# Patient Record
Sex: FEMALE | Race: WHITE | NOT HISPANIC OR LATINO | Employment: OTHER | URBAN - METROPOLITAN AREA
[De-identification: names, ages, dates, MRNs, and addresses within clinical notes are randomized per-mention and may not be internally consistent; named-entity substitution may affect disease eponyms.]

---

## 2017-02-01 ENCOUNTER — HOSPITAL ENCOUNTER (OUTPATIENT)
Dept: RADIOLOGY | Facility: MEDICAL CENTER | Age: 77
Discharge: HOME | End: 2017-02-01
Payer: MEDICARE

## 2017-02-01 ENCOUNTER — ALLSCRIPTS OFFICE VISIT (OUTPATIENT)
Dept: OTHER | Facility: OTHER | Age: 77
End: 2017-02-01

## 2017-02-01 DIAGNOSIS — S82.143A CLOSED DISPLACED BICONDYLAR FRACTURE OF TIBIA: ICD-10-CM

## 2017-02-01 DIAGNOSIS — Z47.89 ENCOUNTER FOR OTHER ORTHOPEDIC AFTERCARE: ICD-10-CM

## 2017-02-01 PROCEDURE — 73560 X-RAY EXAM OF KNEE 1 OR 2: CPT

## 2017-03-02 ENCOUNTER — GENERIC CONVERSION - ENCOUNTER (OUTPATIENT)
Dept: OTHER | Facility: OTHER | Age: 77
End: 2017-03-02

## 2017-03-02 ENCOUNTER — APPOINTMENT (OUTPATIENT)
Dept: PHYSICAL THERAPY | Facility: REHABILITATION | Age: 77
End: 2017-03-02
Payer: MEDICARE

## 2017-03-02 DIAGNOSIS — S82.143A CLOSED DISPLACED BICONDYLAR FRACTURE OF TIBIA: ICD-10-CM

## 2017-03-02 DIAGNOSIS — Z47.89 ENCOUNTER FOR OTHER ORTHOPEDIC AFTERCARE: ICD-10-CM

## 2017-03-02 PROCEDURE — G8979 MOBILITY GOAL STATUS: HCPCS | Performed by: PHYSICAL THERAPIST

## 2017-03-02 PROCEDURE — 97162 PT EVAL MOD COMPLEX 30 MIN: CPT

## 2017-03-02 PROCEDURE — G8978 MOBILITY CURRENT STATUS: HCPCS | Performed by: PHYSICAL THERAPIST

## 2017-03-08 ENCOUNTER — APPOINTMENT (OUTPATIENT)
Dept: PHYSICAL THERAPY | Facility: REHABILITATION | Age: 77
End: 2017-03-08
Payer: MEDICARE

## 2017-03-08 PROCEDURE — 97110 THERAPEUTIC EXERCISES: CPT

## 2017-03-08 PROCEDURE — 97140 MANUAL THERAPY 1/> REGIONS: CPT

## 2017-03-09 ENCOUNTER — APPOINTMENT (OUTPATIENT)
Dept: PHYSICAL THERAPY | Facility: REHABILITATION | Age: 77
End: 2017-03-09
Payer: MEDICARE

## 2017-03-09 ENCOUNTER — TRANSCRIBE ORDERS (OUTPATIENT)
Dept: ADMINISTRATIVE | Facility: HOSPITAL | Age: 77
End: 2017-03-09

## 2017-03-09 DIAGNOSIS — G31.84 MCI (MILD COGNITIVE IMPAIRMENT) WITH MEMORY LOSS: Primary | ICD-10-CM

## 2017-03-09 PROCEDURE — 97140 MANUAL THERAPY 1/> REGIONS: CPT

## 2017-03-09 PROCEDURE — 97110 THERAPEUTIC EXERCISES: CPT

## 2017-03-15 ENCOUNTER — APPOINTMENT (OUTPATIENT)
Dept: PHYSICAL THERAPY | Facility: REHABILITATION | Age: 77
End: 2017-03-15
Payer: MEDICARE

## 2017-03-17 ENCOUNTER — APPOINTMENT (OUTPATIENT)
Dept: PHYSICAL THERAPY | Facility: REHABILITATION | Age: 77
End: 2017-03-17
Payer: MEDICARE

## 2017-03-22 ENCOUNTER — ALLSCRIPTS OFFICE VISIT (OUTPATIENT)
Dept: OTHER | Facility: OTHER | Age: 77
End: 2017-03-22

## 2017-03-22 ENCOUNTER — HOSPITAL ENCOUNTER (OUTPATIENT)
Dept: RADIOLOGY | Facility: MEDICAL CENTER | Age: 77
Discharge: HOME | End: 2017-03-22
Payer: MEDICARE

## 2017-03-22 ENCOUNTER — APPOINTMENT (OUTPATIENT)
Dept: PHYSICAL THERAPY | Facility: REHABILITATION | Age: 77
End: 2017-03-22
Payer: MEDICARE

## 2017-03-22 DIAGNOSIS — S82.143A CLOSED DISPLACED BICONDYLAR FRACTURE OF TIBIA: ICD-10-CM

## 2017-03-22 DIAGNOSIS — Z47.89 ENCOUNTER FOR OTHER ORTHOPEDIC AFTERCARE: ICD-10-CM

## 2017-03-22 PROCEDURE — 73560 X-RAY EXAM OF KNEE 1 OR 2: CPT

## 2017-03-24 ENCOUNTER — APPOINTMENT (OUTPATIENT)
Dept: PHYSICAL THERAPY | Facility: REHABILITATION | Age: 77
End: 2017-03-24
Payer: MEDICARE

## 2017-03-24 PROCEDURE — 97110 THERAPEUTIC EXERCISES: CPT

## 2017-03-24 PROCEDURE — 97140 MANUAL THERAPY 1/> REGIONS: CPT

## 2017-03-28 ENCOUNTER — APPOINTMENT (OUTPATIENT)
Dept: LAB | Facility: CLINIC | Age: 77
End: 2017-03-28
Payer: MEDICARE

## 2017-03-28 ENCOUNTER — HOSPITAL ENCOUNTER (OUTPATIENT)
Dept: CT IMAGING | Facility: HOSPITAL | Age: 77
Discharge: HOME | End: 2017-03-28
Payer: MEDICARE

## 2017-03-28 DIAGNOSIS — G31.84 MCI (MILD COGNITIVE IMPAIRMENT) WITH MEMORY LOSS: ICD-10-CM

## 2017-03-28 LAB
BUN SERPL-MCNC: 14 MG/DL (ref 5–25)
CREAT SERPL-MCNC: 0.78 MG/DL (ref 0.6–1.3)
GFR SERPL CREATININE-BSD FRML MDRD: >60 ML/MIN/1.73SQ M

## 2017-03-28 PROCEDURE — 70450 CT HEAD/BRAIN W/O DYE: CPT

## 2017-03-28 PROCEDURE — 36415 COLL VENOUS BLD VENIPUNCTURE: CPT

## 2017-03-28 PROCEDURE — 82565 ASSAY OF CREATININE: CPT

## 2017-03-28 PROCEDURE — 84520 ASSAY OF UREA NITROGEN: CPT

## 2017-03-29 ENCOUNTER — APPOINTMENT (OUTPATIENT)
Dept: PHYSICAL THERAPY | Facility: REHABILITATION | Age: 77
End: 2017-03-29
Payer: MEDICARE

## 2017-03-30 ENCOUNTER — TRANSCRIBE ORDERS (OUTPATIENT)
Dept: ADMINISTRATIVE | Facility: HOSPITAL | Age: 77
End: 2017-03-30

## 2017-03-30 DIAGNOSIS — R41.0 CONFUSION: Primary | ICD-10-CM

## 2017-03-30 DIAGNOSIS — IMO0002 LUMP: ICD-10-CM

## 2017-03-31 ENCOUNTER — APPOINTMENT (OUTPATIENT)
Dept: PHYSICAL THERAPY | Facility: REHABILITATION | Age: 77
End: 2017-03-31
Payer: MEDICARE

## 2017-03-31 PROCEDURE — G8978 MOBILITY CURRENT STATUS: HCPCS | Performed by: PHYSICAL THERAPIST

## 2017-03-31 PROCEDURE — 97110 THERAPEUTIC EXERCISES: CPT

## 2017-03-31 PROCEDURE — G8979 MOBILITY GOAL STATUS: HCPCS | Performed by: PHYSICAL THERAPIST

## 2017-03-31 PROCEDURE — 97140 MANUAL THERAPY 1/> REGIONS: CPT

## 2017-04-04 ENCOUNTER — APPOINTMENT (OUTPATIENT)
Dept: PHYSICAL THERAPY | Facility: REHABILITATION | Age: 77
End: 2017-04-04
Payer: MEDICARE

## 2017-04-04 PROCEDURE — 97140 MANUAL THERAPY 1/> REGIONS: CPT

## 2017-04-04 PROCEDURE — 97110 THERAPEUTIC EXERCISES: CPT

## 2017-04-06 ENCOUNTER — APPOINTMENT (OUTPATIENT)
Dept: PHYSICAL THERAPY | Facility: REHABILITATION | Age: 77
End: 2017-04-06
Payer: MEDICARE

## 2017-04-06 PROCEDURE — 97110 THERAPEUTIC EXERCISES: CPT

## 2017-04-06 PROCEDURE — 97140 MANUAL THERAPY 1/> REGIONS: CPT

## 2017-04-10 ENCOUNTER — APPOINTMENT (OUTPATIENT)
Dept: PHYSICAL THERAPY | Facility: REHABILITATION | Age: 77
End: 2017-04-10
Payer: MEDICARE

## 2017-04-10 PROCEDURE — 97110 THERAPEUTIC EXERCISES: CPT

## 2017-04-10 PROCEDURE — 97140 MANUAL THERAPY 1/> REGIONS: CPT

## 2017-04-13 ENCOUNTER — APPOINTMENT (OUTPATIENT)
Dept: PHYSICAL THERAPY | Facility: REHABILITATION | Age: 77
End: 2017-04-13
Payer: MEDICARE

## 2017-04-13 PROCEDURE — 97140 MANUAL THERAPY 1/> REGIONS: CPT

## 2017-04-13 PROCEDURE — 97110 THERAPEUTIC EXERCISES: CPT

## 2017-04-17 ENCOUNTER — APPOINTMENT (OUTPATIENT)
Dept: PHYSICAL THERAPY | Facility: REHABILITATION | Age: 77
End: 2017-04-17
Payer: MEDICARE

## 2017-04-17 ENCOUNTER — GENERIC CONVERSION - ENCOUNTER (OUTPATIENT)
Dept: OTHER | Facility: OTHER | Age: 77
End: 2017-04-17

## 2017-04-17 PROCEDURE — 97110 THERAPEUTIC EXERCISES: CPT

## 2017-04-17 PROCEDURE — 97140 MANUAL THERAPY 1/> REGIONS: CPT

## 2017-04-17 PROCEDURE — G8978 MOBILITY CURRENT STATUS: HCPCS

## 2017-04-17 PROCEDURE — G8979 MOBILITY GOAL STATUS: HCPCS

## 2017-04-18 ENCOUNTER — HOSPITAL ENCOUNTER (OUTPATIENT)
Dept: MRI IMAGING | Facility: HOSPITAL | Age: 77
Discharge: HOME | End: 2017-04-18
Payer: MEDICARE

## 2017-04-18 DIAGNOSIS — R41.0 CONFUSION: ICD-10-CM

## 2017-04-18 PROCEDURE — 70551 MRI BRAIN STEM W/O DYE: CPT

## 2017-04-20 ENCOUNTER — APPOINTMENT (OUTPATIENT)
Dept: PHYSICAL THERAPY | Facility: REHABILITATION | Age: 77
End: 2017-04-20
Payer: MEDICARE

## 2017-04-20 PROCEDURE — 97110 THERAPEUTIC EXERCISES: CPT

## 2017-04-20 PROCEDURE — 97140 MANUAL THERAPY 1/> REGIONS: CPT

## 2017-04-24 ENCOUNTER — APPOINTMENT (OUTPATIENT)
Dept: PHYSICAL THERAPY | Facility: REHABILITATION | Age: 77
End: 2017-04-24
Payer: MEDICARE

## 2017-04-24 PROCEDURE — 97140 MANUAL THERAPY 1/> REGIONS: CPT

## 2017-04-24 PROCEDURE — 97110 THERAPEUTIC EXERCISES: CPT

## 2017-04-27 ENCOUNTER — APPOINTMENT (OUTPATIENT)
Dept: PHYSICAL THERAPY | Facility: REHABILITATION | Age: 77
End: 2017-04-27
Payer: MEDICARE

## 2017-04-27 PROCEDURE — 97140 MANUAL THERAPY 1/> REGIONS: CPT

## 2017-04-27 PROCEDURE — 97110 THERAPEUTIC EXERCISES: CPT

## 2017-05-01 ENCOUNTER — APPOINTMENT (OUTPATIENT)
Dept: PHYSICAL THERAPY | Facility: REHABILITATION | Age: 77
End: 2017-05-01
Payer: MEDICARE

## 2017-05-01 PROCEDURE — 97140 MANUAL THERAPY 1/> REGIONS: CPT

## 2017-05-01 PROCEDURE — 97110 THERAPEUTIC EXERCISES: CPT

## 2017-05-04 ENCOUNTER — APPOINTMENT (OUTPATIENT)
Dept: PHYSICAL THERAPY | Facility: REHABILITATION | Age: 77
End: 2017-05-04
Payer: MEDICARE

## 2017-05-04 PROCEDURE — 97140 MANUAL THERAPY 1/> REGIONS: CPT

## 2017-05-04 PROCEDURE — 97110 THERAPEUTIC EXERCISES: CPT

## 2017-05-11 ENCOUNTER — APPOINTMENT (OUTPATIENT)
Dept: PHYSICAL THERAPY | Facility: REHABILITATION | Age: 77
End: 2017-05-11
Payer: MEDICARE

## 2017-05-11 PROCEDURE — 97140 MANUAL THERAPY 1/> REGIONS: CPT

## 2017-05-11 PROCEDURE — 97110 THERAPEUTIC EXERCISES: CPT

## 2017-05-18 ENCOUNTER — APPOINTMENT (OUTPATIENT)
Dept: PHYSICAL THERAPY | Facility: REHABILITATION | Age: 77
End: 2017-05-18
Payer: MEDICARE

## 2017-05-18 ENCOUNTER — GENERIC CONVERSION - ENCOUNTER (OUTPATIENT)
Dept: OTHER | Facility: OTHER | Age: 77
End: 2017-05-18

## 2017-05-18 PROCEDURE — G8978 MOBILITY CURRENT STATUS: HCPCS

## 2017-05-18 PROCEDURE — 97140 MANUAL THERAPY 1/> REGIONS: CPT

## 2017-05-18 PROCEDURE — G8979 MOBILITY GOAL STATUS: HCPCS

## 2017-05-18 PROCEDURE — 97110 THERAPEUTIC EXERCISES: CPT

## 2017-05-25 ENCOUNTER — APPOINTMENT (OUTPATIENT)
Dept: PHYSICAL THERAPY | Facility: REHABILITATION | Age: 77
End: 2017-05-25
Payer: MEDICARE

## 2017-05-25 PROCEDURE — 97110 THERAPEUTIC EXERCISES: CPT

## 2017-05-25 PROCEDURE — 97140 MANUAL THERAPY 1/> REGIONS: CPT

## 2017-06-01 ENCOUNTER — APPOINTMENT (OUTPATIENT)
Dept: PHYSICAL THERAPY | Facility: REHABILITATION | Age: 77
End: 2017-06-01
Payer: MEDICARE

## 2017-06-01 PROCEDURE — 97110 THERAPEUTIC EXERCISES: CPT

## 2017-06-01 PROCEDURE — 97140 MANUAL THERAPY 1/> REGIONS: CPT

## 2017-06-08 ENCOUNTER — APPOINTMENT (OUTPATIENT)
Dept: PHYSICAL THERAPY | Facility: REHABILITATION | Age: 77
End: 2017-06-08
Payer: MEDICARE

## 2017-06-08 PROCEDURE — 97140 MANUAL THERAPY 1/> REGIONS: CPT

## 2017-06-08 PROCEDURE — 97110 THERAPEUTIC EXERCISES: CPT

## 2017-06-15 ENCOUNTER — APPOINTMENT (OUTPATIENT)
Dept: PHYSICAL THERAPY | Facility: REHABILITATION | Age: 77
End: 2017-06-15
Payer: MEDICARE

## 2017-06-15 PROCEDURE — 97110 THERAPEUTIC EXERCISES: CPT

## 2017-06-15 PROCEDURE — 97140 MANUAL THERAPY 1/> REGIONS: CPT

## 2017-06-22 ENCOUNTER — APPOINTMENT (OUTPATIENT)
Dept: PHYSICAL THERAPY | Facility: REHABILITATION | Age: 77
End: 2017-06-22
Payer: MEDICARE

## 2017-06-22 ENCOUNTER — GENERIC CONVERSION - ENCOUNTER (OUTPATIENT)
Dept: OTHER | Facility: OTHER | Age: 77
End: 2017-06-22

## 2017-06-22 PROCEDURE — 97110 THERAPEUTIC EXERCISES: CPT

## 2017-08-03 ENCOUNTER — TRANSCRIBE ORDERS (OUTPATIENT)
Dept: ADMINISTRATIVE | Facility: HOSPITAL | Age: 77
End: 2017-08-03

## 2017-08-03 DIAGNOSIS — G31.84 MCI (MILD COGNITIVE IMPAIRMENT) WITH MEMORY LOSS: Primary | ICD-10-CM

## 2017-08-04 ENCOUNTER — TRANSCRIBE ORDERS (OUTPATIENT)
Dept: LAB | Facility: CLINIC | Age: 77
End: 2017-08-04

## 2017-08-04 ENCOUNTER — APPOINTMENT (OUTPATIENT)
Dept: LAB | Facility: CLINIC | Age: 77
End: 2017-08-04
Payer: MEDICARE

## 2017-08-04 DIAGNOSIS — S09.90XA HEAD INJURY, INITIAL ENCOUNTER: Primary | ICD-10-CM

## 2017-08-04 DIAGNOSIS — S09.90XA HEAD INJURY, INITIAL ENCOUNTER: ICD-10-CM

## 2017-08-04 LAB
BUN SERPL-MCNC: 20 MG/DL (ref 5–25)
CREAT SERPL-MCNC: 0.86 MG/DL (ref 0.6–1.3)
GFR SERPL CREATININE-BSD FRML MDRD: 65 ML/MIN/1.73SQ M

## 2017-08-04 PROCEDURE — 82565 ASSAY OF CREATININE: CPT

## 2017-08-04 PROCEDURE — 84520 ASSAY OF UREA NITROGEN: CPT

## 2017-08-04 PROCEDURE — 36415 COLL VENOUS BLD VENIPUNCTURE: CPT

## 2017-08-24 ENCOUNTER — HOSPITAL ENCOUNTER (OUTPATIENT)
Dept: MRI IMAGING | Facility: HOSPITAL | Age: 77
Discharge: HOME | End: 2017-08-24
Payer: MEDICARE

## 2017-08-24 DIAGNOSIS — G31.84 MCI (MILD COGNITIVE IMPAIRMENT) WITH MEMORY LOSS: ICD-10-CM

## 2017-08-24 PROCEDURE — A9585 GADOBUTROL INJECTION: HCPCS | Performed by: RADIOLOGY

## 2017-08-24 PROCEDURE — 70553 MRI BRAIN STEM W/O & W/DYE: CPT

## 2017-08-24 RX ADMIN — GADOBUTROL 5 ML: 604.72 INJECTION INTRAVENOUS at 15:43

## 2017-09-15 ENCOUNTER — TRANSCRIBE ORDERS (OUTPATIENT)
Dept: LAB | Facility: CLINIC | Age: 77
End: 2017-09-15

## 2017-09-15 ENCOUNTER — APPOINTMENT (OUTPATIENT)
Dept: LAB | Facility: CLINIC | Age: 77
End: 2017-09-15
Payer: MEDICARE

## 2017-09-15 DIAGNOSIS — M10.9 GOUT, UNSPECIFIED CAUSE, UNSPECIFIED CHRONICITY, UNSPECIFIED SITE: ICD-10-CM

## 2017-09-15 DIAGNOSIS — M19.90 OSTEOARTHRITIS, UNSPECIFIED OSTEOARTHRITIS TYPE, UNSPECIFIED SITE: ICD-10-CM

## 2017-09-15 DIAGNOSIS — M19.90 OSTEOARTHRITIS, UNSPECIFIED OSTEOARTHRITIS TYPE, UNSPECIFIED SITE: Primary | ICD-10-CM

## 2017-09-15 LAB
ALBUMIN SERPL BCP-MCNC: 3.3 G/DL (ref 3.5–5)
ALP SERPL-CCNC: 106 U/L (ref 46–116)
ALT SERPL W P-5'-P-CCNC: 27 U/L (ref 12–78)
ANION GAP SERPL CALCULATED.3IONS-SCNC: 7 MMOL/L (ref 4–13)
AST SERPL W P-5'-P-CCNC: 19 U/L (ref 5–45)
BASOPHILS # BLD AUTO: 0.02 THOUSANDS/ΜL (ref 0–0.1)
BASOPHILS NFR BLD AUTO: 0 % (ref 0–1)
BILIRUB SERPL-MCNC: 0.4 MG/DL (ref 0.2–1)
BUN SERPL-MCNC: 16 MG/DL (ref 5–25)
CALCIUM SERPL-MCNC: 8.9 MG/DL (ref 8.3–10.1)
CHLORIDE SERPL-SCNC: 102 MMOL/L (ref 100–108)
CO2 SERPL-SCNC: 28 MMOL/L (ref 21–32)
CREAT SERPL-MCNC: 0.77 MG/DL (ref 0.6–1.3)
CRP SERPL QL: 26 MG/L
EOSINOPHIL # BLD AUTO: 0.15 THOUSAND/ΜL (ref 0–0.61)
EOSINOPHIL NFR BLD AUTO: 3 % (ref 0–6)
ERYTHROCYTE [DISTWIDTH] IN BLOOD BY AUTOMATED COUNT: 13.9 % (ref 11.6–15.1)
ERYTHROCYTE [SEDIMENTATION RATE] IN BLOOD: 33 MM/HOUR (ref 0–20)
EST. AVERAGE GLUCOSE BLD GHB EST-MCNC: 123 MG/DL
GFR SERPL CREATININE-BSD FRML MDRD: 75 ML/MIN/1.73SQ M
GLUCOSE P FAST SERPL-MCNC: 99 MG/DL (ref 65–99)
HBA1C MFR BLD: 5.9 % (ref 4.2–6.3)
HCT VFR BLD AUTO: 38.8 % (ref 34.8–46.1)
HGB BLD-MCNC: 12.6 G/DL (ref 11.5–15.4)
LYMPHOCYTES # BLD AUTO: 1.2 THOUSANDS/ΜL (ref 0.6–4.47)
LYMPHOCYTES NFR BLD AUTO: 22 % (ref 14–44)
MCH RBC QN AUTO: 30.7 PG (ref 26.8–34.3)
MCHC RBC AUTO-ENTMCNC: 32.5 G/DL (ref 31.4–37.4)
MCV RBC AUTO: 94 FL (ref 82–98)
MONOCYTES # BLD AUTO: 0.56 THOUSAND/ΜL (ref 0.17–1.22)
MONOCYTES NFR BLD AUTO: 11 % (ref 4–12)
NEUTROPHILS # BLD AUTO: 3.42 THOUSANDS/ΜL (ref 1.85–7.62)
NEUTS SEG NFR BLD AUTO: 64 % (ref 43–75)
PLATELET # BLD AUTO: 334 THOUSANDS/UL (ref 149–390)
PMV BLD AUTO: 10.4 FL (ref 8.9–12.7)
POTASSIUM SERPL-SCNC: 4.4 MMOL/L (ref 3.5–5.3)
PROT SERPL-MCNC: 7.3 G/DL (ref 6.4–8.2)
RBC # BLD AUTO: 4.11 MILLION/UL (ref 3.81–5.12)
SODIUM SERPL-SCNC: 137 MMOL/L (ref 136–145)
WBC # BLD AUTO: 5.35 THOUSAND/UL (ref 4.31–10.16)

## 2017-09-15 PROCEDURE — 80053 COMPREHEN METABOLIC PANEL: CPT

## 2017-09-15 PROCEDURE — 86140 C-REACTIVE PROTEIN: CPT

## 2017-09-15 PROCEDURE — 85025 COMPLETE CBC W/AUTO DIFF WBC: CPT

## 2017-09-15 PROCEDURE — 83036 HEMOGLOBIN GLYCOSYLATED A1C: CPT

## 2017-09-15 PROCEDURE — 86431 RHEUMATOID FACTOR QUANT: CPT

## 2017-09-15 PROCEDURE — 85652 RBC SED RATE AUTOMATED: CPT

## 2017-09-15 PROCEDURE — 86430 RHEUMATOID FACTOR TEST QUAL: CPT

## 2017-09-15 PROCEDURE — 36415 COLL VENOUS BLD VENIPUNCTURE: CPT

## 2017-09-18 LAB
CRYOGLOB RF SER-ACNC: ABNORMAL [IU]/ML
RHEUMATOID FACT SER QL LA: POSITIVE

## 2017-09-20 ENCOUNTER — GENERIC CONVERSION - ENCOUNTER (OUTPATIENT)
Dept: OTHER | Facility: OTHER | Age: 77
End: 2017-09-20

## 2017-09-21 ENCOUNTER — HOSPITAL ENCOUNTER (EMERGENCY)
Facility: HOSPITAL | Age: 77
Discharge: HOME | End: 2017-09-21
Attending: EMERGENCY MEDICINE
Payer: MEDICARE

## 2017-09-21 ENCOUNTER — APPOINTMENT (EMERGENCY)
Dept: RADIOLOGY | Facility: HOSPITAL | Age: 77
End: 2017-09-21
Payer: MEDICARE

## 2017-09-21 VITALS
BODY MASS INDEX: 18.66 KG/M2 | TEMPERATURE: 98.5 F | SYSTOLIC BLOOD PRESSURE: 174 MMHG | RESPIRATION RATE: 18 BRPM | OXYGEN SATURATION: 98 % | DIASTOLIC BLOOD PRESSURE: 80 MMHG | WEIGHT: 108.69 LBS | HEART RATE: 69 BPM

## 2017-09-21 DIAGNOSIS — M19.90 ARTHRITIS: Primary | ICD-10-CM

## 2017-09-21 PROCEDURE — 99284 EMERGENCY DEPT VISIT MOD MDM: CPT

## 2017-09-21 PROCEDURE — 96372 THER/PROPH/DIAG INJ SC/IM: CPT

## 2017-09-21 PROCEDURE — 73110 X-RAY EXAM OF WRIST: CPT

## 2017-09-21 PROCEDURE — 73562 X-RAY EXAM OF KNEE 3: CPT

## 2017-09-21 RX ORDER — NAPROXEN 375 MG/1
375 TABLET ORAL 2 TIMES DAILY WITH MEALS
Qty: 20 TABLET | Refills: 0 | Status: SHIPPED | OUTPATIENT
Start: 2017-09-21 | End: 2020-01-21

## 2017-09-21 RX ORDER — KETOROLAC TROMETHAMINE 30 MG/ML
15 INJECTION, SOLUTION INTRAMUSCULAR; INTRAVENOUS ONCE
Status: COMPLETED | OUTPATIENT
Start: 2017-09-21 | End: 2017-09-21

## 2017-09-21 RX ORDER — TRAMADOL HYDROCHLORIDE 50 MG/1
50 TABLET ORAL EVERY 6 HOURS PRN
Qty: 20 TABLET | Refills: 0 | Status: SHIPPED | OUTPATIENT
Start: 2017-09-21 | End: 2017-09-26

## 2017-09-21 RX ORDER — OMEPRAZOLE 20 MG/1
20 CAPSULE, DELAYED RELEASE ORAL DAILY
Qty: 20 CAPSULE | Refills: 0 | Status: SHIPPED | OUTPATIENT
Start: 2017-09-21 | End: 2020-07-01

## 2017-09-21 RX ADMIN — KETOROLAC TROMETHAMINE 15 MG: 30 INJECTION, SOLUTION INTRAMUSCULAR at 05:58

## 2018-01-12 VITALS — SYSTOLIC BLOOD PRESSURE: 131 MMHG | RESPIRATION RATE: 18 BRPM | HEART RATE: 69 BPM | DIASTOLIC BLOOD PRESSURE: 70 MMHG

## 2018-01-22 VITALS
RESPIRATION RATE: 18 BRPM | SYSTOLIC BLOOD PRESSURE: 116 MMHG | WEIGHT: 102.25 LBS | BODY MASS INDEX: 17.55 KG/M2 | HEART RATE: 78 BPM | DIASTOLIC BLOOD PRESSURE: 67 MMHG

## 2018-10-11 ENCOUNTER — TRANSCRIBE ORDERS (OUTPATIENT)
Dept: LAB | Facility: CLINIC | Age: 78
End: 2018-10-11

## 2018-10-11 ENCOUNTER — APPOINTMENT (OUTPATIENT)
Dept: LAB | Facility: CLINIC | Age: 78
End: 2018-10-11
Payer: MEDICARE

## 2018-10-11 DIAGNOSIS — M81.0 OSTEOPOROSIS, UNSPECIFIED OSTEOPOROSIS TYPE, UNSPECIFIED PATHOLOGICAL FRACTURE PRESENCE: ICD-10-CM

## 2018-10-11 DIAGNOSIS — C50.919 MALIGNANT NEOPLASM OF FEMALE BREAST, UNSPECIFIED ESTROGEN RECEPTOR STATUS, UNSPECIFIED LATERALITY, UNSPECIFIED SITE OF BREAST (HCC): Primary | ICD-10-CM

## 2018-10-11 DIAGNOSIS — M51.36 DDD (DEGENERATIVE DISC DISEASE), LUMBAR: ICD-10-CM

## 2018-10-11 DIAGNOSIS — C50.919 MALIGNANT NEOPLASM OF FEMALE BREAST, UNSPECIFIED ESTROGEN RECEPTOR STATUS, UNSPECIFIED LATERALITY, UNSPECIFIED SITE OF BREAST (HCC): ICD-10-CM

## 2018-10-11 LAB
25(OH)D3 SERPL-MCNC: 18.3 NG/ML (ref 30–100)
ALBUMIN SERPL BCP-MCNC: 3.3 G/DL (ref 3.5–5)
ALP SERPL-CCNC: 141 U/L (ref 46–116)
ALT SERPL W P-5'-P-CCNC: 33 U/L (ref 12–78)
ANION GAP SERPL CALCULATED.3IONS-SCNC: 9 MMOL/L (ref 4–13)
AST SERPL W P-5'-P-CCNC: 22 U/L (ref 5–45)
BILIRUB SERPL-MCNC: 0.2 MG/DL (ref 0.2–1)
BUN SERPL-MCNC: 16 MG/DL (ref 5–25)
CALCIUM SERPL-MCNC: 8.9 MG/DL (ref 8.3–10.1)
CHLORIDE SERPL-SCNC: 101 MMOL/L (ref 100–108)
CHOLEST SERPL-MCNC: 217 MG/DL (ref 50–200)
CO2 SERPL-SCNC: 27 MMOL/L (ref 21–32)
CREAT SERPL-MCNC: 0.96 MG/DL (ref 0.6–1.3)
ERYTHROCYTE [DISTWIDTH] IN BLOOD BY AUTOMATED COUNT: 13.9 % (ref 11.6–15.1)
GFR SERPL CREATININE-BSD FRML MDRD: 56 ML/MIN/1.73SQ M
GLUCOSE P FAST SERPL-MCNC: 139 MG/DL (ref 65–99)
HCT VFR BLD AUTO: 40 % (ref 34.8–46.1)
HDLC SERPL-MCNC: 113 MG/DL (ref 40–60)
HGB BLD-MCNC: 13 G/DL (ref 11.5–15.4)
LDLC SERPL CALC-MCNC: 96 MG/DL (ref 0–100)
MCH RBC QN AUTO: 32.7 PG (ref 26.8–34.3)
MCHC RBC AUTO-ENTMCNC: 32.5 G/DL (ref 31.4–37.4)
MCV RBC AUTO: 101 FL (ref 82–98)
NONHDLC SERPL-MCNC: 104 MG/DL
PLATELET # BLD AUTO: 311 THOUSANDS/UL (ref 149–390)
PMV BLD AUTO: 10.4 FL (ref 8.9–12.7)
POTASSIUM SERPL-SCNC: 4.6 MMOL/L (ref 3.5–5.3)
PROT SERPL-MCNC: 7.4 G/DL (ref 6.4–8.2)
RBC # BLD AUTO: 3.97 MILLION/UL (ref 3.81–5.12)
SODIUM SERPL-SCNC: 137 MMOL/L (ref 136–145)
TRIGL SERPL-MCNC: 41 MG/DL
VIT B12 SERPL-MCNC: 589 PG/ML (ref 100–900)
WBC # BLD AUTO: 9.08 THOUSAND/UL (ref 4.31–10.16)

## 2018-10-11 PROCEDURE — 80061 LIPID PANEL: CPT

## 2018-10-11 PROCEDURE — 82607 VITAMIN B-12: CPT

## 2018-10-11 PROCEDURE — 82306 VITAMIN D 25 HYDROXY: CPT

## 2018-10-11 PROCEDURE — 80053 COMPREHEN METABOLIC PANEL: CPT

## 2018-10-11 PROCEDURE — 85027 COMPLETE CBC AUTOMATED: CPT

## 2018-10-11 PROCEDURE — 36415 COLL VENOUS BLD VENIPUNCTURE: CPT

## 2019-04-12 ENCOUNTER — TRANSCRIBE ORDERS (OUTPATIENT)
Dept: PHYSICAL THERAPY | Facility: REHABILITATION | Age: 79
End: 2019-04-12

## 2019-04-12 ENCOUNTER — EVALUATION (OUTPATIENT)
Dept: PHYSICAL THERAPY | Facility: REHABILITATION | Age: 79
End: 2019-04-12
Payer: MEDICARE

## 2019-04-12 DIAGNOSIS — M19.031 OSTEOARTHRITIS OF BOTH WRISTS, UNSPECIFIED OSTEOARTHRITIS TYPE: Primary | ICD-10-CM

## 2019-04-12 DIAGNOSIS — M19.032 OSTEOARTHRITIS OF BOTH WRISTS, UNSPECIFIED OSTEOARTHRITIS TYPE: Primary | ICD-10-CM

## 2019-04-12 DIAGNOSIS — M06.9 RHEUMATOID ARTHRITIS INVOLVING BOTH HANDS, UNSPECIFIED RHEUMATOID FACTOR PRESENCE: ICD-10-CM

## 2019-04-12 PROCEDURE — 97110 THERAPEUTIC EXERCISES: CPT | Performed by: PHYSICAL THERAPIST

## 2019-04-12 PROCEDURE — 97161 PT EVAL LOW COMPLEX 20 MIN: CPT | Performed by: PHYSICAL THERAPIST

## 2019-04-17 ENCOUNTER — OFFICE VISIT (OUTPATIENT)
Dept: PHYSICAL THERAPY | Facility: REHABILITATION | Age: 79
End: 2019-04-17
Payer: MEDICARE

## 2019-04-17 DIAGNOSIS — M19.032 OSTEOARTHRITIS OF BOTH WRISTS, UNSPECIFIED OSTEOARTHRITIS TYPE: Primary | ICD-10-CM

## 2019-04-17 DIAGNOSIS — M19.031 OSTEOARTHRITIS OF BOTH WRISTS, UNSPECIFIED OSTEOARTHRITIS TYPE: Primary | ICD-10-CM

## 2019-04-17 DIAGNOSIS — M06.9 RHEUMATOID ARTHRITIS INVOLVING BOTH HANDS, UNSPECIFIED RHEUMATOID FACTOR PRESENCE: ICD-10-CM

## 2019-04-17 PROCEDURE — 97110 THERAPEUTIC EXERCISES: CPT | Performed by: PHYSICAL THERAPIST

## 2019-04-19 ENCOUNTER — APPOINTMENT (OUTPATIENT)
Dept: PHYSICAL THERAPY | Facility: REHABILITATION | Age: 79
End: 2019-04-19
Payer: MEDICARE

## 2019-04-19 ENCOUNTER — OFFICE VISIT (OUTPATIENT)
Dept: PHYSICAL THERAPY | Facility: REHABILITATION | Age: 79
End: 2019-04-19
Payer: MEDICARE

## 2019-04-19 DIAGNOSIS — M19.032 OSTEOARTHRITIS OF BOTH WRISTS, UNSPECIFIED OSTEOARTHRITIS TYPE: Primary | ICD-10-CM

## 2019-04-19 DIAGNOSIS — M19.031 OSTEOARTHRITIS OF BOTH WRISTS, UNSPECIFIED OSTEOARTHRITIS TYPE: Primary | ICD-10-CM

## 2019-04-19 DIAGNOSIS — M06.9 RHEUMATOID ARTHRITIS INVOLVING BOTH HANDS, UNSPECIFIED RHEUMATOID FACTOR PRESENCE: ICD-10-CM

## 2019-04-19 PROCEDURE — 97140 MANUAL THERAPY 1/> REGIONS: CPT | Performed by: PHYSICAL THERAPIST

## 2019-04-19 PROCEDURE — 97110 THERAPEUTIC EXERCISES: CPT | Performed by: PHYSICAL THERAPIST

## 2019-04-22 ENCOUNTER — OFFICE VISIT (OUTPATIENT)
Dept: PHYSICAL THERAPY | Facility: REHABILITATION | Age: 79
End: 2019-04-22
Payer: MEDICARE

## 2019-04-22 DIAGNOSIS — M06.9 RHEUMATOID ARTHRITIS INVOLVING BOTH HANDS, UNSPECIFIED RHEUMATOID FACTOR PRESENCE: ICD-10-CM

## 2019-04-22 DIAGNOSIS — M19.031 OSTEOARTHRITIS OF BOTH WRISTS, UNSPECIFIED OSTEOARTHRITIS TYPE: Primary | ICD-10-CM

## 2019-04-22 DIAGNOSIS — M19.032 OSTEOARTHRITIS OF BOTH WRISTS, UNSPECIFIED OSTEOARTHRITIS TYPE: Primary | ICD-10-CM

## 2019-04-22 PROCEDURE — 97110 THERAPEUTIC EXERCISES: CPT | Performed by: PHYSICAL THERAPIST

## 2019-04-22 PROCEDURE — 97140 MANUAL THERAPY 1/> REGIONS: CPT | Performed by: PHYSICAL THERAPIST

## 2019-04-22 PROCEDURE — 97112 NEUROMUSCULAR REEDUCATION: CPT | Performed by: PHYSICAL THERAPIST

## 2019-04-25 ENCOUNTER — APPOINTMENT (OUTPATIENT)
Dept: PHYSICAL THERAPY | Facility: REHABILITATION | Age: 79
End: 2019-04-25
Payer: MEDICARE

## 2019-04-25 ENCOUNTER — OFFICE VISIT (OUTPATIENT)
Dept: PHYSICAL THERAPY | Facility: REHABILITATION | Age: 79
End: 2019-04-25
Payer: MEDICARE

## 2019-04-25 DIAGNOSIS — M19.031 OSTEOARTHRITIS OF BOTH WRISTS, UNSPECIFIED OSTEOARTHRITIS TYPE: Primary | ICD-10-CM

## 2019-04-25 DIAGNOSIS — M19.032 OSTEOARTHRITIS OF BOTH WRISTS, UNSPECIFIED OSTEOARTHRITIS TYPE: Primary | ICD-10-CM

## 2019-04-25 DIAGNOSIS — M06.9 RHEUMATOID ARTHRITIS INVOLVING BOTH HANDS, UNSPECIFIED RHEUMATOID FACTOR PRESENCE: ICD-10-CM

## 2019-04-25 PROCEDURE — 97140 MANUAL THERAPY 1/> REGIONS: CPT

## 2019-04-25 PROCEDURE — 97110 THERAPEUTIC EXERCISES: CPT

## 2019-04-25 PROCEDURE — 97112 NEUROMUSCULAR REEDUCATION: CPT

## 2019-04-29 ENCOUNTER — OFFICE VISIT (OUTPATIENT)
Dept: PHYSICAL THERAPY | Facility: REHABILITATION | Age: 79
End: 2019-04-29
Payer: MEDICARE

## 2019-04-29 DIAGNOSIS — M06.9 RHEUMATOID ARTHRITIS INVOLVING BOTH HANDS, UNSPECIFIED RHEUMATOID FACTOR PRESENCE: ICD-10-CM

## 2019-04-29 DIAGNOSIS — M19.031 OSTEOARTHRITIS OF BOTH WRISTS, UNSPECIFIED OSTEOARTHRITIS TYPE: Primary | ICD-10-CM

## 2019-04-29 DIAGNOSIS — M19.032 OSTEOARTHRITIS OF BOTH WRISTS, UNSPECIFIED OSTEOARTHRITIS TYPE: Primary | ICD-10-CM

## 2019-04-29 PROCEDURE — 97140 MANUAL THERAPY 1/> REGIONS: CPT | Performed by: PHYSICAL THERAPIST

## 2019-04-29 PROCEDURE — 97110 THERAPEUTIC EXERCISES: CPT | Performed by: PHYSICAL THERAPIST

## 2019-05-02 ENCOUNTER — OFFICE VISIT (OUTPATIENT)
Dept: PHYSICAL THERAPY | Facility: REHABILITATION | Age: 79
End: 2019-05-02
Payer: MEDICARE

## 2019-05-02 DIAGNOSIS — M19.032 OSTEOARTHRITIS OF BOTH WRISTS, UNSPECIFIED OSTEOARTHRITIS TYPE: Primary | ICD-10-CM

## 2019-05-02 DIAGNOSIS — M06.9 RHEUMATOID ARTHRITIS INVOLVING BOTH HANDS, UNSPECIFIED RHEUMATOID FACTOR PRESENCE: ICD-10-CM

## 2019-05-02 DIAGNOSIS — M19.031 OSTEOARTHRITIS OF BOTH WRISTS, UNSPECIFIED OSTEOARTHRITIS TYPE: Primary | ICD-10-CM

## 2019-05-02 PROCEDURE — 97110 THERAPEUTIC EXERCISES: CPT

## 2019-05-02 PROCEDURE — 97140 MANUAL THERAPY 1/> REGIONS: CPT

## 2019-05-02 PROCEDURE — 97112 NEUROMUSCULAR REEDUCATION: CPT

## 2019-05-06 ENCOUNTER — OFFICE VISIT (OUTPATIENT)
Dept: PHYSICAL THERAPY | Facility: REHABILITATION | Age: 79
End: 2019-05-06
Payer: MEDICARE

## 2019-05-06 DIAGNOSIS — M19.031 OSTEOARTHRITIS OF BOTH WRISTS, UNSPECIFIED OSTEOARTHRITIS TYPE: Primary | ICD-10-CM

## 2019-05-06 DIAGNOSIS — M06.9 RHEUMATOID ARTHRITIS INVOLVING BOTH HANDS, UNSPECIFIED RHEUMATOID FACTOR PRESENCE: ICD-10-CM

## 2019-05-06 DIAGNOSIS — M19.032 OSTEOARTHRITIS OF BOTH WRISTS, UNSPECIFIED OSTEOARTHRITIS TYPE: Primary | ICD-10-CM

## 2019-05-06 PROCEDURE — 97140 MANUAL THERAPY 1/> REGIONS: CPT | Performed by: PHYSICAL THERAPIST

## 2019-05-06 PROCEDURE — 97110 THERAPEUTIC EXERCISES: CPT | Performed by: PHYSICAL THERAPIST

## 2019-05-09 ENCOUNTER — OFFICE VISIT (OUTPATIENT)
Dept: PHYSICAL THERAPY | Facility: REHABILITATION | Age: 79
End: 2019-05-09
Payer: MEDICARE

## 2019-05-09 DIAGNOSIS — M06.9 RHEUMATOID ARTHRITIS INVOLVING BOTH HANDS, UNSPECIFIED RHEUMATOID FACTOR PRESENCE: ICD-10-CM

## 2019-05-09 DIAGNOSIS — M19.032 OSTEOARTHRITIS OF BOTH WRISTS, UNSPECIFIED OSTEOARTHRITIS TYPE: Primary | ICD-10-CM

## 2019-05-09 DIAGNOSIS — M19.031 OSTEOARTHRITIS OF BOTH WRISTS, UNSPECIFIED OSTEOARTHRITIS TYPE: Primary | ICD-10-CM

## 2019-05-09 PROCEDURE — 97110 THERAPEUTIC EXERCISES: CPT | Performed by: PHYSICAL THERAPIST

## 2019-05-09 PROCEDURE — 97140 MANUAL THERAPY 1/> REGIONS: CPT | Performed by: PHYSICAL THERAPIST

## 2019-05-13 ENCOUNTER — EVALUATION (OUTPATIENT)
Dept: PHYSICAL THERAPY | Facility: REHABILITATION | Age: 79
End: 2019-05-13
Payer: MEDICARE

## 2019-05-13 DIAGNOSIS — M19.031 OSTEOARTHRITIS OF BOTH WRISTS, UNSPECIFIED OSTEOARTHRITIS TYPE: Primary | ICD-10-CM

## 2019-05-13 DIAGNOSIS — M19.032 OSTEOARTHRITIS OF BOTH WRISTS, UNSPECIFIED OSTEOARTHRITIS TYPE: Primary | ICD-10-CM

## 2019-05-13 DIAGNOSIS — M06.9 RHEUMATOID ARTHRITIS INVOLVING BOTH HANDS, UNSPECIFIED RHEUMATOID FACTOR PRESENCE: ICD-10-CM

## 2019-05-13 PROCEDURE — 97140 MANUAL THERAPY 1/> REGIONS: CPT | Performed by: PHYSICAL THERAPIST

## 2019-05-14 ENCOUNTER — OFFICE VISIT (OUTPATIENT)
Dept: PODIATRY | Facility: CLINIC | Age: 79
End: 2019-05-14
Payer: MEDICARE

## 2019-05-14 VITALS
HEIGHT: 63 IN | WEIGHT: 112 LBS | BODY MASS INDEX: 19.84 KG/M2 | SYSTOLIC BLOOD PRESSURE: 128 MMHG | DIASTOLIC BLOOD PRESSURE: 83 MMHG

## 2019-05-14 DIAGNOSIS — M19.071 ARTHRITIS OF RIGHT FOOT: Primary | ICD-10-CM

## 2019-05-14 DIAGNOSIS — L60.3 NAIL DYSTROPHY: Primary | ICD-10-CM

## 2019-05-14 PROCEDURE — NCFTCARE PR NON-COVERED FOOT CARE: Performed by: PODIATRIST

## 2019-05-14 RX ORDER — PREDNISONE 1 MG/1
TABLET ORAL
COMMUNITY
Start: 2018-10-31 | End: 2020-10-28

## 2019-05-14 RX ORDER — ASPIRIN 81 MG/1
81 TABLET, CHEWABLE ORAL DAILY
COMMUNITY
End: 2020-12-02

## 2019-05-14 RX ORDER — DOXYCYCLINE HYCLATE 100 MG
TABLET ORAL
COMMUNITY
Start: 2019-01-16 | End: 2020-04-15 | Stop reason: SDUPTHER

## 2019-05-16 ENCOUNTER — OFFICE VISIT (OUTPATIENT)
Dept: PHYSICAL THERAPY | Facility: REHABILITATION | Age: 79
End: 2019-05-16
Payer: MEDICARE

## 2019-05-16 DIAGNOSIS — M19.031 OSTEOARTHRITIS OF BOTH WRISTS, UNSPECIFIED OSTEOARTHRITIS TYPE: Primary | ICD-10-CM

## 2019-05-16 DIAGNOSIS — M19.032 OSTEOARTHRITIS OF BOTH WRISTS, UNSPECIFIED OSTEOARTHRITIS TYPE: Primary | ICD-10-CM

## 2019-05-16 DIAGNOSIS — M06.9 RHEUMATOID ARTHRITIS INVOLVING BOTH HANDS, UNSPECIFIED RHEUMATOID FACTOR PRESENCE: ICD-10-CM

## 2019-05-16 PROCEDURE — 97140 MANUAL THERAPY 1/> REGIONS: CPT | Performed by: PHYSICAL THERAPIST

## 2019-05-20 ENCOUNTER — OFFICE VISIT (OUTPATIENT)
Dept: PHYSICAL THERAPY | Facility: REHABILITATION | Age: 79
End: 2019-05-20
Payer: MEDICARE

## 2019-05-20 DIAGNOSIS — M06.9 RHEUMATOID ARTHRITIS INVOLVING BOTH HANDS, UNSPECIFIED RHEUMATOID FACTOR PRESENCE: ICD-10-CM

## 2019-05-20 DIAGNOSIS — M19.032 OSTEOARTHRITIS OF BOTH WRISTS, UNSPECIFIED OSTEOARTHRITIS TYPE: Primary | ICD-10-CM

## 2019-05-20 DIAGNOSIS — M19.031 OSTEOARTHRITIS OF BOTH WRISTS, UNSPECIFIED OSTEOARTHRITIS TYPE: Primary | ICD-10-CM

## 2019-05-20 PROCEDURE — 97140 MANUAL THERAPY 1/> REGIONS: CPT | Performed by: PHYSICAL THERAPIST

## 2019-05-20 PROCEDURE — 97110 THERAPEUTIC EXERCISES: CPT | Performed by: PHYSICAL THERAPIST

## 2019-05-21 ENCOUNTER — TRANSCRIBE ORDERS (OUTPATIENT)
Dept: ADMINISTRATIVE | Facility: HOSPITAL | Age: 79
End: 2019-05-21

## 2019-05-21 DIAGNOSIS — R31.9 URINARY TRACT INFECTION WITH HEMATURIA, SITE UNSPECIFIED: Primary | ICD-10-CM

## 2019-05-21 DIAGNOSIS — N39.0 URINARY TRACT INFECTION WITH HEMATURIA, SITE UNSPECIFIED: Primary | ICD-10-CM

## 2019-05-23 ENCOUNTER — OFFICE VISIT (OUTPATIENT)
Dept: PHYSICAL THERAPY | Facility: REHABILITATION | Age: 79
End: 2019-05-23
Payer: MEDICARE

## 2019-05-23 DIAGNOSIS — M19.032 OSTEOARTHRITIS OF BOTH WRISTS, UNSPECIFIED OSTEOARTHRITIS TYPE: Primary | ICD-10-CM

## 2019-05-23 DIAGNOSIS — M19.031 OSTEOARTHRITIS OF BOTH WRISTS, UNSPECIFIED OSTEOARTHRITIS TYPE: Primary | ICD-10-CM

## 2019-05-23 DIAGNOSIS — M06.9 RHEUMATOID ARTHRITIS INVOLVING BOTH HANDS, UNSPECIFIED RHEUMATOID FACTOR PRESENCE: ICD-10-CM

## 2019-05-23 PROCEDURE — 97110 THERAPEUTIC EXERCISES: CPT | Performed by: PHYSICAL THERAPIST

## 2019-05-23 PROCEDURE — 97140 MANUAL THERAPY 1/> REGIONS: CPT | Performed by: PHYSICAL THERAPIST

## 2019-05-28 ENCOUNTER — HOSPITAL ENCOUNTER (OUTPATIENT)
Dept: ULTRASOUND IMAGING | Facility: HOSPITAL | Age: 79
Discharge: HOME | End: 2019-05-28
Payer: MEDICARE

## 2019-05-28 DIAGNOSIS — R31.9 URINARY TRACT INFECTION WITH HEMATURIA, SITE UNSPECIFIED: ICD-10-CM

## 2019-05-28 DIAGNOSIS — N39.0 URINARY TRACT INFECTION WITH HEMATURIA, SITE UNSPECIFIED: ICD-10-CM

## 2019-05-28 PROCEDURE — 76770 US EXAM ABDO BACK WALL COMP: CPT

## 2019-05-30 ENCOUNTER — OFFICE VISIT (OUTPATIENT)
Dept: PHYSICAL THERAPY | Facility: REHABILITATION | Age: 79
End: 2019-05-30
Payer: MEDICARE

## 2019-05-30 DIAGNOSIS — M06.9 RHEUMATOID ARTHRITIS INVOLVING BOTH HANDS, UNSPECIFIED RHEUMATOID FACTOR PRESENCE: ICD-10-CM

## 2019-05-30 DIAGNOSIS — M19.032 OSTEOARTHRITIS OF BOTH WRISTS, UNSPECIFIED OSTEOARTHRITIS TYPE: Primary | ICD-10-CM

## 2019-05-30 DIAGNOSIS — M19.031 OSTEOARTHRITIS OF BOTH WRISTS, UNSPECIFIED OSTEOARTHRITIS TYPE: Primary | ICD-10-CM

## 2019-05-30 PROCEDURE — 97112 NEUROMUSCULAR REEDUCATION: CPT | Performed by: PHYSICAL THERAPY ASSISTANT

## 2019-05-30 PROCEDURE — 97140 MANUAL THERAPY 1/> REGIONS: CPT | Performed by: PHYSICAL THERAPY ASSISTANT

## 2019-05-30 PROCEDURE — 97110 THERAPEUTIC EXERCISES: CPT | Performed by: PHYSICAL THERAPY ASSISTANT

## 2019-06-03 ENCOUNTER — OFFICE VISIT (OUTPATIENT)
Dept: PHYSICAL THERAPY | Facility: REHABILITATION | Age: 79
End: 2019-06-03
Payer: MEDICARE

## 2019-06-03 DIAGNOSIS — M19.032 OSTEOARTHRITIS OF BOTH WRISTS, UNSPECIFIED OSTEOARTHRITIS TYPE: Primary | ICD-10-CM

## 2019-06-03 DIAGNOSIS — M06.9 RHEUMATOID ARTHRITIS INVOLVING BOTH HANDS, UNSPECIFIED RHEUMATOID FACTOR PRESENCE: ICD-10-CM

## 2019-06-03 DIAGNOSIS — M19.031 OSTEOARTHRITIS OF BOTH WRISTS, UNSPECIFIED OSTEOARTHRITIS TYPE: Primary | ICD-10-CM

## 2019-06-03 PROCEDURE — 97110 THERAPEUTIC EXERCISES: CPT | Performed by: PHYSICAL THERAPIST

## 2019-06-03 PROCEDURE — 97140 MANUAL THERAPY 1/> REGIONS: CPT | Performed by: PHYSICAL THERAPIST

## 2019-06-04 ENCOUNTER — TELEPHONE (OUTPATIENT)
Dept: PODIATRY | Facility: CLINIC | Age: 79
End: 2019-06-04

## 2019-06-06 ENCOUNTER — OFFICE VISIT (OUTPATIENT)
Dept: PHYSICAL THERAPY | Facility: REHABILITATION | Age: 79
End: 2019-06-06
Payer: MEDICARE

## 2019-06-06 DIAGNOSIS — M19.031 OSTEOARTHRITIS OF BOTH WRISTS, UNSPECIFIED OSTEOARTHRITIS TYPE: Primary | ICD-10-CM

## 2019-06-06 DIAGNOSIS — M19.032 OSTEOARTHRITIS OF BOTH WRISTS, UNSPECIFIED OSTEOARTHRITIS TYPE: Primary | ICD-10-CM

## 2019-06-06 DIAGNOSIS — M06.9 RHEUMATOID ARTHRITIS INVOLVING BOTH HANDS, UNSPECIFIED RHEUMATOID FACTOR PRESENCE: ICD-10-CM

## 2019-06-06 PROCEDURE — 97140 MANUAL THERAPY 1/> REGIONS: CPT | Performed by: PHYSICAL THERAPIST

## 2019-06-06 PROCEDURE — 97110 THERAPEUTIC EXERCISES: CPT | Performed by: PHYSICAL THERAPIST

## 2019-06-10 ENCOUNTER — OFFICE VISIT (OUTPATIENT)
Dept: PHYSICAL THERAPY | Facility: REHABILITATION | Age: 79
End: 2019-06-10
Payer: MEDICARE

## 2019-06-10 DIAGNOSIS — M19.031 OSTEOARTHRITIS OF BOTH WRISTS, UNSPECIFIED OSTEOARTHRITIS TYPE: Primary | ICD-10-CM

## 2019-06-10 DIAGNOSIS — M19.032 OSTEOARTHRITIS OF BOTH WRISTS, UNSPECIFIED OSTEOARTHRITIS TYPE: Primary | ICD-10-CM

## 2019-06-10 DIAGNOSIS — M06.9 RHEUMATOID ARTHRITIS INVOLVING BOTH HANDS, UNSPECIFIED RHEUMATOID FACTOR PRESENCE: ICD-10-CM

## 2019-06-10 PROCEDURE — 97140 MANUAL THERAPY 1/> REGIONS: CPT | Performed by: PHYSICAL THERAPIST

## 2019-06-10 PROCEDURE — 97110 THERAPEUTIC EXERCISES: CPT | Performed by: PHYSICAL THERAPIST

## 2019-06-13 ENCOUNTER — OFFICE VISIT (OUTPATIENT)
Dept: PHYSICAL THERAPY | Facility: REHABILITATION | Age: 79
End: 2019-06-13
Payer: MEDICARE

## 2019-06-13 DIAGNOSIS — M06.9 RHEUMATOID ARTHRITIS INVOLVING BOTH HANDS, UNSPECIFIED RHEUMATOID FACTOR PRESENCE: ICD-10-CM

## 2019-06-13 DIAGNOSIS — M19.031 OSTEOARTHRITIS OF BOTH WRISTS, UNSPECIFIED OSTEOARTHRITIS TYPE: Primary | ICD-10-CM

## 2019-06-13 DIAGNOSIS — M19.032 OSTEOARTHRITIS OF BOTH WRISTS, UNSPECIFIED OSTEOARTHRITIS TYPE: Primary | ICD-10-CM

## 2019-06-13 PROCEDURE — 97110 THERAPEUTIC EXERCISES: CPT

## 2019-06-13 PROCEDURE — 97140 MANUAL THERAPY 1/> REGIONS: CPT

## 2019-06-17 ENCOUNTER — OFFICE VISIT (OUTPATIENT)
Dept: PHYSICAL THERAPY | Facility: REHABILITATION | Age: 79
End: 2019-06-17
Payer: MEDICARE

## 2019-06-17 DIAGNOSIS — M19.032 OSTEOARTHRITIS OF BOTH WRISTS, UNSPECIFIED OSTEOARTHRITIS TYPE: Primary | ICD-10-CM

## 2019-06-17 DIAGNOSIS — M06.9 RHEUMATOID ARTHRITIS INVOLVING BOTH HANDS, UNSPECIFIED RHEUMATOID FACTOR PRESENCE: ICD-10-CM

## 2019-06-17 DIAGNOSIS — M19.031 OSTEOARTHRITIS OF BOTH WRISTS, UNSPECIFIED OSTEOARTHRITIS TYPE: Primary | ICD-10-CM

## 2019-06-17 PROCEDURE — 97110 THERAPEUTIC EXERCISES: CPT | Performed by: PHYSICAL THERAPIST

## 2019-06-17 PROCEDURE — 97140 MANUAL THERAPY 1/> REGIONS: CPT | Performed by: PHYSICAL THERAPIST

## 2019-06-20 ENCOUNTER — OFFICE VISIT (OUTPATIENT)
Dept: PHYSICAL THERAPY | Facility: REHABILITATION | Age: 79
End: 2019-06-20
Payer: MEDICARE

## 2019-06-20 DIAGNOSIS — M06.9 RHEUMATOID ARTHRITIS INVOLVING BOTH HANDS, UNSPECIFIED RHEUMATOID FACTOR PRESENCE: ICD-10-CM

## 2019-06-20 DIAGNOSIS — M19.032 OSTEOARTHRITIS OF BOTH WRISTS, UNSPECIFIED OSTEOARTHRITIS TYPE: Primary | ICD-10-CM

## 2019-06-20 DIAGNOSIS — M19.031 OSTEOARTHRITIS OF BOTH WRISTS, UNSPECIFIED OSTEOARTHRITIS TYPE: Primary | ICD-10-CM

## 2019-06-20 PROCEDURE — 97110 THERAPEUTIC EXERCISES: CPT

## 2019-06-20 PROCEDURE — 97140 MANUAL THERAPY 1/> REGIONS: CPT

## 2019-06-24 ENCOUNTER — EVALUATION (OUTPATIENT)
Dept: PHYSICAL THERAPY | Facility: REHABILITATION | Age: 79
End: 2019-06-24
Payer: MEDICARE

## 2019-06-24 ENCOUNTER — TRANSCRIBE ORDERS (OUTPATIENT)
Dept: PHYSICAL THERAPY | Facility: REHABILITATION | Age: 79
End: 2019-06-24

## 2019-06-24 DIAGNOSIS — M19.031 OSTEOARTHRITIS OF BOTH WRISTS, UNSPECIFIED OSTEOARTHRITIS TYPE: Primary | ICD-10-CM

## 2019-06-24 DIAGNOSIS — M19.032 OSTEOARTHRITIS OF BOTH WRISTS, UNSPECIFIED OSTEOARTHRITIS TYPE: Primary | ICD-10-CM

## 2019-06-24 DIAGNOSIS — M06.9 RHEUMATOID ARTHRITIS INVOLVING BOTH HANDS, UNSPECIFIED RHEUMATOID FACTOR PRESENCE: ICD-10-CM

## 2019-06-24 PROCEDURE — 97140 MANUAL THERAPY 1/> REGIONS: CPT | Performed by: PHYSICAL THERAPIST

## 2019-06-27 ENCOUNTER — OFFICE VISIT (OUTPATIENT)
Dept: PHYSICAL THERAPY | Facility: REHABILITATION | Age: 79
End: 2019-06-27
Payer: MEDICARE

## 2019-06-27 DIAGNOSIS — M19.032 OSTEOARTHRITIS OF BOTH WRISTS, UNSPECIFIED OSTEOARTHRITIS TYPE: Primary | ICD-10-CM

## 2019-06-27 DIAGNOSIS — M06.9 RHEUMATOID ARTHRITIS INVOLVING BOTH HANDS, UNSPECIFIED RHEUMATOID FACTOR PRESENCE: ICD-10-CM

## 2019-06-27 DIAGNOSIS — M19.031 OSTEOARTHRITIS OF BOTH WRISTS, UNSPECIFIED OSTEOARTHRITIS TYPE: Primary | ICD-10-CM

## 2019-06-27 PROCEDURE — 97140 MANUAL THERAPY 1/> REGIONS: CPT

## 2019-06-27 PROCEDURE — 97112 NEUROMUSCULAR REEDUCATION: CPT

## 2019-06-27 PROCEDURE — 97110 THERAPEUTIC EXERCISES: CPT

## 2019-07-05 ENCOUNTER — OFFICE VISIT (OUTPATIENT)
Dept: PHYSICAL THERAPY | Facility: REHABILITATION | Age: 79
End: 2019-07-05
Payer: MEDICARE

## 2019-07-05 VITALS — HEART RATE: 68 BPM | DIASTOLIC BLOOD PRESSURE: 69 MMHG | SYSTOLIC BLOOD PRESSURE: 103 MMHG

## 2019-07-05 DIAGNOSIS — M06.9 RHEUMATOID ARTHRITIS INVOLVING BOTH HANDS, UNSPECIFIED RHEUMATOID FACTOR PRESENCE: ICD-10-CM

## 2019-07-05 DIAGNOSIS — M19.032 OSTEOARTHRITIS OF BOTH WRISTS, UNSPECIFIED OSTEOARTHRITIS TYPE: Primary | ICD-10-CM

## 2019-07-05 DIAGNOSIS — M19.031 OSTEOARTHRITIS OF BOTH WRISTS, UNSPECIFIED OSTEOARTHRITIS TYPE: Primary | ICD-10-CM

## 2019-07-05 PROCEDURE — 97110 THERAPEUTIC EXERCISES: CPT | Performed by: PHYSICAL THERAPIST

## 2019-07-05 PROCEDURE — 97140 MANUAL THERAPY 1/> REGIONS: CPT | Performed by: PHYSICAL THERAPIST

## 2019-07-05 NOTE — PROGRESS NOTES
Daily Note     Today's date: 2019  Patient name: Roxane Silverman  : 1940  MRN: [id number]  Referring provider: Trupti Angel DO  Dx:   Encounter Diagnosis     ICD-10-CM    1  Osteoarthritis of both wrists, unspecified osteoarthritis type M19 031     M19 032    2  Rheumatoid arthritis involving both hands, unspecified rheumatoid factor presence ([facility] [address] ) M06 9        Start Time: 1115  Stop Time: 1210  Total time in clinic (min): 55 minutes    Subjective: Patient reports no significant changes to overall status since her previous treatment session  Objective: See treatment diary below      Assessment: Tolerated treatment well  Patient demonstrated fatigue post treatment, exhibited good technique with therapeutic exercises and would benefit from continued PT  Patient with improved tolerance for manual stretching  No significant changes to overall status otherwise  Plan: Continue per plan of care  Progress treatment as tolerated         Precautions: h/o breast CA, asthma, osteoporosis, h/o TIA     Daily Treatment Diary      Manual     Bilateral wrist, hand ROM GR GR         LK   Reassessment  GR                                                                                                   Exercise Diary     Putty gripping                       Digiflex gripping: all   20x Yellow b/l         20x yellow b/l   Putty marble pick out               Thread the needle    2x b/l                   Tendon glides                       Wrist flexion, extension                 Forearm pronation, supination with hammer   20x b/l         20x b/l   Wrist extensor str                Prayer str             5x30"   Flexbar supination            Red x20   Flexbar flexion, extension               Yellow theraband  with supination               Yellow theraband thumb opposition with 2nd and 3rd digits                        wrist flexion & ext    20x 2# b/l              20x  Yellow web digit flexion/ext                  20x                                                                                                                                 Modalities  6/24 7/5 6/20    (b/l hands) - pre 13' 15'         10'

## 2019-07-08 ENCOUNTER — APPOINTMENT (OUTPATIENT)
Dept: RADIOLOGY | Facility: CLINIC | Age: 79
End: 2019-07-08
Payer: MEDICARE

## 2019-07-08 ENCOUNTER — TELEPHONE (OUTPATIENT)
Dept: OBGYN CLINIC | Facility: HOSPITAL | Age: 79
End: 2019-07-08

## 2019-07-08 ENCOUNTER — OFFICE VISIT (OUTPATIENT)
Dept: RHEUMATOLOGY | Facility: CLINIC | Age: 79
End: 2019-07-08
Payer: MEDICARE

## 2019-07-08 VITALS
HEIGHT: 63 IN | HEART RATE: 69 BPM | BODY MASS INDEX: 18.64 KG/M2 | SYSTOLIC BLOOD PRESSURE: 134 MMHG | DIASTOLIC BLOOD PRESSURE: 71 MMHG | WEIGHT: 105.2 LBS

## 2019-07-08 DIAGNOSIS — M05.9 SEROPOSITIVE RHEUMATOID ARTHRITIS (HCC): ICD-10-CM

## 2019-07-08 DIAGNOSIS — M81.0 AGE-RELATED OSTEOPOROSIS WITHOUT CURRENT PATHOLOGICAL FRACTURE: ICD-10-CM

## 2019-07-08 DIAGNOSIS — Z79.52 CURRENT USE OF STEROID MEDICATION: ICD-10-CM

## 2019-07-08 DIAGNOSIS — Z79.899 HIGH RISK MEDICATION USE: ICD-10-CM

## 2019-07-08 DIAGNOSIS — M05.9 SEROPOSITIVE RHEUMATOID ARTHRITIS (HCC): Primary | ICD-10-CM

## 2019-07-08 DIAGNOSIS — Z11.59 ENCOUNTER FOR SCREENING FOR OTHER VIRAL DISEASES: ICD-10-CM

## 2019-07-08 PROCEDURE — 99205 OFFICE O/P NEW HI 60 MIN: CPT | Performed by: INTERNAL MEDICINE

## 2019-07-08 PROCEDURE — 72050 X-RAY EXAM NECK SPINE 4/5VWS: CPT

## 2019-07-08 RX ORDER — LEFLUNOMIDE 20 MG/1
20 TABLET ORAL DAILY
Qty: 30 TABLET | Refills: 2 | Status: SHIPPED | OUTPATIENT
Start: 2019-07-08 | End: 2020-10-28

## 2019-07-08 NOTE — TELEPHONE ENCOUNTER
[address] [facility] patient says she could not tolerate methotrexate and leflunomide copay is too high for her  Is there any way we can help with patient assistance or a copay card? Or if we can check with her insurance which rheumatoid medications would be covered with a minimal copay  Please let her know we are looking at other options and we will get back to her  She can continue only the prednisone for now

## 2019-07-08 NOTE — PROGRESS NOTES
Assessment and Plan:   Ms Yair Ludwig is a 79-year-old  female with history significant for seropositive rheumatoid arthritis (positive rheumatoid factor, with evidence of erosive arthropathy), osteoporosis not on antiresorptive medications, and breast cancer diagnosed 10 years ago in remission, who presents for further management of the rheumatoid arthritis  She is referred by Dr Jojo Alaniz  She is transitioning care from Dr Rickie Bates     # Seropositive rheumatoid arthritis  - Onel Reinoso presents today for further management of rheumatoid arthritis, that was diagnosed approximately 2 years ago  She does have evidence of erosive arthropathy, as well as joint deformities on her physical examination  She is currently only managing the rheumatoid arthritis with prednisone 5 mg daily as needed  She was previously intolerant to methotrexate, and has not been on alternate DMARDs  - I did discuss with her at this time that rheumatoid arthritis can continue to progress if the inflammation is uncontrolled, and can potentially cause future joint damage as well as deformities  To prevent this and avoid chronic steroid use (especially in view of the history of osteoporosis), I would like to start her on DMARDs  I do not anticipate hydroxychloroquine would be beneficial to her as the potency may be more mild, and would like to start her on oral leflunomide 20 mg daily  I reviewed the side effects with her including the risk for infections, GI side effects, as well as need for bone marrow/liver/kidney monitoring  I also provided her with additional reading information  She would like to check with the pharmacy prior to picking up the prescription, as she is anxious about the co-pay  I advised her to let me know if the co-pay amount is too high so we can consider alternate medications  In the interim she can continue to take the prednisone 5 mg daily as needed    - I also worry about the occurrence of cervical spine involvement with the uncontrolled rheumatoid arthritis, and would like to obtain a cervical spine x-ray with flexion-extension views to further evaluate  # Osteoporosis  - I will repeat a DEXA scan and determine further management based off of this  I advised her to continue calcium and vitamin-D supplements daily  Plan:  Diagnoses and all orders for this visit:    Seropositive rheumatoid arthritis ([facility] [address] )  -     XR spine cervical complete 4 or 5 vw non injury; Future  -     leflunomide (ARAVA) 20 MG tablet; Take 1 tablet (20 mg total) by mouth daily  -     CBC and differential; Future  -     Comprehensive metabolic panel; Future  -     Cyclic citrul peptide antibody, IgG; Future  -     Chronic Hepatitis Panel; Future    Age-related osteoporosis without current pathological fracture  -     DXA bone density spine hip and pelvis; Future    Encounter for screening for other viral diseases   -     Chronic Hepatitis Panel; Future    High risk medication use    Current use of steroid medication      I have personally reviewed pertinent films in PACS which shows degenerative changes of bilateral hands, as well as small erosions at the bilateral 2nd MCPs  Activities as tolerated    Diet: low carb/low fat, more greens/vegetables, adequate hydration  Exercise: try to maintain a low impact exercise regimen as much as possible  Walk for 30 minutes a day for at least 3 days a week    Encouraged to maintain good sleep hygiene  Continue other medications as prescribed by PCP and other specialists        RTC in 3 months  HPI  Ms Hi Rodriguez is a 34-year-old  female with history significant for seropositive rheumatoid arthritis (positive rheumatoid factor, with evidence of erosive arthropathy), osteoporosis not on antiresorptive medications, and breast cancer diagnosed 10 years ago in remission, who presents for further management of the rheumatoid arthritis  She is referred by Dr Garrett Mcclellan    She is transitioning care from Dr Valentino Hankins     Patient states she was diagnosed with rheumatoid arthritis approximately 2 years ago, but recalls experiencing joint pains for many years prior to that  She did have x-rays of her hands done in 2014 which revealed evidence of erosive changes at the bilateral 2nd MCPs  She had been seeing Dr Valentino Hankins, but is currently transitioning care to [facility]  At the time of diagnosis she was started on low doses of prednisone, as well as methotrexate up to 5 tablets weekly  She was on the methotrexate for approximately 1 year, and discontinued it 3 months ago  There was no benefit in her joint pains noted with the methotrexate  She encountered multiple side effects including weight loss, hair loss and recurrent urinary infections  She has not been on any other DMARDs  She does have a prescription of prednisone which she takes 5 mg daily as needed, or when she has a flare-up of the joint pains  She states in the past 1 week she may have taken it 2-3 times  It does help her with the joint pains  For her joint pain she is also on aspirin 81 mg daily, as well as topical Voltaren gel that she applies on her hands and feet, which she states helps her significantly  In terms of her joint pains they are predominantly affecting her hands and feet at this time  She did recently complete a course of physical therapy for her hands and wrists which did help  She also describes pain affecting her wrists and ankles  She denies any significant pain in her elbows, shoulders, hips or knees  Recently she has been experiencing pain in her neck with limitation in range of motion  She notes swelling affecting her hands and feet  She does not experience any morning stiffness which is significant, and she states with use of the topical Voltaren gel at bedtime it really helps her through the night and in the morning      Of note she does have a history of osteoporosis, but states she has not had a DEXA scan done in many years  She does take calcium and vitamin-D supplements, and there had previously been discussion regarding initiation of Prolia, but this was not started  She has never been on antiresorptive medications  She has sustained a fracture of her knee in the past which was attributed to an injury  She denies spontaneous fractures  She reports a family history of rheumatoid arthritis in her mother and father  No other complaints noted at this time  The following portions of the patient's history were reviewed and updated as appropriate: allergies, current medications, past family history, past medical history, past social history, past surgical history and problem list       Review of Systems  Constitutional: Negative for weight change, fevers, chills, night sweats, fatigue  ENT/Mouth: Negative for hearing changes, ear pain, nasal congestion, sinus pain, hoarseness, sore throat, rhinorrhea, swallowing difficulty  Eyes: Negative for pain, redness, discharge, vision changes  Cardiovascular: Negative for chest pain, SOB, palpitations  Respiratory: Negative for cough, sputum, wheezing, dyspnea  Gastrointestinal: Negative for nausea, vomiting, diarrhea, constipation, pain, heartburn  Genitourinary: Negative for dysuria, urinary frequency, hematuria  Musculoskeletal: As per HPI  Skin: Negative for skin rash, color changes  Neuro: Negative for weakness, numbness, tingling, loss of consciousness  Psych: Negative for anxiety, depression  Positive for difficulty concentrating  Heme/Lymph: Negative for easy bruising, bleeding, lymphadenopathy          Past Medical History:   Diagnosis Date    Allergic     Anxiety     Arthritis     Cancer ([facility] [address] )     HLD (hyperlipidemia)     Hypertension     Hyperthyroidism     Osteoporosis     Stroke [facility])        Past Surgical History:   Procedure Laterality Date    BREAST LUMPECTOMY Left     HYSTERECTOMY      KNEE SURGERY      ORIF TIBIA FRACTURE Right 12/8/2016    Procedure: OPEN REDUCTION W/ INTERNAL FIXATION (ORIF) TIBIA;  Surgeon: Kory Echols MD;  Location: [facility];  Service:        Social History     Socioeconomic History    Marital status:       Spouse name: Not on file    Number of children: Not on file    Years of education: Not on file    Highest education level: Not on file   Occupational History    Not on file   Social Needs    Financial resource strain: Not on file    Food insecurity:     Worry: Not on file     Inability: Not on file    Transportation needs:     Medical: Not on file     Non-medical: Not on file   Tobacco Use    Smoking status: Never Smoker    Smokeless tobacco: Never Used   Substance and Sexual Activity    Alcohol use: No    Drug use: No    Sexual activity: Not on file   Lifestyle    Physical activity:     Days per week: Not on file     Minutes per session: Not on file    Stress: Not on file   Relationships    Social connections:     Talks on phone: Not on file     Gets together: Not on file     Attends [employer] service: Not on file     Active member of club or organization: Not on file     Attends meetings of clubs or organizations: Not on file     Relationship status: Not on file    Intimate partner violence:     Fear of current or ex partner: Not on file     Emotionally abused: Not on file     Physically abused: Not on file     Forced sexual activity: Not on file   Other Topics Concern    Not on file   Social History Narrative    [facility]       Family History   Problem Relation Age of Onset    Coronary artery disease Family     Other Family         DJD       Allergies   Allergen Reactions    Penicillins Swelling    Sulfa Antibiotics Swelling       Current Outpatient Medications:     albuterol (PROVENTIL HFA,VENTOLIN HFA) 90 mcg/act inhaler, Inhale 2 puffs every 6 (six) hours as needed for wheezing, Disp: , Rfl:     aspirin 81 mg chewable tablet, Chew 81 mg daily, Disp: , Rfl:     Calcium Carbonate-Vitamin D (CALCIUM-VITAMIN D3 PO), Take by mouth, Disp: , Rfl:     cyanocobalamin 50 MCG tablet, Take 500 mcg by mouth daily, Disp: , Rfl:     diclofenac sodium (VOLTAREN) 1 %, , Disp: , Rfl: 0    diclofenac sodium (VOLTAREN) 1 %, Apply 2 g topically 4 (four) times a day for 90 days, Disp: 240 g, Rfl: 2    doxycycline hyclate (VIBRA-TABS) 100 mg tablet, take 1 tablet by mouth once daily, PRN, Disp: , Rfl:     leflunomide (ARAVA) 20 MG tablet, Take 1 tablet (20 mg total) by mouth daily, Disp: 30 tablet, Rfl: 2    multivitamin (THERAGRAN) TABS, Take 1 tablet by mouth daily, Disp: , Rfl:     naproxen (NAPROSYN) 375 mg tablet, Take 1 tablet by mouth 2 (two) times a day with meals, Disp: 20 tablet, Rfl: 0    omeprazole (PriLOSEC) 20 mg delayed release capsule, Take 1 capsule by mouth daily for 20 days, Disp: 20 capsule, Rfl: 0    Pirbuterol Acetate (MAXAIR AUTOHALER IN), Inhale one time by mouth every four hours as needed, Disp: , Rfl:     predniSONE 5 mg tablet, TAKE 1/2 -1 TABLET BY MOUTH DAILY, PRN, Disp: , Rfl:       Objective:    Vitals:    07/08/19 1308   BP: 134/71   Pulse: 69   Weight: 47 7 kg (105 lb 3 2 oz)   Height: 5' 3" (1 6 m)       Physical Exam  General: Well appearing, well nourished, in no distress  Oriented x 3, normal mood and affect  Ambulating without difficulty  Skin: Good turgor, no rash, unusual bruising or prominent lesions  Hair: Normal texture and distribution  Nails: Normal color, no deformities  HEENT:  Head: Normocephalic, atraumatic  Eyes: Conjunctiva clear, sclera non-icteric, EOM intact  Nose: No external lesions, mucosa non-inflamed  Mouth: Mucous membranes moist, no mucosal lesions  Neck: Supple, thyroid non-enlarged and non-tender  No lymphadenopathy  Extremities: No amputations or deformities, cyanosis, edema    Musculoskeletal:   Hands - there is diffuse osteoarthritic changes present at her bilateral DIPs and PIPs  She has mild swan-neck deformities present at the bilateral 2nd digits  There is bilateral 2nd MCP hypertrophy noted  She has synovitis present at her 2nd through 5th PIPs on the right hand  The 3rd through 5th MCPs on the right hand are unremarkable  There is soft tissue swelling with tenderness present at the left hand 2nd and 5th MCPs  There is soft tissue swelling present at her left hand 2nd through 5th PIPs  She is able to make full fists bilaterally  There is mild subluxation noted at her bilateral thumb IP joints  Wrists - the left wrist is unremarkable  There is synovial hypertrophy noted at the right wrist without tenderness  She has limitation in right wrist extension, as well as with full wrist flexion  Elbows and shoulders - unremarkable  Knees - there is no soft tissue swelling or tenderness noted  Ankles - there is mild soft tissue swelling present bilaterally with tenderness to palpation of the left ankle  Feet - positive MTP squeeze test bilaterally  She has bunion deformities present bilaterally  Cervical spine - tender to palpation in the upper region  She has significant limitation in range of motion in all directions  Neurologic: Alert and oriented  No focal neurological deficits appreciated  Psychiatric: Normal mood and affect  SAGAR Garcia    Rheumatology

## 2019-07-08 NOTE — TELEPHONE ENCOUNTER
Patient called in and said the medication leflunomide is too expensive  Patient said that she does have prednisone that she can take for now  Please advise      # [phone]

## 2019-07-08 NOTE — PATIENT INSTRUCTIONS
Please take 1000 mg of calcium daily and 1000 IU of Vitamin D daily  Please have labs done at [address]  Please schedule bone density test   Please check with your pharmacy what the co-pay for leflunomide is  Let me know if the amount is high and we can find another suitable medication  Leflunomide (By mouth)   Leflunomide ([id number]-mide)  Treats rheumatoid arthritis  Brand Name(s): Arava   There may be other brand names for this medicine  When This Medicine Should Not Be Used: This medicine is not right for everyone  Do not use it if you had an allergic reaction to leflunomide, or if you are pregnant or breastfeeding  How to Use This Medicine:   Tablet  · Take your medicine as directed  Your dose may need to be changed several times to find what works best for you  · Missed dose: Take a dose as soon as you remember  If it is almost time for your next dose, wait until then and take a regular dose  Do not take extra medicine to make up for a missed dose  · Store the medicine in a closed container at room temperature, away from heat, moisture, and direct light  Drugs and Foods to Avoid:   Ask your doctor or pharmacist before using any other medicine, including over-the-counter medicines, vitamins, and herbal products  · Do not use this medicine if you are also using teriflunomide  · Some medicines can affect how leflunomide works  Tell your doctor if you are using any of the following:  ¨ Alosetron, cefaclor, cimetidine, ciprofloxacin, duloxetine, furosemide, ketoprofen, methotrexate, mitoxantrone, paclitaxel, penicillin G, pioglitazone, rifampin, rosiglitazone, theophylline, tizanidine, warfarin, zidovudine  ¨ Birth control pills  ¨ Medicines that weaken your immune system, including a steroid or cancer medicines  ¨ Statin medicine (including atorvastatin, nateglinide, pravastatin, repaglinide, simvastatin)  · Ask your doctor before you get a flu shot or any other vaccines   You should not receive live virus vaccines while you are taking this medicine  Warnings While Using This Medicine:   · It is not safe to take this medicine during pregnancy  It could harm an unborn baby  Tell your doctor right away if you become pregnant  · Tell your doctor if you have kidney disease, liver disease, diabetes, any infection, lung disease, or a history of tuberculosis or blood or bone marrow problems  · This medicine may cause the following problems:  ¨ Liver problems  ¨ Serious skin reactions, including drug reaction with eosinophilia and systemic reaction which can cause organ damage  ¨ Possible increased risk of cancer  ¨ Nerve problems  ¨ Lung problems  · This medicine may make you bleed, bruise, or get infections more easily  Take precautions to prevent illness and injury  Wash your hands often  · Your doctor will do lab tests at regular visits to check on the effects of this medicine  Keep all appointments  Your doctor will also check your blood pressure  · Keep all medicine out of the reach of children  Never share your medicine with anyone  Possible Side Effects While Using This Medicine:   Call your doctor right away if you notice any of these side effects:  · Allergic reaction: Itching or hives, swelling in your face or hands, swelling or tingling in your mouth or throat, chest tightness, trouble breathing  · Blistering, peeling, or red skin rash  · Chest pain or discomfort, trouble breathing  · Dark urine or pale stools, nausea, vomiting, loss of appetite, stomach pain, yellow eyes or skin  · Fever, chills, cough, sore throat, body aches  · Numbness, tingling, or burning pain in your hands, arms, legs, or feet  · Unusual bleeding, bruising, or weakness  If you notice these less serious side effects, talk with your doctor:   · Diarrhea  · Hair loss  If you notice other side effects that you think are caused by this medicine, tell your doctor     Call your doctor for medical advice about side effects  You may report side effects to FDA at 8-934-FDA-5571  © 2017 [address] [employer] Information is for End User's use only and may not be sold, redistributed or otherwise used for commercial purposes  The above information is an  only  It is not intended as medical advice for individual conditions or treatments  Talk to your doctor, nurse or pharmacist before following any medical regimen to see if it is safe and effective for you

## 2019-07-09 ENCOUNTER — TELEPHONE (OUTPATIENT)
Dept: RHEUMATOLOGY | Facility: CLINIC | Age: 79
End: 2019-07-09

## 2019-07-09 DIAGNOSIS — M05.9 SEROPOSITIVE RHEUMATOID ARTHRITIS (HCC): Primary | ICD-10-CM

## 2019-07-09 DIAGNOSIS — Z11.59 ENCOUNTER FOR SCREENING FOR OTHER VIRAL DISEASES: ICD-10-CM

## 2019-07-09 DIAGNOSIS — Z79.899 HIGH RISK MEDICATION USE: ICD-10-CM

## 2019-07-09 DIAGNOSIS — Z79.52 CURRENT USE OF STEROID MEDICATION: ICD-10-CM

## 2019-07-09 DIAGNOSIS — M81.0 AGE-RELATED OSTEOPOROSIS WITHOUT CURRENT PATHOLOGICAL FRACTURE: ICD-10-CM

## 2019-07-09 NOTE — TELEPHONE ENCOUNTER
Please let her know there were degenerative changes at multiple levels in her cervical spine  Would she like to be seen by [facility] for treatment options?

## 2019-07-09 NOTE — TELEPHONE ENCOUNTER
Called and spoke with patient  She stated with her "urinary issues" right now she is skeptical to take a lot of meds and thinks she would like to stay off of leflunomide or other medications and just continue with the prednisone 5mg daily  She also stated her hands are working much better and wants to know if she should continue PT? She stated she currently goes twice a week and wanted to know if she should keep up with that, go down to once a week, or stop it

## 2019-07-09 NOTE — TELEPHONE ENCOUNTER
Please let her know that is fine, but she should be aware that with the uncontrolled inflammation  it can lead to further joint damage, as we discussed yesterday  She can continue PT if it is helping her 1-2 times weekly at the therapists discretion   Thx

## 2019-07-10 ENCOUNTER — APPOINTMENT (OUTPATIENT)
Dept: PHYSICAL THERAPY | Facility: REHABILITATION | Age: 79
End: 2019-07-10
Payer: MEDICARE

## 2019-07-12 ENCOUNTER — OFFICE VISIT (OUTPATIENT)
Dept: PHYSICAL THERAPY | Facility: REHABILITATION | Age: 79
End: 2019-07-12
Payer: MEDICARE

## 2019-07-12 ENCOUNTER — DOCUMENTATION (OUTPATIENT)
Dept: PHYSICAL THERAPY | Facility: OTHER | Age: 79
End: 2019-07-12

## 2019-07-12 DIAGNOSIS — M19.031 OSTEOARTHRITIS OF BOTH WRISTS, UNSPECIFIED OSTEOARTHRITIS TYPE: Primary | ICD-10-CM

## 2019-07-12 DIAGNOSIS — M06.9 RHEUMATOID ARTHRITIS INVOLVING BOTH HANDS, UNSPECIFIED RHEUMATOID FACTOR PRESENCE: ICD-10-CM

## 2019-07-12 DIAGNOSIS — M19.032 OSTEOARTHRITIS OF BOTH WRISTS, UNSPECIFIED OSTEOARTHRITIS TYPE: Primary | ICD-10-CM

## 2019-07-12 PROCEDURE — 97140 MANUAL THERAPY 1/> REGIONS: CPT | Performed by: PHYSICAL THERAPIST

## 2019-07-12 PROCEDURE — 97110 THERAPEUTIC EXERCISES: CPT | Performed by: PHYSICAL THERAPIST

## 2019-07-12 NOTE — PROGRESS NOTES
Daily Note     Today's date: 2019  Patient name: Darion Vera  : 1940  MRN: [id number]  Referring provider: Brian Fields DO  Dx:   Encounter Diagnosis     ICD-10-CM    1  Osteoarthritis of both wrists, unspecified osteoarthritis type M19 031     M19 032    2  Rheumatoid arthritis involving both hands, unspecified [facility] M06 9        Start Time: 1055  Stop Time: 1200  Total time in clinic (min): 65 minutes    Subjective: Patient reports that she went to a rheumatologist who ordered blood work, a cervical spine radiograph and a DEXA scan  Patient feels like her hands are more loose  Objective: See treatment diary below      Assessment: Tolerated treatment well  Patient demonstrated fatigue post treatment and would benefit from continued PT  Reviewed results of Patient's appointment with her rheumatologist  Patient continues to present with improved dexterity and ROM of hands bilaterally  Plan: Continue per plan of care  Progress treatment as tolerated         Precautions: h/o breast CA, asthma, osteoporosis, h/o TIA     Daily Treatment Diary      Manual     Bilateral wrist, hand ROM GR GR GR        LK   Reassessment  GR                                                                                                   Exercise Diary     Putty gripping                       Digiflex gripping: all   20x Yellow b/l 20x yellow b/l        20x yellow b/l   Putty marble pick out               Thread the needle    2x b/l                   Tendon glides                       Wrist flexion, extension                 Forearm pronation, supination with hammer   20x b/l 20x b/l        20x b/l   Wrist extensor str                Prayer str             5x30"   Flexbar supination            Red x20   Flexbar flexion, extension               Yellow theraband  with supination               Yellow theraband thumb opposition with 2nd and 3rd digits                        wrist flexion & ext    20x 2# b/l  20x 2# b/l            20x    Yellow web digit flexion/ext                  20x                                                                                                                                 Modalities  6/24 7/5 7/12 6/20    (b/l hands) - pre 13' 15' 15'        10'

## 2019-07-15 ENCOUNTER — OFFICE VISIT (OUTPATIENT)
Dept: PHYSICAL THERAPY | Facility: REHABILITATION | Age: 79
End: 2019-07-15
Payer: MEDICARE

## 2019-07-15 DIAGNOSIS — M06.9 RHEUMATOID ARTHRITIS INVOLVING BOTH HANDS, UNSPECIFIED RHEUMATOID FACTOR PRESENCE: ICD-10-CM

## 2019-07-15 DIAGNOSIS — M19.031 OSTEOARTHRITIS OF BOTH WRISTS, UNSPECIFIED OSTEOARTHRITIS TYPE: Primary | ICD-10-CM

## 2019-07-15 DIAGNOSIS — M19.032 OSTEOARTHRITIS OF BOTH WRISTS, UNSPECIFIED OSTEOARTHRITIS TYPE: Primary | ICD-10-CM

## 2019-07-15 PROCEDURE — 97140 MANUAL THERAPY 1/> REGIONS: CPT | Performed by: PHYSICAL THERAPIST

## 2019-07-15 PROCEDURE — 97110 THERAPEUTIC EXERCISES: CPT | Performed by: PHYSICAL THERAPIST

## 2019-07-15 NOTE — PROGRESS NOTES
Daily Note     Today's date: 7/15/2019  Patient name: Darion Vera  : 1940  MRN: [id number]  Referring provider: Brian Fields DO  Dx:   Encounter Diagnosis     ICD-10-CM    1  Osteoarthritis of both wrists, unspecified osteoarthritis type M19 031     M19 032    2  Rheumatoid arthritis involving both hands, unspecified rheumatoid factor presence ([address] ) M06 9        Start Time: 1145  Stop Time: 1255  Total time in clinic (min): 70 minutes    Subjective: 1 - The Voltaren gel really helps  2 - I am much better since I started PT  Objective: See treatment diary below  Assessment: Tolerated treatment well  Plan: Continue per plan of care  Progress treatment as tolerated         Precautions: h/o breast CA, asthma, osteoporosis, h/o TIA     Daily Treatment Diary      Manual  6/24 7/5 7/12 7/15       6/20   Bilateral wrist, hand ROM GR GR GR LMR       LK   Reassessment  GR                                              gr 4 digit 2-4 LAD        LMR                gr 2 PIP dig 2-4 med & lat gapping       LMR                     Exercise Diary  6/24 7/5 7/12 7/15       6/20   Putty gripping                       Digiflex gripping: all   20x Yellow b/l 20x yellow b/l 20 - yellow       20x yellow b/l   Putty marble pick out               Thread the needle    2x b/l    x2               Tendon glides                       Wrist flexion, extension                 Forearm pronation, supination with hammer   20x b/l 20x b/l        20x b/l   Wrist extensor str                Prayer str             5x30"   Flexbar supination            Red x20   Flexbar flexion, extension               Yellow theraband  with supination               Yellow theraband thumb opposition with 2nd and 3rd digits                        wrist flexion & ext    20x 2# b/l  20x 2# b/l  2# - 15          20x    Yellow web digit flexion/ext                  20x                                                                                                                                 Modalities  6/24 7/5 7/12 7/15       6/20    (b/l hands) - pre 13' 15' 15' 15'       10'

## 2019-07-18 ENCOUNTER — OFFICE VISIT (OUTPATIENT)
Dept: PHYSICAL THERAPY | Facility: REHABILITATION | Age: 79
End: 2019-07-18
Payer: MEDICARE

## 2019-07-18 DIAGNOSIS — M06.9 RHEUMATOID ARTHRITIS INVOLVING BOTH HANDS, UNSPECIFIED RHEUMATOID FACTOR PRESENCE: ICD-10-CM

## 2019-07-18 DIAGNOSIS — M19.032 OSTEOARTHRITIS OF BOTH WRISTS, UNSPECIFIED OSTEOARTHRITIS TYPE: Primary | ICD-10-CM

## 2019-07-18 DIAGNOSIS — M19.031 OSTEOARTHRITIS OF BOTH WRISTS, UNSPECIFIED OSTEOARTHRITIS TYPE: Primary | ICD-10-CM

## 2019-07-18 PROCEDURE — 97140 MANUAL THERAPY 1/> REGIONS: CPT

## 2019-07-18 PROCEDURE — 97110 THERAPEUTIC EXERCISES: CPT

## 2019-07-18 NOTE — PROGRESS NOTES
Daily Note     Today's date: 2019  Patient name: Juan M Smith  : 1940  MRN: [id number]  Referring provider: Car Lanza DO  Dx:   Encounter Diagnosis     ICD-10-CM    1  Osteoarthritis of both wrists, unspecified osteoarthritis type M19 031     M19 032    2  Rheumatoid arthritis involving both hands, unspecified rheumatoid factor presence ([facility] [address] ) M06 9        Start Time: 1130  Stop Time: 1200   Total time in clinic (min): 30 minutes    Subjective: "I was able to do a lot of dishes, even my wrists feel better"     Objective: See treatment diary below  Assessment: Tolerated treatment well  Continues to be limited passively with all digit flex/ext  No c/o pain this session during below interventions  Plan: Continue per plan of care  Progress treatment as tolerated         Precautions: h/o breast CA, asthma, osteoporosis, h/o TIA     Daily Treatment Diary      Manual  6/24 7/5 7/12 7/15 07/18        Bilateral wrist, hand ROM GR GR GR LMR LNK        Reassessment  GR                                            gr 4 digit 2-4 LAD        LMR               gr 2 PIP dig 2-4 med & lat gapping       LMR                    Exercise Diary  6/24 7/5 7/12 7/15 07/18        Putty gripping                      Digiflex gripping: all   20x Yellow b/l 20x yellow b/l 20 - yellow 20- yellow b/l         Putty marble pick out              Thread the needle    2x b/l    x2              Tendon glides                      Wrist flexion, extension                Forearm pronation, supination with hammer   20x b/l 20x b/l  20x   b/l        Wrist extensor str               Prayer str               Flexbar supination              Flexbar flexion, extension              Yellow theraband  with supination              Yellow theraband thumb opposition with 2nd and 3rd digits                       wrist flexion & ext    20x 2# b/l  20x 2# b/l  2# - 15  2#- 15           Yellow web digit flexion/ext                                                                                                                                             Modalities  6/24 7/5 7/12 7/15 07/18      6/20    (b/l hands) - pre 15' 15' 15' 15' 15'      10'

## 2019-07-19 ENCOUNTER — APPOINTMENT (OUTPATIENT)
Dept: LAB | Facility: CLINIC | Age: 79
End: 2019-07-19
Payer: MEDICARE

## 2019-07-19 DIAGNOSIS — M05.9 SEROPOSITIVE RHEUMATOID ARTHRITIS (HCC): ICD-10-CM

## 2019-07-19 DIAGNOSIS — Z11.59 ENCOUNTER FOR SCREENING FOR OTHER VIRAL DISEASES: ICD-10-CM

## 2019-07-19 LAB
ALBUMIN SERPL BCP-MCNC: 3.2 G/DL (ref 3.5–5)
ALP SERPL-CCNC: 145 U/L (ref 46–116)
ALT SERPL W P-5'-P-CCNC: 32 U/L (ref 12–78)
ANION GAP SERPL CALCULATED.3IONS-SCNC: 8 MMOL/L (ref 4–13)
AST SERPL W P-5'-P-CCNC: 22 U/L (ref 5–45)
BASOPHILS # BLD AUTO: 0.04 THOUSANDS/ΜL (ref 0–0.1)
BASOPHILS NFR BLD AUTO: 1 % (ref 0–1)
BILIRUB SERPL-MCNC: 0.3 MG/DL (ref 0.2–1)
BUN SERPL-MCNC: 18 MG/DL (ref 5–25)
CALCIUM SERPL-MCNC: 8.8 MG/DL (ref 8.3–10.1)
CHLORIDE SERPL-SCNC: 104 MMOL/L (ref 100–108)
CO2 SERPL-SCNC: 27 MMOL/L (ref 21–32)
CREAT SERPL-MCNC: 0.84 MG/DL (ref 0.6–1.3)
EOSINOPHIL # BLD AUTO: 0.13 THOUSAND/ΜL (ref 0–0.61)
EOSINOPHIL NFR BLD AUTO: 2 % (ref 0–6)
ERYTHROCYTE [DISTWIDTH] IN BLOOD BY AUTOMATED COUNT: 14.6 % (ref 11.6–15.1)
GFR SERPL CREATININE-BSD FRML MDRD: 66 ML/MIN/1.73SQ M
GLUCOSE SERPL-MCNC: 92 MG/DL (ref 65–140)
HBV CORE AB SER QL: NORMAL
HBV CORE IGM SER QL: NORMAL
HBV SURFACE AG SER QL: NORMAL
HCT VFR BLD AUTO: 39.1 % (ref 34.8–46.1)
HCV AB SER QL: NORMAL
HGB BLD-MCNC: 12.4 G/DL (ref 11.5–15.4)
IMM GRANULOCYTES # BLD AUTO: 0.01 THOUSAND/UL (ref 0–0.2)
IMM GRANULOCYTES NFR BLD AUTO: 0 % (ref 0–2)
LYMPHOCYTES # BLD AUTO: 1.24 THOUSANDS/ΜL (ref 0.6–4.47)
LYMPHOCYTES NFR BLD AUTO: 21 % (ref 14–44)
MCH RBC QN AUTO: 30.2 PG (ref 26.8–34.3)
MCHC RBC AUTO-ENTMCNC: 31.7 G/DL (ref 31.4–37.4)
MCV RBC AUTO: 95 FL (ref 82–98)
MONOCYTES # BLD AUTO: 0.4 THOUSAND/ΜL (ref 0.17–1.22)
MONOCYTES NFR BLD AUTO: 7 % (ref 4–12)
NEUTROPHILS # BLD AUTO: 4.06 THOUSANDS/ΜL (ref 1.85–7.62)
NEUTS SEG NFR BLD AUTO: 69 % (ref 43–75)
NRBC BLD AUTO-RTO: 0 /100 WBCS
PLATELET # BLD AUTO: 314 THOUSANDS/UL (ref 149–390)
PMV BLD AUTO: 10.4 FL (ref 8.9–12.7)
POTASSIUM SERPL-SCNC: 3.9 MMOL/L (ref 3.5–5.3)
PROT SERPL-MCNC: 7.1 G/DL (ref 6.4–8.2)
RBC # BLD AUTO: 4.1 MILLION/UL (ref 3.81–5.12)
SODIUM SERPL-SCNC: 139 MMOL/L (ref 136–145)
WBC # BLD AUTO: 5.88 THOUSAND/UL (ref 4.31–10.16)

## 2019-07-19 PROCEDURE — 80053 COMPREHEN METABOLIC PANEL: CPT

## 2019-07-19 PROCEDURE — 86705 HEP B CORE ANTIBODY IGM: CPT

## 2019-07-19 PROCEDURE — 86803 HEPATITIS C AB TEST: CPT

## 2019-07-19 PROCEDURE — 87340 HEPATITIS B SURFACE AG IA: CPT

## 2019-07-19 PROCEDURE — 86200 CCP ANTIBODY: CPT

## 2019-07-19 PROCEDURE — 36415 COLL VENOUS BLD VENIPUNCTURE: CPT

## 2019-07-19 PROCEDURE — 86704 HEP B CORE ANTIBODY TOTAL: CPT

## 2019-07-19 PROCEDURE — 85025 COMPLETE CBC W/AUTO DIFF WBC: CPT

## 2019-07-21 LAB — CCP IGA+IGG SERPL IA-ACNC: >250 UNITS (ref 0–19)

## 2019-07-22 ENCOUNTER — TELEPHONE (OUTPATIENT)
Dept: OBGYN CLINIC | Facility: CLINIC | Age: 79
End: 2019-07-22

## 2019-07-22 NOTE — TELEPHONE ENCOUNTER
Called patient and advised  She would like to know what your plan of treatment is at this point  States she has been on a steroid has been pretty good, things have calmed down  She is still in therapy with her hands

## 2019-07-22 NOTE — TELEPHONE ENCOUNTER
----- Message from Jerilynn Cooks, MD sent at 7/21/2019  9:41 PM EDT -----  Pls let her know the rheumatoid test was positive as expected   Her other labs were normal

## 2019-07-22 NOTE — TELEPHONE ENCOUNTER
Please call and let patient know that Dr Kimball Moritz is out of the office until next week  Since, per previous phone notes, patient was unable to tolerated methotrexate and leflunomide had too high of a copay, I would recommend she stay on Prednisone for now and wait for Dr Kimball Moritz to discuss further options with her when she returns  After you call patient back please forward back to Dr Kimball Moritz  Thanks

## 2019-07-23 NOTE — TELEPHONE ENCOUNTER
Dr Sherryle Rode I called patient please call when you return    She has another test scheduled for August

## 2019-07-24 ENCOUNTER — OFFICE VISIT (OUTPATIENT)
Dept: PHYSICAL THERAPY | Facility: REHABILITATION | Age: 79
End: 2019-07-24
Payer: MEDICARE

## 2019-07-24 DIAGNOSIS — M06.9 RHEUMATOID ARTHRITIS INVOLVING BOTH HANDS, UNSPECIFIED RHEUMATOID FACTOR PRESENCE: ICD-10-CM

## 2019-07-24 DIAGNOSIS — M19.031 OSTEOARTHRITIS OF BOTH WRISTS, UNSPECIFIED OSTEOARTHRITIS TYPE: Primary | ICD-10-CM

## 2019-07-24 DIAGNOSIS — M19.032 OSTEOARTHRITIS OF BOTH WRISTS, UNSPECIFIED OSTEOARTHRITIS TYPE: Primary | ICD-10-CM

## 2019-07-24 PROCEDURE — 97110 THERAPEUTIC EXERCISES: CPT | Performed by: PHYSICAL THERAPIST

## 2019-07-24 PROCEDURE — 97140 MANUAL THERAPY 1/> REGIONS: CPT | Performed by: PHYSICAL THERAPIST

## 2019-07-24 NOTE — TELEPHONE ENCOUNTER
Pls let her know I recommend she continue with the test in August (I am assuming it is the DEXA scan) as we will be able to discuss further treatment of the osteoporosis  In terms of the RA would she like to discuss medications such as the use of injections, or is cost going to be a limiting factor? (She declined use of methotrexate and leflunomide due to cost issues)  If she is willing to try the injections we can try t o see if the medication will be covered by insurance and have a $0 or minimal copay  If she would like to discuss meds except for the prednisone she is currently on, I recommend she schedule a follow up with me as it will be difficult to explain the side effects etc over the phone  Thanks

## 2019-07-24 NOTE — PROGRESS NOTES
Daily Note     Today's date: 2019  Patient name: Sadiq Mcmahan  : 1940  MRN: [id number]  Referring provider: Regulo Jett DO  Dx:   Encounter Diagnosis     ICD-10-CM    1  Osteoarthritis of both wrists, unspecified osteoarthritis type M19 031     M19 032    2  Rheumatoid arthritis involving both hands, unspecified rheumatoid factor presence ([facility] ) M06 9        Start Time: 1155  Stop Time: 1245  Total time in clinic (min): 50 minutes    Subjective: Patient reports that her hands are feeling looser since taking the oral steroids  Objective: See treatment diary below      Assessment: Tolerated treatment well  Patient demonstrated fatigue post treatment and would benefit from continued PT  Patient with improved PIP and DIP ROM bilaterally  Patient continues to present with swelling over right wrist       Plan: Continue per plan of care  Progress treatment as tolerated  Precautions: h/o breast CA, asthma, osteoporosis, h/o TIA     Daily Treatment Diary      Manual              Bilateral wrist, hand ROM GR            Reassessment                                                                                           Exercise Diary              Putty gripping             Digiflex gripping: all 2x10 Red            Putty marble pick out               Thread the needle                      Tendon glides                       Wrist flexion, extension  30x               Forearm pronation, supination with hammer 2x10 Red flexbar            Wrist extensor str  Prayer str               Flexbar supination             Flexbar flexion, extension               Yellow theraband  with supination               Yellow theraband thumb opposition with 2nd and 3rd digits               wrist flexion & ext               Yellow web digit flexion/ext                                                                                                                                          Modalities  7/24             (b/l hands) - pre 15'

## 2019-07-26 NOTE — TELEPHONE ENCOUNTER
Called and advised patient  She declined injections due to cost  She is going to get her DEXA scan in August  She would like to continue the prednisone

## 2019-07-31 ENCOUNTER — OFFICE VISIT (OUTPATIENT)
Dept: PHYSICAL THERAPY | Facility: REHABILITATION | Age: 79
End: 2019-07-31
Payer: MEDICARE

## 2019-07-31 DIAGNOSIS — M19.031 OSTEOARTHRITIS OF BOTH WRISTS, UNSPECIFIED OSTEOARTHRITIS TYPE: Primary | ICD-10-CM

## 2019-07-31 DIAGNOSIS — M19.032 OSTEOARTHRITIS OF BOTH WRISTS, UNSPECIFIED OSTEOARTHRITIS TYPE: Primary | ICD-10-CM

## 2019-07-31 DIAGNOSIS — M06.9 RHEUMATOID ARTHRITIS INVOLVING BOTH HANDS, UNSPECIFIED RHEUMATOID FACTOR PRESENCE: ICD-10-CM

## 2019-07-31 PROCEDURE — 97140 MANUAL THERAPY 1/> REGIONS: CPT | Performed by: PHYSICAL THERAPIST

## 2019-07-31 PROCEDURE — 97110 THERAPEUTIC EXERCISES: CPT | Performed by: PHYSICAL THERAPIST

## 2019-07-31 NOTE — PROGRESS NOTES
Daily Note     Today's date: 2019  Patient name: Gregor Kelly  : 1940  MRN: [id number]  Referring provider: Cassie Shaver DO  Dx:   Encounter Diagnosis     ICD-10-CM    1  Osteoarthritis of both wrists, unspecified osteoarthritis type M19 031     M19 032    2  Rheumatoid arthritis involving both hands, unspecified rheumatoid factor presence ([facility] [address] ) M06 9        Start Time: 1217  Stop Time: 1305  Total time in clinic (min): 48 minutes    Subjective: "I can tell my fingers are getting better  I couldn't move them this well when I first came in "      Objective: See treatment diary below      Assessment: Tolerated treatment well  Patient demonstrated fatigue post treatment, exhibited good technique with therapeutic exercises and would benefit from continued PT  Patient with improved wrist and finger mobility, but remains limited on right > left  Plan: Continue per plan of care  Progress treatment as tolerated  Precautions: h/o breast CA, asthma, osteoporosis, h/o TIA     Daily Treatment Diary      Manual             Bilateral wrist, hand ROM GR GR           Reassessment                                                                                           Exercise Diary             Putty gripping             Digiflex gripping: all 2x10 Red            Putty marble pick out               Thread the needle                      Tendon glides                       Wrist flexion, extension  30x               Forearm pronation, supination with hammer 2x10 Red flexbar            Wrist extensor str  Prayer str               Flexbar supination  20x Red           Flexbar flexion, extension               Yellow theraband  with supination               Yellow theraband thumb opposition with 2nd and 3rd digits               wrist flexion & ext   20x            Yellow web digit flexion/ext               RD KB hold    10x10" 3#                                                                                                                         Modalities  7/24 7/31            (b/l hands) - pre 15'

## 2019-08-05 ENCOUNTER — OFFICE VISIT (OUTPATIENT)
Dept: PODIATRY | Facility: CLINIC | Age: 79
End: 2019-08-05

## 2019-08-05 VITALS
WEIGHT: 105 LBS | HEIGHT: 63 IN | BODY MASS INDEX: 18.61 KG/M2 | DIASTOLIC BLOOD PRESSURE: 66 MMHG | HEART RATE: 63 BPM | SYSTOLIC BLOOD PRESSURE: 123 MMHG

## 2019-08-05 DIAGNOSIS — L60.3 NAIL DYSTROPHY: Primary | ICD-10-CM

## 2019-08-05 PROCEDURE — NCFTCARE PR NON-COVERED FOOT CARE: Performed by: PODIATRIST

## 2019-08-05 NOTE — PROGRESS NOTES
Patient presents for palliative foot care  Pedal pulses are within normal limits  Toenails are elongated and there is a corn on the left 4th toe secondary to hammertoe deformity  Patient also has bunions both feet that her irritated by shoe pressure  Patient is not interested in surgical intervention  She is advised to try to stretch her shoes and they may be more comfortable  Treatment consisted of nail and lesion trimming

## 2019-08-07 ENCOUNTER — HOSPITAL ENCOUNTER (OUTPATIENT)
Dept: RADIOLOGY | Age: 79
Discharge: HOME | End: 2019-08-07
Payer: MEDICARE

## 2019-08-07 DIAGNOSIS — M81.0 AGE-RELATED OSTEOPOROSIS WITHOUT CURRENT PATHOLOGICAL FRACTURE: ICD-10-CM

## 2019-08-07 PROCEDURE — 77080 DXA BONE DENSITY AXIAL: CPT

## 2019-08-08 ENCOUNTER — OFFICE VISIT (OUTPATIENT)
Dept: PHYSICAL THERAPY | Facility: REHABILITATION | Age: 79
End: 2019-08-08
Payer: MEDICARE

## 2019-08-08 ENCOUNTER — TELEPHONE (OUTPATIENT)
Dept: OBGYN CLINIC | Facility: CLINIC | Age: 79
End: 2019-08-08

## 2019-08-08 DIAGNOSIS — M19.031 OSTEOARTHRITIS OF BOTH WRISTS, UNSPECIFIED OSTEOARTHRITIS TYPE: Primary | ICD-10-CM

## 2019-08-08 DIAGNOSIS — M19.032 OSTEOARTHRITIS OF BOTH WRISTS, UNSPECIFIED OSTEOARTHRITIS TYPE: Primary | ICD-10-CM

## 2019-08-08 DIAGNOSIS — M06.9 RHEUMATOID ARTHRITIS INVOLVING BOTH HANDS, UNSPECIFIED RHEUMATOID FACTOR PRESENCE: ICD-10-CM

## 2019-08-08 PROCEDURE — 97110 THERAPEUTIC EXERCISES: CPT | Performed by: PHYSICAL THERAPIST

## 2019-08-08 PROCEDURE — 97140 MANUAL THERAPY 1/> REGIONS: CPT | Performed by: PHYSICAL THERAPIST

## 2019-08-08 NOTE — TELEPHONE ENCOUNTER
Patient was given a results  She did not know if she would like to f/u sooner w  Dr Grimaldo Resides  She said that she wuld call the office back  ----- Message from Mendel Spark, MD sent at 8/8/2019 10:44 AM EDT -----  Please let her know the DEXA scan showed significant osteoporosis which has worsened since her last one, with a high risk for fractures  I recommend she make a follow up appointment so we can discuss this and treatment options  Thank you

## 2019-08-08 NOTE — PROGRESS NOTES
Daily Note     Today's date: 2019  Patient name: Martina Baez  : 1940  MRN: [id number]  Referring provider: Eloina Khoury DO  Dx:   Encounter Diagnosis     ICD-10-CM    1  Osteoarthritis of both wrists, unspecified osteoarthritis type M19 031     M19 032    2  Rheumatoid arthritis involving both hands, unspecified rheumatoid factor presence ([facility] ) M06 9        Start Time: 1145  Stop Time: 1240  Total time in clinic (min): 55 minutes    Subjective: Patient reports that her wrist and hands feels better than when she first started  Objective: See treatment diary below      Assessment: Tolerated treatment well  Patient demonstrated fatigue post treatment, exhibited good technique with therapeutic exercises and would benefit from continued PT  Patient with improved wrist and hand mobility, however, right wrist remains swollen and limited compared to left  Plan: Continue per plan of care  Potential discharge next visit  Progress treatment as tolerated  Precautions: h/o breast CA, asthma, osteoporosis, h/o TIA     Daily Treatment Diary      Manual            Bilateral wrist, hand ROM GR GR GR          Reassessment                                                                                           Exercise Diary            Putty gripping             Digiflex gripping: all 2x10 Red  2x10 Red          Putty marble pick out               Thread the needle                      Tendon glides                       Wrist flexion, extension  30x    30x           Forearm pronation, supination with hammer 2x10 Red flexbar            Wrist extensor str  Prayer str               Flexbar supination  20x Red           Flexbar flexion, extension               Yellow theraband  with supination               Yellow theraband thumb opposition with 2nd and 3rd digits               wrist flexion & ext   20x            Yellow web digit flexion/ext               RD KB hold    10x10" 3#  10x10" 3#                                                                                                                       Modalities  7/24 7/31 8/8           (b/l hands) - pre 15'  15'

## 2019-08-15 ENCOUNTER — OFFICE VISIT (OUTPATIENT)
Dept: PHYSICAL THERAPY | Facility: REHABILITATION | Age: 79
End: 2019-08-15
Payer: MEDICARE

## 2019-08-15 DIAGNOSIS — M06.9 RHEUMATOID ARTHRITIS INVOLVING BOTH HANDS, UNSPECIFIED RHEUMATOID FACTOR PRESENCE: ICD-10-CM

## 2019-08-15 DIAGNOSIS — M19.031 OSTEOARTHRITIS OF BOTH WRISTS, UNSPECIFIED OSTEOARTHRITIS TYPE: Primary | ICD-10-CM

## 2019-08-15 DIAGNOSIS — M19.032 OSTEOARTHRITIS OF BOTH WRISTS, UNSPECIFIED OSTEOARTHRITIS TYPE: Primary | ICD-10-CM

## 2019-08-15 PROCEDURE — 97140 MANUAL THERAPY 1/> REGIONS: CPT | Performed by: PHYSICAL THERAPIST

## 2019-08-15 PROCEDURE — 97110 THERAPEUTIC EXERCISES: CPT | Performed by: PHYSICAL THERAPIST

## 2019-08-15 NOTE — PROGRESS NOTES
PT Discharge    Today's date: 8/15/2019  Patient name: Jeremy Wiggins  : 1940  MRN: [id number]  Referring provider: Andi Hauser DO  Dx:   Encounter Diagnosis     ICD-10-CM    1  Osteoarthritis of both wrists, unspecified osteoarthritis type M19 031     M19 032    2  Rheumatoid arthritis involving both hands, unspecified rheumatoid factor presence ([facility] [address] ) M06 9        Start Time: 1215  Stop Time: 1300  Total time in clinic (min): 45 minutes    Assessment  Assessment details: Since beginning physical therapy, Kristine Fitzpatrick has attended a total number of 30 visits and has maintained excellent compliance with established POC  Patient has made significant improvements in all areas, including decreased pain, increased strength, increased ROM, improved flexibility, improved joint mobility, decreased joint effusion and improved overall level of function  Patient is reporting improved ability to perform a/iadls and engaging in social activities  Patient is independent with comprehensive HEP  Patient has been instructed to contact PT if she begins to notice a decline in function or has any questions or concerns  Patient will be discharged at this time  Patient is in agreement with plan  Understanding of Dx/Px/POC: excellent   Prognosis: good    Goals  Short Term Goals: to be achieved by 4 weeks   1) Patient to be independent with basic HEP  MET  2) Decrease pain to 3/10 at it's worst  PROGRESSED, BUT NOT MET  3) Increase UE strength by 1/2 MMT grade in all deficient planes  MET  4) Increase hand/wrist ROM by > 5 deg in all deficient planes  PARTIALLY MET    Long Term Goals: to be achieved by discharge  1) FOTO equal to or greater than 58  MET  2) Patient to be independent with comprehensive HEP  MET  3) Abolish pain for improved quality of life  PROGRESSED, BUT NOT MET  4) Increase UE strength to 5/5 MMT grade in all deficient planes to improve a/iadls   PARTIALLY MET  5) Increase hand/wrist ROM to within 5 deg of contralateral UE to improve a/iadls  PARTIALLY MET    Plan  Planned therapy interventions: home exercise program  Treatment plan discussed with: patient        Subjective Evaluation    History of Present Illness  Mechanism of injury: Patient reports that her hands and wrist have much greater mobility compared to prior to starting PT  Patient is able to perform a/iadls with less difficulty, including cooking, cleaning and dressing  Patient does continue to experience stiffness in her fingers and wrist d/t her rheumatoid arthritis    Pain  Current pain ratin  At best pain ratin  At worst pain rating: 3  Location: wrist, hand R > L  Quality: dull ache    Patient Goals  Patient goals for therapy: decreased pain, increased motion, independence with ADLs/IADLs and increased strength          Objective     Active Range of Motion     Left Elbow   Flexion: WFL  Extension: WFL  Forearm supination: 78 degrees   Forearm pronation: 75 degrees     Right Elbow   Flexion: WFL  Extension: WFL  Forearm supination: 75 degrees   Forearm pronation: 68 degrees     Left Wrist   Wrist flexion: 70 degrees   Wrist extension: 68 degrees   Radial deviation: 18 degrees   Ulnar deviation: 35 degrees     Right Wrist   Wrist flexion: 54 degrees   Wrist extension: 55 degrees   Radial deviation: 12 degrees   Ulnar deviation: 30 degrees     Left Digits    Flexion   Index     MCP: 82    PIP: 90    DIP: 72  Middle     MCP: 84    PIP: 32    DIP: 50  Ring     MCP: 77    PIP: 88    DIP: 45  Little     MCP: 70    PIP: 85    DIP: 67    Right Digits   Flexion   Index     MCP: 72    PIP: 54    DIP: 72  Middle     MCP: 74    PIP: 77    DIP: 57  Ring     MCP: 83    PIP: 72    DIP: 44  Little     MCP: 82    PIP: 60    DIP: 78    Passive Range of Motion     Left Wrist   Wrist flexion: 75 degrees   Wrist extension: 85 degrees   Radial deviation: 24 degrees   Ulnar deviation: 45 degrees     Right Wrist   Wrist flexion: 53 degrees   Wrist extension: 60 degrees   Radial deviation: 18 degrees   Ulnar deviation: 34 degrees     Left Digits   Flexion   Index     MCP: 93    PIP: 102    DIP: 85  Middle     MCP: 90    PIP: 70    DIP: 70  Ring     MCP: 92    PIP: 100    DIP: 82  Little     MCP: 90    PIP: 92    DIP: 94    Right Digits   Flexion   Index     MCP: 85    PIP: 57    DIP: 84  Middle     MCP: 97    PIP: 92    DIP: 72  Ring     MCP: 88    PIP: 89    DIP: 60  Little     MCP: 85    PIP: 72    DIP: 84    Strength/Myotome Testing     Left Elbow   Flexion: 5  Extension: 5  Forearm supination: 4+  Forearm pronation: 4    Right Elbow   Flexion: 5  Extension: 4  Forearm supination: 4  Forearm pronation: 4    Left Wrist/Hand   Wrist extension: 4+  Wrist flexion: 4+  Radial deviation: 4  Ulnar deviation: 4  Thumb extension: 4-     (2nd hand position)     Trial 1: 11    Trial 2: 13    Trial 3: 11    Right Wrist/Hand   Wrist extension: 4+  Wrist flexion: 4+  Radial deviation: 4+  Ulnar deviation: 4+  Thumb extension: 3+     (2nd hand position)     Trial 1: 12    Trial 2: 10    Trial 3: 10             Precautions: h/o breast CA, asthma, osteoporosis, h/o TIA     Daily Treatment Diary      Manual  7/24 7/31 8/8 8/15         Bilateral wrist, hand ROM GR GR GR          Reassessment                                                                                           Exercise Diary  7/24 7/31 8/8 8/15         Putty gripping             Digiflex gripping: all 2x10 Red  2x10 Red 20x Red         Putty marble pick out               Thread the needle                      Tendon glides                       Wrist flexion, extension  30x    30x           Forearm pronation, supination with hammer 2x10 Red flexbar            Wrist extensor str  Prayer str               Flexbar supination  20x Red  20x Red         Flexbar flexion, extension               Yellow theraband  with supination               Yellow theraband thumb opposition with 2nd and 3rd digits    wrist flexion & ext   20x            Yellow web digit flexion/ext               RD KB hold    10x10" 3#  10x10" 3#                                                                                                                       Modalities  7/24 7/31 8/8 8/15          (b/l hands) - pre 13'  15' 10'

## 2019-09-23 ENCOUNTER — TRANSCRIBE ORDERS (OUTPATIENT)
Dept: LAB | Facility: CLINIC | Age: 79
End: 2019-09-23

## 2019-09-23 ENCOUNTER — APPOINTMENT (OUTPATIENT)
Dept: LAB | Facility: CLINIC | Age: 79
End: 2019-09-23
Payer: MEDICARE

## 2019-09-23 DIAGNOSIS — M06.9 RHEUMATOID ARTHRITIS, INVOLVING UNSPECIFIED SITE, UNSPECIFIED RHEUMATOID FACTOR PRESENCE: Primary | ICD-10-CM

## 2019-09-23 DIAGNOSIS — C50.112 MALIGNANT NEOPLASM OF CENTRAL PORTION OF LEFT FEMALE BREAST, UNSPECIFIED ESTROGEN RECEPTOR STATUS (HCC): ICD-10-CM

## 2019-09-23 DIAGNOSIS — M06.9 RHEUMATOID ARTHRITIS, INVOLVING UNSPECIFIED SITE, UNSPECIFIED RHEUMATOID FACTOR PRESENCE: ICD-10-CM

## 2019-09-23 LAB
ALBUMIN SERPL BCP-MCNC: 3.3 G/DL (ref 3.5–5)
ALP SERPL-CCNC: 130 U/L (ref 46–116)
ALT SERPL W P-5'-P-CCNC: 24 U/L (ref 12–78)
ANION GAP SERPL CALCULATED.3IONS-SCNC: 9 MMOL/L (ref 4–13)
AST SERPL W P-5'-P-CCNC: 21 U/L (ref 5–45)
BASOPHILS # BLD AUTO: 0.04 THOUSANDS/ΜL (ref 0–0.1)
BASOPHILS NFR BLD AUTO: 1 % (ref 0–1)
BILIRUB SERPL-MCNC: 0.3 MG/DL (ref 0.2–1)
BUN SERPL-MCNC: 13 MG/DL (ref 5–25)
CALCIUM SERPL-MCNC: 8.8 MG/DL (ref 8.3–10.1)
CEA SERPL-MCNC: 1.2 NG/ML (ref 0–3)
CHLORIDE SERPL-SCNC: 104 MMOL/L (ref 100–108)
CHOLEST SERPL-MCNC: 196 MG/DL (ref 50–200)
CO2 SERPL-SCNC: 25 MMOL/L (ref 21–32)
CREAT SERPL-MCNC: 0.79 MG/DL (ref 0.6–1.3)
EOSINOPHIL # BLD AUTO: 0.17 THOUSAND/ΜL (ref 0–0.61)
EOSINOPHIL NFR BLD AUTO: 3 % (ref 0–6)
ERYTHROCYTE [DISTWIDTH] IN BLOOD BY AUTOMATED COUNT: 15 % (ref 11.6–15.1)
GFR SERPL CREATININE-BSD FRML MDRD: 71 ML/MIN/1.73SQ M
GLUCOSE P FAST SERPL-MCNC: 91 MG/DL (ref 65–99)
HCT VFR BLD AUTO: 38.4 % (ref 34.8–46.1)
HDLC SERPL-MCNC: 82 MG/DL (ref 40–60)
HGB BLD-MCNC: 12.2 G/DL (ref 11.5–15.4)
IMM GRANULOCYTES # BLD AUTO: 0.01 THOUSAND/UL (ref 0–0.2)
IMM GRANULOCYTES NFR BLD AUTO: 0 % (ref 0–2)
LDLC SERPL CALC-MCNC: 101 MG/DL (ref 0–100)
LYMPHOCYTES # BLD AUTO: 1.16 THOUSANDS/ΜL (ref 0.6–4.47)
LYMPHOCYTES NFR BLD AUTO: 18 % (ref 14–44)
MCH RBC QN AUTO: 29.8 PG (ref 26.8–34.3)
MCHC RBC AUTO-ENTMCNC: 31.8 G/DL (ref 31.4–37.4)
MCV RBC AUTO: 94 FL (ref 82–98)
MONOCYTES # BLD AUTO: 0.58 THOUSAND/ΜL (ref 0.17–1.22)
MONOCYTES NFR BLD AUTO: 9 % (ref 4–12)
NEUTROPHILS # BLD AUTO: 4.43 THOUSANDS/ΜL (ref 1.85–7.62)
NEUTS SEG NFR BLD AUTO: 69 % (ref 43–75)
NONHDLC SERPL-MCNC: 114 MG/DL
NRBC BLD AUTO-RTO: 0 /100 WBCS
PLATELET # BLD AUTO: 321 THOUSANDS/UL (ref 149–390)
PMV BLD AUTO: 10.4 FL (ref 8.9–12.7)
POTASSIUM SERPL-SCNC: 4.6 MMOL/L (ref 3.5–5.3)
PROT SERPL-MCNC: 7.4 G/DL (ref 6.4–8.2)
RBC # BLD AUTO: 4.09 MILLION/UL (ref 3.81–5.12)
SODIUM SERPL-SCNC: 138 MMOL/L (ref 136–145)
TRIGL SERPL-MCNC: 66 MG/DL
WBC # BLD AUTO: 6.39 THOUSAND/UL (ref 4.31–10.16)

## 2019-09-23 PROCEDURE — 85025 COMPLETE CBC W/AUTO DIFF WBC: CPT

## 2019-09-23 PROCEDURE — 80053 COMPREHEN METABOLIC PANEL: CPT

## 2019-09-23 PROCEDURE — 82378 CARCINOEMBRYONIC ANTIGEN: CPT

## 2019-09-23 PROCEDURE — 36415 COLL VENOUS BLD VENIPUNCTURE: CPT

## 2019-09-23 PROCEDURE — 80061 LIPID PANEL: CPT

## 2019-11-04 ENCOUNTER — TRANSCRIBE ORDERS (OUTPATIENT)
Dept: LAB | Facility: CLINIC | Age: 79
End: 2019-11-04

## 2019-11-04 ENCOUNTER — APPOINTMENT (OUTPATIENT)
Dept: LAB | Facility: CLINIC | Age: 79
End: 2019-11-04
Payer: MEDICARE

## 2019-11-04 ENCOUNTER — OFFICE VISIT (OUTPATIENT)
Dept: PODIATRY | Facility: CLINIC | Age: 79
End: 2019-11-04

## 2019-11-04 VITALS
DIASTOLIC BLOOD PRESSURE: 60 MMHG | HEART RATE: 68 BPM | WEIGHT: 110 LBS | HEIGHT: 63 IN | BODY MASS INDEX: 19.49 KG/M2 | SYSTOLIC BLOOD PRESSURE: 108 MMHG

## 2019-11-04 DIAGNOSIS — N39.0 URINARY TRACT INFECTION WITHOUT HEMATURIA, SITE UNSPECIFIED: Primary | ICD-10-CM

## 2019-11-04 DIAGNOSIS — L60.3 NAIL DYSTROPHY: Primary | ICD-10-CM

## 2019-11-04 LAB
BACTERIA UR QL AUTO: ABNORMAL /HPF
BILIRUB UR QL STRIP: NEGATIVE
CLARITY UR: ABNORMAL
COLOR UR: YELLOW
GLUCOSE UR STRIP-MCNC: NEGATIVE MG/DL
HGB UR QL STRIP.AUTO: ABNORMAL
KETONES UR STRIP-MCNC: NEGATIVE MG/DL
LEUKOCYTE ESTERASE UR QL STRIP: ABNORMAL
NITRITE UR QL STRIP: NEGATIVE
NON-SQ EPI CELLS URNS QL MICRO: ABNORMAL /HPF
PH UR STRIP.AUTO: 5.5 [PH]
PROT UR STRIP-MCNC: NEGATIVE MG/DL
RBC #/AREA URNS AUTO: ABNORMAL /HPF
SP GR UR STRIP.AUTO: 1.01 (ref 1–1.03)
UROBILINOGEN UR QL STRIP.AUTO: 0.2 E.U./DL
WBC #/AREA URNS AUTO: ABNORMAL /HPF

## 2019-11-04 PROCEDURE — 81001 URINALYSIS AUTO W/SCOPE: CPT | Performed by: UROLOGY

## 2019-11-04 PROCEDURE — NCFTCARE PR NON-COVERED FOOT CARE: Performed by: PODIATRIST

## 2019-11-04 PROCEDURE — 87086 URINE CULTURE/COLONY COUNT: CPT | Performed by: UROLOGY

## 2019-11-04 PROCEDURE — 87077 CULTURE AEROBIC IDENTIFY: CPT | Performed by: UROLOGY

## 2019-11-04 PROCEDURE — 87186 SC STD MICRODIL/AGAR DIL: CPT | Performed by: UROLOGY

## 2019-11-04 RX ORDER — DOXYCYCLINE HYCLATE 100 MG/1
100 CAPSULE ORAL 2 TIMES DAILY
Refills: 0 | COMMUNITY
Start: 2019-10-07 | End: 2019-11-04 | Stop reason: SDUPTHER

## 2019-11-04 RX ORDER — TOBRAMYCIN AND DEXAMETHASONE 3; 1 MG/ML; MG/ML
SUSPENSION/ DROPS OPHTHALMIC
Refills: 0 | COMMUNITY
Start: 2019-10-01 | End: 2020-10-28

## 2019-11-04 NOTE — PROGRESS NOTES
Patient presents for palliative foot care  No acute disorder noted  Pedal pulses are within normal limits  Treatment consisted of nail and lesion trimming

## 2019-11-07 LAB — BACTERIA UR CULT: ABNORMAL

## 2019-12-04 ENCOUNTER — TELEPHONE (OUTPATIENT)
Dept: PODIATRY | Facility: CLINIC | Age: 79
End: 2019-12-04

## 2019-12-05 DIAGNOSIS — M19.071 PRIMARY OSTEOARTHRITIS OF RIGHT FOOT: Primary | ICD-10-CM

## 2019-12-12 ENCOUNTER — TELEPHONE (OUTPATIENT)
Dept: PODIATRY | Facility: CLINIC | Age: 79
End: 2019-12-12

## 2020-01-21 ENCOUNTER — OFFICE VISIT (OUTPATIENT)
Dept: OBGYN CLINIC | Facility: HOSPITAL | Age: 80
End: 2020-01-21
Payer: MEDICARE

## 2020-01-21 ENCOUNTER — HOSPITAL ENCOUNTER (OUTPATIENT)
Dept: RADIOLOGY | Facility: HOSPITAL | Age: 80
Discharge: HOME | End: 2020-01-21
Attending: ORTHOPAEDIC SURGERY
Payer: MEDICARE

## 2020-01-21 VITALS
HEART RATE: 67 BPM | SYSTOLIC BLOOD PRESSURE: 193 MMHG | BODY MASS INDEX: 18.35 KG/M2 | DIASTOLIC BLOOD PRESSURE: 88 MMHG | WEIGHT: 103.6 LBS

## 2020-01-21 DIAGNOSIS — M25.562 ACUTE PAIN OF LEFT KNEE: Primary | ICD-10-CM

## 2020-01-21 DIAGNOSIS — M17.12 ARTHRITIS OF LEFT KNEE: ICD-10-CM

## 2020-01-21 DIAGNOSIS — M25.562 ACUTE PAIN OF LEFT KNEE: ICD-10-CM

## 2020-01-21 PROCEDURE — 20610 DRAIN/INJ JOINT/BURSA W/O US: CPT | Performed by: PHYSICIAN ASSISTANT

## 2020-01-21 PROCEDURE — 73562 X-RAY EXAM OF KNEE 3: CPT

## 2020-01-21 PROCEDURE — 99213 OFFICE O/P EST LOW 20 MIN: CPT | Performed by: PHYSICIAN ASSISTANT

## 2020-01-21 RX ORDER — LIDOCAINE HYDROCHLORIDE 10 MG/ML
2 INJECTION, SOLUTION INFILTRATION; PERINEURAL
Status: COMPLETED | OUTPATIENT
Start: 2020-01-21 | End: 2020-01-21

## 2020-01-21 RX ORDER — BUPIVACAINE HYDROCHLORIDE 2.5 MG/ML
2 INJECTION, SOLUTION INFILTRATION; PERINEURAL
Status: COMPLETED | OUTPATIENT
Start: 2020-01-21 | End: 2020-01-21

## 2020-01-21 RX ORDER — METHYLPREDNISOLONE ACETATE 40 MG/ML
2 INJECTION, SUSPENSION INTRA-ARTICULAR; INTRALESIONAL; INTRAMUSCULAR; SOFT TISSUE
Status: COMPLETED | OUTPATIENT
Start: 2020-01-21 | End: 2020-01-21

## 2020-01-21 RX ADMIN — METHYLPREDNISOLONE ACETATE 2 ML: 40 INJECTION, SUSPENSION INTRA-ARTICULAR; INTRALESIONAL; INTRAMUSCULAR; SOFT TISSUE at 13:40

## 2020-01-21 RX ADMIN — BUPIVACAINE HYDROCHLORIDE 2 ML: 2.5 INJECTION, SOLUTION INFILTRATION; PERINEURAL at 13:40

## 2020-01-21 RX ADMIN — LIDOCAINE HYDROCHLORIDE 2 ML: 10 INJECTION, SOLUTION INFILTRATION; PERINEURAL at 13:40

## 2020-01-21 NOTE — PROGRESS NOTES
Orthopedics          Roderick Ricketts [de-identified] y o  female MRN: [id number]      Chief Complaint:   left knee pain    HPI:   [de-identified] y  o female complaining of left knee pain  Patient presents today regarding left knee pain  She does have a history of the left knee tibial plateau fracture Open reduction internal fixation done many years ago  She states having swelling decreased motion of her left knee  He states he is ambulate comfortably states the pain is intermittent in nature  She complains swelling  She denies any changes numbness tingling instability clicking popping catching of her left knee at this time  Review Of Systems:   · Skin: Normal  · Neuro: See HPI  · Musculoskeletal: See HPI  · All other systems reviewed and are negative    Past Medical History:   Past Medical History:   Diagnosis Date    Allergic     Anxiety     Arthritis     Cancer ([facility] [address] )     HLD (hyperlipidemia)     Hypertension     Hyperthyroidism     Osteoporosis     Stroke [facility])        Past Surgical History:   Past Surgical History:   Procedure Laterality Date    BREAST LUMPECTOMY Left     HYSTERECTOMY      KNEE SURGERY      ORIF TIBIA FRACTURE Right 12/8/2016    Procedure: OPEN REDUCTION W/ INTERNAL FIXATION (ORIF) TIBIA;  Surgeon: Maegan Lynch MD;  [facility];  Service:        Family History:  Family history reviewed and non-contributory  Family History   Problem Relation Age of Onset    Coronary artery disease Family     Other Family         DJD         Social History:  Social History     Socioeconomic History    Marital status:       Spouse name: None    Number of children: None    Years of education: None    Highest education level: None   Occupational History    None   Social Needs    Financial resource strain: None    Food insecurity:     Worry: None     Inability: None    Transportation needs:     Medical: None     Non-medical: None   Tobacco Use    Smoking status: Never Smoker    Smokeless tobacco: Never Used   Substance and Sexual Activity    Alcohol use: No    Drug use: No    Sexual activity: None   Lifestyle    Physical activity:     Days per week: None     Minutes per session: None    Stress: None   Relationships    Social connections:     Talks on phone: None     Gets together: None     Attends Confucianist service: None     Active member of club or organization: None     Attends meetings of clubs or organizations: None     Relationship status: None    Intimate partner violence:     Fear of current or ex partner: None     Emotionally abused: None     Physically abused: None     Forced sexual activity: None   Other Topics Concern    None   Social History Narrative    Living at [facility]       Allergies:    Allergies   Allergen Reactions    Penicillins Swelling    Sulfa Antibiotics Swelling       Labs:  0   Lab Value Date/Time    HCT 38 4 09/23/2019 1005    HCT 39 1 07/19/2019 1009    HCT 40 0 10/11/2018 1043    HGB 12 2 09/23/2019 1005    HGB 12 4 07/19/2019 1009    HGB 13 0 10/11/2018 1043    WBC 6 39 09/23/2019 1005    WBC 5 88 07/19/2019 1009    WBC 9 08 10/11/2018 1043    ESR 33 (H) 09/15/2017 1031    CRP 26 0 (H) 09/15/2017 1031       Meds:    Current Outpatient Medications:     albuterol (PROVENTIL HFA,VENTOLIN HFA) 90 mcg/act inhaler, Inhale 2 puffs every 6 (six) hours as needed for wheezing, Disp: , Rfl:     aspirin 81 mg chewable tablet, Chew 81 mg daily, Disp: , Rfl:     cyanocobalamin 50 MCG tablet, Take 500 mcg by mouth daily, Disp: , Rfl:     diclofenac sodium (VOLTAREN) 1 %, , Disp: , Rfl: 0    diclofenac sodium (VOLTAREN) 1 %, Apply 2 g topically 3 (three) times a day, Disp: 180 g, Rfl: 2    doxycycline hyclate (VIBRA-TABS) 100 mg tablet, take 1 tablet by mouth once daily, PRN, Disp: , Rfl:     leflunomide (ARAVA) 20 MG tablet, Take 1 tablet (20 mg total) by mouth daily, Disp: 30 tablet, Rfl: 2    multivitamin (THERAGRAN) TABS, Take 1 tablet by mouth daily, Disp: , Rfl:     Pirbuterol Acetate ([facility] [place]), Inhale one time by mouth every four hours as needed, Disp: , Rfl:     predniSONE 5 mg tablet, TAKE 1/2 -1 TABLET BY MOUTH DAILY, PRN, Disp: , Rfl:     tobramycin-dexamethasone (TOBRADEX) ophthalmic suspension, INSTILL 1 DROP IN AFFECTED (EYE(S) AS NEEDED FOR IRRITATION, Disp: , Rfl: 0    Calcium Carbonate-Vitamin D (CALCIUM-VITAMIN D3 PO), Take by mouth, Disp: , Rfl:     diclofenac sodium (VOLTAREN) 1 %, Apply 2 g topically 4 (four) times a day for 90 days, Disp: 240 g, Rfl: 2    omeprazole (PriLOSEC) 20 mg delayed release capsule, Take 1 capsule by mouth daily for 20 days, Disp: 20 capsule, Rfl: 0      Physical Exam:     General Appearance:    Alert, cooperative, no distress, appears stated age   Head:    Normocephalic, without obvious abnormality, atraumatic   Eyes:    conjunctiva/corneas clear, both eyes        Nose:   Nares normal, septum midline, no drainage    Throat:   Lips normal; teeth and gums normal   Neck:    symmetrical, trachea midline, ;     thyroid:  no enlargement/   Back:     Symmetric, no curvature, ROM normal   Lungs:   No audible wheezing or labored breathing   Chest Wall:    No tenderness or deformity    Heart:    Regular rate and rhythm               Pulses:   2+ and symmetric all extremities   Skin:   Skin color, texture, turgor normal, no rashes or lesions   Neurologic:   normal strength, sensation and reflexes     throughout       Musculoskeletal: left lower extremity  · Examination of patient's left knee mild effusion extension limited to 10° and 100° quadriceps hamstring strength 5/5 pain on palpation medial lateral joint lines ankle dorsi plantar flexion strength 5/5 sensation intact distal pulses present  Radiology:   I personally reviewed the films    X-rays left knee shows intact hardware well-healed tibial plateau fracture mild evidence of osteoarthritis noted  Large joint arthrocentesis: L knee  Date/Time: 1/21/2020 1:40 PM  Consent given by: patient  Supporting Documentation  Indications: pain   Procedure Details  Location: knee - L knee  Needle size: 22 G  Ultrasound guidance: no  Approach: superior  Medications administered: 2 mL bupivacaine 0 25 %; 2 mL lidocaine 1 %; 2 mL methylPREDNISolone acetate 40 mg/mL              _*_*_*_*_*_*_*_*_*_*_*_*_*_*_*_*_*_*_*_*_*_*_*_*_*_*_*_*_*_*_*_*_*_*_*_*_*_*_*_*_*    Assessment:  [de-identified] y  o female with left knee pain posttraumatic arthritis    Plan:   · Weight bearing as tolerated  left lower extremity  · Left knee intra-articular injection given as noted above  · Patient advised should they develop any increasing pain, redness, drainage, numbness, tingling or swelling surrounding the injection sight, they are to contact our office or present to the emergency department    · Patient supply with Home range of motion stretching strengthening exercises  · Follow up in 3 months or sooner if needed      Veronica Remy PA-C

## 2020-01-22 ENCOUNTER — TELEPHONE (OUTPATIENT)
Dept: PODIATRY | Facility: CLINIC | Age: 80
End: 2020-01-22

## 2020-01-22 ENCOUNTER — OFFICE VISIT (OUTPATIENT)
Dept: PODIATRY | Facility: CLINIC | Age: 80
End: 2020-01-22
Payer: MEDICARE

## 2020-01-22 VITALS
HEIGHT: 63 IN | SYSTOLIC BLOOD PRESSURE: 130 MMHG | DIASTOLIC BLOOD PRESSURE: 78 MMHG | HEART RATE: 68 BPM | BODY MASS INDEX: 18.14 KG/M2 | WEIGHT: 102.4 LBS

## 2020-01-22 DIAGNOSIS — M20.12 ACQUIRED HALLUX VALGUS OF LEFT FOOT: ICD-10-CM

## 2020-01-22 DIAGNOSIS — L89.891 PRESSURE INJURY OF RIGHT FOOT, STAGE 1: ICD-10-CM

## 2020-01-22 DIAGNOSIS — M20.11 ACQUIRED HALLUX VALGUS OF RIGHT FOOT: Primary | ICD-10-CM

## 2020-01-22 DIAGNOSIS — M19.071 PRIMARY OSTEOARTHRITIS OF RIGHT FOOT: ICD-10-CM

## 2020-01-22 DIAGNOSIS — M20.42 HAMMER TOE OF LEFT FOOT: ICD-10-CM

## 2020-01-22 PROCEDURE — 99213 OFFICE O/P EST LOW 20 MIN: CPT | Performed by: PODIATRIST

## 2020-01-22 NOTE — PROGRESS NOTES
Assessment/Plan:    Explained to patient that she has a healed ulceration at the bunion right foot  Discussed treatment options for bunions  Advised patient on shoe gear  Soft running shoes were recommended  Patient can also cut a hole in an old shoe that she has at home  Surgical intervention is needed for correction and patient is a candidate with acceptable circulation  Patient to consider  She is rescheduled in 2 months  She may discontinue antibiotics on the right foot bunion but should continue with the dry sterile dressing  No problem-specific Assessment & Plan notes found for this encounter  Diagnoses and all orders for this visit:    Acquired hallux valgus of right foot    Acquired hallux valgus of left foot    Hammer toe of left foot    Pressure injury of right foot, stage 1          Subjective:      Patient ID: Tremayne Morgan is a [de-identified] y o  female  HPI     Patient, an 59-year-old female presents with a bandage on her right foot  Approximately 1 month ago she developed an ulcer overlying her bunion on the right foot  She has been trying to keep it clean and use antibiotic cream on the ulcer as well as trying to modify her shoe gear  Patient states that she feels much better than she did and the ulcer appears to have healed  Patient has bunions on both feet  Only the right foot has flared  She also has a hammertoe deformity of the left 4th toe that is erythematous  Patient is interested in treatment options for the bunions  The following portions of the patient's history were reviewed and updated as appropriate: allergies, current medications, past family history, past medical history, past social history, past surgical history and problem list     Review of Systems   Gastrointestinal: Negative  Genitourinary: Negative  Musculoskeletal: Positive for arthralgias and gait problem               Objective:      /78   Pulse 68   Ht 5' 3" (1 6 m) Comment: verbal  Wt 46 4 kg (102 lb 6 4 oz)   BMI 18 14 kg/m²          Physical Exam   Constitutional: She is oriented to person, place, and time  Cardiovascular: Regular rhythm and intact distal pulses  Musculoskeletal: She exhibits deformity  Severe hallux valgus deformity bilateral   Healed ulcer noted over medial eminence right foot  Hammertoe deformity left 4th toe   Neurological: She is alert and oriented to person, place, and time  Skin:   Eschar overlying medial eminence 1st metatarsal head right foot  No drainage or sign of infection

## 2020-02-10 ENCOUNTER — TELEPHONE (OUTPATIENT)
Dept: PODIATRY | Facility: CLINIC | Age: 80
End: 2020-02-10

## 2020-03-05 ENCOUNTER — TELEPHONE (OUTPATIENT)
Dept: OBGYN CLINIC | Facility: HOSPITAL | Age: 80
End: 2020-03-05

## 2020-03-05 NOTE — TELEPHONE ENCOUNTER
Patient is calling because she would like to see Dr Dorothea Mccullough for her rt foot pain  Patient states that she sees Dr Massimo Jennings but that he is not doing anything for her  I called [place] & she stated that the patient should be seen by Dr Massimo Jennings to discuss her options     [phone]

## 2020-04-15 ENCOUNTER — TELEMEDICINE (OUTPATIENT)
Dept: FAMILY MEDICINE CLINIC | Facility: CLINIC | Age: 80
End: 2020-04-15
Payer: MEDICARE

## 2020-04-15 DIAGNOSIS — N39.0 URINARY TRACT INFECTION WITHOUT HEMATURIA, SITE UNSPECIFIED: Primary | ICD-10-CM

## 2020-04-15 PROCEDURE — 99443 PR PHYS/QHP TELEPHONE EVALUATION 21-30 MIN: CPT | Performed by: NURSE PRACTITIONER

## 2020-04-15 RX ORDER — DOXYCYCLINE HYCLATE 100 MG
100 TABLET ORAL 2 TIMES DAILY
Qty: 20 TABLET | Refills: 0 | Status: SHIPPED | OUTPATIENT
Start: 2020-04-15 | End: 2020-04-25

## 2020-06-09 ENCOUNTER — TELEPHONE (OUTPATIENT)
Dept: FAMILY MEDICINE CLINIC | Facility: CLINIC | Age: 80
End: 2020-06-09

## 2020-06-10 ENCOUNTER — TELEMEDICINE (OUTPATIENT)
Dept: FAMILY MEDICINE CLINIC | Facility: CLINIC | Age: 80
End: 2020-06-10
Payer: MEDICARE

## 2020-06-10 DIAGNOSIS — T36.95XA ANTIBIOTIC-ASSOCIATED DIARRHEA: Primary | ICD-10-CM

## 2020-06-10 DIAGNOSIS — K52.1 ANTIBIOTIC-ASSOCIATED DIARRHEA: Primary | ICD-10-CM

## 2020-06-10 PROCEDURE — 99442 PR PHYS/QHP TELEPHONE EVALUATION 11-20 MIN: CPT | Performed by: NURSE PRACTITIONER

## 2020-06-16 ENCOUNTER — TELEPHONE (OUTPATIENT)
Dept: FAMILY MEDICINE CLINIC | Facility: CLINIC | Age: 80
End: 2020-06-16

## 2020-07-01 ENCOUNTER — OFFICE VISIT (OUTPATIENT)
Dept: OBGYN CLINIC | Facility: MEDICAL CENTER | Age: 80
End: 2020-07-01
Payer: MEDICARE

## 2020-07-01 VITALS
BODY MASS INDEX: 18.14 KG/M2 | SYSTOLIC BLOOD PRESSURE: 150 MMHG | WEIGHT: 102.4 LBS | DIASTOLIC BLOOD PRESSURE: 79 MMHG | HEART RATE: 71 BPM

## 2020-07-01 DIAGNOSIS — M17.31 POST-TRAUMATIC OSTEOARTHRITIS OF RIGHT KNEE: Primary | ICD-10-CM

## 2020-07-01 PROCEDURE — 99213 OFFICE O/P EST LOW 20 MIN: CPT | Performed by: ORTHOPAEDIC SURGERY

## 2020-07-01 PROCEDURE — 20610 DRAIN/INJ JOINT/BURSA W/O US: CPT | Performed by: ORTHOPAEDIC SURGERY

## 2020-07-01 RX ORDER — BETAMETHASONE SODIUM PHOSPHATE AND BETAMETHASONE ACETATE 3; 3 MG/ML; MG/ML
6 INJECTION, SUSPENSION INTRA-ARTICULAR; INTRALESIONAL; INTRAMUSCULAR; SOFT TISSUE
Status: COMPLETED | OUTPATIENT
Start: 2020-07-01 | End: 2020-07-01

## 2020-07-01 RX ORDER — BUPIVACAINE HYDROCHLORIDE 2.5 MG/ML
2 INJECTION, SOLUTION INFILTRATION; PERINEURAL
Status: COMPLETED | OUTPATIENT
Start: 2020-07-01 | End: 2020-07-01

## 2020-07-01 RX ORDER — LIDOCAINE HYDROCHLORIDE 10 MG/ML
2 INJECTION, SOLUTION INFILTRATION; PERINEURAL
Status: COMPLETED | OUTPATIENT
Start: 2020-07-01 | End: 2020-07-01

## 2020-07-01 RX ADMIN — LIDOCAINE HYDROCHLORIDE 2 ML: 10 INJECTION, SOLUTION INFILTRATION; PERINEURAL at 15:59

## 2020-07-01 RX ADMIN — BUPIVACAINE HYDROCHLORIDE 2 ML: 2.5 INJECTION, SOLUTION INFILTRATION; PERINEURAL at 15:59

## 2020-07-01 RX ADMIN — BETAMETHASONE SODIUM PHOSPHATE AND BETAMETHASONE ACETATE 6 MG: 3; 3 INJECTION, SUSPENSION INTRA-ARTICULAR; INTRALESIONAL; INTRAMUSCULAR; SOFT TISSUE at 15:59

## 2020-07-01 NOTE — PROGRESS NOTES
79 y o female presents for evaluation of right knee pain  Patient previously seen evaluated approximately 6 months ago for left knee pain  At the time she underwent an intra-articular corticosteroid injection  She states that her left knee symptoms have significantly improved since his injection  With regards to her right knee, She describes intermittent Rosey right knee pain that is worse with activity  She describes recent incident in her garden when she landed against a wall and has been experiencing significant diffuse knee pain  She denies any associated numbness tingling  She also describes intermittent swelling  Patient has a history of right tibial plateau fracture status post open reduction internal fixation with Dr Tommy Sumner approximately 3 and half years ago  Patient states that she has tried over-the-counter oral analgesics with minimal symptomatic relief      Review of Systems  Review of systems negative unless otherwise specified in HPI    Past Medical History  Past Medical History:   Diagnosis Date    Allergic     Anxiety     Arthritis     Cancer ([facility] [address] )     HLD (hyperlipidemia)     Hypertension     Hyperthyroidism     Osteoporosis     Stroke [facility])        Past Surgical History  Past Surgical History:   Procedure Laterality Date    BREAST LUMPECTOMY Left     HYSTERECTOMY      KNEE SURGERY      ORIF TIBIA FRACTURE Right 12/8/2016    Procedure: OPEN REDUCTION W/ INTERNAL FIXATION (ORIF) TIBIA;  Surgeon: Kathy Osborne MD;  [facility];  Service:        Current Medications  Current Outpatient Medications on File Prior to Visit   Medication Sig Dispense Refill    albuterol (PROVENTIL HFA,VENTOLIN HFA) 90 mcg/act inhaler Inhale 2 puffs every 6 (six) hours as needed for wheezing      aspirin 81 mg chewable tablet Chew 81 mg daily      Calcium Carbonate-Vitamin D (CALCIUM-VITAMIN D3 PO) Take by mouth      cyanocobalamin 50 MCG tablet Take 500 mcg by mouth daily      diclofenac sodium (VOLTAREN) 1 %   0    leflunomide (ARAVA) 20 MG tablet Take 1 tablet (20 mg total) by mouth daily 30 tablet 2    multivitamin (THERAGRAN) TABS Take 1 tablet by mouth daily      Pirbuterol Acetate (MAXAIR AUTOHALER IN) Inhale one time by mouth every four hours as needed      predniSONE 5 mg tablet TAKE 1/2 -1 TABLET BY MOUTH DAILY, PRN      tobramycin-dexamethasone (TOBRADEX) ophthalmic suspension INSTILL 1 DROP IN AFFECTED (EYE(S) AS NEEDED FOR IRRITATION  0    diclofenac sodium (VOLTAREN) 1 % Apply 2 g topically 4 (four) times a day for 90 days 240 g 2    diclofenac sodium (VOLTAREN) 1 % Apply 2 g topically 3 (three) times a day 180 g 2    diclofenac sodium (VOLTAREN) 1 % Apply 2 g topically 3 (three) times a day 180 g 2    [DISCONTINUED] omeprazole (PriLOSEC) 20 mg delayed release capsule Take 1 capsule by mouth daily for 20 days 20 capsule 0     No current facility-administered medications on file prior to visit          Recent Labs [facility])  0   Lab Value Date/Time    HCT 38 4 09/23/2019 1005    HGB 12 2 09/23/2019 1005    WBC 6 39 09/23/2019 1005    ESR 33 (H) 09/15/2017 1031    CRP 26 0 (H) 09/15/2017 1031    HGBA1C 5 7 (H) 01/25/2018 3363         Physical exam  · General: Awake, Alert, Oriented  · Eyes: Pupils equal, round and reactive to light  · Heart: regular rate and rhythm  · Lungs: No audible wheezing  · Abdomen: soft  right Knee exam  · Skin intact, no erythema, no ecchymosis, no palpable knee effusion  · Mild genu valgum  · Knee range of motion 0 to approximately 120°  · Knee stable to varus and valgus stress in mid flexion full extension  · Mild tenderness palpation over medial joint line  · Distal extremity warm well perfused    Procedure  Large joint arthrocentesis: R knee  Date/Time: 7/1/2020 3:59 PM  Consent given by: patient  Timeout: Immediately prior to procedure a time out was called to verify the correct patient, procedure, equipment, support staff and site/side marked as required   Supporting Documentation  Indications: pain   Procedure Details  Location: knee - R knee  Needle size: 22 G  Ultrasound guidance: no  Medications administered: 2 mL bupivacaine 0 25 %; 2 mL lidocaine 1 %; 6 mg betamethasone acetate-betamethasone sodium phosphate 6 (3-3) mg/mL    Patient tolerance: patient tolerated the procedure well with no immediate complications  Dressing:  Sterile dressing applied            Imaging  Previously obtained x-rays of right knee reviewed with patient today's visit    They reveal a posterolateral plate with mild degenerative changes of the knee joint     21-year-old female with symptomatic right knee osteoarthritis  Weightbearing as tolerated right lower extremity  Patient provided with a right intra-articular corticosteroid injection at today's visit in attempt to alleviate her symptoms  Patient instructed to continue with home exercises and remain active  Advised to continue with oral analgesics as needed for symptomatic relief  Follow up in office on an as-needed basis  Encouraged to call office in the interim if any other problems should arise

## 2020-07-02 ENCOUNTER — OFFICE VISIT (OUTPATIENT)
Dept: PODIATRY | Facility: CLINIC | Age: 80
End: 2020-07-02

## 2020-07-02 VITALS
BODY MASS INDEX: 18.43 KG/M2 | DIASTOLIC BLOOD PRESSURE: 74 MMHG | HEART RATE: 68 BPM | HEIGHT: 63 IN | SYSTOLIC BLOOD PRESSURE: 103 MMHG | WEIGHT: 104 LBS

## 2020-07-02 DIAGNOSIS — L84 CORNS: ICD-10-CM

## 2020-07-02 DIAGNOSIS — L60.3 NAIL DYSTROPHY: Primary | ICD-10-CM

## 2020-07-02 PROCEDURE — NCFTCARE PR NON-COVERED FOOT CARE: Performed by: PODIATRIST

## 2020-07-02 NOTE — PROGRESS NOTES
Patient presents for palliative foot care  Pedal pulses are within normal limits  Treatment consisted of nail trimming followed by the trimming of a corn on the left 2nd toe  Dispensed foam separators

## 2020-08-18 ENCOUNTER — TELEPHONE (OUTPATIENT)
Dept: FAMILY MEDICINE CLINIC | Facility: CLINIC | Age: 80
End: 2020-08-18

## 2020-08-18 DIAGNOSIS — R09.81 NASAL CONGESTION: Primary | ICD-10-CM

## 2020-08-18 RX ORDER — FLUTICASONE PROPIONATE 50 MCG
1 SPRAY, SUSPENSION (ML) NASAL DAILY
Qty: 1 BOTTLE | Refills: 0 | Status: SHIPPED | OUTPATIENT
Start: 2020-08-18 | End: 2020-10-12

## 2020-08-18 NOTE — TELEPHONE ENCOUNTER
Pt called yesterday and wanted to know if we could call in something for her post nasal drip that she has all the time    [facility]

## 2020-09-10 ENCOUNTER — OFFICE VISIT (OUTPATIENT)
Dept: PODIATRY | Facility: CLINIC | Age: 80
End: 2020-09-10

## 2020-09-10 VITALS
DIASTOLIC BLOOD PRESSURE: 70 MMHG | HEART RATE: 64 BPM | SYSTOLIC BLOOD PRESSURE: 119 MMHG | WEIGHT: 105 LBS | HEIGHT: 63 IN | BODY MASS INDEX: 18.61 KG/M2

## 2020-09-10 DIAGNOSIS — L84 CORNS: Primary | ICD-10-CM

## 2020-09-10 DIAGNOSIS — L60.3 NAIL DYSTROPHY: ICD-10-CM

## 2020-09-10 PROCEDURE — NCFTCARE PR NON-COVERED FOOT CARE: Performed by: PODIATRIST

## 2020-09-10 NOTE — PROGRESS NOTES
Patient presents for palliative foot care  Toenails are elongated and there is a hyperkeratotic lesion distal aspect left 2nd toe  Treatment consisted of nail and lesion trimming  Pedal pulses are within normal limits  Dispensed t small toe crests for each foot  Patient is rescheduled for 3 months

## 2020-10-10 DIAGNOSIS — R09.81 NASAL CONGESTION: ICD-10-CM

## 2020-10-12 RX ORDER — FLUTICASONE PROPIONATE 50 MCG
SPRAY, SUSPENSION (ML) NASAL
Qty: 16 ML | Refills: 0 | Status: SHIPPED | OUTPATIENT
Start: 2020-10-12 | End: 2020-12-02

## 2020-10-19 ENCOUNTER — APPOINTMENT (INPATIENT)
Dept: RADIOLOGY | Facility: HOSPITAL | Age: 80
DRG: 543 | End: 2020-10-19
Payer: MEDICARE

## 2020-10-19 ENCOUNTER — HOSPITAL ENCOUNTER (INPATIENT)
Facility: HOSPITAL | Age: 80
LOS: 2 days | Discharge: NURSING, REHAB OR LONG-TERM CARE | DRG: 543 | End: 2020-10-21
Attending: EMERGENCY MEDICINE | Admitting: EMERGENCY MEDICINE
Payer: MEDICARE
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
## 2020-10-19 DIAGNOSIS — S42.291A OTHER CLOSED DISPLACED FRACTURE OF PROXIMAL END OF RIGHT HUMERUS, INITIAL ENCOUNTER: Primary | ICD-10-CM

## 2020-10-19 PROBLEM — M25.511 ACUTE PAIN OF RIGHT SHOULDER: Status: ACTIVE | Noted: 2020-10-19

## 2020-10-19 PROBLEM — S00.83XA TRAUMATIC HEMATOMA OF FOREHEAD: Status: ACTIVE | Noted: 2020-10-19

## 2020-10-19 PROBLEM — S42.201A CLOSED FRACTURE OF PROXIMAL END OF RIGHT HUMERUS: Status: ACTIVE | Noted: 2020-10-19

## 2020-10-19 LAB
ANION GAP SERPL CALCULATED.3IONS-SCNC: 5 MMOL/L (ref 4–13)
ANISOCYTOSIS BLD QL SMEAR: PRESENT
BASE EXCESS BLDA CALC-SCNC: 0 MMOL/L (ref -2–3)
BASOPHILS # BLD MANUAL: 0 THOUSAND/UL (ref 0–0.1)
BASOPHILS NFR MAR MANUAL: 0 % (ref 0–1)
BUN SERPL-MCNC: 13 MG/DL (ref 5–25)
CA-I BLD-SCNC: 1.04 MMOL/L (ref 1.12–1.32)
CALCIUM SERPL-MCNC: 8.9 MG/DL (ref 8.3–10.1)
CHLORIDE SERPL-SCNC: 104 MMOL/L (ref 100–108)
CO2 SERPL-SCNC: 26 MMOL/L (ref 21–32)
CREAT SERPL-MCNC: 0.68 MG/DL (ref 0.6–1.3)
EOSINOPHIL # BLD MANUAL: 0 THOUSAND/UL (ref 0–0.4)
EOSINOPHIL NFR BLD MANUAL: 0 % (ref 0–6)
ERYTHROCYTE [DISTWIDTH] IN BLOOD BY AUTOMATED COUNT: 13.9 % (ref 11.6–15.1)
GFR SERPL CREATININE-BSD FRML MDRD: 82 ML/MIN/1.73SQ M
GLUCOSE SERPL-MCNC: 129 MG/DL (ref 65–140)
GLUCOSE SERPL-MCNC: 136 MG/DL (ref 65–140)
HCO3 BLDA-SCNC: 24.2 MMOL/L (ref 24–30)
HCT VFR BLD AUTO: 37.8 % (ref 34.8–46.1)
HCT VFR BLD CALC: 39 % (ref 34.8–46.1)
HGB BLD-MCNC: 12 G/DL (ref 11.5–15.4)
HGB BLDA-MCNC: 13.3 G/DL (ref 11.5–15.4)
LYMPHOCYTES # BLD AUTO: 0.27 THOUSAND/UL (ref 0.6–4.47)
LYMPHOCYTES # BLD AUTO: 2 % (ref 14–44)
MACROCYTES BLD QL AUTO: PRESENT
MCH RBC QN AUTO: 30.8 PG (ref 26.8–34.3)
MCHC RBC AUTO-ENTMCNC: 31.7 G/DL (ref 31.4–37.4)
MCV RBC AUTO: 97 FL (ref 82–98)
METAMYELOCYTES NFR BLD MANUAL: 1 % (ref 0–1)
MONOCYTES # BLD AUTO: 0.27 THOUSAND/UL (ref 0–1.22)
MONOCYTES NFR BLD: 2 % (ref 4–12)
MYELOCYTES NFR BLD MANUAL: 1 % (ref 0–1)
NEUTROPHILS # BLD MANUAL: 12.22 THOUSAND/UL (ref 1.85–7.62)
NEUTS SEG NFR BLD AUTO: 92 % (ref 43–75)
NRBC BLD AUTO-RTO: 0 /100 WBCS
PCO2 BLD: 25 MMOL/L (ref 21–32)
PCO2 BLD: 36.5 MM HG (ref 42–50)
PH BLD: 7.43 [PH] (ref 7.3–7.4)
PLATELET # BLD AUTO: 283 THOUSANDS/UL (ref 149–390)
PLATELET BLD QL SMEAR: ADEQUATE
PMV BLD AUTO: 10.2 FL (ref 8.9–12.7)
PO2 BLD: 59 MM HG (ref 35–45)
POLYCHROMASIA BLD QL SMEAR: PRESENT
POTASSIUM BLD-SCNC: 6.1 MMOL/L (ref 3.5–5.3)
POTASSIUM SERPL-SCNC: 4.4 MMOL/L (ref 3.5–5.3)
RBC # BLD AUTO: 3.9 MILLION/UL (ref 3.81–5.12)
RBC MORPH BLD: PRESENT
SAO2 % BLD FROM PO2: 91 % (ref 60–85)
SODIUM BLD-SCNC: 131 MMOL/L (ref 136–145)
SODIUM SERPL-SCNC: 135 MMOL/L (ref 136–145)
SPECIMEN SOURCE: ABNORMAL
VARIANT LYMPHS # BLD AUTO: 2 %
WBC # BLD AUTO: 13.28 THOUSAND/UL (ref 4.31–10.16)

## 2020-10-19 PROCEDURE — 82947 ASSAY GLUCOSE BLOOD QUANT: CPT

## 2020-10-19 PROCEDURE — 73560 X-RAY EXAM OF KNEE 1 OR 2: CPT

## 2020-10-19 PROCEDURE — 82803 BLOOD GASES ANY COMBINATION: CPT

## 2020-10-19 PROCEDURE — 84132 ASSAY OF SERUM POTASSIUM: CPT

## 2020-10-19 PROCEDURE — 73030 X-RAY EXAM OF SHOULDER: CPT

## 2020-10-19 PROCEDURE — 96374 THER/PROPH/DIAG INJ IV PUSH: CPT

## 2020-10-19 PROCEDURE — 90715 TDAP VACCINE 7 YRS/> IM: CPT | Performed by: EMERGENCY MEDICINE

## 2020-10-19 PROCEDURE — 85007 BL SMEAR W/DIFF WBC COUNT: CPT | Performed by: PHYSICIAN ASSISTANT

## 2020-10-19 PROCEDURE — 99222 1ST HOSP IP/OBS MODERATE 55: CPT | Performed by: ORTHOPAEDIC SURGERY

## 2020-10-19 PROCEDURE — 1123F ACP DISCUSS/DSCN MKR DOCD: CPT | Performed by: EMERGENCY MEDICINE

## 2020-10-19 PROCEDURE — 99285 EMERGENCY DEPT VISIT HI MDM: CPT

## 2020-10-19 PROCEDURE — 23600 CLTX PROX HUMRL FX W/O MNPJ: CPT | Performed by: ORTHOPAEDIC SURGERY

## 2020-10-19 PROCEDURE — 85014 HEMATOCRIT: CPT

## 2020-10-19 PROCEDURE — 85027 COMPLETE CBC AUTOMATED: CPT | Performed by: PHYSICIAN ASSISTANT

## 2020-10-19 PROCEDURE — 73020 X-RAY EXAM OF SHOULDER: CPT

## 2020-10-19 PROCEDURE — 90471 IMMUNIZATION ADMIN: CPT

## 2020-10-19 PROCEDURE — 84295 ASSAY OF SERUM SODIUM: CPT

## 2020-10-19 PROCEDURE — 73060 X-RAY EXAM OF HUMERUS: CPT

## 2020-10-19 PROCEDURE — 82330 ASSAY OF CALCIUM: CPT

## 2020-10-19 PROCEDURE — NC001 PR NO CHARGE: Performed by: EMERGENCY MEDICINE

## 2020-10-19 PROCEDURE — 99222 1ST HOSP IP/OBS MODERATE 55: CPT | Performed by: EMERGENCY MEDICINE

## 2020-10-19 PROCEDURE — 80048 BASIC METABOLIC PNL TOTAL CA: CPT | Performed by: PHYSICIAN ASSISTANT

## 2020-10-19 RX ORDER — FENTANYL CITRATE 50 UG/ML
INJECTION, SOLUTION INTRAMUSCULAR; INTRAVENOUS CODE/TRAUMA/SEDATION MEDICATION
Status: COMPLETED | OUTPATIENT
Start: 2020-10-19 | End: 2020-10-19

## 2020-10-19 RX ORDER — ASPIRIN 81 MG/1
81 TABLET, CHEWABLE ORAL DAILY
COMMUNITY

## 2020-10-19 RX ORDER — HYDROMORPHONE HCL/PF 1 MG/ML
0.5 SYRINGE (ML) INJECTION ONCE
Status: COMPLETED | OUTPATIENT
Start: 2020-10-19 | End: 2020-10-19

## 2020-10-19 RX ORDER — OXYCODONE HYDROCHLORIDE 5 MG/1
2.5 TABLET ORAL EVERY 4 HOURS PRN
Status: DISCONTINUED | OUTPATIENT
Start: 2020-10-19 | End: 2020-10-21 | Stop reason: HOSPADM

## 2020-10-19 RX ORDER — OXYCODONE HYDROCHLORIDE 5 MG/1
5 TABLET ORAL EVERY 4 HOURS PRN
Status: DISCONTINUED | OUTPATIENT
Start: 2020-10-19 | End: 2020-10-21 | Stop reason: HOSPADM

## 2020-10-19 RX ORDER — HYDROMORPHONE HCL/PF 1 MG/ML
0.2 SYRINGE (ML) INJECTION EVERY 4 HOURS PRN
Status: DISCONTINUED | OUTPATIENT
Start: 2020-10-19 | End: 2020-10-21 | Stop reason: HOSPADM

## 2020-10-19 RX ORDER — SODIUM CHLORIDE, SODIUM GLUCONATE, SODIUM ACETATE, POTASSIUM CHLORIDE, MAGNESIUM CHLORIDE, SODIUM PHOSPHATE, DIBASIC, AND POTASSIUM PHOSPHATE .53; .5; .37; .037; .03; .012; .00082 G/100ML; G/100ML; G/100ML; G/100ML; G/100ML; G/100ML; G/100ML
75 INJECTION, SOLUTION INTRAVENOUS CONTINUOUS
Status: CANCELLED | OUTPATIENT
Start: 2020-10-19

## 2020-10-19 RX ORDER — ONDANSETRON 2 MG/ML
4 INJECTION INTRAMUSCULAR; INTRAVENOUS EVERY 6 HOURS PRN
Status: DISCONTINUED | OUTPATIENT
Start: 2020-10-19 | End: 2020-10-21 | Stop reason: HOSPADM

## 2020-10-19 RX ORDER — POLYETHYLENE GLYCOL 3350 17 G/17G
17 POWDER, FOR SOLUTION ORAL DAILY
Status: DISCONTINUED | OUTPATIENT
Start: 2020-10-19 | End: 2020-10-21 | Stop reason: HOSPADM

## 2020-10-19 RX ORDER — SENNOSIDES 8.6 MG
2 TABLET ORAL DAILY
Status: DISCONTINUED | OUTPATIENT
Start: 2020-10-19 | End: 2020-10-19

## 2020-10-19 RX ORDER — GABAPENTIN 100 MG/1
100 CAPSULE ORAL
Status: DISCONTINUED | OUTPATIENT
Start: 2020-10-19 | End: 2020-10-21 | Stop reason: HOSPADM

## 2020-10-19 RX ORDER — FENTANYL CITRATE 50 UG/ML
1 INJECTION, SOLUTION INTRAMUSCULAR; INTRAVENOUS ONCE
Status: COMPLETED | OUTPATIENT
Start: 2020-10-19 | End: 2020-10-19

## 2020-10-19 RX ORDER — POLYETHYLENE GLYCOL 3350 17 G/17G
17 POWDER, FOR SOLUTION ORAL DAILY PRN
Status: DISCONTINUED | OUTPATIENT
Start: 2020-10-19 | End: 2020-10-21 | Stop reason: HOSPADM

## 2020-10-19 RX ORDER — LIDOCAINE 50 MG/G
1 PATCH TOPICAL DAILY
Status: COMPLETED | OUTPATIENT
Start: 2020-10-19 | End: 2020-10-20

## 2020-10-19 RX ORDER — DOCUSATE SODIUM 100 MG/1
100 CAPSULE, LIQUID FILLED ORAL 2 TIMES DAILY
Status: DISCONTINUED | OUTPATIENT
Start: 2020-10-19 | End: 2020-10-21 | Stop reason: HOSPADM

## 2020-10-19 RX ORDER — AMOXICILLIN 250 MG
1 CAPSULE ORAL
Status: DISCONTINUED | OUTPATIENT
Start: 2020-10-19 | End: 2020-10-21 | Stop reason: HOSPADM

## 2020-10-19 RX ORDER — DIPHENOXYLATE HYDROCHLORIDE AND ATROPINE SULFATE 2.5; .025 MG/1; MG/1
1 TABLET ORAL DAILY
COMMUNITY
End: 2020-12-02

## 2020-10-19 RX ORDER — ACETAMINOPHEN 325 MG/1
975 TABLET ORAL EVERY 8 HOURS SCHEDULED
Status: DISCONTINUED | OUTPATIENT
Start: 2020-10-19 | End: 2020-10-21 | Stop reason: HOSPADM

## 2020-10-19 RX ADMIN — DOCUSATE SODIUM AND SENNOSIDES 1 TABLET: 8.6; 5 TABLET ORAL at 21:10

## 2020-10-19 RX ADMIN — ACETAMINOPHEN 975 MG: 325 TABLET, FILM COATED ORAL at 21:10

## 2020-10-19 RX ADMIN — ONDANSETRON 4 MG: 2 INJECTION INTRAMUSCULAR; INTRAVENOUS at 14:24

## 2020-10-19 RX ADMIN — DOCUSATE SODIUM 100 MG: 100 CAPSULE ORAL at 18:04

## 2020-10-19 RX ADMIN — OXYCODONE HYDROCHLORIDE 5 MG: 5 TABLET ORAL at 13:46

## 2020-10-19 RX ADMIN — GABAPENTIN 100 MG: 100 CAPSULE ORAL at 21:10

## 2020-10-19 RX ADMIN — HYDROMORPHONE HYDROCHLORIDE 0.5 MG: 1 INJECTION, SOLUTION INTRAMUSCULAR; INTRAVENOUS; SUBCUTANEOUS at 14:20

## 2020-10-19 RX ADMIN — ENOXAPARIN SODIUM 30 MG: 30 INJECTION SUBCUTANEOUS at 18:05

## 2020-10-19 RX ADMIN — FENTANYL CITRATE 25 MCG: 50 INJECTION, SOLUTION INTRAMUSCULAR; INTRAVENOUS at 10:53

## 2020-10-19 RX ADMIN — OXYCODONE HYDROCHLORIDE 5 MG: 5 TABLET ORAL at 18:04

## 2020-10-19 RX ADMIN — TETANUS TOXOID, REDUCED DIPHTHERIA TOXOID AND ACELLULAR PERTUSSIS VACCINE, ADSORBED 0.5 ML: 5; 2.5; 8; 8; 2.5 SUSPENSION INTRAMUSCULAR at 11:09

## 2020-10-19 RX ADMIN — LIDOCAINE 5% 1 PATCH: 700 PATCH TOPICAL at 18:05

## 2020-10-20 ENCOUNTER — APPOINTMENT (INPATIENT)
Dept: RADIOLOGY | Facility: HOSPITAL | Age: 80
DRG: 543 | End: 2020-10-20
Payer: MEDICARE

## 2020-10-20 LAB
25(OH)D3 SERPL-MCNC: 9.4 NG/ML (ref 30–100)
ALBUMIN SERPL BCP-MCNC: 3 G/DL (ref 3.5–5)
ALP SERPL-CCNC: 128 U/L (ref 46–116)
ALT SERPL W P-5'-P-CCNC: 24 U/L (ref 12–78)
ANION GAP SERPL CALCULATED.3IONS-SCNC: 6 MMOL/L (ref 4–13)
AST SERPL W P-5'-P-CCNC: 15 U/L (ref 5–45)
BILIRUB SERPL-MCNC: 0.51 MG/DL (ref 0.2–1)
BUN SERPL-MCNC: 13 MG/DL (ref 5–25)
CALCIUM ALBUM COR SERPL-MCNC: 9 MG/DL (ref 8.3–10.1)
CALCIUM SERPL-MCNC: 8.2 MG/DL (ref 8.3–10.1)
CHLORIDE SERPL-SCNC: 101 MMOL/L (ref 100–108)
CO2 SERPL-SCNC: 26 MMOL/L (ref 21–32)
CREAT SERPL-MCNC: 0.8 MG/DL (ref 0.6–1.3)
ERYTHROCYTE [DISTWIDTH] IN BLOOD BY AUTOMATED COUNT: 14 % (ref 11.6–15.1)
ERYTHROCYTE [SEDIMENTATION RATE] IN BLOOD: 47 MM/HOUR (ref 0–29)
GFR SERPL CREATININE-BSD FRML MDRD: 69 ML/MIN/1.73SQ M
GLUCOSE SERPL-MCNC: 103 MG/DL (ref 65–140)
HCT VFR BLD AUTO: 34.6 % (ref 34.8–46.1)
HGB BLD-MCNC: 11.1 G/DL (ref 11.5–15.4)
MCH RBC QN AUTO: 31.2 PG (ref 26.8–34.3)
MCHC RBC AUTO-ENTMCNC: 32.1 G/DL (ref 31.4–37.4)
MCV RBC AUTO: 97 FL (ref 82–98)
PLATELET # BLD AUTO: 279 THOUSANDS/UL (ref 149–390)
PMV BLD AUTO: 10.8 FL (ref 8.9–12.7)
POTASSIUM SERPL-SCNC: 4.5 MMOL/L (ref 3.5–5.3)
PROT SERPL-MCNC: 6.6 G/DL (ref 6.4–8.2)
PTH-INTACT SERPL-MCNC: 55.8 PG/ML (ref 18.4–80.1)
RBC # BLD AUTO: 3.56 MILLION/UL (ref 3.81–5.12)
SODIUM SERPL-SCNC: 133 MMOL/L (ref 136–145)
TSH SERPL DL<=0.05 MIU/L-ACNC: 1.98 UIU/ML (ref 0.36–3.74)
WBC # BLD AUTO: 6.64 THOUSAND/UL (ref 4.31–10.16)

## 2020-10-20 PROCEDURE — 97163 PT EVAL HIGH COMPLEX 45 MIN: CPT

## 2020-10-20 PROCEDURE — 70486 CT MAXILLOFACIAL W/O DYE: CPT

## 2020-10-20 PROCEDURE — 84443 ASSAY THYROID STIM HORMONE: CPT | Performed by: ORTHOPAEDIC SURGERY

## 2020-10-20 PROCEDURE — 97167 OT EVAL HIGH COMPLEX 60 MIN: CPT

## 2020-10-20 PROCEDURE — 99222 1ST HOSP IP/OBS MODERATE 55: CPT | Performed by: INTERNAL MEDICINE

## 2020-10-20 PROCEDURE — 80053 COMPREHEN METABOLIC PANEL: CPT | Performed by: ORTHOPAEDIC SURGERY

## 2020-10-20 PROCEDURE — G1004 CDSM NDSC: HCPCS

## 2020-10-20 PROCEDURE — 85652 RBC SED RATE AUTOMATED: CPT | Performed by: ORTHOPAEDIC SURGERY

## 2020-10-20 PROCEDURE — 82306 VITAMIN D 25 HYDROXY: CPT | Performed by: ORTHOPAEDIC SURGERY

## 2020-10-20 PROCEDURE — 83970 ASSAY OF PARATHORMONE: CPT | Performed by: ORTHOPAEDIC SURGERY

## 2020-10-20 PROCEDURE — 85027 COMPLETE CBC AUTOMATED: CPT | Performed by: PHYSICIAN ASSISTANT

## 2020-10-20 PROCEDURE — 99232 SBSQ HOSP IP/OBS MODERATE 35: CPT | Performed by: PHYSICIAN ASSISTANT

## 2020-10-20 RX ORDER — FLUTICASONE PROPIONATE 50 MCG
1 SPRAY, SUSPENSION (ML) NASAL DAILY
Status: DISCONTINUED | OUTPATIENT
Start: 2020-10-20 | End: 2020-10-21 | Stop reason: HOSPADM

## 2020-10-20 RX ORDER — ERGOCALCIFEROL 1.25 MG/1
50000 CAPSULE ORAL WEEKLY
Status: DISCONTINUED | OUTPATIENT
Start: 2020-10-20 | End: 2020-10-21 | Stop reason: HOSPADM

## 2020-10-20 RX ORDER — SODIUM CHLORIDE 1000 MG
1 TABLET, SOLUBLE MISCELLANEOUS
Status: DISPENSED | OUTPATIENT
Start: 2020-10-20 | End: 2020-10-20

## 2020-10-20 RX ADMIN — ACETAMINOPHEN 975 MG: 325 TABLET, FILM COATED ORAL at 21:25

## 2020-10-20 RX ADMIN — ACETAMINOPHEN 975 MG: 325 TABLET, FILM COATED ORAL at 05:12

## 2020-10-20 RX ADMIN — ACETAMINOPHEN 975 MG: 325 TABLET, FILM COATED ORAL at 13:52

## 2020-10-20 RX ADMIN — DOCUSATE SODIUM AND SENNOSIDES 1 TABLET: 8.6; 5 TABLET ORAL at 21:25

## 2020-10-20 RX ADMIN — GABAPENTIN 100 MG: 100 CAPSULE ORAL at 21:25

## 2020-10-20 RX ADMIN — ENOXAPARIN SODIUM 30 MG: 30 INJECTION SUBCUTANEOUS at 09:10

## 2020-10-20 RX ADMIN — OXYCODONE HYDROCHLORIDE 2.5 MG: 5 TABLET ORAL at 05:12

## 2020-10-20 RX ADMIN — DOCUSATE SODIUM 100 MG: 100 CAPSULE ORAL at 19:20

## 2020-10-20 RX ADMIN — DOCUSATE SODIUM 100 MG: 100 CAPSULE ORAL at 09:10

## 2020-10-20 RX ADMIN — ENOXAPARIN SODIUM 30 MG: 30 INJECTION SUBCUTANEOUS at 21:25

## 2020-10-20 RX ADMIN — FLUTICASONE PROPIONATE 1 SPRAY: 50 SPRAY, METERED NASAL at 13:52

## 2020-10-20 RX ADMIN — ERGOCALCIFEROL 50000 UNITS: 1.25 CAPSULE ORAL at 13:52

## 2020-10-21 VITALS
HEART RATE: 86 BPM | RESPIRATION RATE: 18 BRPM | SYSTOLIC BLOOD PRESSURE: 105 MMHG | WEIGHT: 110.89 LBS | BODY MASS INDEX: 19.65 KG/M2 | OXYGEN SATURATION: 90 % | TEMPERATURE: 99.5 F | HEIGHT: 63 IN | DIASTOLIC BLOOD PRESSURE: 60 MMHG

## 2020-10-21 PROBLEM — M80.00XA AGE-RELATED OSTEOPOROSIS WITH CURRENT PATHOLOGICAL FRACTURE: Status: ACTIVE | Noted: 2020-10-21

## 2020-10-21 PROBLEM — S02.2XXA CLOSED FRACTURE OF NASAL BONE: Status: ACTIVE | Noted: 2020-10-21

## 2020-10-21 LAB
ANION GAP SERPL CALCULATED.3IONS-SCNC: 5 MMOL/L (ref 4–13)
ATRIAL RATE: 76 BPM
BASOPHILS # BLD AUTO: 0.03 THOUSANDS/ΜL (ref 0–0.1)
BASOPHILS NFR BLD AUTO: 0 % (ref 0–1)
BUN SERPL-MCNC: 16 MG/DL (ref 5–25)
CALCIUM SERPL-MCNC: 8.8 MG/DL (ref 8.3–10.1)
CHLORIDE SERPL-SCNC: 105 MMOL/L (ref 100–108)
CO2 SERPL-SCNC: 26 MMOL/L (ref 21–32)
CORTIS SERPL-MCNC: 19.3 UG/DL
CREAT SERPL-MCNC: 1.18 MG/DL (ref 0.6–1.3)
EOSINOPHIL # BLD AUTO: 0.04 THOUSAND/ΜL (ref 0–0.61)
EOSINOPHIL NFR BLD AUTO: 0 % (ref 0–6)
ERYTHROCYTE [DISTWIDTH] IN BLOOD BY AUTOMATED COUNT: 14 % (ref 11.6–15.1)
GFR SERPL CREATININE-BSD FRML MDRD: 43 ML/MIN/1.73SQ M
GLUCOSE SERPL-MCNC: 124 MG/DL (ref 65–140)
GLUCOSE SERPL-MCNC: 145 MG/DL (ref 65–140)
HCT VFR BLD AUTO: 34.9 % (ref 34.8–46.1)
HGB BLD-MCNC: 11.3 G/DL (ref 11.5–15.4)
IMM GRANULOCYTES # BLD AUTO: 0.03 THOUSAND/UL (ref 0–0.2)
IMM GRANULOCYTES NFR BLD AUTO: 0 % (ref 0–2)
LYMPHOCYTES # BLD AUTO: 0.75 THOUSANDS/ΜL (ref 0.6–4.47)
LYMPHOCYTES NFR BLD AUTO: 8 % (ref 14–44)
MCH RBC QN AUTO: 31.2 PG (ref 26.8–34.3)
MCHC RBC AUTO-ENTMCNC: 32.4 G/DL (ref 31.4–37.4)
MCV RBC AUTO: 96 FL (ref 82–98)
MONOCYTES # BLD AUTO: 0.59 THOUSAND/ΜL (ref 0.17–1.22)
MONOCYTES NFR BLD AUTO: 7 % (ref 4–12)
NEUTROPHILS # BLD AUTO: 7.48 THOUSANDS/ΜL (ref 1.85–7.62)
NEUTS SEG NFR BLD AUTO: 85 % (ref 43–75)
NRBC BLD AUTO-RTO: 0 /100 WBCS
P AXIS: 45 DEGREES
PLATELET # BLD AUTO: 272 THOUSANDS/UL (ref 149–390)
PMV BLD AUTO: 10.6 FL (ref 8.9–12.7)
POTASSIUM SERPL-SCNC: 4.4 MMOL/L (ref 3.5–5.3)
PR INTERVAL: 110 MS
QRS AXIS: 11 DEGREES
QRSD INTERVAL: 78 MS
QT INTERVAL: 410 MS
QTC INTERVAL: 461 MS
RBC # BLD AUTO: 3.62 MILLION/UL (ref 3.81–5.12)
SARS-COV-2 RNA RESP QL NAA+PROBE: NEGATIVE
SODIUM SERPL-SCNC: 136 MMOL/L (ref 136–145)
T WAVE AXIS: 28 DEGREES
VENTRICULAR RATE: 76 BPM
WBC # BLD AUTO: 8.92 THOUSAND/UL (ref 4.31–10.16)

## 2020-10-21 PROCEDURE — 82533 TOTAL CORTISOL: CPT | Performed by: INTERNAL MEDICINE

## 2020-10-21 PROCEDURE — 97110 THERAPEUTIC EXERCISES: CPT

## 2020-10-21 PROCEDURE — 82948 REAGENT STRIP/BLOOD GLUCOSE: CPT

## 2020-10-21 PROCEDURE — 97530 THERAPEUTIC ACTIVITIES: CPT

## 2020-10-21 PROCEDURE — NC001 PR NO CHARGE: Performed by: PHYSICIAN ASSISTANT

## 2020-10-21 PROCEDURE — 93005 ELECTROCARDIOGRAM TRACING: CPT

## 2020-10-21 PROCEDURE — 80048 BASIC METABOLIC PNL TOTAL CA: CPT | Performed by: PHYSICIAN ASSISTANT

## 2020-10-21 PROCEDURE — U0003 INFECTIOUS AGENT DETECTION BY NUCLEIC ACID (DNA OR RNA); SEVERE ACUTE RESPIRATORY SYNDROME CORONAVIRUS 2 (SARS-COV-2) (CORONAVIRUS DISEASE [COVID-19]), AMPLIFIED PROBE TECHNIQUE, MAKING USE OF HIGH THROUGHPUT TECHNOLOGIES AS DESCRIBED BY CMS-2020-01-R: HCPCS | Performed by: NURSE PRACTITIONER

## 2020-10-21 PROCEDURE — 85025 COMPLETE CBC W/AUTO DIFF WBC: CPT | Performed by: PHYSICIAN ASSISTANT

## 2020-10-21 PROCEDURE — 99238 HOSP IP/OBS DSCHRG MGMT 30/<: CPT | Performed by: EMERGENCY MEDICINE

## 2020-10-21 PROCEDURE — 97116 GAIT TRAINING THERAPY: CPT

## 2020-10-21 PROCEDURE — 93010 ELECTROCARDIOGRAM REPORT: CPT | Performed by: INTERNAL MEDICINE

## 2020-10-21 RX ORDER — GABAPENTIN 100 MG/1
100 CAPSULE ORAL
Refills: 0
Start: 2020-10-21 | End: 2021-05-19

## 2020-10-21 RX ORDER — ERGOCALCIFEROL 1.25 MG/1
50000 CAPSULE ORAL WEEKLY
Refills: 0
Start: 2020-10-27 | End: 2021-05-19 | Stop reason: DRUGHIGH

## 2020-10-21 RX ORDER — POLYETHYLENE GLYCOL 3350 17 G/17G
17 POWDER, FOR SOLUTION ORAL DAILY
Refills: 0
Start: 2020-10-22 | End: 2021-05-19

## 2020-10-21 RX ORDER — DOCUSATE SODIUM 100 MG/1
100 CAPSULE, LIQUID FILLED ORAL 2 TIMES DAILY
Qty: 10 CAPSULE | Refills: 0
Start: 2020-10-21 | End: 2021-05-19

## 2020-10-21 RX ORDER — FLUTICASONE PROPIONATE 50 MCG
1 SPRAY, SUSPENSION (ML) NASAL DAILY
Refills: 0
Start: 2020-10-22

## 2020-10-21 RX ORDER — ACETAMINOPHEN 325 MG/1
975 TABLET ORAL EVERY 8 HOURS SCHEDULED
Refills: 0
Start: 2020-10-21

## 2020-10-21 RX ORDER — OXYCODONE HYDROCHLORIDE 5 MG/1
TABLET ORAL
Qty: 18 TABLET | Refills: 0 | Status: SHIPPED | OUTPATIENT
Start: 2020-10-21 | End: 2021-05-19

## 2020-10-21 RX ADMIN — ENOXAPARIN SODIUM 30 MG: 30 INJECTION SUBCUTANEOUS at 08:48

## 2020-10-21 RX ADMIN — DICLOFENAC SODIUM 2 G: 10 GEL TOPICAL at 12:10

## 2020-10-21 RX ADMIN — DOCUSATE SODIUM 100 MG: 100 CAPSULE ORAL at 08:48

## 2020-10-21 RX ADMIN — POLYETHYLENE GLYCOL 3350 17 G: 17 POWDER, FOR SOLUTION ORAL at 08:48

## 2020-10-21 RX ADMIN — OXYCODONE HYDROCHLORIDE 5 MG: 5 TABLET ORAL at 08:48

## 2020-10-21 RX ADMIN — ONDANSETRON 4 MG: 2 INJECTION INTRAMUSCULAR; INTRAVENOUS at 10:39

## 2020-10-21 RX ADMIN — DICLOFENAC SODIUM 2 G: 10 GEL TOPICAL at 08:48

## 2020-10-21 RX ADMIN — FLUTICASONE PROPIONATE 1 SPRAY: 50 SPRAY, METERED NASAL at 08:49

## 2020-10-21 RX ADMIN — ACETAMINOPHEN 975 MG: 325 TABLET, FILM COATED ORAL at 06:29

## 2020-10-22 ENCOUNTER — NURSING HOME VISIT (OUTPATIENT)
Dept: GERIATRICS | Facility: OTHER | Age: 80
End: 2020-10-22
Payer: MEDICARE

## 2020-10-22 VITALS
OXYGEN SATURATION: 96 % | SYSTOLIC BLOOD PRESSURE: 161 MMHG | TEMPERATURE: 98.4 F | HEART RATE: 81 BPM | BODY MASS INDEX: 18.85 KG/M2 | RESPIRATION RATE: 18 BRPM | DIASTOLIC BLOOD PRESSURE: 71 MMHG | WEIGHT: 106.4 LBS

## 2020-10-22 DIAGNOSIS — S02.2XXA CLOSED FRACTURE OF NASAL BONE: ICD-10-CM
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
## 2020-10-22 DIAGNOSIS — S00.83XD TRAUMATIC HEMATOMA OF FOREHEAD, SUBSEQUENT ENCOUNTER: Primary | ICD-10-CM

## 2020-10-22 DIAGNOSIS — M80.00XA AGE-RELATED OSTEOPOROSIS WITH CURRENT PATHOLOGICAL FRACTURE: ICD-10-CM

## 2020-10-22 DIAGNOSIS — S42.291A OTHER CLOSED DISPLACED FRACTURE OF PROXIMAL END OF RIGHT HUMERUS, INITIAL ENCOUNTER: ICD-10-CM

## 2020-10-22 DIAGNOSIS — E55.9 VITAMIN D DEFICIENCY: ICD-10-CM

## 2020-10-22 PROCEDURE — 99305 1ST NF CARE MODERATE MDM 35: CPT | Performed by: INTERNAL MEDICINE

## 2020-10-28 ENCOUNTER — NURSING HOME VISIT (OUTPATIENT)
Dept: GERIATRICS | Facility: OTHER | Age: 80
End: 2020-10-28
Payer: MEDICARE

## 2020-10-28 VITALS
BODY MASS INDEX: 18.85 KG/M2 | DIASTOLIC BLOOD PRESSURE: 70 MMHG | TEMPERATURE: 96.1 F | RESPIRATION RATE: 18 BRPM | SYSTOLIC BLOOD PRESSURE: 146 MMHG | HEART RATE: 77 BPM | OXYGEN SATURATION: 97 % | WEIGHT: 106.4 LBS

## 2020-10-28 DIAGNOSIS — E55.9 VITAMIN D DEFICIENCY: ICD-10-CM

## 2020-10-28 DIAGNOSIS — S42.291A OTHER CLOSED DISPLACED FRACTURE OF PROXIMAL END OF RIGHT HUMERUS, INITIAL ENCOUNTER: Primary | ICD-10-CM

## 2020-10-28 DIAGNOSIS — G31.84 MILD COGNITIVE IMPAIRMENT: ICD-10-CM

## 2020-10-28 DIAGNOSIS — S00.83XD TRAUMATIC HEMATOMA OF FOREHEAD, SUBSEQUENT ENCOUNTER: ICD-10-CM

## 2020-10-28 DIAGNOSIS — S02.2XXD CLOSED FRACTURE OF NASAL BONE WITH ROUTINE HEALING, SUBSEQUENT ENCOUNTER: ICD-10-CM
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
## 2020-10-28 DIAGNOSIS — I63.9 CEREBROVASCULAR ACCIDENT (CVA), UNSPECIFIED MECHANISM (HCC): ICD-10-CM

## 2020-10-28 PROCEDURE — 99308 SBSQ NF CARE LOW MDM 20: CPT | Performed by: INTERNAL MEDICINE

## 2020-11-01 DIAGNOSIS — M17.31 POST-TRAUMATIC OSTEOARTHRITIS OF RIGHT KNEE: Primary | ICD-10-CM

## 2020-11-01 DIAGNOSIS — S42.291A OTHER CLOSED DISPLACED FRACTURE OF PROXIMAL END OF RIGHT HUMERUS, INITIAL ENCOUNTER: ICD-10-CM

## 2020-11-02 ENCOUNTER — NURSING HOME VISIT (OUTPATIENT)
Dept: GERIATRICS | Facility: OTHER | Age: 80
End: 2020-11-02
Payer: MEDICARE

## 2020-11-02 DIAGNOSIS — S00.83XD TRAUMATIC HEMATOMA OF FOREHEAD, SUBSEQUENT ENCOUNTER: ICD-10-CM

## 2020-11-02 DIAGNOSIS — S02.2XXD CLOSED FRACTURE OF NASAL BONE WITH ROUTINE HEALING, SUBSEQUENT ENCOUNTER: ICD-10-CM

## 2020-11-02 DIAGNOSIS — M80.00XA AGE-RELATED OSTEOPOROSIS WITH CURRENT PATHOLOGICAL FRACTURE: ICD-10-CM

## 2020-11-02 DIAGNOSIS — G31.84 MILD COGNITIVE IMPAIRMENT: ICD-10-CM
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
## 2020-11-02 DIAGNOSIS — S42.291A OTHER CLOSED DISPLACED FRACTURE OF PROXIMAL END OF RIGHT HUMERUS, INITIAL ENCOUNTER: Primary | ICD-10-CM

## 2020-11-02 DIAGNOSIS — E55.9 VITAMIN D DEFICIENCY: ICD-10-CM

## 2020-11-02 DIAGNOSIS — E78.5 HLD (HYPERLIPIDEMIA): ICD-10-CM

## 2020-11-02 PROCEDURE — 99308 SBSQ NF CARE LOW MDM 20: CPT | Performed by: INTERNAL MEDICINE

## 2020-11-04 ENCOUNTER — HOSPITAL ENCOUNTER (OUTPATIENT)
Dept: RADIOLOGY | Facility: HOSPITAL | Age: 80
Discharge: HOME | End: 2020-11-04
Attending: ORTHOPAEDIC SURGERY
Payer: COMMERCIAL

## 2020-11-04 ENCOUNTER — TELEPHONE (OUTPATIENT)
Dept: OBGYN CLINIC | Facility: MEDICAL CENTER | Age: 80
End: 2020-11-04

## 2020-11-04 ENCOUNTER — OFFICE VISIT (OUTPATIENT)
Dept: OBGYN CLINIC | Facility: HOSPITAL | Age: 80
End: 2020-11-04

## 2020-11-04 VITALS
HEIGHT: 63 IN | HEART RATE: 78 BPM | DIASTOLIC BLOOD PRESSURE: 70 MMHG | SYSTOLIC BLOOD PRESSURE: 115 MMHG | BODY MASS INDEX: 18.85 KG/M2

## 2020-11-04 DIAGNOSIS — S42.291A OTHER CLOSED DISPLACED FRACTURE OF PROXIMAL END OF RIGHT HUMERUS, INITIAL ENCOUNTER: ICD-10-CM

## 2020-11-04 DIAGNOSIS — S42.291D OTHER CLOSED DISPLACED FRACTURE OF PROXIMAL END OF RIGHT HUMERUS WITH ROUTINE HEALING, SUBSEQUENT ENCOUNTER: Primary | ICD-10-CM

## 2020-11-04 PROCEDURE — 73030 X-RAY EXAM OF SHOULDER: CPT

## 2020-11-04 PROCEDURE — 99024 POSTOP FOLLOW-UP VISIT: CPT | Performed by: ORTHOPAEDIC SURGERY

## 2020-11-04 RX ORDER — AMLODIPINE BESYLATE 2.5 MG/1
2.5 TABLET ORAL DAILY
COMMUNITY
End: 2021-02-03 | Stop reason: SDUPTHER

## 2020-11-05 ENCOUNTER — TELEPHONE (OUTPATIENT)
Dept: OBGYN CLINIC | Facility: HOSPITAL | Age: 80
End: 2020-11-05

## 2020-11-10 ENCOUNTER — NURSING HOME VISIT (OUTPATIENT)
Dept: GERIATRICS | Facility: OTHER | Age: 80
End: 2020-11-10
Payer: MEDICARE

## 2020-11-10 VITALS
RESPIRATION RATE: 16 BRPM | BODY MASS INDEX: 18.81 KG/M2 | OXYGEN SATURATION: 97 % | WEIGHT: 106.2 LBS | SYSTOLIC BLOOD PRESSURE: 138 MMHG | DIASTOLIC BLOOD PRESSURE: 68 MMHG | TEMPERATURE: 97.5 F | HEART RATE: 78 BPM

## 2020-11-10 DIAGNOSIS — S02.2XXD CLOSED FRACTURE OF NASAL BONE WITH ROUTINE HEALING, SUBSEQUENT ENCOUNTER: ICD-10-CM

## 2020-11-10 DIAGNOSIS — E78.5 HYPERLIPIDEMIA, UNSPECIFIED HYPERLIPIDEMIA TYPE: ICD-10-CM

## 2020-11-10 DIAGNOSIS — E55.9 VITAMIN D DEFICIENCY: ICD-10-CM

## 2020-11-10 DIAGNOSIS — I63.9 CEREBROVASCULAR ACCIDENT (CVA), UNSPECIFIED MECHANISM (HCC): ICD-10-CM

## 2020-11-10 DIAGNOSIS — I10 ESSENTIAL HYPERTENSION: ICD-10-CM

## 2020-11-10 DIAGNOSIS — S42.291D OTHER CLOSED DISPLACED FRACTURE OF PROXIMAL END OF RIGHT HUMERUS WITH ROUTINE HEALING, SUBSEQUENT ENCOUNTER: Primary | ICD-10-CM

## 2020-11-10 DIAGNOSIS — M80.00XD AGE-RELATED OSTEOPOROSIS WITH CURRENT PATHOLOGICAL FRACTURE WITH ROUTINE HEALING, SUBSEQUENT ENCOUNTER: ICD-10-CM

## 2020-11-10 PROCEDURE — 99309 SBSQ NF CARE MODERATE MDM 30: CPT | Performed by: INTERNAL MEDICINE

## 2020-11-13 LAB — EXT SARS-COV-2: NOT DETECTED

## 2020-11-17 ENCOUNTER — NURSING HOME VISIT (OUTPATIENT)
Dept: GERIATRICS | Facility: OTHER | Age: 80
End: 2020-11-17
Payer: MEDICARE

## 2020-11-17 VITALS
WEIGHT: 106 LBS | DIASTOLIC BLOOD PRESSURE: 70 MMHG | RESPIRATION RATE: 16 BRPM | TEMPERATURE: 98.2 F | HEART RATE: 78 BPM | SYSTOLIC BLOOD PRESSURE: 126 MMHG | BODY MASS INDEX: 18.78 KG/M2
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
## 2020-11-17 DIAGNOSIS — S00.83XD TRAUMATIC HEMATOMA OF FOREHEAD, SUBSEQUENT ENCOUNTER: ICD-10-CM

## 2020-11-17 DIAGNOSIS — M80.00XD AGE-RELATED OSTEOPOROSIS WITH CURRENT PATHOLOGICAL FRACTURE WITH ROUTINE HEALING, SUBSEQUENT ENCOUNTER: ICD-10-CM

## 2020-11-17 DIAGNOSIS — I10 ESSENTIAL HYPERTENSION: ICD-10-CM

## 2020-11-17 DIAGNOSIS — I63.9 CEREBROVASCULAR ACCIDENT (CVA), UNSPECIFIED MECHANISM (HCC): ICD-10-CM

## 2020-11-17 DIAGNOSIS — E55.9 VITAMIN D DEFICIENCY: ICD-10-CM

## 2020-11-17 DIAGNOSIS — F41.9 ANXIETY: ICD-10-CM

## 2020-11-17 DIAGNOSIS — G31.84 MILD COGNITIVE IMPAIRMENT: ICD-10-CM

## 2020-11-17 DIAGNOSIS — S42.291D OTHER CLOSED DISPLACED FRACTURE OF PROXIMAL END OF RIGHT HUMERUS WITH ROUTINE HEALING, SUBSEQUENT ENCOUNTER: Primary | ICD-10-CM

## 2020-11-17 PROCEDURE — 99309 SBSQ NF CARE MODERATE MDM 30: CPT | Performed by: INTERNAL MEDICINE

## 2020-11-20 ENCOUNTER — NURSING HOME VISIT (OUTPATIENT)
Dept: GERIATRICS | Facility: OTHER | Age: 80
End: 2020-11-20
Payer: MEDICARE

## 2020-11-20 VITALS
TEMPERATURE: 98.2 F | SYSTOLIC BLOOD PRESSURE: 129 MMHG | WEIGHT: 106 LBS | OXYGEN SATURATION: 98 % | DIASTOLIC BLOOD PRESSURE: 61 MMHG | BODY MASS INDEX: 18.78 KG/M2 | RESPIRATION RATE: 16 BRPM | HEART RATE: 70 BPM

## 2020-11-20 DIAGNOSIS — S00.83XD TRAUMATIC HEMATOMA OF FOREHEAD, SUBSEQUENT ENCOUNTER: ICD-10-CM

## 2020-11-20 DIAGNOSIS — E55.9 VITAMIN D DEFICIENCY: ICD-10-CM

## 2020-11-20 DIAGNOSIS — S42.291D OTHER CLOSED DISPLACED FRACTURE OF PROXIMAL END OF RIGHT HUMERUS WITH ROUTINE HEALING, SUBSEQUENT ENCOUNTER: Primary | ICD-10-CM

## 2020-11-20 DIAGNOSIS — I10 ESSENTIAL HYPERTENSION: ICD-10-CM

## 2020-11-20 DIAGNOSIS — I63.9 CEREBROVASCULAR ACCIDENT (CVA), UNSPECIFIED MECHANISM (HCC): ICD-10-CM

## 2020-11-20 DIAGNOSIS — F41.9 ANXIETY: ICD-10-CM

## 2020-11-20 DIAGNOSIS — G31.84 MILD COGNITIVE IMPAIRMENT: ICD-10-CM

## 2020-11-20 PROCEDURE — 99315 NF DSCHRG MGMT 30 MIN/LESS: CPT | Performed by: INTERNAL MEDICINE

## 2020-11-30 DIAGNOSIS — S42.291D OTHER CLOSED DISPLACED FRACTURE OF PROXIMAL END OF RIGHT HUMERUS WITH ROUTINE HEALING, SUBSEQUENT ENCOUNTER: Primary | ICD-10-CM

## 2020-12-01 ENCOUNTER — TELEPHONE (OUTPATIENT)
Dept: OBGYN CLINIC | Facility: HOSPITAL | Age: 80
End: 2020-12-01

## 2020-12-02 ENCOUNTER — HOSPITAL ENCOUNTER (OUTPATIENT)
Dept: RADIOLOGY | Facility: HOSPITAL | Age: 80
Discharge: HOME | End: 2020-12-02
Attending: ORTHOPAEDIC SURGERY
Payer: COMMERCIAL

## 2020-12-02 ENCOUNTER — OFFICE VISIT (OUTPATIENT)
Dept: OBGYN CLINIC | Facility: HOSPITAL | Age: 80
End: 2020-12-02

## 2020-12-02 VITALS
HEIGHT: 63 IN | HEART RATE: 68 BPM | BODY MASS INDEX: 18.78 KG/M2 | SYSTOLIC BLOOD PRESSURE: 106 MMHG | DIASTOLIC BLOOD PRESSURE: 67 MMHG

## 2020-12-02 DIAGNOSIS — S42.291D OTHER CLOSED DISPLACED FRACTURE OF PROXIMAL END OF RIGHT HUMERUS WITH ROUTINE HEALING, SUBSEQUENT ENCOUNTER: ICD-10-CM

## 2020-12-02 DIAGNOSIS — S42.291D OTHER CLOSED DISPLACED FRACTURE OF PROXIMAL END OF RIGHT HUMERUS WITH ROUTINE HEALING, SUBSEQUENT ENCOUNTER: Primary | ICD-10-CM

## 2020-12-02 PROCEDURE — 99024 POSTOP FOLLOW-UP VISIT: CPT | Performed by: ORTHOPAEDIC SURGERY

## 2020-12-02 PROCEDURE — 73030 X-RAY EXAM OF SHOULDER: CPT

## 2020-12-21 ENCOUNTER — TELEMEDICINE (OUTPATIENT)
Dept: FAMILY MEDICINE CLINIC | Facility: CLINIC | Age: 80
End: 2020-12-21
Payer: MEDICARE

## 2020-12-21 DIAGNOSIS — R74.8 ELEVATED LIVER ENZYMES: ICD-10-CM

## 2020-12-21 DIAGNOSIS — S42.291S FRACTURE OF HUMERAL HEAD, CLOSED, RIGHT, SEQUELA: Primary | ICD-10-CM

## 2020-12-21 PROBLEM — S42.212A FX HUMERAL NECK, LEFT, CLOSED, INITIAL ENCOUNTER: Status: ACTIVE | Noted: 2020-12-21

## 2020-12-21 PROCEDURE — 99213 OFFICE O/P EST LOW 20 MIN: CPT | Performed by: NURSE PRACTITIONER

## 2021-01-04 ENCOUNTER — APPOINTMENT (EMERGENCY)
Dept: RADIOLOGY | Facility: HOSPITAL | Age: 81
End: 2021-01-04
Payer: MEDICARE

## 2021-01-04 ENCOUNTER — HOSPITAL ENCOUNTER (EMERGENCY)
Facility: HOSPITAL | Age: 81
Discharge: HOME | End: 2021-01-04
Attending: EMERGENCY MEDICINE
Payer: MEDICARE

## 2021-01-04 VITALS
OXYGEN SATURATION: 97 % | RESPIRATION RATE: 18 BRPM | TEMPERATURE: 97.6 F | DIASTOLIC BLOOD PRESSURE: 84 MMHG | HEART RATE: 78 BPM | SYSTOLIC BLOOD PRESSURE: 211 MMHG

## 2021-01-04 DIAGNOSIS — I10 HTN (HYPERTENSION): ICD-10-CM

## 2021-01-04 DIAGNOSIS — M54.31 SCIATICA OF RIGHT SIDE: Primary | ICD-10-CM

## 2021-01-04 PROCEDURE — 72100 X-RAY EXAM L-S SPINE 2/3 VWS: CPT

## 2021-01-04 PROCEDURE — 99283 EMERGENCY DEPT VISIT LOW MDM: CPT

## 2021-01-04 PROCEDURE — 96372 THER/PROPH/DIAG INJ SC/IM: CPT

## 2021-01-04 PROCEDURE — 99284 EMERGENCY DEPT VISIT MOD MDM: CPT | Performed by: EMERGENCY MEDICINE

## 2021-01-04 RX ORDER — TRAMADOL HYDROCHLORIDE 50 MG/1
25 TABLET ORAL ONCE
Status: COMPLETED | OUTPATIENT
Start: 2021-01-04 | End: 2021-01-04

## 2021-01-04 RX ORDER — KETOROLAC TROMETHAMINE 30 MG/ML
15 INJECTION, SOLUTION INTRAMUSCULAR; INTRAVENOUS ONCE
Status: COMPLETED | OUTPATIENT
Start: 2021-01-04 | End: 2021-01-04

## 2021-01-04 RX ORDER — TRAMADOL HYDROCHLORIDE 50 MG/1
25 TABLET ORAL EVERY 6 HOURS PRN
Qty: 10 TABLET | Refills: 0 | Status: SHIPPED | OUTPATIENT
Start: 2021-01-04 | End: 2021-01-14

## 2021-01-04 RX ADMIN — TRAMADOL HYDROCHLORIDE 25 MG: 50 TABLET, FILM COATED ORAL at 16:17

## 2021-01-04 RX ADMIN — KETOROLAC TROMETHAMINE 15 MG: 30 INJECTION, SOLUTION INTRAMUSCULAR at 13:50

## 2021-01-04 NOTE — DISCHARGE INSTRUCTIONS
Please return if you develop any worsening symptoms  You may return at any time if you have any further concerns  Please follow up with your doctor in the next few days

## 2021-01-04 NOTE — ED PROVIDER NOTES
History  Chief Complaint   Patient presents with    Back Pain     R lower back pain radiating to RLE worsening x days     50-year-old female presenting with right-sided back pain that is radiating to her right thigh  Symptoms are worse when she walks  She denies trauma, fevers, trouble urinating or trouble defecating  Symptoms been ongoing for the past 24-48 hours  Patient found to be hypertensive but she did not take her medications this morning          Prior to Admission Medications   Prescriptions Last Dose Informant Patient Reported? Taking?    Pirbuterol Acetate (MAXAIR AUTOHALER IN)   Yes No   Sig: Inhale one time by mouth every four hours as needed   acetaminophen (TYLENOL) 325 mg tablet   No No   Sig: Take 3 tablets (975 mg total) by mouth every 8 (eight) hours   albuterol (PROVENTIL HFA,VENTOLIN HFA) 90 mcg/act inhaler   Yes No   Sig: Inhale 2 puffs every 6 (six) hours as needed for wheezing   amLODIPine (NORVASC) 2 5 mg tablet   Yes No   Sig: Take 2 5 mg by mouth daily   aspirin 81 mg chewable tablet  Self Yes No   Sig: Chew 81 mg daily   bisacodyl (DULCOLAX) 5 mg EC tablet   Yes No   Sig: Take 5 mg by mouth daily as needed for constipation   diclofenac sodium (VOLTAREN) 1 %   No No   Sig: Apply 2 g topically 3 (three) times a day   diclofenac sodium (VOLTAREN) 1 %   No No   Sig: Apply 2 g topically 4 (four) times a day   docusate sodium (COLACE) 100 mg capsule   No No   Sig: Take 1 capsule (100 mg total) by mouth 2 (two) times a day   ergocalciferol (VITAMIN D2) 50,000 units   No No   Sig: Take 1 capsule (50,000 Units total) by mouth once a week   fluticasone (FLONASE) 50 mcg/act nasal spray   No No   Si spray into each nostril daily   gabapentin (NEURONTIN) 100 mg capsule   No No   Sig: Take 1 capsule (100 mg total) by mouth daily at bedtime   multivitamin (THERAGRAN) TABS   Yes No   Sig: Take 1 tablet by mouth daily   oxyCODONE (ROXICODONE) 5 mg immediate release tablet   No No   Si 5-1 tab every 4 hours as needed for moderate pain   polyethylene glycol (MIRALAX) 17 g packet   No No   Sig: Take 17 g by mouth daily      Facility-Administered Medications: None       Past Medical History:   Diagnosis Date    Allergic     Anxiety     Arthritis     [facility] )     HLD (hyperlipidemia)     Hypertension     Hyperthyroidism     Osteoporosis     Stroke ([facility] )     TIA (transient ischemic attack)        Past Surgical History:   Procedure Laterality Date    BREAST LUMPECTOMY Left     HYSTERECTOMY      KNEE SURGERY Right     ORIF TIBIA FRACTURE Right 12/8/2016    Procedure: OPEN REDUCTION W/ INTERNAL FIXATION (ORIF) TIBIA;  Surgeon: Rosa Elena Olivas MD;  Location:  MAIN OR;  Service:        Family History   Problem Relation Age of Onset    Coronary artery disease Family     Other Family         DJD     I have reviewed and agree with the history as documented  E-Cigarette/Vaping    E-Cigarette Use Never User      E-Cigarette/Vaping Substances    Nicotine No     THC No     CBD No     Flavoring No     Other No     Unknown No      Social History     Tobacco Use    Smoking status: Never Smoker    Smokeless tobacco: Never Used    Tobacco comment: Former smoker - As per Odette Cap    Substance Use Topics    Alcohol use: Not Currently     Frequency: Never     Comment: Alcohol intake:   Occasional  - As per [employer] Drug use: Never       Review of Systems   Constitutional: Negative for fever  HENT: Negative for congestion  Eyes: Negative for visual disturbance  Respiratory: Negative for shortness of breath  Cardiovascular: Negative for chest pain  Gastrointestinal: Negative for abdominal pain  Musculoskeletal: Positive for back pain  Negative for neck pain  Skin: Negative for rash  Neurological: Negative for weakness  Psychiatric/Behavioral: The patient is not nervous/anxious  Physical Exam  Physical Exam  Vitals signs and nursing note reviewed     Constitutional: General: She is not in acute distress  HENT:      Head: Normocephalic and atraumatic  Mouth/Throat:      Pharynx: No posterior oropharyngeal erythema  Eyes:      Conjunctiva/sclera: Conjunctivae normal    Neck:      Musculoskeletal: No neck rigidity  Cardiovascular:      Rate and Rhythm: Regular rhythm  Pulmonary:      Effort: Pulmonary effort is normal  No respiratory distress  Abdominal:      General: There is no distension  Musculoskeletal:      Comments: Mild tenderness to palpation of right paralumbar musculature  No midline tenderness  Distal lower extremities neurovascularly intact  Range of motion intact   Skin:     General: Skin is warm and dry  Neurological:      Mental Status: She is alert  Mental status is at baseline  Psychiatric:         Mood and Affect: Mood normal          Vital Signs  ED Triage Vitals [01/04/21 1304]   Temperature Pulse Respirations Blood Pressure SpO2   97 6 °F (36 4 °C) 78 18 (!) 211/84 97 %      Temp Source Heart Rate Source Patient Position - Orthostatic VS BP Location FiO2 (%)   Oral Monitor Lying Left arm --      Pain Score       7           Vitals:    01/04/21 1304   BP: (!) 211/84   Pulse: 78   Patient Position - Orthostatic VS: Lying         Visual Acuity      ED Medications  Medications   traMADol (ULTRAM) tablet 25 mg (has no administration in time range)   ketorolac (TORADOL) injection 15 mg (15 mg Intramuscular Given 1/4/21 1350)       Diagnostic Studies  Results Reviewed     None                 XR spine lumbar 2 or 3 views injury   Final Result by Lauren Alvarado MD (01/04 1418)      Normal examination           Workstation performed: [id number]                    Procedures  Procedures         ED Course  ED Course as of Jan 04 1610   Mon Jan 04, 2021   1607 On re-evaluation, patient feeling more comfortable and able to ambulate on her own                                              MDM  Number of Diagnoses or Management Options  Diagnosis management comments: 79-year-old female presenting with right-sided back pain that radiates to her right leg  Patient describes what appears to be an L4 distribution of likely sciatica  Muscle strength and sensation grossly intact in both legs  She denies numbness or tingling and denies trouble defecating or trouble urinating      Disposition  Final diagnoses:   Sciatica of right side   HTN (hypertension)     Time reflects when diagnosis was documented in both MDM as applicable and the Disposition within this note     Time User Action Codes Description Comment    1/4/2021  4:08 PM Anitra Poe [M54 [id number]] Sciatica of right side     1/4/2021  4:08 PM Anitra Poe [[id number]] HTN (hypertension)       ED Disposition     ED Disposition Condition Date/Time Comment    Discharge Stable Mon Jan 4, 2021  4:08 PM Sukumar Snyder discharge to home/self care  Follow-up Information     Follow up With Specialties Details Why Contact Info    Viktor Mix Nurse Practitioner, Family Medicine Schedule an appointment as soon as possible for a visit in 2 days  [address]  [phone]            Patient's Medications   Discharge Prescriptions    TRAMADOL (ULTRAM) 50 MG TABLET    Take 0 5 tablets (25 mg total) by mouth every 6 (six) hours as needed for moderate pain for up to 10 days       Start Date: 1/4/2021  End Date: 1/14/2021       Order Dose: 25 mg       Quantity: 10 tablet    Refills: 0     No discharge procedures on file      PDMP Review     None          ED Provider  Electronically Signed by           Morena Vizcarra DO  01/04/21 7407

## 2021-01-05 ENCOUNTER — TELEPHONE (OUTPATIENT)
Dept: FAMILY MEDICINE CLINIC | Facility: CLINIC | Age: 81
End: 2021-01-05

## 2021-01-05 NOTE — TELEPHONE ENCOUNTER
Please schedule patient for a transition of care management appointment if she is able to come into the office to be great

## 2021-01-05 NOTE — TELEPHONE ENCOUNTER
Pt called and stated that she is in need of a call regarding her being in the ER for her sciatica ,, she stated that she needs someone to call her regarding this issue

## 2021-01-08 ENCOUNTER — TRANSITIONAL CARE MANAGEMENT (OUTPATIENT)
Dept: FAMILY MEDICINE CLINIC | Facility: CLINIC | Age: 81
End: 2021-01-08

## 2021-01-08 ENCOUNTER — TELEMEDICINE (OUTPATIENT)
Dept: FAMILY MEDICINE CLINIC | Facility: CLINIC | Age: 81
End: 2021-01-08
Payer: MEDICARE

## 2021-01-08 VITALS — WEIGHT: 106 LBS | HEIGHT: 63 IN | BODY MASS INDEX: 18.78 KG/M2

## 2021-01-08 DIAGNOSIS — Z76.89 ENCOUNTER FOR SUPPORT AND COORDINATION OF TRANSITION OF CARE: Primary | ICD-10-CM

## 2021-01-08 PROCEDURE — 99496 TRANSJ CARE MGMT HIGH F2F 7D: CPT | Performed by: NURSE PRACTITIONER

## 2021-01-08 NOTE — PROGRESS NOTES
Virtual Brief Visit    Assessment/Plan:    Problem List Items Addressed This Visit        Other    Encounter for support and coordination of transition of care - Primary          BMI Counseling: Body mass index is 18 78 kg/m²  The BMI is above normal  Nutrition recommendations include decreasing portion sizes, encouraging healthy choices of fruits and vegetables, decreasing fast food intake, consuming healthier snacks, limiting drinks that contain sugar, moderation in carbohydrate intake, increasing intake of lean protein, reducing intake of saturated and trans fat and reducing intake of cholesterol  Exercise recommendations include vigorous physical activity 75 minutes/week, exercising 3-5 times per week, obtaining a gym membership and strength training exercises  No pharmacotherapy was ordered  Patient referred to nutritionist due to patient being overweight  Depression Screening and Follow-up Plan: Clincally patient does not have depression  No treatment is required  Falls Plan of Care: balance, strength, and gait training instructions were provided  Medications that increase falls were reviewed  Vitamin D supplementation was recommended  Home safety education provided  Reason for visit is   Chief Complaint   Patient presents with    Follow-up     Patient was in ER 1/4/21 for sciatica right side  Was put on tramadol which she states she takes 1/2 tablet and states it is starting to help  Has history of knee surgery r knee and arthritis  Also uses heating pad       Virtual Brief Visit        Encounter provider Rico Bob, [address]    Provider located at [address] [address] [address] [facility] [id number] [phone]    Recent Visits  Date Type Provider Dept   01/05/21 Telephone Rico Bob, [facility] [place]   Showing recent visits within past 7 days and meeting all other requirements     Today's Visits  Date Type Provider Dept   01/08/21 Telemedicine [facility]   Showing today's visits and meeting all other requirements     Future Appointments  No visits were found meeting these conditions  Showing future appointments within next 150 days and meeting all other requirements        After connecting through telephone, the patient was identified by name and date of birth  Martín Aceves was informed that this is a telemedicine visit and that the visit is being conducted through telephone  My office door was closed  No one else was in the room  She acknowledged consent and understanding of privacy and security of the platform  The patient has agreed to participate and understands she can discontinue the visit at any time  Patient is aware this is a billable service  Carmen Nelson is a 79 y o  female virtual telephone visit for evaluation of right leg pain  Patient is 25-year-old female presents for follow-up after ER evaluation for right-sided lower leg pain  Indicates the pain starts at her knee anteriorly and radiates down her leg  She is currently on tramadol and takes a half a pill as needed for discomfort which seems to be helping  She is getting PT and OT at home at this time  No further distress noted patient has been instructed follow-up as needed         Past Medical History:   Diagnosis Date    Allergic     Anxiety     Arthritis     Cancer ([facility] [address] [facility]     HLD (hyperlipidemia)     Hypertension     Hyperthyroidism     Osteoporosis     Stroke ([facility] [address] )     TIA (transient ischemic attack)        Past Surgical History:   Procedure Laterality Date    BREAST LUMPECTOMY Left     HYSTERECTOMY      KNEE SURGERY Right     ORIF TIBIA FRACTURE Right 12/8/2016    Procedure: OPEN REDUCTION W/ INTERNAL FIXATION (ORIF) TIBIA;  Surgeon: Erasmo Rob MD;  Location: [facility];  Service:        Current Outpatient Medications   Medication Sig Dispense Refill    acetaminophen (TYLENOL) 325 mg tablet Take 3 tablets (975 mg total) by mouth every 8 (eight) hours  0    albuterol (PROVENTIL HFA,VENTOLIN HFA) 90 mcg/act inhaler Inhale 2 puffs every 6 (six) hours as needed for wheezing      amLODIPine (NORVASC) 2 5 mg tablet Take 2 5 mg by mouth daily      aspirin 81 mg chewable tablet Chew 81 mg daily      bisacodyl (DULCOLAX) 5 mg EC tablet Take 5 mg by mouth daily as needed for constipation      diclofenac sodium (VOLTAREN) 1 % Apply 2 g topically 4 (four) times a day  0    docusate sodium (COLACE) 100 mg capsule Take 1 capsule (100 mg total) by mouth 2 (two) times a day 10 capsule 0    ergocalciferol (VITAMIN D2) 50,000 units Take 1 capsule (50,000 Units total) by mouth once a week  0    fluticasone (FLONASE) 50 mcg/act nasal spray 1 spray into each nostril daily  0    gabapentin (NEURONTIN) 100 mg capsule Take 1 capsule (100 mg total) by mouth daily at bedtime  0    multivitamin (THERAGRAN) TABS Take 1 tablet by mouth daily      oxyCODONE (ROXICODONE) 5 mg immediate release tablet 0 5-1 tab every 4 hours as needed for moderate pain 18 tablet 0    Pirbuterol Acetate (MAXAIR AUTOHALER IN) Inhale one time by mouth every four hours as needed      polyethylene glycol (MIRALAX) 17 g packet Take 17 g by mouth daily  0    traMADol (ULTRAM) 50 mg tablet Take 0 5 tablets (25 mg total) by mouth every 6 (six) hours as needed for moderate pain for up to 10 days 10 tablet 0    diclofenac sodium (VOLTAREN) 1 % Apply 2 g topically 3 (three) times a day 180 g 2     No current facility-administered medications for this visit  Allergies   Allergen Reactions    Alendronate     Penicillins Swelling    Raloxifene     Risedronate Sodium        Review of Systems   Constitutional: Negative for activity change, appetite change, chills, diaphoresis, fatigue, fever and unexpected weight change     HENT: Negative for congestion, ear pain, postnasal drip, rhinorrhea, sinus pressure, sinus pain, sore throat and voice change  Eyes: Negative for pain, discharge, redness and visual disturbance  Respiratory: Negative for apnea, cough, choking, chest tightness, shortness of breath and wheezing  Cardiovascular: Negative for chest pain, palpitations and leg swelling  Gastrointestinal: Negative for abdominal distention, abdominal pain, constipation, diarrhea, nausea and vomiting  Genitourinary: Negative for dysuria and hematuria  Musculoskeletal: Negative for arthralgias, gait problem, joint swelling, neck pain and neck stiffness  Skin: Negative  Neurological: Positive for weakness and numbness  Negative for dizziness, tremors, seizures, syncope, speech difficulty, light-headedness and headaches  Sciatic pain described 9/10  Mild weakness of the right lower extremity  Patient uses a walker to ambulate  Hematological: Negative  Psychiatric/Behavioral: Negative  Vitals:    01/08/21 1104   Weight: 48 1 kg (106 lb)   Height: 5' 3" (1 6 m)         I spent 25 minutes directly with the patient during this visit    [facility] acknowledges that she has consented to an online visit or consultation  She understands that the online visit is based solely on information provided by her, and that, in the absence of a face-to-face physical evaluation by the physician, the diagnosis she receives is both limited and provisional in terms of accuracy and completeness  This is not intended to replace a full medical face-to-face evaluation by the physician  Rachael Cancino understands and accepts these terms

## 2021-01-08 NOTE — PATIENT INSTRUCTIONS
Knee Pain   WHAT YOU NEED TO KNOW:   What do I need to know about knee pain? Knee pain may start suddenly, or it may be a long-term problem  You may have pain on the side, front, or back of your knee  You may have knee stiffness and swelling  You may hear popping sounds or feel like your knee is giving way or locking up as you walk  You may feel pain when you sit, stand, walk, or climb up and down stairs  What increases my risk for knee pain? · Obesity    · A strain or tear in ligaments or tendons    · A leg fracture or knee dislocation    · Overuse of your knee    · Osteoarthritis, rheumatoid arthritis, or gout    · An infection, tumor, or cyst in your knee    · Shoes that are not supportive, or training on a hard surface    · Sports that involve jumping or pivoting on your knee    How is the cause of knee pain diagnosed? Your healthcare provider will examine your knee and ask about your symptoms  Tell your provider when the pain started and what you were doing at the time  Describe the pain, such as sharp, throbbing, or achy  Tell your provider about any knee injury or surgery you had  You may need any of the following:  · MRI, CT, or ultrasound  pictures may show an injury, fracture, or tumor  · Blood tests  may be used to check the level of inflammation in your blood  The tests may also show signs of infection  · Arthroscopy  is a procedure to look inside your knee joint with an arthroscope  An arthroscope is a flexible tube with a light and camera on the end  A knee arthroscopy is usually done to check for disease or damage inside your knee  These problems may be fixed during the procedure  How is knee pain treated? Treatment will depend on the cause of your pain  You may need any of the following:  · NSAIDs  help decrease swelling and pain or fever  This medicine is available with or without a doctor's order  NSAIDs can cause stomach bleeding or kidney problems in certain people   If you take blood thinner medicine, always ask your healthcare provider if NSAIDs are safe for you  Always read the medicine label and follow directions  · Acetaminophen  decreases pain and fever  It is available without a doctor's order  Ask how much to take and how often to take it  Follow directions  Read the labels of all other medicines you are using to see if they also contain acetaminophen, or ask your doctor or pharmacist  Acetaminophen can cause liver damage if not taken correctly  Do not use more than 4 grams (4,000 milligrams) total of acetaminophen in one day  · Prescription pain medicine  may be given  Ask your healthcare provider how to take this medicine safely  Some prescription pain medicines contain acetaminophen  Do not take other medicines that contain acetaminophen without talking to your healthcare provider  Too much acetaminophen may cause liver damage  Prescription pain medicine may cause constipation  Ask your healthcare provider how to prevent or treat constipation  · Steroid injections  may be given into your knee  Steroids reduce inflammation and pain  · Surgery  may be used for some injuries, such as to repair a torn ACL  What can I do to manage my symptoms? · Rest your knee so it can heal   Limit activities that increase your pain  Do low-impact exercises, such as walking or swimming  · Apply ice to help reduce swelling and pain  Use an ice pack, or put crushed ice in a plastic bag  Cover it with a towel before you apply it to your knee  Apply ice for 15 to 20 minutes every hour, or as directed  · Apply compression to help reduce swelling  Use a brace or bandage only as directed  · Elevate your knee to help decrease pain and swelling  Elevate your knee while you are sitting or lying down  Prop your leg on pillows to keep your knee above the level of your heart  · Prevent your knee from moving as directed  Your healthcare provider may put on a cast or splint   You may need to wear a leg brace to stabilize your knee  A leg brace can be adjusted to increase your range of motion as your knee heals  What can I do to prevent knee pain? · Maintain a healthy weight  Extra weight increases your risk for knee pain  Ask your healthcare provider how much you should weigh  He or she can help you create a safe weight loss plan if you need to lose weight  · Exercise or train properly  Use the correct equipment for sports  Wear shoes that provide good support  Check your posture often as you exercise, play sports, or train for an event  This can help prevent stress and strain on your knees  Rest between sessions so you do not overwork your knees  When should I seek immediate care? · Your pain is worse, even after treatment  · You cannot bend or straighten your leg completely  · The swelling around your knee does not go down even with treatment  · Your knee is painful and hot to the touch  When should I contact my healthcare provider? · You have questions or concerns about your condition or care  CARE AGREEMENT:   You have the right to help plan your care  Learn about your health condition and how it may be treated  Discuss treatment options with your healthcare providers to decide what care you want to receive  You always have the right to refuse treatment  The above information is an  only  It is not intended as medical advice for individual conditions or treatments  Talk to your doctor, nurse or pharmacist before following any medical regimen to see if it is safe and effective for you  © Copyright [address] Information is for End User's use only and may not be sold, redistributed or otherwise used for commercial purposes  All illustrations and images included in CareNotes® are the copyrighted property of [employer]  or  [facility]  Sciatica   WHAT YOU NEED TO KNOW:   What is sciatica?   Sciatica is a condition that causes pain along your sciatic nerve  The sciatic nerve runs from your spine through both sides of your buttocks  It then runs down the back of your thigh, into your lower leg and foot  Any place along your sciatic nerve may be compressed, inflamed, irritated, or stretched and cause symptoms  What causes sciatica? Sciatica may be related to certain activities, poor posture, and physical or psychological stress  Any of the following may cause or increase your risk of sciatica:  · Disc problems:  A slipped disc (soft cushion in between the bones of the spine) is the most common cause of sciatica  The disc may press on the sciatic nerve  One bone in your spine may slip over another, or you may have narrowing of the spinal column  · Muscle injury: This may happen after you twist or lift a heavy object  Swelling from sprained or irritated muscles in the buttocks, thighs, or legs press on the sciatic nerve  · Obesity or pregnancy:  Extra weight increases pressure on your back and legs  · Trauma:  Direct blows on the buttocks, thighs, or legs, car accidents, or falls may injure the sciatic nerve  · Diseases of the spine:  Arthritis, osteoporosis, cancer, or infection of the spine may also affect the sciatic nerve  What are the signs and symptoms of sciatica? The symptoms of sciatic may be short-term or long-term:  · Pain that goes from the lower back into your buttocks and down the back of your thigh    · Numbness or tingling in your buttocks and legs    · Muscle weakness, difficulty moving or controlling your leg or foot    · Leg pain that increases with standing, sitting, or squatting    How is sciatica diagnosed? Your healthcare provider will ask about other health conditions you may have  He may ask you about your job, history of back pain, diseases, or surgeries you have had  He will examine you and move your legs to see what increases pain  You may also need any of the following:  · X-rays:   This is a picture of the bones and tissues in your back, hip, thigh, or leg  This test may show other problems, such as fractures (broken bones)  · CT scan: This test is also called a CAT scan  An x-ray machine uses a computer to take pictures of your hips, thighs, and legs  The pictures may show your sciatic nerve, muscles, and blood vessels  You may be given a dye before the pictures are taken to help healthcare providers see the pictures better  Tell the healthcare provider if you have ever had an allergic reaction to contrast dye  · MRI:  This scan uses powerful magnets and a computer to take pictures of your hips, thighs, and legs  An MRI may show damaged nerves, muscles, bones, and blood vessels  You may be given dye to help the pictures show up better  Tell the healthcare provider if you have ever had an allergic reaction to contrast dye  Do not enter the MRI room with anything metal  Metal can cause serious injury  Tell the healthcare provider if you have any metal in or on your body  · An electromyography (EMG)  test measures the electrical activity of your muscles at rest and with movement  · Nerve conduction tests: These tests check how surface nerves and related muscles respond to stimulation  Electrodes with wires or tiny needles are placed on certain areas, such as the buttocks and legs  How is sciatica treated? · NSAIDs:  These medicines decrease swelling and pain  NSAIDs are available without a doctor's order  Ask your healthcare provider which medicine is right for you  Ask how much to take and when to take it  Take as directed  NSAIDs can cause stomach bleeding or kidney problems if not taken correctly  · Acetaminophen: This medicine decreases pain  Acetaminophen is available without a doctor's order  Ask how much to take and how often to take it  Follow directions  Acetaminophen can cause liver damage if not taken correctly      · Muscle relaxers  help decrease pain and muscle spasms  · Epidural steroid medicine: This may include both an anesthetic (numbing medicine) and a steroid, which may decrease swelling and relieve pain  It is given as a shot close to the spine in the area where you have pain  · Chemonucleolysis: This is an injection given into the damaged disc to soften or shrink the disc  · Surgery: This may be done to correct problems such as a damaged disc, or a tumor in your spine  It may be done to decrease the pressure on the sciatic nerve  Healthcare providers may also release the muscle that may be pressing into your sciatic nerve  How can I help manage sciatica? · Ultrasound therapy: This is a machine that uses sound waves to decrease pain  Topical medicines may be added to help decrease pain and inflammation  · Physical therapy:  A physical therapist teaches you exercises to help improve movement and strength, and to decrease pain  An occupational therapist teaches you skills to help with your daily activities  · Assistive devices: You may need to wear back support, such as a back brace  You may need crutches, a cane, or a walker to decrease stress on your lower back and leg muscles  Ask your healthcare provider for more information about assistive devices and how to use them correctly  How can sciatica be prevented? · Avoid pressure on your back and legs:  Do not  lift heavy objects, or stand or sit for long periods of time  · Lift objects safely:  Keep your back straight and bend your knees when you  an object  Do not bend or twist your back when you lift  · Maintain a healthy weight:  Ask your healthcare provider how much you should weigh  Ask him to help you create a weight loss plan if you are overweight  · Exercise:  Ask your healthcare provider about the best stretching, warmup, and exercise plan for you  What are the risks of sciatica?   An epidural steroid injection can lead to pain disorders or paralysis if it is placed incorrectly  It may also cause headaches, leg pain, and blockage of blood flow to the spinal cord  Surgery may cause you to bleed or get an infection  If not treated, your muscles and nerves may become damaged permanently  You may have decreased strength  You may not be able to move your leg or control when you urinate or have bowel movements  When should I contact my healthcare provider? · You have pain in your lower back at night or when resting  · You have pain in your lower back with numbness below the knee  · You have weakness in one leg only  · You have questions or concerns about your condition or care  When should I seek immediate care or call 911? · You have trouble holding back your urine or bowel movements  · You have weakness in both legs  · You have numbness in your groin or buttocks  CARE AGREEMENT:   You have the right to help plan your care  Learn about your health condition and how it may be treated  Discuss treatment options with your healthcare providers to decide what care you want to receive  You always have the right to refuse treatment  The above information is an  only  It is not intended as medical advice for individual conditions or treatments  Talk to your doctor, nurse or pharmacist before following any medical regimen to see if it is safe and effective for you  © Copyright [address] Information is for End User's use only and may not be sold, redistributed or otherwise used for commercial purposes   All illustrations and images included in CareNotes® are the copyrighted property of [employer]  or [address] [place], [place] [address]

## 2021-01-10 DIAGNOSIS — S42.291D OTHER CLOSED DISPLACED FRACTURE OF PROXIMAL END OF RIGHT HUMERUS WITH ROUTINE HEALING, SUBSEQUENT ENCOUNTER: Primary | ICD-10-CM

## 2021-01-11 ENCOUNTER — TELEPHONE (OUTPATIENT)
Dept: FAMILY MEDICINE CLINIC | Facility: CLINIC | Age: 81
End: 2021-01-11

## 2021-01-11 NOTE — TELEPHONE ENCOUNTER
Pt called - she is in a terrible amount of pain and cannot physically get into the office - she cannot stand - unsure what to do at this point but is looking for guidance

## 2021-01-13 ENCOUNTER — HOSPITAL ENCOUNTER (OUTPATIENT)
Dept: RADIOLOGY | Facility: HOSPITAL | Age: 81
Discharge: HOME | End: 2021-01-13
Attending: ORTHOPAEDIC SURGERY
Payer: MEDICARE

## 2021-01-13 ENCOUNTER — OFFICE VISIT (OUTPATIENT)
Dept: OBGYN CLINIC | Facility: HOSPITAL | Age: 81
End: 2021-01-13

## 2021-01-13 ENCOUNTER — TELEPHONE (OUTPATIENT)
Dept: OBGYN CLINIC | Facility: HOSPITAL | Age: 81
End: 2021-01-13

## 2021-01-13 VITALS
BODY MASS INDEX: 18.78 KG/M2 | SYSTOLIC BLOOD PRESSURE: 138 MMHG | HEART RATE: 74 BPM | DIASTOLIC BLOOD PRESSURE: 84 MMHG | HEIGHT: 63 IN

## 2021-01-13 DIAGNOSIS — M54.31 SCIATICA OF RIGHT SIDE: ICD-10-CM

## 2021-01-13 DIAGNOSIS — S42.291D OTHER CLOSED DISPLACED FRACTURE OF PROXIMAL END OF RIGHT HUMERUS WITH ROUTINE HEALING, SUBSEQUENT ENCOUNTER: Primary | ICD-10-CM

## 2021-01-13 DIAGNOSIS — S42.291D OTHER CLOSED DISPLACED FRACTURE OF PROXIMAL END OF RIGHT HUMERUS WITH ROUTINE HEALING, SUBSEQUENT ENCOUNTER: ICD-10-CM

## 2021-01-13 PROCEDURE — 73030 X-RAY EXAM OF SHOULDER: CPT

## 2021-01-13 PROCEDURE — 99024 POSTOP FOLLOW-UP VISIT: CPT | Performed by: ORTHOPAEDIC SURGERY

## 2021-01-13 RX ORDER — METHYLPREDNISOLONE 4 MG/1
TABLET ORAL
Qty: 21 TABLET | Refills: 0 | Status: SHIPPED | OUTPATIENT
Start: 2021-01-13 | End: 2021-05-19

## 2021-01-13 NOTE — PROGRESS NOTES
Assessment:   Diagnosis ICD-10-CM Associated Orders   1  Other closed displaced fracture of proximal end of right humerus with routine healing, subsequent encounter, HEALED  S42 291D     10/19/2020   2  Sciatica of right side  M54 31 Ambulatory referral to Pain Management     methylPREDNISolone 4 MG tablet therapy pack       Plan:  X-rays taken and reviewed, physical exam performed  Her displaced proximal humerus fracture is healed  Continue with therapy and home exercise program to maximize motion  In the presence of her recent ED visit and right-sided sciatica referral placed at her request to see Spine and Pain  Medrol Dosepak sent to her pharmacy  Discussed possible referral to Dr Deric Ho for shoulder replacement if right shoulder continues to be painful  To do next visit:  Return if symptoms worsen or fail to improve, for re-check on an as needed basis (PRN), referral placed to Spine and Pain  The above stated was discussed in layman's terms and the patient expressed understanding  All questions were answered to the patient's satisfaction  Scribe Attestation    I,:  Caty Johnson am acting as a scribe while in the presence of the attending physician :       I,:  Da Flowers MD personally performed the services described in this documentation    as scribed in my presence :             Subjective:   Carline Patel is a 79 y o  female who presents repeat evaluation of her displaced right proximal humerus fracture  She is 3 months from her injury  Since her last visit she has been discharged from rehab facility to her home residence  She denies any pain at her right shoulder  She does have stiffness and limited motion to shoulder level  Since her last office evaluation she was seen through the emergency department due to right lower extremity sciatica  She has been taking tramadol          Review of systems negative unless otherwise specified in HPI  Review of Systems    Past Medical History:   Diagnosis Date    Allergic     Anxiety     Arthritis     [facility] [address]     HLD (hyperlipidemia)     Hypertension     Hyperthyroidism     Osteoporosis     Stroke [facility] [address] [facility]     TIA (transient ischemic attack)        Past Surgical History:   Procedure Laterality Date    BREAST LUMPECTOMY Left     HYSTERECTOMY      KNEE SURGERY Right     ORIF TIBIA FRACTURE Right 12/8/2016    Procedure: OPEN REDUCTION W/ INTERNAL FIXATION (ORIF) TIBIA;  Surgeon: Shari Valenzuela MD;  [facility];  Service:        Family History   Problem Relation Age of Onset    Coronary artery disease Family     Other Family         DJD       Social History     Occupational History    Not on file   Tobacco Use    Smoking status: Never Smoker    Smokeless tobacco: Never Used    Tobacco comment: Former smoker - As per [place]    Substance and Sexual Activity    Alcohol use: Not Currently     Frequency: Never     Comment: Alcohol intake:   Occasional  - As per [facility] Drug use: Never    Sexual activity: Not Currently         Current Outpatient Medications:     acetaminophen (TYLENOL) 325 mg tablet, Take 3 tablets (975 mg total) by mouth every 8 (eight) hours, Disp:  , Rfl: 0    albuterol (PROVENTIL HFA,VENTOLIN HFA) 90 mcg/act inhaler, Inhale 2 puffs every 6 (six) hours as needed for wheezing, Disp: , Rfl:     amLODIPine (NORVASC) 2 5 mg tablet, Take 2 5 mg by mouth daily, Disp: , Rfl:     aspirin 81 mg chewable tablet, Chew 81 mg daily, Disp: , Rfl:     bisacodyl (DULCOLAX) 5 mg EC tablet, Take 5 mg by mouth daily as needed for constipation, Disp: , Rfl:     diclofenac sodium (VOLTAREN) 1 %, Apply 2 g topically 4 (four) times a day, Disp:  , Rfl: 0    docusate sodium (COLACE) 100 mg capsule, Take 1 capsule (100 mg total) by mouth 2 (two) times a day, Disp: 10 capsule, Rfl: 0    ergocalciferol (VITAMIN D2) 50,000 units, Take 1 capsule (50,000 Units total) by mouth once a week, Disp:  , Rfl: 0   fluticasone (FLONASE) 50 mcg/act nasal spray, 1 spray into each nostril daily, Disp:  , Rfl: 0    gabapentin (NEURONTIN) 100 mg capsule, Take 1 capsule (100 mg total) by mouth daily at bedtime, Disp:  , Rfl: 0    multivitamin (THERAGRAN) TABS, Take 1 tablet by mouth daily, Disp: , Rfl:     oxyCODONE (ROXICODONE) 5 mg immediate release tablet, 0 5-1 tab every 4 hours as needed for moderate pain, Disp: 18 tablet, Rfl: 0    Pirbuterol Acetate (Annamarie Hernadez [place]), Inhale one time by mouth every four hours as needed, Disp: , Rfl:     polyethylene glycol (MIRALAX) 17 g packet, Take 17 g by mouth daily, Disp:  , Rfl: 0    traMADol (ULTRAM) 50 mg tablet, Take 0 5 tablets (25 mg total) by mouth every 6 (six) hours as needed for moderate pain for up to 10 days, Disp: 10 tablet, Rfl: 0    diclofenac sodium (VOLTAREN) 1 %, Apply 2 g topically 3 (three) times a day, Disp: 180 g, Rfl: 2    Allergies   Allergen Reactions    Alendronate     Penicillins Swelling    Raloxifene     Risedronate Sodium             Vitals:    01/13/21 1039   BP: 138/84   Pulse: 74       Objective:                    Right Shoulder Exam     Tenderness   The patient is experiencing no tenderness  Range of Motion   Right shoulder external rotation: Restricted with elbow side  Right shoulder forward flexion: Passively 80°     Other   Erythema: absent  Sensation: normal    Comments:                Diagnostics, reviewed and taken today if performed as documented: The attending physician has personally reviewed the pertinent films in PACS and interpretation is as follows:    Right shoulder x-rays taken and reviewed in the office today show:  Healed displaced proximal humerus fracture with callus formation present      Procedures, if performed today:    Procedures    None performed      Portions of the record may have been created with voice recognition software    Occasional wrong word or "sound a like" substitutions may have occurred due to the inherent limitations of voice recognition software  Read the chart carefully and recognize, using context, where substitutions have occurred

## 2021-01-13 NOTE — TELEPHONE ENCOUNTER
Patient is calling in reference to a steroid medication that was supposed to be sent to her pharmacy this morning  Please send this order to the [facility] on [address] in [place]      Please advise [phone]

## 2021-01-14 NOTE — TELEPHONE ENCOUNTER
Patient is calling for clarification on how she should take the steroid prescribed  Patient was advised to follow the package instructions on how to take the medication throughout the day

## 2021-01-18 ENCOUNTER — TELEPHONE (OUTPATIENT)
Dept: OBGYN CLINIC | Facility: CLINIC | Age: 81
End: 2021-01-18

## 2021-01-18 NOTE — TELEPHONE ENCOUNTER
Brayden Lomas  #: [phone]           Patient is calling in requesting a call back  She stated she made an appt with pain management for 2/4 @1pm  She is experiencing a lot of pain and would like to know what can she do in the meantime? She would also like clarification on how to take ibuprofen? She has ibuprofen 200mg        Please advise,

## 2021-01-18 NOTE — TELEPHONE ENCOUNTER
Patient sees Dr Tanna Garnett    Patient is calling stating she has Tylenol but is unsure how much to take since she's still taking her prior medication  Patient would like a call back with a suggestion as far as how much she should be taking  Patient would like a message to be left if she's unable to get to the phone      Call back # [phone]

## 2021-01-18 NOTE — TELEPHONE ENCOUNTER
Spoke to patient  Advised that she should not take ibuprofen while taking the steroid  Advised that she should finish the steroid completely before starting iburprofen as recommended on the bottle  No more than 2400 mg ibuprofen in 24 hrs as long as she is able to take that medication  Advised no more than 3000 mg tylenol in 24 hrs  Encouraged the least amount she has to take in 24 hrs to achieve pain relief without going over 3000 mg in 24 hrs  Advised her to use ice/heat on her back 20 mins on 20 mins off and alternate use as needed  Patient stated she is taking only steroid dose pack, and her other daily medications  No other medications for pain at this time  Patient stated she has a nurse coming to her home tomorrow and will ask that nurse best technique for icing and medication regimen because she stated she was confused on what to take and when  Went over instructions several times with patient in regards to medications  She did confirm she understood and did not have any further questions at this time  Encouraged call back with further questions or concerns

## 2021-01-19 NOTE — TELEPHONE ENCOUNTER
Attempted to call nurse back  Did not get through  Called patient back and reviewed Tylenol instructions, use the least amount needed to control pain without going over 3000 mg tylenol in 24 hrs  Advised no ibuprofen while taking steroid again  Advised continued ice 20 mins on 20 mins off  Patient verbalized understanding again

## 2021-01-19 NOTE — TELEPHONE ENCOUNTER
Caregiver / nurse is wondering if patient can alternate the Ibuprofen and Tylenol or is she supposed to take one at a time/     I relayed the message from Unique Anne but caregiver / Nurse is still confused  Also is it the low dose or the extra strength       C/b # [phone]

## 2021-01-26 ENCOUNTER — TELEPHONE (OUTPATIENT)
Dept: FAMILY MEDICINE CLINIC | Facility: CLINIC | Age: 81
End: 2021-01-26

## 2021-01-26 DIAGNOSIS — M54.30 SCIATICA, UNSPECIFIED LATERALITY: Primary | ICD-10-CM

## 2021-01-26 RX ORDER — GABAPENTIN 100 MG/1
100 CAPSULE ORAL
Qty: 7 CAPSULE | Refills: 0 | Status: SHIPPED | OUTPATIENT
Start: 2021-01-26 | End: 2021-05-19

## 2021-01-26 NOTE — TELEPHONE ENCOUNTER
Gabapentin 100 mg p o  Q h s  P r n  Called in for patient's report of sciatic pain secondary to sacral decubitus  Patient to schedule follow-up

## 2021-01-26 NOTE — TELEPHONE ENCOUNTER
skip from [facility] called the patient has a new stage two open area on her buttock   They are putting desitin on it  She is having a lot of sciatica pain going down her right leg tylenol not helping she cant sleep can she have gabapentin  --[facility] [address]  -

## 2021-01-26 NOTE — TELEPHONE ENCOUNTER
Spoke with Arleen Alvarado and let her know med was called in   Pt is going to call us to schedule f/u

## 2021-01-28 ENCOUNTER — TELEPHONE (OUTPATIENT)
Dept: FAMILY MEDICINE CLINIC | Facility: CLINIC | Age: 81
End: 2021-01-28

## 2021-01-28 NOTE — TELEPHONE ENCOUNTER
Patient called she would like to speak to you I offered her tomorrow she wants to talk to you today ?

## 2021-01-29 ENCOUNTER — TELEPHONE (OUTPATIENT)
Dept: FAMILY MEDICINE CLINIC | Facility: CLINIC | Age: 81
End: 2021-01-29

## 2021-02-03 DIAGNOSIS — I10 ESSENTIAL HYPERTENSION: Primary | ICD-10-CM

## 2021-02-04 RX ORDER — AMLODIPINE BESYLATE 2.5 MG/1
2.5 TABLET ORAL DAILY
Qty: 90 TABLET | Refills: 1 | Status: SHIPPED | OUTPATIENT
Start: 2021-02-04 | End: 2021-07-01 | Stop reason: SDUPTHER

## 2021-02-05 ENCOUNTER — TELEPHONE (OUTPATIENT)
Dept: OBGYN CLINIC | Facility: HOSPITAL | Age: 81
End: 2021-02-05

## 2021-02-05 ENCOUNTER — TELEPHONE (OUTPATIENT)
Dept: FAMILY MEDICINE CLINIC | Facility: CLINIC | Age: 81
End: 2021-02-05

## 2021-02-05 DIAGNOSIS — S42.291D OTHER CLOSED DISPLACED FRACTURE OF PROXIMAL END OF RIGHT HUMERUS WITH ROUTINE HEALING, SUBSEQUENT ENCOUNTER: Primary | ICD-10-CM

## 2021-02-05 NOTE — TELEPHONE ENCOUNTER
Patient is asking for additional PT till she can get into Pain Management on 2/18  Please return call

## 2021-02-05 NOTE — TELEPHONE ENCOUNTER
LM on FD VM @ 11:27 am    Her home therapy will be ending next week, would really like to continue it at home, cannot get out much      She doesn't see her pain dr until 2/18    Is also confused about her BP meds

## 2021-02-05 NOTE — TELEPHONE ENCOUNTER
May be scheduled for a virtual visit next week with Dr Thelma Adair or  me and then we can evaluate her if we are able to continue her home therapy  At that time we can discuss her blood pressure meds

## 2021-02-08 ENCOUNTER — TELEMEDICINE (OUTPATIENT)
Dept: FAMILY MEDICINE CLINIC | Facility: CLINIC | Age: 81
End: 2021-02-08
Payer: MEDICARE
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
## 2021-02-08 DIAGNOSIS — S42.201G CLOSED FRACTURE OF PROXIMAL END OF RIGHT HUMERUS WITH DELAYED HEALING, UNSPECIFIED FRACTURE MORPHOLOGY, SUBSEQUENT ENCOUNTER: ICD-10-CM

## 2021-02-08 DIAGNOSIS — G31.84 MILD COGNITIVE IMPAIRMENT: Primary | ICD-10-CM

## 2021-02-08 PROCEDURE — 99213 OFFICE O/P EST LOW 20 MIN: CPT | Performed by: NURSE PRACTITIONER

## 2021-02-08 NOTE — PROGRESS NOTES
Virtual Brief Visit    Assessment/Plan:    Problem List Items Addressed This Visit        Musculoskeletal and Integument    Closed fracture of proximal end of right humerus       Other    Fall    Mild cognitive impairment - Primary            Depression Screening and Follow-up Plan: Patient assessed for underlying major depression  Brief counseling provided and recommend additional follow-up/re-evaluation next office visit  Falls Plan of Care: balance, strength, and gait training instructions were provided  Medications that increase falls were reviewed  Vitamin D supplementation was recommended  Home safety education provided  Reason for visit is   Chief Complaint   Patient presents with    Other     Discuss home therapy    Virtual Brief Visit        Encounter provider Ted Harris    Provider located at [address] [place], [place] [id number] [facility] [id number]  [phone]    Recent Visits  Date Type Provider Dept   02/05/21 Telephone MARY Harris recent visits within past 7 days and meeting all other requirements     Today's Visits  Date Type Provider Dept   02/08/21 Telemedicine MARY Harris today's visits and meeting all other requirements     Future Appointments  No visits were found meeting these conditions  Showing future appointments within next 150 days and meeting all other requirements        After connecting through telephone, the patient was identified by name and date of birth  Ildefonso Headley was informed that this is a telemedicine visit and that the visit is being conducted through telephone  My office door was closed  No one else was in the room  She acknowledged consent and understanding of privacy and security of the platform   The patient has agreed to participate and understands she can discontinue the visit at any time     Patient is aware this is a billable service  Bj Rojo is a 79 y o  female telephone virtual visit  Patient is a 79year old female that would like to discussed her current pt/ot orders and home care  She has been instructed to contact her Orthopedic Dr Jordana Barroso if she has questions about about PT/OT and home care information  She has a current order for pt/ot for the next 4-6 weeks  Her decubitus has heeled and she continues to use desitin for treatment  She currently has completed home care services  Her physical therapy and occupational therapy has been continued by Orthopedics         Past Medical History:   Diagnosis Date    Allergic     Anxiety     Arthritis     [facility] [address] [facility]     HLD (hyperlipidemia)     Hypertension     Hyperthyroidism     Osteoporosis     Stroke ([facility] [address] [facility]     TIA (transient ischemic attack)        Past Surgical History:   Procedure Laterality Date    BREAST LUMPECTOMY Left     HYSTERECTOMY      KNEE SURGERY Right     ORIF TIBIA FRACTURE Right 12/8/2016    Procedure: OPEN REDUCTION W/ INTERNAL FIXATION (ORIF) TIBIA;  Surgeon: Piedad Lowery MD;  [facility];  Service:        Current Outpatient Medications   Medication Sig Dispense Refill    acetaminophen (TYLENOL) 325 mg tablet Take 3 tablets (975 mg total) by mouth every 8 (eight) hours  0    albuterol (PROVENTIL HFA,VENTOLIN HFA) 90 mcg/act inhaler Inhale 2 puffs every 6 (six) hours as needed for wheezing      amLODIPine (NORVASC) 2 5 mg tablet Take 1 tablet (2 5 mg total) by mouth daily 90 tablet 1    aspirin 81 mg chewable tablet Chew 81 mg daily      bisacodyl (DULCOLAX) 5 mg EC tablet Take 5 mg by mouth daily as needed for constipation      diclofenac sodium (VOLTAREN) 1 % Apply 2 g topically 4 (four) times a day  0    ergocalciferol (VITAMIN D2) 50,000 units Take 1 capsule (50,000 Units total) by mouth once a week  0    fluticasone (FLONASE) 50 mcg/act nasal spray 1 spray into each nostril daily  0    gabapentin (NEURONTIN) 100 mg capsule Take 1 capsule (100 mg total) by mouth daily at bedtime as needed (As needed daily for sciatic pain) 7 capsule 0    multivitamin (THERAGRAN) TABS Take 1 tablet by mouth daily      oxyCODONE (ROXICODONE) 5 mg immediate release tablet 0 5-1 tab every 4 hours as needed for moderate pain 18 tablet 0    Pirbuterol Acetate (MAXAIR AUTOH[employer]ER IN) Inhale one time by mouth every four hours as needed      polyethylene glycol (MIRALAX) 17 g packet Take 17 g by mouth daily (Patient taking differently: Take 17 g by mouth daily As needed)  0    diclofenac sodium (VOLTAREN) 1 % Apply 2 g topically 3 (three) times a day 180 g 2    docusate sodium (COLACE) 100 mg capsule Take 1 capsule (100 mg total) by mouth 2 (two) times a day (Patient not taking: Reported on 2/8/2021) 10 capsule 0    gabapentin (NEURONTIN) 100 mg capsule Take 1 capsule (100 mg total) by mouth daily at bedtime (Patient not taking: Reported on 2/8/2021)  0    methylPREDNISolone 4 MG tablet therapy pack Use as directed on package (Patient not taking: Reported on 2/8/2021) 21 tablet 0     No current facility-administered medications for this visit  Allergies   Allergen Reactions    Alendronate     Penicillins Swelling    Raloxifene     Risedronate Sodium        Review of Systems   Constitutional: Negative for activity change, appetite change, chills, fatigue, fever and unexpected weight change  HENT: Negative for congestion, dental problem, drooling, ear discharge, ear pain, facial swelling, hearing loss, mouth sores, nosebleeds, postnasal drip, rhinorrhea, sinus pressure, sinus pain, sneezing, sore throat, tinnitus and voice change  Eyes: Negative for photophobia, pain, redness and visual disturbance  Respiratory: Negative for cough, chest tightness, shortness of breath and wheezing  Cardiovascular: Negative for chest pain and palpitations     Gastrointestinal: Negative for abdominal distention, abdominal pain, constipation, diarrhea, nausea and vomiting  Endocrine: Negative  Genitourinary: Negative for dysuria and hematuria  Musculoskeletal: Negative for arthralgias  Skin: Negative  Allergic/Immunologic: Negative  Neurological: Positive for syncope  Intermittent sciatica to right lower leg  She also continues to have right shoulder pain  Psychiatric/Behavioral: The patient is nervous/anxious  There were no vitals filed for this visit  I spent 25 minutes directly with the patient during this visit    [address] [place] [address] acknowledges that she has consented to an online visit or consultation  She understands that the online visit is based solely on information provided by her, and that, in the absence of a face-to-face physical evaluation by the physician, the diagnosis she receives is both limited and provisional in terms of accuracy and completeness  This is not intended to replace a full medical face-to-face evaluation by the physician  Roderick Ricketts understands and accepts these terms

## 2021-02-08 NOTE — PATIENT INSTRUCTIONS
Proximal Humerus Fracture   WHAT YOU NEED TO KNOW:   What is a proximal humerus fracture? A proximal humerus fracture is a crack or break in the top of your upper arm bone  The proximal humerus is one of the bones in your shoulder joint  What are the signs and symptoms of a proximal humerus fracture? · Pain when you move your arm    · Swelling and bruising    · Trouble moving your shoulder    · Abnormal arm position or shape    · Weakness or numbness in your arm, hand, or fingers    How is a proximal humerus fracture diagnosed? Your healthcare provider will ask about your injury and examine you  An x-ray may show the type of fracture you have  You may need more than 1 x-ray, or another x-ray after several days have passed  How is an arm fracture treated? Treatment depends on the type of fracture you have  You may need any of the following:  · A sling  may be needed to hold your broken bones in place  It will decrease your arm movement and allow the bones to heal          · Prescription pain medicine  may be given  Ask your healthcare provider how to take this medicine safely  Some prescription pain medicines contain acetaminophen  Do not take other medicines that contain acetaminophen without talking to your healthcare provider  Too much acetaminophen may cause liver damage  Prescription pain medicine may cause constipation  Ask your healthcare provider how to prevent or treat constipation  · NSAIDs , such as ibuprofen, help decrease swelling, pain, and fever  This medicine is available with or without a doctor's order  NSAIDs can cause stomach bleeding or kidney problems in certain people  If you take blood thinner medicine, always ask your healthcare provider if NSAIDs are safe for you  Always read the medicine label and follow directions  · Acetaminophen  decreases pain and fever  It is available without a doctor's order  Ask how much to take and how often to take it  Follow directions   Read the labels of all other medicines you are using to see if they also contain acetaminophen, or ask your doctor or pharmacist  Acetaminophen can cause liver damage if not taken correctly  Do not use more than 4 grams (4,000 milligrams) total of acetaminophen in one day  · Closed reduction  may be done to put your bones back into the correct position without surgery  · Surgery  may be needed to put your bones back into the correct position  This is called open reduction  An incision is made and the bones are put back in the correct position  Wires, pins, plates, or screws may be used to hold the bones in place  For severe fractures, surgery to replace part of the shoulder joint with an artificial implant may be needed  How can I manage my symptoms? · Rest  your arm as much as possible  Ask your healthcare provider when you can move your arm  Also ask when you can return to sports or vigorous exercises  · Apply ice  on your arm for 15 to 20 minutes every hour or as directed  Use an ice pack, or put crushed ice in a plastic bag  Cover it with a towel before you put it on your arm  Ice helps prevent tissue damage and decreases swelling and pain  · Go to physical therapy as directed  A physical therapist teaches you exercises to help improve movement and strength, and to decrease pain  When should I seek immediate care? · Your pain does not get better or gets worse, even after you rest and take medicine  · Your arm, hand, or fingers feel numb  · The skin over your fracture is swollen, cold, or pale  · You cannot move your arm, hand, or fingers  When should I call my doctor? · You have a fever  · Your sling gets wet, damaged, or falls off  · You have questions or concerns about your condition or care  CARE AGREEMENT:   You have the right to help plan your care  Learn about your health condition and how it may be treated   Discuss treatment options with your healthcare providers to decide what care you want to receive  You always have the right to refuse treatment  The above information is an  only  It is not intended as medical advice for individual conditions or treatments  Talk to your doctor, nurse or pharmacist before following any medical regimen to see if it is safe and effective for you  © Copyright [address] Information is for End User's use only and may not be sold, redistributed or otherwise used for commercial purposes  All illustrations and images included in CareNotes® are the copyrighted property of [employer]  or [facility]  Sciatica   WHAT YOU NEED TO KNOW:   What is sciatica? Sciatica is a condition that causes pain along your sciatic nerve  The sciatic nerve runs from your spine through both sides of your buttocks  It then runs down the back of your thigh, into your lower leg and foot  Any place along your sciatic nerve may be compressed, inflamed, irritated, or stretched and cause symptoms  What causes sciatica? Sciatica may be related to certain activities, poor posture, and physical or psychological stress  Any of the following may cause or increase your risk of sciatica:  · Disc problems:  A slipped disc (soft cushion in between the bones of the spine) is the most common cause of sciatica  The disc may press on the sciatic nerve  One bone in your spine may slip over another, or you may have narrowing of the spinal column  · Muscle injury: This may happen after you twist or lift a heavy object  Swelling from sprained or irritated muscles in the buttocks, thighs, or legs press on the sciatic nerve  · Obesity or pregnancy:  Extra weight increases pressure on your back and legs  · Trauma:  Direct blows on the buttocks, thighs, or legs, car accidents, or falls may injure the sciatic nerve  · Diseases of the spine:  Arthritis, osteoporosis, cancer, or infection of the spine may also affect the sciatic nerve      What are the signs and symptoms of sciatica? The symptoms of sciatic may be short-term or long-term:  · Pain that goes from the lower back into your buttocks and down the back of your thigh    · Numbness or tingling in your buttocks and legs    · Muscle weakness, difficulty moving or controlling your leg or foot    · Leg pain that increases with standing, sitting, or squatting    How is sciatica diagnosed? Your healthcare provider will ask about other health conditions you may have  He may ask you about your job, history of back pain, diseases, or surgeries you have had  He will examine you and move your legs to see what increases pain  You may also need any of the following:  · X-rays: This is a picture of the bones and tissues in your back, hip, thigh, or leg  This test may show other problems, such as fractures (broken bones)  · CT scan: This test is also called a CAT scan  An x-ray machine uses a computer to take pictures of your hips, thighs, and legs  The pictures may show your sciatic nerve, muscles, and blood vessels  You may be given a dye before the pictures are taken to help healthcare providers see the pictures better  Tell the healthcare provider if you have ever had an allergic reaction to contrast dye  · MRI:  This scan uses powerful magnets and a computer to take pictures of your hips, thighs, and legs  An MRI may show damaged nerves, muscles, bones, and blood vessels  You may be given dye to help the pictures show up better  Tell the healthcare provider if you have ever had an allergic reaction to contrast dye  Do not enter the MRI room with anything metal  Metal can cause serious injury  Tell the healthcare provider if you have any metal in or on your body  · An electromyography (EMG)  test measures the electrical activity of your muscles at rest and with movement  · Nerve conduction tests: These tests check how surface nerves and related muscles respond to stimulation   Electrodes with wires or tiny needles are placed on certain areas, such as the buttocks and legs  How is sciatica treated? · NSAIDs:  These medicines decrease swelling and pain  NSAIDs are available without a doctor's order  Ask your healthcare provider which medicine is right for you  Ask how much to take and when to take it  Take as directed  NSAIDs can cause stomach bleeding or kidney problems if not taken correctly  · Acetaminophen: This medicine decreases pain  Acetaminophen is available without a doctor's order  Ask how much to take and how often to take it  Follow directions  Acetaminophen can cause liver damage if not taken correctly  · Muscle relaxers  help decrease pain and muscle spasms  · Epidural steroid medicine: This may include both an anesthetic (numbing medicine) and a steroid, which may decrease swelling and relieve pain  It is given as a shot close to the spine in the area where you have pain  · Chemonucleolysis: This is an injection given into the damaged disc to soften or shrink the disc  · Surgery: This may be done to correct problems such as a damaged disc, or a tumor in your spine  It may be done to decrease the pressure on the sciatic nerve  Healthcare providers may also release the muscle that may be pressing into your sciatic nerve  How can I help manage sciatica? · Ultrasound therapy: This is a machine that uses sound waves to decrease pain  Topical medicines may be added to help decrease pain and inflammation  · Physical therapy:  A physical therapist teaches you exercises to help improve movement and strength, and to decrease pain  An occupational therapist teaches you skills to help with your daily activities  · Assistive devices: You may need to wear back support, such as a back brace  You may need crutches, a cane, or a walker to decrease stress on your lower back and leg muscles   Ask your healthcare provider for more information about assistive devices and how to use them correctly  How can sciatica be prevented? · Avoid pressure on your back and legs:  Do not  lift heavy objects, or stand or sit for long periods of time  · Lift objects safely:  Keep your back straight and bend your knees when you  an object  Do not bend or twist your back when you lift  · Maintain a healthy weight:  Ask your healthcare provider how much you should weigh  Ask him to help you create a weight loss plan if you are overweight  · Exercise:  Ask your healthcare provider about the best stretching, warmup, and exercise plan for you  What are the risks of sciatica? An epidural steroid injection can lead to pain disorders or paralysis if it is placed incorrectly  It may also cause headaches, leg pain, and blockage of blood flow to the spinal cord  Surgery may cause you to bleed or get an infection  If not treated, your muscles and nerves may become damaged permanently  You may have decreased strength  You may not be able to move your leg or control when you urinate or have bowel movements  When should I contact my healthcare provider? · You have pain in your lower back at night or when resting  · You have pain in your lower back with numbness below the knee  · You have weakness in one leg only  · You have questions or concerns about your condition or care  When should I seek immediate care or call 911? · You have trouble holding back your urine or bowel movements  · You have weakness in both legs  · You have numbness in your groin or buttocks  CARE AGREEMENT:   You have the right to help plan your care  Learn about your health condition and how it may be treated  Discuss treatment options with your healthcare providers to decide what care you want to receive  You always have the right to refuse treatment  The above information is an  only  It is not intended as medical advice for individual conditions or treatments   Talk to your doctor, nurse or pharmacist before following any medical regimen to see if it is safe and effective for you  © Copyright [address] Information is for End User's use only and may not be sold, redistributed or otherwise used for commercial purposes   All illustrations and images included in CareNotes® are the copyrighted property of [employer]  or [address] [place] [address]

## 2021-02-22 ENCOUNTER — TELEPHONE (OUTPATIENT)
Dept: PAIN MEDICINE | Facility: CLINIC | Age: 81
End: 2021-02-22

## 2021-02-22 NOTE — TELEPHONE ENCOUNTER
Patient is unable to get a ride on 3/9 is there any way we can help with scheduling a ride for patient? She tried calling a local service [facility] and they were not able to help her       Please advise,    Thank you    [phone]

## 2021-03-01 ENCOUNTER — TELEPHONE (OUTPATIENT)
Dept: PAIN MEDICINE | Facility: CLINIC | Age: 81
End: 2021-03-01

## 2021-03-01 ENCOUNTER — APPOINTMENT (EMERGENCY)
Dept: RADIOLOGY | Facility: HOSPITAL | Age: 81
End: 2021-03-01
Payer: MEDICARE

## 2021-03-01 ENCOUNTER — APPOINTMENT (EMERGENCY)
Dept: CT IMAGING | Facility: HOSPITAL | Age: 81
End: 2021-03-01
Payer: MEDICARE

## 2021-03-01 ENCOUNTER — HOSPITAL ENCOUNTER (EMERGENCY)
Facility: HOSPITAL | Age: 81
Discharge: HOME | End: 2021-03-01
Attending: EMERGENCY MEDICINE
Payer: MEDICARE

## 2021-03-01 VITALS
SYSTOLIC BLOOD PRESSURE: 172 MMHG | HEIGHT: 63 IN | TEMPERATURE: 98.5 F | DIASTOLIC BLOOD PRESSURE: 92 MMHG | HEART RATE: 78 BPM | RESPIRATION RATE: 16 BRPM | BODY MASS INDEX: 18.78 KG/M2 | OXYGEN SATURATION: 98 % | WEIGHT: 106 LBS

## 2021-03-01 DIAGNOSIS — S32.040A COMPRESSION FRACTURE OF L4 VERTEBRA, INITIAL ENCOUNTER (HCC): ICD-10-CM

## 2021-03-01 DIAGNOSIS — M17.11 ARTHRITIS OF RIGHT KNEE: Primary | ICD-10-CM

## 2021-03-01 PROCEDURE — 99284 EMERGENCY DEPT VISIT MOD MDM: CPT

## 2021-03-01 PROCEDURE — 72131 CT LUMBAR SPINE W/O DYE: CPT

## 2021-03-01 PROCEDURE — 99284 EMERGENCY DEPT VISIT MOD MDM: CPT | Performed by: EMERGENCY MEDICINE

## 2021-03-01 PROCEDURE — 73564 X-RAY EXAM KNEE 4 OR MORE: CPT

## 2021-03-01 PROCEDURE — 96372 THER/PROPH/DIAG INJ SC/IM: CPT

## 2021-03-01 RX ORDER — MELOXICAM 15 MG/1
15 TABLET ORAL DAILY
Qty: 30 TABLET | Refills: 0 | Status: SHIPPED | OUTPATIENT
Start: 2021-03-01 | End: 2021-05-19

## 2021-03-01 RX ORDER — KETOROLAC TROMETHAMINE 30 MG/ML
30 INJECTION, SOLUTION INTRAMUSCULAR; INTRAVENOUS ONCE
Status: COMPLETED | OUTPATIENT
Start: 2021-03-01 | End: 2021-03-01

## 2021-03-01 RX ADMIN — KETOROLAC TROMETHAMINE 30 MG: 30 INJECTION, SOLUTION INTRAMUSCULAR at 14:33

## 2021-03-01 NOTE — ED NOTES
MICHELLE called to arrange return transport for patient, patient requesting Saroj Tomeka made aware of same        Karley Dunaway RN  03/01/21 [id number]

## 2021-03-01 NOTE — ED NOTES
Patient ambulatory to and from bathroom with use of walker, gait shuffling, but steady  Complains of knee pain        Sebastian Rivera RN  03/01/21 [id number]

## 2021-03-01 NOTE — TELEPHONE ENCOUNTER
Spoke with patient and she does not meet [employer] criteria  She is unable to get in and out of car without assistant  She will be contacting the ambulance for transportation for her appt March 9th  with Dr G[facility]

## 2021-03-01 NOTE — ED PROVIDER NOTES
History  Chief Complaint   Patient presents with    Knee Pain     Patient here with right knee pain  for a few days now  Knee steve and unable to bear much weight  Patient fell in October and has PT x once a week  Patient is an 62-year-old female  She has had several months of sciatica  She is followed by pain management  Lately she has been having trouble with her right knee buckling  Usually this occurs when she attempts stairs  She is otherwise able to walk  She does have a walker at home  No associated motor sensory complaints  No new trauma  No incontinence  No fever or chills  No abdominal pain  She has taken Tylenol at home without relief  Prior to Admission Medications   Prescriptions Last Dose Informant Patient Reported? Taking?    Pirbuterol Acetate (MAXAIR AUTOHALER IN) Not Taking at Unknown time  Yes No   Sig: Inhale one time by mouth every four hours as needed   acetaminophen (TYLENOL) 325 mg tablet 2/28/2021 at Unknown time  No Yes   Sig: Take 3 tablets (975 mg total) by mouth every 8 (eight) hours   albuterol (PROVENTIL HFA,VENTOLIN HFA) 90 mcg/act inhaler 3/1/2021 at Unknown time  Yes Yes   Sig: Inhale 2 puffs every 6 (six) hours as needed for wheezing   amLODIPine (NORVASC) 2 5 mg tablet 3/1/2021 at Unknown time  No Yes   Sig: Take 1 tablet (2 5 mg total) by mouth daily   aspirin 81 mg chewable tablet 3/1/2021 at Unknown time Self Yes Yes   Sig: Chew 81 mg daily   bisacodyl (DULCOLAX) 5 mg EC tablet 3/1/2021 at Unknown time  Yes Yes   Sig: Take 5 mg by mouth daily as needed for constipation   diclofenac sodium (VOLTAREN) 1 %   No No   Sig: Apply 2 g topically 3 (three) times a day   diclofenac sodium (VOLTAREN) 1 % Past Month at Unknown time  No Yes   Sig: Apply 2 g topically 4 (four) times a day   docusate sodium (COLACE) 100 mg capsule Not Taking at Unknown time  No No   Sig: Take 1 capsule (100 mg total) by mouth 2 (two) times a day   Patient not taking: Reported on 2021   ergocalciferol (VITAMIN D2) 50,000 units 3/1/2021 at Unknown time  No Yes   Sig: Take 1 capsule (50,000 Units total) by mouth once a week   fluticasone (FLONASE) 50 mcg/act nasal spray Past Month at Unknown time  No Yes   Si spray into each nostril daily   gabapentin (NEURONTIN) 100 mg capsule Not Taking at Unknown time  No No   Sig: Take 1 capsule (100 mg total) by mouth daily at bedtime   Patient not taking: Reported on 2021   gabapentin (NEURONTIN) 100 mg capsule Not Taking at Unknown time  No No   Sig: Take 1 capsule (100 mg total) by mouth daily at bedtime as needed (As needed daily for sciatic pain)   Patient not taking: Reported on 3/1/2021   methylPREDNISolone 4 MG tablet therapy pack Not Taking at Unknown time  No No   Sig: Use as directed on package   Patient not taking: Reported on 2021   multivitamin (Darnella Hoist) TABS Not Taking at Unknown time  Yes No   Sig: Take 1 tablet by mouth daily   oxyCODONE (ROXICODONE) 5 mg immediate release tablet Not Taking at Unknown time  No No   Si 5-1 tab every 4 hours as needed for moderate pain   Patient not taking: Reported on 3/1/2021   polyethylene glycol (MIRALAX) 17 g packet Past Month at Unknown time  No Yes   Sig: Take 17 g by mouth daily   Patient taking differently: Take 17 g by mouth daily As needed      Facility-Administered Medications: None       Past Medical History:   Diagnosis Date    Allergic     Anxiety     Arthritis     Cancer ([facility] )     HLD (hyperlipidemia)     Hypertension     Hyperthyroidism     Osteoporosis     Stroke ([facility] )     TIA (transient ischemic attack)        Past Surgical History:   Procedure Laterality Date    BREAST LUMPECTOMY Left     HYSTERECTOMY      KNEE SURGERY Right     ORIF TIBIA FRACTURE Right 2016    Procedure: OPEN REDUCTION W/ INTERNAL FIXATION (ORIF) TIBIA;  Surgeon: Oswald Anderson MD;  Location: [facility];  Service:        Family History   Problem Relation Age of Onset    Coronary artery disease Family     Other Family         DJD     I have reviewed and agree with the history as documented  E-Cigarette/Vaping    E-Cigarette Use Never User      E-Cigarette/Vaping Substances    Nicotine No     THC No     CBD No     Flavoring No     Other No     Unknown No      Social History     Tobacco Use    Smoking status: Never Smoker    Smokeless tobacco: Never Used    Tobacco comment: Former smoker - As per [employer]    Substance Use Topics    Alcohol use: Not Currently     Frequency: Never     Comment: Alcohol intake:   Occasional  - As per Carri Javier Drug use: Never       Review of Systems   Constitutional: Negative for chills and fever  HENT: Negative for rhinorrhea and sore throat  Eyes: Negative for pain, redness and visual disturbance  Respiratory: Negative for cough and shortness of breath  Cardiovascular: Negative for chest pain and leg swelling  Gastrointestinal: Negative for abdominal pain, diarrhea and vomiting  Endocrine: Negative for polydipsia and polyuria  Genitourinary: Negative for dysuria, frequency, hematuria, vaginal bleeding and vaginal discharge  Musculoskeletal: Positive for back pain  Negative for neck pain  Skin: Negative for rash and wound  Allergic/Immunologic: Negative for immunocompromised state  Neurological: Negative for weakness, numbness and headaches  Hematological: Does not bruise/bleed easily  Psychiatric/Behavioral: Negative for hallucinations and suicidal ideas  All other systems reviewed and are negative  Physical Exam  Physical Exam  Vitals signs reviewed  Constitutional:       General: She is not in acute distress  HENT:      Head: Normocephalic and atraumatic  Nose: Nose normal       Mouth/Throat:      Mouth: Mucous membranes are moist    Eyes:      General:         Right eye: No discharge  Left eye: No discharge        Conjunctiva/sclera: Conjunctivae normal    Neck:      Musculoskeletal: Normal range of motion and neck supple  No neck rigidity  Cardiovascular:      Rate and Rhythm: Normal rate and regular rhythm  Pulses: Normal pulses  Heart sounds: Normal heart sounds  No murmur  No friction rub  No gallop  Pulmonary:      Effort: Pulmonary effort is normal  No respiratory distress  Breath sounds: Normal breath sounds  No stridor  No wheezing, rhonchi or rales  Abdominal:      General: Bowel sounds are normal  There is no distension  Palpations: Abdomen is soft  Tenderness: There is no abdominal tenderness  There is no right CVA tenderness, left CVA tenderness, guarding or rebound  Musculoskeletal: Normal range of motion  General: No swelling, tenderness, deformity or signs of injury  Right lower leg: No edema  Left lower leg: No edema  Comments: No calf tenderness or unilateral leg swelling  Skin:     General: Skin is warm and dry  Coloration: Skin is not jaundiced  Findings: No rash  Neurological:      General: No focal deficit present  Mental Status: She is alert and oriented to person, place, and time  Sensory: No sensory deficit  Motor: Motor function is intact     Psychiatric:         Mood and Affect: Mood normal          Behavior: Behavior normal          Vital Signs  ED Triage Vitals [03/01/21 1401]   Temperature Pulse Respirations Blood Pressure SpO2   98 5 °F (36 9 °C) 72 14 135/69 98 %      Temp Source Heart Rate Source Patient Position - Orthostatic VS BP Location FiO2 (%)   Oral Monitor Sitting Left arm --      Pain Score       No Pain           Vitals:    03/01/21 1401   BP: 135/69   Pulse: 72   Patient Position - Orthostatic VS: Sitting         Visual Acuity      ED Medications  Medications - No data to display    Diagnostic Studies  Results Reviewed     None                 No orders to display              Procedures  Procedures         ED Course                             SBIRT 20yo+      Most Recent Value   SBIRT (24 yo +)   In order to provide better care to our patients, we are screening all of our patients for alcohol and drug use  Would it be okay to ask you these screening questions? Yes Filed at: 03/01/2021 1406   Initial Alcohol Screen: US AUDIT-C    1  How often do you have a drink containing alcohol?  0 Filed at: 03/01/2021 1406   2  How many drinks containing alcohol do you have on a typical day you are drinking? 0 Filed at: 03/01/2021 1406   3a  Male UNDER 65: How often do you have five or more drinks on one occasion? 0 Filed at: 03/01/2021 1406   3b  FEMALE Any Age, or MALE 65+: How often do you have 4 or more drinks on one occassion? 0 Filed at: 03/01/2021 1406   Audit-C Score  0 Filed at: 03/01/2021 1406   MASTER: How many times in the past year have you    Used an illegal drug or used a prescription medication for non-medical reasons? Never Filed at: 03/01/2021 1406                    MDM  Number of Diagnoses or Management Options  Diagnosis management comments: Patient ambulated well in the emergency room  It is really stairs that she has problems with  Will prescribe a knee immobilizer for extra support  Will add Mobic to her Tylenol for pain  She has physical therapy arranged come to the house  Will recommend follow-up with orthopedics  Patient is comfortable with the plan  She does have a new compression fracture of L4  No cord compression  Currently there is no pain there  This to his appropriate for outpatient management  Patient did get good improvement with the Toradol          Amount and/or Complexity of Data Reviewed  Tests in the radiology section of CPT®: ordered and reviewed  Independent visualization of images, tracings, or specimens: yes        Disposition  Final diagnoses:   None     ED Disposition     None      Follow-up Information    None         Patient's Medications   Discharge Prescriptions    No medications on file     No discharge procedures on file     PDMP Review     None          ED Provider  Electronically Signed by           Ember Goins MD  03/01/21 8647

## 2021-03-09 ENCOUNTER — CONSULT (OUTPATIENT)
Dept: PAIN MEDICINE | Facility: CLINIC | Age: 81
End: 2021-03-09
Payer: MEDICARE

## 2021-03-09 ENCOUNTER — TRANSCRIBE ORDERS (OUTPATIENT)
Dept: PAIN MEDICINE | Facility: CLINIC | Age: 81
End: 2021-03-09

## 2021-03-09 ENCOUNTER — TELEPHONE (OUTPATIENT)
Dept: PAIN MEDICINE | Facility: CLINIC | Age: 81
End: 2021-03-09

## 2021-03-09 VITALS — BODY MASS INDEX: 18.78 KG/M2 | WEIGHT: 106 LBS

## 2021-03-09 DIAGNOSIS — M54.31 SCIATICA OF RIGHT SIDE: ICD-10-CM

## 2021-03-09 DIAGNOSIS — M25.561 CHRONIC PAIN OF RIGHT KNEE: Primary | ICD-10-CM

## 2021-03-09 DIAGNOSIS — M17.11 OSTEOARTHRITIS OF RIGHT KNEE, UNSPECIFIED OSTEOARTHRITIS TYPE: ICD-10-CM

## 2021-03-09 DIAGNOSIS — G89.29 CHRONIC PAIN OF RIGHT KNEE: Primary | ICD-10-CM

## 2021-03-09 PROCEDURE — 99204 OFFICE O/P NEW MOD 45 MIN: CPT | Performed by: PHYSICAL MEDICINE & REHABILITATION

## 2021-03-09 RX ORDER — LIDOCAINE 50 MG/G
1 PATCH TOPICAL DAILY
Qty: 30 PATCH | Refills: 1 | Status: SHIPPED | OUTPATIENT
Start: 2021-03-09

## 2021-03-09 NOTE — TELEPHONE ENCOUNTER
The 5% patch has to be authorized  It is $114       Prescription id# [id number]  Plan# [id number]  # [phone]

## 2021-03-09 NOTE — PATIENT INSTRUCTIONS
Decision Aid for Knee Osteoarthritis   AMBULATORY CARE:   What you need to know about decisions for knee osteoarthritis:  You can help make decisions about being screened for osteoarthritis  You can also help plan treatment if osteoarthritis is found with screening, or you develop it  Osteoarthritis screening is a test done to find osteoarthritis early  Screening is different from diagnosis because screening is used when you first start to have signs or symptoms  This means management or treatment can start early to help prevent joint damage  Treatments include medicine and surgery, but you can also manage osteoarthritis with lifestyle changes  What you need to know about knee osteoarthritis:   · Osteoarthritis is a condition that causes tissue between bones to wear away  This can happen slowly over time or quickly because of an injury  The bones rub together when you move  This causes pain and can limit mobility  · Osteoarthritis is the most common form of arthritis  About 30 million people in the [place] have osteoarthritis  · Osteoarthritis is more common in women than in men  Other risk factors include being overweight or having a family history of osteoarthritis  · Talk to your healthcare provider if you think you have signs or symptoms of osteoarthritis  Examples include joint pain with movement, tender or stiff joint, or a grating feeling with movement  You may also feel hard lumps around the joint, called bone spurs  How to know if you are a good candidate for osteoarthritis screening:  Screening may be helpful for you if any of the following is true:  · You are 50 years or older  · You are overweight or obese  · You do work or play a sport that puts repeated stress on your knee joint  · You have a family history of osteoarthritis  · You had a knee joint injury, even if it happened many years ago  · You were born with a joint or cartilage problem      · You have mild signs or symptoms of osteoarthritis  · Your symptoms are starting to affect your ability to do your daily activities  How osteoarthritis screening is done:  No test is used to diagnose osteoarthritis  Your healthcare provider will check the following:  · How much pain, tenderness, or inflammation you have in the joint    · Range of motion in the joint (how far it can move in every direction)    · Your ability to walk without pain, or how your symptoms changed the way you walk    · The strength of muscles around the joint    Benefits and risks of screening:  Talk with your healthcare provider about the risks and benefits of screening:  · Benefits  include finding osteoarthritis early, when you can manage the condition  You may be able to slow the progress of joint damage by losing weight or exercising more  You can also help make a treatment plan that you can change over time as needed  · Risks  include a false belief that you will not develop osteoarthritis  Your screening test may find that you do not have osteoarthritis  This can be because signs and symptoms are mild  You can still develop more severe signs and symptoms over time  It is important to talk with your healthcare provider about how often to have screening  Questions to ask your healthcare provider to help you make decisions about screening:   · How high is my risk for osteoarthritis? · How often do I need to have screening? · Where is the screening done? · Do I need to do anything to get ready to have screening? What happens after osteoarthritis screening: You will meet with your healthcare provider to go over the results of your screening  You, your family or caregiver, and your healthcare provider can talk about your treatment options  Together you can decide which treatment is right for you   You may need more tests to diagnose anything that showed up on the screening test  Common tests include an x-ray or CT scan to check the amount of tissue between bones or to find bone spurs  How osteoarthritis is treated, and the benefits of treatment:   · Lifestyle changes  include weight loss and exercise  Weight loss can help take pressure off the joint  Exercise can help build muscles around the joint  This helps build stability and strength  Your healthcare provider, a dietitian, or a physical therapist can help you make nutrition and exercise plans  · Devices  can help you stay active and relieve your symptoms  Shoe inserts support your joints and take pressure off the joints  This can help reduce pain that happens when you stand or walk  A knee brace can add stability to your knee when you walk  A cane can help take weight off your knee as you walk  · Medicines  include acetaminophen, NSAIDs, and certain antidepressants  NSAIDs, such as ibuprofen, and acetaminophen can be taken as a pill or rubbed into your skin in a cream  Acetaminophen and NSAIDs help relieve pain  NSAIDs also help reduce inflammation  Both medicines are available without a prescription  Do not take these medicines without talking to your healthcare provider first  Stronger forms of these medicines are available with a prescription  Antidepressants are only available with a prescription  · Therapy  includes physical and occupational therapy  A physical therapist can help you strengthen muscles around the joint and increase your range of motion  An occupational therapist can help you find easier ways to do your daily activities  For example, you may be shown how to climb stairs without putting stress on your knee  · Steroid medicine  may be injected into the knee area if your symptoms become worse  This can reduce inflammation and relieve pain  · Surgery  may be done if other treatments do not work and your symptoms become severe  Debridement is surgery to clean pieces of bone and cartilage out of the joint  This is done if you have knee buckling or locking   Osteotomy is surgery to move bones into a different position so they do not rub against other bones  Surgery called arthroplasty is used to remove the damaged joint and replace it with an artificial joint  Surgery can relieve pain by removing the problem that is causing your pain  Risks of osteoarthritis treatment:   · Lifestyle changes  will not reverse the damage to the tissue between your joints  It may prevent symptoms from getting worse  It may also be difficult to exercise if you have severe knee pain  · Medicines  must be taken in limited doses  Acetaminophen can cause liver damage, and NSAIDs can cause kidney damage  The pain may come back before you can take another dose  Antidepressants can cause certain side effects  · Steroid injections  are usually limited to about 4 each year  This is because the medicine can cause joint damage over time  Steroids can also increase your risk for infections, cause changes in your mood, and increase blood sugar levels  · Surgery  increases your risk for an infection or blood clot  Nerves, blood vessels, or tissues around the knee can be damaged during an osteotomy  An artificial joint may wear out over time  It may also become loose  It will need to be replaced if it wears out or comes loose  You will need to do knee exercises to prevent scar tissue from building up in your knee  This can be painful  Questions to ask your healthcare provider to help you make decisions about treatment:   · How will I know if signs and symptoms are becoming severe enough to need treatment? · How long will it take for each treatment option to help my knee pain get better? · Which pain medicines will work best for me? · Am I a good candidate for steroid injections? · Am I a good candidate for knee replacement surgery? · How long is recovery from knee replacement surgery? · Will I need to have more surgery over time?     · How often will I need to do knee exercises to prevent scar tissue from building up? © Copyright [facility] [address] Information is for End User's use only and may not be sold, redistributed or otherwise used for commercial purposes  All illustrations and images included in CareNotes® are the copyrighted property of [employer] M [employer]  or Nikole Baez  The above information is an  only  It is not intended as medical advice for individual conditions or treatments  Talk to your doctor, nurse or pharmacist before following any medical regimen to see if it is safe and effective for you

## 2021-03-09 NOTE — TELEPHONE ENCOUNTER
Pt called in to say that the lidocaine (LIDODERM) 5 is not covered  And she would like to know if there is an alternate she said there are some over the counter option   Please be advise thank you        Please call patient back @ [phone] after 9 am

## 2021-03-10 ENCOUNTER — TELEPHONE (OUTPATIENT)
Dept: PAIN MEDICINE | Facility: CLINIC | Age: 81
End: 2021-03-10

## 2021-03-10 NOTE — TELEPHONE ENCOUNTER
Prior auth denied  Medication will only be approved to treat pain due to diabetic neuropathy, cancer, or shingles

## 2021-03-10 NOTE — TELEPHONE ENCOUNTER
Scheduled pt for Right genicular nerve block for 4/7/21  No as needed pain meds for 6 hrs before and 6/8 hrs after procedure  Pain level must be 5 or greater  Need to arrange transportation  Proper clothing for procedure  If ill or placed on antibiotics please call to reschedule  Covid/travel/ and vaccine instructions

## 2021-03-11 NOTE — TELEPHONE ENCOUNTER
Patient needs help figuring out how to put on these patches she is not sure how, and she feels she is doing it all wrong       Please advise    Thank you    [phone]

## 2021-03-11 NOTE — TELEPHONE ENCOUNTER
Pt go her knee patch-lidocain and when she pit's it on it is not sticking on  It is falling off  Pt states maybe sh is not putting it on where she needs to  There were no instructions how to put it in      Please call pt # [phone]

## 2021-03-24 NOTE — TELEPHONE ENCOUNTER
Patient is requesting clarification on how long she will have to use the pain patches after surgery   Please advise, rina    Call back# [phone]

## 2021-03-29 ENCOUNTER — TELEPHONE (OUTPATIENT)
Dept: OBGYN CLINIC | Facility: MEDICAL CENTER | Age: 81
End: 2021-03-29

## 2021-03-29 NOTE — TELEPHONE ENCOUNTER
S/W pt  Advised pt of the same  Advised pt to follow the directions on the bottle for tylenol  Pt verbalized understanding

## 2021-03-29 NOTE — TELEPHONE ENCOUNTER
S/W pt  Advised pt will have to see how she does with the procedures first in order to see about using the patch  Pt stated she does not understand the procedures  Explained the procedures to her multiple times and pt stated she still does not understand  Pt stated she is cold, freezing, had a recent nose bleed, has no energy and always tried  Advised pt to call her PCP  Pt stated she did and has not been able to get through yet  Pt stated she does not want narcotics  She only has tylenol  Advised pt she can take up to 3,000 mg in 24 hrs  Pt stated she can hardly walk and she feels like she is going to fall down  Pt stated "I can't live like this everyday "  Please advise

## 2021-03-29 NOTE — TELEPHONE ENCOUNTER
Patient sees Dr Domingo Martin  Patient calling about her script for extra strength tylenol, 500 mg  She is not sure how oftern she needs to take to help her pain  Please leave a message if no answer       Call back [phone]

## 2021-03-29 NOTE — TELEPHONE ENCOUNTER
D O  aware and agree she needs to see her PCP   If her symptoms get progressively worse with the decreased energy, lethargy, cold sensation and cannot get through to her PCP would advise going to urgent care or ER for further workup and evaluation   Thank you

## 2021-03-31 NOTE — TELEPHONE ENCOUNTER
Patient calling to get help to getting a ride for procedure or she needs to reschedule procedure until she schedules ride with Marcelle or Raheem Tapia    She is unable to dial out because she is not in the right state of mind       Thank you

## 2021-03-31 NOTE — TELEPHONE ENCOUNTER
Patient called requesting to speak to procedure  about her procedure date & transportation issues   Please reach out to her at # [phone]

## 2021-04-05 ENCOUNTER — TELEPHONE (OUTPATIENT)
Dept: PAIN MEDICINE | Facility: CLINIC | Age: 81
End: 2021-04-05

## 2021-04-05 NOTE — TELEPHONE ENCOUNTER
Patient   [phone]  Dr Jose Pool    Please follow back up with patient  She said that she needs to know what time to tell the squad to pick her up  As well as what time they need to come back to get her, after her procedure  She is scheduled for 5/5/21   Patient said that she will be the office at 12:30 pm     She needs this info for the transportation team

## 2021-04-05 NOTE — TELEPHONE ENCOUNTER
Spoke to patient advised she needs to be here at 12:45 and allow 1 hr for the transport to come back to get her

## 2021-04-30 ENCOUNTER — TELEPHONE (OUTPATIENT)
Dept: FAMILY MEDICINE CLINIC | Facility: CLINIC | Age: 81
End: 2021-04-30

## 2021-04-30 NOTE — TELEPHONE ENCOUNTER
Pt having knee surgery in a few weeks - she has been receiving many calls in regard to equipment she may need post op  She is very confused and would like to speak with you

## 2021-05-03 NOTE — TELEPHONE ENCOUNTER
Patient called requesting to speak to a nurse regarding her ace wrap/patch & her procedure scheduled on 5/5/21   Please reach out to her at # [phone]

## 2021-05-03 NOTE — TELEPHONE ENCOUNTER
RN s/w pt  Pt advised she can use her Lidocaine patch and remove it as usual the evening before but not to apply the day of her procedure  Pt also reminded not to take any as needed medications the day of her nerve block  Pt verbalized understanding

## 2021-05-03 NOTE — TELEPHONE ENCOUNTER
Patient needs to know what she can or cannot eat for procedure on 5/5 and other instructions       Please advise    Thank you     [phone]

## 2021-05-05 ENCOUNTER — HOSPITAL ENCOUNTER (OUTPATIENT)
Dept: RADIOLOGY | Facility: CLINIC | Age: 81
Discharge: HOME | End: 2021-05-05
Attending: PHYSICAL MEDICINE & REHABILITATION | Admitting: PHYSICAL MEDICINE & REHABILITATION
Payer: MEDICARE

## 2021-05-05 VITALS
DIASTOLIC BLOOD PRESSURE: 84 MMHG | SYSTOLIC BLOOD PRESSURE: 172 MMHG | TEMPERATURE: 97.8 F | OXYGEN SATURATION: 96 % | HEART RATE: 64 BPM | RESPIRATION RATE: 18 BRPM

## 2021-05-05 DIAGNOSIS — M17.11 OSTEOARTHRITIS OF RIGHT KNEE, UNSPECIFIED OSTEOARTHRITIS TYPE: ICD-10-CM

## 2021-05-05 DIAGNOSIS — M54.31 SCIATICA OF RIGHT SIDE: ICD-10-CM

## 2021-05-05 DIAGNOSIS — G89.29 CHRONIC PAIN OF RIGHT KNEE: ICD-10-CM

## 2021-05-05 DIAGNOSIS — M25.561 CHRONIC PAIN OF RIGHT KNEE: ICD-10-CM

## 2021-05-05 PROCEDURE — 64454 NJX AA&/STRD GNCLR NRV BRNCH: CPT | Performed by: PHYSICAL MEDICINE & REHABILITATION

## 2021-05-05 RX ORDER — BUPIVACAINE HCL/PF 2.5 MG/ML
10 VIAL (ML) INJECTION ONCE
Status: COMPLETED | OUTPATIENT
Start: 2021-05-05 | End: 2021-05-05

## 2021-05-05 RX ADMIN — BUPIVACAINE HYDROCHLORIDE 3 ML: 2.5 INJECTION, SOLUTION EPIDURAL; INFILTRATION; INTRACAUDAL at 13:21

## 2021-05-05 RX ADMIN — Medication 1.5 ML: at 13:19

## 2021-05-05 NOTE — DISCHARGE INSTR - LAB
ACTIVITY  · Please do activities that will bring on the normal pain that we are rating  For example, if vacuuming or walking increases the pain, do that  This will give the most accurate response to the diary  · You may shower, but no tub baths today, or applied heat  CARE OF THE INJECTION SITE  · This area may be numb for several hours after the injection  · Notify [facility] if you have any of the following:  redness, drainage, swelling, or fever above 100°F     SPECIAL INSTRUCTIONS  · Please return the MBB diary to our office by mail, fax, or drop it off  MEDICATIONS  · Please do not take any break through or short acting pain medications for 8 hours after the block  · Continue to take all routine medications  · Our office may have instructed you to hold some medications  As no general anesthesia was used in today's procedure, you should not experience any side effects related to anesthesia  If you have a problem specifically related to your procedure, please call our office at [phone]  Problems not related to your procedure should be directed to your primary care physician

## 2021-05-05 NOTE — H&P
History of Present Illness:  The patient is a 79 y o  female who presents with complaints of Right knee pain    Patient Active Problem List   Diagnosis    Knee injury    HLD (hyperlipidemia)    Antibiotic-associated diarrhea    Fall    Traumatic hematoma of forehead    Closed fracture of proximal end of right humerus    Closed fracture of nasal bone    Age-related osteoporosis with current pathological fracture    Vitamin D deficiency    Stroke (HCC)    Mild cognitive impairment    Essential hypertension    Anxiety    Fx humeral neck, left, closed, initial encounter    Elevated liver enzymes    Encounter for support and coordination of transition of care       Past Medical History:   Diagnosis Date    Allergic     Anxiety     Arthritis     [facility] [address] [facility]     HLD (hyperlipidemia)     Hypertension     Hyperthyroidism     Osteoporosis     Stroke ([facility] [address] [facility]     TIA (transient ischemic attack)        Past Surgical History:   Procedure Laterality Date    BREAST LUMPECTOMY Left     HYSTERECTOMY      KNEE SURGERY Right     ORIF TIBIA FRACTURE Right 12/8/2016    Procedure: OPEN REDUCTION W/ INTERNAL FIXATION (ORIF) TIBIA;  Surgeon: Soumya Nielson MD;  Location: [facility];  Service:          Current Outpatient Medications:     acetaminophen (TYLENOL) 325 mg tablet, Take 3 tablets (975 mg total) by mouth every 8 (eight) hours, Disp:  , Rfl: 0    albuterol (PROVENTIL HFA,VENTOLIN HFA) 90 mcg/act inhaler, Inhale 2 puffs every 6 (six) hours as needed for wheezing, Disp: , Rfl:     amLODIPine (NORVASC) 2 5 mg tablet, Take 1 tablet (2 5 mg total) by mouth daily, Disp: 90 tablet, Rfl: 1    aspirin 81 mg chewable tablet, Chew 81 mg daily, Disp: , Rfl:     bisacodyl (DULCOLAX) 5 mg EC tablet, Take 5 mg by mouth daily as needed for constipation, Disp: , Rfl:     diclofenac sodium (VOLTAREN) 1 %, Apply 2 g topically 4 (four) times a day, Disp:  , Rfl: 0    docusate sodium (COLACE) 100 mg capsule, Take 1 capsule (100 mg total) by mouth 2 (two) times a day (Patient not taking: Reported on 2/8/2021), Disp: 10 capsule, Rfl: 0    ergocalciferol (VITAMIN D2) 50,000 units, Take 1 capsule (50,000 Units total) by mouth once a week, Disp:  , Rfl: 0    fluticasone (FLONASE) 50 mcg/act nasal spray, 1 spray into each nostril daily, Disp:  , Rfl: 0    gabapentin (NEURONTIN) 100 mg capsule, Take 1 capsule (100 mg total) by mouth daily at bedtime (Patient not taking: Reported on 2/8/2021), Disp:  , Rfl: 0    gabapentin (NEURONTIN) 100 mg capsule, Take 1 capsule (100 mg total) by mouth daily at bedtime as needed (As needed daily for sciatic pain) (Patient not taking: Reported on 3/1/2021), Disp: 7 capsule, Rfl: 0    lidocaine (LIDODERM) 5 %, Apply 1 patch topically daily Remove & Discard patch within 12 hours or as directed by MD, Disp: 30 patch, Rfl: 1    meloxicam (MOBIC) 15 mg tablet, Take 1 tablet (15 mg total) by mouth daily, Disp: 30 tablet, Rfl: 0    methylPREDNISolone 4 MG tablet therapy pack, Use as directed on package (Patient not taking: Reported on 2/8/2021), Disp: 21 tablet, Rfl: 0    multivitamin (THERAGRAN) TABS, Take 1 tablet by mouth daily, Disp: , Rfl:     oxyCODONE (ROXICODONE) 5 mg immediate release tablet, 0 5-1 tab every 4 hours as needed for moderate pain (Patient not taking: Reported on 3/1/2021), Disp: 18 tablet, Rfl: 0    Pirbuterol Acetate (Gertrudis Conradi [place]), Inhale one time by mouth every four hours as needed, Disp: , Rfl:     polyethylene glycol (MIRALAX) 17 g packet, Take 17 g by mouth daily (Patient taking differently: Take 17 g by mouth daily As needed), Disp:  , Rfl: 0    Current Facility-Administered Medications:     bupivacaine (PF) (MARCAINE) 0 25 % injection 10 mL, 10 mL, Perineural, Once, Curtistine Hotter, DO    lidocaine (PF) (XYLOCAINE-MPF) 2 % injection 10 mL, 10 mL, Perineural, Once, Curtistine Hotter, DO    Allergies   Allergen Reactions    Alendronate     Penicillins Swelling    Raloxifene     Risedronate Sodium        Physical Exam: There were no vitals filed for this visit  General: Awake, Alert, Oriented x 3, Mood and affect appropriate  Respiratory: Respirations even and unlabored  Cardiovascular: Peripheral pulses intact; no edema  Musculoskeletal Exam:  Tenderness to palpation right medial and lateral joint line knee    ASA Score: 2         Assessment:   1  Sciatica of right side    2  Chronic pain of right knee    3   Osteoarthritis of right knee, unspecified osteoarthritis type        Plan: Rt genicular nerve block

## 2021-05-06 NOTE — TELEPHONE ENCOUNTER
Pt called in to discuss the pain in her knee after the injection she had  Mainly on the side of the knee  She said is is working on her pain diary   Ph# [phone]

## 2021-05-14 ENCOUNTER — TRANSCRIBE ORDERS (OUTPATIENT)
Dept: PAIN MEDICINE | Facility: CLINIC | Age: 81
End: 2021-05-14

## 2021-05-14 ENCOUNTER — TELEPHONE (OUTPATIENT)
Dept: PAIN MEDICINE | Facility: CLINIC | Age: 81
End: 2021-05-14

## 2021-05-17 NOTE — TELEPHONE ENCOUNTER
Scheduled pt for Rt Geniculate RFA for 6/30/21  Went over pre-procedure instructions below:  Nothing to eat or drink 1 hr prior to procedure  Need to arrange transportation  Proper clothing for procedure  If ill or placed on antibiotics please call to reschedule  Covid/travel/ and vaccine instructions

## 2021-05-19 ENCOUNTER — TELEPHONE (OUTPATIENT)
Dept: FAMILY MEDICINE CLINIC | Facility: CLINIC | Age: 81
End: 2021-05-19

## 2021-05-19 ENCOUNTER — TELEMEDICINE (OUTPATIENT)
Dept: FAMILY MEDICINE CLINIC | Facility: CLINIC | Age: 81
End: 2021-05-19
Payer: MEDICARE

## 2021-05-19 DIAGNOSIS — Z76.89 ENCOUNTER FOR SUPPORT AND COORDINATION OF TRANSITION OF CARE: ICD-10-CM

## 2021-05-19 DIAGNOSIS — M17.11 OSTEOARTHRITIS OF RIGHT KNEE, UNSPECIFIED OSTEOARTHRITIS TYPE: ICD-10-CM

## 2021-05-19 DIAGNOSIS — E55.9 VITAMIN D DEFICIENCY: Primary | ICD-10-CM

## 2021-05-19 PROCEDURE — 99213 OFFICE O/P EST LOW 20 MIN: CPT | Performed by: NURSE PRACTITIONER

## 2021-05-19 RX ORDER — MELATONIN
1000 DAILY
Qty: 90 TABLET | Refills: 2 | Status: SHIPPED | OUTPATIENT
Start: 2021-05-19

## 2021-05-19 NOTE — PROGRESS NOTES
Virtual Brief Visit    Assessment/Plan:    Problem List Items Addressed This Visit        Musculoskeletal and Integument    Osteoarthritis of right knee       Other    Vitamin D deficiency    Encounter for support and coordination of transition of care - Primary          BMI Counseling: There is no height or weight on file to calculate BMI  The BMI is above normal  Nutrition recommendations include decreasing portion sizes, encouraging healthy choices of fruits and vegetables, decreasing fast food intake, consuming healthier snacks, limiting drinks that contain sugar, moderation in carbohydrate intake, increasing intake of lean protein, reducing intake of saturated and trans fat and reducing intake of cholesterol  Exercise recommendations include vigorous physical activity 75 minutes/week, exercising 3-5 times per week, obtaining a gym membership and strength training exercises  No pharmacotherapy was ordered  Depression Screening and Follow-up Plan: Clincally patient does not have depression  No treatment is required  Falls Plan of Care: balance, strength, and gait training instructions were provided  Medications that increase falls were reviewed  Vitamin D supplementation was recommended  Home safety education provided  Reason for visit is   Chief Complaint   Patient presents with    Virtual Brief Visit        Encounter provider Christiano Douglas, [facility]    Provider located at [address] [place] [address] [place] [address] [phone]    Recent Visits  No visits were found meeting these conditions     Showing recent visits within past 7 days and meeting all other requirements     Today's Visits  Date Type Provider Dept   05/19/21 Telemedicine VALDEMAR Camargo  [facility] [place]   05/19/21 Telephone [facility]   Showing today's visits and meeting all other requirements     Future Appointments  No visits were found meeting these conditions  Showing future appointments within next 150 days and meeting all other requirements        After connecting through [employer] and patient was informed that this is a secure, HIPAA-compliant platform  She agrees to proceed  , the patient was identified by name and date of birth  Violeta Lucero was informed that this is a telemedicine visit and that the visit is being conducted through telephone  My office door was closed  No one else was in the room  She acknowledged consent and understanding of privacy and security of the platform  The patient has agreed to participate and understands she can discontinue the visit at any time  Patient is aware this is a billable service  Reford Jeff is a 79 y o  female virtual phone visit  Patient is an 79-year-old female, who has had several months of sciatica  She is followed by Dr Amaury Moreland from pain management and is to have ablation of the her right knee with steroid injection  She has occassionally been having trouble with her right knee if over used and still uses a walker and cane  Usually this occurs when she attempts stairs  She is otherwise able to walk  No associated motor sensory complaints  No new trauma  No incontinence  No fever or chills  No abdominal pain  She reports she has not had any other pain issues otherwise         Past Medical History:   Diagnosis Date    Allergic     Anxiety     Arthritis     Cancer ([facility] [address] [address] )     HLD (hyperlipidemia)     Hypertension     Hyperthyroidism     Osteoporosis     Stroke ([facility] [address] )     TIA (transient ischemic attack)        Past Surgical History:   Procedure Laterality Date    BREAST LUMPECTOMY Left     HYSTERECTOMY      KNEE SURGERY Right     ORIF TIBIA FRACTURE Right 12/8/2016    Procedure: OPEN REDUCTION W/ INTERNAL FIXATION (ORIF) TIBIA;  Surgeon: Johan Valdez MD;  [facility];  Service:        Current Outpatient Medications Medication Sig Dispense Refill    acetaminophen (TYLENOL) 325 mg tablet Take 3 tablets (975 mg total) by mouth every 8 (eight) hours  0    albuterol (PROVENTIL HFA,VENTOLIN HFA) 90 mcg/act inhaler Inhale 2 puffs every 6 (six) hours as needed for wheezing      amLODIPine (NORVASC) 2 5 mg tablet Take 1 tablet (2 5 mg total) by mouth daily 90 tablet 1    aspirin 81 mg chewable tablet Chew 81 mg daily      bisacodyl (DULCOLAX) 5 mg EC tablet Take 5 mg by mouth daily as needed for constipation      diclofenac sodium (VOLTAREN) 1 % Apply 2 g topically 4 (four) times a day  0    fluticasone (FLONASE) 50 mcg/act nasal spray 1 spray into each nostril daily  0    lidocaine (LIDODERM) 5 % Apply 1 patch topically daily Remove & Discard patch within 12 hours or as directed by MD 30 patch 1    multivitamin (THERAGRAN) TABS Take 1 tablet by mouth daily      Pirbuterol Acetate (MAXAIR AUTOHALER IN) Inhale one time by mouth every four hours as needed       No current facility-administered medications for this visit  Allergies   Allergen Reactions    Alendronate     Penicillins Swelling    Raloxifene     Risedronate Sodium        Review of Systems   Constitutional: Negative for activity change, appetite change, chills, fatigue, fever and unexpected weight change  HENT: Negative for congestion, ear discharge, ear pain, nosebleeds, postnasal drip, rhinorrhea, sinus pressure, sinus pain, sneezing, sore throat and voice change  Eyes: Negative for pain, redness and visual disturbance  Respiratory: Negative for cough, chest tightness, shortness of breath, wheezing and stridor  Cardiovascular: Negative for chest pain and palpitations  Gastrointestinal: Negative for abdominal distention, abdominal pain, constipation, diarrhea, nausea and vomiting  Endocrine: Negative  Genitourinary: Negative for decreased urine volume, difficulty urinating, dysuria, flank pain, frequency, hematuria and urgency  Musculoskeletal: Negative for arthralgias and myalgias  Skin: Negative  Allergic/Immunologic: Negative  Neurological: Negative  Hematological: Negative  Psychiatric/Behavioral: Negative  There were no vitals filed for this visit  I spent 25 minutes directly with the patient during this visit    [facility] acknowledges that she has consented to an online visit or consultation  She understands that the online visit is based solely on information provided by her, and that, in the absence of a face-to-face physical evaluation by the physician, the diagnosis she receives is both limited and provisional in terms of accuracy and completeness  This is not intended to replace a full medical face-to-face evaluation by the physician  Marianne Coy understands and accepts these terms

## 2021-05-19 NOTE — ASSESSMENT & PLAN NOTE
Patient discharged 03/01/2021  Currently is receiving home care services  Home care services verify as well as medications at this time via phone  Pain has caresphere  Paperwork faxed at present

## 2021-05-19 NOTE — PATIENT INSTRUCTIONS
Osteoporosis   Brandy Kumar, Georgina Knight, et al: American Association of [address]   [address] of Endocrinology clinical practice guidelines for the diagnosis and treatment of postmenopausal osteoporosis - 2020 update executive summary  Endocr Pract 2020; 26(Suppl 1):1-46  Brennan CRISOSTOMO, Luci Mack, Nils ML, et al: Long-term drug therapy and drug discontinuations and holidays for osteoporosis fracture prevention: a systematic review  Amber Intern Med 2019; 171(1):37-50  Agency for [employer] and Quality [facility]): Long-term drug therapy and drug holidays for osteoporosis fracture prevention: a systematic review  Agency for [employer] and Quality ([facility])  Dudley Abrams MD  2018  Available from URL: https://effectivehealthcare  [facility]q gov/topics/osteoporosis-fracture-prevention/draft-report  As accessed 2018-11-07  Mary Titus et al: Screening to prevent osteoporotic fractures: updated evidence report and systematic review for the [employer]  JESSICA 2018; 319(24):1449-1306  Kanchan Llamas et al: 2017 American College of Rheumatology guideline for the prevention and treatment of glucocorticoid-induced osteoporosis  Arthritis Rheumatol 2017; 69(8):8047-5236  Hemalatha Jeronimo MA , Shivani CASEY , et al: Treatment of low bone density or osteoporosis to prevent fractures in men and women: a clinical practice guideline update from the [employer]  Amber Intern Med 2017; 370(21):384-082  Brandy Kumar, Georgina Knight, et al: [employer] of Clinical Endocrinologists and [employer] of Endocrinology Clinical Practice Guidelines for the Diagnosis and Treatment of Postmenopausal Osteoporosis - 2016  Endocr Pract 2016; 22(Suppl 4):1-42  Ankit N: Osteoporosis  In: Didier IGLESIAS, ed  The 5-Minute Clinical Consult Standard 2016, 24th ed  [address] [facility],[address] [place], [place], [address] [place] [address]: Osteoporosis  In: Faraz'sRoberth RM, [place], [place]  [address] Emergency Medicine Consult, 5th ed  [address] [facility],[address] [place], [place], [address]  Juan MATTHEW: Osteoporosis  In: Stephen ROBLES, Excell Sailors, Nicole [place], [place]  [facility], 2nd ed  [address] [facility],[address] [place], [place], 2014  National Osteoporosis Foundation: Clinician's Guide to Prevention and Treatment of Osteoporosis  [address] [facility]  2013  Available from URL: VIPSaver fi  pdf  As accessed 2013-05-23  © Copyright [address] [place] [address] Information is for End User's use only and may not be sold, redistributed or otherwise used for commercial purposes  All illustrations and images included in CareNotes® are the copyrighted property of A D A M , Inc  or [employer]   The above information is an  only  It is not intended as medical advice for individual conditions or treatments  Talk to your doctor, nurse or pharmacist before following any medical regimen to see if it is safe and effective for you  Knee Pain   WHAT YOU NEED TO KNOW:   What do I need to know about knee pain? Knee pain may start suddenly, or it may be a long-term problem  You may have pain on the side, front, or back of your knee  You may have knee stiffness and swelling  You may hear popping sounds or feel like your knee is giving way or locking up as you walk  You may feel pain when you sit, stand, walk, or climb up and down stairs  What increases my risk for knee pain?    · Obesity    · A strain or tear in ligaments or tendons    · A leg fracture or knee dislocation    · Overuse of your knee    · Osteoarthritis, rheumatoid arthritis, or gout    · An infection, tumor, or cyst in your knee    · Shoes that are not supportive, or training on a hard surface    · Sports that involve jumping or pivoting on your knee    How is the cause of knee pain diagnosed? Your healthcare provider will examine your knee and ask about your symptoms  Tell your provider when the pain started and what you were doing at the time  Describe the pain, such as sharp, throbbing, or achy  Tell your provider about any knee injury or surgery you had  You may need any of the following:  · MRI, CT, or ultrasound  pictures may show an injury, fracture, or tumor  · Blood tests  may be used to check the level of inflammation in your blood  The tests may also show signs of infection  · Arthroscopy  is a procedure to look inside your knee joint with an arthroscope  An arthroscope is a flexible tube with a light and camera on the end  A knee arthroscopy is usually done to check for disease or damage inside your knee  These problems may be fixed during the procedure  How is knee pain treated? Treatment will depend on the cause of your pain  You may need any of the following:  · NSAIDs  help decrease swelling and pain or fever  This medicine is available with or without a doctor's order  NSAIDs can cause stomach bleeding or kidney problems in certain people  If you take blood thinner medicine, always ask your healthcare provider if NSAIDs are safe for you  Always read the medicine label and follow directions  · Acetaminophen  decreases pain and fever  It is available without a doctor's order  Ask how much to take and how often to take it  Follow directions  Read the labels of all other medicines you are using to see if they also contain acetaminophen, or ask your doctor or pharmacist  Acetaminophen can cause liver damage if not taken correctly  Do not use more than 4 grams (4,000 milligrams) total of acetaminophen in one day  · Prescription pain medicine  may be given  Ask your healthcare provider how to take this medicine safely  Some prescription pain medicines contain acetaminophen   Do not take other medicines that contain acetaminophen without talking to your healthcare provider  Too much acetaminophen may cause liver damage  Prescription pain medicine may cause constipation  Ask your healthcare provider how to prevent or treat constipation  · Steroid injections  may be given into your knee  Steroids reduce inflammation and pain  · Surgery  may be used for some injuries, such as to repair a torn ACL  What can I do to manage my symptoms? · Rest your knee so it can heal   Limit activities that increase your pain  Do low-impact exercises, such as walking or swimming  · Apply ice to help reduce swelling and pain  Use an ice pack, or put crushed ice in a plastic bag  Cover it with a towel before you apply it to your knee  Apply ice for 15 to 20 minutes every hour, or as directed  · Apply compression to help reduce swelling  Use a brace or bandage only as directed  · Elevate your knee to help decrease pain and swelling  Elevate your knee while you are sitting or lying down  Prop your leg on pillows to keep your knee above the level of your heart  · Prevent your knee from moving as directed  Your healthcare provider may put on a cast or splint  You may need to wear a leg brace to stabilize your knee  A leg brace can be adjusted to increase your range of motion as your knee heals  What can I do to prevent knee pain? · Maintain a healthy weight  Extra weight increases your risk for knee pain  Ask your healthcare provider how much you should weigh  He or she can help you create a safe weight loss plan if you need to lose weight  · Exercise or train properly  Use the correct equipment for sports  Wear shoes that provide good support  Check your posture often as you exercise, play sports, or train for an event  This can help prevent stress and strain on your knees  Rest between sessions so you do not overwork your knees  When should I seek immediate care? · Your pain is worse, even after treatment       · You cannot bend or straighten your leg completely  · The swelling around your knee does not go down even with treatment  · Your knee is painful and hot to the touch  When should I contact my healthcare provider? · You have questions or concerns about your condition or care  CARE AGREEMENT:   You have the right to help plan your care  Learn about your health condition and how it may be treated  Discuss treatment options with your healthcare providers to decide what care you want to receive  You always have the right to refuse treatment  The above information is an  only  It is not intended as medical advice for individual conditions or treatments  Talk to your doctor, nurse or pharmacist before following any medical regimen to see if it is safe and effective for you  © Copyright [address] Information is for End User's use only and may not be sold, redistributed or otherwise used for commercial purposes   All illustrations and images included in CareNotes® are the copyrighted property of A D A M , Inc  or [address] [place] [address] Elimi

## 2021-05-19 NOTE — TELEPHONE ENCOUNTER
LM for pt to call the office to schedule - we received orders from home health  Pt needs an appt to go over this to see what is needed  She did not follow up after her hospital stay in March    Please schedule pt with [facility]    Orders are in [facility]'s bin

## 2021-05-19 NOTE — ASSESSMENT & PLAN NOTE
She reports she is taking over-the-counter vitamin-D 500 mg  She has completed the vitamin-D 2 at this time  Repeat vitamin-D level to be done in 3 months

## 2021-05-19 NOTE — TELEPHONE ENCOUNTER
Patient called and left message with the phone number for her physical therapist [phone]  She is also requesting you to refill her eye drop since she is unable to get into her eye doctor up in [facility]  She states is Tobramycin Ophthalmic Solution? She did not specify which eye she uses it for? If you are ok with filling rx, please send to [address]  in [place]

## 2021-05-19 NOTE — TELEPHONE ENCOUNTER
Home health orders list many medications - pt is only taking amlodipine and a vitamin  Per [facility] - we need new orders reflecting this   LM for [facility] (home health) to call us to discuss and get new order

## 2021-05-20 NOTE — TELEPHONE ENCOUNTER
Can you call [facility] in clarify the order of her eyedrops and the ice she was using it in  Then we can refill it

## 2021-05-20 NOTE — TELEPHONE ENCOUNTER
Spoke with patient and she states she uses Tobramycin-Dexemetho Opth  Susp  Quantity of 5 mg  Directions are to use 1 drop in affected eye as needed   Please send to [facility] [address]

## 2021-05-21 DIAGNOSIS — H44.003 EYE INFECTION, BILATERAL: Primary | ICD-10-CM

## 2021-05-21 RX ORDER — TOBRAMYCIN AND DEXAMETHASONE 3; 1 MG/ML; MG/ML
1 SUSPENSION/ DROPS OPHTHALMIC
COMMUNITY
End: 2021-05-21 | Stop reason: SDUPTHER

## 2021-05-21 RX ORDER — TOBRAMYCIN AND DEXAMETHASONE 3; 1 MG/ML; MG/ML
1 SUSPENSION/ DROPS OPHTHALMIC
Qty: 5 ML | Refills: 1 | Status: SHIPPED | OUTPATIENT
Start: 2021-05-21

## 2021-05-21 NOTE — TELEPHONE ENCOUNTER
Please verify the correct pharmacy where they were filled last   Also the doses the frequency the eyes that they were to be placed in and the last time and is prescription was ordered  Without that information we will not be able to refill the medication and she will have to go to her eye doctor

## 2021-05-21 NOTE — TELEPHONE ENCOUNTER
Called patient and got the correct information for eye drop  Order is pending approval, please send to [facility]  [address]

## 2021-05-26 ENCOUNTER — TELEPHONE (OUTPATIENT)
Dept: FAMILY MEDICINE CLINIC | Facility: CLINIC | Age: 81
End: 2021-05-26

## 2021-05-26 NOTE — TELEPHONE ENCOUNTER
Lala from [facility] called - pt contacted her directly at her home - Mihaela Frederick is unsure how she got her number but pt is requesting [facility] services for PT -  If she needs services - please place orders

## 2021-06-08 ENCOUNTER — TELEPHONE (OUTPATIENT)
Dept: FAMILY MEDICINE CLINIC | Facility: CLINIC | Age: 81
End: 2021-06-08

## 2021-06-08 ENCOUNTER — APPOINTMENT (OUTPATIENT)
Dept: LAB | Facility: HOSPITAL | Age: 81
End: 2021-06-08
Payer: MEDICARE

## 2021-06-08 DIAGNOSIS — R74.8 ELEVATED LIVER ENZYMES: ICD-10-CM

## 2021-06-08 DIAGNOSIS — E55.9 VITAMIN D DEFICIENCY: ICD-10-CM

## 2021-06-08 LAB
25(OH)D3 SERPL-MCNC: 32.9 NG/ML (ref 30–100)
ALBUMIN SERPL BCP-MCNC: 3.5 G/DL (ref 3.5–5)
ALP SERPL-CCNC: 148 U/L (ref 46–116)
ALT SERPL W P-5'-P-CCNC: 28 U/L (ref 12–78)
AST SERPL W P-5'-P-CCNC: 25 U/L (ref 5–45)
BILIRUB DIRECT SERPL-MCNC: 0.11 MG/DL (ref 0–0.2)
BILIRUB SERPL-MCNC: 0.48 MG/DL (ref 0.2–1)
PROT SERPL-MCNC: 7.3 G/DL (ref 6.4–8.2)

## 2021-06-08 PROCEDURE — 80076 HEPATIC FUNCTION PANEL: CPT

## 2021-06-08 PROCEDURE — 36415 COLL VENOUS BLD VENIPUNCTURE: CPT

## 2021-06-08 PROCEDURE — 82306 VITAMIN D 25 HYDROXY: CPT

## 2021-06-08 NOTE — TELEPHONE ENCOUNTER
----- Message from Romaine Rivera, 10 Saman Baez sent at 6/8/2021  4:14 PM EDT -----  Please call patient and notify test results are normal   Her alk-phos is slightly elevated but it always has been slightly elevated    No distress at this time please notified normal

## 2021-06-14 ENCOUNTER — TELEPHONE (OUTPATIENT)
Dept: PAIN MEDICINE | Facility: MEDICAL CENTER | Age: 81
End: 2021-06-14

## 2021-06-14 NOTE — TELEPHONE ENCOUNTER
Attempted to contact pt x3, line busy with each attempt, unable to leave vm  Please try to reach pt at another time, thank you

## 2021-06-21 ENCOUNTER — TELEPHONE (OUTPATIENT)
Dept: FAMILY MEDICINE CLINIC | Facility: CLINIC | Age: 81
End: 2021-06-21

## 2021-06-21 NOTE — TELEPHONE ENCOUNTER
I did some research it looks like [employer] does house calls   [phone]   Have them try and see what happens

## 2021-06-22 ENCOUNTER — TELEPHONE (OUTPATIENT)
Dept: PODIATRY | Facility: CLINIC | Age: 81
End: 2021-06-22

## 2021-06-22 NOTE — TELEPHONE ENCOUNTER
Dominick Ramos called, she took a fall and hurt her shoulder  She also has knee pain and sciatic pain  She cannot wash her feet and is very concerned about getting an infection between her toes  She asked if Dr Eboni Moran could order [facility] to go in a couple times a week to do that for her

## 2021-06-23 ENCOUNTER — TELEPHONE (OUTPATIENT)
Dept: PODIATRY | Facility: CLINIC | Age: 81
End: 2021-06-23

## 2021-06-30 ENCOUNTER — TELEPHONE (OUTPATIENT)
Dept: RADIOLOGY | Facility: CLINIC | Age: 81
End: 2021-06-30

## 2021-06-30 ENCOUNTER — HOSPITAL ENCOUNTER (EMERGENCY)
Facility: HOSPITAL | Age: 81
Discharge: HOME | End: 2021-06-30
Attending: EMERGENCY MEDICINE | Admitting: EMERGENCY MEDICINE
Payer: MEDICARE

## 2021-06-30 ENCOUNTER — TELEPHONE (OUTPATIENT)
Dept: FAMILY MEDICINE CLINIC | Facility: CLINIC | Age: 81
End: 2021-06-30

## 2021-06-30 VITALS
HEIGHT: 62 IN | RESPIRATION RATE: 18 BRPM | DIASTOLIC BLOOD PRESSURE: 86 MMHG | BODY MASS INDEX: 19.39 KG/M2 | TEMPERATURE: 99.1 F | OXYGEN SATURATION: 99 % | SYSTOLIC BLOOD PRESSURE: 176 MMHG | HEART RATE: 75 BPM

## 2021-06-30 DIAGNOSIS — R03.0 ELEVATED BLOOD PRESSURE READING: Primary | ICD-10-CM

## 2021-06-30 LAB
ALBUMIN SERPL BCP-MCNC: 3.5 G/DL (ref 3.5–5)
ALP SERPL-CCNC: 173 U/L (ref 46–116)
ALT SERPL W P-5'-P-CCNC: 37 U/L (ref 12–78)
ANION GAP SERPL CALCULATED.3IONS-SCNC: 10 MMOL/L (ref 4–13)
AST SERPL W P-5'-P-CCNC: 28 U/L (ref 5–45)
BASOPHILS # BLD AUTO: 0.04 THOUSANDS/ΜL (ref 0–0.1)
BASOPHILS NFR BLD AUTO: 1 % (ref 0–1)
BILIRUB SERPL-MCNC: 0.33 MG/DL (ref 0.2–1)
BUN SERPL-MCNC: 16 MG/DL (ref 5–25)
CALCIUM SERPL-MCNC: 9.2 MG/DL (ref 8.3–10.1)
CHLORIDE SERPL-SCNC: 104 MMOL/L (ref 100–108)
CO2 SERPL-SCNC: 25 MMOL/L (ref 21–32)
CREAT SERPL-MCNC: 0.79 MG/DL (ref 0.6–1.3)
EOSINOPHIL # BLD AUTO: 0.1 THOUSAND/ΜL (ref 0–0.61)
EOSINOPHIL NFR BLD AUTO: 2 % (ref 0–6)
ERYTHROCYTE [DISTWIDTH] IN BLOOD BY AUTOMATED COUNT: 14.6 % (ref 11.6–15.1)
GFR SERPL CREATININE-BSD FRML MDRD: 70 ML/MIN/1.73SQ M
GLUCOSE SERPL-MCNC: 98 MG/DL (ref 65–140)
HCT VFR BLD AUTO: 40.4 % (ref 34.8–46.1)
HGB BLD-MCNC: 12.8 G/DL (ref 11.5–15.4)
IMM GRANULOCYTES # BLD AUTO: 0.03 THOUSAND/UL (ref 0–0.2)
IMM GRANULOCYTES NFR BLD AUTO: 1 % (ref 0–2)
LYMPHOCYTES # BLD AUTO: 1.2 THOUSANDS/ΜL (ref 0.6–4.47)
LYMPHOCYTES NFR BLD AUTO: 19 % (ref 14–44)
MCH RBC QN AUTO: 30.8 PG (ref 26.8–34.3)
MCHC RBC AUTO-ENTMCNC: 31.7 G/DL (ref 31.4–37.4)
MCV RBC AUTO: 97 FL (ref 82–98)
MONOCYTES # BLD AUTO: 0.43 THOUSAND/ΜL (ref 0.17–1.22)
MONOCYTES NFR BLD AUTO: 7 % (ref 4–12)
NEUTROPHILS # BLD AUTO: 4.42 THOUSANDS/ΜL (ref 1.85–7.62)
NEUTS SEG NFR BLD AUTO: 70 % (ref 43–75)
NRBC BLD AUTO-RTO: 0 /100 WBCS
PLATELET # BLD AUTO: 320 THOUSANDS/UL (ref 149–390)
PMV BLD AUTO: 10.3 FL (ref 8.9–12.7)
POTASSIUM SERPL-SCNC: 4.6 MMOL/L (ref 3.5–5.3)
PROT SERPL-MCNC: 7.9 G/DL (ref 6.4–8.2)
RBC # BLD AUTO: 4.15 MILLION/UL (ref 3.81–5.12)
SODIUM SERPL-SCNC: 139 MMOL/L (ref 136–145)
TROPONIN I SERPL-MCNC: <0.02 NG/ML
WBC # BLD AUTO: 6.22 THOUSAND/UL (ref 4.31–10.16)

## 2021-06-30 PROCEDURE — 99283 EMERGENCY DEPT VISIT LOW MDM: CPT

## 2021-06-30 PROCEDURE — 36415 COLL VENOUS BLD VENIPUNCTURE: CPT

## 2021-06-30 PROCEDURE — 93005 ELECTROCARDIOGRAM TRACING: CPT

## 2021-06-30 PROCEDURE — 99284 EMERGENCY DEPT VISIT MOD MDM: CPT | Performed by: EMERGENCY MEDICINE

## 2021-06-30 PROCEDURE — 85025 COMPLETE CBC W/AUTO DIFF WBC: CPT | Performed by: EMERGENCY MEDICINE

## 2021-06-30 PROCEDURE — 84484 ASSAY OF TROPONIN QUANT: CPT | Performed by: EMERGENCY MEDICINE

## 2021-06-30 PROCEDURE — 80053 COMPREHEN METABOLIC PANEL: CPT | Performed by: EMERGENCY MEDICINE

## 2021-06-30 RX ORDER — AMLODIPINE BESYLATE 5 MG/1
5 TABLET ORAL ONCE
Status: DISCONTINUED | OUTPATIENT
Start: 2021-06-30 | End: 2021-06-30

## 2021-06-30 RX ORDER — AMLODIPINE BESYLATE 2.5 MG/1
2.5 TABLET ORAL ONCE
Status: COMPLETED | OUTPATIENT
Start: 2021-06-30 | End: 2021-06-30

## 2021-06-30 RX ADMIN — AMLODIPINE BESYLATE 2.5 MG: 2.5 TABLET ORAL at 15:18

## 2021-06-30 NOTE — TELEPHONE ENCOUNTER
Pt Augusto Muse that she was supposed to have an ablation done today, but they would not do it because her BP was severely high and they brought her to the ER  They ran tests and adjusted her meds, but she is wanting to have either [facility] or someone clinical please call her, because she has questions regarding her medication

## 2021-06-30 NOTE — ED NOTES
Loli Fair from [facility] called to advise p/u time for patient will be at  with [facility]  Will make primary RN aware       April Pattie Mcdaniel RN  06/30/21 [id number]

## 2021-06-30 NOTE — TELEPHONE ENCOUNTER
Pt here for procedure today with KW and BP too high to have procedure performed  Procedure cx'd and pt taken to ER  Pt's procedure would need to be R/S once BP under control

## 2021-06-30 NOTE — ED PROVIDER NOTES
History  Chief Complaint   Patient presents with    Hypertension     Pt reports at office to have ablasion done, states BP was too high so they sent her here  States unsure how high her BP was  Denies dizziness/HA/CP  Pt states she did not take her BP medication this am     78 y/o female presents today for high blood pressure  She was in the orthopedic office for an injection of her right knee and they found her blood pressure to be high so they sent her to the ER for evaluation  Patient has no symptoms  She states that she did not take her blood pressure medicine this morning because she was not sure if she was allowed with the procedure she was due to have this morning  Patient states it could also be because I am nervous  History provided by:  Patient  Hypertension  Timing:  Constant  Progression:  Unchanged  Time since last dose of antihypertensive:  1 day  Context comment:  See HPI  Relieved by:  None tried  Worsened by:  Nothing  Ineffective treatments:  None tried  Associated symptoms: no abdominal pain, no chest pain, no confusion, no dizziness, no fatigue, no fever, no headaches, no loss of consciousness, no shortness of breath and no weakness    Risk factors: no decongestant use, no family history of hypertension and no tobacco use        Prior to Admission Medications   Prescriptions Last Dose Informant Patient Reported? Taking?    Pirbuterol Acetate (MAXAIR AUTOHALER IN)   Yes No   Sig: Inhale one time by mouth every four hours as needed   acetaminophen (TYLENOL) 325 mg tablet   No No   Sig: Take 3 tablets (975 mg total) by mouth every 8 (eight) hours   albuterol (PROVENTIL HFA,VENTOLIN HFA) 90 mcg/act inhaler   Yes No   Sig: Inhale 2 puffs every 6 (six) hours as needed for wheezing   amLODIPine (NORVASC) 2 5 mg tablet   No No   Sig: Take 1 tablet (2 5 mg total) by mouth daily   aspirin 81 mg chewable tablet  Self Yes No   Sig: Chew 81 mg daily   bisacodyl (DULCOLAX) 5 mg EC tablet   Yes No Sig: Take 5 mg by mouth daily as needed for constipation   cholecalciferol (VITAMIN D3) 1,000 units tablet   No No   Sig: Take 1 tablet (1,000 Units total) by mouth daily   diclofenac sodium (VOLTAREN) 1 %   No No   Sig: Apply 2 g topically 4 (four) times a day   fluticasone (FLONASE) 50 mcg/act nasal spray   No No   Si spray into each nostril daily   lidocaine (LIDODERM) 5 %   No No   Sig: Apply 1 patch topically daily Remove & Discard patch within 12 hours or as directed by MD   multivitamin (THERAGRAN) TABS   Yes No   Sig: Take 1 tablet by mouth daily   tobramycin-dexamethasone (TOBRADEX) ophthalmic suspension   No No   Sig: Administer 1 drop to both eyes every 4 (four) hours while awake      Facility-Administered Medications: None       Past Medical History:   Diagnosis Date    Allergic     Anxiety     Arthritis     [facility]     HLD (hyperlipidemia)     Hypertension     Hyperthyroidism     Osteoporosis     Stroke ([facility] )     TIA (transient ischemic attack)        Past Surgical History:   Procedure Laterality Date    BREAST LUMPECTOMY Left     HYSTERECTOMY      KNEE SURGERY Right     ORIF TIBIA FRACTURE Right 2016    Procedure: OPEN REDUCTION W/ INTERNAL FIXATION (ORIF) TIBIA;  Surgeon: Talia Zamora MD;  Location: [facility];  Service:        Family History   Problem Relation Age of Onset    Coronary artery disease Family     Other Family         DJD     I have reviewed and agree with the history as documented      E-Cigarette/Vaping    E-Cigarette Use Never User      E-Cigarette/Vaping Substances    Nicotine No     THC No     CBD No     Flavoring No     Other No     Unknown No      Social History     Tobacco Use    Smoking status: Never Smoker    Smokeless tobacco: Never Used    Tobacco comment: Former smoker - As per [employer] Vaping Use: Never used   Substance Use Topics    Alcohol use: Not Currently     Comment: Alcohol intake:   Occasional  - As per Tha Roberto Drug use: Never       Review of Systems   Constitutional: Negative for chills, fatigue and fever  HENT: Negative for congestion, postnasal drip, sore throat and trouble swallowing  Eyes: Negative for visual disturbance  Respiratory: Negative for chest tightness and shortness of breath  Cardiovascular: Negative for chest pain  Gastrointestinal: Negative for abdominal pain  Genitourinary: Negative for dysuria  Musculoskeletal: Negative for back pain  Skin: Negative for rash  Allergic/Immunologic: Negative for immunocompromised state  Neurological: Negative for dizziness, loss of consciousness, weakness, light-headedness and headaches  Psychiatric/Behavioral: Negative for confusion  Physical Exam  Physical Exam  Vitals and nursing note reviewed  Constitutional:       Appearance: She is well-developed  HENT:      Head: Normocephalic and atraumatic  Mouth/Throat:      Mouth: Mucous membranes are moist       Pharynx: Uvula midline  Tonsils: No tonsillar exudate  Eyes:      Pupils: Pupils are equal, round, and reactive to light  Cardiovascular:      Rate and Rhythm: Normal rate and regular rhythm  Pulmonary:      Effort: Pulmonary effort is normal       Breath sounds: Normal breath sounds  Abdominal:      General: Bowel sounds are normal       Palpations: Abdomen is soft  Tenderness: There is no abdominal tenderness  There is no guarding or rebound  Musculoskeletal:         General: No tenderness or deformity  Cervical back: Normal range of motion and neck supple  Skin:     General: Skin is warm and dry  Capillary Refill: Capillary refill takes less than 2 seconds  Neurological:      General: No focal deficit present  Mental Status: She is alert and oriented to person, place, and time  Comments: Patient moving all extremities equally, no focal neuro deficits noted         Psychiatric:         Mood and Affect: Mood normal  Behavior: Behavior normal          Vital Signs  ED Triage Vitals [06/30/21 1158]   Temperature Pulse Respirations Blood Pressure SpO2   99 1 °F (37 3 °C) 75 18 (!) 202/107 97 %      Temp Source Heart Rate Source Patient Position - Orthostatic VS BP Location FiO2 (%)   Oral Monitor Lying Left arm --      Pain Score       No Pain           Vitals:    06/30/21 1158 06/30/21 1309 06/30/21 1518   BP: (!) 202/107 (!) 180/80 (!) 176/86   Pulse: 75  75   Patient Position - Orthostatic VS: Lying  Lying         Visual Acuity      ED Medications  Medications   amLODIPine (NORVASC) tablet 2 5 mg (2 5 mg Oral Given 6/30/21 1518)       Diagnostic Studies  Results Reviewed     Procedure Component Value Units Date/Time    [place] draw [[id number]] Collected: 06/30/21 1210    Lab Status: Final result Specimen: Blood from Arm, Right Updated: 06/30/21 1401    Narrative: The following orders were created for panel order [place] draw  Procedure                               Abnormality         Status                     ---------                               -----------         ------                     Carol Rob Top on QYOM[[id number]]                           Final result               Gold top on CPEP[[id number]]                                                              Please view results for these tests on the individual orders      Troponin I [[id number]]  (Normal) Collected: 06/30/21 1210    Lab Status: Final result Specimen: Blood from Arm, Right Updated: 06/30/21 1244     Troponin I <0 02 ng/mL     Comprehensive metabolic panel [[id number]]  (Abnormal) Collected: 06/30/21 1210    Lab Status: Final result Specimen: Blood from Arm, Right Updated: 06/30/21 1238     Sodium 139 mmol/L      Potassium 4 6 mmol/L      Chloride 104 mmol/L      CO2 25 mmol/L      ANION GAP 10 mmol/L      BUN 16 mg/dL      Creatinine 0 79 mg/dL      Glucose 98 mg/dL      Calcium 9 2 mg/dL      AST 28 U/L      ALT 37 U/L      Alkaline Phosphatase 173 U/L      Total Protein 7 9 g/dL      Albumin 3 5 g/dL      Total Bilirubin 0 33 mg/dL      eGFR 70 ml/min/1 73sq m     Narrative:      National Kidney Disease Foundation guidelines for Chronic Kidney Disease (CKD):     Stage 1 with normal or high GFR (GFR > 90 mL/min/1 73 square meters)    Stage 2 Mild CKD (GFR = 60-89 mL/min/1 73 square meters)    Stage 3A Moderate CKD (GFR = 45-59 mL/min/1 73 square meters)    Stage 3B Moderate CKD (GFR = 30-44 mL/min/1 73 square meters)    Stage 4 Severe CKD (GFR = 15-29 mL/min/1 73 square meters)    Stage 5 End Stage CKD (GFR <15 mL/min/1 73 square meters)  Note: GFR calculation is accurate only with a steady state creatinine    CBC and differential [[id number]] Collected: 06/30/21 1210    Lab Status: Final result Specimen: Blood from Arm, Right Updated: 06/30/21 1218     WBC 6 22 Thousand/uL      RBC 4 15 Million/uL      Hemoglobin 12 8 g/dL      Hematocrit 40 4 %      MCV 97 fL      MCH 30 8 pg      MCHC 31 7 g/dL      RDW 14 6 %      MPV 10 3 fL      Platelets 865 Thousands/uL      nRBC 0 /100 WBCs      Neutrophils Relative 70 %      Immat GRANS % 1 %      Lymphocytes Relative 19 %      Monocytes Relative 7 %      Eosinophils Relative 2 %      Basophils Relative 1 %      Neutrophils Absolute 4 42 Thousands/µL      Immature Grans Absolute 0 03 Thousand/uL      Lymphocytes Absolute 1 20 Thousands/µL      Monocytes Absolute 0 43 Thousand/µL      Eosinophils Absolute 0 10 Thousand/µL      Basophils Absolute 0 04 Thousands/µL                  No orders to display              Procedures  Procedures         ED Course                             SBIRT 22yo+      Most Recent Value   SBIRT (24 yo +)   In order to provide better care to our patients, we are screening all of our patients for alcohol and drug use  Would it be okay to ask you these screening questions?   Unable to answer at this time Filed at: 06/30/2021 1201                    MDM  Number of Diagnoses or Management Options  Elevated blood pressure reading: minor  Diagnosis management comments: Patient has a history of high blood pressure and did not take her blood pressure medicines this morning  She has no symptoms  Patient presents today to the ED due to elevated blood pressure without any associated symptoms  Denies Cp, SOB, difficulty urinating and any neurologic symptoms including but not limited to, headache, numbness, weakness or difficulty speaking  We discussed at length the purpose of the medical screening exam in the ED and that it can actually be more harmful to attempt to lower an elevated blood pressure in the Ed  Elevated blood pressure causes damage over a prolonged period of time, not usually acutely  With no abnormal physical exam findings, there is no indication for acute intervention here in the Ed  I recommend he/she continue to take his/her medications as directed, pay close attention to diet and fluid intake and f/u with PCP tomorrow to schedule a blood pressure check  I recommended that he/she take his/her blood pressure monitor into the office so the staff there can compare the numbers  Strict return to ED precautions reviewed with patient  Risk of Complications, Morbidity, and/or Mortality  Presenting problems: minimal  Diagnostic procedures: minimal  Management options: minimal    Patient Progress  Patient progress: stable      Disposition  Final diagnoses:   Elevated blood pressure reading     Time reflects when diagnosis was documented in both MDM as applicable and the Disposition within this note     Time User Action Codes Description Comment    6/30/2021  1:10 PM Christiano Koo Add [R03 0] Elevated blood pressure reading       ED Disposition     ED Disposition Condition Date/Time Comment    Discharge Stable Wed Jun 30, 2021  1:10 PM Eliceo Pichardo discharge to home/self care              Follow-up Information     Follow up With Specialties Details Why Contact Info VALDEMAR Conroy Nurse Practitioner, Family Medicine Schedule an appointment as soon as possible for a visit  As needed [address]  [phone]            Patient's Medications   Discharge Prescriptions    No medications on file     No discharge procedures on file      PDMP Review     None          ED Provider  Electronically Signed by           Ashanti Soria DO  06/30/21 7952

## 2021-06-30 NOTE — TELEPHONE ENCOUNTER
I spoke with pt, she is unable to come into the office due to lack of transportation  She needs to talk with you about a few things, and so I put her on your scheduled for a phone visit for [address] afternoon @ 3:30

## 2021-07-01 ENCOUNTER — TELEMEDICINE (OUTPATIENT)
Dept: FAMILY MEDICINE CLINIC | Facility: CLINIC | Age: 81
End: 2021-07-01
Payer: MEDICARE

## 2021-07-01 DIAGNOSIS — G89.29 CHRONIC PAIN OF RIGHT KNEE: ICD-10-CM

## 2021-07-01 DIAGNOSIS — I10 ESSENTIAL HYPERTENSION: Primary | ICD-10-CM

## 2021-07-01 DIAGNOSIS — M54.30 SCIATICA, UNSPECIFIED LATERALITY: ICD-10-CM

## 2021-07-01 DIAGNOSIS — M25.561 CHRONIC PAIN OF RIGHT KNEE: ICD-10-CM

## 2021-07-01 PROCEDURE — 99213 OFFICE O/P EST LOW 20 MIN: CPT | Performed by: NURSE PRACTITIONER

## 2021-07-01 RX ORDER — WEIGH SCALE
MISCELLANEOUS MISCELLANEOUS 2 TIMES DAILY
Qty: 1 EACH | Refills: 0 | Status: SHIPPED | OUTPATIENT
Start: 2021-07-01 | End: 2021-10-06

## 2021-07-01 RX ORDER — AMLODIPINE BESYLATE 2.5 MG/1
2.5 TABLET ORAL 2 TIMES DAILY
Qty: 180 TABLET | Refills: 1 | Status: SHIPPED | OUTPATIENT
Start: 2021-07-01

## 2021-07-01 NOTE — PATIENT INSTRUCTIONS
Knee Pain   WHAT YOU NEED TO KNOW:   What do I need to know about knee pain? Knee pain may start suddenly, or it may be a long-term problem  You may have pain on the side, front, or back of your knee  You may have knee stiffness and swelling  You may hear popping sounds or feel like your knee is giving way or locking up as you walk  You may feel pain when you sit, stand, walk, or climb up and down stairs  What increases my risk for knee pain? · Obesity    · A strain or tear in ligaments or tendons    · A leg fracture or knee dislocation    · Overuse of your knee    · Osteoarthritis, rheumatoid arthritis, or gout    · An infection, tumor, or cyst in your knee    · Shoes that are not supportive, or training on a hard surface    · Sports that involve jumping or pivoting on your knee    How is the cause of knee pain diagnosed? Your healthcare provider will examine your knee and ask about your symptoms  Tell your provider when the pain started and what you were doing at the time  Describe the pain, such as sharp, throbbing, or achy  Tell your provider about any knee injury or surgery you had  You may need any of the following:  · MRI, CT, or ultrasound  pictures may show an injury, fracture, or tumor  · Blood tests  may be used to check the level of inflammation in your blood  The tests may also show signs of infection  · Arthroscopy  is a procedure to look inside your knee joint with an arthroscope  An arthroscope is a flexible tube with a light and camera on the end  A knee arthroscopy is usually done to check for disease or damage inside your knee  These problems may be fixed during the procedure  How is knee pain treated? Treatment will depend on the cause of your pain  You may need any of the following:  · NSAIDs  help decrease swelling and pain or fever  This medicine is available with or without a doctor's order  NSAIDs can cause stomach bleeding or kidney problems in certain people   If you take blood thinner medicine, always ask your healthcare provider if NSAIDs are safe for you  Always read the medicine label and follow directions  · Acetaminophen  decreases pain and fever  It is available without a doctor's order  Ask how much to take and how often to take it  Follow directions  Read the labels of all other medicines you are using to see if they also contain acetaminophen, or ask your doctor or pharmacist  Acetaminophen can cause liver damage if not taken correctly  Do not use more than 4 grams (4,000 milligrams) total of acetaminophen in one day  · Prescription pain medicine  may be given  Ask your healthcare provider how to take this medicine safely  Some prescription pain medicines contain acetaminophen  Do not take other medicines that contain acetaminophen without talking to your healthcare provider  Too much acetaminophen may cause liver damage  Prescription pain medicine may cause constipation  Ask your healthcare provider how to prevent or treat constipation  · Steroid injections  may be given into your knee  Steroids reduce inflammation and pain  · Surgery  may be used for some injuries, such as to repair a torn ACL  What can I do to manage my symptoms? · Rest your knee so it can heal   Limit activities that increase your pain  Do low-impact exercises, such as walking or swimming  · Apply ice to help reduce swelling and pain  Use an ice pack, or put crushed ice in a plastic bag  Cover it with a towel before you apply it to your knee  Apply ice for 15 to 20 minutes every hour, or as directed  · Apply compression to help reduce swelling  Use a brace or bandage only as directed  · Elevate your knee to help decrease pain and swelling  Elevate your knee while you are sitting or lying down  Prop your leg on pillows to keep your knee above the level of your heart  · Prevent your knee from moving as directed  Your healthcare provider may put on a cast or splint   You may need to wear a leg brace to stabilize your knee  A leg brace can be adjusted to increase your range of motion as your knee heals  What can I do to prevent knee pain? · Maintain a healthy weight  Extra weight increases your risk for knee pain  Ask your healthcare provider how much you should weigh  He or she can help you create a safe weight loss plan if you need to lose weight  · Exercise or train properly  Use the correct equipment for sports  Wear shoes that provide good support  Check your posture often as you exercise, play sports, or train for an event  This can help prevent stress and strain on your knees  Rest between sessions so you do not overwork your knees  When should I seek immediate care? · Your pain is worse, even after treatment  · You cannot bend or straighten your leg completely  · The swelling around your knee does not go down even with treatment  · Your knee is painful and hot to the touch  When should I contact my healthcare provider? · You have questions or concerns about your condition or care  CARE AGREEMENT:   You have the right to help plan your care  Learn about your health condition and how it may be treated  Discuss treatment options with your healthcare providers to decide what care you want to receive  You always have the right to refuse treatment  The above information is an  only  It is not intended as medical advice for individual conditions or treatments  Talk to your doctor, nurse or pharmacist before following any medical regimen to see if it is safe and effective for you  © Copyright [address] Information is for End User's use only and may not be sold, redistributed or otherwise used for commercial purposes  All illustrations and images included in CareNotes® are the copyrighted property of [employer]  or [facility]  [employer]  Knee Pain   WHAT YOU NEED TO KNOW:   What do I need to know about knee pain?   Knee pain may start suddenly, or it may be a long-term problem  You may have pain on the side, front, or back of your knee  You may have knee stiffness and swelling  You may hear popping sounds or feel like your knee is giving way or locking up as you walk  You may feel pain when you sit, stand, walk, or climb up and down stairs  What increases my risk for knee pain? · Obesity    · A strain or tear in ligaments or tendons    · A leg fracture or knee dislocation    · Overuse of your knee    · Osteoarthritis, rheumatoid arthritis, or gout    · An infection, tumor, or cyst in your knee    · Shoes that are not supportive, or training on a hard surface    · Sports that involve jumping or pivoting on your knee    How is the cause of knee pain diagnosed? Your healthcare provider will examine your knee and ask about your symptoms  Tell your provider when the pain started and what you were doing at the time  Describe the pain, such as sharp, throbbing, or achy  Tell your provider about any knee injury or surgery you had  You may need any of the following:  · MRI, CT, or ultrasound  pictures may show an injury, fracture, or tumor  · Blood tests  may be used to check the level of inflammation in your blood  The tests may also show signs of infection  · Arthroscopy  is a procedure to look inside your knee joint with an arthroscope  An arthroscope is a flexible tube with a light and camera on the end  A knee arthroscopy is usually done to check for disease or damage inside your knee  These problems may be fixed during the procedure  How is knee pain treated? Treatment will depend on the cause of your pain  You may need any of the following:  · NSAIDs  help decrease swelling and pain or fever  This medicine is available with or without a doctor's order  NSAIDs can cause stomach bleeding or kidney problems in certain people   If you take blood thinner medicine, always ask your healthcare provider if NSAIDs are safe for you  Always read the medicine label and follow directions  · Acetaminophen  decreases pain and fever  It is available without a doctor's order  Ask how much to take and how often to take it  Follow directions  Read the labels of all other medicines you are using to see if they also contain acetaminophen, or ask your doctor or pharmacist  Acetaminophen can cause liver damage if not taken correctly  Do not use more than 4 grams (4,000 milligrams) total of acetaminophen in one day  · Prescription pain medicine  may be given  Ask your healthcare provider how to take this medicine safely  Some prescription pain medicines contain acetaminophen  Do not take other medicines that contain acetaminophen without talking to your healthcare provider  Too much acetaminophen may cause liver damage  Prescription pain medicine may cause constipation  Ask your healthcare provider how to prevent or treat constipation  · Steroid injections  may be given into your knee  Steroids reduce inflammation and pain  · Surgery  may be used for some injuries, such as to repair a torn ACL  What can I do to manage my symptoms? · Rest your knee so it can heal   Limit activities that increase your pain  Do low-impact exercises, such as walking or swimming  · Apply ice to help reduce swelling and pain  Use an ice pack, or put crushed ice in a plastic bag  Cover it with a towel before you apply it to your knee  Apply ice for 15 to 20 minutes every hour, or as directed  · Apply compression to help reduce swelling  Use a brace or bandage only as directed  · Elevate your knee to help decrease pain and swelling  Elevate your knee while you are sitting or lying down  Prop your leg on pillows to keep your knee above the level of your heart  · Prevent your knee from moving as directed  Your healthcare provider may put on a cast or splint  You may need to wear a leg brace to stabilize your knee   A leg brace can be adjusted to increase your range of motion as your knee heals  What can I do to prevent knee pain? · Maintain a healthy weight  Extra weight increases your risk for knee pain  Ask your healthcare provider how much you should weigh  He or she can help you create a safe weight loss plan if you need to lose weight  · Exercise or train properly  Use the correct equipment for sports  Wear shoes that provide good support  Check your posture often as you exercise, play sports, or train for an event  This can help prevent stress and strain on your knees  Rest between sessions so you do not overwork your knees  When should I seek immediate care? · Your pain is worse, even after treatment  · You cannot bend or straighten your leg completely  · The swelling around your knee does not go down even with treatment  · Your knee is painful and hot to the touch  When should I contact my healthcare provider? · You have questions or concerns about your condition or care  CARE AGREEMENT:   You have the right to help plan your care  Learn about your health condition and how it may be treated  Discuss treatment options with your healthcare providers to decide what care you want to receive  You always have the right to refuse treatment  The above information is an  only  It is not intended as medical advice for individual conditions or treatments  Talk to your doctor, nurse or pharmacist before following any medical regimen to see if it is safe and effective for you  © Copyright [address] Information is for End User's use only and may not be sold, redistributed or otherwise used for commercial purposes   All illustrations and images included in CareNotes® are the copyrighted property of [employer]  or [address] [place] [address]

## 2021-07-01 NOTE — PROGRESS NOTES
Virtual Brief Visit    Assessment/Plan:    Problem List Items Addressed This Visit        Cardiovascular and Mediastinum    Essential hypertension - Primary     Patient does not have a BP monitor at home  She indicates that her BP was elevated at the [facility]  We will order a bp cuff for home monitoring and increase her norvasc 2 5mg p o  b i d  Relevant Medications    amLODIPine (NORVASC) 2 5 mg tablet    Blood Pressure Monitoring (Blood Pressure Monitor/S Cuff) MISC       Nervous and Auditory    Sciatica    Relevant Orders    Ambulatory Referral to [address] [place] [address] Aramis Braun       Other    Chronic pain of right knee          BMI Counseling: There is no height or weight on file to calculate BMI  The BMI is above normal  Nutrition recommendations include decreasing portion sizes, encouraging healthy choices of fruits and vegetables, decreasing fast food intake, consuming healthier snacks, limiting drinks that contain sugar, moderation in carbohydrate intake, increasing intake of lean protein, reducing intake of saturated and trans fat and reducing intake of cholesterol  Exercise recommendations include vigorous physical activity 75 minutes/week, exercising 3-5 times per week, obtaining a gym membership and strength training exercises  No pharmacotherapy was ordered  Falls Plan of Care: balance, strength, and gait training instructions were provided and referral to physical therapy  Medications that increase falls were reviewed  Vitamin D supplementation was recommended  Home safety education provided            Reason for visit is   Chief Complaint   Patient presents with    Virtual Brief Visit        Encounter provider No Reyes, [facility]    Provider located at [address] [place] [id number]  [phone]    Recent Visits  Date Type Provider Dept   06/30/21 Telephone No Reyes, [facility] [address] [address] recent visits within past 7 days and meeting all other requirements  Today's Visits  Date Type Provider Dept   07/01/21 Telemedicine MARY Marx 112 today's visits and meeting all other requirements  Future Appointments  No visits were found meeting these conditions  Showing future appointments within next 150 days and meeting all other requirements       After connecting through [employer] and patient was informed that this is a secure, HIPAA-compliant platform  She agrees to proceed  , the patient was identified by name and date of birth  Chepe Hagen was informed that this is a telemedicine visit and that the visit is being conducted through [address] Now and patient was informed that this is a secure, HIPAA-compliant platform  She agrees to proceed     My office door was closed  No one else was in the room  She acknowledged consent and understanding of privacy and security of the platform  The patient has agreed to participate and understands she can discontinue the visit at any time  Patient is aware this is a billable service  Opal Bocanegra is a 79 y o  female virtual phone follow-up for evaluation of bp, recent ER visit  Patient is a 79year old female present for requested telephone visit to discuss her blood pressure  She was in the orthopedic office 6/30/2021 for an injection of her right knee and they found her blood pressure to be high so they sent her to the ER for evaluation  Patient was not symptomatic  She states that she did not take her blood pressure medicine that morning because she was not sure if she was allowed to the day of the procedure  Patient states,  "it could also be because she was nervous about the procedure " We will place her on Norvasc 2 5mg p o  b i d  and ordered for a BP cuff  She also requests home care services for PT, medical transport and assistance at home         Past Medical History:   Diagnosis Date    Allergic     Anxiety     Arthritis     [facility] [address] [facility]     HLD (hyperlipidemia)     Hypertension     Hyperthyroidism     Osteoporosis     Stroke ([facility] [address] [facility]     TIA (transient ischemic attack)        Past Surgical History:   Procedure Laterality Date    BREAST LUMPECTOMY Left     HYSTERECTOMY      KNEE SURGERY Right     ORIF TIBIA FRACTURE Right 12/8/2016    Procedure: OPEN REDUCTION W/ INTERNAL FIXATION (ORIF) TIBIA;  Surgeon: Esther Yen MD;  [facility];  Service:        Current Outpatient Medications   Medication Sig Dispense Refill    acetaminophen (TYLENOL) 325 mg tablet Take 3 tablets (975 mg total) by mouth every 8 (eight) hours  0    albuterol (PROVENTIL HFA,VENTOLIN HFA) 90 mcg/act inhaler Inhale 2 puffs every 6 (six) hours as needed for wheezing      amLODIPine (NORVASC) 2 5 mg tablet Take 1 tablet (2 5 mg total) by mouth 2 (two) times a day 180 tablet 1    aspirin 81 mg chewable tablet Chew 81 mg daily      bisacodyl (DULCOLAX) 5 mg EC tablet Take 5 mg by mouth daily as needed for constipation      Blood Pressure Monitoring (Blood Pressure Monitor/S Cuff) MISC Use 2 (two) times a day 1 each 0    cholecalciferol (VITAMIN D3) 1,000 units tablet Take 1 tablet (1,000 Units total) by mouth daily 90 tablet 2    diclofenac sodium (VOLTAREN) 1 % Apply 2 g topically 4 (four) times a day  0    fluticasone (FLONASE) 50 mcg/act nasal spray 1 spray into each nostril daily  0    lidocaine (LIDODERM) 5 % Apply 1 patch topically daily Remove & Discard patch within 12 hours or as directed by MD 30 patch 1    multivitamin (THERAGRAN) TABS Take 1 tablet by mouth daily      Pirbuterol Acetate (MAXAIR AUTOHALER IN) Inhale one time by mouth every four hours as needed      tobramycin-dexamethasone (TOBRADEX) ophthalmic suspension Administer 1 drop to both eyes every 4 (four) hours while awake 5 mL 1     No current facility-administered medications for this visit       Facility-Administered Medications Ordered in Other Visits   Medication Dose Route Frequency Provider Last Rate Last Admin    bupivacaine (PF) (MARCAINE) 0 25 % injection 5 mL  5 mL Perineural Once Deisy Heckler, DO        lidocaine (PF) (XYLOCAINE-MPF) 2 % injection 10 mL  10 mL Perineural Once Deisy Heckler, DO        methylPREDNISolone acetate (DEPO-MEDROL) injection 40 mg  40 mg Perineural Once Deisy Heckler, DO        sodium chloride (PF) 0 9 % injection 10 mL  10 mL Infiltration Once Deisy Heckler, DO            Allergies   Allergen Reactions    Alendronate     Penicillins Swelling    Raloxifene     Risedronate Sodium     Sulfa Antibiotics Swelling       Review of Systems   Constitutional: Positive for activity change  Negative for appetite change, chills, fatigue and fever  Difficulty ambulating due to right knee pain and sciatica  She uses a walker to ambulate  HENT: Negative for congestion, ear pain, postnasal drip, rhinorrhea, sinus pain and sore throat  Eyes: Negative for pain, redness and visual disturbance  Respiratory: Negative for cough, chest tightness and shortness of breath  Cardiovascular: Negative for chest pain and palpitations  Gastrointestinal: Negative for abdominal distention, abdominal pain, constipation, diarrhea, nausea and vomiting  Endocrine: Negative  Genitourinary: Negative for decreased urine volume, difficulty urinating, dysuria, flank pain, frequency, hematuria and urgency  Musculoskeletal: Positive for arthralgias, gait problem and myalgias  Patient reports right leg sciatica and right knee pain  Pain is intermittent in nature scales at 5/10  Indicates varies in nature  Skin: Negative  Allergic/Immunologic: Negative  Neurological: Negative for dizziness, weakness, light-headedness, numbness and headaches  Hematological: Negative  Psychiatric/Behavioral: Negative  There were no vitals filed for this visit        I spent 20 minutes minutes directly with the patient during this visit    [facility] acknowledges that she has consented to an online visit or consultation  She understands that the online visit is based solely on information provided by her, and that, in the absence of a face-to-face physical evaluation by the physician, the diagnosis she receives is both limited and provisional in terms of accuracy and completeness  This is not intended to replace a full medical face-to-face evaluation by the physician  Fely Sotelo understands and accepts these terms

## 2021-07-01 NOTE — ASSESSMENT & PLAN NOTE
Patient does not have a BP monitor at home  She indicates that her BP was elevated at the [facility] office  We will order a bp cuff for home monitoring and increase her norvasc 2 5mg p o  b i d

## 2021-07-02 LAB
ATRIAL RATE: 72 BPM
P AXIS: -36 DEGREES
PR INTERVAL: 110 MS
QRS AXIS: 8 DEGREES
QRSD INTERVAL: 70 MS
QT INTERVAL: 404 MS
QTC INTERVAL: 433 MS
T WAVE AXIS: 55 DEGREES
VENTRICULAR RATE: 69 BPM

## 2021-07-02 PROCEDURE — 93010 ELECTROCARDIOGRAM REPORT: CPT | Performed by: INTERNAL MEDICINE

## 2021-07-03 ENCOUNTER — DOCUMENTATION (OUTPATIENT)
Dept: SOCIAL WORK | Facility: HOSPITAL | Age: 81
End: 2021-07-03

## 2021-07-03 NOTE — PROGRESS NOTES
1. Keep appointment for follow up with primary care provider  2. If symptoms are getting worse over time or you have any questions, call the clinic number - it's answered 24/7      Patient Education   What is High Blood Pressure?  High blood pressure (also called hypertension) is known as the  silent killer.  This is because most of the time it doesn t cause symptoms. In fact, many people don t know they have it until other problems develop. In most cases, high blood pressure often requires lifelong treatment.  Understanding blood pressure  The circulatory system is made up of the heart and blood vessels that carry blood through the body. Your heart is the pump for this system. With each heartbeat (contraction), the heart sends blood out through large blood vessels called arteries. Blood pressure is a measure of how hard the moving blood pushes against the walls of the arteries.  High blood pressure can harm your health  In a healthy blood vessel, the blood moves smoothly through the vessel and puts normal pressure on the vessel walls.  High blood pressure occurs when blood pushes too hard against artery walls. This causes damage to the artery walls and then the formation of scar tissue as it heals. This makes the arteries stiff and weak. Plaque sticks to the scarred tissue narrowing and hardening the arteries. High blood pressure also causes your heart to work harder to get blood out to the body. High blood pressure raises your risk of heart attack, also known as acute myocardial infarction, or AMI, heart failure, and stroke. It can also lead to kidney disease, and blindness. In general, if you have high blood pressure, keeping your blood pressure below 130/80 mmHg may help prevent these problems. Your healthcare provider may prescribe medicine to help control blood pressure if lifestyle changes are not enough.  It's important to know your blood pressure numbers. Blood pressure measurements are given as 2 numbers.  Admission Report at [facility]  [facility] admitted your patient to [address] [place] [address] service with the following disciplines:      PT  Response needed, please respond via tiger text vs EPIC vs work phone [phone]  Primary focus of home health care MUSCULOSKELETAL  Patient stated goals of care to bend down to pick things off of the ground  to dec R knee pain and to move with more strength  Anticipated visit pattern 1WK2 AND 2WK3   Significant clinical findings BP at rest 130/66  Request for additional disciplines home OT and MSW    Potential barriers to goal achievement dec cognition  inability to lexit and leave home at this time  Thank you for allowing us to participate in the care of your patient        Amy Tiwari, PT Systolic blood pressure is the upper number. This is the pressure when the heart contracts. Diastolic blood pressure is the lower number. This is the pressure when the heart relaxes between beats.  Blood pressure is categorized as normal, elevated, or stage 1 or stage 2 high blood pressure:    Normal blood pressure is systolic of less than 120 and diastolic of less than 80 (120/80)    Elevated blood pressure is systolic of 120 to 129 and diastolic less than 80    Stage 1 high blood pressure is systolic is 130 to 139 or diastolic between 80 to 89    Stage 2 high blood pressure is when systolic is 140 or higher or the diastolic is 90 or higher  High blood pressure is diagnosed when multiple, separate readings show blood pressure above 130/80 mmHg. Talk with your healthcare provider if you have questions or concerns about your blood pressure readings.  Measuring blood pressure  An example of a blood pressure measurement is 120/70 (120 over 70). The top number is the pressure of blood against the artery walls during a heartbeat (systolic). The bottom number is the pressure of blood against artery walls between heartbeats (diastolic). Talk with your healthcare provider to find out what your blood pressure goals should be.   Controlling blood pressure  If your blood pressure is too high, work with your doctor on a plan for lowering it. Below are steps you can take that will help lower your blood pressure.    Choose heart-healthy foods. Eating healthier meals helps you control your blood pressure. Ask your doctor about the DASH eating plan. This plan helps reduce blood pressure by limiting the amount of sodium (salt) you have in your diet. DASH also encourages eating plenty of fruits and vegetables, low-fat or non-fat dairy, whole-grains, and foods high in fiber, and low in fat. This also provides an enhanced amount of potassium which can also help lower blood pressure.    Reduce sodium. Reducing sodium in your diet  "reduces fluid retention. Fluid retention caused by too much salt increases blood volume and blood pressure. The American Heart Association s (AHA) \"ideal\" sodium intake recommendation is 1,500 milligrams per day.  However, since American's eat so much salt, the AHA says a positive change can occur by cutting back to even 2,400 milligrams of sodium a day.    Maintain a healthy weight. Being overweight makes you more likely to have high blood pressure. Losing excess weight helps lower blood pressure.    Exercise regularly. Daily exercise helps your heart and blood vessels work better and stay healthier. It can help lower your blood pressure.    Stop smoking. Smoking increases blood pressure and damages blood vessels.    Limit alcohol. Drinking too much alcohol can raise blood pressure. Men should have no more than 2 drinks a day. Women should have no more than 1. (A drink is equal to 1 beer, or a small glass of wine, or a shot of liquor.)    Control stress. Stress makes your heart work harder and beat faster. Controlling stress helps you control your blood pressure.  Facts about high blood pressure    Feeling OK does not mean that blood pressure is under control. Likewise, feeling bad doesn t mean it s out of control. The only way to know for sure is to check your pressure regularly.    Medicine is only one part of controlling high blood pressure. You also need to manage your weight, get regular exercise, and adjust your eating habits.    High blood pressure is usually a lifelong problem. But it can be controlled with healthy lifestyle changes and medicine.    Hypertension is not the same as stress. Although stress may be a factor in high blood pressure, it s only one part of the story.    Blood pressure medicines need to be taken every day. Stopping suddenly may cause a dangerous increase in pressure.  Date Last Reviewed: 4/27/2016 2000-2019 [employer]. [address] [facility], [place] [address]. All " rights reserved. This information is not intended as a substitute for professional medical care. Always follow your healthcare professional's instructions.

## 2021-07-05 NOTE — PROGRESS NOTES
Tracy Hou,  Thank you for evaluating and setting up services  If she needs additional disciplines for home OT and MSW please organize    [address]

## 2021-07-06 NOTE — PROGRESS NOTES
Alexx Hathaway thank you  I will place the order for home OT and MSW and fax for you to sign      Thank you,  Ran SEXTONT

## 2021-07-09 ENCOUNTER — TELEPHONE (OUTPATIENT)
Dept: FAMILY MEDICINE CLINIC | Facility: CLINIC | Age: 81
End: 2021-07-09

## 2021-07-13 ENCOUNTER — TELEPHONE (OUTPATIENT)
Dept: FAMILY MEDICINE CLINIC | Facility: CLINIC | Age: 81
End: 2021-07-13

## 2021-07-13 NOTE — TELEPHONE ENCOUNTER
I do not know, she has home care, home pt/ot, but has not come in the office for evaluation  We can only go by those modalities reports

## 2021-07-13 NOTE — TELEPHONE ENCOUNTER
Lala a physical therapist from [facility] visiting nurses called she wants to know if there are any restrictions for this patient ? Also can she do steps  --patient said no steps but the patient has a lot of steps and they feel she should be doing them?   Please advise  [phone]  Thank you

## 2021-07-13 NOTE — TELEPHONE ENCOUNTER
Left message for Lala and let her know that if is not known for sure whether she can do stairs as she hasn't been evaluated in office for sometime  Informed to call the office back if she has any further questions

## 2021-07-13 NOTE — TELEPHONE ENCOUNTER
Please advise on whether this patient has any current physical restrictions such as stairs, etc  Thanks!

## 2021-07-14 ENCOUNTER — TELEPHONE (OUTPATIENT)
Dept: PAIN MEDICINE | Facility: MEDICAL CENTER | Age: 81
End: 2021-07-14

## 2021-07-14 NOTE — TELEPHONE ENCOUNTER
Home Phycial Therapist calling for clarity on what patient can and cannot do, per patient no steps?     Please advise    [phone]

## 2021-07-16 ENCOUNTER — TELEPHONE (OUTPATIENT)
Dept: OBGYN CLINIC | Facility: HOSPITAL | Age: 81
End: 2021-07-16

## 2021-07-16 NOTE — TELEPHONE ENCOUNTER
Patient call to see if Dr Siva Salcido makes house calls    Advised her to check with her insurance company

## 2021-07-19 ENCOUNTER — TELEPHONE (OUTPATIENT)
Dept: PAIN MEDICINE | Facility: MEDICAL CENTER | Age: 81
End: 2021-07-19

## 2021-07-19 NOTE — TELEPHONE ENCOUNTER
Patient calling with update on her Blood Pressures so she may schedule her procedure       Please advise    Thank you

## 2021-07-20 NOTE — TELEPHONE ENCOUNTER
Pt was scheduled for Right Genicular Nerve Block on 6/30 but was cancelled due to elevated BP  Was sent to ER and followed up with PCP  Norvasc increased  Has been keeping track of BP and would like to reschedule injection  7/12 120/70  7/15 120/80  7/19 132/78  Ok to reschedule?

## 2021-07-21 NOTE — TELEPHONE ENCOUNTER
Rescheduled pt for Rt geniculate nerve block for 8/30/21  Went over pre-procedure instructions below:  Nothing to eat or drink 1 hr prior to procedure  Need to arrange transportation  Proper clothing for procedure  If ill or placed on antibiotics please call to reschedule  Covid/travel/ and vaccine instructions

## 2021-07-22 ENCOUNTER — TELEPHONE (OUTPATIENT)
Dept: FAMILY MEDICINE CLINIC | Facility: CLINIC | Age: 81
End: 2021-07-22

## 2021-07-22 NOTE — TELEPHONE ENCOUNTER
Pt called - she is currently taking amlopipine 2 5mg  Pt states she is only taking 1 daily and her BP has been 120/70, 120/80, 132/78  Should she continue this medication at this dose?

## 2021-07-29 ENCOUNTER — TELEPHONE (OUTPATIENT)
Dept: FAMILY MEDICINE CLINIC | Facility: CLINIC | Age: 81
End: 2021-07-29

## 2021-07-29 NOTE — TELEPHONE ENCOUNTER
Keny Jackson called to discuss symptoms  BP is stable  She has been instructed if she has speech changes, she should be evaluated in the emergency room  She indicates that she wasn't feeling right  She has been taking her medication 2 x a day for 1 week and instructed to go back to her single dose if she is not feeling well

## 2021-07-29 NOTE — TELEPHONE ENCOUNTER
[facility] called - pt is taking the dose on her bottle of Amlodipine which is (1) tablet 2x daily - pt's speech has been off for 2 days and BP number today is 104/60 - please advise and call Dorota Negron [phone]

## 2021-08-06 ENCOUNTER — TELEPHONE (OUTPATIENT)
Dept: FAMILY MEDICINE CLINIC | Facility: CLINIC | Age: 81
End: 2021-08-06

## 2021-08-06 NOTE — TELEPHONE ENCOUNTER
Patient called stating she is doing well with PT and today her BP was 127/78  She is asking about her BP medication and if she should take them? Please advise

## 2021-08-13 ENCOUNTER — TELEPHONE (OUTPATIENT)
Dept: FAMILY MEDICINE CLINIC | Facility: CLINIC | Age: 81
End: 2021-08-13

## 2021-08-13 NOTE — TELEPHONE ENCOUNTER
Lala from [facility] called stating patient is receiving home services for PT for one more week and is sending orders to be signed  Just wanted to inform us and stated if you have any questions you may call her

## 2021-08-30 ENCOUNTER — HOSPITAL ENCOUNTER (OUTPATIENT)
Dept: RADIOLOGY | Facility: CLINIC | Age: 81
Discharge: HOME | End: 2021-08-30
Attending: PHYSICAL MEDICINE & REHABILITATION | Admitting: PHYSICAL MEDICINE & REHABILITATION
Payer: MEDICARE

## 2021-08-30 ENCOUNTER — TELEPHONE (OUTPATIENT)
Dept: RADIOLOGY | Facility: CLINIC | Age: 81
End: 2021-08-30

## 2021-08-30 VITALS
TEMPERATURE: 97.8 F | DIASTOLIC BLOOD PRESSURE: 81 MMHG | OXYGEN SATURATION: 98 % | HEART RATE: 76 BPM | RESPIRATION RATE: 18 BRPM | SYSTOLIC BLOOD PRESSURE: 154 MMHG

## 2021-08-30 DIAGNOSIS — M17.11 ARTHRITIS OF KNEE, RIGHT: ICD-10-CM

## 2021-08-30 PROCEDURE — 64624 DSTRJ NULYT AGT GNCLR NRV: CPT | Performed by: PHYSICAL MEDICINE & REHABILITATION

## 2021-08-30 RX ORDER — LIDOCAINE HYDROCHLORIDE 10 MG/ML
10 INJECTION, SOLUTION EPIDURAL; INFILTRATION; INTRACAUDAL; PERINEURAL ONCE
Status: COMPLETED | OUTPATIENT
Start: 2021-08-30 | End: 2021-08-30

## 2021-08-30 RX ORDER — BUPIVACAINE HCL/PF 2.5 MG/ML
5 VIAL (ML) INJECTION ONCE
Status: COMPLETED | OUTPATIENT
Start: 2021-08-30 | End: 2021-08-30

## 2021-08-30 RX ORDER — METHYLPREDNISOLONE ACETATE 40 MG/ML
40 INJECTION, SUSPENSION INTRA-ARTICULAR; INTRALESIONAL; INTRAMUSCULAR; PARENTERAL; SOFT TISSUE ONCE
Status: COMPLETED | OUTPATIENT
Start: 2021-08-30 | End: 2021-08-30

## 2021-08-30 RX ADMIN — LIDOCAINE HYDROCHLORIDE 10 ML: 10 INJECTION, SOLUTION EPIDURAL; INFILTRATION; INTRACAUDAL; PERINEURAL at 11:54

## 2021-08-30 RX ADMIN — METHYLPREDNISOLONE ACETATE 40 MG: 40 INJECTION, SUSPENSION INTRA-ARTICULAR; INTRALESIONAL; INTRAMUSCULAR; PARENTERAL; SOFT TISSUE at 12:08

## 2021-08-30 RX ADMIN — BUPIVACAINE HYDROCHLORIDE 3 ML: 2.5 INJECTION, SOLUTION EPIDURAL; INFILTRATION; INTRACAUDAL at 12:08

## 2021-08-30 NOTE — H&P
History of Present Illness:  The patient is a 79 y o  female who presents with complaints of right knee pain    Patient Active Problem List   Diagnosis    Knee injury    HLD (hyperlipidemia)    Antibiotic-associated diarrhea    Fall    Traumatic hematoma of forehead    Closed fracture of proximal end of right humerus    Closed fracture of nasal bone    Age-related osteoporosis with current pathological fracture    Vitamin D deficiency    Stroke ([facility] [address] [facility]    Mild cognitive impairment    Essential hypertension    Anxiety    Fx humeral neck, left, closed, initial encounter    Elevated liver enzymes    Encounter for support and coordination of transition of care    Chronic pain of right knee    Osteoarthritis of right knee    Sciatica       Past Medical History:   Diagnosis Date    Allergic     Anxiety     Arthritis     Cancer ([facility] [address] [facility]     HLD (hyperlipidemia)     Hypertension     Hyperthyroidism     Osteoporosis     Stroke ([facility] [address] [facility]     TIA (transient ischemic attack)        Past Surgical History:   Procedure Laterality Date    BREAST LUMPECTOMY Left     HYSTERECTOMY      KNEE SURGERY Right     ORIF TIBIA FRACTURE Right 12/8/2016    Procedure: OPEN REDUCTION W/ INTERNAL FIXATION (ORIF) TIBIA;  Surgeon: Sydni Perkins MD;  [facility];  Service:          Current Outpatient Medications:     acetaminophen (TYLENOL) 325 mg tablet, Take 3 tablets (975 mg total) by mouth every 8 (eight) hours, Disp:  , Rfl: 0    albuterol (PROVENTIL HFA,VENTOLIN HFA) 90 mcg/act inhaler, Inhale 2 puffs every 6 (six) hours as needed for wheezing, Disp: , Rfl:     amLODIPine (NORVASC) 2 5 mg tablet, Take 1 tablet (2 5 mg total) by mouth 2 (two) times a day, Disp: 180 tablet, Rfl: 1    aspirin 81 mg chewable tablet, Chew 81 mg daily, Disp: , Rfl:     bisacodyl (DULCOLAX) 5 mg EC tablet, Take 5 mg by mouth daily as needed for constipation, Disp: , Rfl:     Blood Pressure Monitoring (Blood Pressure Monitor/S Cuff) MISC, Use 2 (two) times a day, Disp: 1 each, Rfl: 0    cholecalciferol (VITAMIN D3) 1,000 units tablet, Take 1 tablet (1,000 Units total) by mouth daily, Disp: 90 tablet, Rfl: 2    diclofenac sodium (VOLTAREN) 1 %, Apply 2 g topically 4 (four) times a day, Disp:  , Rfl: 0    fluticasone (FLONASE) 50 mcg/act nasal spray, 1 spray into each nostril daily, Disp:  , Rfl: 0    lidocaine (LIDODERM) 5 %, Apply 1 patch topically daily Remove & Discard patch within 12 hours or as directed by MD, Disp: 30 patch, Rfl: 1    multivitamin (THERAGRAN) TABS, Take 1 tablet by mouth daily, Disp: , Rfl:     Pirbuterol Acetate (MAXAIR AUTOHALER IN), Inhale one time by mouth every four hours as needed, Disp: , Rfl:     tobramycin-dexamethasone (TOBRADEX) ophthalmic suspension, Administer 1 drop to both eyes every 4 (four) hours while awake, Disp: 5 mL, Rfl: 1    Current Facility-Administered Medications:     bupivacaine (PF) (MARCAINE) 0 25 % injection 5 mL, 5 mL, Perineural, Once, Luciano Balder, DO    lidocaine (PF) (XYLOCAINE-MPF) 1 % injection 10 mL, 10 mL, Infiltration, Once, Luciano Balder, DO    methylPREDNISolone acetate (DEPO-MEDROL) injection 40 mg, 40 mg, Perineural, Once, Luciano Balder, DO    Allergies   Allergen Reactions    Alendronate     Penicillins Swelling    Raloxifene     Risedronate Sodium     Sulfa Antibiotics Swelling       Physical Exam: There were no vitals filed for this visit  General: Awake, Alert, Oriented x 3, Mood and affect appropriate  Respiratory: Respirations even and unlabored  Cardiovascular: Peripheral pulses intact; no edema  Musculoskeletal Exam:  Tenderness to palpation right lateral and medial knee joint line    ASA Score: 2         Assessment:   1   Arthritis of knee, right        Plan: Rt Geniculte Nerve Block RFA

## 2021-08-30 NOTE — DISCHARGE INSTR - LAB

## 2021-08-31 NOTE — TELEPHONE ENCOUNTER
FYI-    S/w pt  Pt states " I don't feel too bad " Pt denies s/s of infection and or sunburn like sensation  Pt advised it takes 2 weeks to notice a difference and 4-6 weeks for the full effect  Pt scheduled for a follow up appt on 10/6 with 2:15 arrival time

## 2021-10-06 ENCOUNTER — OFFICE VISIT (OUTPATIENT)
Dept: PAIN MEDICINE | Facility: CLINIC | Age: 81
End: 2021-10-06
Payer: MEDICARE

## 2021-10-06 VITALS — DIASTOLIC BLOOD PRESSURE: 72 MMHG | HEART RATE: 83 BPM | SYSTOLIC BLOOD PRESSURE: 138 MMHG

## 2021-10-06 DIAGNOSIS — G89.4 CHRONIC PAIN SYNDROME: ICD-10-CM

## 2021-10-06 DIAGNOSIS — M17.11 ARTHRITIS OF KNEE, RIGHT: Primary | ICD-10-CM

## 2021-10-06 PROCEDURE — 99214 OFFICE O/P EST MOD 30 MIN: CPT | Performed by: PHYSICAL MEDICINE & REHABILITATION

## 2021-10-07 ENCOUNTER — TELEPHONE (OUTPATIENT)
Dept: PAIN MEDICINE | Facility: CLINIC | Age: 81
End: 2021-10-07

## 2021-10-11 ENCOUNTER — TELEPHONE (OUTPATIENT)
Dept: FAMILY MEDICINE CLINIC | Facility: CLINIC | Age: 81
End: 2021-10-11

## 2021-10-13 ENCOUNTER — TELEPHONE (OUTPATIENT)
Dept: FAMILY MEDICINE CLINIC | Facility: CLINIC | Age: 81
End: 2021-10-13

## 2021-10-19 ENCOUNTER — TELEPHONE (OUTPATIENT)
Dept: FAMILY MEDICINE CLINIC | Facility: CLINIC | Age: 81
End: 2021-10-19

## 2021-10-27 ENCOUNTER — TELEPHONE (OUTPATIENT)
Dept: PAIN MEDICINE | Facility: MEDICAL CENTER | Age: 81
End: 2021-10-27

## 2021-10-27 NOTE — TELEPHONE ENCOUNTER
Pt called stating that she would like to know what is next for her care    Pt states she is having a lot of pain     Pt can be reached at [phone]

## 2021-10-27 NOTE — TELEPHONE ENCOUNTER
FYI-    S/w pt  Pt had a RT genicular RFA on 10/6 so is 3 weeks out  Pt is having some relief and was advised it takes 4-6 weeks for the full pain relieving effects of the procedure  Pt verbalized understanding  Pt is currently taking Tylenol and Voltaren gel which help  Pt did not have a f/u appt scheduled  Appt scheduled for 11/23 with a 1:15 arrival time  Pt was offered an OV on 11/8 but declined

## 2021-10-29 ENCOUNTER — TELEPHONE (OUTPATIENT)
Dept: FAMILY MEDICINE CLINIC | Facility: CLINIC | Age: 81
End: 2021-10-29

## 2021-11-11 ENCOUNTER — TELEPHONE (OUTPATIENT)
Dept: PAIN MEDICINE | Facility: CLINIC | Age: 81
End: 2021-11-11

## 2021-11-12 ENCOUNTER — TELEPHONE (OUTPATIENT)
Dept: PAIN MEDICINE | Facility: CLINIC | Age: 81
End: 2021-11-12

## 2021-11-22 ENCOUNTER — TELEPHONE (OUTPATIENT)
Dept: PAIN MEDICINE | Facility: CLINIC | Age: 81
End: 2021-11-22

## 2021-11-23 ENCOUNTER — OFFICE VISIT (OUTPATIENT)
Dept: PAIN MEDICINE | Facility: CLINIC | Age: 81
End: 2021-11-23
Payer: MEDICARE

## 2021-11-23 VITALS — DIASTOLIC BLOOD PRESSURE: 78 MMHG | SYSTOLIC BLOOD PRESSURE: 140 MMHG | HEART RATE: 64 BPM
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
## 2021-11-23 DIAGNOSIS — M17.11 ARTHRITIS OF KNEE, RIGHT: Primary | ICD-10-CM

## 2021-11-23 DIAGNOSIS — S89.91XD INJURY OF RIGHT KNEE, SUBSEQUENT ENCOUNTER: ICD-10-CM

## 2021-11-23 PROCEDURE — 99214 OFFICE O/P EST MOD 30 MIN: CPT | Performed by: PHYSICAL MEDICINE & REHABILITATION

## 2021-11-29 ENCOUNTER — TELEPHONE (OUTPATIENT)
Dept: PAIN MEDICINE | Facility: CLINIC | Age: 81
End: 2021-11-29

## 2021-12-20 ENCOUNTER — TELEPHONE (OUTPATIENT)
Dept: PAIN MEDICINE | Facility: CLINIC | Age: 81
End: 2021-12-20

## 2021-12-20 NOTE — TELEPHONE ENCOUNTER
Please call pt to schedule a appointment for lift to pick her up and take home  Pt has appointment for 12/29/21      Pt # [phone]

## 2021-12-22 NOTE — TELEPHONE ENCOUNTER
Patient does not meet LYFT requirements  She is aware and will reach out to the senior program for assistance with transportation

## 2021-12-22 NOTE — TELEPHONE ENCOUNTER
Pt called inquiring about the LYFT time for her appointment on the 12/29/2021  Please be advised thank you    Pt can be reached @ [phone]

## 2021-12-29 NOTE — TELEPHONE ENCOUNTER
Pt called in to schedule a procedure   Please be advised thank you    Pt can be reached @ [phone]

## 2022-01-04 NOTE — TELEPHONE ENCOUNTER
Pt called in to to reschedule her procedure no transportation  Please be advised thank you    Pt can be reached @ [phone]

## 2022-01-05 ENCOUNTER — TELEPHONE (OUTPATIENT)
Dept: PAIN MEDICINE | Facility: CLINIC | Age: 82
End: 2022-01-05

## 2022-01-05 NOTE — TELEPHONE ENCOUNTER
[facility]  [phone] etx 2  Dr [facility] will be faxing over a form for Dr April Gallego to sign and fax back to them   This form is for OT,please look out for this form     Fax: [phone]

## 2022-01-07 NOTE — TELEPHONE ENCOUNTER
[facility] called requesting that Dr Garcia Copping complete order for OT & she is also refaxing forms to Kristi Lo back fax number as well - this is th third request for this  OT form,please look out for this form      Fax: [phone]

## 2022-01-11 NOTE — TELEPHONE ENCOUNTER
Transferred Blanca Bracket from [facility] to [facility]

## 2022-01-12 NOTE — TELEPHONE ENCOUNTER
Hayden Matias from [facility] our office faxed over Physical Therapy order & not Occupational therapy again  I didn't see a script/order for Occupational therapy for patient   Please advise, thx    Call back# [phone]  Fax# [phone]

## 2022-01-12 NOTE — TELEPHONE ENCOUNTER
[facility] sent a form to be printed, filled out and faxed  Form under media that was scanned in today  Please order on that form

## 2022-01-13 NOTE — TELEPHONE ENCOUNTER
Rolly Stanford is calling in from [facility] stating that the form they received was for PT   They are now looking for the OT form to be faxed back over to them

## 2022-01-13 NOTE — TELEPHONE ENCOUNTER
S/W Anne Wiggins  Advised her of the previous tasks  Refaxed form from  fax  She will look out for it

## 2022-02-07 ENCOUNTER — TELEMEDICINE (OUTPATIENT)
Dept: FAMILY MEDICINE CLINIC | Facility: CLINIC | Age: 82
End: 2022-02-07
Payer: MEDICARE

## 2022-02-07 DIAGNOSIS — R05.9 COUGH: ICD-10-CM

## 2022-02-07 DIAGNOSIS — R09.89 UPPER RESPIRATORY SYMPTOM: Primary | ICD-10-CM

## 2022-02-07 DIAGNOSIS — J00 COMMON COLD: ICD-10-CM

## 2022-02-07 PROCEDURE — G2012 BRIEF CHECK IN BY MD/QHP: HCPCS | Performed by: NURSE PRACTITIONER

## 2022-02-07 RX ORDER — AZITHROMYCIN 250 MG/1
TABLET, FILM COATED ORAL
Qty: 6 TABLET | Refills: 0 | Status: SHIPPED | OUTPATIENT
Start: 2022-02-07 | End: 2022-02-12

## 2022-02-07 NOTE — ASSESSMENT & PLAN NOTE
Patient is take over-the-counter cough medication such as Coricidin due to her history of hypertension

## 2022-02-07 NOTE — PROGRESS NOTES
Virtual Brief Visit    Patient is located in the following state in which I hold an active license [place]  HPI: Patient is a 79year old female that reports she has had cough, with clear sputum  Allergy symptom managment with Flonase  Patient report cold symptoms, patient is not vaccinated for covid  No fever, temperature or chills  Assessment/Plan:    Problem List Items Addressed This Visit        Respiratory    Common cold     To take over-the-counter cold medications  Other    Upper respiratory symptom - Primary     Patient started on azithromycin (Z-You) for treatment of upper respiratory symptoms  Patient to take over-the-counter cold and cough medication as needed  Relevant Medications    azithromycin (Zithromax) 250 mg tablet    Cough     Patient is take over-the-counter cough medication such as Coricidin due to her history of hypertension  Recent Visits  No visits were found meeting these conditions  Showing recent visits within past 7 days and meeting all other requirements  Today's Visits  Date Type Provider Dept   02/07/22 Telemedicine [facility] today's visits and meeting all other requirements  Future Appointments  No visits were found meeting these conditions    Showing future appointments within next 150 days and meeting all other requirements         I spent 25 minutes directly with the patient during this visit

## 2022-02-07 NOTE — PATIENT INSTRUCTIONS
Cold Symptoms   WHAT YOU NEED TO KNOW:   A cold is an infection caused by a virus  The infection causes your upper respiratory system to become inflamed  Common symptoms of a cold include sneezing, dry throat, a stuffy nose, headache, watery eyes, and a cough  Your cough may be dry, or you may cough up mucus  You may also have muscle aches, joint pain, and tiredness  Rarely, you may have a fever  Most colds go away without treatment  DISCHARGE INSTRUCTIONS:   Return to the emergency department if:   · You have increased tiredness and weakness  · You are unable to eat  · Your heart is beating much faster than usual for you  · You see white spots in the back of your throat and your neck is swollen and sore to the touch  · You see pinpoint or larger reddish-purple dots on your skin  Contact your healthcare provider if:   · You have a fever higher than 102°F (38 9°C)  · You have new or worsening shortness of breath  · You have thick nasal drainage for more than 2 days  · Your symptoms do not improve or get worse within 5 days  · You have questions or concerns about your condition or care  Medicines: The following medicines may be suggested by your healthcare provider to decrease your cold symptoms  These medicines are available without a doctor's order  Ask which medicines to take and when to take them  Follow directions  · NSAIDs or acetaminophen  help to bring down a fever or decrease pain  · Decongestants  help decrease nasal stuffiness  · Antihistamines  help decrease sneezing and a runny nose  · Cough suppressants  help decrease how much you cough  · Expectorants  help loosen mucus so you can cough it up  · Take your medicine as directed  Contact your healthcare provider if you think your medicine is not helping or if you have side effects  Tell him of her if you are allergic to any medicine  Keep a list of the medicines, vitamins, and herbs you take   Include the amounts, and when and why you take them  Bring the list or the pill bottles to follow-up visits  Carry your medicine list with you in case of an emergency  Symptom relief: The following may help relieve cold symptoms, such as a dry throat and congestion:  · Gargle with mouthwash or warm salt water as directed  · Suck on throat lozenges or hard candy  · Use a cold or warm vaporizer or humidifier to ease your breathing  · Rest for at least 2 days and then as needed to decrease tiredness and weakness  · Use petroleum based jelly around your nostrils to decrease irritation from blowing your nose  Drink liquids:  Liquids will help thin and loosen thick mucus so you can cough it up  Liquids will also keep you hydrated  Ask your healthcare provider which liquids are best for you and how much to drink each day  Prevent the spread of germs: You can spread your cold germs to others for at least 3 days after your symptoms start  Wash your hands often  Do not share items, such as eating utensils  Cover your nose and mouth when you cough or sneeze using the crook of your elbow instead of your hands  Throw used tissues in the garbage  Do not smoke:  Smoking may worsen your symptoms and increase the length of time you feel sick  Talk with your healthcare provider if you need help to stop smoking  Follow up with your doctor as directed:  Write down your questions so you remember to ask them during your visits  © Copyright [employer] 2021 Information is for End User's use only and may not be sold, redistributed or otherwise used for commercial purposes  All illustrations and images included in CareNotes® are the copyrighted property of A D A M , Inc  or Nikole Cameron   The above information is an  only  It is not intended as medical advice for individual conditions or treatments   Talk to your doctor, nurse or pharmacist before following any medical regimen to see if it is safe and effective for you  Upper Respiratory Infection   WHAT YOU NEED TO KNOW:   What is an upper respiratory infection? An upper respiratory infection is also called a cold  It can affect your nose, throat, ears, and sinuses  You are more likely to get a cold in the winter  Your risk is also higher if you smoke cigarettes or have allergies, such as hay fever  What causes a cold? A cold is caused by a virus  Many viruses can cause a cold, and each is contagious  A virus may be spread to others through coughing, sneezing, or close contact  A virus can also stay on objects and surfaces  You can become infected if you touch the object or surface and then touch your eyes, mouth, or nose  What are the signs and symptoms of a cold? Cold symptoms are usually worst for the first 3 to 5 days  You may have any of the following:  · Runny or stuffy nose    · Sneezing and coughing    · Sore throat or hoarseness    · Red, watery, and sore eyes    · A lack of energy and feeling tired    · Chills and fever    · Headache, body aches, or sore muscles    How is a cold treated? Colds are caused by viruses and do not get better with antibiotics  Most people get better in 7 to 14 days  You may continue to cough for 2 to 3 weeks  The following may help decrease your symptoms:  · Decongestants  help reduce nasal congestion and help you breathe more easily  If you take decongestant pills, they may make you feel restless or cause problems with your sleep  Do not use decongestant sprays for longer than a few days  · Cough suppressants  help reduce coughing  Ask your healthcare provider which type of cough medicine is best for you  · NSAIDs , such as ibuprofen, help decrease swelling, pain, and fever  NSAIDs can cause stomach bleeding or kidney problems in certain people  If you take blood thinner medicine, always ask your healthcare provider if NSAIDs are safe for you   Always read the medicine label and follow directions  · Acetaminophen  decreases pain and fever  It is available without a doctor's order  Ask how much to take and how often to take it  Follow directions  Read the labels of all other medicines you are using to see if they also contain acetaminophen, or ask your doctor or pharmacist  Acetaminophen can cause liver damage if not taken correctly  Do not use more than 4 grams (4,000 milligrams) total of acetaminophen in one day  How can I manage a cold? · Rest as much as possible  Slowly start to do more each day  · Drink more liquids as directed  Liquids will help thin and loosen mucus so you can cough it up  Liquids will also help prevent dehydration  Liquids that help prevent dehydration include water, fruit juice, and broth  Do not drink liquids that contain caffeine  Caffeine can increase your risk for dehydration  Ask your healthcare provider how much liquid to drink each day  · Soothe a sore throat  Gargle with warm salt water  Make salt water by dissolving ¼ teaspoon salt in 1 cup warm water  You may also suck on hard candy or throat lozenges  You may use a sore throat spray  · Use a humidifier or vaporizer  Use a cool mist humidifier or a vaporizer to increase air moisture in your home  This may make it easier for you to breathe and help decrease your cough  · Use saline nasal drops as directed  These help relieve congestion  · Apply petroleum-based jelly around the outside of your nostrils  This can decrease irritation from blowing your nose  · Do not smoke  Nicotine and other chemicals in cigarettes and cigars can make your symptoms worse  They can also cause infections such as bronchitis or pneumonia  Ask your healthcare provider for information if you currently smoke and need help to quit  E-cigarettes or smokeless tobacco still contain nicotine  Talk to your healthcare provider before you use these products  What can I do to prevent the common cold?    · Wash your hands often  Use soap and water every time you wash your hands  Rub your soapy hands together, lacing your fingers  Use the fingers of one hand to scrub under the nails of the other hand  Wash for at least 20 seconds  Rinse with warm, running water for several seconds  Then dry your hands  Use germ-killing gel if soap and water are not available  Do not touch your eyes or mouth without washing your hands first          · Cover a sneeze or cough  Use a tissue that covers your mouth and nose  Put the used tissue in the trash right away  Use the bend of your arm if a tissue is not available  Wash your hands well with soap and water or use a hand   Do not stand close to anyone who is sneezing or coughing  · Try to stay away from others while you are sick  This is especially important during the first 2 to 3 days when the virus is more easily spread  Wait until a fever, cough, or other symptoms are gone before you return to work or other regular activities  · Do not share items while you are sick  This includes food, drinks, eating utensils, and dishes  Call your local emergency number (911 in the [address] [place] [address],[address]) if:   · You have chest pain or trouble breathing  When should I seek immediate care? · You have a fever over 102ºF (39ºC)  When should I call my doctor? · You have a low fever  · Your sore throat gets worse or you see white or yellow spots in your throat  · Your symptoms get worse after 3 to 5 days or are not better in 14 days  · You have a rash anywhere on your skin  · You have large, tender lumps in your neck  · You have thick, green, or yellow drainage from your nose  · You cough up thick yellow, green, or bloody mucus  · You have a bad earache  · You have questions or concerns about your condition or care  CARE AGREEMENT:   You have the right to help plan your care  Learn about your health condition and how it may be treated   Discuss treatment options with your healthcare providers to decide what care you want to receive  You always have the right to refuse treatment  The above information is an  only  It is not intended as medical advice for individual conditions or treatments  Talk to your doctor, nurse or pharmacist before following any medical regimen to see if it is safe and effective for you  © Copyright [employer] 2021 Information is for End User's use only and may not be sold, redistributed or otherwise used for commercial purposes   All illustrations and images included in CareNotes® are the copyrighted property of A D A M , Inc  or [address] [place] [address]

## 2022-02-07 NOTE — ASSESSMENT & PLAN NOTE
Patient started on azithromycin (Z-You) for treatment of upper respiratory symptoms  Patient to take over-the-counter cold and cough medication as needed

## 2022-02-08 ENCOUNTER — PROCEDURE VISIT (OUTPATIENT)
Dept: PAIN MEDICINE | Facility: CLINIC | Age: 82
End: 2022-02-08
Payer: MEDICARE

## 2022-02-08 VITALS
SYSTOLIC BLOOD PRESSURE: 142 MMHG | DIASTOLIC BLOOD PRESSURE: 76 MMHG | BODY MASS INDEX: 18.11 KG/M2 | HEART RATE: 71 BPM | WEIGHT: 99 LBS

## 2022-02-08 DIAGNOSIS — G89.29 CHRONIC PAIN OF RIGHT KNEE: ICD-10-CM

## 2022-02-08 DIAGNOSIS — M25.561 CHRONIC PAIN OF RIGHT KNEE: ICD-10-CM

## 2022-02-08 DIAGNOSIS — S42.212A FX HUMERAL NECK, LEFT, CLOSED, INITIAL ENCOUNTER: Primary | ICD-10-CM

## 2022-02-08 PROCEDURE — 20611 DRAIN/INJ JOINT/BURSA W/US: CPT | Performed by: PHYSICAL MEDICINE & REHABILITATION

## 2022-02-08 RX ORDER — BUPIVACAINE HYDROCHLORIDE 2.5 MG/ML
10 INJECTION, SOLUTION EPIDURAL; INFILTRATION; INTRACAUDAL ONCE
Status: COMPLETED | OUTPATIENT
Start: 2022-02-08 | End: 2022-02-08

## 2022-02-08 RX ORDER — METHYLPREDNISOLONE ACETATE 40 MG/ML
40 INJECTION, SUSPENSION INTRA-ARTICULAR; INTRALESIONAL; INTRAMUSCULAR; SOFT TISSUE ONCE
Status: COMPLETED | OUTPATIENT
Start: 2022-02-08 | End: 2022-02-08

## 2022-02-08 RX ADMIN — BUPIVACAINE HYDROCHLORIDE 10 ML: 2.5 INJECTION, SOLUTION EPIDURAL; INFILTRATION; INTRACAUDAL at 15:15

## 2022-02-08 RX ADMIN — METHYLPREDNISOLONE ACETATE 40 MG: 40 INJECTION, SUSPENSION INTRA-ARTICULAR; INTRALESIONAL; INTRAMUSCULAR; SOFT TISSUE at 15:16

## 2022-02-08 NOTE — PROGRESS NOTES
Indication: Knee pain  Preprocedure diagnosis: 1  Knee arthropathy                                                  2   Knee pain  Postprocedure diagnosis: 1  Knee arthropathy                                                    2   Knee pain    Procedure: Ultrasound guided right knee joint injection    After discussing the risks, benefits, and alternatives to the procedure, the patient expressed understanding and wished to proceed  The patient was brought to the procedure suite and placed in the supine position with a bolster under the knee  A procedural pause was conducted to verify:  correct patient identity, procedure to be performed and as applicable, correct side and site, correct patient position, and availability of implants, special equipment or special requirements  A simple surgical tray was used  Prior to the procedure, the knee was examined with a 12 MHz linear transducer to visualize the suprapatellar recess of the knee joint and determine the optimal needle path  Following this, the area was prepared with a ChloraPrep scrub, then re-examined using the same transducer, a sterile ultrasound transducer cover, and sterile ultrasound transducer gel  Thereafter, using continuous ultrasound guidance, a 2 5-inch 25-gauge needle was advanced into the knee joint in the suprapatellar recess of the joint space  After visualization of the tip and negative aspiration for blood, a mixture of 40 mg Depo-Medrol and 3 mL of 0 25% bupivacaine was injected into the knee joint  Following the injection, the needle was withdrawn  The patient tolerated the procedure well and there were no apparent complications  After an appropriate amount of observation, the patient was dismissed from the clinic in good condition under their own power

## 2022-02-11 ENCOUNTER — TELEPHONE (OUTPATIENT)
Dept: FAMILY MEDICINE CLINIC | Facility: CLINIC | Age: 82
End: 2022-02-11

## 2022-02-11 NOTE — TELEPHONE ENCOUNTER
She had 1 day left of the antibiotics  Told her to stop them   Denies any itching   It is small area and localized   She is to monitor it for now   If any worsening or if it doesn't go away in 1 week call us  No rash anywhere else   Denies any hives shortness of breath or difficulty swallowing

## 2022-02-11 NOTE — TELEPHONE ENCOUNTER
Pt LM, has been on ABX (they are working), however today has a bit of a red rash on her stomach   She is wondering if this is normal?

## 2022-02-12 ENCOUNTER — TELEPHONE (OUTPATIENT)
Dept: OTHER | Facility: OTHER | Age: 82
End: 2022-02-12

## 2022-02-12 NOTE — TELEPHONE ENCOUNTER
Vance Mcclendon PT w/ [facility] is calling to let Dr Salinas know that the patients knee is sore after injection Tuesday, stated she is going to be on hold for a week to give the patient time to heal

## 2022-02-21 ENCOUNTER — TELEPHONE (OUTPATIENT)
Dept: FAMILY MEDICINE CLINIC | Facility: CLINIC | Age: 82
End: 2022-02-21

## 2022-02-21 DIAGNOSIS — B36.9 FUNGAL RASH OF TORSO: Primary | ICD-10-CM

## 2022-02-21 RX ORDER — NYSTATIN 100000 U/G
CREAM TOPICAL 2 TIMES DAILY
Qty: 30 G | Refills: 0 | Status: SHIPPED | OUTPATIENT
Start: 2022-02-21

## 2022-02-21 NOTE — TELEPHONE ENCOUNTER
Pt called - she has a pruritic rash under breast, shoulder, and back - she is using anti itch cream but it is not helping

## 2022-03-04 ENCOUNTER — TELEPHONE (OUTPATIENT)
Dept: FAMILY MEDICINE CLINIC | Facility: CLINIC | Age: 82
End: 2022-03-04

## 2022-03-08 ENCOUNTER — TELEPHONE (OUTPATIENT)
Dept: FAMILY MEDICINE CLINIC | Facility: CLINIC | Age: 82
End: 2022-03-08

## 2022-03-08 NOTE — TELEPHONE ENCOUNTER
Pt called - looking for foot soak recommendation - pt was in the hospital awhile ago and she reached out to Podiatry but they were unable to help - pt has oil and skin built up on feet

## 2022-03-18 ENCOUNTER — TELEPHONE (OUTPATIENT)
Dept: FAMILY MEDICINE CLINIC | Facility: CLINIC | Age: 82
End: 2022-03-18

## 2022-03-23 ENCOUNTER — TELEPHONE (OUTPATIENT)
Dept: FAMILY MEDICINE CLINIC | Facility: CLINIC | Age: 82
End: 2022-03-23

## 2022-04-05 ENCOUNTER — TELEPHONE (OUTPATIENT)
Dept: FAMILY MEDICINE CLINIC | Facility: CLINIC | Age: 82
End: 2022-04-05

## 2022-04-05 NOTE — TELEPHONE ENCOUNTER
I will take care of this  It will need to be signed but I can see if Roula Solano can sign it for you

## 2022-04-06 NOTE — TELEPHONE ENCOUNTER
Ordered  Sent via tiger connect I believe for electronic signature  Let me know when it is signed  Thanks!

## 2022-04-12 ENCOUNTER — OFFICE VISIT (OUTPATIENT)
Dept: PODIATRY | Facility: CLINIC | Age: 82
End: 2022-04-12

## 2022-04-12 VITALS
BODY MASS INDEX: 18.8 KG/M2 | HEART RATE: 73 BPM | SYSTOLIC BLOOD PRESSURE: 127 MMHG | DIASTOLIC BLOOD PRESSURE: 78 MMHG | WEIGHT: 102.8 LBS

## 2022-04-12 DIAGNOSIS — L60.3 NAIL DYSTROPHY: ICD-10-CM

## 2022-04-12 DIAGNOSIS — S42.212A FX HUMERAL NECK, LEFT, CLOSED, INITIAL ENCOUNTER: Primary | ICD-10-CM

## 2022-04-12 PROCEDURE — NCFTCARE PR NON-COVERED FOOT CARE: Performed by: PODIATRIST

## 2022-04-12 NOTE — PROGRESS NOTES
Patient presents with extremely elongated toenails as they have not been trimmed for over 1 5 years  Nails are thickened dystrophic and very long  Pedal pulses remain palpable  Treatment consisted of nail trimming

## 2022-04-19 ENCOUNTER — TELEMEDICINE (OUTPATIENT)
Dept: FAMILY MEDICINE CLINIC | Facility: CLINIC | Age: 82
End: 2022-04-19
Payer: MEDICARE

## 2022-04-19 DIAGNOSIS — J34.89 SINUS PRESSURE: ICD-10-CM

## 2022-04-19 DIAGNOSIS — J34.89 STUFFY AND RUNNY NOSE: Primary | ICD-10-CM

## 2022-04-19 PROBLEM — Z86.73 PERSONAL HISTORY OF TRANSIENT ISCHEMIC ATTACK (TIA), AND CEREBRAL INFARCTION WITHOUT RESIDUAL DEFICITS: Status: ACTIVE | Noted: 2020-11-24

## 2022-04-19 PROBLEM — Z91.81 HISTORY OF FALLING: Status: ACTIVE | Noted: 2020-11-24

## 2022-04-19 PROBLEM — R93.89 ABNORMAL MRI: Status: ACTIVE | Noted: 2017-10-17

## 2022-04-19 PROBLEM — R94.5 ABNORMAL RESULTS OF LIVER FUNCTION STUDIES: Status: ACTIVE | Noted: 2020-12-02

## 2022-04-19 PROBLEM — E53.8 B12 DEFICIENCY: Status: ACTIVE | Noted: 2018-07-25

## 2022-04-19 PROBLEM — K59.00 CONSTIPATION, UNSPECIFIED: Status: ACTIVE | Noted: 2020-11-24

## 2022-04-19 PROBLEM — G60.9 HEREDITARY AND IDIOPATHIC NEUROPATHY, UNSPECIFIED: Status: ACTIVE | Noted: 2020-11-25

## 2022-04-19 PROBLEM — Z85.3 PERSONAL HISTORY OF BREAST CANCER: Status: ACTIVE | Noted: 2020-11-24

## 2022-04-19 PROBLEM — M06.9 RHEUMATOID ARTHRITIS, UNSPECIFIED (HCC): Status: ACTIVE | Noted: 2020-11-24

## 2022-04-19 PROBLEM — G31.84 MCI (MILD COGNITIVE IMPAIRMENT) WITH MEMORY LOSS: Status: ACTIVE | Noted: 2017-06-30

## 2022-04-19 PROBLEM — M81.0 AGE-RELATED OSTEOPOROSIS WITHOUT CURRENT PATHOLOGICAL FRACTURE: Status: ACTIVE | Noted: 2020-11-24

## 2022-04-19 PROBLEM — R30.0 DIFFICULT OR PAINFUL URINATION: Status: ACTIVE | Noted: 2020-12-02

## 2022-04-19 PROCEDURE — 99442 PR PHYS/QHP TELEPHONE EVALUATION 11-20 MIN: CPT | Performed by: NURSE PRACTITIONER

## 2022-04-19 RX ORDER — AZITHROMYCIN 250 MG/1
TABLET, FILM COATED ORAL
Qty: 6 TABLET | Refills: 0 | Status: SHIPPED | OUTPATIENT
Start: 2022-04-19 | End: 2022-04-24

## 2022-04-19 NOTE — PATIENT INSTRUCTIONS
Sinusitis   WHAT YOU NEED TO KNOW:   Sinusitis is inflammation or infection of your sinuses  Sinusitis is most often caused by a virus  Acute sinusitis may last up to 12 weeks  Chronic sinusitis lasts longer than 12 weeks  Recurrent sinusitis means you have 4 or more infections in 1 year  DISCHARGE INSTRUCTIONS:   Return to the emergency department if:   · You have trouble breathing or wheezing that is getting worse  · You have a stiff neck, a fever, or a bad headache  · You cannot open your eye  · Your eyeball bulges out or you cannot move your eye  · You are more sleepy than normal, or you notice changes in your ability to think, move, or talk  · You have swelling of your forehead or scalp  Call your doctor if:   · You have vision changes, such as double vision  · Your eye and eyelid are red, swollen, and painful  · Your symptoms do not improve or go away after 10 days  · You have nausea and are vomiting  · Your nose is bleeding  · You have questions or concerns about your condition or care  Medicines: Your symptoms may go away on their own  Your healthcare provider may recommend watchful waiting for up to 10 days before starting antibiotics  You may need any of the following:  · Acetaminophen  decreases pain and fever  It is available without a doctor's order  Ask how much to take and how often to take it  Follow directions  Read the labels of all other medicines you are using to see if they also contain acetaminophen, or ask your doctor or pharmacist  Acetaminophen can cause liver damage if not taken correctly  Do not use more than 4 grams (4,000 milligrams) total of acetaminophen in one day  · NSAIDs , such as ibuprofen, help decrease swelling, pain, and fever  This medicine is available with or without a doctor's order  NSAIDs can cause stomach bleeding or kidney problems in certain people   If you take blood thinner medicine, always ask your healthcare provider if NSAIDs are safe for you  Always read the medicine label and follow directions  · Nasal steroid sprays  may help decrease inflammation in your nose and sinuses  · Decongestants  help reduce swelling and drain mucus in the nose and sinuses  They may help you breathe easier  · Antihistamines  help dry mucus in the nose and relieve sneezing  · Antibiotics  help treat or prevent a bacterial infection  · Take your medicine as directed  Contact your healthcare provider if you think your medicine is not helping or if you have side effects  Tell him or her if you are allergic to any medicine  Keep a list of the medicines, vitamins, and herbs you take  Include the amounts, and when and why you take them  Bring the list or the pill bottles to follow-up visits  Carry your medicine list with you in case of an emergency  Self-care:   · Rinse your sinuses as directed  Use a sinus rinse device to rinse your nasal passages with a saline (salt water) solution or distilled water  Do not use tap water  This will help thin the mucus in your nose and rinse away pollen and dirt  It will also help reduce swelling so you can breathe normally  · Use a humidifier  to increase air moisture in your home  This may make it easier for you to breathe and help decrease your cough  · Sleep with your head elevated  Place an extra pillow under your head before you go to sleep to help your sinuses drain  · Drink liquids as directed  Ask your healthcare provider how much liquid to drink each day and which liquids are best for you  Liquids will thin the mucus in your nose and help it drain  Avoid drinks that contain alcohol or caffeine  · Do not smoke, and avoid secondhand smoke  Nicotine and other chemicals in cigarettes and cigars can make your symptoms worse  Ask your healthcare provider for information if you currently smoke and need help to quit   E-cigarettes or smokeless tobacco still contain nicotine  Talk to your healthcare provider before you use these products  Prevent the spread of germs:   · Wash your hands often with soap and water  Wash your hands after you use the bathroom, change a child's diaper, or sneeze  Wash your hands before you prepare or eat food  · Stay away from people who are sick  Some germs spread easily and quickly through contact  Follow up with your doctor as directed: You may be referred to an ear, nose, and throat specialist  Write down your questions so you remember to ask them during your visits  © Copyright [employer] 2022 Information is for End User's use only and may not be sold, redistributed or otherwise used for commercial purposes  All illustrations and images included in CareNotes® are the copyrighted property of [employer]  or Nikole Baez  The above information is an  only  It is not intended as medical advice for individual conditions or treatments  Talk to your doctor, nurse or pharmacist before following any medical regimen to see if it is safe and effective for you

## 2022-04-19 NOTE — PROGRESS NOTES
Virtual Brief Visit    Patient is located in the following state in which I hold an active license [place]      Assessment/Plan:    Presents in office via virtual visit / telephone   C/o nasal congestion sinus pressure and frontal headaches   Started about 1 week ago   She wants whatever her PCP orders when she gets this   Thinks she needs an antibiotic   Usually takes Zithromax     I have refilled her Zithromax  Discussed supportive measures   Discussed hydration and if not better in 1 week she is to be seen in the office  Problem List Items Addressed This Visit     None      Visit Diagnoses     Stuffy and runny nose    -  Primary    Relevant Medications    azithromycin (Zithromax) 250 mg tablet    Sinus pressure        Relevant Medications    azithromycin (Zithromax) 250 mg tablet          Recent Visits  No visits were found meeting these conditions  Showing recent visits within past 7 days and meeting all other requirements  Today's Visits  Date Type Provider Dept   04/19/22 Telemedicine [facility] 112 today's visits and meeting all other requirements  Future Appointments  No visits were found meeting these conditions    Showing future appointments within next 150 days and meeting all other requirements         I spent 15 minutes directly with the patient during this visit

## 2022-04-25 ENCOUNTER — TELEPHONE (OUTPATIENT)
Dept: PAIN MEDICINE | Facility: CLINIC | Age: 82
End: 2022-04-25

## 2022-04-25 NOTE — TELEPHONE ENCOUNTER
S/W pt  Pt stated she thought she had an knee injection 2 weeks ago,  Advised her on 2/8/22 was right [facility] knee injection  Advised her what medications were used, advised her it will wear off and it affects everyone differently  Pt stated she takes a 1/2 pill a day of tylenol and she doesn't know how much she can take  Advised she can take up to 3,000 mg in 24 hrs  Her pills are 325 mg  She increase the amount of tylenol that she takes  Answered her questions  Pt verbalized understanding

## 2022-04-25 NOTE — TELEPHONE ENCOUNTER
Patient thought after she got procedure the pain would go away, she can't remember she wants to know what was in it?     Please advise    Thank you     [phone]

## 2022-05-20 ENCOUNTER — TELEPHONE (OUTPATIENT)
Dept: FAMILY MEDICINE CLINIC | Facility: CLINIC | Age: 82
End: 2022-05-20

## 2022-05-20 NOTE — TELEPHONE ENCOUNTER
Pt called and stated that she is having her nose problem again , she stated that she in not coughing , she does have a light yellow discharge , she stated that she feels it usually happens when it is humid ,she stated she does use an inhaler , she stated that her toes are a mess also and she would like someone to come in and help take care of them

## 2022-05-25 ENCOUNTER — TELEMEDICINE (OUTPATIENT)
Dept: FAMILY MEDICINE CLINIC | Facility: CLINIC | Age: 82
End: 2022-05-25
Payer: MEDICARE

## 2022-05-25 DIAGNOSIS — I10 ESSENTIAL HYPERTENSION: ICD-10-CM

## 2022-05-25 DIAGNOSIS — J30.89 SEASONAL ALLERGIC RHINITIS DUE TO OTHER ALLERGIC TRIGGER: ICD-10-CM

## 2022-05-25 DIAGNOSIS — R05.9 COUGH: ICD-10-CM

## 2022-05-25 DIAGNOSIS — R09.89 UPPER RESPIRATORY SYMPTOM: Primary | ICD-10-CM

## 2022-05-25 DIAGNOSIS — J00 COMMON COLD: ICD-10-CM

## 2022-05-25 PROBLEM — J30.9 ALLERGIC RHINITIS DUE TO ALLERGEN: Status: ACTIVE | Noted: 2022-05-25

## 2022-05-25 PROCEDURE — 99443 PR PHYS/QHP TELEPHONE EVALUATION 21-30 MIN: CPT | Performed by: NURSE PRACTITIONER

## 2022-05-25 RX ORDER — AZITHROMYCIN 250 MG/1
TABLET, FILM COATED ORAL
Qty: 6 TABLET | Refills: 0 | Status: SHIPPED | OUTPATIENT
Start: 2022-05-25 | End: 2022-05-30

## 2022-05-25 NOTE — ASSESSMENT & PLAN NOTE
Patient indicates that she is getting a lot of physical therapy and will have them check her blood pressure after she is done with her visits  Patient to maintain Norvasc 2 5 mg p o  B i d   Maintain low-sodium diet  Walking [facility] 3-5 times per week for the 60 minutes

## 2022-05-25 NOTE — PATIENT INSTRUCTIONS
Allergies   WHAT YOU NEED TO KNOW:   What are allergies? Allergies are an immune system reaction to a substance called an allergen  Your immune system sees the allergen as harmful and attacks it  What causes allergies? You may have allergies at certain times of the year or all year  The following are common allergies:  Seasonal airborne allergies  happen during certain times of the year  This is also called hay fever  Tree, weed, or grass pollen are examples of allergens that you breathe in  Environmental airborne allergy  triggers you may breathe in year-round include dust, mold, and pet hair  Contact allergies  include latex, found in items such as condoms and medical gloves  Latex allergies can be very serious  Insect sting allergies  may be caused by bees, hornets, fire ants, or other insects that sting or bite you  Insect allergies can be very serious  Food allergies  commonly include shellfish, wheat, and eggs  Some foods must be eaten to produce an allergic reaction  Other foods can trigger a reaction if they touch your skin or are breathed in  What increases my risk for allergies? Allergic reactions can happen at any time, even if you have not had allergies before  You may develop an allergy after you have been exposed to an allergen more than once  Allergies are most common in children and elderly people, but anyone can have an allergic reaction  Your risk is also increased if you have a family history of allergies or a medical condition such as asthma  What are the signs and symptoms of allergies? Mild symptoms  include sneezing and a runny, itchy, or stuffy nose  You may also have swollen, watery, or itchy eyes, or skin itching  You may have swelling or pain where an insect bit or stung you  Anaphylaxis symptoms  include trouble breathing or swallowing, a rash or hives, or severe swelling  You may also have a cough, wheezing, or feel lightheaded or dizzy   Anaphylaxis is a sudden, life-threatening reaction that needs immediate treatment  How are allergies diagnosed? Your healthcare provider will ask about your signs and symptoms  He or she will ask what allergens you have been exposed to and if you have ever had other allergic reactions  He or she may look in your nose, ears, or throat  You may need additional testing if you developed anaphylaxis after you were exposed to a trigger and then exercised  This is called exercise-induced anaphylaxis  You may also need the following tests:  Blood tests  are used to check for signs of a reaction to allergens  Nasal tests  are used to see how your nasal passages react to allergens  A sample of your nasal fluid may also be tested  Skin tests  can help your healthcare provider find what you are allergic to  He will place a small amount of allergen on your arm or back and then prick your skin with a needle  He will watch how your skin reacts to the allergen  How are allergies treated? Antihistamines  help decrease itching, sneezing, and swelling  You may take them as a pill or use drops in your nose or eyes  Decongestants  help your nose feel less stuffy  Steroids  decrease swelling and redness  Topical treatments  help decrease itching or swelling  You also may be given nasal sprays or eyedrops  Epinephrine  is medicine used to treat severe allergic reactions such as anaphylaxis  Desensitization  gets your body used to allergens you cannot avoid  Your healthcare provider will give you a shot that contains a small amount of an allergen  He or she will treat any allergic reaction you have  Your provider will give you more of the allergen a little at a time until your body gets used to it  Your reaction to the allergen may be less serious after this treatment  Your healthcare provider will tell you how long to get the shots  What steps do I need to take for signs or symptoms of anaphylaxis?    Immediately  give 1 shot of epinephrine only into the outer thigh muscle  Leave the shot in place  as directed  Your healthcare provider may recommend you leave it in place for up to 10 seconds before you remove it  This helps make sure all of the epinephrine is delivered  Call 911 and go to the emergency department,  even if the shot improved symptoms  Do not drive yourself  Bring the used epinephrine shot with you  What safety precautions do I need to take if I am at risk for anaphylaxis? Keep 2 shots of epinephrine with you at all times  You may need a second shot, because epinephrine only works for about 20 minutes and symptoms may return  Your healthcare provider can show you and family members how to give the shot  Check the expiration date every month and replace it before it expires  Create an action plan  Your healthcare provider can help you create a written plan that explains the allergy and an emergency plan to treat a reaction  The plan explains when to give a second epinephrine shot if symptoms return or do not improve after the first  Give copies of the action plan and emergency instructions to family members and work staff  Show them how to give a shot of epinephrine  Be careful when you exercise  If you have had exercise-induced anaphylaxis, do not exercise right after you eat  Stop exercising right away if you start to develop any signs or symptoms of anaphylaxis  You may first feel tired, warm, or have itchy skin  Hives, swelling, and severe breathing problems may develop if you continue to exercise  Carry medical alert identification  Wear medical alert jewelry or carry a card that explains the allergy  Ask your healthcare provider where to get these items  Inform all healthcare providers of the allergy  This includes dentists, nurses, doctors, and surgeons  How can I manage allergies? Use nasal rinses as directed  Rinse with a saline solution daily   This will help clear allergens out of your nose  Use distilled water if possible  You can also boil tap water and let it cool before you use it  Do not use tap water that has not been boiled  Do not smoke  Allergy symptoms may decrease if you are not around smoke  Nicotine and other chemicals in cigarettes and cigars can cause lung damage  Ask your healthcare provider for information if you currently smoke and need help to quit  E-cigarettes or smokeless tobacco still contain nicotine  Talk to your healthcare provider before you use these products  How can I prevent an allergic reaction? Do not go outside when pollen counts are high if you have seasonal allergies  Your symptoms may be better if you go outside only in the morning or evening  Use your air conditioner, and change air filters often  Avoid dust, fur, and mold  Dust and vacuum your home often  You may want to wear a mask when you vacuum  Keep pets in certain rooms, and bathe them often  Use a dehumidifier (machine that decreases moisture) to help prevent mold  Do not use products that contain latex if you have a latex allergy  Use nonlatex gloves if you work in healthcare or in food preparation  Always tell healthcare providers about a latex allergy  Avoid areas that attract insects if you have an insect bite or sting allergy  Areas include trash cans, gardens, and picnics  Do not wear bright clothing or strong scents when you will be outside  Prevent an allergic reaction caused by food  You may have a reaction if your food is not prepared safely  For example, you could be served food that touched your trigger food during preparation  This is called cross-contamination  Kitchen tools can also cause cross-contamination  You may also eat baked foods that contain a trigger food you do not know about  Ask if the food contains your trigger food before you handle or eat it      Call 911 for signs or symptoms of anaphylaxis,  such as trouble breathing, swelling in your mouth or throat, or wheezing  You may also have itching, a rash, hives, or feel like you are going to faint  When should I seek immediate care? You have tingling in your hands or feet  Your skin is red or flushed  When should I contact my healthcare provider? You have questions or concerns about your condition or care  CARE AGREEMENT:   You have the right to help plan your care  Learn about your health condition and how it may be treated  Discuss treatment options with your healthcare providers to decide what care you want to receive  You always have the right to refuse treatment  The above information is an  only  It is not intended as medical advice for individual conditions or treatments  Talk to your doctor, nurse or pharmacist before following any medical regimen to see if it is safe and effective for you  © Copyright [employer] 2022 Information is for End User's use only and may not be sold, redistributed or otherwise used for commercial purposes  All illustrations and images included in CareNotes® are the copyrighted property of [employer]  or [employer]  Postnasal Drip   WHAT YOU NEED TO KNOW:   What is postnasal drip? Postnasal drip is a condition that causes a large amount of mucus to collect in your throat or nose  It may also be called upper airway cough syndrome because the mucus causes repeated coughing  You may have a sore throat, or throat tissues may swell  This may feel like a lump in your throat  You may also feel like you need to clear your throat often  What causes postnasal drip?    A cold or the flu    Allergies, such as hay fever or a milk allergy    Cold air, or dry air in a heated area    Pregnancy or hormone changes    Medical conditions such as a deviated septum, gastroesophageal reflux (GERD), or problems with structures in your throat    Certain medicines, such as birth control pills and blood pressure medicines    An infection in your sinuses or nose    How is postnasal drip diagnosed and treated? Your healthcare provider will examine you and ask about your symptoms  Tell your provider if you have symptoms all the time or if they come and go  Include anything that triggers your symptoms, such as cold air or pollen  A sample of the mucus may be tested for bacteria that could be causing your symptoms  Medicines  may be given to thin the mucus  You may need to swallow the medicine or use a device to flush your sinuses with liquid squirted into your nose  Nasal sprays may also be needed to keep the tissues in your nose moist  Medicines can also relieve congestion  Allergy medicine may help if your symptoms are caused by seasonal allergies, such as hay fever  You may need medicine to help control GERD  Antibiotics  may be needed to treat a bacterial infection  What can I do to manage postnasal drip? Use a humidifier or vaporizer  Use a cool mist humidifier or a vaporizer to increase air moisture in your home  This may make it easier for you to breathe  Drink more liquids as directed  Liquids help keep your air passages moist and help you cough up mucus  Ask how much liquid to drink each day and which liquids are best for you  Avoid cold air and dry, heated air  Cold or dry air can trigger postnasal drip  Try to stay inside on cold days, or keep your mouth covered  Do not stay long in areas that have dry, heated air  Do not smoke, and avoid secondhand smoke  Nicotine and other chemicals in cigarettes and cigars can irritate your throat and make coughing worse  Ask your healthcare provider for information if you currently smoke and need help to quit  E-cigarettes or smokeless tobacco still contain nicotine  Talk to your healthcare provider before you use these products  When should I contact my healthcare provider? You have trouble breathing because of the mucus      You have new or worsening symptoms, even with treatment  You have signs of an infection, such as yellow or green mucus, or a fever  You have questions or concerns about your condition or care  CARE AGREEMENT:   You have the right to help plan your care  Learn about your health condition and how it may be treated  Discuss treatment options with your healthcare providers to decide what care you want to receive  You always have the right to refuse treatment  The above information is an  only  It is not intended as medical advice for individual conditions or treatments  Talk to your doctor, nurse or pharmacist before following any medical regimen to see if it is safe and effective for you  © Copyright [employer] 2022 Information is for End User's use only and may not be sold, redistributed or otherwise used for commercial purposes  All illustrations and images included in CareNotes® are the copyrighted property of [employer] M [employer] Inc  or [facility]  Acute Cough   WHAT YOU NEED TO KNOW:   What is an acute cough? An acute cough can last up to 3 weeks  Common causes of an acute cough include a cold, allergies, or a lung infection  How is the cause of an acute cough diagnosed? Your healthcare provider will examine you and listen to your lungs  Tell your healthcare provider if you cough up any mucus, or have a fever or shortness of breath  Also tell your provider what makes the cough better or worse  Depending on your symptoms, you may need a chest x-ray  A sample of mucus may be collected and tested for infection  How is an acute cough treated? An acute cough usually goes away on its own  Ask your healthcare provider about medicines you can take to decrease your cough  You may need medicine to stop the cough, decrease swelling in your airways, or help open your airways  Medicine may also be given to help you cough up mucus  If you have an infection caused by bacteria, you may need antibiotics  What can I do to manage my cough? Do not smoke and stay away from others who smoke  Nicotine and other chemicals in cigarettes and cigars can cause lung damage and make your cough worse  Ask your healthcare provider for information if you currently smoke and need help to quit  E-cigarettes or smokeless tobacco still contain nicotine  Talk to your healthcare provider before you use these products  Drink extra liquids as directed  Liquids will help thin and loosen mucus so you can cough it up  Liquids will also help prevent dehydration  Examples of good liquids to drink include water, fruit juice, and broth  Do not drink liquids that contain caffeine  Caffeine can increase your risk for dehydration  Ask your healthcare provider how much liquid to drink each day  Rest as directed  Do not do activities that make your cough worse, such as exercise  Use a humidifier or vaporizer  Use a cool mist humidifier or a vaporizer to increase air moisture in your home  This may make it easier for you to breathe and help decrease your cough  Eat 2 to 5 mL of honey 2 times each day  Honey can help thin mucus and decrease your cough  Use cough drops or lozenges  These can help decrease throat irritation and your cough  When should I seek immediate care? You have trouble breathing or feel short of breath  You cough up blood, or you see blood in your mucus  You faint or feel weak or dizzy  You have chest pain when you cough or take a deep breath  You have new wheezing  When should I contact my healthcare provider? You have a fever  Your cough lasts longer than 4 weeks  Your symptoms do not improve with treatment  You have questions or concerns about your condition or care  CARE AGREEMENT:   You have the right to help plan your care  Learn about your health condition and how it may be treated  Discuss treatment options with your healthcare providers to decide what care you want to receive   You always have the right to refuse treatment  The above information is an  only  It is not intended as medical advice for individual conditions or treatments  Talk to your doctor, nurse or pharmacist before following any medical regimen to see if it is safe and effective for you  © Copyright [employer] 2022 Information is for End User's use only and may not be sold, redistributed or otherwise used for commercial purposes  All illustrations and images included in CareNotes® are the copyrighted property of [employer] Inc  or [employer]  Postnasal Drip   WHAT YOU NEED TO KNOW:   What is postnasal drip? Postnasal drip is a condition that causes a large amount of mucus to collect in your throat or nose  It may also be called upper airway cough syndrome because the mucus causes repeated coughing  You may have a sore throat, or throat tissues may swell  This may feel like a lump in your throat  You may also feel like you need to clear your throat often  What causes postnasal drip? A cold or the flu    Allergies, such as hay fever or a milk allergy    Cold air, or dry air in a heated area    Pregnancy or hormone changes    Medical conditions such as a deviated septum, gastroesophageal reflux (GERD), or problems with structures in your throat    Certain medicines, such as birth control pills and blood pressure medicines    An infection in your sinuses or nose    How is postnasal drip diagnosed and treated? Your healthcare provider will examine you and ask about your symptoms  Tell your provider if you have symptoms all the time or if they come and go  Include anything that triggers your symptoms, such as cold air or pollen  A sample of the mucus may be tested for bacteria that could be causing your symptoms  Medicines  may be given to thin the mucus  You may need to swallow the medicine or use a device to flush your sinuses with liquid squirted into your nose   Nasal sprays may also be needed to keep the tissues in your nose moist  Medicines can also relieve congestion  Allergy medicine may help if your symptoms are caused by seasonal allergies, such as hay fever  You may need medicine to help control GERD  Antibiotics  may be needed to treat a bacterial infection  What can I do to manage postnasal drip? Use a humidifier or vaporizer  Use a cool mist humidifier or a vaporizer to increase air moisture in your home  This may make it easier for you to breathe  Drink more liquids as directed  Liquids help keep your air passages moist and help you cough up mucus  Ask how much liquid to drink each day and which liquids are best for you  Avoid cold air and dry, heated air  Cold or dry air can trigger postnasal drip  Try to stay inside on cold days, or keep your mouth covered  Do not stay long in areas that have dry, heated air  Do not smoke, and avoid secondhand smoke  Nicotine and other chemicals in cigarettes and cigars can irritate your throat and make coughing worse  Ask your healthcare provider for information if you currently smoke and need help to quit  E-cigarettes or smokeless tobacco still contain nicotine  Talk to your healthcare provider before you use these products  When should I contact my healthcare provider? You have trouble breathing because of the mucus  You have new or worsening symptoms, even with treatment  You have signs of an infection, such as yellow or green mucus, or a fever  You have questions or concerns about your condition or care  CARE AGREEMENT:   You have the right to help plan your care  Learn about your health condition and how it may be treated  Discuss treatment options with your healthcare providers to decide what care you want to receive  You always have the right to refuse treatment  The above information is an  only  It is not intended as medical advice for individual conditions or treatments   Talk to your doctor, nurse or pharmacist before following any medical regimen to see if it is safe and effective for you  © Copyright [employer] 2022 Information is for End User's use only and may not be sold, redistributed or otherwise used for commercial purposes  All illustrations and images included in CareNotes® are the copyrighted property of [employer]  or [employer]  Allergic Rhinitis   WHAT YOU NEED TO KNOW:   Allergic rhinitis, or hay fever, is swelling of the inside of your nose  The swelling is a reaction to allergens in the air  An allergen can be anything that causes an allergic reaction  Allergies to weeds, grass, trees, or mold often cause seasonal allergic rhinitis  Indoor dust mites, cockroaches, pet dander, or mold can also cause allergic rhinitis  DISCHARGE INSTRUCTIONS:   Call 911 for the following: You have chest pain or shortness of breath  Return to the emergency department if:   You have severe pain  You cough up blood  Contact your healthcare provider if:   You have a fever  You have ear or sinus pain, or a headache  Your symptoms get worse, even after treatment  You have yellow, green, brown, or bloody mucus coming from your nose  Your nose is bleeding or you have pain inside your nose  You have trouble sleeping because of your symptoms  You have questions or concerns about your condition or care  Medicines:   Medicines  help decrease your symptoms and clear your stuffy nose  Take your medicine as directed  Contact your healthcare provider if you think your medicine is not helping or if you have side effects  Tell him of her if you are allergic to any medicine  Keep a list of the medicines, vitamins, and herbs you take  Include the amounts, and when and why you take them  Bring the list or the pill bottles to follow-up visits  Carry your medicine list with you in case of an emergency      How to manage allergic rhinitis:  The best way to manage allergic rhinitis is to avoid allergens that can trigger your symptoms  Any of the following may help decrease your symptoms:  Rinse your nose and sinuses  with a salt water solution or use a salt water nasal spray  This will help thin the mucus in your nose and rinse away pollen and dirt  It will also help reduce swelling so you can breathe normally  Ask your healthcare provider how often to rinse your nose  Reduce exposure to dust mites  Wash sheets and towels in hot water every week  Cover your pillows and mattresses with allergen-free covers  Limit the number of stuffed animals and soft toys your child has  Wash your child's toys in hot water regularly  Vacuum weekly and use a vacuum  with an air filter  If possible, get rid of carpets and curtains  These collect dust and dust mites  Reduce exposure to pollen  Keep windows and doors closed in your house and car  Stay inside when air pollution or the pollen count is high  Run your air conditioner on recycle, and change air filters often  Shower and wash your hair before bed every night to rinse away pollen  Reduce exposure to pet dander  If possible, do not keep cats, dogs, birds, or other pets  If you do keep pets in your home, keep them out of bedrooms and carpeted rooms  Bathe them often  Reduce exposure to mold  Do not spend time in basements  Choose artificial plants instead of live plants  Keep your home's humidity at less than 45%  Do not have ponds or standing water in your home or yard  Do not smoke  Avoid others who smoke  Ask your healthcare provider for information if you currently smoke and need help to quit  Follow up with your healthcare provider as directed: You may need to see an allergist often to control your symptoms  Write down your questions so you remember to ask them during your visits    © Copyright [employer] 2022 Information is for End User's use only and may not be sold, redistributed or otherwise used for commercial purposes  All illustrations and images included in CareNotes® are the copyrighted property of [employer]  or Nikole Cameron   The above information is an  only  It is not intended as medical advice for individual conditions or treatments  Talk to your doctor, nurse or pharmacist before following any medical regimen to see if it is safe and effective for you  Upper Respiratory Infection   WHAT YOU NEED TO KNOW:   What is an upper respiratory infection? An upper respiratory infection is also called a cold  It can affect your nose, throat, ears, and sinuses  You are more likely to get a cold in the winter  Your risk is also higher if you smoke cigarettes or have allergies, such as hay fever  What causes a cold? A cold is caused by a virus  Many viruses can cause a cold, and each is contagious  A virus may be spread to others through coughing, sneezing, or close contact  A virus can also stay on objects and surfaces  You can become infected if you touch the object or surface and then touch your eyes, mouth, or nose  What are the signs and symptoms of a cold? Cold symptoms are usually worst for the first 3 to 5 days  You may have any of the following:  Runny or stuffy nose    Sneezing and coughing    Sore throat or hoarseness    Red, watery, and sore eyes    A lack of energy and feeling tired    Chills and fever    Headache, body aches, or sore muscles    How is a cold treated? Colds are caused by viruses and do not get better with antibiotics  Most people get better in 7 to 14 days  You may continue to cough for 2 to 3 weeks  The following may help decrease your symptoms:  Decongestants  help reduce nasal congestion and help you breathe more easily  If you take decongestant pills, they may make you feel restless or cause problems with your sleep  Do not use decongestant sprays for longer than a few days  Cough suppressants  help reduce coughing   Ask your healthcare provider which type of cough medicine is best for you  NSAIDs , such as ibuprofen, help decrease swelling, pain, and fever  NSAIDs can cause stomach bleeding or kidney problems in certain people  If you take blood thinner medicine, always ask your healthcare provider if NSAIDs are safe for you  Always read the medicine label and follow directions  Acetaminophen  decreases pain and fever  It is available without a doctor's order  Ask how much to take and how often to take it  Follow directions  Read the labels of all other medicines you are using to see if they also contain acetaminophen, or ask your doctor or pharmacist  Acetaminophen can cause liver damage if not taken correctly  Do not use more than 4 grams (4,000 milligrams) total of acetaminophen in one day  How can I manage a cold? Rest as much as possible  Slowly start to do more each day  Drink more liquids as directed  Liquids will help thin and loosen mucus so you can cough it up  Liquids will also help prevent dehydration  Liquids that help prevent dehydration include water, fruit juice, and broth  Do not drink liquids that contain caffeine  Caffeine can increase your risk for dehydration  Ask your healthcare provider how much liquid to drink each day  Soothe a sore throat  Gargle with warm salt water  Make salt water by dissolving ¼ teaspoon salt in 1 cup warm water  You may also suck on hard candy or throat lozenges  You may use a sore throat spray  Use a humidifier or vaporizer  Use a cool mist humidifier or a vaporizer to increase air moisture in your home  This may make it easier for you to breathe and help decrease your cough  Use saline nasal drops as directed  These help relieve congestion  Apply petroleum-based jelly around the outside of your nostrils  This can decrease irritation from blowing your nose  Do not smoke  Nicotine and other chemicals in cigarettes and cigars can make your symptoms worse   They can also cause infections such as bronchitis or pneumonia  Ask your healthcare provider for information if you currently smoke and need help to quit  E-cigarettes or smokeless tobacco still contain nicotine  Talk to your healthcare provider before you use these products  What can I do to prevent the common cold? Wash your hands often  Use soap and water every time you wash your hands  Rub your soapy hands together, lacing your fingers  Use the fingers of one hand to scrub under the nails of the other hand  Wash for at least 20 seconds  Rinse with warm, running water for several seconds  Then dry your hands  Use germ-killing gel if soap and water are not available  Do not touch your eyes or mouth without washing your hands first          Cover a sneeze or cough  Use a tissue that covers your mouth and nose  Put the used tissue in the trash right away  Use the bend of your arm if a tissue is not available  Wash your hands well with soap and water or use a hand   Do not stand close to anyone who is sneezing or coughing  Try to stay away from others while you are sick  This is especially important during the first 2 to 3 days when the virus is more easily spread  Wait until a fever, cough, or other symptoms are gone before you return to work or other regular activities  Do not share items while you are sick  This includes food, drinks, eating utensils, and dishes  Call your local emergency number (911 in the [address] [place] [address],[address]) if:   You have chest pain or trouble breathing  When should I seek immediate care? You have a fever over 102ºF (39ºC)  When should I call my doctor? You have a low fever  Your sore throat gets worse or you see white or yellow spots in your throat  Your symptoms get worse after 3 to 5 days or are not better in 14 days  You have a rash anywhere on your skin  You have large, tender lumps in your neck  You have thick, green, or yellow drainage from your nose      You cough up thick yellow, green, or bloody mucus  You have a bad earache  You have questions or concerns about your condition or care  CARE AGREEMENT:   You have the right to help plan your care  Learn about your health condition and how it may be treated  Discuss treatment options with your healthcare providers to decide what care you want to receive  You always have the right to refuse treatment  The above information is an  only  It is not intended as medical advice for individual conditions or treatments  Talk to your doctor, nurse or pharmacist before following any medical regimen to see if it is safe and effective for you  © Copyright [employer] 2022 Information is for End User's use only and may not be sold, redistributed or otherwise used for commercial purposes   All illustrations and images included in CareNotes® are the copyrighted property of A D A M , Inc  or [address] [place], [place] [address]maira

## 2022-05-25 NOTE — TELEPHONE ENCOUNTER
This patient left a message again on voicemail regarding her runny nose  Please call her to schedule an appointment   Thank you

## 2022-05-25 NOTE — ASSESSMENT & PLAN NOTE
Patient to take over-the-counter cold and cough medication as needed  Coricidin base medications due to history of hypertension

## 2022-05-25 NOTE — PROGRESS NOTES
Virtual Brief Visit    Patient is located in the following state in which I hold an active license [place]      Assessment/Plan:    Problem List Items Addressed This Visit        Respiratory    Common cold     Patient to take over-the-counter cold and cough medication as needed  Coricidin base medications due to history of hypertension  Allergic rhinitis due to allergen     Patient to maintain Flonase as directed  Patient to avoid allergy triggers  Cardiovascular and Mediastinum    Essential hypertension     Patient indicates that she is getting a lot of physical therapy and will have them check her blood pressure after she is done with her visits  Patient to maintain Norvasc 2 5 mg p o  B i d   Maintain low-sodium diet  Walking courage 3-5 times per week for the 60 minutes  Other    Upper respiratory symptom - Primary    Relevant Medications    azithromycin (Zithromax) 250 mg tablet    Cough          Recent Visits  Date Type Provider Dept   05/20/22 Telephone [facility]ARY [facility] 112 recent visits within past 7 days and meeting all other requirements  Today's Visits  Date Type Provider Dept   05/25/22 Telemedicine Jay LIM, [facility] today's visits and meeting all other requirements  Future Appointments  No visits were found meeting these conditions    Showing future appointments within next 150 days and meeting all other requirements         I spent 25 minutes directly with the patient during this visit

## 2022-06-06 ENCOUNTER — TELEPHONE (OUTPATIENT)
Dept: FAMILY MEDICINE CLINIC | Facility: CLINIC | Age: 82
End: 2022-06-06

## 2022-06-06 DIAGNOSIS — Z00.8 EXAM FOR POPULATION SURVEY: Primary | ICD-10-CM

## 2022-06-06 DIAGNOSIS — Z13.89 SCREENING FOR BLOOD OR PROTEIN IN URINE: ICD-10-CM

## 2022-06-06 DIAGNOSIS — Z13.29 SCREENING FOR THYROID DISORDER: ICD-10-CM

## 2022-06-06 DIAGNOSIS — E55.9 VITAMIN D DEFICIENCY: ICD-10-CM

## 2022-06-06 DIAGNOSIS — Z13.220 SCREENING, LIPID: ICD-10-CM

## 2022-06-06 NOTE — TELEPHONE ENCOUNTER
Patient had seen you in the past and said everything had cleared up nicely  However,  She is so very cold and cannot get warm  Her neck is very stiff as well  But she is really concerned about being so cold  She would like you to call her

## 2022-06-07 ENCOUNTER — TELEPHONE (OUTPATIENT)
Dept: LAB | Facility: HOSPITAL | Age: 82
End: 2022-06-07

## 2022-06-10 NOTE — TELEPHONE ENCOUNTER
Patient called in and said the nystatin powder or cream you ordered for her is causing more of a issue  She has swelling on the feet and breast and she is scratching all over and she went and bought 1% anti itch cream and placing that on her areas that bother her  Not sure what you want to do but patient is calling for you to please call her 
Spoke to pt   I told her to clean area before applying new creams   Use both nystatin and antifungal cream   If not better by Monday she is to call us on Monday
Patient expressed no known problems or needs

## 2022-06-13 ENCOUNTER — OFFICE VISIT (OUTPATIENT)
Dept: PODIATRY | Facility: CLINIC | Age: 82
End: 2022-06-13

## 2022-06-13 VITALS — BODY MASS INDEX: 18.51 KG/M2 | WEIGHT: 101.2 LBS

## 2022-06-13 DIAGNOSIS — S42.201G CLOSED FRACTURE OF PROXIMAL END OF RIGHT HUMERUS WITH DELAYED HEALING, UNSPECIFIED FRACTURE MORPHOLOGY, SUBSEQUENT ENCOUNTER: Primary | ICD-10-CM

## 2022-06-13 DIAGNOSIS — L60.3 NAIL DYSTROPHY: ICD-10-CM

## 2022-06-13 PROCEDURE — NCFTCARE PR NON-COVERED FOOT CARE: Performed by: PODIATRIST

## 2022-06-13 NOTE — PROGRESS NOTES
Patient presents for palliative foot care  Pedal pulses are palpable  Treatment consisted of trimming multiple mycotic toenails    Dispensed foam pads to wear between great toes and 2nd toes

## 2022-06-15 ENCOUNTER — TELEPHONE (OUTPATIENT)
Dept: PAIN MEDICINE | Facility: CLINIC | Age: 82
End: 2022-06-15

## 2022-06-15 NOTE — TELEPHONE ENCOUNTER
Patient is having Right hand finger pain all hurt really bad, should she see ortho or can you help her?     Please advise    Thank you

## 2022-06-16 NOTE — TELEPHONE ENCOUNTER
FYI-    S/w pt  Per pt " My Rt hand and fingers are swollen  They feel tight   " Pt denies injury, redness, warmth or drainage and is having difficulty bending her fingers  Per pt " This has been like this for a while, I don't know how long?" Pt advised that she should go to UC or the ER to be seen  Pt verbalized understanding

## 2022-06-17 ENCOUNTER — LAB (OUTPATIENT)
Dept: LAB | Facility: HOSPITAL | Age: 82
End: 2022-06-17
Payer: MEDICARE

## 2022-06-17 DIAGNOSIS — Z13.29 SCREENING FOR THYROID DISORDER: ICD-10-CM

## 2022-06-17 DIAGNOSIS — Z13.220 SCREENING, LIPID: ICD-10-CM

## 2022-06-17 DIAGNOSIS — E55.9 VITAMIN D DEFICIENCY: ICD-10-CM

## 2022-06-17 DIAGNOSIS — Z00.8 EXAM FOR POPULATION SURVEY: ICD-10-CM

## 2022-06-17 LAB
25(OH)D3 SERPL-MCNC: 31.4 NG/ML (ref 30–100)
ALBUMIN SERPL BCP-MCNC: 3.4 G/DL (ref 3.5–5)
ALP SERPL-CCNC: 140 U/L (ref 46–116)
ALT SERPL W P-5'-P-CCNC: 42 U/L (ref 12–78)
ANION GAP SERPL CALCULATED.3IONS-SCNC: 5 MMOL/L (ref 4–13)
AST SERPL W P-5'-P-CCNC: 39 U/L (ref 5–45)
BASOPHILS # BLD AUTO: 0.04 THOUSANDS/ΜL (ref 0–0.1)
BASOPHILS NFR BLD AUTO: 1 % (ref 0–1)
BILIRUB SERPL-MCNC: 0.4 MG/DL (ref 0.2–1)
BUN SERPL-MCNC: 22 MG/DL (ref 5–25)
CALCIUM ALBUM COR SERPL-MCNC: 9.8 MG/DL (ref 8.3–10.1)
CALCIUM SERPL-MCNC: 9.3 MG/DL (ref 8.3–10.1)
CHLORIDE SERPL-SCNC: 106 MMOL/L (ref 100–108)
CHOLEST SERPL-MCNC: 194 MG/DL
CO2 SERPL-SCNC: 24 MMOL/L (ref 21–32)
CREAT SERPL-MCNC: 0.98 MG/DL (ref 0.6–1.3)
EOSINOPHIL # BLD AUTO: 0.12 THOUSAND/ΜL (ref 0–0.61)
EOSINOPHIL NFR BLD AUTO: 2 % (ref 0–6)
ERYTHROCYTE [DISTWIDTH] IN BLOOD BY AUTOMATED COUNT: 14.2 % (ref 11.6–15.1)
GFR SERPL CREATININE-BSD FRML MDRD: 53 ML/MIN/1.73SQ M
GLUCOSE SERPL-MCNC: 80 MG/DL (ref 65–140)
HCT VFR BLD AUTO: 41.9 % (ref 34.8–46.1)
HDLC SERPL-MCNC: 77 MG/DL
HGB BLD-MCNC: 13.1 G/DL (ref 11.5–15.4)
IMM GRANULOCYTES # BLD AUTO: 0.02 THOUSAND/UL (ref 0–0.2)
IMM GRANULOCYTES NFR BLD AUTO: 0 % (ref 0–2)
LDLC SERPL CALC-MCNC: 103 MG/DL (ref 0–100)
LYMPHOCYTES # BLD AUTO: 1.12 THOUSANDS/ΜL (ref 0.6–4.47)
LYMPHOCYTES NFR BLD AUTO: 21 % (ref 14–44)
MCH RBC QN AUTO: 30.6 PG (ref 26.8–34.3)
MCHC RBC AUTO-ENTMCNC: 31.3 G/DL (ref 31.4–37.4)
MCV RBC AUTO: 98 FL (ref 82–98)
MONOCYTES # BLD AUTO: 0.4 THOUSAND/ΜL (ref 0.17–1.22)
MONOCYTES NFR BLD AUTO: 8 % (ref 4–12)
NEUTROPHILS # BLD AUTO: 3.63 THOUSANDS/ΜL (ref 1.85–7.62)
NEUTS SEG NFR BLD AUTO: 68 % (ref 43–75)
NONHDLC SERPL-MCNC: 117 MG/DL
NRBC BLD AUTO-RTO: 0 /100 WBCS
PLATELET # BLD AUTO: 256 THOUSANDS/UL (ref 149–390)
PMV BLD AUTO: 11 FL (ref 8.9–12.7)
POTASSIUM SERPL-SCNC: 4.5 MMOL/L (ref 3.5–5.3)
PROT SERPL-MCNC: 7.9 G/DL (ref 6.4–8.2)
RBC # BLD AUTO: 4.28 MILLION/UL (ref 3.81–5.12)
SODIUM SERPL-SCNC: 135 MMOL/L (ref 136–145)
TRIGL SERPL-MCNC: 72 MG/DL
TSH SERPL DL<=0.05 MIU/L-ACNC: 2.61 UIU/ML (ref 0.45–4.5)
WBC # BLD AUTO: 5.33 THOUSAND/UL (ref 4.31–10.16)

## 2022-06-17 PROCEDURE — 80053 COMPREHEN METABOLIC PANEL: CPT

## 2022-06-17 PROCEDURE — 80061 LIPID PANEL: CPT

## 2022-06-17 PROCEDURE — 85025 COMPLETE CBC W/AUTO DIFF WBC: CPT

## 2022-06-17 PROCEDURE — 36415 COLL VENOUS BLD VENIPUNCTURE: CPT

## 2022-06-17 PROCEDURE — 82306 VITAMIN D 25 HYDROXY: CPT

## 2022-06-17 PROCEDURE — 84443 ASSAY THYROID STIM HORMONE: CPT

## 2022-06-23 ENCOUNTER — TELEPHONE (OUTPATIENT)
Dept: FAMILY MEDICINE CLINIC | Facility: CLINIC | Age: 82
End: 2022-06-23

## 2022-08-01 ENCOUNTER — TELEPHONE (OUTPATIENT)
Dept: PAIN MEDICINE | Facility: CLINIC | Age: 82
End: 2022-08-01

## 2022-08-01 DIAGNOSIS — M17.11 ARTHRITIS OF KNEE, RIGHT: Primary | ICD-10-CM

## 2022-08-01 RX ORDER — TRAMADOL HYDROCHLORIDE 50 MG/1
50 TABLET ORAL 2 TIMES DAILY PRN
Qty: 20 TABLET | Refills: 0 | Status: SHIPPED | OUTPATIENT
Start: 2022-08-01 | End: 2022-08-11

## 2022-08-01 NOTE — TELEPHONE ENCOUNTER
Patient only takes Tylenol requesting medication or some type of cream for her feet?     Please advise

## 2022-08-01 NOTE — TELEPHONE ENCOUNTER
Tramadol 50 mg twice a day as needed for moderate to severe pain sent to pharmacy   Provided patient with 10 day supply and please schedule an office follow-up and re-evaluation with me or Jennifer Fine in the next 10 days to establish pain contract if she wishes to proceed  Thank you    Okay to double book me

## 2022-08-01 NOTE — TELEPHONE ENCOUNTER
S/W pt  Pt stated her knee is driving her crazy then the left knee had a fluid bubble and the pain comes and goes  Advised her to ask podiatry about her feet  She stated she did call them and she sees them next month  Pt stated she may get a second option  She stated her shoulder is sore  She takes tylenol 1 pill daily prn, uses heat and ice makes it worse  Her knee pain is a 3/10 but her pain could be lower or higher  Pt asking what to do for her knee pain? Advised she can take tylenol up to 3,000 mg in 24 hrs and try elevation  Confirmed next appt w/ pt  Please advise

## 2022-08-03 NOTE — TELEPHONE ENCOUNTER
NICO-    S/w pt  Per pt she was having trouble with her phone over the last 2 days  Pt advised of same and verbalized understanding  Pt states she has been taking Tylenol and it is helping  Pt will take Tramadol if necessary   Pt was scheduled for an ov on 8/25 with Genora Seats for a 10:30 arrival time  (soonest available)

## 2022-08-03 NOTE — TELEPHONE ENCOUNTER
Please assist with obtaining accurate contact information and emergency contacts  Can not continue any type of medication management with difficulty with communications  Thank you

## 2022-08-03 NOTE — TELEPHONE ENCOUNTER
Attempted to call the patient and unable to LVM, phone is constantly busy  Attempted to contact [facility] as listed on her call list and they stated she was discharged back in 11/2020  There is noone listed under communication consent  What do you think?

## 2022-08-23 ENCOUNTER — TELEPHONE (OUTPATIENT)
Dept: FAMILY MEDICINE CLINIC | Facility: CLINIC | Age: 82
End: 2022-08-23

## 2022-08-23 NOTE — TELEPHONE ENCOUNTER
Patient has not been seen in some time  Is a possible to have her schedule appointment in the office,   For evaluation for DME

## 2022-08-23 NOTE — TELEPHONE ENCOUNTER
Pt called and left msg on machine stating that she is in need of a SEAT RISER she stated that it goes over the whole toilet it has legs and arms

## 2022-08-25 ENCOUNTER — OFFICE VISIT (OUTPATIENT)
Dept: PAIN MEDICINE | Facility: CLINIC | Age: 82
End: 2022-08-25
Payer: MEDICARE

## 2022-08-25 VITALS
BODY MASS INDEX: 17.74 KG/M2 | DIASTOLIC BLOOD PRESSURE: 75 MMHG | HEART RATE: 63 BPM | SYSTOLIC BLOOD PRESSURE: 124 MMHG | WEIGHT: 97 LBS

## 2022-08-25 DIAGNOSIS — G89.29 CHRONIC PAIN OF BOTH KNEES: ICD-10-CM

## 2022-08-25 DIAGNOSIS — M25.561 CHRONIC PAIN OF BOTH KNEES: ICD-10-CM

## 2022-08-25 DIAGNOSIS — M54.12 CERVICAL RADICULOPATHY: ICD-10-CM

## 2022-08-25 DIAGNOSIS — M54.2 NECK PAIN: ICD-10-CM

## 2022-08-25 DIAGNOSIS — G89.4 CHRONIC PAIN SYNDROME: Primary | ICD-10-CM

## 2022-08-25 DIAGNOSIS — M25.562 CHRONIC PAIN OF BOTH KNEES: ICD-10-CM

## 2022-08-25 PROCEDURE — 99214 OFFICE O/P EST MOD 30 MIN: CPT

## 2022-08-25 RX ORDER — METHYLPREDNISOLONE 4 MG/1
TABLET ORAL
Qty: 1 EACH | Refills: 0 | Status: SHIPPED | OUTPATIENT
Start: 2022-08-25 | End: 2022-10-26

## 2022-08-25 NOTE — PROGRESS NOTES
Assessment:  1  Chronic pain syndrome    2  Chronic pain of both knees    3  Cervical radiculopathy    4  Neck pain        Plan:  The patient is an 79-year-old female with a history of chronic pain secondary to neck pain, low back pain and pain in bilateral knees who presents to the office with multiple pain complaints throughout several areas of her body including neck, low back and bilateral knees  At this time I will provide her a Medrol Dosepak to complete to decrease inflammation and provide her relief of her multiple pain complaints  A script was sent to the patient's pharmacy  If she continues to experience severe bilateral knee pain after completing the Medrol Dosepak, I instructed patient to call our office and we can move forward in order bilateral knee injections  My impressions and treatment recommendations were discussed in detail with the patient who verbalized understanding and had no further questions  Discharge instructions were provided  I personally saw and examined the patient and I agree with the above discussed plan of care  No orders of the defined types were placed in this encounter  New Medications Ordered This Visit   Medications    methylPREDNISolone 4 MG tablet therapy pack     Sig: Use as directed on package     Dispense:  1 each     Refill:  0       History of Present Illness:  Yamile Lanza is a 79 y o  female with a history of chronic pain secondary to neck pain, low back pain and pain in bilateral knees  She was last seen on 02/08/2022 where she underwent a ultrasound-guided right knee injection  She presents to the office with worsening bilateral knee pain and also multiple complaints of pain in several areas of her body including neck and low back  She states her pain is worse since the last office visit and intermittent but worse at night    She rates the quality of her pain as dull/aching and sharp and is currently rating it a 6/10 on a numeric scale     She is currently taking Tylenol as needed for pain  She was prescribed tramadol by Dr Akosua De Leon, however she never picked this medication up and does not want to take it  I have personally reviewed and/or updated the patient's past medical history, past surgical history, family history, social history, current medications, allergies, and vital signs today  Review of Systems   Respiratory: Negative for shortness of breath  Cardiovascular: Negative for chest pain  Gastrointestinal: Negative for constipation, diarrhea, nausea and vomiting  Musculoskeletal: Positive for back pain, gait problem and joint swelling  Negative for arthralgias and myalgias  Skin: Negative for rash  Neurological: Negative for dizziness, seizures and weakness  All other systems reviewed and are negative        Patient Active Problem List   Diagnosis    Knee injury    HLD (hyperlipidemia)    Antibiotic-associated diarrhea    Fall    Traumatic hematoma of forehead    Closed fracture of proximal end of right humerus    Closed fracture of nasal bone    Age-related osteoporosis with current pathological fracture    Vitamin D deficiency    Stroke ([facility] [address] [address]    Mild cognitive impairment    Essential hypertension    Anxiety    Fx humeral neck, left, closed, initial encounter    Elevated liver enzymes    Encounter for support and coordination of transition of care    Chronic pain of right knee    Arthritis of knee, right    Sciatica    Upper respiratory symptom    Cough    Common cold    Rheumatoid arthritis, unspecified ([facility] [address] )    Personal history of transient ischemic attack (TIA), and cerebral infarction without residual deficits    Personal history of breast cancer    MCI (mild cognitive impairment) with memory loss    History of falling    Hereditary and idiopathic neuropathy, unspecified    Abnormal MRI    Abnormal results of liver function studies    Age-related osteoporosis without current pathological fracture    B12 deficiency    Constipation, unspecified    Difficult or painful urination    Allergic rhinitis due to allergen       Past Medical History:   Diagnosis Date    Allergic     Anxiety     Arthritis     Cancer ([facility]     HLD (hyperlipidemia)     Hypertension     Hyperthyroidism     Osteoporosis     Stroke ([facility]     TIA (transient ischemic attack)        Past Surgical History:   Procedure Laterality Date    BREAST LUMPECTOMY Left     HYSTERECTOMY      KNEE SURGERY Right     ORIF TIBIA FRACTURE Right 12/8/2016    Procedure: OPEN REDUCTION W/ INTERNAL FIXATION (ORIF) TIBIA;  Surgeon: Kong Simposn MD;  Location: [facility];  Service:        Family History   Problem Relation Age of Onset    Coronary artery disease Family     Other Family         DJD       Social History     Occupational History    Not on file   Tobacco Use    Smoking status: Never Smoker    Smokeless tobacco: Never Used    Tobacco comment: Former smoker - As per [facility] Vaping Use: Never used   Substance and Sexual Activity    Alcohol use: Not Currently     Comment: Alcohol intake:   Occasional  - As per Stacie Apa Drug use: Never    Sexual activity: Not Currently       Current Outpatient Medications on File Prior to Visit   Medication Sig    acetaminophen (TYLENOL) 325 mg tablet Take 3 tablets (975 mg total) by mouth every 8 (eight) hours    albuterol (PROVENTIL HFA,VENTOLIN HFA) 90 mcg/act inhaler Inhale 2 puffs every 6 (six) hours as needed for wheezing    amLODIPine (NORVASC) 2 5 mg tablet Take 1 tablet (2 5 mg total) by mouth 2 (two) times a day    bisacodyl (DULCOLAX) 5 mg EC tablet Take 5 mg by mouth daily as needed for constipation    cholecalciferol (VITAMIN D3) 1,000 units tablet Take 1 tablet (1,000 Units total) by mouth daily    diclofenac sodium (VOLTAREN) 1 % Apply 2 g topically 4 (four) times a day    fluticasone (FLONASE) 50 mcg/act nasal spray 1 spray into each nostril daily    multivitamin (THERAGRAN) TABS Take 1 tablet by mouth daily    nystatin (MYCOSTATIN) cream Apply topically 2 (two) times a day Apply to skin folds in affected areas 2 x a day for 7 days   Pirbuterol Acetate (MAXAIR AUTOHALER IN) Inhale one time by mouth every four hours as needed    tobramycin-dexamethasone (TOBRADEX) ophthalmic suspension Administer 1 drop to both eyes every 4 (four) hours while awake    [DISCONTINUED] aspirin 81 mg chewable tablet Chew 81 mg daily (Patient not taking: No sig reported)    [DISCONTINUED] lidocaine (LIDODERM) 5 % Apply 1 patch topically daily Remove & Discard patch within 12 hours or as directed by MD (Patient not taking: No sig reported)     No current facility-administered medications on file prior to visit  Allergies   Allergen Reactions    Alendronate     Penicillins Swelling    Raloxifene     Risedronate Sodium     Sulfa Antibiotics Swelling       Physical Exam:    /75   Pulse 63   Wt 44 kg (97 lb)   BMI 17 74 kg/m²     Constitutional:normal, well developed, well nourished, alert, in no distress and non-toxic and no overt pain behavior    Eyes:anicteric  HEENT:grossly intact  Neck:supple, symmetric, trachea midline and no masses   Pulmonary:even and unlabored  Cardiovascular:No edema or pitting edema present  Skin:Normal without rashes or lesions and well hydrated  Psychiatric:Mood and affect appropriate  Neurologic:Cranial Nerves II-XII grossly intact  Musculoskeletal:antalgic and Ambulates with a roller walker    Imaging

## 2022-08-25 NOTE — TELEPHONE ENCOUNTER
I called pt and left msg on machine stating to call back to schedule appt  stating that he would need to  see her for eval for the DME

## 2022-08-26 ENCOUNTER — TELEPHONE (OUTPATIENT)
Dept: FAMILY MEDICINE CLINIC | Facility: CLINIC | Age: 82
End: 2022-08-26

## 2022-08-26 NOTE — TELEPHONE ENCOUNTER
Pt asking for an order for a seat riser with arms for the toilet seat, as she is having a hard time getting up and down       [facility]

## 2022-08-29 LAB
DME PARACHUTE DELIVERY DATE REQUESTED: NORMAL
DME PARACHUTE ITEM DESCRIPTION: NORMAL
DME PARACHUTE ORDER STATUS: NORMAL
DME PARACHUTE SUPPLIER NAME: NORMAL
DME PARACHUTE SUPPLIER PHONE: NORMAL

## 2022-08-29 NOTE — PROGRESS NOTES
Patient requests a seat riser to get up and down from the toilet seat  Order placed to be delivered to patient's home by Suzette Dior

## 2022-08-29 NOTE — TELEPHONE ENCOUNTER
Patient requests a seat riser to get up and down from the toilet seat  Order placed to be delivered to patient's home by Yola Carrillo

## 2022-09-06 DIAGNOSIS — M17.11 ARTHRITIS OF KNEE, RIGHT: Primary | ICD-10-CM

## 2022-09-06 NOTE — TELEPHONE ENCOUNTER
S/w pt, she said the steroid pack did help her knee pain but once it ended her pain returned  Pt asking if she can have the knee injections that Heri Valdes mentioned

## 2022-09-06 NOTE — TELEPHONE ENCOUNTER
Sent message to RN to reach out regarding scheduling, she does not think knee injection is going to work

## 2022-09-06 NOTE — TELEPHONE ENCOUNTER
She had a right knee RFA was done last August, which she like to move forward with the RFA of the right knee? In regards to the left knee, we can do genicular knee blocks  Which knee is more painful?

## 2022-09-06 NOTE — TELEPHONE ENCOUNTER
S/w pt, she said the R knee is a lot worse than the left and would like to move forward with the injection for that

## 2022-09-06 NOTE — TELEPHONE ENCOUNTER
Pt called in with extreme pain in both of her legs and knees  pain level 10/10  Please be advised thank you    Pt can be reached @[phone]

## 2022-09-07 NOTE — TELEPHONE ENCOUNTER
Please cancel the knee RFA  She can follow up with MARINA at her OV on 9/20   I did not assess her low back at her last OV, just her knees, as that was the area causing her the most pain

## 2022-09-07 NOTE — TELEPHONE ENCOUNTER
Patient is aware procedure was cancelled and we will f/u with patient on 9/20 11:15 am     Thank you

## 2022-09-07 NOTE — TELEPHONE ENCOUNTER
S/w pt  Pt states that she had an ablation in the past and is adamant that she will not have it done again(scheduled 9/26)  Pt states that she will have an LUCILA if that will  help her pain  Pt has OVS with KW on 9/20    Please advise

## 2022-09-20 ENCOUNTER — OFFICE VISIT (OUTPATIENT)
Dept: PAIN MEDICINE | Facility: CLINIC | Age: 82
End: 2022-09-20
Payer: MEDICARE

## 2022-09-20 VITALS
SYSTOLIC BLOOD PRESSURE: 138 MMHG | HEART RATE: 64 BPM | DIASTOLIC BLOOD PRESSURE: 75 MMHG | WEIGHT: 97 LBS | BODY MASS INDEX: 17.74 KG/M2

## 2022-09-20 DIAGNOSIS — M17.11 ARTHRITIS OF KNEE, RIGHT: ICD-10-CM

## 2022-09-20 DIAGNOSIS — G89.4 CHRONIC PAIN SYNDROME: Primary | ICD-10-CM

## 2022-09-20 DIAGNOSIS — M54.2 NECK PAIN: ICD-10-CM

## 2022-09-20 DIAGNOSIS — M17.12 ARTHRITIS OF LEFT KNEE: ICD-10-CM

## 2022-09-20 PROCEDURE — 99214 OFFICE O/P EST MOD 30 MIN: CPT | Performed by: PHYSICAL MEDICINE & REHABILITATION

## 2022-09-20 NOTE — PROGRESS NOTES
Assessment:  1  Chronic pain syndrome    2  Arthritis of knee, right    3  Arthritis of left knee    4  Neck pain        Plan:  Ms John Quinn is a pleasant 78-year-old female who presents for follow-up and re-evaluation regarding ongoing neck and bilateral knee pain  We previously performed genicular radiofrequency ablation is which did provide some relief, however, patient does not wish to proceed with further ablation  We did discuss surgical intervention but patient again does not wish to undergo any surgeries regarding her ongoing knee arthritis  At this time we will order home health physical therapy with [facility] as well as plan for bilateral knee injections under ultrasound guidance to better assist with continued pain in her bilateral knees  All questions answered, patient is agreeable with plan  My impressions and treatment recommendations were discussed in detail with the patient who verbalized understanding and had no further questions  Discharge instructions were provided  I personally saw and examined the patient and I agree with the above discussed plan of care  Orders Placed This Encounter   Procedures    US guidance     Bilateral knee USGI  30 minutes please     Standing Status:   Future     Standing Expiration Date:   9/20/2026     Scheduling Instructions:      No prep required  Please bring your insurance cards, a form of photo ID and a list of your medications with you  Arrive 15 minutes prior to your appointment time in order to register  To schedule this appointment, please contact Central Scheduling at [phone]  Order Specific Question:   What is the patient's sedation requirement?      Answer:   No Sedation    EXTERNAL Referral to Home Health     Standing Status:   Future     Standing Expiration Date:   9/20/2023     Referral Priority:   Routine     Referral Type:   Home Health     Referral Reason:   Specialty Services Required     Referred to Provider: Rafa Oseis, PT     Requested Specialty:   Home Health Services     Number of Visits Requested:   1     Expiration Date:   9/20/2023     No orders of the defined types were placed in this encounter  History of Present Illness:  Aureliano Valencia is a 79 y o  female who presents for a follow up office visit in regards to Neck Pain and Knee Pain  The patients current symptoms include neck pain and bilateral knee pain currently rated 5/10 and described as a intermittent dull ache at night  Presents today for follow-up and re-evaluation  I have personally reviewed and/or updated the patient's past medical history, past surgical history, family history, social history, current medications, allergies, and vital signs today  Review of Systems   Respiratory: Negative for shortness of breath  Cardiovascular: Negative for chest pain  Gastrointestinal: Negative for constipation, diarrhea, nausea and vomiting  Musculoskeletal: Positive for joint swelling and neck pain  Negative for arthralgias, gait problem and myalgias  Skin: Negative for rash  Neurological: Positive for weakness  Negative for dizziness and seizures  All other systems reviewed and are negative        Patient Active Problem List   Diagnosis    Knee injury    HLD (hyperlipidemia)    Antibiotic-associated diarrhea    Fall    Traumatic hematoma of forehead    Closed fracture of proximal end of right humerus    Closed fracture of nasal bone    Age-related osteoporosis with current pathological fracture    Vitamin D deficiency    [facility]    Mild cognitive impairment    Essential hypertension    Anxiety    Fx humeral neck, left, closed, initial encounter    Elevated liver enzymes    Encounter for support and coordination of transition of care    Chronic pain of right knee    Arthritis of knee, right    Sciatica    Upper respiratory symptom    Cough    Common cold    Rheumatoid arthritis, unspecified ([facility] [address] )  Personal history of transient ischemic attack (TIA), and cerebral infarction without residual deficits    Personal history of breast cancer    MCI (mild cognitive impairment) with memory loss    History of falling    Hereditary and idiopathic neuropathy, unspecified    Abnormal MRI    Abnormal results of liver function studies    Age-related osteoporosis without current pathological fracture    B12 deficiency    Constipation, unspecified    Difficult or painful urination    Allergic rhinitis due to allergen       Past Medical History:   Diagnosis Date    Allergic     Anxiety     Arthritis     Cancer [facility] [address] [facility]     HLD (hyperlipidemia)     Hypertension     Hyperthyroidism     Osteoporosis     [facility]     TIA (transient ischemic attack)        Past Surgical History:   Procedure Laterality Date    BREAST LUMPECTOMY Left     HYSTERECTOMY      KNEE SURGERY Right     ORIF TIBIA FRACTURE Right 12/8/2016    Procedure: OPEN REDUCTION W/ INTERNAL FIXATION (ORIF) TIBIA;  Surgeon: Lea Jones MD;  Location:  MAIN OR;  Service:        Family History   Problem Relation Age of Onset    Coronary artery disease Family     Other Family         DJD       Social History     Occupational History    Not on file   Tobacco Use    Smoking status: Never Smoker    Smokeless tobacco: Never Used    Tobacco comment: Former smoker - As per [employer] Vaping Use: Never used   Substance and Sexual Activity    Alcohol use: Not Currently     Comment: Alcohol intake:   Occasional  - As per [employer] Drug use: Never    Sexual activity: Not Currently       Current Outpatient Medications on File Prior to Visit   Medication Sig    acetaminophen (TYLENOL) 325 mg tablet Take 3 tablets (975 mg total) by mouth every 8 (eight) hours    albuterol (PROVENTIL HFA,VENTOLIN HFA) 90 mcg/act inhaler Inhale 2 puffs every 6 (six) hours as needed for wheezing    amLODIPine (NORVASC) 2 5 mg tablet Take 1 tablet (2 5 mg total) by mouth 2 (two) times a day    bisacodyl (DULCOLAX) 5 mg EC tablet Take 5 mg by mouth daily as needed for constipation    cholecalciferol (VITAMIN D3) 1,000 units tablet Take 1 tablet (1,000 Units total) by mouth daily    diclofenac sodium (VOLTAREN) 1 % Apply 2 g topically 4 (four) times a day    fluticasone (FLONASE) 50 mcg/act nasal spray 1 spray into each nostril daily    methylPREDNISolone 4 MG tablet therapy pack Use as directed on package    multivitamin (THERAGRAN) TABS Take 1 tablet by mouth daily    nystatin (MYCOSTATIN) cream Apply topically 2 (two) times a day Apply to skin folds in affected areas 2 x a day for 7 days   Pirbuterol Acetate (MAXAIR AUTOHALER IN) Inhale one time by mouth every four hours as needed    tobramycin-dexamethasone (TOBRADEX) ophthalmic suspension Administer 1 drop to both eyes every 4 (four) hours while awake     No current facility-administered medications on file prior to visit  Allergies   Allergen Reactions    Alendronate     Penicillins Swelling    Raloxifene     Risedronate Sodium     Sulfa Antibiotics Swelling       Physical Exam:    /75   Pulse 64   Wt 44 kg (97 lb)   BMI 17 74 kg/m²     Constitutional:normal, well developed, well nourished, alert, in no distress and non-toxic and no overt pain behavior    Eyes:anicteric  HEENT:grossly intact  Neck:supple, symmetric, trachea midline and no masses   Pulmonary:even and unlabored  Cardiovascular:No edema or pitting edema present  Skin:Normal without rashes or lesions and well hydrated  Psychiatric:Mood and affect appropriate  Neurologic:Cranial Nerves II-XII grossly intact  Musculoskeletal:  Antalgic, tenderness to palpation bilateral medial lateral joint line knees    Imaging

## 2022-09-26 ENCOUNTER — TELEPHONE (OUTPATIENT)
Dept: PODIATRY | Facility: CLINIC | Age: 82
End: 2022-09-26

## 2022-09-26 NOTE — TELEPHONE ENCOUNTER
Cathy Woodson called, her nails are very long and twisted and she knows it is going to be painful when they are cut tomorrow  She does not need a call back but she was wondering if it is possible to use the spray on them so that she does not mind it so much

## 2022-09-29 ENCOUNTER — TELEPHONE (OUTPATIENT)
Dept: PAIN MEDICINE | Facility: CLINIC | Age: 82
End: 2022-09-29

## 2022-09-29 NOTE — TELEPHONE ENCOUNTER
Patient   [phone]  Dr Aixa Pete    Patient is calling in asking if the office has a list of home health aid services  Pt said that the person helping quit  Please follow up with pt

## 2022-09-30 NOTE — TELEPHONE ENCOUNTER
Attempted to reach pt  Busy signal   Pt was advised yesterday to reach out to PCPs office for recommendations

## 2022-10-04 DIAGNOSIS — H44.003 EYE INFECTION, BILATERAL: ICD-10-CM

## 2022-10-04 RX ORDER — TOBRAMYCIN AND DEXAMETHASONE 3; 1 MG/ML; MG/ML
1 SUSPENSION/ DROPS OPHTHALMIC
Qty: 5 ML | Refills: 1 | OUTPATIENT
Start: 2022-10-04

## 2022-10-05 NOTE — TELEPHONE ENCOUNTER
Caller: patient    Doctor: Damaso Mckeon    Reason for call: wants physical therapy information  Give her time to  the phone      Call back#: [phone]

## 2022-10-05 NOTE — TELEPHONE ENCOUNTER
I called pt to inform her of this information   She said she did not want to come in because she said she does not have an eye infection she has been on the medication for years and it really helps for dryness of the eyes and to prevent from any infection

## 2022-10-05 NOTE — TELEPHONE ENCOUNTER
LMOM to CB, CB# provided  KW placed order for [facility] 9/20, in comments "[facility] "

## 2022-10-05 NOTE — TELEPHONE ENCOUNTER
Caller: katarina herrera    Doctor: terri    Reason for call: returning call from nurses with fax number to [employer] fax PT script [phone]    Call back#: [phone]

## 2022-10-05 NOTE — TELEPHONE ENCOUNTER
She should probably use saline drops, long term use of antibiotics without infection for dry eyes is not the intended use

## 2022-10-10 NOTE — TELEPHONE ENCOUNTER
Caller: Shoaib Champagne     Doctor: Dr Jl Donovan     Reason for call: Procedure Instruction    Call [phone]

## 2022-10-11 NOTE — TELEPHONE ENCOUNTER
Caller: Patient     Doctor: Og Garcia    Reason for call: I relayed procedure instructions from procedure     Call back#: [phone]

## 2022-10-12 PROBLEM — J00 COMMON COLD: Status: RESOLVED | Noted: 2022-02-07 | Resolved: 2022-10-12

## 2022-10-12 PROBLEM — R05.9 COUGH: Status: RESOLVED | Noted: 2022-02-07 | Resolved: 2022-10-12

## 2022-10-13 ENCOUNTER — TELEPHONE (OUTPATIENT)
Dept: FAMILY MEDICINE CLINIC | Facility: CLINIC | Age: 82
End: 2022-10-13

## 2022-10-13 NOTE — TELEPHONE ENCOUNTER
Could you please call patient and explain procedure to her  I already explained it several times   thanks No

## 2022-10-13 NOTE — TELEPHONE ENCOUNTER
Pt called and left msg on machine stating that she called DR Sue Hernandez this morning due to having Urinary leakage problems , she stated that  [facility] office stated that she needed to contact her PCP , she stated  That she would like to know if she can have a nurse come to the house like she did before   [address]

## 2022-10-20 NOTE — TELEPHONE ENCOUNTER
Caller: Patient     Doctor: Claire Nguyen    Reason for call: pt states she hasn't heard from [address] [place] [address] going to reach out to them to schedule an appointment  She wasn't sure if she should do that, since she states she was told in our office that Lalo Gates would reach out to her      Call back#: [phone]

## 2022-10-20 NOTE — TELEPHONE ENCOUNTER
Home care PT ordered 9/20 for [facility] ([facility])  Pls see notation below  For clerical; do we need to fax the order to [facility])? Pt didn't get a call from [facility] rehab  Pls advise

## 2022-10-21 ENCOUNTER — TELEPHONE (OUTPATIENT)
Dept: FAMILY MEDICINE CLINIC | Facility: CLINIC | Age: 82
End: 2022-10-21

## 2022-10-21 NOTE — TELEPHONE ENCOUNTER
Pt called and left msg on machine stating that she would like you to put in an ORDER FOR HER TO DO PT ,she stated she wanted to go through [facility]   She stated that the phone number she has for them is  [phone]

## 2022-10-21 NOTE — TELEPHONE ENCOUNTER
When PT orders are placed we need to evaluate the patient for areas to be focused on for her physical therapy  Have not seen this patient in the office in quite some time  We would need to follow-up

## 2022-10-23 ENCOUNTER — APPOINTMENT (EMERGENCY)
Dept: CT IMAGING | Facility: HOSPITAL | Age: 82
End: 2022-10-23

## 2022-10-23 ENCOUNTER — APPOINTMENT (EMERGENCY)
Dept: RADIOLOGY | Facility: HOSPITAL | Age: 82
End: 2022-10-23

## 2022-10-23 ENCOUNTER — HOSPITAL ENCOUNTER (INPATIENT)
Facility: HOSPITAL | Age: 82
LOS: 3 days | Discharge: NURSING, REHAB OR LONG-TERM CARE | End: 2022-10-26
Attending: EMERGENCY MEDICINE | Admitting: SURGERY

## 2022-10-23 DIAGNOSIS — N82.0 VESICO-VAGINAL FISTULA: ICD-10-CM
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
## 2022-10-23 DIAGNOSIS — N82.0 VESICOVAGINAL FISTULA: ICD-10-CM

## 2022-10-23 DIAGNOSIS — M25.511 RIGHT SHOULDER PAIN: ICD-10-CM

## 2022-10-23 DIAGNOSIS — M25.551 RIGHT HIP PAIN: ICD-10-CM

## 2022-10-23 DIAGNOSIS — S32.591A INFERIOR PUBIC RAMUS FRACTURE, RIGHT, CLOSED, INITIAL ENCOUNTER (HCC): ICD-10-CM

## 2022-10-23 DIAGNOSIS — S42.212A FX HUMERAL NECK, LEFT, CLOSED, INITIAL ENCOUNTER: ICD-10-CM

## 2022-10-23 PROBLEM — H05.231: Status: ACTIVE | Noted: 2022-10-23

## 2022-10-23 PROBLEM — R26.2 AMBULATORY DYSFUNCTION: Status: ACTIVE | Noted: 2022-10-23

## 2022-10-23 LAB
ANION GAP SERPL CALCULATED.3IONS-SCNC: 8 MMOL/L (ref 4–13)
ATRIAL RATE: 80 BPM
BASOPHILS # BLD AUTO: 0.03 THOUSANDS/ÂΜL (ref 0–0.1)
BASOPHILS NFR BLD AUTO: 0 % (ref 0–1)
BUN SERPL-MCNC: 17 MG/DL (ref 5–25)
CALCIUM SERPL-MCNC: 9 MG/DL (ref 8.4–10.2)
CHLORIDE SERPL-SCNC: 105 MMOL/L (ref 96–108)
CO2 SERPL-SCNC: 25 MMOL/L (ref 21–32)
CREAT SERPL-MCNC: 0.91 MG/DL (ref 0.6–1.3)
EOSINOPHIL # BLD AUTO: 0.06 THOUSAND/ÂΜL (ref 0–0.61)
EOSINOPHIL NFR BLD AUTO: 1 % (ref 0–6)
ERYTHROCYTE [DISTWIDTH] IN BLOOD BY AUTOMATED COUNT: 14.9 % (ref 11.6–15.1)
GFR SERPL CREATININE-BSD FRML MDRD: 58 ML/MIN/1.73SQ M
GLUCOSE SERPL-MCNC: 121 MG/DL (ref 65–140)
HCT VFR BLD AUTO: 39.5 % (ref 34.8–46.1)
HGB BLD-MCNC: 12.5 G/DL (ref 11.5–15.4)
IMM GRANULOCYTES # BLD AUTO: 0.08 THOUSAND/UL (ref 0–0.2)
IMM GRANULOCYTES NFR BLD AUTO: 1 % (ref 0–2)
LYMPHOCYTES # BLD AUTO: 0.72 THOUSANDS/ÂΜL (ref 0.6–4.47)
LYMPHOCYTES NFR BLD AUTO: 8 % (ref 14–44)
MCH RBC QN AUTO: 30.3 PG (ref 26.8–34.3)
MCHC RBC AUTO-ENTMCNC: 31.6 G/DL (ref 31.4–37.4)
MCV RBC AUTO: 96 FL (ref 82–98)
MONOCYTES # BLD AUTO: 0.53 THOUSAND/ÂΜL (ref 0.17–1.22)
MONOCYTES NFR BLD AUTO: 6 % (ref 4–12)
NEUTROPHILS # BLD AUTO: 7.54 THOUSANDS/ÂΜL (ref 1.85–7.62)
NEUTS SEG NFR BLD AUTO: 84 % (ref 43–75)
NRBC BLD AUTO-RTO: 0 /100 WBCS
P AXIS: 59 DEGREES
PLATELET # BLD AUTO: 297 THOUSANDS/UL (ref 149–390)
PMV BLD AUTO: 10.5 FL (ref 8.9–12.7)
POTASSIUM SERPL-SCNC: 4.5 MMOL/L (ref 3.5–5.3)
PR INTERVAL: 120 MS
QRS AXIS: 14 DEGREES
QRSD INTERVAL: 78 MS
QT INTERVAL: 402 MS
QTC INTERVAL: 463 MS
RBC # BLD AUTO: 4.13 MILLION/UL (ref 3.81–5.12)
SODIUM SERPL-SCNC: 138 MMOL/L (ref 135–147)
T WAVE AXIS: 22 DEGREES
VENTRICULAR RATE: 80 BPM
WBC # BLD AUTO: 8.96 THOUSAND/UL (ref 4.31–10.16)

## 2022-10-23 RX ORDER — ACETAMINOPHEN 325 MG/1
975 TABLET ORAL EVERY 8 HOURS SCHEDULED
Status: DISCONTINUED | OUTPATIENT
Start: 2022-10-23 | End: 2022-10-26 | Stop reason: HOSPADM

## 2022-10-23 RX ORDER — OXYCODONE HYDROCHLORIDE 5 MG/1
2.5 TABLET ORAL EVERY 6 HOURS PRN
Status: DISCONTINUED | OUTPATIENT
Start: 2022-10-23 | End: 2022-10-26 | Stop reason: HOSPADM

## 2022-10-23 RX ORDER — FENTANYL CITRATE 50 UG/ML
0.25 INJECTION, SOLUTION INTRAMUSCULAR; INTRAVENOUS ONCE
Status: COMPLETED | OUTPATIENT
Start: 2022-10-23 | End: 2022-10-23

## 2022-10-23 RX ORDER — AMLODIPINE BESYLATE 2.5 MG/1
2.5 TABLET ORAL 2 TIMES DAILY
Status: DISCONTINUED | OUTPATIENT
Start: 2022-10-23 | End: 2022-10-26 | Stop reason: HOSPADM

## 2022-10-23 RX ORDER — FENTANYL CITRATE 50 UG/ML
25 INJECTION, SOLUTION INTRAMUSCULAR; INTRAVENOUS ONCE
Status: COMPLETED | OUTPATIENT
Start: 2022-10-23 | End: 2022-10-23

## 2022-10-23 RX ORDER — FLUTICASONE PROPIONATE 50 MCG
1 SPRAY, SUSPENSION (ML) NASAL DAILY
Status: DISCONTINUED | OUTPATIENT
Start: 2022-10-24 | End: 2022-10-26 | Stop reason: HOSPADM

## 2022-10-23 RX ORDER — ENOXAPARIN SODIUM 100 MG/ML
30 INJECTION SUBCUTANEOUS EVERY 12 HOURS
Status: DISCONTINUED | OUTPATIENT
Start: 2022-10-23 | End: 2022-10-23

## 2022-10-23 RX ORDER — MELATONIN
1000 DAILY
Status: DISCONTINUED | OUTPATIENT
Start: 2022-10-24 | End: 2022-10-26 | Stop reason: HOSPADM

## 2022-10-23 RX ORDER — HEPARIN SODIUM 5000 [USP'U]/ML
5000 INJECTION, SOLUTION INTRAVENOUS; SUBCUTANEOUS EVERY 8 HOURS SCHEDULED
Status: COMPLETED | OUTPATIENT
Start: 2022-10-23 | End: 2022-10-25

## 2022-10-23 RX ORDER — GABAPENTIN 100 MG/1
100 CAPSULE ORAL
Status: DISCONTINUED | OUTPATIENT
Start: 2022-10-23 | End: 2022-10-26 | Stop reason: HOSPADM

## 2022-10-23 RX ORDER — OXYCODONE HYDROCHLORIDE 5 MG/1
5 TABLET ORAL EVERY 6 HOURS PRN
Status: DISCONTINUED | OUTPATIENT
Start: 2022-10-23 | End: 2022-10-26 | Stop reason: HOSPADM

## 2022-10-23 RX ORDER — ALBUTEROL SULFATE 90 UG/1
2 AEROSOL, METERED RESPIRATORY (INHALATION) EVERY 6 HOURS PRN
Status: DISCONTINUED | OUTPATIENT
Start: 2022-10-23 | End: 2022-10-26 | Stop reason: HOSPADM

## 2022-10-23 RX ORDER — LIDOCAINE 50 MG/G
1 PATCH TOPICAL DAILY
Status: DISCONTINUED | OUTPATIENT
Start: 2022-10-24 | End: 2022-10-26 | Stop reason: HOSPADM

## 2022-10-23 RX ORDER — AMOXICILLIN 250 MG
1 CAPSULE ORAL 2 TIMES DAILY
Status: DISCONTINUED | OUTPATIENT
Start: 2022-10-23 | End: 2022-10-26 | Stop reason: HOSPADM

## 2022-10-23 RX ADMIN — AMLODIPINE BESYLATE 2.5 MG: 2.5 TABLET ORAL at 21:22

## 2022-10-23 RX ADMIN — HEPARIN SODIUM 5000 UNITS: 5000 INJECTION INTRAVENOUS; SUBCUTANEOUS at 22:54

## 2022-10-23 RX ADMIN — SODIUM CHLORIDE 500 ML: 0.9 INJECTION, SOLUTION INTRAVENOUS at 16:45

## 2022-10-23 RX ADMIN — FENTANYL CITRATE 25 MCG: 50 INJECTION INTRAMUSCULAR; INTRAVENOUS at 16:44

## 2022-10-23 RX ADMIN — IOHEXOL 85 ML: 350 INJECTION, SOLUTION INTRAVENOUS at 16:37

## 2022-10-23 RX ADMIN — ACETAMINOPHEN 975 MG: 325 TABLET ORAL at 21:22

## 2022-10-23 RX ADMIN — OXYCODONE HYDROCHLORIDE 5 MG: 5 TABLET ORAL at 21:22

## 2022-10-23 RX ADMIN — GABAPENTIN 100 MG: 100 CAPSULE ORAL at 21:28

## 2022-10-23 NOTE — H&P
[phone], 79 y o  female MRN: [id number]  Unit/Bed#: ED-22 Encounter: [id number]  Primary Care Provider: VALDEMAR Fraga   Date and time admitted to hospital: 10/23/2022  3:24 PM    Periorbital hematoma, right  Assessment & Plan  - Secondary to fall  - Monitor for changes  - Check CBC    Ambulatory dysfunction  Assessment & Plan  - Patient reports unable to walk secondary to pain  - Pain management per primary team  - PT/OT evaluation  - Encourage up and out of bed    Hypertension  Assessment & Plan  - Patient states she was on Amlodipine but her physician said she "didn't need it anymore"  - Consider starting Norvasc vs Lopressor pending heart rate  - Monitor blood pressure      Fall  Assessment & Plan  - Patient fell from standing with head strike  - Tenderness in Right shoulder, right hip, right knee and left 5 digit of hand  - Consider CT imaging to rule out questionable fractures, X-ray negative        Trauma Alert: Evaluation; trauma team notified at [address] via text  Model of Arrival: Ambulance  Trauma Team: Attending Dr Donny Sanchez and Residents Dr Sanrda Adames  Consultants:     None    Trauma Active Problems:   - Right periorbital hematoma  - Right shoulder pain  - Right hip pain  - Left 5th digit of the hand pain    Trauma Plan:   - Admit to Trauma service  - Pain management protocol  - PT/OT evaluation  - Encourage up and out of bed   - Airway clearance protocol  - Consult CM    Chief Complaint: "I hurt all over"    History of Present Illness   HPI:  Luke Amaya is a 79 y o  female who presents with a complaint of right sided body pain secondary to a fall  The patient reports that she had just walked in her house with her POA and lost her balance when she turned to talk to him  She fell onto a rug and struck the right side of her head on the floor  She landed on the entire right side of her body    She denies taking anticoagulation medication or losing consciousness  She can recall the entire event  She now reports headache as well as right shoulder pain with decreased range of motion of the right upper extremity  She also reports right hip pain as well as right knee pain and weakness with moving her right lower extremity  She also reports bruising and tenderness of the left 5th digit of her hand  She rates her current pain a 7/10  Review of Systems   Constitutional: Positive for fatigue  Negative for chills and fever  HENT: Negative for congestion, rhinorrhea and sore throat  Eyes: Negative for photophobia, pain and visual disturbance  Respiratory: Negative for shortness of breath, wheezing and stridor  Cardiovascular: Negative for chest pain, palpitations and leg swelling  Gastrointestinal: Negative for abdominal distention, abdominal pain, diarrhea, nausea and vomiting  Endocrine: Negative  Genitourinary: Negative for dysuria and hematuria  Musculoskeletal: Positive for arthralgias and gait problem  Negative for neck pain and neck stiffness  Skin: Negative for color change and wound  Allergic/Immunologic: Negative  Neurological: Positive for headaches  Negative for dizziness, seizures, syncope, facial asymmetry, weakness, light-headedness and numbness  Hematological: Negative  Psychiatric/Behavioral: Negative  All other systems reviewed and are negative        Historical Information       Past Medical History:   Past Medical History:   Diagnosis Date   • Allergic    • Anxiety    • Arthritis    • Cancer ([facility] [address] )    • HLD (hyperlipidemia)    • Hypertension    • Hyperthyroidism    • Osteoporosis    • Stroke [facility])    • TIA (transient ischemic attack)        Past Surgical History:   Past Surgical History:   Procedure Laterality Date   • BREAST LUMPECTOMY Left    • HYSTERECTOMY     • KNEE SURGERY Right    • ORIF TIBIA FRACTURE Right 12/8/2016    Procedure: OPEN REDUCTION W/ INTERNAL FIXATION (ORIF) TIBIA;  Surgeon: Servando Perez MD;  Location: [facility];  Service:        Social History:  Alcohol Use:   Social History     Substance and Sexual Activity   Alcohol Use Not Currently    Comment: Alcohol intake:   Occasional  - As per John Feliciano      Drug Use:   Social History     Substance and Sexual Activity   Drug Use Never     Tobacco Use:   Social History     Tobacco Use   Smoking Status Never Smoker   Smokeless Tobacco Never Used   Tobacco Comment    Former smoker - As per John Feliciano        Immunizations:   Immunization History   Administered Date(s) Administered   • Pneumococcal Conjugate 13-Valent 08/16/2007   • Tdap 10/19/2020       Last Tetanus: Unknown  Family History: non-contributory  Unable to obtain/limited by None     Meds/Allergies   all current active meds have been reviewed     Allergies   Allergen Reactions   • Alendronate    • Penicillins Swelling   • Raloxifene    • Risedronate Sodium    • Sulfa Antibiotics Swelling       PHYSICAL EXAM    PE limited by: None    Objective   Vitals:   First set: Temperature: 97 8 °F (36 6 °C) (10/23/22 1815)  Pulse: 76 (10/23/22 1545)  Respirations: 18 (10/23/22 1545)  Blood Pressure: (!) 182/88 (10/23/22 1545)    Primary Survey:   (A) Airway: Patent  (B) Breathing: Bilateral breath sounds  (C) Circulation: Pulses:   pedal  2/4 and radial  2/4  (D) Disabliity:  GCS Total:  15  (E) Expose:  Completed    Secondary Survey: (Click on Physical Exam tab above)    Physical Exam  Vitals and nursing note reviewed  Constitutional:       General: She is not in acute distress  Appearance: Normal appearance  She is normal weight  She is not ill-appearing  Comments: Patient is sitting in her ED bed in a right shoulder sling  She appears sore with movement but in no acute distress  HENT:      Head: Normocephalic  Comments: Right sided 2 cm periorbital hematoma     Nose: Nose normal       Mouth/Throat:      Mouth: Mucous membranes are moist       Pharynx: Oropharynx is clear  Eyes:      Extraocular Movements: Extraocular movements intact  Conjunctiva/sclera: Conjunctivae normal       Pupils: Pupils are equal, round, and reactive to light  Cardiovascular:      Rate and Rhythm: Normal rate and regular rhythm  Pulses: Normal pulses  Heart sounds: Normal heart sounds  No murmur heard  No friction rub  Pulmonary:      Effort: Pulmonary effort is normal  No respiratory distress  Breath sounds: Normal breath sounds  No stridor  No wheezing, rhonchi or rales  Chest:      Chest wall: No tenderness  Abdominal:      General: Abdomen is flat  Bowel sounds are normal  There is no distension  Palpations: Abdomen is soft  Tenderness: There is no abdominal tenderness  There is no guarding or rebound  Musculoskeletal:         General: Tenderness and signs of injury present  No swelling or deformity  Cervical back: Normal range of motion and neck supple  No rigidity or tenderness  Right lower leg: No edema  Left lower leg: No edema  Comments: Tenderness of the right shoulder  Tenderness of the right hip  Tenderness of the right knee  Tenderness of the left fifth digit of the hand   Skin:     General: Skin is warm and dry  Capillary Refill: Capillary refill takes less than 2 seconds  Findings: Bruising present  No erythema or rash  Comments: Ecchymosis of the right periorbital region and left fifth digit of the hand   Neurological:      General: No focal deficit present  Mental Status: She is alert and oriented to person, place, and time  Mental status is at baseline  Cranial Nerves: No cranial nerve deficit  Sensory: No sensory deficit  Motor: No weakness  Comments: 4/5 weakness of the right lower extremity secondary to pain         Invasive Devices  Report    Peripheral Intravenous Line  Duration           Peripheral IV 10/23/22 Distal;Dorsal (posterior); Right Forearm <1 day    Peripheral IV 10/23/22 Right Antecubital <1 day          Drain  Duration           External Urinary Catheter <1 day                Lab Results:   BMP/CMP:   Lab Results   Component Value Date    SODIUM 138 10/23/2022    K 4 5 10/23/2022     10/23/2022    CO2 25 10/23/2022    BUN 17 10/23/2022    CREATININE 0 91 10/23/2022    CALCIUM 9 0 10/23/2022    EGFR 58 10/23/2022    and CBC:   Lab Results   Component Value Date    WBC 8 96 10/23/2022    HGB 12 5 10/23/2022    HCT 39 5 10/23/2022    MCV 96 10/23/2022     10/23/2022    MCH 30 3 10/23/2022    MCHC 31 6 10/23/2022    RDW 14 9 10/23/2022    MPV 10 5 10/23/2022    NRBC 0 10/23/2022     Imaging/EKG Studies:   CT head wo contrast   Final Result by Tressa Sheriff MD (10/23 1746)      No acute intracranial abnormality  Chronic left PCA territory infarct  Large right superior periorbital subcutaneous hematoma  Workstation performed: [id number]         CT spine cervical without contrast   Final Result by Tressa Sheriff MD (10/23 1750)      No cervical spine fracture or traumatic malalignment  Workstation performed: [id number]         CT facial bones without contrast   Final Result by Tressa Sheriff MD (10/23 1748)      No acute osseous abnormality  Large right superior periorbital subcutaneous hematoma  Workstation performed: [id number]         CT chest abdomen pelvis w contrast   Final Result by Tressa Sheriff MD (10/23 1817)      No evidence of acute posttraumatic abnormality  Colonic diverticulosis  Workstation performed: [id number]         XR hand 3+ views LEFT   Final Result by Christian Herman MD (10/23 1806)      Polyarticular arthritis as described  No acute fracture or dislocation appreciated              Workstation performed: [id number]         XR humerus RIGHT    (Results Pending)   XR hip/pelv 2-3 vws right if performed    (Results Pending)       Other Studies: None    Code Status: Prior  Advance Directive and Living Will:      Power of :    POLST:      Counseling / Coordination of Care  Total floor / unit time spent today 30 minutes  This involved direct patient contact where I performed a full history and physical, reviewed previous records, and reviewed laboratory and other diagnostic studies  Total Critical Care time spent 30 minutes excluding procedures, teaching and family updates

## 2022-10-23 NOTE — ED PROVIDER NOTES
History  Chief Complaint   Patient presents with   • Fall     Pt presents via EMS  Pt tripped and fell at home and landed on her right side  Pt c/o pain in the right upper arm and right leg  +headstrike; -thinners  Pt is unable to straighten her right leg due to pain  80 yo female presented to the ED via EMS on 2022 after an unwitnessed trip and fall w/ head strike but no loss of consciousness  Pt fell in their residence on the right side  EMS arrived noted deformity of the right humorous and an expanding hematoma on the right frontal bone above the orbit  Pt notes right arm pain right leg pain, right pinky pain  Pt denies any current medications and is not on blood thinners  Pt is allergic to penicillin  EMS noted BP of 174/86 and was given 25mcg fentanyl en route  History provided by:  Patient   used: No        Prior to Admission Medications   Prescriptions Last Dose Informant Patient Reported? Taking?    Pirbuterol Acetate (MAXAIR AUTOHALER IN)  Self Yes No   Sig: Inhale one time by mouth every four hours as needed   acetaminophen (TYLENOL) 325 mg tablet  Self No No   Sig: Take 3 tablets (975 mg total) by mouth every 8 (eight) hours   albuterol (PROVENTIL HFA,VENTOLIN HFA) 90 mcg/act inhaler  Self Yes No   Sig: Inhale 2 puffs every 6 (six) hours as needed for wheezing   amLODIPine (NORVASC) 2 5 mg tablet  Self No No   Sig: Take 1 tablet (2 5 mg total) by mouth 2 (two) times a day   bisacodyl (DULCOLAX) 5 mg EC tablet  Self Yes No   Sig: Take 5 mg by mouth daily as needed for constipation   cholecalciferol (VITAMIN D3) 1,000 units tablet  Self No No   Sig: Take 1 tablet (1,000 Units total) by mouth daily   diclofenac sodium (VOLTAREN) 1 %  Self No No   Sig: Apply 2 g topically 4 (four) times a day   fluticasone (FLONASE) 50 mcg/act nasal spray  Self No No   Si spray into each nostril daily   methylPREDNISolone 4 MG tablet therapy pack   No No   Sig: Use as directed on package   multivitamin (THERAGRAN) TABS  Self Yes No   Sig: Take 1 tablet by mouth daily   nystatin (MYCOSTATIN) cream   No No   Sig: Apply topically 2 (two) times a day Apply to skin folds in affected areas 2 x a day for 7 days  tobramycin-dexamethasone (TOBRADEX) ophthalmic suspension  Self No No   Sig: Administer 1 drop to both eyes every 4 (four) hours while awake      Facility-Administered Medications: None       Past Medical History:   Diagnosis Date   • Allergic    • Anxiety    • Arthritis    • Cancer (HCC)    • HLD (hyperlipidemia)    • Hypertension    • Hyperthyroidism    • Osteoporosis    • Stroke [facility])    • TIA (transient ischemic attack)        Past Surgical History:   Procedure Laterality Date   • BREAST LUMPECTOMY Left    • HYSTERECTOMY     • KNEE SURGERY Right    • ORIF TIBIA FRACTURE Right 12/8/2016    Procedure: OPEN REDUCTION W/ INTERNAL FIXATION (ORIF) TIBIA;  Surgeon: Francisco Javier Emery MD;  Location: [facility];  Service:        Family History   Problem Relation Age of Onset   • Coronary artery disease Family    • Other Family         DJD     I have reviewed and agree with the history as documented  E-Cigarette/Vaping   • E-Cigarette Use Never User      E-Cigarette/Vaping Substances   • Nicotine No    • THC No    • CBD No    • Flavoring No    • Other No    • Unknown No      Social History     Tobacco Use   • Smoking status: Never Smoker   • Smokeless tobacco: Never Used   • Tobacco comment: Former smoker - As per Merly Rodarte   • Vaping Use: Never used   Substance Use Topics   • Alcohol use: Not Currently     Comment: Alcohol intake:   Occasional  - As per [place]    • Drug use: Never        Review of Systems   Eyes: Positive for pain  Respiratory: Negative  Cardiovascular: Negative  Gastrointestinal: Negative  Genitourinary: Negative  Musculoskeletal:        Right shoulder / right hip pain and limited range of motion  Neurological: Negative  Psychiatric/Behavioral: Negative  All other systems reviewed and are negative  Physical Exam  ED Triage Vitals   Temperature Pulse Respirations Blood Pressure SpO2   10/23/22 1815 10/23/22 1545 10/23/22 1545 10/23/22 1545 10/23/22 1545   97 8 °F (36 6 °C) 76 18 (!) 182/88 97 %      Temp src Heart Rate Source Patient Position - Orthostatic VS BP Location FiO2 (%)   -- 10/23/22 1804 10/23/22 1804 10/23/22 1804 --    Monitor Lying Left arm       Pain Score       10/23/22 1638       8             Orthostatic Vital Signs  Vitals:    10/23/22 1545 10/23/22 1804 10/23/22 1815   BP: (!) 182/88 (!) 201/105 (!) 195/100   Pulse: 76 84    Patient Position - Orthostatic VS:  Lying        Physical Exam  Vitals and nursing note reviewed  Constitutional:       General: She is in acute distress  Appearance: She is normal weight  She is not ill-appearing, toxic-appearing or diaphoretic  HENT:      Head: Normocephalic  Abrasion and contusion present  No raccoon eyes, Mcfarlane's sign, masses, right periorbital erythema, left periorbital erythema or laceration  Hair is normal         Right Ear: External ear normal       Left Ear: External ear normal       Nose: Nose normal  No congestion  Mouth/Throat:      Mouth: Mucous membranes are moist       Pharynx: Oropharynx is clear  No oropharyngeal exudate or posterior oropharyngeal erythema  Eyes:      General: No scleral icterus  Right eye: No discharge  Left eye: No discharge  Extraocular Movements: Extraocular movements intact  Conjunctiva/sclera: Conjunctivae normal       Pupils: Pupils are equal, round, and reactive to light  Cardiovascular:      Rate and Rhythm: Normal rate  Pulses: Normal pulses  Pulmonary:      Effort: Pulmonary effort is normal    Abdominal:      General: Abdomen is flat  Palpations: Abdomen is soft  Tenderness: There is no abdominal tenderness  There is no guarding or rebound     Musculoskeletal: Hands:         Back:       Right lower leg: No edema  Left lower leg: No edema  Legs:       Comments: Right upper extremity tenderness in region of humorous  Pt cannot extend right shoulder  Skin:     Coloration: Skin is not jaundiced  Neurological:      Mental Status: She is alert  Cranial Nerves: No cranial nerve deficit     Psychiatric:         Behavior: Behavior normal          ED Medications  Medications   fentanyl citrate (PF) (FOR EMS ONLY) 100 mcg/2 mL injection 25 mcg (0 mcg Does not apply Given to EMS 10/23/22 1542)   fentanyl citrate (PF) 100 MCG/2ML 25 mcg (25 mcg Intravenous Given 10/23/22 1644)   sodium chloride 0 9 % bolus 500 mL (500 mL Intravenous New Bag 10/23/22 1645)   iohexol (OMNIPAQUE) 350 MG/ML injection (SINGLE-DOSE) 85 mL (85 mL Intravenous Given 10/23/22 1637)       Diagnostic Studies  Results Reviewed     Procedure Component Value Units Date/Time    Basic metabolic panel [[id number]] Collected: 10/23/22 1605    Lab Status: Final result Specimen: Blood from Arm, Right Updated: 10/23/22 1638     Sodium 138 mmol/L      Potassium 4 5 mmol/L      Chloride 105 mmol/L      CO2 25 mmol/L      ANION GAP 8 mmol/L      BUN 17 mg/dL      Creatinine 0 91 mg/dL      Glucose 121 mg/dL      Calcium 9 0 mg/dL      eGFR 58 ml/min/1 73sq m     Narrative:      [facility] guidelines for Chronic Kidney Disease (CKD):   •  Stage 1 with normal or high GFR (GFR > 90 mL/min/1 73 square meters)  •  Stage 2 Mild CKD (GFR = 60-89 mL/min/1 73 square meters)  •  Stage 3A Moderate CKD (GFR = 45-59 mL/min/1 73 square meters)  •  Stage 3B Moderate CKD (GFR = 30-44 mL/min/1 73 square meters)  •  Stage 4 Severe CKD (GFR = 15-29 mL/min/1 73 square meters)  •  Stage 5 End Stage CKD (GFR <15 mL/min/1 73 square meters)  Note: GFR calculation is accurate only with a steady state creatinine    CBC and differential [[id number]]  (Abnormal) Collected: 10/23/22 1605    Lab Status: Final result Specimen: Blood from Arm, Right Updated: 10/23/22 1612     WBC 8 96 Thousand/uL      RBC 4 13 Million/uL      Hemoglobin 12 5 g/dL      Hematocrit 39 5 %      MCV 96 fL      MCH 30 3 pg      MCHC 31 6 g/dL      RDW 14 9 %      MPV 10 5 fL      Platelets 484 Thousands/uL      nRBC 0 /100 WBCs      Neutrophils Relative 84 %      Immat GRANS % 1 %      Lymphocytes Relative 8 %      Monocytes Relative 6 %      Eosinophils Relative 1 %      Basophils Relative 0 %      Neutrophils Absolute 7 54 Thousands/µL      Immature Grans Absolute 0 08 Thousand/uL      Lymphocytes Absolute 0 72 Thousands/µL      Monocytes Absolute 0 53 Thousand/µL      Eosinophils Absolute 0 06 Thousand/µL      Basophils Absolute 0 03 Thousands/µL                  CT head wo contrast   Final Result by Evelyne Malave MD (10/23 1746)      No acute intracranial abnormality  Chronic left PCA territory infarct  Large right superior periorbital subcutaneous hematoma  Workstation performed: [id number]         CT spine cervical without contrast   Final Result by Evelyne Malave MD (10/23 1750)      No cervical spine fracture or traumatic malalignment  Workstation performed: [id number]         CT facial bones without contrast   Final Result by Evelyne Malave MD (10/23 1748)      No acute osseous abnormality  Large right superior periorbital subcutaneous hematoma  Workstation performed: [id number]         CT chest abdomen pelvis w contrast   Final Result by Evelyne Malave MD (10/23 1817)      No evidence of acute posttraumatic abnormality  Colonic diverticulosis  Workstation performed: [id number]         XR hand 3+ views LEFT   Final Result by Trenton Hansen MD (10/23 1806)      Polyarticular arthritis as described  No acute fracture or dislocation appreciated              Workstation performed: [id number]         XR humerus RIGHT    (Results Pending)   XR hip/pelv 2-3 vws right if performed    (Results Pending)         Procedures  Procedures      ED Course                                       MDM  Number of Diagnoses or Management Options     Amount and/or Complexity of Data Reviewed  Clinical lab tests: reviewed and ordered  Tests in the radiology section of CPT®: ordered and reviewed  Tests in the medicine section of CPT®: ordered and reviewed  Discuss the patient with other providers: yes  Independent visualization of images, tracings, or specimens: yes    Risk of Complications, Morbidity, and/or Mortality  Presenting problems: high  Diagnostic procedures: moderate  Management options: high    Patient Progress  Patient progress: stable      Disposition  Final diagnoses:   Fall, initial encounter   Right shoulder pain   Right hip pain     Time reflects when diagnosis was documented in both MDM as applicable and the Disposition within this note     Time User Action Codes Description Comment    10/23/2022  6:54 PM Reola Chrissy Add Eb Gale Fall, initial encounter     10/23/2022  6:54 PM Reola W[place]am Add [P18 351] Right shoulder pain     10/23/2022  6:54 PM Reola Chrissy Add [M25 [id number]] Right hip pain       ED Disposition     None      Follow-up Information    None         Patient's Medications   Discharge Prescriptions    No medications on file     No discharge procedures on file  PDMP Review       Value Time User    PDMP Reviewed  Yes 9/20/2022 11:39 AM Raymundo Hardwick DO           ED Provider  Attending physically available and evaluated Arian Sauceda I managed the patient along with the ED Attending      Electronically Signed by         Celina Zamora MD  10/23/22 [address], [place]  10/23/22 8465

## 2022-10-23 NOTE — ED ATTENDING ATTESTATION
10/23/2022  I, Morris Alonzo MD, saw and evaluated the patient  I have discussed the patient with the resident/non-physician practitioner and agree with the resident's/non-physician practitioner's findings, Plan of Care, and MDM as documented in the resident's/non-physician practitioner's note, except where noted  All available labs and Radiology studies were reviewed  I was present for key portions of any procedure(s) performed by the resident/non-physician practitioner and I was immediately available to provide assistance  At this point I agree with the current assessment done in the Emergency Department  I have conducted an independent evaluation of this patient a history and physical is as follows:  Mechanical fall, no trauma criteria, periorbital ecchymosis to right eye  No pain on extraocular movements  No proptosis  No visual deficits  Pain in right upper extremity as well as right lower extremity  She did hit her head but did not lose consciousness  She has no chest pain, abdominal pain  She is unsure exactly why she fell  Review of Systems - Negative except right eye swelling/bruising, right upper arm pain, right lower extremity pain, left small finger pain  Physical Exam  Vitals and nursing note reviewed  Constitutional:       General: She is not in acute distress  Appearance: She is well-developed  HENT:      Head:      Comments: Right periorbital ecchymosis  Eyes:      Extraocular Movements: Extraocular movements intact  Pupils: Pupils are equal, round, and reactive to light  Neck:      Comments: C-collar in place  Cardiovascular:      Rate and Rhythm: Normal rate and regular rhythm  Heart sounds: Normal heart sounds  No murmur heard  Pulmonary:      Effort: Pulmonary effort is normal  No respiratory distress  Breath sounds: Normal breath sounds  No wheezing or rales  Abdominal:      General: Bowel sounds are normal  There is no distension  Palpations: Abdomen is soft  Tenderness: There is no abdominal tenderness  There is no guarding or rebound  Musculoskeletal:         General: No deformity  Comments: Limited range of motion right upper and right lower extremity secondary to pain  Strong distal pulse  Well-perfused extremity  Skin:     Capillary Refill: Capillary refill takes less than 2 seconds  Findings: No erythema or rash  Neurological:      Mental Status: She is alert and oriented to person, place, and time  Cranial Nerves: No cranial nerve deficit  Motor: No abnormal muscle tone  Coordination: Coordination normal    Psychiatric:         Behavior: Behavior normal        Mechanical fall, questionable new humerus fracture  Of note, patient had significant humerus fracture recorded in 2021  Patient states more pain and difficulty moving  Possibility of new fracture is not completely ruled out on x-ray  Sling applied  Has periorbital ecchymosis  Significant right hip pain, no acute fracture noted on CT scan  Pain control with fentanyl x2  Patient will need admission for PT/OT at the minimal   Discussed with Trauma team who see and evaluate patient, likely admit to their service      Results Reviewed     Procedure Component Value Units Date/Time    Basic metabolic panel [[id number]] Collected: 10/23/22 1605    Lab Status: Final result Specimen: Blood from Arm, Right Updated: 10/23/22 1638     Sodium 138 mmol/L      Potassium 4 5 mmol/L      Chloride 105 mmol/L      CO2 25 mmol/L      ANION GAP 8 mmol/L      BUN 17 mg/dL      Creatinine 0 91 mg/dL      Glucose 121 mg/dL      Calcium 9 0 mg/dL      eGFR 58 ml/min/1 73sq m     Narrative:      [facility] guidelines for Chronic Kidney Disease (CKD):   •  Stage 1 with normal or high GFR (GFR > 90 mL/min/1 73 square meters)  •  Stage 2 Mild CKD (GFR = 60-89 mL/min/1 73 square meters)  •  Stage 3A Moderate CKD (GFR = 45-59 mL/min/1 73 square meters)  •  Stage 3B Moderate CKD (GFR = 30-44 mL/min/1 73 square meters)  •  Stage 4 Severe CKD (GFR = 15-29 mL/min/1 73 square meters)  •  Stage 5 End Stage CKD (GFR <15 mL/min/1 73 square meters)  Note: GFR calculation is accurate only with a steady state creatinine    CBC and differential [[id number]]  (Abnormal) Collected: 10/23/22 1605    Lab Status: Final result Specimen: Blood from Arm, Right Updated: 10/23/22 1612     WBC 8 96 Thousand/uL      RBC 4 13 Million/uL      Hemoglobin 12 5 g/dL      Hematocrit 39 5 %      MCV 96 fL      MCH 30 3 pg      MCHC 31 6 g/dL      RDW 14 9 %      MPV 10 5 fL      Platelets 291 Thousands/uL      nRBC 0 /100 WBCs      Neutrophils Relative 84 %      Immat GRANS % 1 %      Lymphocytes Relative 8 %      Monocytes Relative 6 %      Eosinophils Relative 1 %      Basophils Relative 0 %      Neutrophils Absolute 7 54 Thousands/µL      Immature Grans Absolute 0 08 Thousand/uL      Lymphocytes Absolute 0 72 Thousands/µL      Monocytes Absolute 0 53 Thousand/µL      Eosinophils Absolute 0 06 Thousand/µL      Basophils Absolute 0 03 Thousands/µL         CT head wo contrast   Final Result by Marky French MD (10/23 1746)      No acute intracranial abnormality  Chronic left PCA territory infarct  Large right superior periorbital subcutaneous hematoma  Workstation performed: [id number]         CT spine cervical without contrast   Final Result by Marky French MD (10/23 1750)      No cervical spine fracture or traumatic malalignment  Workstation performed: [id number]         CT facial bones without contrast   Final Result by Marky French MD (10/23 1748)      No acute osseous abnormality  Large right superior periorbital subcutaneous hematoma  Workstation performed: [id number]         CT chest abdomen pelvis w contrast   Final Result by Marky French MD (10/23 1817)      No evidence of acute posttraumatic abnormality  Colonic diverticulosis  Workstation performed: [id number]         XR hand 3+ views LEFT   Final Result by Andie Bridges MD (10/23 1806)      Polyarticular arthritis as described  No acute fracture or dislocation appreciated              Workstation performed: [id number]         XR humerus RIGHT    (Results Pending)   XR hip/pelv 2-3 vws right if performed    (Results Pending)             ED Course         Critical Care Time  Procedures

## 2022-10-23 NOTE — ASSESSMENT & PLAN NOTE
- Patient fell from standing with head strike  - Tenderness in Right shoulder, right hip, right knee and left 5 digit of hand  - Consider CT imaging to rule out questionable fractures, X-ray negative

## 2022-10-23 NOTE — ASSESSMENT & PLAN NOTE
- Patient states she was on Amlodipine but her physician said she "didn't need it anymore"  - Consider starting Norvasc vs Lopressor pending heart rate  - Monitor blood pressure

## 2022-10-23 NOTE — ASSESSMENT & PLAN NOTE
- Patient reports unable to walk secondary to pain  - Pain management per primary team  - PT/OT evaluation  - Encourage up and out of bed

## 2022-10-24 ENCOUNTER — APPOINTMENT (INPATIENT)
Dept: CT IMAGING | Facility: HOSPITAL | Age: 82
End: 2022-10-24

## 2022-10-24 ENCOUNTER — APPOINTMENT (INPATIENT)
Dept: RADIOLOGY | Facility: HOSPITAL | Age: 82
End: 2022-10-24

## 2022-10-24 LAB
ABO GROUP BLD: NORMAL
BLD GP AB SCN SERPL QL: NEGATIVE
RH BLD: POSITIVE
SPECIMEN EXPIRATION DATE: NORMAL

## 2022-10-24 RX ADMIN — ACETAMINOPHEN 975 MG: 325 TABLET ORAL at 21:34

## 2022-10-24 RX ADMIN — GABAPENTIN 100 MG: 100 CAPSULE ORAL at 21:34

## 2022-10-24 RX ADMIN — AMLODIPINE BESYLATE 2.5 MG: 2.5 TABLET ORAL at 09:51

## 2022-10-24 RX ADMIN — Medication 1000 UNITS: at 09:51

## 2022-10-24 RX ADMIN — SENNOSIDES AND DOCUSATE SODIUM 1 TABLET: 8.6; 5 TABLET ORAL at 17:35

## 2022-10-24 RX ADMIN — HEPARIN SODIUM 5000 UNITS: 5000 INJECTION INTRAVENOUS; SUBCUTANEOUS at 14:22

## 2022-10-24 RX ADMIN — LIDOCAINE 5% 1 PATCH: 700 PATCH TOPICAL at 09:51

## 2022-10-24 RX ADMIN — ACETAMINOPHEN 975 MG: 325 TABLET ORAL at 06:16

## 2022-10-24 RX ADMIN — ACETAMINOPHEN 975 MG: 325 TABLET ORAL at 14:22

## 2022-10-24 RX ADMIN — HEPARIN SODIUM 5000 UNITS: 5000 INJECTION INTRAVENOUS; SUBCUTANEOUS at 06:16

## 2022-10-24 RX ADMIN — HEPARIN SODIUM 5000 UNITS: 5000 INJECTION INTRAVENOUS; SUBCUTANEOUS at 21:34

## 2022-10-24 RX ADMIN — AMLODIPINE BESYLATE 2.5 MG: 2.5 TABLET ORAL at 21:34

## 2022-10-24 RX ADMIN — SENNOSIDES AND DOCUSATE SODIUM 1 TABLET: 8.6; 5 TABLET ORAL at 09:51

## 2022-10-24 RX ADMIN — FLUTICASONE PROPIONATE 1 SPRAY: 50 SPRAY, METERED NASAL at 11:29

## 2022-10-24 NOTE — PROGRESS NOTES
[facility]  Progress Note - Shelia Covington 1940, 79 y o  female MRN: [id number]  Unit/Bed#: W -Jennifer Encounter: [id number]  Primary Care Provider: VALDEMAR Taylor   Date and time admitted to hospital: 10/23/2022  3:24 PM    Periorbital hematoma, right  Assessment & Plan  - Secondary to fall  - Monitor for changes  - Check CBC    Ambulatory dysfunction  Assessment & Plan  - Patient reports unable to walk secondary to pain  - Pain management per primary team  - PT/OT evaluation  - Encourage up and out of bed    Hypertension  Assessment & Plan  - Patient states she was on Amlodipine but her physician said she "didn't need it anymore"  - Consider starting Norvasc vs Lopressor pending heart rate  - Monitor blood pressure      Fall  Assessment & Plan  - Patient fell from standing with head strike  - Tenderness in Right shoulder, right hip, right knee and left 5 digit of hand  - Consider CT imaging to rule out questionable fractures, X-ray negative          TERTIARY TRAUMA SURVEY NOTE    Prophylaxis: Sequential compression device (Venodyne)  and Heparin    Disposition: Continue level of care until evaluated by PT for rehab recommendations  Code status:  Level 1 - Full Code    Consultants: PT/OT, Gerontology      SUBJECTIVE:     Transfer from: emergency department  Mechanism of Injury:Fall    Chief Complaint: "I feel so much better this morning"    HPI/Last 24 hour events: The patient fell from a standing position onto her right side  Imaging was negative for acute traumatic injury but the patient was unable to ambulate and was in a great deal of pain  She reports that her pain is much improved this morning  She says that she slept well and is pulling 1000 on IS  She denies pain or discomfort at this time      Active medications:           Current Facility-Administered Medications:   •  acetaminophen (TYLENOL) tablet 975 mg, 975 mg, Oral, Q8H [address], 975 mg at 10/23/22 2122  • albuterol (PROVENTIL HFA,VENTOLIN HFA) inhaler 2 puff, 2 puff, Inhalation, Q6H PRN  •  amLODIPine (NORVASC) tablet 2 5 mg, 2 5 mg, Oral, BID, 2 5 mg at 10/23/22 2122  •  bisacodyl (DULCOLAX) EC tablet 5 mg, 5 mg, Oral, Daily PRN  •  cholecalciferol (VITAMIN D3) tablet 1,000 Units, 1,000 Units, Oral, Daily  •  fluticasone (FLONASE) 50 mcg/act nasal spray 1 spray, 1 spray, Nasal, Daily  •  gabapentin (NEURONTIN) capsule 100 mg, 100 mg, Oral, HS, 100 mg at 10/23/22 2128  •  heparin (porcine) subcutaneous injection 5,000 Units, 5,000 Units, Subcutaneous, Q8H [address], 5,000 Units at 10/23/22 2254  •  lidocaine (LIDODERM) 5 % patch 1 patch, 1 patch, Topical, Daily  •  naloxone (NARCAN) 0 04 mg/mL syringe 0 04 mg, 0 04 mg, Intravenous, Q1MIN PRN  •  oxyCODONE (ROXICODONE) IR tablet 2 5 mg, 2 5 mg, Oral, Q6H PRN  •  oxyCODONE (ROXICODONE) IR tablet 5 mg, 5 mg, Oral, Q6H PRN, 5 mg at 10/23/22 2122  •  senna-docusate sodium (SENOKOT S) 8 6-50 mg per tablet 1 tablet, 1 tablet, Oral, BID      OBJECTIVE:     Vitals:   Vitals:    10/24/22 0409   BP: 157/84   Pulse: 78   Resp: 16   Temp:    SpO2:        Physical Exam:   GENERAL APPEARANCE: No acute distress, resting in her bed  NEURO: GCS 15, no neurological deficits  HEENT: PERRLA, EOMI, NCAT  CV: RRR, no murmurs or rubs appreciated  LUNGS: CTA bilaterally, normal effort, no wheezing  GI: soft, non tender, nondistended  : voiding  MSK: 5/5 strength, no edema, decreased ROM in the RUE and arm is in a sling  SKIN: warm, pink and dry      1  Before the illness or injury that brought you to the Emergency, did you need someone to help you on a regular basis? 1=Yes   2  Since the illness or injury that brought you to the Emergency, have you needed more help than usual to take care of yourself? 1=Yes   3  Have you been hospitalized for one or more nights during the past 6 months (excluding a stay in the Emergency Department)? 1=Yes   4  In general, do you see well? 1=No   5   In general, do you have serious problems with your memory? 0=No   6  Do you take more than three different medications everyday? 1=Yes   TOTAL   5     Did you order a geriatric consult if the score was 2 or greater?: yes                I/O:   I/O     None          Invasive Devices: Invasive Devices  Report    Peripheral Intravenous Line  Duration           Peripheral IV 10/23/22 Distal;Dorsal (posterior); Right Forearm <1 day    Peripheral IV 10/23/22 Right Antecubital <1 day          Drain  Duration           External Urinary Catheter 1 day                  Imaging:   XR humerus RIGHT    Result Date: 10/23/2022  Impression: Fracture right humeral surgical neck which most likely represents a refracture although a previously ununited fracture would also be a consideration  Findings marked for immediate notification in Epic  Workstation performed: [id number]     XR hand 3+ views LEFT    Result Date: 10/23/2022  Impression: Polyarticular arthritis as described  No acute fracture or dislocation appreciated  Workstation performed: [id number]     CT head wo contrast    Result Date: 10/23/2022  Impression: No acute intracranial abnormality  Chronic left PCA territory infarct  Large right superior periorbital subcutaneous hematoma  Workstation performed: [id number]     CT facial bones without contrast    Result Date: 10/23/2022  Impression: No acute osseous abnormality  Large right superior periorbital subcutaneous hematoma  Workstation performed: [id number]     CT spine cervical without contrast    Result Date: 10/23/2022  Impression: No cervical spine fracture or traumatic malalignment  Workstation performed: [id number]     CT chest abdomen pelvis w contrast    Result Date: 10/23/2022  Impression: No evidence of acute posttraumatic abnormality  Colonic diverticulosis   Workstation performed: [id number]       Labs: None today

## 2022-10-24 NOTE — DISCHARGE INSTRUCTIONS
Discharge Instructions - Orthopedics  Asia Spence 79 y o  female MRN: [id number]  Unit/Bed#: W -01    Weight Bearing Status:                                           Weight bearing as tolerated to the right upper extremity  Sling for comfort only  Pain:  Continue analgesics as directed    Dressing Instructions:   Please keep clean, dry and intact until follow up     Appt Instructions: If you do not have your appointment, please call the clinic at [phone] to schedule a 2 week return visit  Contact the office sooner if you experience any increased numbness/tingling in the extremities

## 2022-10-24 NOTE — OCCUPATIONAL THERAPY NOTE
Occupational Therapy Evaluation     Patient Name: Bhumika Murray  PNCRZ'O Date: 10/24/2022  Problem List  Principal Problem:    Ambulatory dysfunction  Active Problems:    Fall    Hypertension    Periorbital hematoma, right    Past Medical History  Past Medical History:   Diagnosis Date    Allergic     Anxiety     Arthritis     Cancer ([facility] )     HLD (hyperlipidemia)     Hypertension     Hyperthyroidism     Osteoporosis     Stroke ([facility] )     TIA (transient ischemic attack)      Past Surgical History  Past Surgical History:   Procedure Laterality Date    BREAST LUMPECTOMY Left     HYSTERECTOMY      KNEE SURGERY Right     ORIF TIBIA FRACTURE Right 12/8/2016    Procedure: OPEN REDUCTION W/ INTERNAL FIXATION (ORIF) TIBIA;  Surgeon: Malick Martinez MD;  Location: [facility];  Service:              10/24/22 1223   OT Last Visit   OT Visit Date 10/24/22   Note Type   Note type Evaluation   Pain Assessment   Pain Assessment Tool 0-10   Pain Score 8   Pain Location/Orientation Orientation: Right;Location: Arm;Location: Leg   Restrictions/Precautions   Weight Bearing Precautions Per Order Yes   RUE Weight Bearing Per Order WBAT   Braces or Orthoses Sling  (for comfort)   Other Precautions Cognitive; Bed Alarm; Chair Alarm;Pain; Fall Risk   Home Living   Type of [address] [place] [address] Two level;Performs ADLs on one level; Able to live on main level with bedroom/bathroom   Bathroom Shower/Tub   (shower is upstairs, pt sponge bathes on first floor)   Bathroom Toilet Raised   Bathroom Equipment Commode  (commode over toilet)   [facility]  (use of RW at baseline)   Additional Comments Pt is a questionable historian at times, providiing conflicting information   Prior Function   Level of [place] Independent with ADLs; Independent with functional mobility   Lives With (S)  Alone   Receives Help From Neighbor   IADLs Family/Friend/Other provides transportation; Independent with meal prep; Independent with medication management  (neighbor provides groceries and drives)   Falls in the last 6 months 1 to 4   Vocational Retired   Comments Pt reports that she only goes out of the house once or twice a month   Lifestyle   Autonomy PTA pt living alone in [facility] with [address], pt (I) with ADLs and IADLs, (+)falls, (-)drives, use of RW at baseline   Reciprocal Relationships supportive POA: Karl Baeza, and supportive neighbors   Service to Others retired   Intrinsic Gratification crocheting table runners   General   Family/Caregiver Present No   Subjective   Subjective "I broke my arm a few years ago and now I can't use it"   ADL   Eating Assistance 4  Minimal Assistance   Eating Deficit   (A with set up of lunch)   Grooming Assistance 4  Minimal Assistance   Grooming Deficit Increased time to complete;Verbal cueing;Supervision/safety  (combing hair)   UB Bathing Assistance 3  Moderate Assistance   LB Bathing Assistance 2  Maximal Assistance   UB Dressing Assistance 2  Maximal Assistance   LB Dressing Assistance 1  Total Assistance   LB Dressing Deficit Don/doff R sock; Don/doff L sock; Increased time to complete;Supervision/safety;Verbal cueing   Toileting Assistance  2  Maximal Assistance   Bed Mobility   Supine to Sit 2  Maximal assistance   Additional items Assist x 1; Increased time required;Verbal cues;LE management   Transfers   Sit to Stand 3  Moderate assistance   Additional items Assist x 2; Increased time required;Verbal cues   Stand to Sit 3  Moderate assistance   Additional items Assist x 2; Increased time required;Verbal cues   Additional Comments use of RW   Functional Mobility   Functional Mobility 2  Maximal assistance   Additional Comments Ax2, side stepping to recliner chair   Additional items Rolling walker   Balance   Static Sitting Fair   Dynamic Sitting Fair -   Static Standing Zero  (Ax2)   Ambulatory Zero  (Ax2)   Activity Tolerance   Activity Tolerance Patient limited by fatigue;Patient limited by pain   Medical Staff Made Aware PT [address], [place] Leonora Laws, JENNIFER Ortega Poisson Assessment   RUE Assessment X  (Pt protective of arm and declining active movement due to pain)   LUE Assessment   LUE Assessment WFL   Hand Function   Gross Motor Coordination Impaired   Fine Motor Coordination Impaired   Hand Function Comments Poor hand eye coordination, unable to use BUE to grasp sock  Able to reach for sock with LUE, unable to bring to RUE without assistance   Vision-Basic Assessment   Current Vision   (Pt declining to don her glasses at this time stating "well it wouldn't do much good now would it?")   Cognition   Overall Cognitive Status Impaired   Arousal/Participation Alert; Cooperative   Attention Attends with cues to redirect   Orientation Level Oriented to person;Oriented to place; Disoriented to time;Oriented to situation  (states November)   Memory Decreased recall of recent events;Decreased short term memory;Decreased recall of precautions;Decreased recall of biographical information  (pt is a poor historian)   Following Commands Follows one step commands with increased time or repetition   Comments Pt nonsensical at times, limited problem solving and safety awareness   Mini-Cog Assessment   Word Version Version 1: Banana, Sunrise, Chair   Clock Draw (CDT) 0  (unable to draw numbers or hands on clock: drawing 16 lines with a center starting point (looks like pie slices))   Word Recall 1   Mini-Cog Score 1   Mini-Cog Results Positive screen for dementia   Mini-Cog Assessment Comment Pt would benefit from continued cognitive evaluation   Assessment   Limitation Decreased ADL status; Decreased UE strength;Decreased Safe judgement during ADL;Decreased cognition;Decreased endurance;Decreased self-care trans;Decreased high-level ADLs   Prognosis Good   Assessment Pt is a 79 y o  female seen for OT evaluation s/p admission to [address] [place] [address] on 10/23/2022 due to fall  Pt diagnosed with Ambulatory dysfunction   Pt has a significant PMH impacting occupational performance including: HTN, periorbital hematoma, hx R shldr injury  Pt with no recent admissions in the last 2 months  Pt with active OT evaluation and treatment orders and activity orders for Up in chair  PTA pt living alone in [facility] with [address], pt (I) with ADLs and IADLs, (+)falls, (-)drives, use of RW at baseline  Pt is motivated to return to home  Personal and environmental factors supporting pt at time of IE include attitude towards recovery  Personal and environmental factors inhibiting engagement in occupations include advanced age, current habits and behavioral patterns, limited social support, inaccessible home environment, difficulty completing ADLs and difficulty completing IADLs  During evaluation pt performed as is outlined above in flowsheet  Pt required VC for safety and VC for attention to task  Standardized assessments used to assist in identifying performance deficits include AMPAC 6-Clicks and Barthel ADL Index  Performance deficits that affect the pt’s occupational performance during the initial evaluation include impaired balance, functional mobility, endurance, activity tolerance, gross motor coordination, forward functional reach, functional standing tolerance and overall strength, attention to task, direction following, communication and social skills, safety awareness, insight into deficits, problem solving and learning/remembering new tasks  Based on pt’s functional performance and deficits the following occupations will be addressed in OT treatments in order to maximize pt’s independence and overall occupational performance: grooming, bathing/showering, toileting and toilet hygiene, dressing and functional mobility  Goals are listed below  Upon discharge from acute care setting recommend d/c to [address] [place] [address]   This evaluation required an extensive review of medical and/or therapy records and additional review of physical, cognitive and psychosocial history related to functional performance  Based upon functional performance deficits and assessments, this evaluation has been identified as a high complexity evaluation  Goals   Patient Goals to go home   LTG Time Frame 10-14   Long Term Goal see goals listed below   Plan   Treatment Interventions ADL retraining;Functional transfer training; Endurance training;Cognitive reorientation;Patient/family training;Equipment evaluation/education; Compensatory technique education; Activityengagement   Goal Expiration Date 11/03/22   OT Treatment Day 0   OT Frequency 3-5x/wk   Recommendation   OT Discharge Recommendation Post acute rehabilitation services   AM-PAC Daily Activity Inpatient   Lower Body Dressing 1   Bathing 2   Toileting 2   Upper Body Dressing 2   Grooming 3   Eating 3   Daily Activity Raw Score 13   Daily Activity Standardized Score (Calc for Raw Score >=11) 32 03   AM-PAC Applied Cognition Inpatient   Following a Speech/Presentation 3   Understanding Ordinary Conversation 3   Taking Medications 3   Remembering Where Things Are Placed or Put Away 1   Remembering List of 4-5 Errands 1   Taking Care of Complicated Tasks 1   Applied Cognition Raw Score 12   Applied Cognition Standardized Score 28 82   Barthel Index   Feeding 5   Bathing 0   Grooming Score 0   Dressing Score 5   Bladder Score 5   Bowels Score 10   Toilet Use Score 5   Transfers (Bed/Chair) Score 5   Mobility (Level Surface) Score 0   Stairs Score 0   Barthel Index Score 35   End of Consult   Patient Position at End of Consult Bedside chair;Bed/Chair alarm activated; All needs within reach     GOALS:      -Patient will perform grooming tasks sitting at sink with overall Mod I in order to increase overall independence    -Patient will be Min A  with UB ADLs using AE and AD as needed in order to increase (I) with ADLs    -Patient will be Min A  with LB ADLs with use of AE and AD as needed in order to increase (I) with ADLs    -Patient will complete toileting w/ Mod A  w/ G hygiene/thoroughness in order to reduce caregiver burden    -Patient will demonstrate Mod A x 1 with bed mobility for ability to manage own comfort and initiate OOB tasks      -Patient will perform functional transfers with Mod A x 1 to/from all surfaces using DME as needed in order to increase (I) with functional tasks    -Patient will be Mod A x 1 with functional mobility to/from bathroom for increased independence with toileting tasks    -Patient will tolerate therapeutic activities for greater than 30 min, in order to increase tolerance for functional activities      -Patient will engage in ongoing cognitive assessment in order to assist with safe discharge planning/recommendations     -Patient will follow multi-step instructions with no VC in order to safely complete functional tasks       The patient's raw score on the -PAC Daily Activity inpatient short form is 13, standardized score is 32 03, less than 39 4  Patients at this level are likely to benefit from discharge to post-acute rehabilitation services  Please refer to the recommendation of the Occupational Therapist for safe discharge planning  This session, pt required and most appropriately benefited from skilled OT/PT co-eval due to significant regression from functional baseline and decreased activity tolerance  OT and PT goals were addressed separately as seen in documentation       [facility], MS, OTR/L

## 2022-10-24 NOTE — PLAN OF CARE
Problem: PHYSICAL THERAPY ADULT  Goal: Performs mobility at highest level of function for planned discharge setting  See evaluation for individualized goals  Description: Treatment/Interventions: Functional transfer training, LE strengthening/ROM, Therapeutic exercise, Endurance training, Patient/family training, Equipment eval/education, Bed mobility, Gait training, Cognitive reorientation  Equipment Recommended: Tish Mckeon       See flowsheet documentation for full assessment, interventions and recommendations  10/24/2022 1527 by Boni Rodriguez PT  Note: Prognosis: Fair  Problem List: Decreased strength, Decreased endurance, Impaired balance, Decreased mobility, Decreased cognition, Impaired judgement, Decreased safety awareness, Orthopedic restrictions, Pain  Assessment: Bakari Licea is a 79 y o  Female who [facility]2022 due to fall and diagnosis of ambulatory dysfunction  Orders for PT eval and treat received, w/ activity orders of up in chair and WBAT RUE w/ sling for comfort  Comorbidities affecting pt at time of evaluation include: HTN, R periorbital hematoma, CVA, anxiety, mild cognitive impairment, rheumatoid arthritis, TIA  Personal factors affecting DC include: inaccessible home environment, ambulating w/ assistive device, stairs to enter home, inability to ambulate household distances, inability to navigate level surfaces w/o external assistance, decreased cognition, limited home support, positive fall history and inability to perform IADLs  At baseline, pt reports mobilizing mod I w/ RW, and w/ at least 1 fall(s) in the previous 6 months  Upon evaluation, pt presents w/ the following deficits: weakness, impaired balance, decreased endurance, pain limiting functional mobility and gait deviations  Pt currently requires max Ax1 for bed mobility, mod Ax2 for transfers, and max Ax2 w/ RW for ambulation   Pt's clinical presentation is unstable/unpredictable due to need for assist w/ all phases of mobility when usually mobilizing independently, tolerance to only 2 feet of ambulation, pain impacting overall mobility status, need for input for mobility technique/safety, recent drastic decline in mobility compared to baseline and recent history of falls  Pt is at an increased risk of falls due to impaired cognition, decreased safety awareness, pain limiting functional mobility  From a PT/mobility standpoint, given the above findings, DC recommendation is: Post-acute inpatient rehabilitation  During current admission, pt will benefit from continued skilled inpatient PT in the acute care setting in order to address the above deficits and to maximize function and mobility prior to DC from acute care  Barriers to Discharge: Inaccessible home environment, Decreased caregiver support     PT Discharge Recommendation: Post acute rehabilitation services    See flowsheet documentation for full assessment

## 2022-10-24 NOTE — MALNUTRITION/BMI
This medical record reflects one or more clinical indicators suggestive of malnutrition and/or morbid obesity  BMI Findings:  Adult BMI Classifications: Underweight < 18 5        Body mass index is 18 35 kg/m²  See Nutrition note dated 10/24/2022 for additional details  Completed nutrition assessment is viewable in the nutrition documentation

## 2022-10-24 NOTE — CONSULTS
Consultation - Geriatric Medicine   Thong Lin 79 y o  female MRN: [id number]  Unit/Bed#: W -01 Encounter: [id number]        Inpatient consult to Gerontology  Consult performed by: Kleber Hogue MD  Consult ordered by: Trinh Darby DO        Assessment/Plan   1  Fall/Ambulatory Dysfunction  - patient presents for mechanical fall onto her right side  - Patient says she was unable to walk yesterday due to pain  Today she says she is in less pain   - currently patient complains of moderate pain in her right shoulder right hip right knee entire right leg  States that she is unable to walk due to this pain  - Assess patient frequently for physical needs, encourage use of assistant devices as needed and directed by PT/OT  -  Identify cognitive and physical deficits and behaviors that affect risk of falls  - consider moving patient closer to nursing station to monitor more closely for impulsive behavior which may increase risk of falls  -  Tye fall precautions   - Educate patient/family on patient safety including physical limitations and importance of using call bell for assistance   - Modify environment to reduce risk of injury including cap IV and disconnect from pole when not in use, ensuring adequate lighting in room and restroom, ensuring that path to restroom is clear and free of trip hazards  - PT/OT consulted to assist with strengthening/mobility and assist with discharge planning to appropriate level of care    2  Old right humeral surgical neck fracture  - x-rays of the right humerus showed a fracture of the right humeral surgical neck which most likely represents 3 fracture, although previously ununited fracture would also be consideration  CT of the chest abdomen pelvis showed old fracture of the right proximal humerus  Orthopedics did consult and there was suspicion for old fracture   Patient's right upper extremity is in a sling for comfort   -continue pain control per Geriatric pain protocol:  Tylenol 975mg Q8H scheduled  Roxicodone 2 5mg Q4H PRN moderate pain  Roxicodone 5mg Q4H PRN severe pain  Dilaudid 0 2mg Q2H PRN  -continue adjuncts such as Gabapentin 100mg HS and lidocaine patch topically  -encourage addition of non-pharmacologic pain treatment including ice and frequent repositioning  -recommend  bowel regimen to prevent and treat constipation due to increased risk with acute pain and opiate pain medications    3  Right Periorbital Hematoma   - Patient has a periorbital hematoma after falling  - Monitor and repeat CBC    4  Hypertension  - Blood pressure currently stable at 118/62  - BP was 91/47 at 09:00 this morning, hold medication for systolic blood pressure less than 110  - Patient states she was on Amlodipine but her PCP told her "you do not need it anymore"  - Patient previously on amlodipine 2 5 mg daily    5  Hyperlipidemia  - History of hyperlipidemia on chart review which has resolved as most recent lipid panel from June 2022 showed total cholesterol 194, triglycerides 72, HDL 77,     6  Osteoporosis  - Patient had a DEXA scan in August 2019 which showed a T-score of -4 8 in the lumbar spine, T-score of -4 4 in the right hip, and T-score of -4 7 in the right femoral neck consistent with osteoporosis  - Recommend calcium supplementation of at least 1200 mg daily  -Consider follow-up with PCP    7  Vitamin D Deficiency  - Patient is on cholecalciferol 1,000 units daily  - Vitamin D, 25- hydroxy was 31 4 in June 2022    8  Prior history of stroke  - patient reports a history of a stroke,-does not remember  She says she does not have any residual weakness from the scalp  9  Mild cognitive impairment   - Consider MOCA evaluation    10  Constipation  - Patient is on bisacodyl 5 mg daily PRN with adequate control of her constipation  11  Patient has a personal history of breast cancer which was treated with surgical intervention and radiation   Patient states she currently in remission  She says she had mammogram 3 years ago that was negative  12  Medication Review - I reviewed the patient's medications with her  Patient states that she takes 325 mg of Tylenol had night for pain, Dulcolax 5 mg daily for constipation, cholecalciferol 1000 units by mouth daily, Voltaren gel 1% up to 4 times a day apply topically, and TobraDex eyedrops for eye dryness  Patient states that she is no longer using the nystatin cream or pirbuterol acetate inhaler  Recommendations  1  PT/OT, get patient up and moving out of bed when pain is controlled   2  Consider inpatient rehab if patient's pain does not improve  3  Frequent repositioning   4  Geriatric pain protocol  5  Fall precautions  6  Follow-up with PCP regarding calcium supplementation or Prolia as patient has a DEXA scan from 2019 showing osteoporosis  History of Present Illness   Physician Requesting Consult: Armen Arana DO  Reason for Consult / Principal Problem: Ambulatory Dysfunction   Hx and PE limited by: No limitations  Additional history obtained from: None      HPI: Aiyana Villavicencio is a 79y o  year old female who presented to the emergency department for complaints of right-sided pain after falling  Patient states that she was walking into her house at her power of  and lost her balance when she turned around to speak with him  Patient fell onto the rug hitting the right side of her head on the kitchen counter  She says that she landed on the entire right side of her body  Patient states she is not on any anticoagulation medication  She denies losing consciousness and was able to remember the entire event  Patient was complaining of a right-sided headache and right shoulder pain as well as right hip and right knee pain when she arrived at the ER  She did note some bruising and pain to the left 5th digit is of her hand    Patient was noted to have an expanding hematoma on the right frontal bone above the orbit  Patient has a history of a right proximal humerus fracture 2 years get this managed non-operatively with a sling  Laboratory stay should sodium 130, potassium 4 5, chloride of 105  Labs also showed a GFR of 58 and creatinine at 0 91  Hemoglobin and hematocrit from yesterday were 12 5 and 39 5  EKG from yesterday showed normal sinus rhythm with no significant change compared to EKG from June of 2021  Patient had an x-ray of the right humerus which showed fractured right humeral surgical neck which most likely represents a refracture but could also be previously ununited fracture; also noted were multiple old-appearing right rib fractures  X-ray right hip showed no acute osseous abnormality; visualized old left pubic bone fracture is in orthopedic hardware in the proximal left femur as well as degenerative changes in lower lumbar spine  X-ray of the right hand showed polyarticular arthritis with no acute fracture or dislocation  CT of the cervical spine without contrast showed no cervical spine fracture or traumatic malalignment; severe multilevel cervical degenerative changes were noted without clinical canal stenosis  CT of the facial bones without contrast noted large subcutaneous hematoma in the right superior periorbital region, but no acute osseous abnormality  CT of the chest abdomen pelvis contrast showed old fracture of the right proximal humerus; no evidence of acute posttraumatic abnormality; colonic diverticulosis noted; also noted was a 5 mm posterior right apical nodule which is stable since 2010 and is therefore benign  Review of Systems   Constitutional: Negative for chills and fever  HENT: Negative for hearing loss and trouble swallowing  Eyes: Negative for pain and visual disturbance  Respiratory: Negative for cough and shortness of breath  Cardiovascular: Negative for chest pain and palpitations     Gastrointestinal: Negative for abdominal pain, constipation, diarrhea, nausea and vomiting  Endocrine: Negative  Genitourinary: Negative for dysuria and hematuria  Musculoskeletal: Positive for arthralgias, back pain and gait problem  Patient reports right shoulder pain, right hip pain, right knee pain, and lower back pain  She says she is unable to walk up steps to the second floor of her home to shower  Denies saddles anesthesia  Skin: Negative for color change, rash and wound  Allergic/Immunologic: Negative  Neurological: Negative for seizures, syncope and headaches  Hematological: Negative  Psychiatric/Behavioral: Negative for agitation and behavioral problems  All other systems reviewed and are negative  Memory/Cognitive screening:  Patient states that her memory is "good"  Mobility:  Patient reports that she is unable to walk up the stairs in her home to get to her bathroom with the shower  She currently washes herself using her sink  Patient walks with her walker half of the time, and the other half of the time she walks without it  Falls:  Patient states that her last fall was 1 year ago and she had subsequent fracture of her right humeral head  She says that she does not fall often  Assistive Devices:  Patient has a walker with front wheels at home  Fraility:   Nutrition/weight loss/grocery shopping/meal preparation:  Patient denies any recent weight loss  She does say that she has a poor appetite  Her neighbor does her grocery shopping for her and she prepares meals for herself  Most meals are premade meals  Vision impairment:  Patient wears bifocals  Patient reports history of cataracts in both eyes which she had removed  Patient denies any history of glaucoma or macular degeneration  Hearing impairment:  Denies any hearing impairment  Patient does not have hearing aids  Incontinence:  Patient reports urge incontinence for which she wears pads    Delirium:  Denies any history of delirium  Polypharmacy:   No current facility-administered medications on file prior to encounter  Current Outpatient Medications on File Prior to Encounter   Medication Sig Dispense Refill   • acetaminophen (TYLENOL) 325 mg tablet Take 3 tablets (975 mg total) by mouth every 8 (eight) hours  0   • albuterol (PROVENTIL HFA,VENTOLIN HFA) 90 mcg/act inhaler Inhale 2 puffs every 6 (six) hours as needed for wheezing     • amLODIPine (NORVASC) 2 5 mg tablet Take 1 tablet (2 5 mg total) by mouth 2 (two) times a day 180 tablet 1   • bisacodyl (DULCOLAX) 5 mg EC tablet Take 5 mg by mouth daily as needed for constipation     • cholecalciferol (VITAMIN D3) 1,000 units tablet Take 1 tablet (1,000 Units total) by mouth daily 90 tablet 2   • diclofenac sodium (VOLTAREN) 1 % Apply 2 g topically 4 (four) times a day  0   • fluticasone (FLONASE) 50 mcg/act nasal spray 1 spray into each nostril daily  0   • methylPREDNISolone 4 MG tablet therapy pack Use as directed on package 1 each 0   • multivitamin (THERAGRAN) TABS Take 1 tablet by mouth daily     • nystatin (MYCOSTATIN) cream Apply topically 2 (two) times a day Apply to skin folds in affected areas 2 x a day for 7 days  30 g 0   • Pirbuterol Acetate ([place]) Inhale one time by mouth every four hours as needed     • tobramycin-dexamethasone (TOBRADEX) ophthalmic suspension Administer 1 drop to both eyes every 4 (four) hours while awake 5 mL 1     Patients primary residence:  Patient lives in a two-story home has 10 steps to get up onto the porch of her home  Patient says that she is not able to go up the 2nd floor her house to use the shower and instead showers at home  She does not have a chair lift in her house or a shower chair  Lives with:  Patient lives at home alone  Patient's  passed away in 2010 and she does not have any children  She does have a cat  iADL's:  Patient does not enjoy her instrumental activities of daily living    Patient's neighbor does her laundry for her  She does not drive and she has someone from her [employer] help pay her bills  ADL's:  Patient enjoys her activities of daily living such as feeding herself, dressing herself, and bathing herself  Historical Information   Past medical history:    Past Medical History:   Diagnosis Date   • Allergic    • Anxiety    • Arthritis    • Cancer ([facility] [address] )    • HLD (hyperlipidemia)    • Hypertension    • Hyperthyroidism    • Osteoporosis    • Stroke [facility])    • TIA (transient ischemic attack)      Past surgical history:   Past Surgical History:   Procedure Laterality Date   • BREAST LUMPECTOMY Left    • HYSTERECTOMY     • KNEE SURGERY Right    • ORIF TIBIA FRACTURE Right 12/8/2016    Procedure: OPEN REDUCTION W/ INTERNAL FIXATION (ORIF) TIBIA;  Surgeon: Namita Villela MD;  [facility];  Service:      Social history:   Social History     Socioeconomic History   • Marital status:       Spouse name: Not on file   • Number of children: Not on file   • Years of education: Not on file   • Highest education level: Not on file   Occupational History   • Not on file   Tobacco Use   • Smoking status: Never Smoker   • Smokeless tobacco: Never Used   • Tobacco comment: Former smoker - As per [employer]   • Vaping Use: Never used   Substance and Sexual Activity   • Alcohol use: Not Currently     Comment: Alcohol intake:   Occasional  - As per [employer]    • Drug use: Never   • Sexual activity: Not Currently   Other Topics Concern   • Not on file   Social History Narrative    ** Merged History Encounter **         Living at SNF    · Caffeine intake:   None        Social Determinants of Health     Financial Resource Strain: Not on file   Food Insecurity: Not on file   Transportation Needs: Not on file   Physical Activity: Not on file   Stress: Not on file   Social Connections: Not on file   Intimate Partner Violence: Not on file   Housing Stability: Not on file     Family history: Family History   Problem Relation Age of Onset   • Coronary artery disease Family    • Other Family         DJD       Meds/Allergies   All current active meds have been reviewed  Allergies   Allergen Reactions   • Alendronate    • Penicillins Swelling   • Raloxifene    • Risedronate Sodium    • Sulfa Antibiotics Swelling       Objective   Vitals:    10/24/22 0950   BP: 117/62   Pulse:    Resp: 17   Temp: (!) 97 1 °F (36 2 °C)   SpO2:        Intake/Output Summary (Last 24 hours) at 10/24/2022 1100  Last data filed at 10/24/2022 0830  Gross per 24 hour   Intake 360 ml   Output 275 ml   Net 85 ml     Invasive Devices  Report    Peripheral Intravenous Line  Duration           Peripheral IV 10/23/22 Distal;Dorsal (posterior); Right Forearm <1 day    Peripheral IV 10/23/22 Right Antecubital <1 day          Drain  Duration           External Urinary Catheter 1 day                Physical Exam  Vitals and nursing note reviewed  Constitutional:       General: She is not in acute distress  Appearance: She is well-developed  HENT:      Head: Normocephalic  Comments: There is ecchymosis with hematoma to superior right orbit  Right Ear: Tympanic membrane, ear canal and external ear normal       Left Ear: Tympanic membrane, ear canal and external ear normal       Nose: Nose normal       Mouth/Throat:      Mouth: Mucous membranes are moist    Eyes:      General: No scleral icterus  Conjunctiva/sclera: Conjunctivae normal       Pupils: Pupils are equal, round, and reactive to light  Cardiovascular:      Rate and Rhythm: Normal rate and regular rhythm  Heart sounds: No murmur heard  No friction rub  No gallop  Pulmonary:      Effort: Pulmonary effort is normal  No respiratory distress  Breath sounds: Normal breath sounds  Abdominal:      General: Abdomen is flat  Bowel sounds are normal  There is no distension  Palpations: Abdomen is soft  Tenderness:  There is no abdominal tenderness  Musculoskeletal:         General: Tenderness present  No swelling  Cervical back: Neck supple  Comments: Limited ROM of the right shoulder, right hip and right knee secondary to pain  Skin:     General: Skin is warm and dry  Coloration: Skin is not jaundiced  Neurological:      General: No focal deficit present  Mental Status: She is alert and oriented to person, place, and time  Psychiatric:         Mood and Affect: Mood normal          Thought Content: Thought content normal          Lab Results:   I have personally reviewed pertinent lab and imaging results  VTE Prophylaxis: Heparin 5,000 units subQ every 8 hours     Code Status: Level 1 - Full Code  Advance Directive and Living Will:      Power of :    POLST:      Family and Social Support:   Living Arrangements: Lives Alone  Support Systems: Other (Comment); Friends/neighbors (Home care staff quit a few months ago)  Type of Current Residence: Private residence  [address]:  Other (Comment) (Home care staff quit a few months ago per client)

## 2022-10-24 NOTE — CONSULTS
Orthopedics   Ritika Mock 79 y o  female MRN: [id number]  Unit/Bed#: W -01      Chief Complaint:   right arm and shoulder pain    HPI:   79 y o   female status post fall onto her right side complaining of right shoulder pain  She had +headstrike, and has right sided periorbital edema/ecchymosis  She had a fall from standing position onto her right side, and she thinks she hit her head on her kitchen counter  She was admitted from the ER secondary to ambulatory dysfunction, and pain  At time of consult she endorses that her greatest pain is low back pain, and that she is uncomfortable in bed  She has history of a right proximal humerus fracture about 2 years ago, managed non operatively at that time with sling  After her fall now, she has some increased shoulder pain, and feels that range of motion is difficult as she is "bruised"  She has no numbness/tingling in the hand  She denies hip, knee, or ankle pain  She states only her low back and right shoulder are sore  Orthopedics called to evaluate for proximal humerus fracture on the right  She notes orthopedic surgery to her left knee many years ago      Review Of Systems:   · Skin: As per HPI  · Neuro: See HPI  · Musculoskeletal: See HPI  · 14 point review of systems negative except as stated above     Past Medical History:   Past Medical History:   Diagnosis Date   • Allergic    • Anxiety    • Arthritis    • Cancer ([facility] [address] )    • HLD (hyperlipidemia)    • Hypertension    • Hyperthyroidism    • Osteoporosis    • [facility] )    • TIA (transient ischemic attack)        Past Surgical History:   Past Surgical History:   Procedure Laterality Date   • BREAST LUMPECTOMY Left    • HYSTERECTOMY     • KNEE SURGERY Right    • ORIF TIBIA FRACTURE Right 12/8/2016    Procedure: OPEN REDUCTION W/ INTERNAL FIXATION (ORIF) TIBIA;  Surgeon: Naomie Kirkpatrick MD;  [facility];  Service:        Family History:  Family history reviewed and non-contributory  Family History   Problem Relation Age of Onset   • Coronary artery disease Family    • Other Family         DJD       Social History:  Social History     Socioeconomic History   • Marital status:      Spouse name: None   • Number of children: None   • Years of education: None   • Highest education level: None   Occupational History   • None   Tobacco Use   • Smoking status: Never Smoker   • Smokeless tobacco: Never Used   • Tobacco comment: Former smoker - As per [employer] Use   • Vaping Use: Never used   Substance and Sexual Activity   • Alcohol use: Not Currently     Comment: Alcohol intake:   Occasional  - As per [place]    • Drug use: Never   • Sexual activity: Not Currently   Other Topics Concern   • None   Social History Narrative    ** Merged History Encounter **         Living at [facility]    · Caffeine intake:   None        Social Determinants of Health     Financial Resource Strain: Not on file   Food Insecurity: Not on file   Transportation Needs: Not on file   Physical Activity: Not on file   Stress: Not on file   Social Connections: Not on file   Intimate Partner Violence: Not on file   Housing Stability: Not on file       Allergies:    Allergies   Allergen Reactions   • Alendronate    • Penicillins Swelling   • Raloxifene    • Risedronate Sodium    • Sulfa Antibiotics Swelling           Labs:  0   Lab Value Date/Time    HCT 39 5 10/23/2022 1605    HCT 41 9 06/17/2022 0709    HCT 40 4 06/30/2021 1210    HGB 12 5 10/23/2022 1605    HGB 13 1 06/17/2022 0709    HGB 12 8 06/30/2021 1210    WBC 8 96 10/23/2022 1605    WBC 5 33 06/17/2022 0709    WBC 6 22 06/30/2021 1210    ESR 47 (H) 10/20/2020 0514    CRP 26 0 (H) 09/15/2017 1031       Meds:    Current Facility-Administered Medications:   •  acetaminophen (TYLENOL) tablet 975 mg, 975 mg, Oral, Q8H [facility], Josephine Li DO, 975 mg at 10/24/22 8656  •  albuterol (PROVENTIL HFA,VENTOLIN HFA) inhaler 2 puff, 2 puff, Inhalation, Q6H PRN, Keke Shaper, DO  •  amLODIPine (NORVASC) tablet 2 5 mg, 2 5 mg, Oral, BID, Keke Shaper, DO, 2 5 mg at 10/24/22 4237  •  bisacodyl (DULCOLAX) EC tablet 5 mg, 5 mg, Oral, Daily PRN, Keke Shaper, DO  •  cholecalciferol (VITAMIN D3) tablet 1,000 Units, 1,000 Units, Oral, Daily, Keke Shaper, DO, 1,000 Units at 10/24/22 6663  •  fluticasone (FLONASE) 50 mcg/act nasal spray 1 spray, 1 spray, Nasal, Daily, Keke Shaper, DO  •  gabapentin (NEURONTIN) capsule 100 mg, 100 mg, Oral, HS, Keke Shaper, DO, 100 mg at 10/23/22 2128  •  heparin (porcine) subcutaneous injection 5,000 Units, 5,000 Units, Subcutaneous, Q8H [address], Keke Shaper, DO, 5,000 Units at 10/24/22 9690  •  lidocaine (LIDODERM) 5 % patch 1 patch, 1 patch, Topical, Daily, Keke Shaper, DO, 1 patch at 10/24/22 8135  •  naloxone (NARCAN) 0 04 mg/mL syringe 0 04 mg, 0 04 mg, Intravenous, Q1MIN PRN, Keke Shaper, DO  •  oxyCODONE (ROXICODONE) IR tablet 2 5 mg, 2 5 mg, Oral, Q6H PRN, Keke Shaper, DO  •  oxyCODONE (ROXICODONE) IR tablet 5 mg, 5 mg, Oral, Q6H PRN, Keke Shaper, DO, 5 mg at 10/23/22 2122  •  senna-docusate sodium (SENOKOT S) 8 6-50 mg per tablet 1 tablet, 1 tablet, Oral, BID, Keke Shaper, DO, 1 tablet at 10/24/22 1211    Blood Culture:   No results found for: BLOODCX    Wound Culture:   No results found for: WOUNDCULT    Ins and Outs:  I/O last 24 hours: In: 360 [P O :360]  Out: 275 [Urine:275]          Physical Exam:   /62   Pulse 78   Temp (!) 97 1 °F (36 2 °C)   Resp 17   SpO2 96%   Gen: No acute distress, resting comfortably in bed  HEENT: Eyes clear, moist mucus membranes, hearing intact, +right sided periorbital edema/ecchymosis     Respiratory: No audible wheezing or stridor  Cardiovascular: Well Perfused peripherally, 2+ distal pulse  Abdomen: nondistended, no peritoneal signs  Musculoskeletal: right upper extremity  · Skin intact, No significant ecchymosis or bruising over the right shoulder/upper arm  · Tender to palpation over shoulder and upper arm  · Sensation intact to radial, ulna, median, musculocutaneous, and axillary nerve distributions  · Motor intact to  radial, ulnar, median, musculocutaneous, and axillary nerve distributions  +deltoid twitch on the right  · 2+ radial and ulnar pulse  · Musculature is soft and compressible, no pain with passive stretch  · Sling in place  Radiology:   I personally reviewed the films  X-rays AP/Lateral right humerus: fracture of the right humeral surgical neck which most likely represents re-fracture, although a previously ununited fracture would also be a consideration  CT C/A/P: old fracture of the right proximal humerus      _*_*_*_*_*_*_*_*_*_*_*_*_*_*_*_*_*_*_*_*_*_*_*_*_*_*_*_*_*_*_*_*_*_*_*_*_*_*_*_*_*    Assessment:  79 y o  female S/P fall with right proximal humerus fracture  Suspect old fracture  Plan:   · Sling for comfort  · WBAT to the right upper extremity  · Analgesics for pain per primary team   · No immediate orthopedic intervention indicated  · BMI 18 35    · Dispo: [place] for discharge from ortho perspective   · Orthopedics will sign off at this time  Please call with questions or concerns  · Patient may follow up with orthopedics in 2 weeks for re-evaluation       Roselyn Garcia PA-C

## 2022-10-24 NOTE — PHYSICAL THERAPY NOTE
PHYSICAL THERAPY EVALUATION NOTE          Patient Name: Jacek Krause  BSILM'I Date: 10/24/2022      AGE:   79 y o  Mrn:   [id number]  ADMIT DX:  Right hip pain [M25 551]  Right shoulder pain [M25 511]  Unspecified multiple injuries, initial encounter [T07  XXXA]    Past Medical History:  Past Medical History:   Diagnosis Date    Allergic     Anxiety     Arthritis     Cancer ([facility] )     HLD (hyperlipidemia)     Hypertension     Hyperthyroidism     Osteoporosis     Stroke ([facility] )     TIA (transient ischemic attack)        Past Surgical History:  Past Surgical History:   Procedure Laterality Date    BREAST LUMPECTOMY Left     HYSTERECTOMY      KNEE SURGERY Right     ORIF TIBIA FRACTURE Right 2016    Procedure: OPEN REDUCTION W/ INTERNAL FIXATION (ORIF) TIBIA;  Surgeon: Anaid Lynch MD;  Location: [facility];  Service:      Length Of Stay: 1        PHYSICAL THERAPY EVALUATION:    Patient's identity confirmed via 2 patient identifiers (full name and ) at start of session       10/24/22 1150   PT Last Visit   PT Visit Date 10/24/22   Note Type   Note type Evaluation   Pain Assessment   Pain Assessment Tool 0-10   Pain Score 8   Pain Location/Orientation Orientation: Right;Location: Arm;Location: Leg   Pain Onset/Description Onset: Ongoing   Effect of Pain on Daily Activities limits pt's tolerance to functional mobility   Hospital Pain Intervention(s) Repositioned  (RUE sling)   Restrictions/Precautions   Weight Bearing Precautions Per Order Yes   RUE Weight Bearing Per Order WBAT   Braces or Orthoses Sling  (Per ortho: WBAT RUE w/ sling for comfort)   Other Precautions Cognitive; Chair Alarm; Bed Alarm; Fall Risk;Pain;WBS   Home Living   Type of [address] [place] [address] Two level;Performs ADLs on one level; Able to live on main level with bedroom/bathroom;Stairs to enter with rails;Bed/bath upstairs  (6+ LOUIE, pt reports stone w/ railing)   Bathroom Shower/Tub   (sponge-bathes)   Bathroom Toilet Raised   Bathroom Equipment Commode  (commode over toilet)   [address]  (RW)   Additional Comments Pt is a questionable historian, often contradicting self  Reports living home alone in a 2 level house and remaining on the 1st floor   Prior Function   Level of [place] Independent with functional mobility; Independent with ADLs; Needs assistance with IADLS   Lives With (S)  Alone   Receives Help From Aby Amor  (neighbor provides transportation and [employer])   IADLs Family/Friend/Other provides transportation   Falls in the last 6 months 1 to 4  (1; reason for current admission)   Comments Pt reports she ambulates mod I w/ RW, sponge-bathes in sink, and only goes out of her house 1-2 times/month   General   Family/Caregiver Present No   Cognition   Overall Cognitive Status Impaired   Attention Attends with cues to redirect   Orientation Level Oriented to person;Oriented to place; Disoriented to time;Oriented to situation  (stated month was November)   Memory Decreased short term memory;Decreased recall of recent events;Decreased recall of precautions   Following Commands Follows one step commands with increased time or repetition   Comments Pt ID via name and ; pt w/ limited problem solving, VC for attention to task   Strength RLE   RLE Overall Strength 3/5  (grossly assessed w/ functional mobility)   Strength LLE   LLE Overall Strength 3/5  (grossly assessed w/ functional mobility)   Vision-Basic Assessment   Current Vision   (pt declined donning her glassess for mobility)   Bed Mobility   Supine to Sit 2  Maximal assistance   Additional items Assist x 1;HOB elevated; Bedrails; Increased time required;Verbal cues;LE management   Sit to Supine Unable to assess   Additional items   (pt OOB in chair at end of session)   Additional Comments pt able to maintain static sitting balance at EOB w/ supervision   Transfers Sit to Stand 3  Moderate assistance   Additional items Assist x 2; Increased time required;Verbal cues   Stand to Sit 3  Moderate assistance   Additional items Assist x 2;Armrests; Increased time required;Verbal cues  (assistance w/ reaching for L armrest w/ LUE)   Additional Comments assistance w/ placing RUE on RW   Ambulation/Elevation   Gait pattern Improper Weight shift;Decreased foot clearance;Narrow EDWIN; Forward Flexion; Short stride; Excessively slow   Gait Assistance 2  Maximal assist   Additional items Assist x 2;Verbal cues   Assistive Device Rolling walker   Distance 2'   Stair Management Assistance Not tested   Ambulation/Elevation Additional Comments assistance requiring for RW management   Balance   Static Sitting Fair   Dynamic Sitting Fair -   Static Standing Zero  (Ax2)   Ambulatory Zero  (Ax2 w/ RW)   Activity Tolerance   Activity Tolerance Patient limited by fatigue;Patient limited by pain   Medical Staff Made Aware Care coordination w/ OT Albert Castro due to pt's medical complexity, regression from mobility baseline, and cognitive/safety awareness deficits requiring two skilled clinicians, individual items assessed and goals addressed; [facility]   Nurse Made Aware LPN Rika   Assessment   Prognosis Fair   Problem List Decreased strength;Decreased endurance; Impaired balance;Decreased mobility; Decreased cognition; Impaired judgement;Decreased safety awareness;Orthopedic restrictions;Pain   Assessment Bhumika Murray is a 79 y o  Female who [facility] due to fall and diagnosis of ambulatory dysfunction  Orders for PT eval and treat received, w/ activity orders of up in chair and WBAT RUE w/ sling for comfort  Comorbidities affecting pt at time of evaluation include: HTN, R periorbital hematoma, CVA, anxiety, mild cognitive impairment, rheumatoid arthritis, TIA   Personal factors affecting DC include: inaccessible home environment, ambulating w/ assistive device, stairs to enter home, inability to ambulate household distances, inability to navigate level surfaces w/o external assistance, decreased cognition, limited home support, positive fall history and inability to perform IADLs  At baseline, pt reports mobilizing mod I w/ RW, and w/ at least 1 fall(s) in the previous 6 months  Upon evaluation, pt presents w/ the following deficits: weakness, impaired balance, decreased endurance, pain limiting functional mobility and gait deviations  Pt currently requires max Ax1 for bed mobility, mod Ax2 for transfers, and max Ax2 w/ RW for ambulation  Pt's clinical presentation is unstable/unpredictable due to need for assist w/ all phases of mobility when usually mobilizing independently, tolerance to only 2 feet of ambulation, pain impacting overall mobility status, need for input for mobility technique/safety, recent drastic decline in mobility compared to baseline and recent history of falls  Pt is at an increased risk of falls due to impaired cognition, decreased safety awareness, pain limiting functional mobility  From a PT/mobility standpoint, given the above findings, DC recommendation is: Post-acute inpatient rehabilitation  During current admission, pt will benefit from continued skilled inpatient PT in the acute care setting in order to address the above deficits and to maximize function and mobility prior to DC from acute care     Barriers to Discharge Inaccessible home environment;Decreased caregiver support   Goals   STG Expiration Date 11/03/22   Short Term Goal #1 Pt will: perform bilateral rolling bed mobility w/ min Ax1 to decrease pt's burden of care; perform supine<>sit mobility w/ min Ax1 to increase pt's independence w/ functional mobility; sit at EOB w/ supervision for at least 30 minutes to increase pt's tolerance to an upright sitting position and prepare for OOB transfers; perform transfers w/ min Ax1 to increase pt's OOB mobility; ambulate at least 40' w/ LRAD and min Ax1 to increase pt's ambulatory endurance/tolerance; increase all balance ratings by at least 1 grade to decrease pt's risk of falls   PT Treatment Day 0   Plan   Treatment/Interventions Functional transfer training;LE strengthening/ROM; Therapeutic exercise; Endurance training;Patient/family training;Equipment eval/education; Bed mobility;Gait training;Cognitive reorientation   PT Frequency 3-5x/wk   Recommendation   PT Discharge Recommendation Post acute rehabilitation services   Equipment Recommended [facility] Recommended Wheeled walker   Change/add to [employer]? No   AM-PAC Basic Mobility Inpatient   Turning in Bed Without Bedrails 2   Lying on Back to Sitting on Edge of Flat Bed 2   Moving Bed to Chair 1   Standing Up From Chair 1   Walk in Room 1   Climb 3-5 Stairs 1   Basic Mobility Inpatient Raw Score 8   Highest Level Of Mobility   -[facility] Goal 3: Sit at edge of bed   -[facility] Achieved 4: Move to chair/commode   End of Consult   Patient Position at End of Consult Bedside chair;Bed/Chair alarm activated; All needs within reach       The patient's AM-PAC Basic Mobility Inpatient Short Form Raw Score is 8  A Raw score of less than or equal to 16 suggests the patient may benefit from discharge to post-acute rehabilitation services  Please also refer to the recommendation of the Physical Therapist for safe discharge planning      Pt will benefit from skilled inpatient PT during this admission in order to facilitate progress towards goals and to maximize functional independence prior to [address] rec: post acute rehab        Lowella Goldberg, PT, DPT  10/24/22

## 2022-10-24 NOTE — PLAN OF CARE
Problem: OCCUPATIONAL THERAPY ADULT  Goal: Performs self-care activities at highest level of function for planned discharge setting  See evaluation for individualized goals  Description: Treatment Interventions: ADL retraining, Functional transfer training, Endurance training, Cognitive reorientation, Patient/family training, Equipment evaluation/education, Compensatory technique education, Activityengagement          See flowsheet documentation for full assessment, interventions and recommendations  Note: Limitation: Decreased ADL status, Decreased UE strength, Decreased Safe judgement during ADL, Decreased cognition, Decreased endurance, Decreased self-care trans, Decreased high-level ADLs  Prognosis: Good  Assessment: Pt is a 79 y o  female seen for OT evaluation s/p admission to [address] [place] [address] on 10/23/2022 due to fall  Pt diagnosed with Ambulatory dysfunction  Pt has a significant PMH impacting occupational performance including: HTN, periorbital hematoma, hx R shldr injury  Pt with no recent admissions in the last 2 months  Pt with active OT evaluation and treatment orders and activity orders for Up in chair  PTA pt living alone in [facility] with [address], pt (I) with ADLs and IADLs, (+)falls, (-)drives, use of RW at baseline  Pt is motivated to return to home  Personal and environmental factors supporting pt at time of IE include attitude towards recovery  Personal and environmental factors inhibiting engagement in occupations include advanced age, current habits and behavioral patterns, limited social support, inaccessible home environment, difficulty completing ADLs and difficulty completing IADLs  During evaluation pt performed as is outlined above in flowsheet  Pt required VC for safety and VC for attention to task  Standardized assessments used to assist in identifying performance deficits include AMPAC 6-Clicks and [place] ADL Index   Performance deficits that affect the pt’s occupational performance during the initial evaluation include impaired balance, functional mobility, endurance, activity tolerance, gross motor coordination, forward functional reach, functional standing tolerance and overall strength, attention to task, direction following, communication and social skills, safety awareness, insight into deficits, problem solving and learning/remembering new tasks  Based on pt’s functional performance and deficits the following occupations will be addressed in OT treatments in order to maximize pt’s independence and overall occupational performance: grooming, bathing/showering, toileting and toilet hygiene, dressing and functional mobility  Goals are listed below  Upon discharge from acute care setting recommend d/c to [address] [place] [address]  This evaluation required an extensive review of medical and/or therapy records and additional review of physical, cognitive and psychosocial history related to functional performance  Based upon functional performance deficits and assessments, this evaluation has been identified as a high complexity evaluation       OT Discharge Recommendation: Post acute rehabilitation services

## 2022-10-25 PROBLEM — N82.0 VESICOVAGINAL FISTULA: Status: ACTIVE | Noted: 2022-10-25

## 2022-10-25 PROBLEM — S32.591A CLOSED FRACTURE OF RAMUS OF RIGHT PUBIS (HCC): Status: ACTIVE | Noted: 2022-10-25

## 2022-10-25 PROBLEM — M25.511 RIGHT SHOULDER PAIN: Status: ACTIVE | Noted: 2022-10-25

## 2022-10-25 LAB
ABO GROUP BLD: NORMAL
ERYTHROCYTE [DISTWIDTH] IN BLOOD BY AUTOMATED COUNT: 15 % (ref 11.6–15.1)
HCT VFR BLD AUTO: 35.8 % (ref 34.8–46.1)
HGB BLD-MCNC: 11.2 G/DL (ref 11.5–15.4)
MCH RBC QN AUTO: 30.5 PG (ref 26.8–34.3)
MCHC RBC AUTO-ENTMCNC: 31.3 G/DL (ref 31.4–37.4)
MCV RBC AUTO: 98 FL (ref 82–98)
PLATELET # BLD AUTO: 250 THOUSANDS/UL (ref 149–390)
PMV BLD AUTO: 11.1 FL (ref 8.9–12.7)
RBC # BLD AUTO: 3.67 MILLION/UL (ref 3.81–5.12)
RH BLD: POSITIVE
WBC # BLD AUTO: 7.34 THOUSAND/UL (ref 4.31–10.16)

## 2022-10-25 RX ORDER — ENOXAPARIN SODIUM 100 MG/ML
30 INJECTION SUBCUTANEOUS EVERY 12 HOURS SCHEDULED
Status: DISCONTINUED | OUTPATIENT
Start: 2022-10-25 | End: 2022-10-26 | Stop reason: HOSPADM

## 2022-10-25 RX ORDER — LANOLIN ALCOHOL/MO/W.PET/CERES
3 CREAM (GRAM) TOPICAL
Status: DISCONTINUED | OUTPATIENT
Start: 2022-10-25 | End: 2022-10-26 | Stop reason: HOSPADM

## 2022-10-25 RX ORDER — MIRTAZAPINE 15 MG/1
7.5 TABLET, FILM COATED ORAL
Status: DISCONTINUED | OUTPATIENT
Start: 2022-10-25 | End: 2022-10-26 | Stop reason: HOSPADM

## 2022-10-25 RX ADMIN — HEPARIN SODIUM 5000 UNITS: 5000 INJECTION INTRAVENOUS; SUBCUTANEOUS at 14:07

## 2022-10-25 RX ADMIN — SENNOSIDES AND DOCUSATE SODIUM 1 TABLET: 8.6; 5 TABLET ORAL at 17:16

## 2022-10-25 RX ADMIN — FLUTICASONE PROPIONATE 1 SPRAY: 50 SPRAY, METERED NASAL at 08:52

## 2022-10-25 RX ADMIN — DICLOFENAC SODIUM TOPICAL GEL, 1%, 2 G: 10 GEL TOPICAL at 12:04

## 2022-10-25 RX ADMIN — Medication 1000 UNITS: at 08:51

## 2022-10-25 RX ADMIN — ACETAMINOPHEN 975 MG: 325 TABLET ORAL at 14:07

## 2022-10-25 RX ADMIN — OXYCODONE HYDROCHLORIDE 5 MG: 5 TABLET ORAL at 12:04

## 2022-10-25 RX ADMIN — OXYCODONE HYDROCHLORIDE 5 MG: 5 TABLET ORAL at 05:38

## 2022-10-25 RX ADMIN — DICLOFENAC SODIUM TOPICAL GEL, 1%, 2 G: 10 GEL TOPICAL at 17:17

## 2022-10-25 RX ADMIN — HEPARIN SODIUM 5000 UNITS: 5000 INJECTION INTRAVENOUS; SUBCUTANEOUS at 05:38

## 2022-10-25 RX ADMIN — ENOXAPARIN SODIUM 30 MG: 30 INJECTION SUBCUTANEOUS at 21:16

## 2022-10-25 RX ADMIN — MIRTAZAPINE 7.5 MG: 15 TABLET, FILM COATED ORAL at 21:13

## 2022-10-25 RX ADMIN — DICLOFENAC SODIUM TOPICAL GEL, 1%, 2 G: 10 GEL TOPICAL at 21:16

## 2022-10-25 RX ADMIN — ACETAMINOPHEN 975 MG: 325 TABLET ORAL at 21:16

## 2022-10-25 RX ADMIN — Medication 3 MG: at 21:13

## 2022-10-25 RX ADMIN — GABAPENTIN 100 MG: 100 CAPSULE ORAL at 21:16

## 2022-10-25 RX ADMIN — AMLODIPINE BESYLATE 2.5 MG: 2.5 TABLET ORAL at 21:16

## 2022-10-25 RX ADMIN — AMLODIPINE BESYLATE 2.5 MG: 2.5 TABLET ORAL at 08:50

## 2022-10-25 RX ADMIN — SENNOSIDES AND DOCUSATE SODIUM 1 TABLET: 8.6; 5 TABLET ORAL at 08:51

## 2022-10-25 NOTE — PROGRESS NOTES
[facility]  Progress Note - Thong Pin 1940, 79 y o  female MRN: [id number]  Unit/Bed#: W -01 Encounter: [id number]  Primary Care Provider: VALDEMAR Hinds   Date and time admitted to hospital: 10/23/2022  3:24 PM    [address]  - Fall from standing with head strike  - Below noted injuries  - Multimodal pain regimen  - PT/OT evaluation and treatment as indicated  - Geriatric consultation and recommendations appreciated      * Closed fracture of ramus of right pubis ([address] )  Assessment & Plan  - Closed right inferior pubic ramus fracture secondary to fall as above  - With associated ambulatory dysfunction  - PT/OT evaluation and treatment as indicated  - Multimodal pain regimen  - Orthopedic consultation pending    Vesicovaginal fistula  Assessment & Plan  - CT pelvis with concern for vesicovaginal fistula with contrast in the vaginal cuff  - OB/GYN consult pending    Right shoulder pain  Assessment & Plan  - Right shoulder pain s/p fall   - History of right proximal humerus fracture 7 years prior  - Appreciate orthopedic evaluation and recs: No new fracture, okay for right sling for comfort, WBAT, op follow up    Periorbital hematoma, right  Assessment & Plan  - Secondary to fall as above  - Hemodynamically stable  - Ice and elevation of the right side of the face    Hypertension  Assessment & Plan  - Patient states she was on Amlodipine but her physician said she "didn't need it anymore"  - Blood pressures improved with 2 5mg Norvasc  - Monitor blood pressure        Disposition: Continue med/surg status, medically stable for discharge when placement available  SUBJECTIVE:  Chief Complaint: Right pelvis pain    Subjective: Patient reports right proximal anterior hip pain, worse with movement of her legs  She also reports pain in her right shoulder which is mild and improved with sling   She denies cp, sob, abdominal pain, n/v      OBJECTIVE:   Vitals: Temp:  [97 1 °F (36 2 °C)-98 1 °F (36 7 °C)] 97 9 °F (36 6 °C)  HR:  [74-87] 87  Resp:  [16-19] 17  BP: (108-152)/(62-82) 108/62    Intake/Output:  I/O       10/23 0701  10/24 0700 10/24 0701  10/25 0700 10/25 0701  10/26 0700    P  O   1140     Total Intake(mL/kg)  1140 (24 3)     Urine (mL/kg/hr) 275 800 (0 7)     Total Output 275 800     Net -275 +340                 Nutrition: Diet Regular; Regular House  GI Proph/Bowel Reg: Senokot S Dulcolax po PRN  VTE Prophylaxis:Heparin change to Lovenox    Physical Exam:   GENERAL APPEARANCE: No acute distress, resting supine in bed  NEURO: AAOX2 GCS 15, no focal neuro deficit  HEENT: Right periorbit ecchymosis with minimal swelling, EOMI mucus membranes moist  CV: RRR S1 S2 without murmur, rub, or gallop  LUNGS: Clear to ausc bilaterally without wheezes, crackles, or rhonchi  GI: Soft, nontender nondistended  : Voiding independently  MSK: RUE without tenderness, ROM limited at shoulder to Abduction and flex/ex  SKIN: Warm, dry, intact    Invasive Devices  Report    Peripheral Intravenous Line  Duration           Peripheral IV 10/23/22 Distal;Dorsal (posterior); Right Forearm 1 day    Peripheral IV 10/23/22 Right Antecubital 1 day          Drain  Duration           External Urinary Catheter 2 days                      Lab Results:   BMP/CMP: No results found for: SODIUM, K, CL, CO2, ANIONGAP, BUN, CREATININE, GLUCOSE, CALCIUM, AST, ALT, ALKPHOS, PROT, BILITOT, EGFR and CBC:   Lab Results   Component Value Date    WBC 7 34 10/25/2022    HGB 11 2 (L) 10/25/2022    HCT 35 8 10/25/2022    MCV 98 10/25/2022     10/25/2022    MCH 30 5 10/25/2022    MCHC 31 3 (L) 10/25/2022    RDW 15 0 10/25/2022    MPV 11 1 10/25/2022     Imaging/EKG Studies: I have personally reviewed pertinent reports     Pelvis ct with contrast with right inferior pubic rami fracture without hematoma  Other Studies: none

## 2022-10-25 NOTE — ASSESSMENT & PLAN NOTE
- Closed right inferior pubic ramus fracture secondary to fall as above  - With associated ambulatory dysfunction  - PT/OT evaluation and treatment as indicated  - Multimodal pain regimen  - Orthopedic consultation pending

## 2022-10-25 NOTE — ASSESSMENT & PLAN NOTE
- Secondary to fall as above  - Hemodynamically stable  - Ice and elevation of the right side of the face

## 2022-10-25 NOTE — ASSESSMENT & PLAN NOTE
- Right shoulder pain s/p fall   - History of right proximal humerus fracture 7 years prior  - Appreciate orthopedic evaluation and recs: No new fracture, okay for right sling for comfort, WBAT, op follow up

## 2022-10-25 NOTE — CASE MANAGEMENT
Case Management Assessment & Discharge Planning Note    Patient name Dearl Odor  Location W /W -78 MRN [id number]  : 1940 Date 10/25/2022       Current Admission Date: 10/23/2022  Current Admission Diagnosis:Closed fracture of ramus of right pubis 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   • Right shoulder pain 10/25/2022   • Periorbital hematoma, right 10/23/2022   • Allergic rhinitis due to allergen 2022   • Upper respiratory symptom 2022   • Sciatica 2021   • Chronic pain of right knee    • Arthritis of knee, right    • Encounter for support and coordination of transition of care 2021   • Fx humeral neck, left, closed, initial encounter 2020   • Elevated liver enzymes 2020   • Abnormal results of liver function studies 2020   • Difficult or painful urination 2020   • Hereditary and idiopathic neuropathy, unspecified 2020   • Rheumatoid arthritis, unspecified ([facility] [address] ) 2020   • Personal history of transient ischemic attack (TIA), and cerebral infarction without residual deficits 2020   • Personal history of breast cancer 2020   • History of falling 2020   • Age-related osteoporosis without current pathological fracture 2020   • Constipation, unspecified 2020   • Anxiety    • Hypertension 11/10/2020   • Stroke [facility])    • Mild cognitive impairment    • Vitamin D deficiency 10/22/2020   • Closed fracture of nasal bone 10/21/2020   • Age-related osteoporosis with current pathological fracture 10/21/2020   • Fall 10/19/2020   • Traumatic hematoma of forehead 10/19/2020   • Closed fracture of proximal end of right humerus 10/19/2020   • Antibiotic-associated diarrhea 06/10/2020   • B12 deficiency 2018   • Abnormal MRI 10/17/2017   • MCI (mild cognitive impairment) with memory loss 06/30/2017   • HLD (hyperlipidemia)    • Knee injury 11/29/2016      LOS (days): 2  Geometric Mean LOS (GMLOS) (days): 2 50  Days to GMLOS:0 6     OBJECTIVE:    Risk of Unplanned Readmission Score: 11 91         Current admission status: Inpatient       Preferred Pharmacy:   [id number] [address] [facility]  [address] [facility]  Phone: [phone] Fax: [phone]    Primary Care Provider: VALDEMAR Sorensen    Primary Insurance: MEDICARE  Secondary Insurance: BLUE CROSS    ASSESSMENT:  [address] [facility], [place] Representative - Friend, Legal Guardian   Primary Phone: [phone] (Mobile)                         Readmission Root Cause  30 Day Readmission: No    Patient Information  Admitted from[de-identified] Home  Mental Status: Alert  During Assessment patient was accompanied by: Not accompanied during assessment  Assessment information provided by[de-identified] Patient  Primary Caregiver: Self  Support Systems: Other (Comment), Ju [address] of Residence: [address] [place] [address] do you live in?: [facility] entry access options   Select all that apply : Stairs  Number of steps to enter home : 2  Do the steps have railings?: Yes  Type of Current Residence: [address] [place] [address] home  Upon entering residence, is there a bedroom on the main floor (no further steps)?: Yes  Upon entering residence, is there a bathroom on the main floor (no further steps)?: Yes  In the last 12 months, was there a time when you were not able to pay the mortgage or rent on time?: No  In the last 12 months, how many places have you lived?: 1  In the last 12 months, was there a time when you did not have a steady place to sleep or slept in a shelter (including now)?: No  Homeless/housing insecurity resource given?: N/A  Living Arrangements: Lives Alone  Is patient a ?: No    Activities of Daily Living Prior to Admission  Completes ADLs independently?: Yes  Ambulates independently?: Yes  Does patient use assisted devices?: Yes  Assisted Devices (DME) used: Josue Odonnell, Bedside Commode, Shower Chair  Does patient currently own DME?: Yes  What DME does the patient currently own?: Bedside Commjaylene, Shower Chair, Josue Odonnell  Does patient have a history of Outpatient Therapy (PT/OT)?: No  Does the patient have a history of Short-Term Rehab?: No  Does patient have a history of HHC?: No  Does patient currently have [place] [address]?: No         Patient Information Continued  Within the past 12 months, you worried that your food would run out before you got the money to buy more : Never true  Within the past 12 months, the food you bought just didn't last and you didn't have money to get more : Never true  Food insecurity resource given?: N/A  Does patient receive dialysis treatments?: No  Does patient have a history of Mental Health Diagnosis?: No         Means of Transportation  Means of Transport to Appts[de-identified] Friends  In the past 12 months, has lack of transportation kept you from medical appointments or from getting medications?: No  In the past 12 months, has lack of transportation kept you from meetings, work, or from getting things needed for daily living?: No  Was application for public transport provided?: N/A        DISCHARGE DETAILS:    Discharge planning discussed with[de-identified] Patient and [facility] [address] of Choice: Yes  Comments - Freedom of Choice: CM discussed discharge plans per care team recommendations and she's in agreement with a blanket SNF referral - referrals made  CM provided patient a list of accepting facilities and she asked if CM can contact Caterina Ayala for his thoughts  As per Caterina Ayala, he felt [address] is close proximity to him and her friends  CM spoke to patient and informed her Caterina Ayala stated [address] but patient is not interested in [place]  Patient's SNF choice is [facility]  Patient is not covid vaccinated and not interested in receiving covid vaccine   Waiting to hear back from S if they have a yellow zone bed  CM contacted family/caregiver?: Yes  Were Treatment Team discharge recommendations reviewed with patient/caregiver?: Yes  Did patient/caregiver verbalize understanding of patient care needs?: N/A- going to facility  Were patient/caregiver advised of the risks associated with not following Treatment Team discharge recommendations?: Yes    Contacts  Patient Contacts: Kennedy MENDOZA  Relationship to Patient[de-identified] Friend  Contact Method: Phone  Phone Number: Face Sheet  Reason/Outcome: Discharge [address]         Is the patient interested in [facility] at discharge?: No    DME Referral Provided  Referral made for DME?: No    Other Referral/Resources/Interventions Provided:  Interventions: Short Term Rehab  Referral Comments: CM discussed discharge plans per care team recommendations and she's in agreement with a blanket SNF referral - referrals made  CM provided patient a list of accepting facilities and she asked if CM can contact Kennedy Hernández for his thoughts  As per Kenendy Hernández, he felt [facility] is close proximity to him and her friends  CM spoke to patient and informed her Kennedy Henr[facility] stated [facility] but patient is not interested in [place]  Patient's SNF choice is [facility]  Patient is not covid vaccinated and not interested in receiving covid vaccine  Waiting to hear back from [facility] if they have a yellow zone bed      Would you like to participate in our [address] service program?  : No - Declined    Treatment Team Recommendation: Short Term Rehab  Discharge Destination Plan[de-identified] Short Term Rehab

## 2022-10-25 NOTE — PROGRESS NOTES
Progress Note - Geriatric Medicine   Myrna Cagle 79 y o  female MRN: [id number]  Unit/Bed#: W -01 Encounter: [id number]      Assessment/Plan:  1  Ambulatory Dysfunction - Patient reports that she is unable to walk due to pain  She was complaining of pain in her right knee yesterday and had an x-ray of her right knee which showed no acute fracture or dislocation  Because she has been unable to walk due to pain patient had a CT of her pelvis without contrast which she had an acute nondisplaced fracture of the right inferior pubic ramus  Continue geriatric pain protocol  2  Vesicovaginal fistula - CT abdomen/pelvis showed a small amount of contrast in the vaginal cough concerning for vaginal vesical fistula with OBGYN consultation recommended  3  Right Periorbital Hematoma - hematoma is improving, continue to monitor for changes and monitor CBC  Hemoglobin from 10/23/2022 was 12 5 with hematocrit 39 5, hemoglobin today is down to 11 2 with hematocrit of 35 8  Patient is currently hemodynamically stable  Plan to ice and elevate right-sided face  4  Hypertension - blood pressure today is 108/62  She is receiving 2 5 mg of amlodipine oral route twice a day with recommendations to hold for systolic blood pressure less than 110      5  Fall - patient reports a mechanical fall from standing position with head strike prior to her arrival to the hospital   She complained of right shoulder right hip and right knee pain  Her pain is improved today  Recommendations:  - Patient reports that she is having difficulty sleeping at night and a poor appetite  She says she "feels more confused today " Patient was not able to tell us the month  We ordered Remeron 7 5 mg by mouth qHS daily for insomnia, decreased appetite, and depressed mood  Recommend continue this medication as an outpatient     - Consider intranasal calcitonin as this has been demonstrated to help with pain and activity in elderly patients Subjective:   Patient states she continues to have pain in her right knee and right pelvic region  She says her right shoulder pain has improved  Patient states that she is not sleeping well at night for the last 2 nights, and she has a decrease in her appetite because she is in pain  She says that she did eat dinner last night  This morning she ate some fruit for breakfast  She has not had a bowel movement in the last two days  She denied any medications to help with her constipation at this time  We will monitor her constipation  Review of Systems   Constitutional: Negative for chills and fever  HENT: Negative for ear pain, hearing loss and sore throat  Eyes: Negative for pain and visual disturbance  Respiratory: Negative for cough and shortness of breath  Cardiovascular: Negative for chest pain and palpitations  Gastrointestinal: Positive for constipation  Negative for abdominal pain, diarrhea, nausea and vomiting  Endocrine: Negative  Genitourinary: Negative for dysuria and hematuria  Musculoskeletal: Positive for arthralgias, back pain and gait problem  Negative for neck pain  Patient complains of right shoulder right knee and right hip pain  Skin: Negative for color change and rash  Allergic/Immunologic: Negative  Neurological: Negative for seizures, syncope and headaches  Hematological: Negative  Psychiatric/Behavioral: Positive for confusion and sleep disturbance  Negative for behavioral problems  All other systems reviewed and are negative  Objective:     Vitals: Blood pressure 108/62, pulse 87, temperature 97 9 °F (36 6 °C), temperature source Oral, resp  rate 17, height 5' 3" (1 6 m), weight 47 kg (103 lb 9 9 oz), SpO2 93 %  ,Body mass index is 18 35 kg/m²        Intake/Output Summary (Last 24 hours) at 10/25/2022 0821  Last data filed at 10/25/2022 0105  Gross per 24 hour   Intake 1140 ml   Output 800 ml   Net 340 ml       Current Medications: Reviewed    Physical Exam:   Physical Exam  Vitals and nursing note reviewed  Constitutional:       General: She is not in acute distress  Appearance: She is well-developed  Comments: Thin, elderly female   HENT:      Head: Normocephalic  Comments: There is a hematoma to the right superior orbit with surround ecchymosis  No abrasion     Right Ear: External ear normal       Left Ear: External ear normal       Nose: Nose normal       Mouth/Throat:      Mouth: Mucous membranes are moist    Eyes:      General: No scleral icterus  Conjunctiva/sclera: Conjunctivae normal       Pupils: Pupils are equal, round, and reactive to light  Cardiovascular:      Rate and Rhythm: Normal rate and regular rhythm  Heart sounds: No murmur heard  Pulmonary:      Effort: Pulmonary effort is normal  No respiratory distress  Breath sounds: Normal breath sounds  No wheezing, rhonchi or rales  Abdominal:      General: Abdomen is flat  Bowel sounds are normal  There is no distension  Palpations: Abdomen is soft  Tenderness: There is no abdominal tenderness  Musculoskeletal:         General: Tenderness present  Cervical back: Normal range of motion and neck supple  Right lower leg: No edema  Left lower leg: No edema  Comments: Limited ROM of the right upper extremity at the shoulder and the right lower extremity at the hip and knee secondary to pain  Normal AROM of the left lower extremity  Lymphadenopathy:      Cervical: No cervical adenopathy  Skin:     General: Skin is warm and dry  Neurological:      General: No focal deficit present  Mental Status: She is alert  Comments: Alert and oriented to person and place  Not oriented to date or month  Oriented to year  Psychiatric:         Behavior: Behavior normal          Thought Content:  Thought content normal           Invasive Devices  Report    Peripheral Intravenous Line  Duration           Peripheral IV 10/23/22 Distal;Dorsal (posterior); Right Forearm 1 day    Peripheral IV 10/23/22 Right Antecubital 1 day          Drain  Duration           External Urinary Catheter 2 days                Lab, Imaging and other studies: I have personally reviewed pertinent reports

## 2022-10-25 NOTE — ASSESSMENT & PLAN NOTE
- CT pelvis with concern for vesicovaginal fistula with contrast in the vaginal cuff  - OB/GYN consult pending

## 2022-10-25 NOTE — CONSULTS
Progress Note - Orthopedics   Jacek Krause 79 y o  female MRN: [id number]  Unit/Bed#: AN CT SCAN      Subjective:    79 y  o female patient was seen and evaluated at bedside yesterday in consultation for concern for a right proximal humerus fracture  Please see consultation note dated 10/24  This morning, orthopedics re-engaged for CT that was done overnight revealing pubic rami fracture  At time of consult this morning, patient complains of continued low back pain, similar to yesterday  She notes intermittent pain in the right leg  At present, the pain is improved, however notes that it returns without reason/rhyme  She is ordering lunch at time of consult  No other acute complaints       Labs:  0   Lab Value Date/Time    HCT 35 8 10/25/2022 0524    HCT 39 5 10/23/2022 1605    HCT 41 9 06/17/2022 0709    HGB 11 2 (L) 10/25/2022 0524    HGB 12 5 10/23/2022 1605    HGB 13 1 06/17/2022 0709    WBC 7 34 10/25/2022 0524    WBC 8 96 10/23/2022 1605    WBC 5 33 06/17/2022 0709    ESR 47 (H) 10/20/2020 0514    CRP 26 0 (H) 09/15/2017 1031       Meds:    Current Facility-Administered Medications:   •  acetaminophen (TYLENOL) tablet 975 mg, 975 mg, Oral, Q8H [facility], Sharona Montes DO, 975 mg at 10/24/22 2134  •  albuterol (PROVENTIL HFA,VENTOLIN HFA) inhaler 2 puff, 2 puff, Inhalation, Q6H PRN, Sharona Montes DO  •  amLODIPine (NORVASC) tablet 2 5 mg, 2 5 mg, Oral, BID, Sharona Montes DO, 2 5 mg at 10/25/22 9785  •  bisacodyl (DULCOLAX) EC tablet 5 mg, 5 mg, Oral, Daily PRN, Sharona Montes DO  •  cholecalciferol (VITAMIN D3) tablet 1,000 Units, 1,000 Units, Oral, Daily, Sharona Montes DO, 1,000 Units at 10/25/22 4706  •  enoxaparin (LOVENOX) subcutaneous injection 30 mg, 30 mg, Subcutaneous, Q12H [facility], Camila Avilez PA-C  •  fluticasone (FLONASE) 50 mcg/act nasal spray 1 spray, 1 spray, Nasal, Daily, Sharona Montes DO, 1 spray at 10/25/22 1565  •  gabapentin (NEURONTIN) capsule 100 mg, 100 mg, Oral, HS, Ariana Vargas, DO, 100 mg at 10/24/22 2134  •  heparin (porcine) subcutaneous injection 5,000 Units, 5,000 Units, Subcutaneous, Q8H [facility], Camila Avilez PA-C, 5,000 Units at 10/25/22 9769  •  lidocaine (LIDODERM) 5 % patch 1 patch, 1 patch, Topical, Daily, Ariana Vargas DO, 1 patch at 10/24/22 0854  •  mirtazapine (REMERON) tablet 7 5 mg, 7 5 mg, Oral, HS, Verona Sena MD  •  naloxone (NARCAN) 0 04 mg/mL syringe 0 04 mg, 0 04 mg, Intravenous, Q1MIN PRN, Ariana Vargas DO  •  oxyCODONE (ROXICODONE) IR tablet 2 5 mg, 2 5 mg, Oral, Q6H PRN, Ariana Vargas, DO  •  oxyCODONE (ROXICODONE) IR tablet 5 mg, 5 mg, Oral, Q6H PRN, Ariana Vargas, DO, 5 mg at 10/25/22 4614  •  senna-docusate sodium (SENOKOT S) 8 6-50 mg per tablet 1 tablet, 1 tablet, Oral, BID, Ariana Vargas, DO, 1 tablet at 10/25/22 4636    Blood Culture:   No results found for: BLOODCX    Wound Culture:   No results found for: WOUNDCULT    Ins and Outs:  I/O last 24 hours: In: 1140 [P O :1140]  Out: 800 [Urine:800]          Physical:  Vitals:    10/25/22 0800   BP: 108/62   Pulse: 87   Resp: 17   Temp: 97 9 °F (36 6 °C)   SpO2: 93%     Musculoskeletal: right Upper Extremity  · Sling in place  · Skin intact, No significant ecchymosis or bruising over the right shoulder/upper arm  · Tender to palpation over shoulder and upper arm  · Sensation intact to radial, ulna, median, musculocutaneous, and axillary nerve distributions  · Motor intact to  radial, ulnar, median, musculocutaneous, and axillary nerve distributions  +deltoid twitch on the right  · 2+ radial and ulnar pulse  · Musculature is soft and compressible, no pain with passive stretch    MSK bilateral lower extremities  · Skin intact  · Non Tender to palpation over anterior pelvis  · Leg lengths equal  · Sensation intact L3-S1  · Positive knee flexion/extension, ankle dorsi/plantar flexion, EHL/FHL   Pt guarding due to pain  · Able to perform straight leg raise bilaterally   · 2+ DP/PT pulse  · Musculature is soft and compressible, no pain with passive stretch      Imaging:  I have personally reviewed imaging studies  CT pelvis: non displaced fracture of the right inferior pubic rami  Assessment:    79 y  o female s/p fall with right proximal humerus - suspect old, no acute fracture line  Also with acute nondisplaced right inferior pubic rami fracture  Plan:  · WBAT to the right upper extremity, sling for comfort  · WBAT to the bilateral lower extremities  · No acute operative intervention indicated at this time  · Pain control per primary team    · PT/OT  · DVT ppx per primary team    · Dispo: Ortho signing off   · Patient may follow up as outpatient in 2-3 weeks as previously advised yesterday with Dr Dianna Aiken for her right humerus fracture, and at that time can be re-assessed for her pelvic rami fracture       Roselyn Romeo PA-C

## 2022-10-25 NOTE — ASSESSMENT & PLAN NOTE
- Fall from standing with head strike  - Below noted injuries  - Multimodal pain regimen  - PT/OT evaluation and treatment as indicated  - Geriatric consultation and recommendations appreciated

## 2022-10-25 NOTE — ASSESSMENT & PLAN NOTE
- Patient states she was on Amlodipine but her physician said she "didn't need it anymore"  - Blood pressures improved with 2 5mg Norvasc  - Monitor blood pressure

## 2022-10-26 ENCOUNTER — TELEPHONE (OUTPATIENT)
Dept: OTHER | Facility: OTHER | Age: 82
End: 2022-10-26

## 2022-10-26 VITALS
WEIGHT: 103.62 LBS | BODY MASS INDEX: 18.36 KG/M2 | HEIGHT: 63 IN | HEART RATE: 81 BPM | DIASTOLIC BLOOD PRESSURE: 60 MMHG | OXYGEN SATURATION: 93 % | RESPIRATION RATE: 18 BRPM | SYSTOLIC BLOOD PRESSURE: 110 MMHG | TEMPERATURE: 98.4 F

## 2022-10-26 LAB
FLUAV RNA RESP QL NAA+PROBE: NEGATIVE
FLUBV RNA RESP QL NAA+PROBE: NEGATIVE
RSV RNA RESP QL NAA+PROBE: NEGATIVE
SARS-COV-2 RNA RESP QL NAA+PROBE: NEGATIVE

## 2022-10-26 RX ORDER — MIRTAZAPINE 7.5 MG/1
7.5 TABLET, FILM COATED ORAL
Qty: 9 TABLET | Refills: 0
Start: 2022-10-26 | End: 2022-11-14 | Stop reason: ALTCHOICE

## 2022-10-26 RX ORDER — AMOXICILLIN 250 MG
1 CAPSULE ORAL 2 TIMES DAILY
Refills: 0
Start: 2022-10-26

## 2022-10-26 RX ORDER — LANOLIN ALCOHOL/MO/W.PET/CERES
3 CREAM (GRAM) TOPICAL
Refills: 0
Start: 2022-10-26

## 2022-10-26 RX ORDER — LIDOCAINE 50 MG/G
1 PATCH TOPICAL DAILY
Refills: 0
Start: 2022-10-27

## 2022-10-26 RX ORDER — GABAPENTIN 100 MG/1
100 CAPSULE ORAL
Refills: 0
Start: 2022-10-26

## 2022-10-26 RX ORDER — OXYCODONE HYDROCHLORIDE 5 MG/1
2.5 TABLET ORAL EVERY 6 HOURS PRN
Qty: 5 TABLET | Refills: 0 | Status: SHIPPED | OUTPATIENT
Start: 2022-10-26 | End: 2022-10-29

## 2022-10-26 RX ADMIN — DICLOFENAC SODIUM TOPICAL GEL, 1%, 2 G: 10 GEL TOPICAL at 09:29

## 2022-10-26 RX ADMIN — SENNOSIDES AND DOCUSATE SODIUM 1 TABLET: 8.6; 5 TABLET ORAL at 09:23

## 2022-10-26 RX ADMIN — ENOXAPARIN SODIUM 30 MG: 30 INJECTION SUBCUTANEOUS at 09:28

## 2022-10-26 RX ADMIN — Medication 1000 UNITS: at 09:23

## 2022-10-26 RX ADMIN — ACETAMINOPHEN 975 MG: 325 TABLET ORAL at 13:29

## 2022-10-26 RX ADMIN — FLUTICASONE PROPIONATE 1 SPRAY: 50 SPRAY, METERED NASAL at 09:29

## 2022-10-26 RX ADMIN — DICLOFENAC SODIUM TOPICAL GEL, 1%, 2 G: 10 GEL TOPICAL at 13:31

## 2022-10-26 NOTE — DISCHARGE SUMMARY
[facility]  Discharge- Asia Spence 1940, 79 y o  female MRN: [id number]  Unit/Bed#: W -01 Encounter: [id number]  Primary Care Provider: VALDEMAR Adair   Date and time admitted to hospital: 10/23/2022  3:24 PM    Right shoulder pain  Assessment & Plan  - Right shoulder pain s/p fall   - History of right proximal humerus fracture 7 years prior  - Appreciate orthopedic evaluation and recs: No new fracture, okay for right sling for comfort, WBAT, op follow up    Vesicovaginal fistula  Assessment & Plan  - CT pelvis with concern for vesicovaginal fistula with contrast in the vaginal cuff  - Asymptomatic  - OB/GYN on call was contacted via [employer] and since patient is asymptomatic and has an acute pubic rami fracture, pelvic exam is deferred for now and patient can follow up outpatient  - Ambulatory referral placed for OB/GYN    Periorbital hematoma, right  Assessment & Plan  - Secondary to fall as above  - Hemodynamically stable  - Ice and elevation of the right side of the face    Hypertension  Assessment & Plan  - Patient states she was on Amlodipine but her physician said she "didn't need it anymore"  - Blood pressures improved with 2 5mg Norvasc  - Monitor blood pressure      Fall  Assessment & Plan  - Fall from standing with head strike  - Below noted injuries  - Multimodal pain regimen  - PT/OT evaluation and treatment as indicated  - Geriatric consultation and recommendations appreciated      * Closed fracture of ramus of right pubis ([address] )  Assessment & Plan  - Closed right inferior pubic ramus fracture secondary to fall as above  - With associated ambulatory dysfunction  - PT/OT evaluation and treatment as indicated  - Multimodal pain regimen  - Orthopedic consultation pending        Disposition: Medically stable for discharge    Spoke with patient's significant other, Sunshine Rosas, and updated him regarding the discharge plan and treatment plan    He was agreeable, all questions and concerns answered to satisfaction  SUBJECTIVE:  Chief Complaint: "I'm fine"    Subjective: Patient is comfortable in bed, eating her breakfast, and reports that she feels well  GCS 15, pain controlled  OBJECTIVE:   Vitals:   Temp:  [97 8 °F (36 6 °C)-98 °F (36 7 °C)] 97 9 °F (36 6 °C)  HR:  [70-80] 70  Resp:  [14-17] 16  BP: (106-126)/(52-70) 109/57    Intake/Output:  I/O       10/24 0701  10/25 0700 10/25 0701  10/26 0700 10/26 0701  10/27 0700    P  O  1140 240 180    Total Intake(mL/kg) 1140 (24 3) 240 (5 1) 180 (3 8)    Urine (mL/kg/hr) 800 (0 7) 350 (0 3) 300 (1 5)    Total Output 800 350 300    Net +340 -110 -120                Nutrition: Diet Regular; Regular House  Discharge Diet  GI Prophylaxis/Bowel Regimen: Senokot S  VTE Prophylaxis:Sequential compression device (Venodyne)  and Enoxaparin (Lovenox)     Physical Exam:   GENERAL APPEARANCE: Patient in no acute distress  HEENT: Right sided facial ecchymosis; PERRL, EOMs intact; Mucous membranes moist  CV: Regular rate and rhythm; no murmur/gallops/rubs appreciated  CHEST / LUNGS: Clear to auscultation; no wheezes/rales/rhonci  ABD: NABS; soft; non-distended; non-tender  : Voiding  EXT: +2 pulses bilaterally upper & lower extremities; no edema  NEURO: GCS 15; no focal neurologic deficits; neurovascularly intact  SKIN: Warm, dry and well perfused; no rash; no jaundice  Invasive Devices  Report    Peripheral Intravenous Line  Duration           Peripheral IV 10/23/22 Distal;Dorsal (posterior); Right Forearm 2 days    Peripheral IV 10/23/22 Right Antecubital 2 days          Drain  Duration           External Urinary Catheter 3 days                      Lab Results: Results: I have personally reviewed all pertinent laboratory/tests results  Imaging/EKG Studies: I have personally reviewed pertinent reports     see below  Other Studies:   CT pelvis wo contrast   Final Result by Jorge Hoang MD (10/24 2101) 1   Nondisplaced fracture of the right inferior pubic ramus  2   Small amount of contrast in the vaginal cough, 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  Recommend OB/GYN consultation  Workstation performed: [id number]         XR knee 1 or 2 vw right   Final Result by Eris Caceres MD (10/24 1708)      No acute osseous abnormality  Workstation performed: [id number]         CT head wo contrast   Final Result by Carter Larson MD (10/23 1746)      No acute intracranial abnormality  Chronic left PCA territory infarct  Large right superior periorbital subcutaneous hematoma  Workstation performed: [id number]         CT spine cervical without contrast   Final Result by Carter Larson MD (10/23 1750)      No cervical spine fracture or traumatic malalignment  Workstation performed: [id number]         CT facial bones without contrast   Final Result by Carter Larson MD (10/23 1748)      No acute osseous abnormality  Large right superior periorbital subcutaneous hematoma  Workstation performed: [id number]         CT chest abdomen pelvis w contrast   Final Result by Carter Larson MD (10/23 1817)      No evidence of acute posttraumatic abnormality  Colonic diverticulosis  Workstation performed: [id number]         XR humerus RIGHT   Final Result by Octavia Bradley MD (10/23 2051)      Fracture right humeral surgical neck which most likely represents a refracture although a previously ununited fracture would also be a consideration  Findings marked for immediate notification in Epic  Workstation performed: [id number]         XR hip/pelv 2-3 vws right if performed   Final Result by Rahel Rogers MD (10/24 6257)      No acute osseous abnormality  Workstation performed: [id number]         XR hand 3+ views LEFT   Final Result by Pavan Arteaga MD (10/23 1806)      Polyarticular arthritis as described  No acute fracture or dislocation appreciated  Workstation performed: [id number]                     Medical Problems             Resolved Problems  Date Reviewed: 10/26/2022   None                 Admission Date:   Admission Orders (From admission, onward)     Ordered        10/23/22 1955  Inpatient Admission  Once                        Admitting Diagnosis: Right hip pain [M25 551]  Right shoulder pain [M25 511]  Unspecified multiple injuries, initial encounter [T07  XXXA]    HPI written by Dr Zaki Gilliam 10/23/22 "HPI:  Gwendolyn Mckeon is a 79 y o  female who presents with a complaint of right sided body pain secondary to a fall  The patient reports that she had just walked in her house with her POA and lost her balance when she turned to talk to him  She fell onto a rug and struck the right side of her head on the floor  She landed on the entire right side of her body  She denies taking anticoagulation medication or losing consciousness  She can recall the entire event  She now reports headache as well as right shoulder pain with decreased range of motion of the right upper extremity  She also reports right hip pain as well as right knee pain and weakness with moving her right lower extremity  She also reports bruising and tenderness of the left 5th digit of her hand  She rates her current pain a 7/10 "    Procedures Performed: No orders of the defined types were placed in this encounter  Summary of Hospital Course: Please see above and reference hospital medical records  Significant Findings, Care, Treatment and Services Provided: Trauma evaluation and admission after patient fell and sustained a right pubic rami fracture  She was found to have multiple chronic fractures including thoracic compression fractures and chronic pubic fractures and right humeral neck fracture  Orthopedic surgery evaluated and treated the patient, recommended non-operative management   Additional consult was completed by Geriatric medicine  PT and OT recommended discharged to rehab  Complications: None    Condition at Discharge: stable         Discharge instructions/Information to patient and family:   See after visit summary for information provided to patient and family  Provisions for Follow-Up Care:  See after visit summary for information related to follow-up care and any pertinent home health orders  PCP: VALDEMAR Kolb    Disposition: Short-term rehab at [facility]    Planned Readmission: No    Discharge Statement   I spent 30 minutes discharging the patient  This time was spent on the day of discharge  I had direct contact with the patient on the day of discharge  Additional documentation is required if more than 30 minutes were spent on discharge  Discharge Medications:  See after visit summary for reconciled discharge medications provided to patient and family

## 2022-10-26 NOTE — CASE MANAGEMENT
Case Management Discharge Planning Note    Patient name Shama Uriarte  Location W /W -12 MRN [id number]  : 1940 Date 10/26/2022       Current Admission Date: 10/23/2022  Current Admission Diagnosis:Closed fracture of ramus of right pubis [facility])   Patient Active Problem List    Diagnosis Date Noted   • Closed fracture of ramus of right pubis ([facility] [address] ) 10/25/2022   • 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   • Right shoulder pain 10/25/2022   • Periorbital hematoma, right 10/23/2022   • Allergic rhinitis due to allergen 2022   • Upper respiratory symptom 2022   • Sciatica 2021   • Chronic pain of right knee    • Arthritis of knee, right    • Encounter for support and coordination of transition of care 2021   • Fx humeral neck, left, closed, initial encounter 2020   • Elevated liver enzymes 2020   • Abnormal results of liver function studies 2020   • Difficult or painful urination 2020   • Hereditary and idiopathic neuropathy, unspecified 2020   • Rheumatoid arthritis, unspecified ([facility] [address] ) 2020   • Personal history of transient ischemic attack (TIA), and cerebral infarction without residual deficits 2020   • Personal history of breast cancer 2020   • History of falling 2020   • Age-related osteoporosis without current pathological fracture 2020   • Constipation, unspecified 2020   • Anxiety    • Hypertension 11/10/2020   • Stroke [facility])    • Mild cognitive impairment    • Vitamin D deficiency 10/22/2020   • Closed fracture of nasal bone 10/21/2020   • Age-related osteoporosis with current pathological fracture 10/21/2020   • Fall 10/19/2020   • Traumatic hematoma of forehead 10/19/2020   • Closed fracture of proximal end of right humerus 10/19/2020   • Antibiotic-associated diarrhea 06/10/2020   • B12 deficiency 2018   • Abnormal MRI 10/17/2017   • MCI (mild cognitive impairment) with memory loss 2017   • HLD (hyperlipidemia)    • Knee injury 11/29/2016      LOS (days): 3  Geometric Mean LOS (GMLOS) (days): 2 50  Days to GMLOS:-0 2     OBJECTIVE:  Risk of Unplanned Readmission Score: 13 28         Current admission status: Inpatient   Preferred Pharmacy:   [address]  [facility]  Phone: [phone] Fax: [phone]    Primary Care Provider: VALDEMAR Mccabe    Primary Insurance: MEDICARE  Secondary Insurance: BLUE CROSS    DISCHARGE DETAILS:       Other Referral/Resources/Interventions Provided:  Referral Comments: [facility] has a yellow zone bed for today  Patient and Ashley Grewal made aware  CM received a 3pm  with Mor Garcia  Would you like to participate in our [address] service program?  : No - Declined    Treatment Team Recommendation: Short Term Rehab  Discharge Destination Plan[de-identified] Short Term Rehab  Transport at Discharge : [facility]  Dispatcher Contacted: Yes  Number/Name of Dispatcher: Naomy Rodriguez by [facility] and Unit #): Lala Garcia  ETA of Transport (Date): 10/26/22  ETA of Transport (Time): 1500     Transfer Mode: Stretcher  Accompanied by: EMS personnel     IMM Given (Date):: 10/26/22  IMM Given to[de-identified] Patient          Accepting Facility Name, [address] : [facility]  Receiving Facility/Agency Phone Number: [phone]  Facility/Agency Fax Number: [phone]       Lizette Alaniz IMM reviewed with patient, patient agrees with discharge determination

## 2022-10-26 NOTE — ASSESSMENT & PLAN NOTE
- CT pelvis with concern for vesicovaginal fistula with contrast in the vaginal cuff  - Asymptomatic  - OB/GYN on call was contacted via [employer] and since patient is asymptomatic and has an acute pubic rami fracture, pelvic exam is deferred for now and patient can follow up outpatient  - Ambulatory referral placed for OB/GYN

## 2022-10-26 NOTE — PLAN OF CARE
Problem: Prexisting or High Potential for Compromised Skin Integrity  Goal: Skin integrity is maintained or improved  Description: INTERVENTIONS:  - Identify patients at risk for skin breakdown  - Assess and monitor skin integrity  - Assess and monitor nutrition and hydration status  - Monitor labs   - Assess for incontinence   - Turn and reposition patient  - Assist with mobility/ambulation  - Relieve pressure over bony prominences  - Avoid friction and shearing  - Provide appropriate hygiene as needed including keeping skin clean and dry  - Evaluate need for skin moisturizer/barrier cream  - Collaborate with interdisciplinary team   - Patient/family teaching  - Consider wound care consult   10/26/2022 0830 by Gilberto Blackwell RN  Outcome: Progressing  10/26/2022 0829 by Gilberto Blackwell RN  Outcome: Progressing     Problem: Potential for Falls  Goal: Patient will remain free of falls  Description: INTERVENTIONS:  - Educate patient/family on patient safety including physical limitations  - Instruct patient to call for assistance with activity   - Consult OT/PT to assist with strengthening/mobility   - Keep Call bell within reach  - Keep bed low and locked with side rails adjusted as appropriate  - Keep care items and personal belongings within reach  - Initiate and maintain comfort rounds  - Make Fall Risk Sign visible to staff  - Offer Toileting every  Hours, in advance of need  - Initiate/Maintain alarm  - Obtain necessary fall risk management equipme  - Apply yellow socks and bracelet for high fall risk patients  - Consider moving patient to room near nurses station  10/26/2022 0830 by Gilberto Blackwell RN  Outcome: Progressing  10/26/2022 0829 by Gilberto Blackwell RN  Outcome: Progressing     Problem: Nutrition/Hydration-ADULT  Goal: Nutrient/Hydration intake appropriate for improving, restoring or maintaining nutritional needs  Description: Monitor and assess patient's nutrition/hydration status for malnutrition  Collaborate with interdisciplinary team and initiate plan and interventions as ordered  Monitor patient's weight and dietary intake as ordered or per policy  Utilize nutrition screening tool and intervene as necessary  Determine patient's food preferences and provide high-protein, high-caloric foods as appropriate       INTERVENTIONS:  - Monitor oral intake, urinary output, labs, and treatment plans  - Assess nutrition and hydration status and recommend course of action  - Evaluate amount of meals eaten  - Assist patient with eating if necessary   - Allow adequate time for meals  - Recommend/ encourage appropriate diets, oral nutritional supplements, and vitamin/mineral supplements  - Order, calculate, and assess calorie counts as needed  - Recommend, monitor, and adjust tube feedings and TPN/PPN based on assessed needs  - Assess need for intravenous fluids  - Provide specific nutrition/hydration education as appropriate  - Include patient/family/caregiver in decisions related to nutrition  10/26/2022 0830 by John Reaves RN  Outcome: Progressing  10/26/2022 0829 by John Reaves RN  Outcome: Progressing     Problem: MOBILITY - ADULT  Goal: Maintain or return to baseline ADL function  Description: INTERVENTIONS:  -  Assess patient's ability to carry out ADLs; assess patient's baseline for ADL function and identify physical deficits which impact ability to perform ADLs (bathing, care of mouth/teeth, toileting, grooming, dressing, etc )  - Assess/evaluate cause of self-care deficits   - Assess range of motion  - Assess patient's mobility; develop plan if impaired  - Assess patient's need for assistive devices and provide as appropriate  - Encourage maximum independence but intervene and supervise when necessary  - Involve family in performance of ADLs  - Assess for home care needs following discharge   - Consider OT consult to assist with ADL evaluation and planning for discharge  - Provide patient education as appropriate  10/26/2022 0830 by Jeannie Fox RN  Outcome: Progressing  10/26/2022 0829 by Jeannie Fox RN  Outcome: Progressing  Goal: Maintains/Returns to pre admission functional level  Description: INTERVENTIONS:  - Perform BMAT or MOVE assessment daily    - Set and communicate daily mobility goal to care team and patient/family/caregiver  - Collaborate with rehabilitation services on mobility goals if consulted  - Perform Range of Motion  times a day  - Reposition patient every  hours    - Dangle patient  times a day  - Stand patient  times a day  - Ambulate patient  times a day  - Out of bed to chair  times a day   - Out of bed for ayesha times a day  - Out of bed for toileting  - Record patient progress and toleration of activity level   10/26/2022 0830 by Jeannie Fox RN  Outcome: Progressing  10/26/2022 0829 by Jeannie Fox RN  Outcome: Progressing

## 2022-10-26 NOTE — INCIDENTAL FINDINGS
The following findings require follow up:  Radiographic finding   Findin  Right sided pulmonary nodule, 5 mm  Stable since 2010 scan, likely benign  2  Small amount of contrast in the vaginal cough, 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  Recommend OB/GYN consultation  Follow up required: Yes - follow up with GYN as soon as possible for an outpatient pelvic exam  Also follow up with PCP  Follow up should be done within 2 week(s)    Spoke with patient's partner, Fanny Lennon, regarding the findings and indication for follow up  He was appreciative of the phone call and all questions answered to his satisfaction      Please notify the following clinician to assist with the follow up:  Lamont Phelps, [address] [facility] Phone   [phone]

## 2022-10-27 ENCOUNTER — NURSING HOME VISIT (OUTPATIENT)
Dept: GERIATRICS | Facility: OTHER | Age: 82
End: 2022-10-27

## 2022-10-27 DIAGNOSIS — I10 PRIMARY HYPERTENSION: ICD-10-CM
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
## 2022-10-27 DIAGNOSIS — H05.231 PERIORBITAL HEMATOMA, RIGHT: ICD-10-CM

## 2022-10-27 DIAGNOSIS — M25.511 CHRONIC RIGHT SHOULDER PAIN: ICD-10-CM

## 2022-10-27 DIAGNOSIS — M06.9 RHEUMATOID ARTHRITIS, INVOLVING UNSPECIFIED SITE, UNSPECIFIED WHETHER RHEUMATOID FACTOR PRESENT (HCC): ICD-10-CM

## 2022-10-27 DIAGNOSIS — G89.29 CHRONIC RIGHT SHOULDER PAIN: ICD-10-CM

## 2022-10-27 DIAGNOSIS — I63.9 CEREBROVASCULAR ACCIDENT (CVA), UNSPECIFIED MECHANISM (HCC): ICD-10-CM

## 2022-10-27 DIAGNOSIS — G31.84 MILD COGNITIVE IMPAIRMENT: ICD-10-CM

## 2022-10-29 NOTE — PROGRESS NOTES
Gavin 11  [address] Admission    NAME: Kimberly Gonzalez  AGE: 79 y o  SEX: female [id number]    DATE OF ENCOUNTER: 10/27/2022    Assessment/Plan:   Patient’s care was coordinated with nursing facility staff  Recent vitals, labs and [facility]  Past Medical, surgical, social, medication and allergy history and patient’s previous records reviewed  S/p fall  Rt shoulder pain  Hx of Rt proximal humerus fx 7 years ago  Xray Rt humerus reveals "  "Fracture right humeral surgical neck which most likely represents a refracture although a previously ununited fracture would also be a consideration "   No new fx says ortho  Ortho recommend sling for comfort  WBAT  F/u outpatient with ortho    Rt periorbital hematoma  S/p fall  CT head revealed lart Rt superior periorbital subcutaneous hematoma  H/h stable  Pt with greenish bruise around her eye & Rt forehead  Cont ice  Elevate Rt side of face    S/p fall  Pt with acute closed fx or Rt pubics ramus  Pt evaluated by ortho on 10/25  Ortho says "WBAT to b/l LE"; no acute operative intervention  PT/OT to eval/tx  F/u outpatient with Dr Case Ortho in 2-3 weeks    Ambulatory dysfunction  PT/OT to eval/tx  Geriatric pain protocol    ? Vaginal vesicle fistula  Small amount of contrast in vagina seen on CAT scan pelvis  F/u outpatient in 6 weeks with OB/GYN  (after Rt pubic ramus fracture heals)    Hx chronic Lt PCA CVA  Cont good BP control  Pt with hematoma & high risk falls    HTN  /65  Cont amlodipine 2 5 mg po BID  Hold if SBP <110    Mild cognitive impairment  Pt with memory loss  Only oriented around 2; does not know location or exact date    Rheumatoid arthritis  Cont prn tylenol    Osteoporosis  Cont vit D3 1000 units 1 tab po daily    Chief Complaint     Rt shoulder pain    HPI     Patient is a 79 y o  female with PMH CVA, HTN, mild cognitive impairment, osteoporosis, rheumatoid arthritis, hyperlipidemia, vitamin D deficiency, and anxiety  Pt admitted to [facility] from 10/23/2022-10/26/2022 under trauma surgery service after sustaining a fall to her right side  She walked into her house and had lost her balance  Her head hit the floor  CT head negative for bleed  She did not loose consciousness and went to the E [facility]  There it was found she had a nondisplaced acute Rt pubic rami fx  She has had prior Rt humerus neck fx but not a new one  She also had bruises on the Rt side of her forehead & periorbital area  She was evaluated by orthopedics  In addition, her pelvis CT reealed a small amount of contrast in the vaginal ?cough, 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  Pt advised to see OB/gyn once her Rt pubic ramus fx heals  Pt seen and examined with speech swallow therapy present  Pt is getting her sling back on  She has memory issues       Past Medical History:   Diagnosis Date   • Allergic    • Anxiety    • Arthritis    • Cancer ([facility] [address] )    • HLD (hyperlipidemia)    • Hypertension    • Hyperthyroidism    • Osteoporosis    • Stroke [facility])    • TIA (transient ischemic attack)        Past Surgical History:   Procedure Laterality Date   • BREAST LUMPECTOMY Left    • HYSTERECTOMY     • KNEE SURGERY Right    • ORIF TIBIA FRACTURE Right 12/8/2016    Procedure: OPEN REDUCTION W/ INTERNAL FIXATION (ORIF) TIBIA;  Surgeon: Francisco Javier Emery MD;  Location: [facility];  Service:        Social History     Tobacco Use   Smoking Status Never Smoker   Smokeless Tobacco Never Used   Tobacco Comment    Former smoker - As per Rosie Senior           Family History   Problem Relation Age of Onset   • Coronary artery disease Family    • Other Family         DJD        Allergies   Allergen Reactions   • Alendronate    • Penicillins Swelling   • Raloxifene    • Risedronate Sodium    • Sulfa Antibiotics Swelling Current Outpatient Medications:   •  acetaminophen (TYLENOL) 325 mg tablet, Take 3 tablets (975 mg total) by mouth every 8 (eight) hours, Disp:  , Rfl: 0  •  albuterol (PROVENTIL HFA,VENTOLIN HFA) 90 mcg/act inhaler, Inhale 2 puffs every 6 (six) hours as needed for wheezing, Disp: , Rfl:   •  amLODIPine (NORVASC) 2 5 mg tablet, Take 1 tablet (2 5 mg total) by mouth 2 (two) times a day, Disp: 180 tablet, Rfl: 1  •  bisacodyl (DULCOLAX) 5 mg EC tablet, Take 5 mg by mouth daily as needed for constipation, Disp: , Rfl:   •  cholecalciferol (VITAMIN D3) 1,000 units tablet, Take 1 tablet (1,000 Units total) by mouth daily, Disp: 90 tablet, Rfl: 2  •  diclofenac sodium (VOLTAREN) 1 %, Apply 2 g topically 4 (four) times a day, Disp:  , Rfl: 0  •  fluticasone (FLONASE) 50 mcg/act nasal spray, 1 spray into each nostril daily, Disp:  , Rfl: 0  •  gabapentin (NEURONTIN) 100 mg capsule, Take 1 capsule (100 mg total) by mouth daily at bedtime, Disp: , Rfl: 0  •  lidocaine (LIDODERM) 5 %, Apply 1 patch topically daily Remove & Discard patch within 12 hours or as directed by MD Do not start before October 27, 2022 , Disp: , Rfl: 0  •  melatonin 3 mg, Take 1 tablet (3 mg total) by mouth daily at bedtime, Disp: , Rfl: 0  •  mirtazapine (REMERON) 7 5 MG tablet, Take 1 tablet (7 5 mg total) by mouth daily at bedtime for 9 doses, Disp: 9 tablet, Rfl: 0  •  multivitamin (THERAGRAN) TABS, Take 1 tablet by mouth daily, Disp: , Rfl:   •  nystatin (MYCOSTATIN) cream, Apply topically 2 (two) times a day Apply to skin folds in affected areas 2 x a day for 7 days  , Disp: 30 g, Rfl: 0  •  oxyCODONE (ROXICODONE) 5 immediate release tablet, Take 0 5 tablets (2 5 mg total) by mouth every 6 (six) hours as needed for severe pain for up to 3 days Max Daily Amount: 10 mg, Disp: 5 tablet, Rfl: 0  •  Pirbuterol Acetate (Ceci Jacinto [place]), Inhale one time by mouth every four hours as needed, Disp: , Rfl:   •  senna-docusate sodium (SENOKOT S) 8 6-50 mg per tablet, Take 1 tablet by mouth 2 (two) times a day, Disp: , Rfl: 0  •  tobramycin-dexamethasone (TOBRADEX) ophthalmic suspension, Administer 1 drop to both eyes every 4 (four) hours while awake, Disp: 5 mL, Rfl: 1    [facility]  97 2 Lbs 10/27/2022    Blood Pressure: 122 / 65 mmHg     Temperature: 97 8 °F    Pulse: 78 bpm    Respirations: 18 Breaths/min    Blood Sugar:     O2 Saturation: 96 0 %        Vital signs were reviewed in [facility] care    Review of Systems   Musculoskeletal: Positive for arthralgias  All other systems reviewed and are negative  Physical Exam  Constitutional:       General: She is not in acute distress  Appearance: She is not ill-appearing  HENT:      Head: Normocephalic  Cardiovascular:      Rate and Rhythm: Normal rate and regular rhythm  Pulmonary:      Effort: Pulmonary effort is normal  No respiratory distress  Breath sounds: No wheezing  Abdominal:      General: Bowel sounds are normal  There is no distension  Palpations: Abdomen is soft  Tenderness: There is no abdominal tenderness  Skin:     Comments: Pt with RUE in proper position; therapy placing on sling as pt had showered; pt with greenish bruises on her Rt forehead & around Rt eye & underneath Rt eyelid   Neurological:      Mental Status: She is alert  Comments: Pt A&Ox2; not to location   Psychiatric:      Comments: Calm, confused       Diagnostic Data   Recent labs and imaging studies were reviewed  Code Status:    DNR    Additional notes:     This note was electronically signed by Dr Amilcar Wharton

## 2022-10-31 ENCOUNTER — NURSING HOME VISIT (OUTPATIENT)
Dept: GERIATRICS | Facility: OTHER | Age: 82
End: 2022-10-31

## 2022-10-31 DIAGNOSIS — R35.0 URINARY FREQUENCY: ICD-10-CM

## 2022-10-31 DIAGNOSIS — S32.591D CLOSED FRACTURE OF RAMUS OF RIGHT PUBIS WITH ROUTINE HEALING, SUBSEQUENT ENCOUNTER: Primary | ICD-10-CM

## 2022-10-31 DIAGNOSIS — S42.201G CLOSED FRACTURE OF PROXIMAL END OF RIGHT HUMERUS WITH DELAYED HEALING, UNSPECIFIED FRACTURE MORPHOLOGY, SUBSEQUENT ENCOUNTER: ICD-10-CM

## 2022-10-31 DIAGNOSIS — I10 PRIMARY HYPERTENSION: ICD-10-CM

## 2022-10-31 DIAGNOSIS — G31.84 MCI (MILD COGNITIVE IMPAIRMENT) WITH MEMORY LOSS: ICD-10-CM

## 2022-11-01 NOTE — PROGRESS NOTES
Gavin [address] at [facility] [address], [address] [place] [address]  [phone]    SNF Rehab: POS [facility]    ASSESSMENT AND PLAN:  1  Closed fracture of ramus of right pubis with routine healing, subsequent encounter  Assessment & Plan:  · Status post fall with inferior pubic ramus fracture  · Continue pain management with scheduled Tylenol and prn Oxycodone  · Continue PT/OT, WBAT  · Follow-up with Ortho 11/09/2022      2  Closed fracture of proximal end of right humerus with delayed healing, unspecified fracture morphology, subsequent encounter  Assessment & Plan:  · Patient complains of chronic pain to her right arm, history of right humeral fracture  · Continue scheduled tylenol and prn oxycodone  · Continue sling for comfort  · Follow-up with Ortho      3  Urinary frequency  Assessment & Plan:  · Will order UAC&S  · Encourage PO fluid intake      4  Primary hypertension  Assessment & Plan:  · SBP trend 95 to 154  · Continue Amlodipine 2 5 mg po two times daily        5  MCI (mild cognitive impairment) with memory loss  Assessment & Plan:  · [facility] score 9/30 on 10/27/2022  · Continue PT/OT/ST services  · Encourage participation in unit activities  · Monitor for delirium        HPI:    Madhuri Fox  is a 79 year old patient seen and examined in SNF rehab to follow-up on acute and chronic medical conditions  Patient is status post hospitalization for a fall with right sided body pain  She is status post right pubic rami fracture  She was also found to have multiple chornic fractures including thoracic compression fractures, chronic pubic fractures and right humeral neck fracture  Ortho evaluated the patient and recommend non-operative management  Patient is currently ambulating with salma-cane 6 feet with min assist to CG assist   She is a mod assist for upper body ADLS, a max assist for lower body ADLs and dependent with toileting  Her MOCA score is a 9/30 on 10/27/2022  Upon examination, patient is calm cooperative and in no acute distress  She complains of moderate to severe right arm pan that is controlled with her pain medication  Nursing reports that she is experiencing frequency of urination and increased confusion  ROS: Review of Systems   Constitutional: Negative for chills, diaphoresis, fatigue and fever  Respiratory: Negative for cough, chest tightness, shortness of breath and wheezing  Cardiovascular: Negative for chest pain, palpitations and leg swelling  Gastrointestinal: Negative for abdominal pain, constipation, diarrhea, nausea and vomiting  Genitourinary: Positive for frequency  Negative for difficulty urinating, dysuria and flank pain  Musculoskeletal: Positive for gait problem  Negative for arthralgias, back pain and myalgias  Skin: Negative for color change, pallor, rash and wound  Neurological: Negative for dizziness, weakness, numbness and headaches  Psychiatric/Behavioral: Positive for confusion  Negative for agitation and dysphoric mood  The patient is not nervous/anxious          Allergies   Allergen Reactions   • Alendronate    • Penicillins Swelling   • Raloxifene    • Risedronate Sodium    • Sulfa Antibiotics Swelling       Medications:    Current Outpatient Medications on File Prior to Visit   Medication Sig Dispense Refill   • acetaminophen (TYLENOL) 325 mg tablet Take 3 tablets (975 mg total) by mouth every 8 (eight) hours  0   • albuterol (PROVENTIL HFA,VENTOLIN HFA) 90 mcg/act inhaler Inhale 2 puffs every 6 (six) hours as needed for wheezing     • amLODIPine (NORVASC) 2 5 mg tablet Take 1 tablet (2 5 mg total) by mouth 2 (two) times a day 180 tablet 1   • bisacodyl (DULCOLAX) 5 mg EC tablet Take 5 mg by mouth daily as needed for constipation     • cholecalciferol (VITAMIN D3) 1,000 units tablet Take 1 tablet (1,000 Units total) by mouth daily 90 tablet 2   • diclofenac sodium (VOLTAREN) 1 % Apply 2 g topically 4 (four) times a day  0   • fluticasone (FLONASE) 50 mcg/act nasal spray 1 spray into each nostril daily  0   • gabapentin (NEURONTIN) 100 mg capsule Take 1 capsule (100 mg total) by mouth daily at bedtime  0   • lidocaine (LIDODERM) 5 % Apply 1 patch topically daily Remove & Discard patch within 12 hours or as directed by MD Do not start before October 27, 2022   0   • melatonin 3 mg Take 1 tablet (3 mg total) by mouth daily at bedtime  0   • mirtazapine (REMERON) 7 5 MG tablet Take 1 tablet (7 5 mg total) by mouth daily at bedtime for 9 doses 9 tablet 0   • multivitamin (THERAGRAN) TABS Take 1 tablet by mouth daily     • nystatin (MYCOSTATIN) cream Apply topically 2 (two) times a day Apply to skin folds in affected areas 2 x a day for 7 days  30 g 0   • Pirbuterol Acetate (Vernon Qualia IN) Inhale one time by mouth every four hours as needed     • senna-docusate sodium (SENOKOT S) 8 6-50 mg per tablet Take 1 tablet by mouth 2 (two) times a day  0   • tobramycin-dexamethasone (TOBRADEX) ophthalmic suspension Administer 1 drop to both eyes every 4 (four) hours while awake 5 mL 1     No current facility-administered medications on file prior to visit  History:  Past Medical History:   Diagnosis Date   • Allergic    • Anxiety    • Arthritis    • Cancer ([facility] [address] )    • HLD (hyperlipidemia)    • Hypertension    • Hyperthyroidism    • Osteoporosis    • Stroke [facility])    • TIA (transient ischemic attack)      Past Surgical History:   Procedure Laterality Date   • BREAST LUMPECTOMY Left    • HYSTERECTOMY     • KNEE SURGERY Right    • ORIF TIBIA FRACTURE Right 12/8/2016    Procedure: OPEN REDUCTION W/ INTERNAL FIXATION (ORIF) TIBIA;  Surgeon: Geovanni Yarbrough MD;  Location: [facility];  Service:      Family History   Problem Relation Age of Onset   • Coronary artery disease Family    • Other Family         DJD     Social History     Socioeconomic History   • Marital status:       Spouse name: Not on file   • Number of children: Not on file   • Years of education: Not on file   • Highest education level: Not on file   Occupational History   • Not on file   Tobacco Use   • Smoking status: Never Smoker   • Smokeless tobacco: Never Used   • Tobacco comment: Former smoker - As per [employer]   • Vaping Use: Never used   Substance and Sexual Activity   • Alcohol use: Not Currently     Comment: Alcohol intake:   Occasional  - As per Siena Freedman    • Drug use: Never   • Sexual activity: Not Currently   Other Topics Concern   • Not on file   Social History Narrative    ** Merged History Encounter **         Living at [facility]    · Caffeine intake:   None        Social Determinants of Health     Financial Resource Strain: Not on file   Food Insecurity: No Food Insecurity   • Worried About Running Out of Food in the Last Year: Never true   • Ran Out of Food in the Last Year: Never true   Transportation Needs: No Transportation Needs   • Lack of Transportation (Medical): No   • Lack of Transportation (Non-Medical): No   Physical Activity: Not on file   Stress: Not on file   Social Connections: Not on file   Intimate Partner Violence: Not on file   Housing Stability: Low Risk    • Unable to Pay for Housing in the Last Year: No   • Number of Places Lived in the Last Year: 1   • Unstable Housing in the Last Year: No     Past Surgical History:   Procedure Laterality Date   • BREAST LUMPECTOMY Left    • HYSTERECTOMY     • KNEE SURGERY Right    • ORIF TIBIA FRACTURE Right 12/8/2016    Procedure: OPEN REDUCTION W/ INTERNAL FIXATION (ORIF) TIBIA;  Surgeon: Sudha Beard MD;  Location: [facility] OR;  Service:        OBJECTIVE:  Vital Signs:  /79, Temp 97 4, HR 66, RR 18, SpO2 96% RA    Physical Exam  Constitutional:       General: She is not in acute distress  Appearance: Normal appearance  She is not ill-appearing, toxic-appearing or diaphoretic  HENT:      Head: Normocephalic and atraumatic        Right Ear: External ear normal       Left Ear: External ear normal       Nose: Nose normal       Mouth/Throat:      Mouth: Mucous membranes are moist       Pharynx: Oropharynx is clear  Eyes:      General: No scleral icterus  Right eye: No discharge  Left eye: No discharge  Extraocular Movements: Extraocular movements intact  Conjunctiva/sclera: Conjunctivae normal    Cardiovascular:      Rate and Rhythm: Normal rate and regular rhythm  Pulses: Normal pulses  Heart sounds: Normal heart sounds  Pulmonary:      Effort: Pulmonary effort is normal  No respiratory distress  Breath sounds: Normal breath sounds  No wheezing, rhonchi or rales  Abdominal:      General: Bowel sounds are normal  There is no distension  Palpations: Abdomen is soft  There is no mass  Tenderness: There is no abdominal tenderness  There is no guarding  Musculoskeletal:         General: No tenderness or signs of injury  Normal range of motion  Cervical back: Normal range of motion and neck supple  No rigidity  Right lower leg: No edema  Left lower leg: No edema  Skin:     General: Skin is warm and dry  Coloration: Skin is not jaundiced  Findings: No bruising, erythema or lesion  Comments: Healing ecchymosis of right face   Neurological:      General: No focal deficit present  Mental Status: She is alert and oriented to person, place, and time  Cranial Nerves: No cranial nerve deficit  Sensory: No sensory deficit  Motor: No weakness  Gait: Gait abnormal    Psychiatric:         Mood and Affect: Mood normal          Behavior: Behavior normal          Thought Content:  Thought content normal          Labs & Imaging:  Lab Results   Component Value Date    WBC 7 34 10/25/2022    HGB 11 2 (L) 10/25/2022    HCT 35 8 10/25/2022    MCV 98 10/25/2022     10/25/2022     Lab Results   Component Value Date    SODIUM 138 10/23/2022    K 4 5 10/23/2022     10/23/2022    CO2 25 10/23/2022    BUN 17 10/23/2022    CREATININE 0 91 10/23/2022    GLUC 121 10/23/2022    CALCIUM 9 0 10/23/2022     Lab Results   Component Value Date    GYEPZFLK55 739 10/11/2018     Lab Results   Component Value Date    VPQ9NNRXQESN 2 610 06/17/2022     Lab Results   Component Value Date    HRIB06SFRAGX 31 4 06/17/2022      Results for orders placed during the hospital encounter of 10/23/22    CT head wo contrast    Narrative  CT BRAIN - WITHOUT CONTRAST    INDICATION:   Head trauma, moderate-severe  Head strike after fall  COMPARISON:  10/19/2020    TECHNIQUE:  CT examination of the brain was performed  In addition to axial images, sagittal and coronal 2D reformatted images were created and submitted for interpretation  Radiation dose length product (DLP) for this visit:  1003 mGy-cm   This examination, like all CT scans performed in the [facility], was performed utilizing techniques to minimize radiation dose exposure, including the use of iterative  reconstruction and automated exposure control  IMAGE QUALITY:  Diagnostic  FINDINGS:    PARENCHYMA: Decreased attenuation is noted in periventricular and subcortical white matter demonstrating an appearance that is statistically most likely to represent mild microangiopathic change  Unchanged old left PCA territory infarct  No CT signs of acute infarction  No intracranial mass, mass effect or midline shift  No acute parenchymal hemorrhage  VENTRICLES AND EXTRA-AXIAL SPACES:  Unchanged ex vacuo dilatation of the atrium of the left lateral ventricle  No hydrocephalus  No extra-axial collections  VISUALIZED ORBITS AND PARANASAL SINUSES:  Please see the separate report of the concurrent facial CT for evaluation of facial and orbital trauma  CALVARIUM AND EXTRACRANIAL SOFT TISSUES:  Intact calvarium  Large subcutaneous hematoma in the right superior periorbital region  Impression  No acute intracranial abnormality  Chronic left PCA territory infarct  Large right superior periorbital subcutaneous hematoma              Workstation performed: [id number]      Neto Tucker, [address] [place] [address]  10/31/2022

## 2022-11-02 PROBLEM — R35.0 URINARY FREQUENCY: Status: ACTIVE | Noted: 2022-10-31

## 2022-11-03 NOTE — ASSESSMENT & PLAN NOTE
· MOCA score 9/30 on 10/27/2022  · Continue PT/OT/ST services  · Encourage participation in unit activities  · Monitor for delirium

## 2022-11-03 NOTE — ASSESSMENT & PLAN NOTE
· Status post fall with inferior pubic ramus fracture  · Continue pain management with scheduled Tylenol and prn Oxycodone  · Continue PT/OT, WBAT  · Follow-up with Ortho 11/09/2022

## 2022-11-03 NOTE — ASSESSMENT & PLAN NOTE
· Patient complains of chronic pain to her right arm, history of right humeral fracture     · Continue scheduled tylenol and prn oxycodone  · Continue sling for comfort  · Follow-up with Ortho

## 2022-11-08 ENCOUNTER — NURSING HOME VISIT (OUTPATIENT)
Dept: GERIATRICS | Facility: OTHER | Age: 82
End: 2022-11-08

## 2022-11-08 DIAGNOSIS — G31.84 MILD COGNITIVE IMPAIRMENT: ICD-10-CM

## 2022-11-08 DIAGNOSIS — S42.201G CLOSED FRACTURE OF PROXIMAL END OF RIGHT HUMERUS WITH DELAYED HEALING, UNSPECIFIED FRACTURE MORPHOLOGY, SUBSEQUENT ENCOUNTER: ICD-10-CM

## 2022-11-08 DIAGNOSIS — S32.591D CLOSED FRACTURE OF RAMUS OF RIGHT PUBIS WITH ROUTINE HEALING, SUBSEQUENT ENCOUNTER: Primary | ICD-10-CM

## 2022-11-08 DIAGNOSIS — L29.9 PRURITUS: ICD-10-CM

## 2022-11-08 DIAGNOSIS — N30.00 ACUTE CYSTITIS WITHOUT HEMATURIA: ICD-10-CM

## 2022-11-08 NOTE — PROGRESS NOTES
Gavin [facility] at [facility] [address], [address] [place] [address]  [phone]     SNF Rehab: POS 31     ASSESSMENT AND PLAN:  1  Closed fracture of ramus of right pubis with routine healing, subsequent encounter  Assessment & Plan:  · Status post fall with inferior pubic ramus fracture  · Continue pain management with scheduled Tylenol and prn Oxycodone  · Continue PT/OT, WBAT  · Follow-up with Ortho 11/09/2022        2  Closed fracture of proximal end of right humerus with delayed healing, unspecified fracture morphology, subsequent encounter  Assessment & Plan:  · Patient complains of chronic pain to her right arm, history of right humeral fracture  · Continue scheduled tylenol, prn oxycodone and voltaren gel              Will order aspercreme lidocaine creamto left upper extremity and bilateral hands prn for pain  · Continue sling for comfort  · Follow-up with Ortho        3  Acute cystitis without hematuria  Assessment & Plan:  · Urine positive for Proteus mirabilis and Aerococcus sanguinicola  · Continue Cephalexin to complete 7 day course        4  Pruritis   Assessment & Plan:  · No rash or erythema present  · Hydrocortisone cream ordered prn to areas of itching  5  MCI (mild cognitive impairment) with memory loss  Assessment & Plan:  · [address], [address] score 9/30 on 10/27/2022  · Continue PT/OT/ST services  · Encourage participation in unit activities              Redirect, reorient and reassure  · Monitor for delirium           HPI:    Concepción Gilliam  is a 79 year old patient seen and examined in SNF rehab to follow-up on acute and chronic medical conditions  Patient is status post hospitalization for a fall with right sided body pain  She is status post right pubic rami fracture  She was also found to have multiple chornic fractures including thoracic compression fractures, chronic pubic fractures and right humeral neck fracture   Ortho evaluated the patient and recommend non-operative management  Patient is currently ambulating with roller walker 50  feet with min assist to CG assist   She is a mod assist for upper body ADLS, a max assist for lower body ADLs and mod assist  with toileting  Her MOCA score is a 9/30 on 10/27/2022  She is currently receiving Cephalexin for UTI  Upon examination, patient is calm cooperative and in no acute distress  She complains of moderate to severe right arm and bilateral hand pain due to arthritis  She states that the voltaren gel helps her and she would like to take it more often  She also complains of increased itchiness  She thinks it it due to the antibiotic for UTI that she is currently taking  She states that she does not wish to stop the antibiotic due to this, she wishes to complete the full course  No rash, fever, chills reported  ROS: Review of Systems   Constitutional: Negative for chills, diaphoresis, fatigue and fever  Respiratory: Negative for cough, chest tightness, shortness of breath and wheezing  Cardiovascular: Negative for chest pain, palpitations and leg swelling  Gastrointestinal: Negative for abdominal pain, constipation, diarrhea, nausea and vomiting  Genitourinary: Positive for frequency  Negative for difficulty urinating, dysuria and flank pain  Musculoskeletal: Positive for gait problem  Negative for arthralgias, back pain and myalgias  Skin: Negative for color change, pallor, rash and wound  Positive pruritis of back and right hip area  Neurological: Negative for dizziness, weakness, numbness and headaches  Psychiatric/Behavioral:  Negative for confusion, agitation and dysphoric mood  The patient is not nervous/anxious            Allergies  Allergen Reactions  • Alendronate    • Penicillins Swelling  • Raloxifene    • Risedronate Sodium    • Sulfa Antibiotics Swelling        Medications:    Current Outpatient Medications on File Prior to Visit  Medication Sig Dispense Refill  • acetaminophen (TYLENOL) 325 mg tablet Take 3 tablets (975 mg total) by mouth every 8 (eight) hours   0  • albuterol (PROVENTIL HFA,VENTOLIN HFA) 90 mcg/act inhaler Inhale 2 puffs every 6 (six) hours as needed for wheezing      • amLODIPine (NORVASC) 2 5 mg tablet Take 1 tablet (2 5 mg total) by mouth 2 (two) times a day 180 tablet 1  • bisacodyl (DULCOLAX) 5 mg EC tablet Take 5 mg by mouth daily as needed for constipation      • cholecalciferol (VITAMIN D3) 1,000 units tablet Take 1 tablet (1,000 Units total) by mouth daily 90 tablet 2  • diclofenac sodium (VOLTAREN) 1 % Apply 2 g topically 4 (four) times a day   0  • fluticasone (FLONASE) 50 mcg/act nasal spray 1 spray into each nostril daily   0  • gabapentin (NEURONTIN) 100 mg capsule Take 1 capsule (100 mg total) by mouth daily at bedtime   0  • lidocaine (LIDODERM) 5 % Apply 1 patch topically daily Remove & Discard patch within 12 hours or as directed by MD Do not start before October 27, 2022    0  • melatonin 3 mg Take 1 tablet (3 mg total) by mouth daily at bedtime   0  • mirtazapine (REMERON) 7 5 MG tablet Take 1 tablet (7 5 mg total) by mouth daily at bedtime for 9 doses 9 tablet 0  • multivitamin (THERAGRAN) TABS Take 1 tablet by mouth daily      • nystatin (MYCOSTATIN) cream Apply topically 2 (two) times a day Apply to skin folds in affected areas 2 x a day for 7 days  30 g 0  • Pirbuterol Acetate (Shukla Andriy IN) Inhale one time by mouth every four hours as needed      • senna-docusate sodium (SENOKOT S) 8 6-50 mg per tablet Take 1 tablet by mouth 2 (two) times a day   0  • tobramycin-dexamethasone (TOBRADEX) ophthalmic suspension Administer 1 drop to both eyes every 4 (four) hours while awake 5 mL 1     No current facility-administered medications on file prior to visit          History:  Medical History  Past Medical History:  Diagnosis Date  • Allergic    • Anxiety    • Arthritis    • Cancer ([facility] [address] )    • HLD (hyperlipidemia)    • Hypertension    • Hyperthyroidism    • Osteoporosis    • Stroke [facility])    • TIA (transient ischemic attack)         Surgical History  Past Surgical History:  Procedure Laterality Date  • BREAST LUMPECTOMY Left    • HYSTERECTOMY      • KNEE SURGERY Right    • ORIF TIBIA FRACTURE Right 12/8/2016    Procedure: OPEN REDUCTION W/ INTERNAL FIXATION (ORIF) TIBIA;  Surgeon: Benitez Aikns MD;  Location: [facility];  Service:        Family History  Family History  Problem Relation Age of Onset  • Coronary artery disease Family    • Other Family          DJD       Social History         Socioeconomic History  • Marital status:       Spouse name: Not on file  • Number of children: Not on file  • Years of education: Not on file  • Highest education level: Not on file  Occupational History  • Not on file  Tobacco Use  • Smoking status: Never Smoker  • Smokeless tobacco: Never Used  • Tobacco comment: Former smoker - As per [employer] Use  • Vaping Use: Never used  Substance and Sexual Activity  • Alcohol use: Not Currently      Comment: Alcohol intake:   Occasional  - As per [employer]   • Drug use: Never  • Sexual activity: Not Currently  Other Topics Concern  • Not on file  Social History Narrative    ** Merged History Encounter **         Living at SNF     ·           Caffeine intake:   None           Social Determinants of Health       Financial Resource Strain: Not on file  Food Insecurity: No Food Insecurity  • Worried About Running Out of Food in the Last Year: Never true  • Ran Out of Food in the Last Year: Never true  Transportation Needs: No Transportation Needs  • Lack of Transportation (Medical): No  • Lack of Transportation (Non-Medical):  No  Physical Activity: Not on file  Stress: Not on file  Social Connections: Not on file  Intimate Partner Violence: Not on file  Housing Stability: Low Risk   • Unable to Pay for Housing in the Last Year: No  • Number of Places Lived in the Last Year: 1  • Unstable Housing in the Last Year: No       Surgical History  Past Surgical History:  Procedure Laterality Date  • BREAST LUMPECTOMY Left    • HYSTERECTOMY      • KNEE SURGERY Right    • ORIF TIBIA FRACTURE Right 12/8/2016    Procedure: OPEN REDUCTION W/ INTERNAL FIXATION (ORIF) TIBIA;  Surgeon: Fei Low MD;  Location: [facility];  Service:           OBJECTIVE:  Vital Signs:  /56, Temp 97 4, HR 74, RR 20, SpO2 95% RA     Physical Exam  Constitutional:       General: She is not in acute distress  Appearance: Normal appearance  She is not ill-appearing, toxic-appearing or diaphoretic  HENT:      Head: Normocephalic and atraumatic  Right Ear: External ear normal       Left Ear: External ear normal       Nose: Nose normal       Mouth/Throat:      Mouth: Mucous membranes are moist       Pharynx: Oropharynx is clear  Eyes:      General: No scleral icterus  Right eye: No discharge  Left eye: No discharge  Extraocular Movements: Extraocular movements intact  Conjunctiva/sclera: Conjunctivae normal    Cardiovascular:      Rate and Rhythm: Normal rate and regular rhythm  Pulses: Normal pulses  Heart sounds: Normal heart sounds  Pulmonary:      Effort: Pulmonary effort is normal  No respiratory distress  Breath sounds: Normal breath sounds  No wheezing, rhonchi or rales  Abdominal:      General: Bowel sounds are normal  There is no distension  Palpations: Abdomen is soft  There is no mass  Tenderness: There is no abdominal tenderness  There is no guarding  Musculoskeletal:         General: Right upper extremity tenderness upon palpation and decreased ROM      Cervical back: Normal range of motion and neck supple  No rigidity  Right lower leg: No edema  Left lower leg: No edema  Skin:     General: Skin is warm and dry  Coloration: Skin is not jaundiced  Findings: No bruising, erythema or lesion        Comments: Healing ecchymosis of right face   Neurological:      General: No focal deficit present  Mental Status: She is alert and oriented to person, place, and time  Cranial Nerves: No cranial nerve deficit  Sensory: No sensory deficit  Motor: No weakness  Gait: Gait abnormal    Psychiatric:         Mood and Affect: Mood normal          Behavior: Behavior normal          Thought Content: Thought content normal             Labs & Imaging:  Lab Results  Component Value Date    WBC 7 34 10/25/2022    HGB 11 2 (L) 10/25/2022    HCT 35 8 10/25/2022    MCV 98 10/25/2022     10/25/2022     Lab Results  Component Value Date    SODIUM 138 10/23/2022    K 4 5 10/23/2022     10/23/2022    CO2 25 10/23/2022    BUN 17 10/23/2022    CREATININE 0 91 10/23/2022    GLUC 121 10/23/2022    CALCIUM 9 0 10/23/2022     Lab Results  Component Value Date    OBUXHWGY56 370 10/11/2018     Lab Results  Component Value Date    [id number] 2 610 06/17/2022     Lab Results  Component Value Date    [id number] 31 4 06/17/2022  Results for orders placed during the hospital encounter of 10/23/22     SPECIMEN DESCRIPTION     : Urine, Clean Voided  SPECIAL REQUESTS         : None  CULTURE RESULTS          :     10,000 to 40,000 col/mL  **PROTEUS MIRABILIS**    50,000 to 100,000 col/mL  **AEROCOCCUS SANGUINICOLA**  Susceptibility     testing is not routinely performed on Aerococcus species  These     organisms are described as routinely susceptible to penicillins,     cephalosporins, and vancomycin  Nitrofurantoin may also be used for     uncomplicated UTIs    REPORT STATUS            : Final 11/02/2022  ORGANISM                 : 10,000 to 40,000 col/mL  **PROTEUS MIRABILIS**  SENSITIVITY              : CRISTOPHER  PIP/TAZO                 : <=4 Susceptible  CEFEPIME                 : <=1 Susceptible  AZTREONAM                : <=1 Susceptible  MEROPENEM                : <=0 25 Susceptible  GENTAMICIN               : <=1 Susceptible  CIPRO                    : <=0 25 Susceptible  TRIMETHOPRIM-SUL         : <=20 Susceptible  AMPICILLIN               : <=2 Susceptible  CEFAZOLIN                : <=4 Susceptible  CEFTRIAXONE              : <=1 Susceptible  NITROFURANTOIN           : 128 Resistant  CEFTAZIDIME              : <=1 Susceptible    CT head wo contrast     Narrative  CT BRAIN - WITHOUT CONTRAST     INDICATION:   Head trauma, moderate-severe  Head strike after fall  COMPARISON:  10/19/2020     TECHNIQUE:  CT examination of the brain was performed  In addition to axial images, sagittal and coronal 2D reformatted images were created and submitted for interpretation  Radiation dose length product (DLP) for this visit:  1003 mGy-cm   This examination, like all CT scans performed in the [facility], was performed utilizing techniques to minimize radiation dose exposure, including the use of iterative  reconstruction and automated exposure control  IMAGE QUALITY:  Diagnostic  FINDINGS:     PARENCHYMA: Decreased attenuation is noted in periventricular and subcortical white matter demonstrating an appearance that is statistically most likely to represent mild microangiopathic change  Unchanged old left PCA territory infarct  No CT signs of acute infarction  No intracranial mass, mass effect or midline shift  No acute parenchymal hemorrhage  VENTRICLES AND EXTRA-AXIAL SPACES:  Unchanged ex vacuo dilatation of the atrium of the left lateral ventricle  No hydrocephalus  No extra-axial collections  VISUALIZED ORBITS AND PARANASAL SINUSES:  Please see the separate report of the concurrent facial CT for evaluation of facial and orbital trauma  CALVARIUM AND EXTRACRANIAL SOFT TISSUES:  Intact calvarium  Large subcutaneous hematoma in the right superior periorbital region  Impression  No acute intracranial abnormality  Chronic left PCA territory infarct    Large right superior periorbital subcutaneous hematoma                   Workstation performed: [id number]        Lenea Menard, [address] [place] [address]  11/08/2022

## 2022-11-09 PROBLEM — L29.9 PRURITUS: Status: ACTIVE | Noted: 2022-11-08

## 2022-11-09 PROBLEM — N30.00 ACUTE CYSTITIS WITHOUT HEMATURIA: Status: ACTIVE | Noted: 2022-11-08

## 2022-11-11 ENCOUNTER — NURSING HOME VISIT (OUTPATIENT)
Dept: GERIATRICS | Facility: OTHER | Age: 82
End: 2022-11-11

## 2022-11-11 DIAGNOSIS — N30.00 ACUTE CYSTITIS WITHOUT HEMATURIA: ICD-10-CM

## 2022-11-11 DIAGNOSIS — G31.84 MCI (MILD COGNITIVE IMPAIRMENT) WITH MEMORY LOSS: ICD-10-CM

## 2022-11-11 DIAGNOSIS — S42.212A FX HUMERAL NECK, LEFT, CLOSED, INITIAL ENCOUNTER: ICD-10-CM

## 2022-11-11 DIAGNOSIS — S32.591D CLOSED FRACTURE OF RAMUS OF RIGHT PUBIS WITH ROUTINE HEALING, SUBSEQUENT ENCOUNTER: Primary | ICD-10-CM

## 2022-11-11 DIAGNOSIS — F41.9 ANXIETY: ICD-10-CM

## 2022-11-12 NOTE — PROGRESS NOTES
Gavin [facility] at [facility], [address] [place] [address]  [phone]     SNF Rehab: POS 31     ASSESSMENT AND PLAN:  1  Closed fracture of ramus of right pubis with routine healing, subsequent encounter  Assessment & Plan:  ·           Status post fall with inferior pubic ramus fracture  ·           Continue pain management with scheduled Tylenol and prn Oxycodone  ·           Continue PT/OT, WBAT  ·           Follow-up with Ortho 11/09/2022        2  Closed fracture of proximal end of right humerus with delayed healing, unspecified fracture morphology, subsequent encounter  Assessment & Plan:  ·           Patient complains of chronic pain to her right arm, history of right humeral fracture  ·           Continue scheduled tylenol, prn oxycodone and voltaren gel              Will order aspercreme lidocaine creamto left upper extremity and bilateral hands prn for pain  ·           Continue sling for comfort  ·           Follow-up with Ortho        3  Acute cystitis without hematuria  Assessment & Plan:  ·           Urine positive for Proteus mirabilis and Aerococcus sanguinicola  ·          Status post  Cephalexin to complete 7 day course       4  Anxiety   Assessment & Plan:  ·           Increased anxiety related to discharge planning  ·           Supportive care and            5  MCI (mild cognitive impairment) with memory loss  Assessment & Plan:  ·           MOCA score 9/30 on 10/27/2022  ·           Continue PT/OT/ST services  ·           Encourage participation in unit activities              Redirect, reorient and reassure  ·           Monitor for delirium           HPI:    Eron Farnsworth  is a 79 year old patient seen and examined in SNF rehab to follow-up on acute and chronic medical conditions  Patient is status post hospitalization for a fall with right sided body pain  She is status post right pubic rami fracture   She was also found to have multiple chornic fractures including thoracic compression fractures, chronic pubic fractures and right humeral neck fracture  Ortho evaluated the patient and recommend non-operative management  Patient is currently ambulating with roller walker 50  feet with min assist to CG assist   She is a mod assist for upper body ADLS, a max assist for lower body ADLs and mod assist  with toileting  Her MOCA score is a 9/30 on 10/27/2022  She is status post Cephalexin for UTI  Upon examination, patient is calm cooperative and in no acute distress  She complains of mild right arm and bilateral hand pain due to arthritis  She states that the voltaren gel and Aspercreme are working for her, she is feeling better  She is nervous due to her discharge planning, she is not sure where she is going  ROS: Review of Systems   Constitutional: Negative for chills, diaphoresis, fatigue and fever  Respiratory: Negative for cough, chest tightness, shortness of breath and wheezing  Cardiovascular: Negative for chest pain, palpitations and leg swelling  Gastrointestinal: Negative for abdominal pain, constipation, diarrhea, nausea and vomiting  Genitourinary: Positive for frequency  Negative for difficulty urinating, dysuria and flank pain  Musculoskeletal: Positive for gait problem  Negative for arthralgias, back pain and myalgias  Skin: Negative for color change, pallor, rash and wound  Positive pruritis of back and right hip area  Neurological: Negative for dizziness, weakness, numbness and headaches  Psychiatric/Behavioral:  Negative for confusion, agitation and dysphoric mood   The patient is nervous        Allergies  Allergen          Reactions  •           Alendronate      •           Penicillins      Swelling  •           Raloxifene         •           Risedronate Sodium    •           Sulfa Antibiotics       Swelling        Medications:    Current Outpatient Medications on File Prior to Visit  Medication     Sig Dispense        Refill  •           acetaminophen (TYLENOL) 325 mg tablet Take 3 tablets (975 mg total) by mouth every 8 (eight) hours                        0  •           albuterol (PROVENTIL HFA,VENTOLIN HFA) 90 mcg/act inhaler           Inhale 2 puffs every 6 (six) hours as needed for wheezing                        •           amLODIPine (NORVASC) 2 5 mg tablet     Take 1 tablet (2 5 mg total) by mouth 2 (two) times a day  180 tablet       1  •           bisacodyl (DULCOLAX) 5 mg EC tablet     Take 5 mg by mouth daily as needed for constipation                      •           cholecalciferol (VITAMIN D3) 1,000 units tablet    Take 1 tablet (1,000 Units total) by mouth daily           90 tablet         2  •           diclofenac sodium (VOLTAREN) 1 %         Apply 2 g topically 4 (four) times a day                  0  •           fluticasone (FLONASE) 50 mcg/act nasal spray   1 spray into each nostril daily                0  •           gabapentin (NEURONTIN) 100 mg capsule           Take 1 capsule (100 mg total) by mouth daily at bedtime                 0  •           lidocaine (LIDODERM) 5 %  Apply 1 patch topically daily Remove & Discard patch within 12 hours or as directed by MD Do not start before October 27, 2022                 0  •           melatonin 3 mg         Take 1 tablet (3 mg total) by mouth daily at bedtime                    0  •           mirtazapine (REMERON) 7 5 MG tablet      Take 1 tablet (7 5 mg total) by mouth daily at bedtime for 9 doses           9 tablet           0  •           multivitamin (THERAGRAN) TABS Take 1 tablet by mouth daily                           •           nystatin (MYCOSTATIN) cream       Apply topically 2 (two) times a day Apply to skin folds in affected areas 2 x a day for 7 days          30 g     0  •           Pirbuterol Acetate (MAXAIR AUTOHALER IN)      Inhale one time by mouth every four hours as needed                           •           senna-docusate sodium (SENOKOT S) 8 6-50 mg per tablet      Take 1 tablet by mouth 2 (two) times a day                      0  •           tobramycin-dexamethasone (TOBRADEX) ophthalmic suspension      Administer 1 drop to both eyes every 4 (four) hours while awake    5 mL    1     No current facility-administered medications on file prior to visit  History:  Medical History  Past Medical History:  Diagnosis       Date  •           Allergic              •           Anxiety              •           Arthritis             •           Cancer ([facility] [address] )    •           HLD (hyperlipidemia)              •           Hypertension    •           Hyperthyroidism          •           Osteoporosis    •           Stroke [facility])     •           TIA (transient ischemic attack)               Surgical History  Past Surgical History:  Procedure      Laterality        Date  •           BREAST LUMPECTOMY      Left         •           HYSTERECTOMY                      •           KNEE SURGERY       Right      •           ORIF TIBIA FRACTURE        Right   12/8/2016               Procedure: OPEN REDUCTION W/ INTERNAL FIXATION (ORIF) TIBIA;  Surgeon: Che Baker MD;  Location: [facility];  Service:        Family History  Family History  Problem          Relation          Age of Onset  •           Coronary artery disease      Family    •           Other   Family                     DJD       Social History         Socioeconomic History  •           Marital status:                                        Spouse name:           Not on file  •           Number of children:  Not on file  •           Years of education:  Not on file  •           Highest education level:      Not on file  Occupational History  •           Not on file  Tobacco Use  •           Smoking status:        Never Smoker  •           Smokeless tobacco: Never Used  •           Tobacco comment: Former smoker - As per [employer] Use  •           Vaping Use:   Never used  Substance and Sexual Activity  •           Alcohol use:  Not Currently                            Comment: Alcohol intake:   Occasional  - As per [employer]   •           Drug use:       Never  •           Sexual activity:          Not Currently  Other Topics  Concern  •           Not on file  Social History Narrative               ** Merged History Encounter **                               Living at [facility]     ·           Caffeine intake:   None           Social Determinants of Health       Financial Resource Strain: Not on file  Food Insecurity: No Food Insecurity  •           Worried About [address] in the Last Year: Never true  •           Ran Out of Food in the Last Year: Never true  Transportation Needs: No Transportation Needs  •           Lack of Transportation (Medical): No  •           Lack of Transportation (Non-Medical): No  Physical Activity: Not on file  Stress: Not on file  Social Connections: Not on file  Intimate Partner Violence: Not on file  Housing Stability: Low Risk   •           Unable to Pay for Housing in the Last Year: No  •           Number of Places Lived in the Last Year: 1  •           Unstable Housing in the Last Year: No       Surgical History  Past Surgical History:  Procedure      Laterality        Date  •           BREAST LUMPECTOMY      Left         •           HYSTERECTOMY                      •           KNEE SURGERY       Right      •           ORIF TIBIA FRACTURE        Right   12/8/2016               Procedure: OPEN REDUCTION W/ INTERNAL FIXATION (ORIF) TIBIA;  Surgeon: González Infante MD;  Location: [facility] OR;  Service:           OBJECTIVE:  Vital Signs:  /70, Temp 98, HR 78, RR 20, SpO2 97% RA     Physical Exam  Constitutional:       General: She is not in acute distress  Appearance: Normal appearance  She is not ill-appearing, toxic-appearing or diaphoretic  HENT:      Head: Normocephalic and atraumatic        Right Ear: External ear normal  Left Ear: External ear normal       Nose: Nose normal       Mouth/Throat:      Mouth: Mucous membranes are moist       Pharynx: Oropharynx is clear  Eyes:      General: No scleral icterus  Right eye: No discharge  Left eye: No discharge  Extraocular Movements: Extraocular movements intact  Conjunctiva/sclera: Conjunctivae normal    Cardiovascular:      Rate and Rhythm: Normal rate and regular rhythm  Pulses: Normal pulses  Heart sounds: Normal heart sounds  Pulmonary:      Effort: Pulmonary effort is normal  No respiratory distress  Breath sounds: Normal breath sounds  No wheezing, rhonchi or rales  Abdominal:      General: Bowel sounds are normal  There is no distension  Palpations: Abdomen is soft  There is no mass  Tenderness: There is no abdominal tenderness  There is no guarding  Musculoskeletal:         General: Right upper extremity tenderness upon palpation and decreased ROM      Cervical back: Normal range of motion and neck supple  No rigidity  Right lower leg: No edema  Left lower leg: No edema  Skin:     General: Skin is warm and dry  Coloration: Skin is not jaundiced  Findings: No bruising, erythema or lesion  Comments: Healing ecchymosis of right face   Neurological:      General: No focal deficit present  Mental Status: She is alert and oriented to person, place, and time  Cranial Nerves: No cranial nerve deficit  Sensory: No sensory deficit  Motor: No weakness  Gait: Gait abnormal    Psychiatric:         Mood and Affect: Mood normal          Behavior: Behavior normal          Thought Content:  Thought content normal             Labs & Imaging:  Lab Results  Component    Value   Date               WBC    7 34     10/25/2022               HGB    11 2 (L)           10/25/2022               HCT     35 8     10/25/2022               MCV    98        10/25/2022               PLT     250 10/25/2022     Lab Results  Component    Value   Date               SODIUM          138      10/23/2022               K          4 5       10/23/2022               CL       105      10/23/2022               CO2     25        10/23/2022               BUN    17        10/23/2022               CREATININE  0 91     10/23/2022               GLUC  121      10/23/2022               CALCIUM       9 0       10/23/2022     Lab Results  Component    Value   Date               [id number]   574      10/11/2018     Lab Results  Component    Value   Date               [id number]      2 610   06/17/2022     Lab Results  Component    Value   Date               [id number]        31 4     06/17/2022  Results for orders placed during the hospital encounter of 10/23/22     SPECIMEN DESCRIPTION     : Urine, Clean Voided  SPECIAL REQUESTS         : None  CULTURE RESULTS          :     10,000 to 40,000 col/mL  **PROTEUS MIRABILIS**    50,000 to 100,000 col/mL  **AEROCOCCUS SANGUINICOLA**  Susceptibility     testing is not routinely performed on Aerococcus species  These     organisms are described as routinely susceptible to penicillins,     cephalosporins, and vancomycin  Nitrofurantoin may also be used for     uncomplicated UTIs    REPORT STATUS            : Final 11/02/2022  ORGANISM                 : 10,000 to 40,000 col/mL  **PROTEUS MIRABILIS**  SENSITIVITY              : CRISTOPHER  PIP/TAZO                 : <=4 Susceptible  CEFEPIME                 : <=1 Susceptible  AZTREONAM                : <=1 Susceptible  MEROPENEM                : <=0 25 Susceptible  GENTAMICIN               : <=1 Susceptible  CIPRO                    : <=0 25 Susceptible  TRIMETHOPRIM-SUL         : <=20 Susceptible  AMPICILLIN               : <=2 Susceptible  CEFAZOLIN                : <=4 Susceptible  CEFTRIAXONE              : <=1 Susceptible  NITROFURANTOIN           : 128 Resistant  CEFTAZIDIME              : <=1 Susceptible    CT head wo contrast Narrative  CT BRAIN - WITHOUT CONTRAST     INDICATION:   Head trauma, moderate-severe  Head strike after fall  COMPARISON:  10/19/2020     TECHNIQUE:  CT examination of the brain was performed  In addition to axial images, sagittal and coronal 2D reformatted images were created and submitted for interpretation  Radiation dose length product (DLP) for this visit:  1003 mGy-cm   This examination, like all CT scans performed in the [facility], was performed utilizing techniques to minimize radiation dose exposure, including the use of iterative  reconstruction and automated exposure control  IMAGE QUALITY:  Diagnostic  FINDINGS:     PARENCHYMA: Decreased attenuation is noted in periventricular and subcortical white matter demonstrating an appearance that is statistically most likely to represent mild microangiopathic change  Unchanged old left PCA territory infarct  No CT signs of acute infarction  No intracranial mass, mass effect or midline shift  No acute parenchymal hemorrhage  VENTRICLES AND EXTRA-AXIAL SPACES:  Unchanged ex vacuo dilatation of the atrium of the left lateral ventricle  No hydrocephalus  No extra-axial collections  VISUALIZED ORBITS AND PARANASAL SINUSES:  Please see the separate report of the concurrent facial CT for evaluation of facial and orbital trauma  CALVARIUM AND EXTRACRANIAL SOFT TISSUES:  Intact calvarium  Large subcutaneous hematoma in the right superior periorbital region  Impression  No acute intracranial abnormality  Chronic left PCA territory infarct  Large right superior periorbital subcutaneous hematoma                   Workstation performed: [id number]        [facility], [address] [place] [address]  11/11/2022

## 2022-11-14 ENCOUNTER — NURSING HOME VISIT (OUTPATIENT)
Dept: GERIATRICS | Facility: OTHER | Age: 82
End: 2022-11-14

## 2022-11-14 DIAGNOSIS — G31.84 MCI (MILD COGNITIVE IMPAIRMENT) WITH MEMORY LOSS: ICD-10-CM

## 2022-11-14 DIAGNOSIS — S32.591D CLOSED FRACTURE OF RAMUS OF RIGHT PUBIS WITH ROUTINE HEALING, SUBSEQUENT ENCOUNTER: Primary | ICD-10-CM

## 2022-11-14 DIAGNOSIS — F41.9 ANXIETY: ICD-10-CM

## 2022-11-14 DIAGNOSIS — N30.00 ACUTE CYSTITIS WITHOUT HEMATURIA: ICD-10-CM

## 2022-11-14 DIAGNOSIS — S42.201G CLOSED FRACTURE OF PROXIMAL END OF RIGHT HUMERUS WITH DELAYED HEALING, UNSPECIFIED FRACTURE MORPHOLOGY, SUBSEQUENT ENCOUNTER: ICD-10-CM

## 2022-11-14 RX ORDER — LIDOCAINE 40 MG/G
CREAM TOPICAL 2 TIMES DAILY
COMMUNITY

## 2022-11-14 RX ORDER — OXYCODONE HYDROCHLORIDE 5 MG/1
2.5 CAPSULE ORAL EVERY 6 HOURS PRN
COMMUNITY

## 2022-11-14 NOTE — PROGRESS NOTES
[facility] at [facility] [address], [address] [facility]  [phone]     SNF Rehab: POS 31     ASSESSMENT AND PLAN:  1  Closed fracture of ramus of right pubis with routine healing, subsequent encounter  Assessment & Plan:  ·           Status post fall with inferior pubic ramus fracture  ·           Continue pain management with scheduled Tylenol and prn Oxycodone  ·           Continue PT/OT, WBAT  ·           Follow-up with Ortho         2  Closed fracture of proximal end of right humerus with delayed healing, unspecified fracture morphology, subsequent encounter  Assessment & Plan:  ·           Patient complains of chronic pain to her right arm, history of right humeral fracture  ·           Continue scheduled tylenol, prn oxycodone and voltaren gel              Will order aspercreme lidocaine creamto left upper extremity and bilateral hands prn for pain  ·           Continue sling for comfort  ·           Follow-up with Ortho        3  Acute cystitis without hematuria  Assessment & Plan:  ·           Urine positive for Proteus mirabilis and Aerococcus sanguinicola  ·          Status post  Cephalexin 7 day course       4  Anxiety   Assessment & Plan:  ·           Currently stable  ·           Continue supportive care and frequent reassurance           5  MCI (mild cognitive impairment) with memory loss  Assessment & Plan:  ·           [address] [facility] score 9/30 on 10/27/2022  ·           Continue PT/OT/ST services  ·           Encourage participation in unit activities              Redirect, reorient and reassure  ·           Monitor for delirium           HPI:    Kelvin Crawford  is a 79 year old patient seen and examined in SNF rehab to follow-up on acute and chronic medical conditions  Patient is status post hospitalization for a fall with right sided body pain  She is status post right pubic rami fracture   She was also found to have multiple chornic fractures including thoracic compression fractures, chronic pubic fractures and right humeral neck fracture  Ortho evaluated the patient and recommend non-operative management  Patient is currently ambulating with roller walker 50  feet with min assist to CG assist   She is a mod assist for upper body ADLS, a max assist for lower body ADLs and mod assist  with toileting  Her MOCA score is a 9/30 on 10/27/2022  She is status post Cephalexin for UTI  Upon examination, patient is calm cooperative and in no acute distress  She complains of mild right arm and bilateral hand pain due to arthritis  She states that the voltaren gel and Aspercreme are working for her, she is feeling better  She states that she is looking for a new POA due to her current POA being unable to provide this service any longer  ROS: Review of Systems   Constitutional: Negative for chills, diaphoresis, fatigue and fever  Respiratory: Negative for cough, chest tightness, shortness of breath and wheezing  Cardiovascular: Negative for chest pain, palpitations and leg swelling  Gastrointestinal: Negative for abdominal pain, constipation, diarrhea, nausea and vomiting  Genitourinary: Positive for frequency  Negative for difficulty urinating, dysuria and flank pain  Musculoskeletal: Positive for gait problem  Negative for arthralgias, back pain and myalgias  Skin: Negative for color change, pallor, rash and wound  Positive pruritis of back and right hip area  Neurological: Negative for dizziness, weakness, numbness and headaches  Psychiatric/Behavioral:  Negative for confusion, agitation and dysphoric mood   The patient is nervous        Allergies  Allergen          Reactions  •           Alendronate      •           Penicillins      Swelling  •           Raloxifene         •           Risedronate Sodium    •           Sulfa Antibiotics       Swelling        Medications:    Current Outpatient Medications on File Prior to Visit  Medication     Sig       Dispense        Refill  •           acetaminophen (TYLENOL) 325 mg tablet Take 3 tablets (975 mg total) by mouth every 8 (eight) hours                        0  •           albuterol (PROVENTIL HFA,VENTOLIN HFA) 90 mcg/act inhaler           Inhale 2 puffs every 6 (six) hours as needed for wheezing                        •           amLODIPine (NORVASC) 2 5 mg tablet     Take 1 tablet (2 5 mg total) by mouth 2 (two) times a day  180 tablet       1  •           bisacodyl (DULCOLAX) 5 mg EC tablet     Take 5 mg by mouth daily as needed for constipation                      •           cholecalciferol (VITAMIN D3) 1,000 units tablet    Take 1 tablet (1,000 Units total) by mouth daily           90 tablet         2  •           diclofenac sodium (VOLTAREN) 1 %         Apply 2 g topically 4 (four) times a day                  0  •           fluticasone (FLONASE) 50 mcg/act nasal spray   1 spray into each nostril daily                0  •           gabapentin (NEURONTIN) 100 mg capsule           Take 1 capsule (100 mg total) by mouth daily at bedtime                 0  •           lidocaine (LIDODERM) 5 %  Apply 1 patch topically daily Remove & Discard patch within 12 hours or as directed by MD Do not start before October 27, 2022                 0  •           melatonin 3 mg         Take 1 tablet (3 mg total) by mouth daily at bedtime                    0  •           mirtazapine (REMERON) 7 5 MG tablet      Take 1 tablet (7 5 mg total) by mouth daily at bedtime for 9 doses           9 tablet           0  •           multivitamin (THERAGRAN) TABS Take 1 tablet by mouth daily                           •           nystatin (MYCOSTATIN) cream       Apply topically 2 (two) times a day Apply to skin folds in affected areas 2 x a day for 7 days          30 g     0  •           Pirbuterol Acetate (MAXAIR AUTOHALER IN)      Inhale one time by mouth every four hours as needed •           senna-docusate sodium (SENOKOT S) 8 6-50 mg per tablet      Take 1 tablet by mouth 2 (two) times a day                      0  •           tobramycin-dexamethasone (TOBRADEX) ophthalmic suspension      Administer 1 drop to both eyes every 4 (four) hours while awake    5 mL    1     No current facility-administered medications on file prior to visit  History:  Medical History  Past Medical History:  Diagnosis       Date  •           Allergic              •           Anxiety              •           Arthritis             •           Cancer ([facility] [address] )    •           HLD (hyperlipidemia)              •           Hypertension    •           Hyperthyroidism          •           Osteoporosis    •           Stroke [facility])     •           TIA (transient ischemic attack)               Surgical History  Past Surgical History:  Procedure      Laterality        Date  •           BREAST LUMPECTOMY      Left         •           HYSTERECTOMY                      •           KNEE SURGERY       Right      •           ORIF TIBIA FRACTURE        Right   12/8/2016               Procedure: OPEN REDUCTION W/ INTERNAL FIXATION (ORIF) TIBIA;  Surgeon: Wild Kowalski MD;  Location: [facility];  Service:        Family History  Family History  Problem          Relation          Age of Onset  •           Coronary artery disease      Family    •           Other   Family                     DJD       Social History         Socioeconomic History  •           Marital status:                                        Spouse name:           Not on file  •           Number of children:  Not on file  •           Years of education:  Not on file  •           Highest education level:      Not on file  Occupational History  •           Not on file  Tobacco Use  •           Smoking status:        Never Smoker  •           Smokeless tobacco: Never Used  •           Tobacco comment: Former smoker - As per [employer]  • Vaping Use:   Never used  Substance and Sexual Activity  •           Alcohol use:  Not Currently                            Comment: Alcohol intake:   Occasional  - As per Remonia Sales   •           Drug use:       Never  •           Sexual activity:          Not Currently  Other Topics  Concern  •           Not on file  Social History Narrative               ** Merged History Encounter **                               Living at [facility]     ·           Caffeine intake:   None           Social Determinants of Health       Financial Resource Strain: Not on file  Food Insecurity: No Food Insecurity  •           Worried About [address] [employer] in the Last Year: Never true  •           Ran Out of Food in the Last Year: Never true  Transportation Needs: No Transportation Needs  •           Lack of Transportation (Medical): No  •           Lack of Transportation (Non-Medical): No  Physical Activity: Not on file  Stress: Not on file  Social Connections: Not on file  Intimate Partner Violence: Not on file  Housing Stability: Low Risk   •           Unable to Pay for Housing in the Last Year: No  •           Number of Places Lived in the Last Year: 1  •           Unstable Housing in the Last Year: No       Surgical History  Past Surgical History:  Procedure      Laterality        Date  •           BREAST LUMPECTOMY      Left         •           HYSTERECTOMY                      •           KNEE SURGERY       Right      •           ORIF TIBIA FRACTURE        Right   12/8/2016               Procedure: OPEN REDUCTION W/ INTERNAL FIXATION (ORIF) TIBIA;  Surgeon: Gabe Agarwal MD;  Location: [facility];  Service:           OBJECTIVE:  Vital Signs:  /58, Temp 97 9, HR 70, RR 20, SpO2 97% RA     Physical Exam  Constitutional:       General: She is not in acute distress  Appearance: Normal appearance  She is not ill-appearing, toxic-appearing or diaphoretic  HENT:      Head: Normocephalic and atraumatic        Right Ear: External ear normal       Left Ear: External ear normal       Nose: Nose normal       Mouth/Throat:      Mouth: Mucous membranes are moist       Pharynx: Oropharynx is clear  Eyes:      General: No scleral icterus  Right eye: No discharge  Left eye: No discharge  Extraocular Movements: Extraocular movements intact  Conjunctiva/sclera: Conjunctivae normal    Cardiovascular:      Rate and Rhythm: Normal rate and regular rhythm  Pulses: Normal pulses  Heart sounds: Normal heart sounds  Pulmonary:      Effort: Pulmonary effort is normal  No respiratory distress  Breath sounds: Normal breath sounds  No wheezing, rhonchi or rales  Abdominal:      General: Bowel sounds are normal  There is no distension  Palpations: Abdomen is soft  There is no mass  Tenderness: There is no abdominal tenderness  There is no guarding  Musculoskeletal:         General: Right upper extremity tenderness upon palpation and decreased ROM      Cervical back: Normal range of motion and neck supple  No rigidity  Right lower leg: No edema  Left lower leg: No edema  Skin:     General: Skin is warm and dry  Coloration: Skin is not jaundiced  Findings: No bruising, erythema or lesion  Comments: Healing ecchymosis of right face   Neurological:      General: No focal deficit present  Mental Status: She is alert and oriented to person, place, and time  Cranial Nerves: No cranial nerve deficit  Sensory: No sensory deficit  Motor: No weakness  Gait: Gait abnormal    Psychiatric:         Mood and Affect: Mood normal          Behavior: Behavior normal          Thought Content:  Thought content normal             Labs & Imaging:  Lab Results  Component    Value   Date               WBC    7 34     10/25/2022               HGB    11 2 (L)           10/25/2022               HCT     35 8     10/25/2022               MCV    98        10/25/2022 PLT     250      10/25/2022     Lab Results  Component    Value   Date               SODIUM          138      10/23/2022               K          4 5       10/23/2022               CL       105      10/23/2022               CO2     25        10/23/2022               BUN    17        10/23/2022               CREATININE  0 91     10/23/2022               GLUC  121      10/23/2022               CALCIUM       9 0       10/23/2022     Lab Results  Component    Value   Date               [id number]   336      10/11/2018     Lab Results  Component    Value   Date               [id number]      2 610   06/17/2022     Lab Results  Component    Value   Date               [id number]        31 4     06/17/2022  Results for orders placed during the hospital encounter of 10/23/22     SPECIMEN DESCRIPTION     : Urine, Clean Voided  SPECIAL REQUESTS         : None  CULTURE RESULTS          :     10,000 to 40,000 col/mL  **PROTEUS MIRABILIS**    50,000 to 100,000 col/mL  **AEROCOCCUS SANGUINICOLA**  Susceptibility     testing is not routinely performed on Aerococcus species  These     organisms are described as routinely susceptible to penicillins,     cephalosporins, and vancomycin  Nitrofurantoin may also be used for     uncomplicated UTIs    REPORT STATUS            : Final 11/02/2022  ORGANISM                 : 10,000 to 40,000 col/mL  **PROTEUS MIRABILIS**  SENSITIVITY              : CRISTOPHER  PIP/TAZO                 : <=4 Susceptible  CEFEPIME                 : <=1 Susceptible  AZTREONAM                : <=1 Susceptible  MEROPENEM                : <=0 25 Susceptible  GENTAMICIN               : <=1 Susceptible  CIPRO                    : <=0 25 Susceptible  TRIMETHOPRIM-SUL         : <=20 Susceptible  AMPICILLIN               : <=2 Susceptible  CEFAZOLIN                : <=4 Susceptible  CEFTRIAXONE              : <=1 Susceptible  NITROFURANTOIN           : 128 Resistant  CEFTAZIDIME              : <=1 Susceptible    CT head wo contrast     Narrative  CT BRAIN - WITHOUT CONTRAST     INDICATION:   Head trauma, moderate-severe  Head strike after fall  COMPARISON:  10/19/2020     TECHNIQUE:  CT examination of the brain was performed  In addition to axial images, sagittal and coronal 2D reformatted images were created and submitted for interpretation  Radiation dose length product (DLP) for this visit:  1003 mGy-cm   This examination, like all CT scans performed in the [facility] [place], was performed utilizing techniques to minimize radiation dose exposure, including the use of iterative  reconstruction and automated exposure control  IMAGE QUALITY:  Diagnostic  FINDINGS:     PARENCHYMA: Decreased attenuation is noted in periventricular and subcortical white matter demonstrating an appearance that is statistically most likely to represent mild microangiopathic change  Unchanged old left PCA territory infarct  No CT signs of acute infarction  No intracranial mass, mass effect or midline shift  No acute parenchymal hemorrhage  VENTRICLES AND EXTRA-AXIAL SPACES:  Unchanged ex vacuo dilatation of the atrium of the left lateral ventricle  No hydrocephalus  No extra-axial collections  VISUALIZED ORBITS AND PARANASAL SINUSES:  Please see the separate report of the concurrent facial CT for evaluation of facial and orbital trauma  CALVARIUM AND EXTRACRANIAL SOFT TISSUES:  Intact calvarium  Large subcutaneous hematoma in the right superior periorbital region  Impression  No acute intracranial abnormality  Chronic left PCA territory infarct  Large right superior periorbital subcutaneous hematoma          Workstation performed: [id number]        [address]  11/14/2022

## 2022-11-18 ENCOUNTER — NURSING HOME VISIT (OUTPATIENT)
Dept: GERIATRICS | Facility: OTHER | Age: 82
End: 2022-11-18

## 2022-11-18 DIAGNOSIS — G31.84 MCI (MILD COGNITIVE IMPAIRMENT) WITH MEMORY LOSS: ICD-10-CM

## 2022-11-18 DIAGNOSIS — I10 PRIMARY HYPERTENSION: ICD-10-CM

## 2022-11-18 DIAGNOSIS — S32.591D CLOSED FRACTURE OF RAMUS OF RIGHT PUBIS WITH ROUTINE HEALING, SUBSEQUENT ENCOUNTER: Primary | ICD-10-CM

## 2022-11-18 DIAGNOSIS — R26.2 AMBULATORY DYSFUNCTION: ICD-10-CM

## 2022-11-21 PROBLEM — R26.2 AMBULATORY DYSFUNCTION: Status: ACTIVE | Noted: 2022-11-21

## 2022-11-21 PROBLEM — Z86.73 HISTORY OF STROKE: Status: ACTIVE | Noted: 2022-11-21

## 2022-11-21 NOTE — ASSESSMENT & PLAN NOTE
Stable    BP range (11/1-18/2022) = 90/50 to 169/67  ** 3 episodes of SBP < 100 on this period  ** 4 epsiodes of SBP > 150 on this period  HR range (11/1-18/2022) = 57 to 81/min  Continue assessment every shift  Continue Amlodipine 2 5mg BID

## 2022-11-21 NOTE — ASSESSMENT & PLAN NOTE
Non surgical management per Ortho recommendation  Continue pain management:   * Oxycodone 2 5mg Q6 hours PRN  * Tylenol 975mg Q8 hours  WBAT   Continue PT/OT as scheduled

## 2022-11-21 NOTE — ASSESSMENT & PLAN NOTE
Recent Hx of fall at home with head strike and fracture  Continue PT/OT as scheduled  Continue STR supportive care

## 2022-11-21 NOTE — PROGRESS NOTES
[address], [address] [place] [address], [address] [place] [address]  [phone]    NAME: Rachael Cancino  AGE: 79 y o  SEX: female    Progress Note    Location: [facility]  POS: 32 (SNF)    Assessment/Plan:    Closed fracture of ramus of right pubis (HCC)  Non surgical management per Ortho recommendation  Continue pain management:   * Oxycodone 2 5mg Q6 hours PRN  * Tylenol 975mg Q8 hours  WBAT  Continue PT/OT as scheduled    Hypertension  Stable  BP range (11/1-18/2022) = 90/50 to 169/67  ** 3 episodes of SBP < 100 on this period  ** 4 epsiodes of SBP > 150 on this period  HR range (11/1-18/2022) = 57 to 81/min  Continue assessment every shift  Continue Amlodipine 2 5mg BID    MCI (mild cognitive impairment) with memory loss  Alert but mildly anxious  Continue to redirect/reorient as often as needed  Continue fall precaution    Ambulatory dysfunction  Recent Hx of fall at home with head strike and fracture  Continue PT/OT as scheduled  Continue STR supportive care      Chief complaint / Reason for visit: Follow-up visit    History of Present Illness: This is an 11-year-old female patient currently admitted [facility]STR (10/26/2022 to present) following discharge from acute care hospitalization [facility]) with Dx of Closed Fracture of ramus of right pubis, Humaira-orbital hematoma - right, Vesicovaginal fistula, Right shoulder pain, fall  Patient is seen and examined today to follow up acute chronic medical conditions as mentioned HTN, ambulatory dysfunction and MCI  Patient is out of bed for this visit sitting in knowledge chair in room - alert, cooperative, calm, pleasant and not in distress  Patient is verbal with clear coherent speech - oriented to name/birthday and current month only - unable to recall current year " I don't look in the calendar anymore"    Patient acknowledged feeling well on this visit but also reported feeling overwhelmed and sad related to current situation and possibility of losing home, " I know I need help  But I have so much things to do at home"  Patient further expressed concerns about moving to a different place/home and feeling overwhelmed about change with concerns related to if staff are kind and nice to her  Otherwise patient denies any acute medical concerns for this visit  Patient reported minimal pain to hip shoulder areas  Per nursing, no acute medical concerns for this visit  Review of Systems:  Per history of present illness, all other systems reviewed and negative  HISTORY:  Medical Hx: Reviewed, unchanged  Family Hx: Reviewed, unchanged  Soc Hx: Reviewed,  unchanged    ALLERGY: Reviewed, unchanged  Allergies   Allergen Reactions   • Alendronate    • Penicillins Swelling   • Raloxifene    • Risedronate Sodium    • Sulfa Antibiotics Swelling        PHYSICAL EXAM:  Vital Signs:  T97 4F -P80 -R18 BP: 115/70 SpO2: 95% RA  Weight:  98 4 lbs (11/13/2022) <= 97 2 lbs (11/6/2022)    General: NAD  Frail stature  Head: Atraumatic  Normocephalic  Eye Exam: anicteric sclera, no discharge, PERRLA, No injection  Wears glasses  Oral Exam: moist mucous membranes, no buccaloropharyngeal erythema, palatine tonsils WNL  Neck Exam: no anterior cervical lymphadenopathy noted, neck supple  Cardiovascular: regular rate, regular rhythm, no murmurs, rubs, or gallops  Pulmonary: no wheeze, no rhonchi, no rales  No chest tenderness  Abdominal: soft, non-tender, nondistended, bowel sounds audible x 4 quadrants  Ave meal completion: 25-50% with occasional 75%  : Non distended bladder  Continent  BM every 1-2 days  Last documented BM: 11/17/2022  Extremities and skin: no edema noted, no rashes  Intact skin  Neurological: alert, cooperative and responsive, Oriented x 2, moving all 4 extremities symmetrically    Laboratory results / Imaging reviewed: Hard copy/ies in medical chart  Last lab results done on 10/28/2022  Current Medications:   All medications reviewed and updated in [employer] Chart    Please note:  Voice-recognition software may have been used in the preparation of this document  Occasional wrong word or "sound-alike" substitutions may have occurred due to the inherent limitations of voice recognition software  Interpretation should be guided by context      VALDEMAR Greenwood  11/21/2022

## 2022-11-21 NOTE — ASSESSMENT & PLAN NOTE
Alert but mildly anxious  Continue to redirect/reorient as often as needed  Continue fall precaution

## 2022-11-22 ENCOUNTER — NURSING HOME VISIT (OUTPATIENT)
Dept: GERIATRICS | Facility: OTHER | Age: 82
End: 2022-11-22

## 2022-11-22 ENCOUNTER — TELEPHONE (OUTPATIENT)
Dept: PSYCHIATRY | Facility: CLINIC | Age: 82
End: 2022-11-22

## 2022-11-22 DIAGNOSIS — I10 PRIMARY HYPERTENSION: ICD-10-CM

## 2022-11-22 DIAGNOSIS — R26.2 AMBULATORY DYSFUNCTION: ICD-10-CM

## 2022-11-22 DIAGNOSIS — S32.591D CLOSED FRACTURE OF RAMUS OF RIGHT PUBIS WITH ROUTINE HEALING, SUBSEQUENT ENCOUNTER: Primary | ICD-10-CM

## 2022-11-22 DIAGNOSIS — G31.84 MCI (MILD COGNITIVE IMPAIRMENT) WITH MEMORY LOSS: ICD-10-CM

## 2022-11-22 DIAGNOSIS — G31.84 MCI (MILD COGNITIVE IMPAIRMENT) WITH MEMORY LOSS: Primary | ICD-10-CM

## 2022-11-22 NOTE — TELEPHONE ENCOUNTER
[address] [facility]  regarding ASAP  Lvm advising to return call to Intake Dept at [phone] to be scheduled  Nai Whitman

## 2022-11-23 NOTE — TELEPHONE ENCOUNTER
[address] [facility]  regarding ASAP  Lvm advising to return call to Intake Dept at [phone] to be scheduled  Dennis Escalante

## 2022-11-23 NOTE — PROGRESS NOTES
[address], [address] [place] [address], [address] [place] [address]  [phone]    NAME: Gatito Kern  AGE: 79 y o  SEX: female    Progress Note    Location: [facility]  POS: 32 (SNF)    Assessment/Plan:    Closed fracture of ramus of right pubis (HCC)  Non surgical management per Ortho recommendation  Patient denies any pain  Continue pain management:   * Oxycodone 2 5mg Q6 hours PRN (only until 11/25/2022)  * Tylenol 975mg Q8 hours  Continue PT/OT as scheduled     Hypertension  Stable  BP range (11/1-22/2022) = 90/50 to 169/67  ** 3 episodes of SBP < 100 on this period  ** 4 episodes of SBP > 150 on this period  HR range (11/1-22/2022) = 57 to 81/min  Continue Amlodipine 2 5mg BID     MCI (mild cognitive impairment) with memory loss  Continue to redirect/reorient as often as needed  Currently awaiting [facility] placement      Ambulatory dysfunction   Recent Hx of fall at home with head strike and fracture  Continue PT/OT as scheduled  Continue STR supportive care       Chief complaint / Reason for visit: Follow-up visit    History of Present Illness: This is an 50-year-old female patient currently admitted [facility] (10/26/2022 to present) following discharge from acute care hospitalization [facility]) with Dx of Closed Fracture of ramus of right pubis, Humaira-orbital hematoma - right, Vesicovaginal fistula, Right shoulder pain, fall      Patient is seen and examined today to follow up acute chronic medical conditions as mentioned HTN, ambulatory dysfunction and MCI      Patient is out of bed for this visit sitting in knowledge chair in room - alert, cooperative, calm, pleasant and not in distress  Patient is verbal with clear coherent speech - oriented to name/birthday/ current month+ year  Patient acknowledged feeling well on this visit - denies any acute medical concerns during ROS assessment including pain  Per nursing, no acute medical concerns for this visit      Review of Systems:  Per history of present illness, all other systems reviewed and negative  HISTORY:  Medical Hx: Reviewed, unchanged  Family Hx: Reviewed, unchanged  Soc Hx: Reviewed,  unchanged    ALLERGY: Reviewed, unchanged  Allergies   Allergen Reactions   • Alendronate    • Penicillins Swelling   • Raloxifene    • Risedronate Sodium    • Sulfa Antibiotics Swelling        PHYSICAL EXAM:  Vital Signs:  T97 2F -P68 -R18 BP: 143/83 SpO2: 95% RA  Weight:  98 4 lbs (11/13/2022) <= 97 2 lbs (11/6/2022)    General: NAD  Frail stature  Head: Atraumatic  Normocephalic  Eye Exam: anicteric sclera, no discharge, PERRLA, No injection  Wears glasses  Humaira-orbital ecchymosis  Oral Exam: moist mucous membranes, no buccaloropharyngeal erythema, palatine tonsils WNL  Neck Exam: no anterior cervical lymphadenopathy noted, neck supple  Cardiovascular: regular rate, regular rhythm, no murmurs, rubs, or gallops  Pulmonary: no wheeze, no rhonchi, no rales  No chest tenderness  Abdominal: soft, non-tender, nondistended, bowel sounds audible x 4 quadrants  Ave meal completion: 50% with occasional 75%  : Non distended bladder  Continent  BM every 1-2 days  Last documented BM: 11/22/2022  Extremities and skin: no edema noted, no rashes  Intact skin  Neurological: alert, cooperative and responsive, Oriented x 3, moving all 4 extremities symmetrically    Laboratory results / Imaging reviewed: Hard copy/ies in medical chart  Current Medications: All medications reviewed and updated in Nursing Home Chart    Please note:  Voice-recognition software may have been used in the preparation of this document  Occasional wrong word or "sound-alike" substitutions may have occurred due to the inherent limitations of voice recognition software  Interpretation should be guided by context      VALDEMAR Gross  11/23/2022

## 2022-11-29 NOTE — TELEPHONE ENCOUNTER
[facility]  regarding ASAP  Lvm advising to return call to Intake Dept at [phone] to be scheduled  Sonia Marcano

## 2022-12-01 ENCOUNTER — NURSING HOME VISIT (OUTPATIENT)
Dept: GERIATRICS | Facility: OTHER | Age: 82
End: 2022-12-01

## 2022-12-01 DIAGNOSIS — F43.22 ADJUSTMENT DISORDER WITH ANXIOUS MOOD: Primary | ICD-10-CM

## 2022-12-01 DIAGNOSIS — F41.9 ANXIETY: ICD-10-CM

## 2022-12-01 DIAGNOSIS — F01.A0 MAJOR NEUROCOGNITIVE DISORDER, DUE TO VASCULAR DISEASE, WITHOUT BEHAVIORAL DISTURBANCE, MILD: ICD-10-CM

## 2022-12-02 PROBLEM — F01.A0 MAJOR NEUROCOGNITIVE DISORDER, DUE TO VASCULAR DISEASE, WITHOUT BEHAVIORAL DISTURBANCE, MILD: Status: ACTIVE | Noted: 2017-06-30

## 2022-12-02 PROBLEM — F43.22 ADJUSTMENT DISORDER WITH ANXIOUS MOOD: Status: ACTIVE | Noted: 2022-12-02

## 2022-12-02 NOTE — PROGRESS NOTES
[address] [facility]  POS: 31: SNF/Short Term Rehab, [facility]    Name and Date of Birth:  Rachel Kayser 79 y o  1940 MRN: [id number]    Date of Visit: December 1, 2022    Reason for visit (CC): Seen for Psychiatric Consult  at Nursing Facility/Assisted Living    Allergies   Allergen Reactions   • Alendronate    • Penicillins Swelling   • Raloxifene    • Risedronate Sodium    • Sulfa Antibiotics Swelling     HPI     Josef Vazquez is a 79 y o  female with a psychiatric history of Mild cognitive impairment, but no other psychiatric history who presents for psychiatric evaluation to establish care and due to anxiety symptoms and problems with memory  Symptoms first started gradually several weeks ago and followed a fluctuating course over the last several weeks  Stressors preceding visit included recent hospitalization after fall at home and needing short term reahb and now possible long term placemt  Assessment:    Josef Vazquez is seen in a meeting room in the facility  She is oriented to situation and date, but states the faciilty is "[facility]", when I tell her we are at [facility], she states " oh yes, that other one in in [place]"  However, she is aware of circumstances of her admission, although she does admit she has no concept of how long she was in hospital or here at this facility and admits she "lost some time"   Aware that I have come to do a cognitive assessment  Aware there has been talk of a guardian, but states " I don't want to lose all my rights to someone I don't know and trust"  She denies any previous psychiatric history, no history of psychosis or giovani  No previous depressive episodes  "Normal worry"  No current symptoms of depression and states she is still enjoying life, no passive death wish  She does present anxious today, in talking about her future and easily tearful    She recognizes the need to sell her home, but sad about it all  She makes repetitive statements  She states she is aware of some forgetfulness, "but normal for my age"  I did do a MOCA cognitive assessment today  She also had one done in October while hospitalized after fall, scored 9/30 on 10/27/2022  Today's result, 14/30  Struggled with executive function, attention, delayed recall was 1/5  3/5 with cues  Given history of CVA and most recent CT scan "Decreased attenuation is noted in periventricular and subcortical white matter demonstrating an appearance that is statistically most likely to represent mild microangiopathic change  Unchanged old left PCA territory infarct "  Would say she has Major neurocognitive disorder , likely vascular  However, she continues to demonstrate insight  She recognizes she cannot balance a checkbook, can no longer do the math - and has been having a friend/neighbor help her with that  She recognizes she can no longer get up unassisted and can no longer live alone  She is willing for long term placement  She is willing to assign a POA  Unfortunately has no immediate family and is struggling to find some one able to do this   This is causing her great anxiety at this time  I will schedule a virtual visit with Dr Mattie Uriarte for formal capacity evaluation  Plan:   All medications and routine orders were reviewed and updated as needed  I have recommended to [facility] starting medication for dementia such as Aricept  She is reluctant to add medication, but will consider  Follow up for official capacity evaluation  Plan discussed with: Patient    Current Medications  Medications reviewed and updated in facility chart       Diagnoses and all orders for this visit:    Adjustment disorder with anxious mood    Anxiety    Major neurocognitive disorder, due to vascular disease, without behavioral disturbance, mild         Psychiatric Review Of Systems:    Sleep changes: fluctuating sleep pattern  Appetite changes: fluctuating appetite  Weight changes: no weight change  Energy/anergy: low energy  Interest/pleasure/anhedonia: no  Somatic symptoms: no  Anxiety/panic: no  Elinor: no  Guilty/hopeless: no  Self injurious behavior/risky behavior: Patient denies current risk or intent to self harm  Suicidal ideation: Denies suicidal ideation  Homicidal ideation: Patient denies homicidal ideations  Auditory hallucinations: no  Visual hallucinations: no  Other hallucinations: no  Delusional thinking: no  Eating disorder history: no  Obsessive/compulsive symptoms: no    Review Of Systems:    General decreased functioning   Personality no change in personality   Constitutional negative   ENT negative   Cardiovascular negative   Respiratory negative   Gastrointestinal negative   Genitourinary negative   Musculoskeletal as noted in HPI   Integumentary negative   Neurological negative   Endocrine negative   Other Symptoms none, all other systems are negative       OBJECTIVE:    Vital signs in last 24 hours: Vital signs and nursing notes reviewed in facility chart      Mental Status Evaluation:      Appearance Adequate hygiene and grooming   Behavior calm and cooperative   Mood anxious   Speech Normal rate and volume   Affect mood-congruent   Thought Processes Goal directed and coherent   Thought Content Does not verbalize delusional material   Associations Tightly connected and concrete associations   Perceptual Disturbances Denies hallucinations and does not appear to be responding to internal stimuli   Risk Potential Suicidal/Homicidal Ideation - No evidence of suicidal or homicidal ideation and patient does not verbalize suicidal or homicidal ideation  Risk of Violence - No evidence of risk for violence found on assessment  Risk of Self Mutilation - No evidence of risk for self mutilation found on assessment   Orientation oriented to person, time/date and situation   Memory recent memory mildly impaired Consciousness alert and awake   Attention/Concentration attention span and concentration appear shorter than expected for age   Intellect appears to be of average intelligence   Insight partial   Judgement intact and fair   Muscle Strength and Gait uses wheelchair   Motor Activity no abnormal movements   Language no difficulty naming common objects, no difficulty repeating a phrase, no difficulty writing a sentence   Fund of Knowledge adequate knowledge of current events  adequate fund of knowledge regarding past history  adequate fund of knowledge regarding vocabulary                Laboratory Results: I have personally reviewed all pertinent laboratory/tests results  Historical Information     History Review:     The following portions of the patient's history were reviewed and updated as appropriate: allergies, current medications, past family history, past medical history, past social history, past surgical history and problem list     Past Psychiatric History:     Past Inpatient Psychiatric Treatment:   No history of past inpatient psychiatric admissions  Past Outpatient Psychiatric Treatment:    No history of past outpatient psychiatric treatment  Past Suicide Attempts: No evidence of past suicide attempts  Past Violent Behavior: No evidence of past violent behavior and Patient denies history of violent behavior  Past Psychiatric Medication Trials: none    Traumatic History:     Abuse: no history of physical or sexual abuse  Other Traumatic Events: none     Family Psychiatric History:     Family History   Problem Relation Age of Onset   • Coronary artery disease Family    • Other Family         DJD     Substance Use History:    Social History     Substance and Sexual Activity   Alcohol Use Not Currently    Comment: Alcohol intake:   Occasional  - As per [place]      Social History     Substance and Sexual Activity   Drug Use Never     Social History:    Social History     Socioeconomic History   • Marital status:      Spouse name: Not on file   • Number of children: Not on file   • Years of education: Not on file   • Highest education level: Not on file   Occupational History   • Not on file   Tobacco Use   • Smoking status: Never   • Smokeless tobacco: Never   • Tobacco comments:     Former smoker - As per [employer]   • Vaping Use: Never used   Substance and Sexual Activity   • Alcohol use: Not Currently     Comment: Alcohol intake:   Occasional  - As per [employer]    • Drug use: Never   • Sexual activity: Not Currently   Other Topics Concern   • Not on file   Social History Narrative    ** Merged History Encounter **         Living at [facility]    · Caffeine intake:   None        Social Determinants of Health     Financial Resource Strain: Not on file   Food Insecurity: No Food Insecurity   • Worried About Running Out of Food in the Last Year: Never true   • Ran Out of Food in the Last Year: Never true   Transportation Needs: No Transportation Needs   • Lack of Transportation (Medical): No   • Lack of Transportation (Non-Medical):  No   Physical Activity: Not on file   Stress: Not on file   Social Connections: Not on file   Intimate Partner Violence: Not on file   Housing Stability: Low Risk    • Unable to Pay for Housing in the Last Year: No   • Number of Places Lived in the Last Year: 1   • Unstable Housing in the Last Year: No     Past Medical History:    Past Medical History:   Diagnosis Date   • Allergic    • Anxiety    • Arthritis    • Cancer ([facility] [address] )    • HLD (hyperlipidemia)    • Hypertension    • Hyperthyroidism    • Osteoporosis    • Stroke [facility])    • TIA (transient ischemic attack)         Past Surgical History:   Procedure Laterality Date   • BREAST LUMPECTOMY Left    • HYSTERECTOMY     • KNEE SURGERY Right    • ORIF TIBIA FRACTURE Right 12/8/2016    Procedure: OPEN REDUCTION W/ INTERNAL FIXATION (ORIF) TIBIA;  Surgeon: Jose Lyn MD;  Location: [facility];  Service:          Medications Risks/Benefits:      Risks, Benefits And Possible Side Effects Of Medications:    Discussed risks and benefits of treatment with patient including :N/A     Controlled Medication Discussion:     Not applicable - controlled prescriptions are not prescribed by this practice        VALDEMAR Castelan

## 2022-12-08 ENCOUNTER — NURSING HOME VISIT (OUTPATIENT)
Dept: GERIATRICS | Facility: OTHER | Age: 82
End: 2022-12-08

## 2022-12-08 DIAGNOSIS — G89.29 CHRONIC RIGHT SHOULDER PAIN: ICD-10-CM

## 2022-12-08 DIAGNOSIS — S42.291A OTHER CLOSED DISPLACED FRACTURE OF PROXIMAL END OF RIGHT HUMERUS, INITIAL ENCOUNTER: ICD-10-CM

## 2022-12-08 DIAGNOSIS — F43.22 ADJUSTMENT DISORDER WITH ANXIOUS MOOD: ICD-10-CM

## 2022-12-08 DIAGNOSIS — S32.591D CLOSED FRACTURE OF RAMUS OF RIGHT PUBIS WITH ROUTINE HEALING, SUBSEQUENT ENCOUNTER: Primary | ICD-10-CM

## 2022-12-08 DIAGNOSIS — G89.29 CHRONIC PAIN OF RIGHT KNEE: ICD-10-CM

## 2022-12-08 DIAGNOSIS — R26.2 AMBULATORY DYSFUNCTION: ICD-10-CM

## 2022-12-08 DIAGNOSIS — M25.511 CHRONIC RIGHT SHOULDER PAIN: ICD-10-CM

## 2022-12-08 DIAGNOSIS — F01.A0 MAJOR NEUROCOGNITIVE DISORDER, DUE TO VASCULAR DISEASE, WITHOUT BEHAVIORAL DISTURBANCE, MILD: ICD-10-CM

## 2022-12-08 DIAGNOSIS — M25.561 CHRONIC PAIN OF RIGHT KNEE: ICD-10-CM

## 2022-12-08 RX ORDER — DIAPER,BRIEF,INFANT-TODD,DISP
1 EACH MISCELLANEOUS 3 TIMES DAILY PRN
COMMUNITY

## 2022-12-08 RX ORDER — LIDOCAINE 40 MG/G
1 CREAM TOPICAL 2 TIMES DAILY PRN
COMMUNITY

## 2022-12-08 RX ORDER — ACETAMINOPHEN 325 MG/1
975 TABLET ORAL EVERY 8 HOURS PRN
COMMUNITY

## 2022-12-08 RX ORDER — BISACODYL 10 MG
10 SUPPOSITORY, RECTAL RECTAL DAILY PRN
COMMUNITY

## 2022-12-08 NOTE — ASSESSMENT & PLAN NOTE
Patient is teary-eyed on this visit -easily redirected  Per patient, recent death of significant other, loss of home changing ambulation and function " is hard to accept"  Patient declined starting antidepressant/antianxiety meds when asked    Continue to monitor for mood/appetite/sleep patterns  Evaluated and followed by psychiatry group  Recommend follow-up with psychiatry upon discharge

## 2022-12-08 NOTE — PROGRESS NOTES
[facility]  [address]  [phone]  [facility]  POS: 31: SNF/Short Term Rehab      NAME: Dirk Mansfield  AGE: 79 y o  SEX: female  DATE OF ADMISSION: OCTOBER 26, 2022  DATE OF DISCHARGE: December 9, 2022  DISCHARGE DISPOSITION: TO senior care MINISTRY [facility])    Reason for admission: Patient was admitted from [facility] for rehabilitation after hospitalization for ambulatory dysfunction and deconditioning  This is an 50-year-old female patient currently admitted [facility] (10/26/2022 to present) following discharge from acute care hospitalization [facility]) with Dx of Closed Fracture of ramus of right pubis, [place]-orbital hematoma - right, Vesicovaginal fistula, Right shoulder pain, fall  Admission Diagnoses: Ambulatory dysfunction  Discharge Diagnoses:   * Ambulatory dysfunction with deconditioning  * Hx of Closed Fracture of ramus of right pubis  * Right [place]-orbital hematoma - resolved  * Right shoulder pain  * Hypertension  * Major Neurocognitive disorder  * Chronic Right knee pain  * Adjustment disorder with anxious mood    Course of stay: Patient is currently admitted at [facility] for rehabilitation services due to ambulatory dysfunction and deconditioning  Patient received 24/7 SNF supportive care, PT/OT/ST services  To date, patient stay in [facility] has been uneventful  Patient last covered date for therapy was 11/22/2022 but patient stayed to present date as private pay until an JENNA is determined  Patient is out of bed for this visit sitting in manual wheelchair in room - alert, cooperative, calm, pleasant and not in distress  Noted to be teary-eyed due to multiple changes in her life and losing her house  Patient is verbal with clear coherent speech - oriented to name/birthday/ current month/year  Patient acknowledged feeling well on this visit - denies any acute medical concerns during ROS assessment including pain   Per nursing, no acute medical concerns for this visit  Per , patient is scheduled for discharge to [facility] on December 9, 2022  Labs and testing performed during stay: Most recent lab results as below:     * CBC with diff (10/28/2022):    HEMOGLOBIN 11 6 g/dL 11 5-14 5  Final         HEMATOCRIT 34 4 % 35 0-43 0 L Final         WBC 5 7 thou/cmm 4 0-10 0  Final         RBC 3 73 mill/cmm 3 70-4 70  Final         PLATELET COUNT 278 thou/cmm 140-350  Final         MPV 9 4 fL 7 5-11 3  Final         MCV 92 fL   Final         MCH 31 2 pg 26 0-34 0  Final         MCHC 33 8 g/dL 32 0-37 0  Final         RDW 15 5 % 12 0-16 0  Final         DIFFERENTIAL TYPE AUTO    Final         ABSOLUTE NEUT 4 0 thou/cmm 1 8-7 8  Final         ABSOLUTE LYMPH 0 9 thou/cmm 1 0-3 0 L Final         ABSOLUTE MONO 0 4 thou/cmm 0 3-1 0  Final         ABSOLUTE EOS 0 3 thou/cmm 0 0-0 5  Final         ABSOLUTE BASO 0 0 thou/cmm 0 0-0 1  Final         NEUTROPHILS 70 %   Final         LYMPHOCYTES 17 %   Final         MONOCYTES 7 %   Final         EOSINOPHILS 5 %   Final         BASOPHILS 1 %   Final         * CMP (10/28/2022):   GLUCOSE 88 mg/dL 65-99  Final   Specimen slightly hemolyzed, results may be affected        BUN 14 mg/dL 7-25  Final   Specimen slightly hemolyzed, results may be affected       CREATININE 0 92 mg/dL 0 40-1 10  Final   Specimen slightly hemolyzed, results may be affected       SODIUM 138 mmol/L 135-145  Final   Specimen slightly hemolyzed, results may be affected       POTASSIUM 5 2 mmol/L 3 5-5 2  Final   Specimen slightly hemolyzed, results may be affected       CHLORIDE 105 mmol/L 100-109  Final   Specimen slightly hemolyzed, results may be affected       CARBON DIOXIDE 28 mmol/L 23-31  Final   Specimen slightly hemolyzed, results may be affected       CALCIUM 8 4 mg/dL 8 5-10 1 L Final   Specimen slightly hemolyzed, results may be affected       ALKALINE PHOSPHATASE 395 U/L  H Final   Specimen slightly hemolyzed, results may be affected       ALBUMIN 2 6 g/dL 3 5-4 8 L Final   Specimen slightly hemolyzed, results may be affected       BILIRUBIN,TOTAL 0 4 mg/dL 0 2-1 0  Final   Use of this assay is not recommended for patients undergoing treatment   with eltrombopag due to the potential for falsely elevated results  Specimen slightly hemolyzed, results may be affected       PROTEIN, TOTAL 5 9 g/dL 6 3-8 3 L Final   Specimen slightly hemolyzed, results may be affected    U/L <41 H Final   Specimen slightly hemolyzed, results may be affected    U/L <56 H Final   Specimen slightly hemolyzed, results may be affected       ANION GAP 5  3-11  Final   Specimen slightly hemolyzed, results may be affected       eGFRcr 62  >59  Final   Specimen slightly hemolyzed, results may be affected       eGFRcr COMMENT (Note)    Final         HISTORY:  Medical Hx: Reviewed, unchanged  Family Hx: Reviewed, unchanged  Soc Hx: Reviewed,  unchanged    ALLERGY: Reviewed, unchanged  Allergies   Allergen Reactions   • Alendronate    • Penicillins Swelling   • Raloxifene    • Risedronate Sodium    • Sulfa Antibiotics Swelling       Discharge Medications: See discharge medication list which was reviewed and signed  Status at time of discharge: Stable    Today's Visit: 12/8/20222:45 PM    Subjective: " I'm alright"    Vitals: T97 3F -P69 -R18 BP: 121/68 SpO2: 94% RA  Weight: 98 2 lbs (11/27/2022) <= 96 2 lbs (10/27/2022)    Review of Systems   Constitutional: Negative  HENT: Negative  Eyes: Negative  Respiratory: Negative  Cardiovascular: Negative  Gastrointestinal: Negative  Endocrine: Negative  Genitourinary: Negative  Musculoskeletal: Positive for joint swelling  Negative for arthralgias, back pain, gait problem, myalgias, neck pain and neck stiffness  Patient reported achy Right shoulder but also acknowledged that it is not worse than before  Right knee pain - chronic     Skin: Negative  Allergic/Immunologic: Negative  Neurological: Negative  Hematological: Negative  Psychiatric/Behavioral: Negative  All other systems reviewed and are negative  Exam: Physical Exam  Vitals and nursing note reviewed  Constitutional:       General: She is not in acute distress  Appearance: Normal appearance  She is normal weight  She is not ill-appearing, toxic-appearing or diaphoretic  Comments: Frail stature   HENT:      Head: Normocephalic and atraumatic  Right Ear: Tympanic membrane, ear canal and external ear normal  There is no impacted cerumen  Left Ear: Tympanic membrane, ear canal and external ear normal  There is no impacted cerumen  Nose: Nose normal  No congestion or rhinorrhea  Mouth/Throat:      Mouth: Mucous membranes are moist       Pharynx: Oropharynx is clear  No oropharyngeal exudate or posterior oropharyngeal erythema  Eyes:      General: No scleral icterus  Right eye: No discharge  Left eye: No discharge  Conjunctiva/sclera: Conjunctivae normal       Pupils: Pupils are equal, round, and reactive to light  Comments: Wears glasses   Neck:      Vascular: No carotid bruit  Cardiovascular:      Rate and Rhythm: Normal rate and regular rhythm  Heart sounds: Normal heart sounds  No murmur heard  No friction rub  No gallop  Pulmonary:      Effort: Pulmonary effort is normal  No respiratory distress  Breath sounds: Normal breath sounds  No stridor  No wheezing, rhonchi or rales  Chest:      Chest wall: No tenderness  Abdominal:      General: Abdomen is flat  Bowel sounds are normal  There is no distension  Palpations: Abdomen is soft  There is no mass  Tenderness: There is no abdominal tenderness  There is no right CVA tenderness, left CVA tenderness, guarding or rebound  Hernia: No hernia is present        Comments: Average meal completion: %   Genitourinary:     Comments: Nondistended bladder  BM every 1 to 2 days: Last documented BM 12/8/2022  Musculoskeletal:         General: No swelling, tenderness, deformity or signs of injury  Cervical back: Normal range of motion and neck supple  No rigidity or tenderness  No muscular tenderness  Right lower leg: No edema  Left lower leg: No edema  Comments: Ambulatory dysfunction -uses manual wheelchair  Right knee without erythema or swelling: Noted enlarged joint -likely chronic due to degenerative changes  Lymphadenopathy:      Cervical: No cervical adenopathy  Skin:     General: Skin is warm  Capillary Refill: Capillary refill takes less than 2 seconds  Coloration: Skin is not jaundiced or pale  Findings: No bruising, erythema, lesion or rash  Comments: Intact skin  No DTI to bony prominences  Neurological:      General: No focal deficit present  Mental Status: She is alert and oriented to person, place, and time  Mental status is at baseline  Gait: Gait abnormal       Comments: Verbally engage with clear coherent speech: Oriented to name/birthday/current date and place  Psychiatric:         Mood and Affect: Mood normal          Behavior: Behavior normal          Thought Content: Thought content normal       Comments: Alert, cooperative, calm, very pleasant and not in distress  Patient is teary-eyed on this visit -anxious and grieving loss of home  Discussion with patient/family and further instructions:  -Fall precautions  -Aspiration precautions  -Bleeding precautions  -Monitor for signs/symptoms of infection  -Medication list was reviewed and signed  -DME form was completed    Follow-up Recommendations:     * Please follow-up with your primary care physician within 7-10 days of discharge to review medication changes and current status  The family/ patient needs to set-up an appointment for BECCA ANTOINE with PCP in [facility]   Patient will be seen and followed by [facility] PCP upon admission    Please make every effort to attend this appointment  Should there be a change in your medical condition and an earlier appointment is needed please call the number provided above  * Recommend PT/OT service to continue strengthening, gait, balance and overall functional independence improvement gained during in-patient rehabilitation  * Recommend VNA to assist in medication management and other skilled nursing needs during transition to home  Problem List Follow-up Recommendations:    Closed fracture of ramus of right pubis ([address] )  Non surgical management per Ortho recommendation  Patient denies any pain  Continue pain management:   * Oxycodone 2 5mg Q6 hours PRN  * Tylenol 975mg Q8 hours PRN     Hypertension  Stable  BP range (12/1-8/2022) = 98/50 to 153/77  ** 1 episodes of SBP < 100 on this period  ** 2 episodes of SBP > 150 on this period  HR range (12/1-8/2022) = 69 to 89/min  Continue Amlodipine 2 5mg BID     Major Neurocognitive disorder, due to vascular disease, without behavioral disturbance , mild  Continue to redirect/reorient as often as needed    Ambulatory dysfunction   Hx of multiple falls at home  Recent Hx of fall at home with head strike and fracture (Oc 2022)  Continue fall precaution  Patient will be discharged to [facility] with 24/7 hour care  Chronic Right Shoulder pain  Patient reported minimal pain  Continue Tylenol as needed    Chronic Right knee pain  Likely degenerative changes  Per patient, she sees [facility] intermittently to get an injection  Recommend setting up appointment with pain management  Adjustment disorder with anxious mood  Patient is teary-eyed on this visit -easily redirected  Per patient, recent death of significant other, loss of home changing ambulation and function " is hard to accept"  Patient declined starting antidepressant/antianxiety meds when asked    Continue to monitor for mood/appetite/sleep patterns  Evaluated and followed by psychiatry group  Recommend follow-up with psychiatry upon discharge      CURRENT MEDICATION LIST IN -STR:    Current Outpatient Medications:   •  acetaminophen (TYLENOL) 325 mg tablet, Take 975 mg by mouth every 8 (eight) hours as needed, Disp: , Rfl:   •  albuterol (PROVENTIL HFA,VENTOLIN HFA) 90 mcg/act inhaler, Inhale 2 puffs every 6 (six) hours as needed for wheezing, Disp: , Rfl:   •  amLODIPine (NORVASC) 2 5 mg tablet, Take 1 tablet (2 5 mg total) by mouth 2 (two) times a day, Disp: 180 tablet, Rfl: 1  •  bisacodyl (DULCOLAX) 10 mg suppository, Insert 10 mg into the rectum daily as needed, Disp: , Rfl:   •  bisacodyl (DULCOLAX) 5 mg EC tablet, Take 5 mg by mouth daily as needed for constipation, Disp: , Rfl:   •  cholecalciferol (VITAMIN D3) 1,000 units tablet, Take 1 tablet (1,000 Units total) by mouth daily, Disp: 90 tablet, Rfl: 2  •  Diclofenac Sodium (VOLTAREN) 1 %, Apply 2 g topically 4 (four) times a day = To right hip and pelvis, Disp: , Rfl:   •  fluticasone (FLONASE) 50 mcg/act nasal spray, 1 spray into each nostril daily, Disp:  , Rfl: 0  •  gabapentin (NEURONTIN) 100 mg capsule, Take 1 capsule (100 mg total) by mouth daily at bedtime, Disp: , Rfl: 0  •  hydrocortisone 1 % ointment, Apply 1 application topically 3 (three) times a day as needed = to dry itchy areas, Disp: , Rfl:   •  lidocaine (LMX) 4 % cream, Apply 1 application topically 2 (two) times a day as needed = to LUE and hands, Disp: , Rfl:   •  magnesium hydroxide (MILK OF MAGNESIA) 400 mg/5 mL oral suspension, Take 30 mL by mouth 2 (two) times a day as needed, Disp: , Rfl:   •  melatonin 3 mg, Take 1 tablet (3 mg total) by mouth daily at bedtime, Disp: , Rfl: 0  •  multivitamin (THERAGRAN) TABS, Take 1 tablet by mouth daily, Disp: , Rfl:   •  oxyCODONE (OXY-IR) 5 MG capsule, Take 2 5 mg by mouth every 6 (six) hours as needed for moderate pain, Disp: , Rfl:   •  senna-docusate sodium (SENOKOT S) 8 6-50 mg per tablet, Take 1 tablet by mouth 2 (two) times a day, Disp: , Rfl: 0  •  Sodium Phosphates (FLEET ENEMA RE), Insert 1 Bottle into the rectum daily as needed, Disp: , Rfl:     Discharge Statement:  * Spent more than 30 minutes discharging the patient; greater than 50% spent on physical examination of patient, answering questions, discussion of plan of care, recommendations and providing post discharge instructions  Additional time spend on other discharge related activities including documentation  Please note: This note was completed in part utilizing a voice-recognition software may have been used in the preparation of this document  Grammatical errors, random word insertion, spelling mistakes, and incomplete sentences may be an occasional consequence of the system secondary to software limitations, ambient noise and hardware issues  Occasional wrong word or "sound-alike" substitutions may have occurred due to the inherent limitations of voice recognition software  At the time of dictation, efforts were made to edit, clarify and/or correct errors  Interpretation should be guided by context  Please read the chart carefully and recognize, using context, where substitutions have occurred  If you have any questions or concerns about the context, text or information contained within the body of this dictation, please contact myself, the provider, for further clarification        VALDEMAR Melissa  12/8/20222:45 PM

## 2023-01-06 DIAGNOSIS — E55.9 VITAMIN D DEFICIENCY: ICD-10-CM

## 2023-01-06 DIAGNOSIS — S42.201G CLOSED FRACTURE OF PROXIMAL END OF RIGHT HUMERUS WITH DELAYED HEALING, UNSPECIFIED FRACTURE MORPHOLOGY, SUBSEQUENT ENCOUNTER: Primary | ICD-10-CM

## 2023-01-06 DIAGNOSIS — S32.591A INFERIOR PUBIC RAMUS FRACTURE, RIGHT, CLOSED, INITIAL ENCOUNTER (HCC): ICD-10-CM

## 2023-01-06 RX ORDER — MELATONIN
1000 DAILY
Qty: 90 TABLET | Refills: 2 | Status: SHIPPED | OUTPATIENT
Start: 2023-01-06

## 2023-01-06 RX ORDER — AMOXICILLIN 250 MG
1 CAPSULE ORAL 2 TIMES DAILY
Qty: 60 TABLET | Refills: 0 | Status: SHIPPED | OUTPATIENT
Start: 2023-01-06

## 2023-01-06 RX ORDER — DIPHENOXYLATE HYDROCHLORIDE AND ATROPINE SULFATE 2.5; .025 MG/1; MG/1
1 TABLET ORAL DAILY
Qty: 30 TABLET | Refills: 0 | Status: SHIPPED | OUTPATIENT
Start: 2023-01-06

## 2023-01-06 RX ORDER — LANOLIN ALCOHOL/MO/W.PET/CERES
3 CREAM (GRAM) TOPICAL
Qty: 30 TABLET | Refills: 0 | Status: SHIPPED | OUTPATIENT
Start: 2023-01-06

## 2023-01-06 RX ORDER — GABAPENTIN 100 MG/1
100 CAPSULE ORAL
Qty: 30 CAPSULE | Refills: 0 | Status: SHIPPED | OUTPATIENT
Start: 2023-01-06

## 2023-01-06 NOTE — TELEPHONE ENCOUNTER
Pt requested Vitamin D3 (1000 U/t), Multivitamin tab, Voltaren 1% gel otc, Melatonin (3 mg) tabs, Gabapentin (100 mg) capsules, Senokot-S tablet  Called pt to see if pt truly requested all the above, pt phone line has been disconnected

## 2023-01-08 PROBLEM — N30.00 ACUTE CYSTITIS WITHOUT HEMATURIA: Status: RESOLVED | Noted: 2022-11-08 | Resolved: 2023-01-08

## 2023-01-13 DIAGNOSIS — S42.291A OTHER CLOSED DISPLACED FRACTURE OF PROXIMAL END OF RIGHT HUMERUS, INITIAL ENCOUNTER: ICD-10-CM

## 2023-01-13 DIAGNOSIS — S32.591A INFERIOR PUBIC RAMUS FRACTURE, RIGHT, CLOSED, INITIAL ENCOUNTER (HCC): ICD-10-CM

## 2023-01-13 DIAGNOSIS — I10 ESSENTIAL HYPERTENSION: ICD-10-CM

## 2023-01-16 RX ORDER — FLUTICASONE PROPIONATE 50 MCG
1 SPRAY, SUSPENSION (ML) NASAL DAILY
Refills: 0 | OUTPATIENT
Start: 2023-01-16

## 2023-01-16 RX ORDER — GABAPENTIN 100 MG/1
100 CAPSULE ORAL
Qty: 30 CAPSULE | Refills: 0 | OUTPATIENT
Start: 2023-01-16

## 2023-01-16 RX ORDER — OXYCODONE HYDROCHLORIDE 5 MG/1
2.5 CAPSULE ORAL EVERY 6 HOURS PRN
Qty: 30 CAPSULE | OUTPATIENT
Start: 2023-01-16

## 2023-01-16 RX ORDER — LANOLIN ALCOHOL/MO/W.PET/CERES
3 CREAM (GRAM) TOPICAL
Qty: 30 TABLET | Refills: 0 | OUTPATIENT
Start: 2023-01-16

## 2023-01-16 RX ORDER — AMLODIPINE BESYLATE 2.5 MG/1
2.5 TABLET ORAL 2 TIMES DAILY
Qty: 180 TABLET | Refills: 1 | OUTPATIENT
Start: 2023-01-16

## 2023-01-16 RX ORDER — BISACODYL 10 MG
10 SUPPOSITORY, RECTAL RECTAL DAILY PRN
Qty: 12 SUPPOSITORY | Refills: 0 | OUTPATIENT
Start: 2023-01-16

## 2023-01-16 NOTE — TELEPHONE ENCOUNTER
Patient needs appointment for evaluation  Currently in long-term care and needs evaluation of MAR and physical assessment

## 2023-01-19 ENCOUNTER — APPOINTMENT (EMERGENCY)
Dept: RADIOLOGY | Facility: HOSPITAL | Age: 83
End: 2023-01-19

## 2023-01-19 ENCOUNTER — HOSPITAL ENCOUNTER (INPATIENT)
Facility: HOSPITAL | Age: 83
LOS: 4 days | End: 2023-01-24
Attending: EMERGENCY MEDICINE | Admitting: HOSPITALIST

## 2023-01-19 DIAGNOSIS — D64.9 ANEMIA: ICD-10-CM

## 2023-01-19 DIAGNOSIS — R53.1 WEAKNESS: ICD-10-CM

## 2023-01-19 DIAGNOSIS — J10.1 INFLUENZA A: ICD-10-CM

## 2023-01-19 DIAGNOSIS — R05.9 COUGH: ICD-10-CM

## 2023-01-19 DIAGNOSIS — J69.0 ASPIRATION PNEUMONIA (HCC): Primary | ICD-10-CM

## 2023-01-19 LAB
ALBUMIN SERPL BCP-MCNC: 3.7 G/DL (ref 3.5–5)
ALP SERPL-CCNC: 141 U/L (ref 34–104)
ALT SERPL W P-5'-P-CCNC: 16 U/L (ref 7–52)
ANION GAP SERPL CALCULATED.3IONS-SCNC: 11 MMOL/L (ref 4–13)
AST SERPL W P-5'-P-CCNC: 23 U/L (ref 13–39)
BASOPHILS # BLD MANUAL: 0 THOUSAND/UL (ref 0–0.1)
BASOPHILS NFR MAR MANUAL: 0 % (ref 0–1)
BILIRUB SERPL-MCNC: 0.51 MG/DL (ref 0.2–1)
BUN SERPL-MCNC: 10 MG/DL (ref 5–25)
CALCIUM SERPL-MCNC: 8.8 MG/DL (ref 8.4–10.2)
CARDIAC TROPONIN I PNL SERPL HS: 7 NG/L
CHLORIDE SERPL-SCNC: 98 MMOL/L (ref 96–108)
CO2 SERPL-SCNC: 24 MMOL/L (ref 21–32)
CREAT SERPL-MCNC: 0.72 MG/DL (ref 0.6–1.3)
EOSINOPHIL # BLD MANUAL: 0 THOUSAND/UL (ref 0–0.4)
EOSINOPHIL NFR BLD MANUAL: 0 % (ref 0–6)
ERYTHROCYTE [DISTWIDTH] IN BLOOD BY AUTOMATED COUNT: 14.3 % (ref 11.6–15.1)
GFR SERPL CREATININE-BSD FRML MDRD: 77 ML/MIN/1.73SQ M
GLUCOSE SERPL-MCNC: 128 MG/DL (ref 65–140)
HCT VFR BLD AUTO: 37.8 % (ref 34.8–46.1)
HGB BLD-MCNC: 11.9 G/DL (ref 11.5–15.4)
LACTATE SERPL-SCNC: 1.5 MMOL/L (ref 0.5–2)
LYMPHOCYTES # BLD AUTO: 0.26 THOUSAND/UL (ref 0.6–4.47)
LYMPHOCYTES # BLD AUTO: 2 % (ref 14–44)
MCH RBC QN AUTO: 29.5 PG (ref 26.8–34.3)
MCHC RBC AUTO-ENTMCNC: 31.5 G/DL (ref 31.4–37.4)
MCV RBC AUTO: 94 FL (ref 82–98)
MONOCYTES # BLD AUTO: 0.38 THOUSAND/UL (ref 0–1.22)
MONOCYTES NFR BLD: 3 % (ref 4–12)
NEUTROPHILS # BLD MANUAL: 12.11 THOUSAND/UL (ref 1.85–7.62)
NEUTS SEG NFR BLD AUTO: 95 % (ref 43–75)
PLATELET # BLD AUTO: 276 THOUSANDS/UL (ref 149–390)
PLATELET BLD QL SMEAR: ADEQUATE
PMV BLD AUTO: 10.2 FL (ref 8.9–12.7)
POTASSIUM SERPL-SCNC: 4.3 MMOL/L (ref 3.5–5.3)
PROT SERPL-MCNC: 7.3 G/DL (ref 6.4–8.4)
RBC # BLD AUTO: 4.03 MILLION/UL (ref 3.81–5.12)
RBC MORPH BLD: NORMAL
SODIUM SERPL-SCNC: 133 MMOL/L (ref 135–147)
WBC # BLD AUTO: 12.75 THOUSAND/UL (ref 4.31–10.16)

## 2023-01-20 ENCOUNTER — APPOINTMENT (EMERGENCY)
Dept: CT IMAGING | Facility: HOSPITAL | Age: 83
End: 2023-01-20

## 2023-01-20 ENCOUNTER — APPOINTMENT (EMERGENCY)
Dept: RADIOLOGY | Facility: HOSPITAL | Age: 83
End: 2023-01-20

## 2023-01-20 PROBLEM — R74.8 ELEVATED ALKALINE PHOSPHATASE LEVEL: Status: ACTIVE | Noted: 2023-01-20

## 2023-01-20 PROBLEM — J45.909 ASTHMA: Status: ACTIVE | Noted: 2023-01-20

## 2023-01-20 PROBLEM — R94.31 QT PROLONGATION: Status: ACTIVE | Noted: 2023-01-20

## 2023-01-20 PROBLEM — J10.1 INFLUENZA A: Status: ACTIVE | Noted: 2023-01-20

## 2023-01-20 PROBLEM — J69.0 ASPIRATION PNEUMONIA (HCC): Status: ACTIVE | Noted: 2023-01-20

## 2023-01-20 PROBLEM — R94.31 QT PROLONGATION: Status: RESOLVED | Noted: 2023-01-20 | Resolved: 2023-01-20

## 2023-01-20 LAB
2HR DELTA HS TROPONIN: -2 NG/L
ALBUMIN SERPL BCP-MCNC: 3.2 G/DL (ref 3.5–5)
ALP SERPL-CCNC: 114 U/L (ref 34–104)
ALT SERPL W P-5'-P-CCNC: 12 U/L (ref 7–52)
ANION GAP SERPL CALCULATED.3IONS-SCNC: 9 MMOL/L (ref 4–13)
APTT PPP: 28 SECONDS (ref 23–37)
AST SERPL W P-5'-P-CCNC: 18 U/L (ref 13–39)
ATRIAL RATE: 86 BPM
BACTERIA UR QL AUTO: ABNORMAL /HPF
BILIRUB SERPL-MCNC: 0.44 MG/DL (ref 0.2–1)
BILIRUB UR QL STRIP: NEGATIVE
BNP SERPL-MCNC: 190 PG/ML (ref 0–100)
BUN SERPL-MCNC: 9 MG/DL (ref 5–25)
CALCIUM ALBUM COR SERPL-MCNC: 8.6 MG/DL (ref 8.3–10.1)
CALCIUM SERPL-MCNC: 8 MG/DL (ref 8.4–10.2)
CARDIAC TROPONIN I PNL SERPL HS: 5 NG/L
CHLORIDE SERPL-SCNC: 99 MMOL/L (ref 96–108)
CLARITY UR: CLEAR
CO2 SERPL-SCNC: 23 MMOL/L (ref 21–32)
COLOR UR: ABNORMAL
CREAT SERPL-MCNC: 0.6 MG/DL (ref 0.6–1.3)
D DIMER PPP FEU-MCNC: 11.02 UG/ML FEU
ERYTHROCYTE [DISTWIDTH] IN BLOOD BY AUTOMATED COUNT: 14.4 % (ref 11.6–15.1)
FLUAV RNA RESP QL NAA+PROBE: POSITIVE
FLUBV RNA RESP QL NAA+PROBE: NEGATIVE
GFR SERPL CREATININE-BSD FRML MDRD: 84 ML/MIN/1.73SQ M
GLUCOSE SERPL-MCNC: 93 MG/DL (ref 65–140)
GLUCOSE UR STRIP-MCNC: NEGATIVE MG/DL
HCT VFR BLD AUTO: 33.4 % (ref 34.8–46.1)
HGB BLD-MCNC: 10.8 G/DL (ref 11.5–15.4)
HGB UR QL STRIP.AUTO: NEGATIVE
INR PPP: 0.93 (ref 0.84–1.19)
KETONES UR STRIP-MCNC: ABNORMAL MG/DL
L PNEUMO1 AG UR QL IA.RAPID: NEGATIVE
LEUKOCYTE ESTERASE UR QL STRIP: ABNORMAL
MAGNESIUM SERPL-MCNC: 1.7 MG/DL (ref 1.9–2.7)
MCH RBC QN AUTO: 30.3 PG (ref 26.8–34.3)
MCHC RBC AUTO-ENTMCNC: 32.3 G/DL (ref 31.4–37.4)
MCV RBC AUTO: 94 FL (ref 82–98)
NITRITE UR QL STRIP: NEGATIVE
NON-SQ EPI CELLS URNS QL MICRO: ABNORMAL /HPF
P AXIS: 61 DEGREES
PH UR STRIP.AUTO: 6 [PH]
PLATELET # BLD AUTO: 218 THOUSANDS/UL (ref 149–390)
PMV BLD AUTO: 10.1 FL (ref 8.9–12.7)
POTASSIUM SERPL-SCNC: 3.8 MMOL/L (ref 3.5–5.3)
PR INTERVAL: 114 MS
PROCALCITONIN SERPL-MCNC: 1.14 NG/ML
PROT SERPL-MCNC: 6.2 G/DL (ref 6.4–8.4)
PROT UR STRIP-MCNC: ABNORMAL MG/DL
PROTHROMBIN TIME: 12.7 SECONDS (ref 11.6–14.5)
QRS AXIS: 34 DEGREES
QRSD INTERVAL: 74 MS
QT INTERVAL: 410 MS
QTC INTERVAL: 490 MS
RBC # BLD AUTO: 3.57 MILLION/UL (ref 3.81–5.12)
RBC #/AREA URNS AUTO: ABNORMAL /HPF
RSV RNA RESP QL NAA+PROBE: NEGATIVE
S PNEUM AG UR QL: NEGATIVE
SARS-COV-2 RNA RESP QL NAA+PROBE: NEGATIVE
SODIUM SERPL-SCNC: 131 MMOL/L (ref 135–147)
SP GR UR STRIP.AUTO: >=1.05 (ref 1–1.03)
T WAVE AXIS: 66 DEGREES
TSH SERPL DL<=0.05 MIU/L-ACNC: 2.72 UIU/ML (ref 0.45–4.5)
UROBILINOGEN UR STRIP-ACNC: <2 MG/DL
VENTRICULAR RATE: 86 BPM
WBC # BLD AUTO: 9.42 THOUSAND/UL (ref 4.31–10.16)
WBC #/AREA URNS AUTO: ABNORMAL /HPF

## 2023-01-20 RX ORDER — OSELTAMIVIR PHOSPHATE 6 MG/ML
30 FOR SUSPENSION ORAL EVERY 12 HOURS SCHEDULED
Status: COMPLETED | OUTPATIENT
Start: 2023-01-20 | End: 2023-01-24

## 2023-01-20 RX ORDER — LEVOFLOXACIN 5 MG/ML
500 INJECTION, SOLUTION INTRAVENOUS EVERY 24 HOURS
Status: DISCONTINUED | OUTPATIENT
Start: 2023-01-20 | End: 2023-01-20

## 2023-01-20 RX ORDER — FLUTICASONE PROPIONATE 50 MCG
1 SPRAY, SUSPENSION (ML) NASAL DAILY
Status: DISCONTINUED | OUTPATIENT
Start: 2023-01-20 | End: 2023-01-24 | Stop reason: HOSPADM

## 2023-01-20 RX ORDER — ACETAMINOPHEN 325 MG/1
650 TABLET ORAL EVERY 6 HOURS PRN
Status: DISCONTINUED | OUTPATIENT
Start: 2023-01-20 | End: 2023-01-24 | Stop reason: HOSPADM

## 2023-01-20 RX ORDER — ALBUTEROL SULFATE 90 UG/1
2 AEROSOL, METERED RESPIRATORY (INHALATION) EVERY 6 HOURS PRN
Status: DISCONTINUED | OUTPATIENT
Start: 2023-01-20 | End: 2023-01-24 | Stop reason: HOSPADM

## 2023-01-20 RX ORDER — AMLODIPINE BESYLATE 2.5 MG/1
2.5 TABLET ORAL 2 TIMES DAILY
Status: DISCONTINUED | OUTPATIENT
Start: 2023-01-20 | End: 2023-01-24 | Stop reason: HOSPADM

## 2023-01-20 RX ORDER — BENZONATATE 100 MG/1
100 CAPSULE ORAL 3 TIMES DAILY
Status: DISCONTINUED | OUTPATIENT
Start: 2023-01-20 | End: 2023-01-20

## 2023-01-20 RX ORDER — ONDANSETRON 2 MG/ML
4 INJECTION INTRAMUSCULAR; INTRAVENOUS EVERY 6 HOURS PRN
Status: DISCONTINUED | OUTPATIENT
Start: 2023-01-20 | End: 2023-01-20

## 2023-01-20 RX ORDER — GUAIFENESIN/DEXTROMETHORPHAN 100-10MG/5
10 SYRUP ORAL 3 TIMES DAILY
Status: DISCONTINUED | OUTPATIENT
Start: 2023-01-20 | End: 2023-01-20

## 2023-01-20 RX ORDER — CLINDAMYCIN PHOSPHATE 600 MG/50ML
600 INJECTION INTRAVENOUS ONCE
Status: COMPLETED | OUTPATIENT
Start: 2023-01-20 | End: 2023-01-20

## 2023-01-20 RX ORDER — LEVOFLOXACIN 5 MG/ML
750 INJECTION, SOLUTION INTRAVENOUS ONCE
Status: COMPLETED | OUTPATIENT
Start: 2023-01-20 | End: 2023-01-20

## 2023-01-20 RX ORDER — ENOXAPARIN SODIUM 100 MG/ML
40 INJECTION SUBCUTANEOUS DAILY
Status: DISCONTINUED | OUTPATIENT
Start: 2023-01-20 | End: 2023-01-24 | Stop reason: HOSPADM

## 2023-01-20 RX ORDER — LEVOFLOXACIN 5 MG/ML
750 INJECTION, SOLUTION INTRAVENOUS
Status: DISCONTINUED | OUTPATIENT
Start: 2023-01-22 | End: 2023-01-21

## 2023-01-20 RX ORDER — GABAPENTIN 100 MG/1
100 CAPSULE ORAL
Status: DISCONTINUED | OUTPATIENT
Start: 2023-01-20 | End: 2023-01-24 | Stop reason: HOSPADM

## 2023-01-20 RX ORDER — OXYCODONE HYDROCHLORIDE 5 MG/1
2.5 TABLET ORAL EVERY 6 HOURS PRN
Status: DISCONTINUED | OUTPATIENT
Start: 2023-01-20 | End: 2023-01-24 | Stop reason: HOSPADM

## 2023-01-20 RX ORDER — BENZONATATE 100 MG/1
100 CAPSULE ORAL 3 TIMES DAILY
Status: DISCONTINUED | OUTPATIENT
Start: 2023-01-20 | End: 2023-01-24 | Stop reason: HOSPADM

## 2023-01-20 RX ORDER — SODIUM CHLORIDE 9 MG/ML
75 INJECTION, SOLUTION INTRAVENOUS CONTINUOUS
Status: DISPENSED | OUTPATIENT
Start: 2023-01-20 | End: 2023-01-20

## 2023-01-20 RX ORDER — LANOLIN ALCOHOL/MO/W.PET/CERES
3 CREAM (GRAM) TOPICAL
Status: DISCONTINUED | OUTPATIENT
Start: 2023-01-20 | End: 2023-01-24 | Stop reason: HOSPADM

## 2023-01-20 RX ORDER — GUAIFENESIN/DEXTROMETHORPHAN 100-10MG/5
10 SYRUP ORAL 3 TIMES DAILY
Status: DISCONTINUED | OUTPATIENT
Start: 2023-01-20 | End: 2023-01-24 | Stop reason: HOSPADM

## 2023-01-20 RX ORDER — OSELTAMIVIR PHOSPHATE 6 MG/ML
75 FOR SUSPENSION ORAL EVERY 12 HOURS SCHEDULED
Status: DISCONTINUED | OUTPATIENT
Start: 2023-01-20 | End: 2023-01-20

## 2023-01-20 RX ORDER — LEVOFLOXACIN 5 MG/ML
500 INJECTION, SOLUTION INTRAVENOUS
Status: DISCONTINUED | OUTPATIENT
Start: 2023-01-20 | End: 2023-01-20

## 2023-01-20 RX ORDER — LIDOCAINE 50 MG/G
2 PATCH TOPICAL DAILY
Status: DISCONTINUED | OUTPATIENT
Start: 2023-01-20 | End: 2023-01-24 | Stop reason: HOSPADM

## 2023-01-20 RX ORDER — MAGNESIUM SULFATE HEPTAHYDRATE 40 MG/ML
2 INJECTION, SOLUTION INTRAVENOUS ONCE
Status: COMPLETED | OUTPATIENT
Start: 2023-01-20 | End: 2023-01-20

## 2023-01-20 RX ADMIN — ACETAMINOPHEN 650 MG: 325 TABLET, FILM COATED ORAL at 11:23

## 2023-01-20 RX ADMIN — CLINDAMYCIN PHOSPHATE 600 MG: 600 INJECTION, SOLUTION INTRAVENOUS at 02:58

## 2023-01-20 RX ADMIN — AMLODIPINE BESYLATE 2.5 MG: 2.5 TABLET ORAL at 22:35

## 2023-01-20 RX ADMIN — OSELTAMIVIR PHOSPHATE 30 MG: 6 FOR SUSPENSION ORAL at 10:15

## 2023-01-20 RX ADMIN — GUAIFENESIN AND DEXTROMETHORPHAN 10 ML: 100; 10 SYRUP ORAL at 22:34

## 2023-01-20 RX ADMIN — BENZONATATE 100 MG: 100 CAPSULE ORAL at 09:43

## 2023-01-20 RX ADMIN — FLUTICASONE PROPIONATE 1 SPRAY: 50 SPRAY, METERED NASAL at 09:43

## 2023-01-20 RX ADMIN — OSELTAMIVIR PHOSPHATE 30 MG: 6 FOR SUSPENSION ORAL at 22:34

## 2023-01-20 RX ADMIN — DICLOFENAC SODIUM TOPICAL GEL, 1%, 2 G: 10 GEL TOPICAL at 22:42

## 2023-01-20 RX ADMIN — MAGNESIUM SULFATE HEPTAHYDRATE 2 G: 40 INJECTION, SOLUTION INTRAVENOUS at 10:16

## 2023-01-20 RX ADMIN — AMLODIPINE BESYLATE 2.5 MG: 2.5 TABLET ORAL at 09:42

## 2023-01-20 RX ADMIN — SODIUM CHLORIDE 75 ML/HR: 0.9 INJECTION, SOLUTION INTRAVENOUS at 04:51

## 2023-01-20 RX ADMIN — BENZONATATE 100 MG: 100 CAPSULE ORAL at 15:47

## 2023-01-20 RX ADMIN — ENOXAPARIN SODIUM 40 MG: 40 INJECTION SUBCUTANEOUS at 09:42

## 2023-01-20 RX ADMIN — LEVOFLOXACIN 750 MG: 750 INJECTION, SOLUTION INTRAVENOUS at 03:26

## 2023-01-20 RX ADMIN — IOHEXOL 60 ML: 350 INJECTION, SOLUTION INTRAVENOUS at 01:41

## 2023-01-20 RX ADMIN — DICLOFENAC SODIUM TOPICAL GEL, 1%, 2 G: 10 GEL TOPICAL at 12:44

## 2023-01-20 RX ADMIN — GUAIFENESIN AND DEXTROMETHORPHAN 10 ML: 100; 10 SYRUP ORAL at 09:43

## 2023-01-20 RX ADMIN — Medication 3 MG: at 22:35

## 2023-01-20 RX ADMIN — DICLOFENAC SODIUM TOPICAL GEL, 1%, 2 G: 10 GEL TOPICAL at 17:56

## 2023-01-20 RX ADMIN — BENZONATATE 100 MG: 100 CAPSULE ORAL at 22:34

## 2023-01-20 RX ADMIN — DICLOFENAC SODIUM TOPICAL GEL, 1%, 2 G: 10 GEL TOPICAL at 09:43

## 2023-01-20 RX ADMIN — LIDOCAINE 5% 2 PATCH: 700 PATCH TOPICAL at 05:00

## 2023-01-20 RX ADMIN — GUAIFENESIN AND DEXTROMETHORPHAN 10 ML: 100; 10 SYRUP ORAL at 15:47

## 2023-01-20 RX ADMIN — GABAPENTIN 100 MG: 100 CAPSULE ORAL at 22:35

## 2023-01-20 RX ADMIN — OSELTAMIVIR PHOSPHATE 30 MG: 6 FOR SUSPENSION ORAL at 02:08

## 2023-01-20 NOTE — ASSESSMENT & PLAN NOTE
Patient tested positive for influenza A in the emergency department  1 week history of cough and weakness  Bilateral consolidative airspace disease in the lower lobes, concerning for aspiration pneumonia  Given 1 dose of Tamiflu in the ED    Plan:  Patient's upper respiratory symptoms started greater than 48 hours ago, however in the setting of multilobular infiltrates will provide renally adjusted liquid oseltamivir 30 mg every 12 hours for up to 5 days  Normal saline 75 mL an hour  Tylenol as needed  Monitor vitals  Tessalon Perles 3 times daily  Robitussin 3 times daily

## 2023-01-20 NOTE — H&P
[phone] 1940, 79 y o  female MRN: [id number]  Unit/Bed#:  W -01 Encounter: [id number]  Primary Care Provider: VALDEMAR Kelly   Date and time admitted to hospital: 1/19/2023 10:32 PM    * Aspiration pneumonia [facility])  Assessment & Plan  Patient presenting with 1 week history of cough and weakness from [facility]  History of intellectual disability  Vitals on admission: no hypoxia, no fever, hypertensive 170/79  Elevated Pro-Vlad 1 14  Elevated   White blood cell leukocytosis 12 75  Influenza A+  Elevated D-dimer 11 02  Lactate within normal limits  CTA PE negative for PE, however demonstrating bilateral consolidative airspace disease in the bilateral lower lobes, suspicious for aspiration pneumonia  Patient denies any history of dysphagia or recent choking episodes, however patient is a poor historian  Given 1 dose of Levaquin and clindamycin in the ED  Based on presenting criteria, patient has nonsevere community-acquired pneumonia    Plan:  Levofloxacin 750 mg every 48 H, in the setting of allergy to penicillins with swelling  Normal saline 75 mL an hour for 12 hours  Tylenol as needed for pain  AM Pro-Vlad/CBC/CMP  Urine strep/Legionella antigen, follow-up on results  Sputum culture, follow-up on results  Blood cultures, follow-up on results  Speech pathology evaluation  Tessalon Perles 3 times daily  Robitussin 3 times daily  Aspiration precautions  Will provide dental soft dysphagia diet until speech pathology evaluation    Influenza A  Assessment & Plan  Patient tested positive for influenza A in the emergency department  1 week history of cough and weakness  Bilateral consolidative airspace disease in the lower lobes, concerning for aspiration pneumonia  Given 1 dose of Tamiflu in the ED    Plan:  Patient's upper respiratory symptoms started greater than 48 hours ago, however in the setting of multilobular infiltrates will provide renally adjusted liquid oseltamivir 30 mg every 12 hours for up to 5 days  Normal saline 75 mL an hour  Tylenol as needed  Monitor vitals  Tessalon Perles 3 times daily  Robitussin 3 times daily    QT prolongation  Assessment & Plan  Patient presenting with prolonged QTc interval of 490 ms    Plan:  Treating pneumonia with levofloxacin 750 mg every 48 hours in the setting of allergy to penicillins, however this antibiotic cannot continue prolonged QTc interval, first dose done 1/20 3 AM, next dose at 1/22 at 3 AM  Consider switching antibiotic coverage to avoid QTC prolongation and or repeat EKG  Avoid other QTC prolonging medications  Tigan as needed for nausea    Elevated alkaline phosphatase level  Assessment & Plan  Patient presenting with elevated alkaline phosphatase level of 141  Denying any abdominal pain  No abdominal pain to palpation    Plan:  AM CMP  Consider right upper quadrant ultrasound    Ambulatory dysfunction  Assessment & Plan  Patient presenting with ambulatory dysfunction  Patient admits she is unsteady on her feet and uses a walker to ambulate  Long-term resident at [facility]:  PT/OT evaluation  Fall precautions  Out of bed with assistance    Sciatica  Assessment & Plan  Patient has a history of lumbar back pain secondary to sciatica  Presenting with worsening low back pain    Plan:  Lumbar back x-ray done in the ED, follow-up on results  Voltaren gel for hips and lumbar back  Gabapentin 100 mg daily, home medication  Lidocaine patch  Aqua K pad  Oxycodone 2 5 mg every 6 hours, home medication    Asthma  Assessment & Plan  Patient has a history of asthma    Plan:  Albuterol inhaler, home medication  Fluticasone inhaler, home medication    Hypertension  Assessment & Plan  Patient has a history of hypertension  Elevated blood pressure on admission 170/79    Plan:  Amlodipine 2 5 mg daily, home medication    VTE Pharmacologic Prophylaxis: VTE Score: 4 Moderate Risk (Score 3-4) - Pharmacological DVT Prophylaxis Ordered: enoxaparin (Lovenox)  Code Status: Level 3 - DNAR and DNI   Discussion with family: Patient reports she has no family to reach out to  Anticipated Length of Stay: Patient will be admitted on an inpatient basis with an anticipated length of stay of greater than 2 midnights secondary to Pneumonia  Chief Complaint: Cough    History of Present Illness:  Kyle Ernandez is a 79 y o  female with a PMH of mild intellectual disability, osteoarthritis, history of right pubic bone fracture, vitamin D deficiency, sciatica, and rheumatoid arthritis who presents with 1 week history of cough and weakness from [facility]  Patient is a poor historian but was able to provide me with information regarding her presentation  Patient reports that she has had a cough for approximately 1 week with associated phlegm production, fatigue, myalgias, intermittent chills, and nausea without vomiting  Patient denies shortness of breath or chest pain  Patient has true teeth, no dentures, denies any history of issues swallowing or recent choking events  Patient reports she is having low back pain, which comes and goes, described as a sharp pain that radiates to both hips and legs  Denies any paresthesias  Patient reports her last bowel movement was yesterday and she goes regularly  Patient denies headaches, rhinorrhea, sore throat, chest pain, shortness of breath, abdominal pain, constipation, diarrhea, dysuria, suprapubic pressure  Patient reports she has no family  Her brother recently passed away from [facility] approximately 1 year ago  Patient reports the individual listed as her POA is not her POA  She reports she is not steady on her feet and uses a walker to ambulate      In the emergency department patient was hemodynamically stable, with laboratory work-up demonstrating hyponatremia of 133, elevated alkaline phosphatase of 141, elevated BNP of 190, elevated procalcitonin of 1 14, leukocytosis with WBC of 12 75, positive influenza A test, and an elevated D-dimer of 11 02  CTA of the chest was negative for pulmonary embolism, however demonstrated bilateral consolidative airspace diseases in the lower lobes concerning for aspiration pneumonia  An x-ray of the lumbar spine and chest x-ray were obtained which are pending results  Blood cultures were drawn  Review of Systems:  Review of Systems   Constitutional: Positive for fatigue  Negative for chills and fever  HENT: Negative for ear pain and sore throat  Eyes: Negative for pain and visual disturbance  Respiratory: Positive for cough  Negative for chest tightness and shortness of breath  Cardiovascular: Negative for chest pain, palpitations and leg swelling  Gastrointestinal: Negative for abdominal pain, constipation, diarrhea, nausea and vomiting  Genitourinary: Negative for dysuria and hematuria  Musculoskeletal: Positive for myalgias  Negative for arthralgias and back pain  Skin: Negative for color change and rash  Neurological: Negative for seizures, syncope, light-headedness and headaches  Psychiatric/Behavioral: Negative  All other systems reviewed and are negative  Past Medical and Surgical History:   Past Medical History:   Diagnosis Date   • Allergic    • Anxiety    • Arthritis    • Cancer ([address] )    • HLD (hyperlipidemia)    • Hypertension    • Hyperthyroidism    • Osteoporosis    • Stroke [facility])    • TIA (transient ischemic attack)        Past Surgical History:   Procedure Laterality Date   • BREAST LUMPECTOMY Left    • HYSTERECTOMY     • KNEE SURGERY Right    • ORIF TIBIA FRACTURE Right 12/8/2016    Procedure: OPEN REDUCTION W/ INTERNAL FIXATION (ORIF) TIBIA;  Surgeon: Satnam Grimaldo MD;  Location: [facility];  Service:        Meds/Allergies:  Prior to Admission medications    Medication Sig Start Date End Date Taking?  Authorizing Provider   acetaminophen (TYLENOL) 325 mg tablet Take 975 mg by mouth every 8 (eight) hours as needed    Historical Provider, MD   albuterol (PROVENTIL HFA,VENTOLIN HFA) 90 mcg/act inhaler Inhale 2 puffs every 6 (six) hours as needed for wheezing    Historical Provider, MD   amLODIPine (NORVASC) 2 5 mg tablet Take 1 tablet (2 5 mg total) by mouth 2 (two) times a day 7/1/21   VALDEMAR Navarro   bisacodyl (DULCOLAX) 10 mg suppository Insert 10 mg into the rectum daily as needed    Historical Provider, MD   bisacodyl (DULCOLAX) 5 mg EC tablet Take 5 mg by mouth daily as needed for constipation    Historical Provider, MD   cholecalciferol (VITAMIN D3) 1,000 units tablet Take 1 tablet (1,000 Units total) by mouth daily 1/6/23   VALDEMAR Navarro   Diclofenac Sodium (VOLTAREN) 1 % Apply 2 g topically 4 (four) times a day = To right hip and pelvis 1/6/23   VALDEMAR Navarro   fluticasone (FLONASE) 50 mcg/act nasal spray 1 spray into each nostril daily 10/22/20   Jean Paul Avilez PA-C   gabapentin (NEURONTIN) 100 mg capsule Take 1 capsule (100 mg total) by mouth daily at bedtime 1/6/23   VALDEMAR Navarro   hydrocortisone 1 % ointment Apply 1 application topically 3 (three) times a day as needed = to dry itchy areas    Historical Provider, MD   lidocaine (LMX) 4 % cream Apply 1 application topically 2 (two) times a day as needed = to LUE and hands    Historical Provider, MD   magnesium hydroxide (MILK OF MAGNESIA) 400 mg/5 mL oral suspension Take 30 mL by mouth 2 (two) times a day as needed    Historical Provider, MD   melatonin 3 mg Take 1 tablet (3 mg total) by mouth daily at bedtime 1/6/23   VALDEMAR Navarro   multivitamin [facility]) TABS Take 1 tablet by mouth daily 1/6/23   VALDEMAR Navarro   oxyCODONE (OXY-IR) 5 MG capsule Take 2 5 mg by mouth every 6 (six) hours as needed for moderate pain    Historical Provider, MD   senna-docusate sodium (SENOKOT S) 8 6-50 mg per tablet Take 1 tablet by mouth 2 (two) times a day 1/6/23   VALDEMAR Villanueva   Sodium Phosphates (FLEET ENEMA RE) Insert 1 Bottle into the rectum daily as needed    Historical Provider, MD     I have reveiwed home medications using records provided by [facility]  Allergies: Allergies   Allergen Reactions   • Alendronate    • Penicillins Swelling   • Raloxifene    • Risedronate Sodium    • Sulfa Antibiotics Swelling       Social History:  Marital Status:    Occupation:   Patient Pre-hospital Living Situation: [facility]  Patient Pre-hospital Level of Mobility: walks with walker  Patient Pre-hospital Diet Restrictions:   Substance Use History:   Social History     Substance and Sexual Activity   Alcohol Use Not Currently    Comment: Alcohol intake:   Occasional  - As per [place]      Social History     Tobacco Use   Smoking Status Never   Smokeless Tobacco Never   Tobacco Comments    Former smoker - As per [place]      Social History     Substance and Sexual Activity   Drug Use Never       Family History:  Family History   Problem Relation Age of Onset   • Coronary artery disease Family    • Other Family         DJD       Physical Exam:     Vitals:   Blood Pressure: 127/66 (01/20/23 0446)  Pulse: 77 (01/20/23 0446)  Temperature: 97 8 °F (36 6 °C) (01/20/23 0400)  Temp Source: Oral (01/20/23 0400)  Respirations: 20 (01/20/23 0446)  Weight - Scale: 46 7 kg (103 lb) (01/19/23 2234)  SpO2: 95 % (01/20/23 0446)    Physical Exam  Vitals and nursing note reviewed  Constitutional:       General: She is not in acute distress  Appearance: She is well-developed  HENT:      Head: Normocephalic and atraumatic  Eyes:      Conjunctiva/sclera: Conjunctivae normal    Cardiovascular:      Rate and Rhythm: Normal rate and regular rhythm  Heart sounds: No murmur heard  Pulmonary:      Effort: Pulmonary effort is normal  No respiratory distress  Breath sounds: Rhonchi present        Comments: Significant inspiratory and expiratory rhonchi   No crackles  Abdominal:      General: Abdomen is flat  Bowel sounds are normal       Palpations: Abdomen is soft  Tenderness: There is no abdominal tenderness  Musculoskeletal:         General: No swelling  Cervical back: Neck supple  Right lower leg: No edema  Left lower leg: No edema  Skin:     General: Skin is warm and dry  Capillary Refill: Capillary refill takes less than 2 seconds  Neurological:      Mental Status: She is alert  Mental status is at baseline  Psychiatric:         Mood and Affect: Mood normal           Additional Data:     Lab Results:  Results from last 7 days   Lab Units 01/19/23  2247   WBC Thousand/uL 12 75*   HEMOGLOBIN g/dL 11 9   HEMATOCRIT % 37 8   PLATELETS Thousands/uL 276   LYMPHO PCT % 2*   MONO PCT % 3*   EOS PCT % 0     Results from last 7 days   Lab Units 01/19/23  2247   SODIUM mmol/L 133*   POTASSIUM mmol/L 4 3   CHLORIDE mmol/L 98   CO2 mmol/L 24   BUN mg/dL 10   CREATININE mg/dL 0 72   ANION GAP mmol/L 11   CALCIUM mg/dL 8 8   ALBUMIN g/dL 3 7   TOTAL BILIRUBIN mg/dL 0 51   ALK PHOS U/L 141*   ALT U/L 16   AST U/L 23   GLUCOSE RANDOM mg/dL 128     Results from last 7 days   Lab Units 01/19/23  2247   INR  0 93             Results from last 7 days   Lab Units 01/19/23  2316 01/19/23  2247   LACTIC ACID mmol/L 1 5  --    PROCALCITONIN ng/ml  --  1 14*       Lines/Drains:  Invasive Devices     Peripheral Intravenous Line  Duration           Peripheral IV 01/19/23 Left;Ventral (anterior) Forearm <1 day          Drain  Duration           External Urinary Catheter 89 days                    Imaging: Reviewed radiology reports from this admission including: chest CT scan  CTA ED chest PE Study   Final Result by Koby Reyes MD (01/20 6720)      Consolidative airspace disease at the bilateral lower lobes consistent with pneumonia likely secondary to aspiration                     Workstation performed: [id number]         XR lumbar spine 2 or 3 views   ED Interpretation by Mathew Alcala DO (01/20 4164)   Severe osteopenia, scoliosis noted  Large amount of intestinal gas  No acute bony abnormalities visualized  XR chest 1 view   ED Interpretation by Mathew Alcala DO (01/20 5655)   Rotated view, widened mediastinum  Consolidation left lower lobe  EKG and Other Studies Reviewed on Admission:   · EKG: NSR  HR 86     ** Please Note: This note has been constructed using a voice recognition system   **

## 2023-01-20 NOTE — ASSESSMENT & PLAN NOTE
Patient has a history of lumbar back pain secondary to sciatica  Presenting with worsening low back pain    Plan:  Lumbar back x-ray done in the ED, follow-up on results  Voltaren gel for hips and lumbar back  Gabapentin 100 mg daily, home medication  Lidocaine patch  Aqua K pad  Oxycodone 2 5 mg every 6 hours, home medication

## 2023-01-20 NOTE — CASE MANAGEMENT
Case Management Progress Note    Patient name Kyle Ernandez  Location W /W -04 MRN [id number]  : 1940 Date 2023       LOS (days): 0  Geometric Mean LOS (GMLOS) (days): 3 60  Days to GMLOS:3        OBJECTIVE:        Current admission status: Inpatient  Preferred Pharmacy:   [facility]/pharmacy #[id number]- [place] - [address]   Phone: [phone] Fax: [phone]    Primary Care Provider: Lynda Go    Primary Insurance: MEDICARE  Secondary Insurance: BLUE CROSS    PROGRESS NOTE:    CM called Butch Cruz at P: [phone] LVM w/number for a return call

## 2023-01-20 NOTE — SPEECH THERAPY NOTE
Speech-Language Pathology Bedside Swallow Evaluation        Patient Name: Michaela MANLEY Date: 1/20/2023     Problem List  Principal Problem:    Aspiration pneumonia ([facility] )  Active Problems:    Hypertension    Sciatica    Ambulatory dysfunction    Influenza A    Elevated alkaline phosphatase level    Asthma    QT prolongation         Past Medical History  Past Medical History:   Diagnosis Date   • Allergic    • Anxiety    • Arthritis    • Cancer ([facility] )    • HLD (hyperlipidemia)    • Hypertension    • Hyperthyroidism    • Osteoporosis    • Stroke [facility])    • TIA (transient ischemic attack)        Past Surgical History  Past Surgical History:   Procedure Laterality Date   • BREAST LUMPECTOMY Left    • HYSTERECTOMY     • KNEE SURGERY Right    • ORIF TIBIA FRACTURE Right 12/8/2016    Procedure: OPEN REDUCTION W/ INTERNAL FIXATION (ORIF) TIBIA;  Surgeon: Annelise Estrada MD;  Location: [facility];  Service:        Summary    Pt presents with functional oral and pharyngeal stages of swallowing suspected w/ limited amounts  No overt s/s aspiration w/ thin liquids  Recommendations:   Diet: soft/level 3 diet and thin liquids   Meds: as tolerated    Frequent Oral care: 2x/day  Aspiration precautions  Other Recommendations/ considerations: will follow up x 1-2 for assessment w/ larger sample  Current Medical Status  Pt is a 79 y o  female who presented to [address] [place] [address]  with aspiration pneumonia  Pt is flu A +  Patient reports that she has had a cough for approximately 1 week with associated phlegm production, fatigue, myalgias, intermittent chills, and nausea without vomiting  Patient denies shortness of breath or chest pain  Patient has true teeth, no dentures, denies any history of issues swallowing or recent choking events  Patient reports she is having low back pain, which comes and goes, described as a sharp pain that radiates to both hips and legs  Denies any paresthesias    Patient reports her last bowel movement was yesterday and she goes regularly  Patient denies headaches, rhinorrhea, sore throat, chest pain, shortness of breath, abdominal pain, constipation, diarrhea, dysuria, suprapubic pressure  Past medical history:   Please see H&P for details    Special Studies:  CTA ed chest pe study: 1/20/23  Consolidative airspace disease at the bilateral lower lobes consistent with pneumonia likely secondary to aspiration  Social/Education/Vocational Hx:  Pt lives [address]   Current Risks for Dysphagia & Aspiration: flu +, gen weakness  Current Symptoms/Concerns: change in respiratory status and suspected aspiration pneumonia  Current Diet: soft/level 3 diet and thin liquids   Baseline Diet: assume regular diet   Takes pills-    Baseline Assessment   Behavior/Cognition: alert  Speech/Language Status: able to participate in basic conversation and able to follow commands  Patient Positioning: upright in chair     Swallow Mechanism Exam   Facial: left facial droop  Labial: WFL  Lingual: WFL  Velum: unable to visualize  Mandible: adequate ROM  Dentition: adequate  Vocal quality:clear/adequate   Volitional Cough: strong/productive   Respiratory: NC    Consistencies Assessed and Performance   Consistencies Administered: pt seen at lunch w/ chicken salad, peaches, jello, thin liquids by straw  Oral Stage: pt w/ adequate bolus retrieval via spoon and straw  Pt appeared w/ adequate oral control w/ successive sips of thin liquids by straw  Mastication, manipulation of chicken salad was slow and prolonged, pt expectorated last bite of chicken salad after coughing up some mucous during meal  Oral processing of peaches was WNL    Pharyngeal Stage: Swallow initiation was delayed but complete   No coughing after swallowing thin liquids, however toward end of session, last bite of chicken salad, pt gagged and coughed up some thick yellowish mucous and spit out chicken salad  Pt then switched to eating just the jello w/o difficulty         Esophageal Concerns: none reported      Results Reviewed with: patient and RN   Dysphagia Goals: pt will tolerate dysphagia 3  with thin liquids without s/s of aspiration x48 hours       April Patience Encarnacion MA CCC-SLP  Speech Pathologist  [place] license # [id number] [facility] [id number]  [place] license # [id number]  Available via "[employer]

## 2023-01-20 NOTE — ASSESSMENT & PLAN NOTE
Patient has a history of asthma    Plan:  Albuterol inhaler, home medication  Fluticasone inhaler, home medication

## 2023-01-20 NOTE — OCCUPATIONAL THERAPY NOTE
Occupational Therapy Evaluation     Patient Name: Evgeny Pate  BZCHL'L Date: 1/20/2023  Problem List  Principal Problem:    Aspiration pneumonia ([facility] )  Active Problems:    Hypertension    Sciatica    Ambulatory dysfunction    Influenza A    Elevated alkaline phosphatase level    Asthma    QT prolongation    Past Medical History  Past Medical History:   Diagnosis Date    Allergic     Anxiety     Arthritis     Cancer ([facility] )     HLD (hyperlipidemia)     Hypertension     Hyperthyroidism     Osteoporosis     Stroke ([facility] )     TIA (transient ischemic attack)      Past Surgical History  Past Surgical History:   Procedure Laterality Date    BREAST LUMPECTOMY Left     HYSTERECTOMY      KNEE SURGERY Right     ORIF TIBIA FRACTURE Right 12/8/2016    Procedure: OPEN REDUCTION W/ INTERNAL FIXATION (ORIF) TIBIA;  Surgeon: Anastasia Kohli MD;  [facility];  Service:              01/20/23 0757   OT Last Visit   OT Visit Date 01/20/23   Note Type   Note type Evaluation   Pain Assessment   Pain Assessment Tool 0-10   Pain Score 4   Restrictions/Precautions   Weight Bearing Precautions Per Order No   Other Precautions Cognitive; Chair Alarm; Bed Alarm; Fall Risk;Pain;Multiple lines;O2  (2L O2)   Home Living   Type of Home Assisted living  [facility])   Home Layout One level   Home Equipment Walker   Additional Comments use of RW at baseline   Prior Function   Level of [place] Needs assistance with ADLs  ((A) with LB ADLs and "my shirts when they are difficult" (A) with bathing and (S) with toileting)   Lives With Facility staff   Receives Help From Personal care attendant   IADLs Family/Friend/Other provides transportation; Family/Friend/Other provides meals; Family/Friend/Other provides medication management   Falls in the last 6 months   (denies)   Vocational Retired   Lifestyle   Autonomy PTA pt living at [facility], pt requires (A) with ADLs and IADLs, (-)falls, (-)drives, use of RW at baseline Reciprocal Relationships supportive facility staff   Service to Others retired   [address] enjoys going to music events at [facility] HOS[facility] Present No   Subjective   Subjective "Oh I feel so nauseous"   ADL   Eating Assistance 5  Supervision/Setup   Eating Deficit Setup   Grooming Assistance 4  Minimal Assistance   Grooming Deficit Increased time to complete;Supervision/safety;Verbal cueing;Wash/dry hands  (standing at sink, step by step VC for sequencing of task A for location of materials)   UB Bathing Assistance 3  Moderate Assistance   LB Bathing Assistance 1  Total Assistance   UB Dressing Assistance 3  Moderate Assistance   LB Dressing Assistance 1  Total Assistance   LB Dressing Deficit Don/doff R sock; Don/doff L sock   Toileting Assistance  2  Maximal Assistance   Toileting Deficit Increased time to complete;Verbal cueing;Supervison/safety; Clothing management down;Clothing management up;Perineal hygiene  (Able to complete hygiene in front, A with back)   Bed Mobility   Supine to Sit 3  Moderate assistance   Additional items Assist x 1; Increased time required;Verbal cues;LE management   Transfers   Sit to Stand 4  Minimal assistance   Additional items Assist x 1; Increased time required;Verbal cues   Stand to Sit 4  Minimal assistance   Additional items Assist x 1; Increased time required;Verbal cues   Toilet transfer 3  Moderate assistance  (physical A for guiding hips through sitting motion due to cognitive deficits and not being able to figure out how to sit)   Additional items Assist x 1; Increased time required;Verbal cues;Standard toilet  (increased assist due to low surface)   Additional Comments use of RW   Functional Mobility   Functional Mobility 4  Minimal assistance   Additional Comments Ax1, limited distance bed > bathroom, recliner chair brought into bathroom due to limited fatigue   Additional items Rolling walker   Balance   Static Sitting Good Dynamic Sitting Fair +   Static Standing Fair   Dynamic Standing Poor +   Ambulatory Poor +   Activity Tolerance   Activity Tolerance Patient limited by fatigue   Medical Staff Made Aware LPN Rika   RUE Assessment   RUE Assessment X  (premorbid shldr injury, limited flexion)   LUE Assessment   LUE Assessment X  (premorbid shldr injury, limited flexion)   Hand Function   Gross Motor Coordination Impaired   Fine Motor Coordination Impaired  (poor FM coordination)   Cognition   Overall Cognitive Status Impaired   Arousal/Participation Alert; Cooperative   Attention Attends with cues to redirect   Orientation Level Oriented to person;Disoriented to place; Disoriented to time;Oriented to situation   Memory Decreased short term memory;Decreased recall of recent events   Following Commands Follows one step commands with increased time or repetition   Comments Limited problem solving  Requiring step by step VC through tasks   Assessment   Limitation Decreased ADL status; Decreased UE strength;Decreased Safe judgement during ADL;Decreased cognition;Decreased endurance;Decreased self-care trans;Decreased high-level ADLs   Prognosis Good   Assessment Pt is a 79 y o  female seen for OT evaluation s/p admission to [facility] on 1/19/2023 due to generalized weakness  Pt diagnosed with Aspiration pneumonia ([address] )  Pt has a significant PMH impacting occupational performance including: ambulatory dysfunction, sciatica, asthma, HTN, hx cognitive impairment and CVA  Pt with no recent admissions in the last 2 months  PTA pt living at [facility], pt requires (A) with ADLs and IADLs, (-)falls, (-)drives, use of RW at baseline  Pt is motivated to return to home  Personal and environmental factors supporting pt at time of IE include social support and accessible home environment   Personal and environmental factors inhibiting engagement in occupations include advanced age, difficulty completing ADLs and difficulty completing IADLs  During evaluation pt performed as is outlined above in flowsheet  Pt required VC for safety, VC for attention to task and encouragement for (I) with tasks  Standardized assessments used to assist in identifying performance deficits include AMPAC 6-Clicks and Barthel ADL Index  Performance deficits that affect the pt’s occupational performance during the initial evaluation include impaired balance, functional mobility, endurance, activity tolerance, functional standing tolerance and overall strength, attention to task, communication and social skills, safety awareness, insight into deficits, problem solving and learning/remembering new tasks  Based on pt’s functional performance and deficits the following occupations will be addressed in OT treatments in order to maximize pt’s independence and overall occupational performance: grooming, bathing/showering, toileting and toilet hygiene, dressing and functional mobility  Goals are listed below  Upon discharge from acute care setting recommend d/c to Return to facility with therapy services vs PAR pending the level of support facility is able to provide  This evaluation required an extensive review of medical and/or therapy records and additional review of physical, cognitive and psychosocial history related to functional performance  Based upon functional performance deficits and assessments, this evaluation has been identified as a high complexity evaluation  Goals   Patient Goals to rest   LTG Time Frame 10-14   Long Term Goal see goals listed below   Plan   Treatment Interventions ADL retraining;Functional transfer training; Endurance training;Patient/family training;Cognitive reorientation; Compensatory technique education; Activityengagement;Equipment evaluation/education   Goal Expiration Date 02/03/23   OT Treatment Day 0   OT Frequency 2-3x/wk   Recommendation   OT Discharge Recommendation Return to facility with rehabilitation services  (vs PAR pending on level of support facility able to provide)   AM-PAC Daily Activity Inpatient   Lower Body Dressing 1   Bathing 2   Toileting 2   Upper Body Dressing 2   Grooming 3   Eating 3   Daily Activity Raw Score 13   Daily Activity Standardized Score (Calc for Raw Score >=11) 32 03   AM-PAC Applied Cognition Inpatient   Following a Speech/Presentation 2   Understanding Ordinary Conversation 2   Taking Medications 1   Remembering Where Things Are Placed or Put Away 1   Remembering List of 4-5 Errands 1   Taking Care of Complicated Tasks 1   Applied Cognition Raw Score 8   Applied Cognition Standardized Score 19 32   Barthel Index   Feeding 5   Bathing 0   Grooming Score 0   Dressing Score 5   Bladder Score 5   Bowels Score 10   Toilet Use Score 5   Transfers (Bed/Chair) Score 10   Mobility (Level Surface) Score 0   Stairs Score 0   Barthel Index Score 40   End of Consult   Patient Position at End of Consult Bedside chair;Bed/Chair alarm activated; All needs within reach       GOALS:      -Patient will perform grooming tasks standing at sink with overall Mod I in order to increase overall independence    -Patient will be Mod I with UB ADLs using AE and AD as needed in order to increase (I) with ADLs    -Patient will be Min A  with LB ADLs with use of AE and AD as needed in order to increase (I) with ADLs    -Patient will complete toileting w/ Supervision w/ G hygiene/thoroughness in order to reduce caregiver burden    -Patient will demonstrate Supervision with bed mobility for ability to manage own comfort and initiate OOB tasks      -Patient will perform functional transfers with Supervision to/from all surfaces using DME as needed in order to increase (I) with functional tasks    -Patient will be Supervision with functional mobility to/from bathroom for increased independence with toileting tasks    -Patient will tolerate therapeutic activities for greater than 30 min, in order to increase tolerance for functional activities  -Patient will increase OOB/sitting tolerance to 2-4 hours per day to increase participation in self-care and leisure tasks with no s/s of exertion      -Patient will engage in ongoing cognitive assessment in order to assist with safe discharge planning/recommendations  The patient's raw score on the -PAC Daily Activity inpatient short form is 13, standardized score is 32 03, less than 39 4  Patients at this level are likely to benefit from discharge to post-acute rehabilitation services  Please refer to the recommendation of the Occupational Therapist for safe discharge planning        Traci Elizabeth MS, OTR/L

## 2023-01-20 NOTE — PHYSICAL THERAPY NOTE
PHYSICAL THERAPY EVALUATION NOTE          Patient Name: Devorah Child  CTRTN'F Date: 2023        AGE:   79 y o  Mrn:   [id number]  ADMIT DX:  Cough [R05 9]  [facility] ) [J69 0]  Weakness generalized [R53 1]  Weakness [R53 1]  Influenza A [J10 1]    Past Medical History:  Past Medical History:   Diagnosis Date    Allergic     Anxiety     Arthritis     Cancer ([place] [address] )     HLD (hyperlipidemia)     Hypertension     Hyperthyroidism     Osteoporosis     Stroke ([place] [address] )     TIA (transient ischemic attack)        Past Surgical History:  Past Surgical History:   Procedure Laterality Date    BREAST LUMPECTOMY Left     HYSTERECTOMY      KNEE SURGERY Right     ORIF TIBIA FRACTURE Right 2016    Procedure: OPEN REDUCTION W/ INTERNAL FIXATION (ORIF) TIBIA;  Surgeon: Satnam Grimaldo MD;  Location[facility];  Service:      Length Of Stay: 0        PHYSICAL THERAPY EVALUATION:    Patient's identity confirmed via 2 patient identifiers (full name and ) at start of session       23 1423   PT Last Visit   PT Visit Date 23   Note Type   Note type Evaluation   Pain Assessment   Pain Assessment Tool 0-10   Pain Score No Pain  ("not that I can feel")   Restrictions/Precautions   Weight Bearing Precautions Per Order No   Other Precautions Contact/isolation;Droplet precautions;Cognitive; Chair Alarm; Bed Alarm; Fall Risk;O2;Pain  (2L O2 via NC)   Home Living   Type of Home Assisted living  [facility])   Home Layout One level;Performs ADLs on one level; Able to live on main level with bedroom/bathroom   [address] [facility]  (RW)   Additional Comments Pt is currently admitted from [facility]   Prior Function   Level of [place] Independent with functional mobility; Needs assistance with ADLs; Needs assistance with [address] staff   Receives Help From Personal care attendant   IADLs Family/Friend/Other provides transportation; Family/Friend/Other provides meals; Family/Friend/Other provides medication management   Falls in the last 6 months 1 to 4  (at least 1 per chart review)   Comments Pt reports she was ambulating w/ RW to/from [employer] PTA, staff assist w/ ADLs and IADLs   General   Family/Caregiver Present No   Cognition   Overall Cognitive Status Impaired   Arousal/Participation Cooperative   Attention Attends with cues to redirect   Orientation Level Oriented to person;Disoriented to time;Disoriented to situation;Disoriented to place  (stated year was 2024)   Memory Decreased short term memory;Decreased recall of recent events;Decreased recall of precautions   Following Commands Follows one step commands with increased time or repetition   Strength RLE   RLE Overall Strength 3+/5  (grossly assessed w/ functional mobility)   Strength LLE   LLE Overall Strength 3+/5  (grossly assessed w/ functional mobility)   Bed Mobility   Supine to Sit Unable to assess   Sit to Supine Unable to assess   Additional Comments pt OOB in recliner chair upon arrival and returned to chair at end of session   Transfers   Sit to Stand 3  Moderate assistance   Additional items Assist x 1; Increased time required;Armrests; Verbal cues   Stand to Sit 3  Moderate assistance   Additional items Assist x 1; Armrests; Increased time required;Verbal cues   Additional Comments VC for hand placement   Ambulation/Elevation   Gait pattern Improper Weight shift;Narrow EDWIN; Decreased foot clearance; Short stride; Excessively slow;Decreased hip extension;Decreased heel strike   Gait Assistance 4  Minimal assist   Additional items Assist x 1;Verbal cues   Assistive Device Rolling walker   Distance 15'   Balance   Static Sitting Fair +   Dynamic Sitting Fair   Static Standing Poor +  (CGA w/ RW)   Dynamic Standing Poor +   Ambulatory Poor +   Endurance Deficit   Endurance Deficit Yes   Endurance Deficit Description limited ambulatory endurance and decreased overall activity tolerance   Activity Tolerance   Activity Tolerance Patient limited by fatigue   Medical Staff Made Aware Spoke to OT Ana   Nurse Made Aware LPN [place]   Assessment   Prognosis Fair   Problem List Decreased strength;Decreased endurance; Impaired balance;Decreased mobility; Decreased cognition; Impaired judgement;Decreased safety awareness; Impaired hearing   Assessment Dayan San is a 79 y o  Female who [facility] 1[facility]23 due to generalized weakness and diagnosis of aspiration pneumonia  Orders for PT eval and treat received, w/ activity orders of up w/ assist w/ contact and droplet precautions  Comorbidities affecting pt at time of evaluation include: sciatica, HTN, asthma, CVA, anxiety, TIA, neurocognitive disorder  Personal factors affecting DC include: ambulating w/ assistive device, inability to ambulate household distances, inability to navigate level surfaces w/o external assistance, decreased cognition, positive fall history, inability to perform IADLs and inability to perform ADLs  At baseline, pt mobilizes w/ RW, and w/ at least 1 fall(s) in the previous 6 months  Upon evaluation, pt presents w/ the following deficits: weakness, impaired balance, decreased endurance and gait deviations  Pt currently requires mod Ax1 for transfers and mod Ax1 w/ RW for ambulation  Pt's clinical presentation is unstable/unpredictable due to need for assist w/ all phases of mobility when usually mobilizing independently, need for supplemental oxygen in order to maintain oxygen saturation, need for input for mobility technique/safety, recent drastic decline in mobility compared to baseline and recent history of falls  Pt is at an increased risk of falls due to impaired cognition, impaired balance, decreased safety awareness, and generalized weakness   From a PT/mobility standpoint, given the above findings, DC recommendation is: Post-acute inpatient rehabilitation vs return to facility w/ skilled PT services pending level of assistance facility is able to provide  During current admission, pt will benefit from continued skilled inpatient PT in the acute care setting in order to address the above deficits and to maximize function and mobility prior to DC from acute care  Goals   Patient Goals to feel better   STG Expiration Date 01/30/23   Short Term Goal #1 Pt will: perform bed mobility w/ supervision to decrease pt's burden of care and increase pt's independence w/ repositioning in bed; perform transfers w/ supervision to increase pt's OOB mobility; ambulate at least 76' w/ LRAD and supervision to increase pt's ambulatory endurance/tolerance; increase all balance ratings by at least 1 grade to decrease pt's risk of falls   PT Treatment Day 1  (PT tx note below)   Plan   Treatment/Interventions Functional transfer training;LE strengthening/ROM; Therapeutic exercise; Endurance training;Cognitive reorientation;Patient/family training;Equipment eval/education; Bed mobility;Gait training   PT Frequency 3-5x/wk   Recommendation   PT Discharge Recommendation Post acute rehabilitation services  (vs return to facility ([facility]) w/ skilled PT services)   Equipment Recommended [place] walker   Change/add to [employer]?  No   Additional Comments DC rec: post acute rehab vs return to facility w/ skilled PT services pending level of assistance facility can provide upon [address]   Turning in Flat Bed Without Bedrails 2   Lying on Back to Sitting on Edge of Flat Bed Without Bedrails 2   Moving Bed to Chair 2   Standing Up From Chair Using Arms 2   Walk in Room 3   Climb 3-5 Stairs With Railing 1   Basic Mobility Inpatient Raw Score 12   Basic Mobility Standardized Score 32 23   Highest Level Of Mobility   -[facility] Goal 4: Move to chair/commode   -HL Achieved 6: Walk 10 steps or more   Additional Treatment Session   Start Time 1536   End Time 1554   Treatment Assessment Pt seen for PT treatment session today including: transfer, gait, endurance, and balance training in order to maximize pt independence w/ functional mobility  Pt performed sit<>stand transfer w/ mod Ax1 and VC for hand placement during descent  With RW, pt ambulated an additional 16' w/ min Ax1 w/ 1 standing rest break  Pt continues to be functioning below baseline level, and remains limited in functional mobility due to generalized fatigue, weakness, and decreased endurance  Pt will continue to benefit from skilled PT intervention to promote pt's independence w/ functional mobility and progress towards set goals  Continue to recommend DC post acute rehabilitation services vs return to facility w/ PT when medically cleared  Equipment Use RW   Additional Treatment Day 1   End of Consult   Patient Position at End of Consult Bedside chair;Bed/Chair alarm activated; All needs within reach       The patient's AM-PAC Basic Mobility Inpatient Short Form Raw Score is 12  A Raw score of less than or equal to 16 suggests the patient may benefit from discharge to post-acute rehabilitation services  Please also refer to the recommendation of the Physical Therapist for safe discharge planning      Pt will benefit from skilled inpatient PT during this admission in order to facilitate progress towards goals and to maximize functional independence prior to [address] rec: post acute rehab (vs return to facility pending level of assistance facility is able to provide)        Peter Chatterjee, PT, DPT  01/20/23

## 2023-01-20 NOTE — ASSESSMENT & PLAN NOTE
Patient presenting with ambulatory dysfunction  Patient admits she is unsteady on her feet and uses a walker to ambulate  Long-term resident at [facility]:  PT/OT evaluation  Fall precautions  Out of bed with assistance

## 2023-01-20 NOTE — ASSESSMENT & PLAN NOTE
Patient presenting with 1 week history of cough and weakness from [facility]  History of intellectual disability  Vitals on admission: no hypoxia, no fever, hypertensive 170/79  Elevated Pro-Vlad 1 14  Elevated   White blood cell leukocytosis 12 75  Influenza A+  Elevated D-dimer 11 02  Lactate within normal limits  CTA PE negative for PE, however demonstrating bilateral consolidative airspace disease in the bilateral lower lobes, suspicious for aspiration pneumonia  Patient denies any history of dysphagia or recent choking episodes, however patient is a poor historian  Given 1 dose of Levaquin and clindamycin in the ED  Based on presenting criteria, patient has nonsevere community-acquired pneumonia    Plan:  Levofloxacin 750 mg every 48 H, in the setting of allergy to penicillins with swelling  Normal saline 75 mL an hour for 12 hours  Tylenol as needed for pain  AM Pro-Vlad/CBC/CMP  Urine strep/Legionella antigen, follow-up on results  Sputum culture, follow-up on results  Blood cultures, follow-up on results  Speech pathology evaluation  Tessalon Perles 3 times daily  Robitussin 3 times daily  Aspiration precautions  Will provide dental soft dysphagia diet until speech pathology evaluation

## 2023-01-20 NOTE — PLAN OF CARE
Problem: OCCUPATIONAL THERAPY ADULT  Goal: Performs self-care activities at highest level of function for planned discharge setting  See evaluation for individualized goals  Description: Treatment Interventions: ADL retraining, Functional transfer training, Endurance training, Patient/family training, Cognitive reorientation, Compensatory technique education, Activityengagement, Equipment evaluation/education          See flowsheet documentation for full assessment, interventions and recommendations  Note: Limitation: Decreased ADL status, Decreased UE strength, Decreased Safe judgement during ADL, Decreased cognition, Decreased endurance, Decreased self-care trans, Decreased high-level ADLs  Prognosis: Good  Assessment: Pt is a 79 y o  female seen for OT evaluation s/p admission to [address] [place] [address] on 1/19/2023 due to generalized weakness  Pt diagnosed with Aspiration pneumonia ([facility] [address] )  Pt has a significant PMH impacting occupational performance including: ambulatory dysfunction, sciatica, asthma, HTN, hx cognitive impairment and CVA  Pt with no recent admissions in the last 2 months  PTA pt living at [facility], pt requires (A) with ADLs and IADLs, (-)falls, (-)drives, use of RW at baseline  Pt is motivated to return to home  Personal and environmental factors supporting pt at time of IE include social support and accessible home environment  Personal and environmental factors inhibiting engagement in occupations include advanced age, difficulty completing ADLs and difficulty completing IADLs  During evaluation pt performed as is outlined above in flowsheet  Pt required VC for safety, VC for attention to task and encouragement for (I) with tasks  Standardized assessments used to assist in identifying performance deficits include AMPAC 6-Clicks and Barthel ADL Index   Performance deficits that affect the pt’s occupational performance during the initial evaluation include impaired balance, functional mobility, endurance, activity tolerance, functional standing tolerance and overall strength, attention to task, communication and social skills, safety awareness, insight into deficits, problem solving and learning/remembering new tasks  Based on pt’s functional performance and deficits the following occupations will be addressed in OT treatments in order to maximize pt’s independence and overall occupational performance: grooming, bathing/showering, toileting and toilet hygiene, dressing and functional mobility  Goals are listed below  Upon discharge from acute care setting recommend d/c to Return to facility with therapy services vs PAR pending the level of support facility is able to provide  This evaluation required an extensive review of medical and/or therapy records and additional review of physical, cognitive and psychosocial history related to functional performance  Based upon functional performance deficits and assessments, this evaluation has been identified as a high complexity evaluation       OT Discharge Recommendation: Return to facility with rehabilitation services (vs PAR pending on level of support facility able to provide)

## 2023-01-20 NOTE — ASSESSMENT & PLAN NOTE
Patient presenting with elevated alkaline phosphatase level of 141  Denying any abdominal pain  No abdominal pain to palpation    Plan:  AM CMP  Consider right upper quadrant ultrasound

## 2023-01-20 NOTE — ED ATTENDING ATTESTATION
1/19/2023  Caty BARKER MD, saw and evaluated the patient  I have discussed the patient with the resident/non-physician practitioner and agree with the resident's/non-physician practitioner's findings, Plan of Care, and MDM as documented in the resident's/non-physician practitioner's note, except where noted  All available labs and Radiology studies were reviewed  I was present for key portions of any procedure(s) performed by the resident/non-physician practitioner and I was immediately available to provide assistance  At this point I agree with the current assessment done in the Emergency Department  I have conducted an independent evaluation of this patient a history and physical is as follows: Patient is a 79year old female with about 1 week of worsening generalized weakness, cough and occasional sob  No fever  No chest pain  Diffuse myalgias  (+) low back pain  No trauma  Patient has cognitive impairment  Patient unable to provide complete hx  Was last seen in this ED on 10/23/22 for pelvic fx  [facility] website checked on this patient and last Rx filled was on 12/24/22 for oxycodone for 5 day supply  NCAT  (+) diffuse rhonci  Heart regular without murmur  Abdomen soft and nontender  Good bowel sounds  No edema  No rash  (+) paravertebral lumbar tenderness  Confused  No gross focal deficits  DDx including but not limited to: metabolic abnormality, dehydration, viral illness, anemia, thyroid disease, intracranial process, other infectious process including UTI; doubt Gu[place] syndrome or myasthenia gravis or botulism or multiple sclerosis  DDX including but not limited to: pneumonia, pleural effusion, CHF, PE, PTX, ACS, MI, asthma exacerbation, COPD exacerbation, COVID 19, EVALI, anemia, metabolic abnormality, renal failure  Nasal cannula oxygen given with improvement in pulse ox to 96-97% on nasal cannula  Will check labs, CXR and EKG  Patient will need admission       ED Course Critical Care Time  Procedures

## 2023-01-20 NOTE — PLAN OF CARE
Problem: PHYSICAL THERAPY ADULT  Goal: Performs mobility at highest level of function for planned discharge setting  See evaluation for individualized goals  Description: Treatment/Interventions: Functional transfer training, LE strengthening/ROM, Therapeutic exercise, Endurance training, Cognitive reorientation, Patient/family training, Equipment eval/education, Bed mobility, Gait training  Equipment Recommended: Slim Mejía       See flowsheet documentation for full assessment, interventions and recommendations  1/20/2023 1514 by Jarvis President, PT  Note: Prognosis: Fair  Problem List: Decreased strength, Decreased endurance, Impaired balance, Decreased mobility, Decreased cognition, Impaired judgement, Decreased safety awareness, Impaired hearing  Assessment: Alina Garcia is a 79 y o  Female who [facility]/19[facility]23 due to generalized weakness and diagnosis of aspiration pneumonia  Orders for PT eval and treat received, w/ activity orders of up w/ assist w/ contact and droplet precautions  Comorbidities affecting pt at time of evaluation include: sciatica, HTN, asthma, CVA, anxiety, TIA, neurocognitive disorder  Personal factors affecting DC include: ambulating w/ assistive device, inability to ambulate household distances, inability to navigate level surfaces w/o external assistance, decreased cognition, positive fall history, inability to perform IADLs and inability to perform ADLs  At baseline, pt mobilizes w/ RW, and w/ at least 1 fall(s) in the previous 6 months  Upon evaluation, pt presents w/ the following deficits: weakness, impaired balance, decreased endurance and gait deviations  Pt currently requires mod Ax1 for transfers and mod Ax1 w/ RW for ambulation   Pt's clinical presentation is unstable/unpredictable due to need for assist w/ all phases of mobility when usually mobilizing independently, need for supplemental oxygen in order to maintain oxygen saturation, need for input for mobility technique/safety, recent drastic decline in mobility compared to baseline and recent history of falls  Pt is at an increased risk of falls due to impaired cognition, impaired balance, decreased safety awareness, and generalized weakness  From a PT/mobility standpoint, given the above findings, DC recommendation is: Post-acute inpatient rehabilitation vs return to facility w/ skilled PT services pending level of assistance facility is able to provide  During current admission, pt will benefit from continued skilled inpatient PT in the acute care setting in order to address the above deficits and to maximize function and mobility prior to DC from acute care  PT Discharge Recommendation: Post acute rehabilitation services (vs return to facility ([facility]) w/ skilled PT services)    See flowsheet documentation for full assessment

## 2023-01-20 NOTE — SEPSIS NOTE
Sepsis Note   Dusty Hoover 79 y o  female MRN: [id number]  Unit/Bed#: ED-26 Encounter: [id number]       qSOFA     Row Name 01/20/23 0215 01/19/23 2236 01/19/23 2234          Altered mental status GCS < 15 -- 1 --      Respiratory Rate > / =22 0 -- 0      Systolic BP < / =939 0 -- 0      Q Sofa Score 1 1 0                 Initial Sepsis Screening     Row Name 01/20/23 0324                Is the patient's history suggestive of a new or worsening infection? Yes (Proceed)  -PG        Suspected source of infection pneumonia  -PG        Are two or more of the following signs & symptoms of infection both present and new to the patient? No  -PG        Indicate SIRS criteria Leukocytosis (WBC > 53602 IJL)  -PG        If the answer is yes to both questions, suspicion of sepsis is present --        If severe sepsis is present AND tissue hypoperfusion perists in the hour after fluid resuscitation or lactate > 4, the patient meets criteria for SEPTIC SHOCK --        Are any of the following organ dysfunction criteria present within 6 hours of suspected infection and SIRS criteria that are NOT considered to be chronic conditions?  --        Organ dysfunction --        Date of presentation of severe sepsis --        Time of presentation of severe sepsis --        Tissue hypoperfusion persists in the hour after crystalloid fluid administration, evidenced, by either: --        Was hypotension present within one hour of the conclusion of crystalloid fluid administration? --        Date of presentation of septic shock --        Time of presentation of septic shock --              User Key  (r) = Recorded By, (t) = Taken By, (c) = Cosigned By    [address] Name Provider Type    PG Christoper Boeck, DO Resident

## 2023-01-20 NOTE — ED PROVIDER NOTES
History  Chief Complaint   Patient presents with   • Weakness - Generalized     Pt with generalized weakness, cough, feeling unwell for about a week     65-year-old female with PMH of cognitive impairment, CVA, breast cancer, anxiety, presents today after 1 week of generalized weakness and cough  Patient is a resident at [facility] and states her symptoms have been progressively worsening over the last couple days  She also endorses shortness of breath and low back pain  Patient denies chest pain, fever  Prior to Admission Medications   Prescriptions Last Dose Informant Patient Reported? Taking?    Diclofenac Sodium (VOLTAREN) 1 % Unknown  No No   Sig: Apply 2 g topically 4 (four) times a day = To right hip and pelvis   Sodium Phosphates (FLEET ENEMA RE) Unknown  Yes No   Sig: Insert 1 Bottle into the rectum daily as needed   acetaminophen (TYLENOL) 325 mg tablet Unknown  Yes No   Sig: Take 975 mg by mouth every 8 (eight) hours as needed   albuterol (PROVENTIL HFA,VENTOLIN HFA) 90 mcg/act inhaler Unknown  Yes No   Sig: Inhale 2 puffs every 6 (six) hours as needed for wheezing   amLODIPine (NORVASC) 2 5 mg tablet Unknown  No No   Sig: Take 1 tablet (2 5 mg total) by mouth 2 (two) times a day   bisacodyl (DULCOLAX) 10 mg suppository Unknown  Yes No   Sig: Insert 10 mg into the rectum daily as needed   bisacodyl (DULCOLAX) 5 mg EC tablet Unknown  Yes No   Sig: Take 5 mg by mouth daily as needed for constipation   cholecalciferol (VITAMIN D3) 1,000 units tablet Unknown  No No   Sig: Take 1 tablet (1,000 Units total) by mouth daily   fluticasone (FLONASE) 50 mcg/act nasal spray Unknown  No No   Si spray into each nostril daily   gabapentin (NEURONTIN) 100 mg capsule Unknown  No No   Sig: Take 1 capsule (100 mg total) by mouth daily at bedtime   hydrocortisone 1 % ointment Unknown  Yes No   Sig: Apply 1 application topically 3 (three) times a day as needed = to dry itchy areas   lidocaine (LMX) 4 % cream Unknown  Yes No   Sig: Apply 1 application topically 2 (two) times a day as needed = to LUE and hands   magnesium hydroxide (MILK OF MAGNESIA) 400 mg/5 mL oral suspension Unknown  Yes No   Sig: Take 30 mL by mouth 2 (two) times a day as needed   melatonin 3 mg Unknown  No No   Sig: Take 1 tablet (3 mg total) by mouth daily at bedtime   multivitamin (THERAGRAN) TABS Unknown  No No   Sig: Take 1 tablet by mouth daily   oxyCODONE (OXY-IR) 5 MG capsule Unknown  Yes No   Sig: Take 2 5 mg by mouth every 6 (six) hours as needed for moderate pain   senna-docusate sodium (SENOKOT S) 8 6-50 mg per tablet Unknown  No No   Sig: Take 1 tablet by mouth 2 (two) times a day      Facility-Administered Medications: None       Past Medical History:   Diagnosis Date   • Allergic    • Anxiety    • Arthritis    • Cancer (HCC)    • HLD (hyperlipidemia)    • Hypertension    • Hyperthyroidism    • Osteoporosis    • Stroke ([facility], [place] [address] )    • TIA (transient ischemic attack)        Past Surgical History:   Procedure Laterality Date   • BREAST LUMPECTOMY Left    • HYSTERECTOMY     • KNEE SURGERY Right    • ORIF TIBIA FRACTURE Right 12/8/2016    Procedure: OPEN REDUCTION W/ INTERNAL FIXATION (ORIF) TIBIA;  Surgeon: Francisco Briones MD;  Location: [facility];  Service:        Family History   Problem Relation Age of Onset   • Coronary artery disease Family    • Other Family         DJD     I have reviewed and agree with the history as documented      E-Cigarette/Vaping   • E-Cigarette Use Never User      E-Cigarette/Vaping Substances   • Nicotine No    • THC No    • CBD No    • Flavoring No    • Other No    • Unknown No      Social History     Tobacco Use   • Smoking status: Never   • Smokeless tobacco: Never   • Tobacco comments:     Former smoker - As per [employer] Use   • Vaping Use: Never used   Substance Use Topics   • Alcohol use: Not Currently     Comment: Alcohol intake:   Occasional  - As per [employer]    • Drug use: Never Review of Systems   Constitutional: Negative for chills and fever  HENT: Negative for congestion, rhinorrhea and sneezing  Eyes: Negative for pain and visual disturbance  Respiratory: Positive for cough and shortness of breath  Cardiovascular: Negative for chest pain and palpitations  Gastrointestinal: Negative for abdominal pain, constipation, diarrhea, nausea and vomiting  Genitourinary: Negative for dysuria and hematuria  Musculoskeletal: Positive for myalgias (diffuse, chronic)  Negative for arthralgias and back pain  Skin: Negative for color change and rash  Neurological: Positive for weakness  Negative for syncope, numbness and headaches  All other systems reviewed and are negative  Physical Exam  ED Triage Vitals   Temperature Pulse Respirations Blood Pressure SpO2   01/19/23 2234 01/19/23 2234 01/19/23 2234 01/19/23 2234 01/19/23 2234   99 1 °F (37 3 °C) 85 18 170/79 92 %      Temp Source Heart Rate Source Patient Position - Orthostatic VS BP Location FiO2 (%)   01/19/23 2234 01/20/23 0215 -- -- --   Oral Monitor         Pain Score       01/19/23 2234       No Pain             Orthostatic Vital Signs  Vitals:    01/19/23 2234 01/20/23 0215   BP: 170/79 149/77   Pulse: 85 82       Physical Exam  Vitals and nursing note reviewed  Constitutional:       General: She is in acute distress  Appearance: She is ill-appearing  HENT:      Head: Normocephalic and atraumatic  Right Ear: External ear normal       Left Ear: External ear normal       Nose: Congestion and rhinorrhea present  Mouth/Throat:      Mouth: Mucous membranes are moist       Pharynx: Oropharynx is clear  Eyes:      General: No scleral icterus  Extraocular Movements: Extraocular movements intact  Pupils: Pupils are equal, round, and reactive to light  Cardiovascular:      Rate and Rhythm: Normal rate and regular rhythm  Pulses: Normal pulses  Heart sounds: Normal heart sounds  Pulmonary:      Effort: Pulmonary effort is normal       Breath sounds: Rhonchi (diffuse) present  Abdominal:      Palpations: Abdomen is soft  Tenderness: There is no abdominal tenderness  Musculoskeletal:      Right lower leg: No edema  Left lower leg: No edema  Lymphadenopathy:      Cervical: No cervical adenopathy  Skin:     General: Skin is warm and dry  Neurological:      General: No focal deficit present  Mental Status: She is alert and oriented to person, place, and time  Mental status is at baseline  Motor: Weakness present     Psychiatric:         Mood and Affect: Mood normal          Behavior: Behavior normal          ED Medications  Medications   oseltamivir (TAMIFLU) oral suspension 30 mg (30 mg Oral Given 1/20/23 0208)   levofloxacin (LEVAQUIN) IVPB (premix in dextrose) 750 mg 150 mL (750 mg Intravenous New Bag 1/20/23 0326)   sodium chloride 0 9 % infusion (has no administration in time range)   acetaminophen (TYLENOL) tablet 650 mg (has no administration in time range)   ondansetron (ZOFRAN) injection 4 mg (has no administration in time range)   enoxaparin (LOVENOX) subcutaneous injection 40 mg (has no administration in time range)   albuterol (PROVENTIL HFA,VENTOLIN HFA) inhaler 2 puff (has no administration in time range)   amLODIPine (NORVASC) tablet 2 5 mg (has no administration in time range)   Diclofenac Sodium (VOLTAREN) 1 % topical gel 2 g (has no administration in time range)   fluticasone (FLONASE) 50 mcg/act nasal spray 1 spray (has no administration in time range)   gabapentin (NEURONTIN) capsule 100 mg (has no administration in time range)   melatonin tablet 3 mg (has no administration in time range)   iohexol (OMNIPAQUE) 350 MG/ML injection (SINGLE-DOSE) 60 mL (60 mL Intravenous Given 1/20/23 0141)   clindamycin (CLEOCIN) IVPB (premix in dextrose) 600 mg 50 mL (0 mg Intravenous Stopped 1/20/23 0325)       Diagnostic Studies  Results Reviewed     Procedure Component Value Units Date/Time    UA w Reflex to Microscopic w Reflex to Culture [[id number]] Collected: 01/20/23 0416    Lab Status: No result Specimen: Urine, Other     Comprehensive metabolic panel [[id number]]     Lab Status: No result Specimen: Blood     Magnesium [[id number]]     Lab Status: No result Specimen: Blood     CBC (With Platelets) [[id number]]     Lab Status: No result Specimen: Blood     MRSA culture [[id number]]     Lab Status: No result Specimen: Nares from Nose     HS Troponin I 2hr [[id number]]  (Normal) Collected: 01/20/23 0105    Lab Status: Final result Specimen: Blood from Arm, Right Updated: 01/20/23 0141     hs TnI 2hr 5 ng/L      Delta 2hr hsTnI -2 ng/L     B-Type Natriuretic Peptide(BNP) [[id number]]  (Abnormal) Collected: 01/19/23 2247    Lab Status: Final result Specimen: Blood from Arm, Left Updated: 01/20/23 0035      pg/mL     TSH, 3rd generation with Free T4 reflex [[id number]]  (Normal) Collected: 01/19/23 2247    Lab Status: Final result Specimen: Blood from Arm, Left Updated: 01/20/23 0034     TSH 3RD GENERATON 2 718 uIU/mL     Narrative:      Patients undergoing fluorescein dye angiography may retain small amounts of fluorescein in the body for 48-72 hours post procedure  Samples containing fluorescein can produce falsely depressed TSH values  If the patient had this procedure,a specimen should be resubmitted post fluorescein clearance  Procalcitonin [[id number]]  (Abnormal) Collected: 01/19/23 2247    Lab Status: Final result Specimen: Blood from Arm, Left Updated: 01/20/23 0034     Procalcitonin 1 14 ng/ml     D-dimer, quantitative [[id number]]  (Abnormal) Collected: 01/19/23 2247    Lab Status: Final result Specimen: Blood from Arm, Left Updated: 01/20/23 0019     D-Dimer, Quant 11 02 ug/ml FEU     Narrative:       In the evaluation for possible pulmonary embolism, in the appropriate (Well's Score of 4 or less) patient, the age adjusted d-dimer cutoff for this patient can be calculated as:    Age x 0 01 (in ug/mL) for Age-adjusted D-dimer exclusion threshold for a patient over 50 years  Salazar York [[id number]]  (Normal) Collected: 01/19/23 2247    Lab Status: Final result Specimen: Blood from Arm, Left Updated: 01/20/23 0009     Protime 12 7 seconds      INR 0 93    APTT [[id number]]  (Normal) Collected: 01/19/23 2247    Lab Status: Final result Specimen: Blood from Arm, Left Updated: 01/20/23 0009     PTT 28 seconds     FLU/RSV/COVID - if FLU/RSV clinically relevant [[id number]]  (Abnormal) Collected: 01/19/23 2316    Lab Status: Final result Specimen: Nares from Nose Updated: 01/20/23 0003     SARS-CoV-2 Negative     INFLUENZA A PCR Positive     INFLUENZA B PCR Negative     RSV PCR Negative    Narrative:      FOR PEDIATRIC PATIENTS - copy/paste COVID Guidelines URL to browser: [email or web address]    SARS-CoV-2 assay is a Nucleic Acid Amplification assay intended for the  qualitative detection of nucleic acid from SARS-CoV-2 in nasopharyngeal  swabs  Results are for the presumptive identification of SARS-CoV-2 RNA  Positive results are indicative of infection with SARS-CoV-2, the virus  causing COVID-19, but do not rule out bacterial infection or co-infection  with other viruses  Laboratories within the [place] and its  territories are required to report all positive results to the appropriate  public health authorities  Negative results do not preclude SARS-CoV-2  infection and should not be used as the sole basis for treatment or other  patient management decisions  Negative results must be combined with  clinical observations, patient history, and epidemiological information  This test has not been FDA cleared or approved  This test has been authorized by FDA under an Emergency Use Authorization  (EUA)   This test is only authorized for the duration of time the  declaration that circumstances exist justifying the authorization of the  emergency use of an in vitro diagnostic tests for detection of SARS-CoV-2  virus and/or diagnosis of COVID-19 infection under section 564(b)(1) of  the Act, 21 U  S C  611LBP-3(F)(3), unless the authorization is terminated  or revoked sooner  The test has been validated but independent review by FDA  and CLIA is pending  Test performed using [employer] GeneXpert: This RT-PCR assay targets N2,  a region unique to SARS-CoV-2  A conserved region in the E-gene was chosen  for pan-Sarbecovirus detection which includes SARS-CoV-2  According to CMS-2020-01-R, this platform meets the definition of high-throughput technology  Lactic acid [[id number]]  (Normal) Collected: 01/19/23 2316    Lab Status: Final result Specimen: Blood from Arm, Left Updated: 01/19/23 2347     LACTIC ACID 1 5 mmol/L     Narrative:      Result may be elevated if tourniquet was used during collection  Manual Differential(PHLEBS Do Not Order) [[id number]]  (Abnormal) Collected: 01/19/23 2247    Lab Status: Final result Specimen: Blood from Arm, Left Updated: 01/19/23 2333     Segmented % 95 %      Lymphocytes % 2 %      Monocytes % 3 %      Eosinophils, % 0 %      Basophils % 0 %      Absolute Neutrophils 12 11 Thousand/uL      Lymphocytes Absolute 0 26 Thousand/uL      Monocytes Absolute 0 38 Thousand/uL      Eosinophils Absolute 0 00 Thousand/uL      Basophils Absolute 0 00 Thousand/uL      Total Counted --     RBC Morphology Normal     Platelet Estimate Adequate    CBC and differential [[id number]]  (Abnormal) Collected: 01/19/23 2247    Lab Status: Final result Specimen: Blood from Arm, Left Updated: 01/19/23 2333     WBC 12 75 Thousand/uL      RBC 4 03 Million/uL      Hemoglobin 11 9 g/dL      Hematocrit 37 8 %      MCV 94 fL      MCH 29 5 pg      MCHC 31 5 g/dL      RDW 14 3 %      MPV 10 2 fL      Platelets 557 Thousands/uL     Narrative: This is an appended report    These results have been appended to a previously verified report  Blood culture #1 [[id number]] Collected: 01/19/23 2329    Lab Status: In process Specimen: Blood from Arm, Right Updated: 01/19/23 2332    HS Troponin 0hr (reflex protocol) [[id number]]  (Normal) Collected: 01/19/23 2247    Lab Status: Final result Specimen: Blood from Arm, Left Updated: 01/19/23 2325     hs TnI 0hr 7 ng/L     Blood culture #2 [[id number]] Collected: 01/19/23 2316    Lab Status: In process Specimen: Blood from Arm, Left Updated: 01/19/23 2321    Comprehensive metabolic panel [[id number]]  (Abnormal) Collected: 01/19/23 2247    Lab Status: Final result Specimen: Blood from Arm, Left Updated: 01/19/23 2313     Sodium 133 mmol/L      Potassium 4 3 mmol/L      Chloride 98 mmol/L      CO2 24 mmol/L      ANION GAP 11 mmol/L      BUN 10 mg/dL      Creatinine 0 72 mg/dL      Glucose 128 mg/dL      Calcium 8 8 mg/dL      AST 23 U/L      ALT 16 U/L      Alkaline Phosphatase 141 U/L      Total Protein 7 3 g/dL      Albumin 3 7 g/dL      Total Bilirubin 0 51 mg/dL      eGFR 77 ml/min/1 73sq m     Narrative:      [place] guidelines for Chronic Kidney Disease (CKD):   •  Stage 1 with normal or high GFR (GFR > 90 mL/min/1 73 square meters)  •  Stage 2 Mild CKD (GFR = 60-89 mL/min/1 73 square meters)  •  Stage 3A Moderate CKD (GFR = 45-59 mL/min/1 73 square meters)  •  Stage 3B Moderate CKD (GFR = 30-44 mL/min/1 73 square meters)  •  Stage 4 Severe CKD (GFR = 15-29 mL/min/1 73 square meters)  •  Stage 5 End Stage CKD (GFR <15 mL/min/1 73 square meters)  Note: GFR calculation is accurate only with a steady state creatinine                 CTA ED chest PE Study   Final Result by Maine Joseph MD (01/20 7937)      Consolidative airspace disease at the bilateral lower lobes consistent with pneumonia likely secondary to aspiration                     Workstation performed: [id number]         XR lumbar spine 2 or 3 views   ED Interpretation by Sunshine Hou,  (01/20 0417)   Severe osteopenia, scoliosis noted  Large amount of intestinal gas  No acute bony abnormalities visualized  XR chest 1 view   ED Interpretation by Antoinette Barajas DO (01/20 0413)   Rotated view, widened mediastinum  Consolidation left lower lobe  Procedures  ECG 12 Lead Documentation Only    Date/Time: 1/20/2023 1:37 AM  Performed by: Antoinette Barajas DO  Authorized by: Antoinette Barajas DO     Indications / Diagnosis:  Weakness  ECG reviewed by me, the ED Provider: yes    Patient location:  ED  Previous ECG:     Previous ECG:  Compared to current    Similarity:  No change    Comparison to cardiac monitor: Yes    Interpretation:     Interpretation: abnormal    Rate:     ECG rate:  86    ECG rate assessment: normal    Rhythm:     Rhythm: sinus rhythm    Ectopy:     Ectopy: none    QRS:     QRS axis:  Normal    QRS intervals:  Normal  Conduction:     Conduction: normal    ST segments:     ST segments:  Normal  T waves:     T waves: non-specific            ED Course  ED Course as of 01/20/23 0417   Thu Jan 19, 2023   2315 WBC(!): 12 75   2315 Sodium(!): 133   2327 hs TnI 0hr: 7   2355 LACTIC ACID: 1 5   2357 D-Dimer, Quant(!): 11 02   Fri Jan 20, 2023   0023 INFLU A PCR(!): Positive   0110 BNP(!): 190   0110 Procalcitonin(!): 1 14   0110 TSH 3RD GENERATON: 2 718   0110 INFLU A PCR(!): Positive   0210 Delta 2hr hsTnI: -2   0210 CTA ED chest PE Study  Consolidative airspace disease at the bilateral lower lobes consistent with pneumonia likely secondary to aspiration                HEART Risk Score    Flowsheet Row Most Recent Value   Heart Score Risk Calculator    History 0 Filed at: 01/20/2023 0410   ECG 0 Filed at: 01/20/2023 0410   Age 2 Filed at: 01/20/2023 0410   Risk Factors 2 Filed at: 01/20/2023 0410   Troponin 0 Filed at: 01/20/2023 0410   HEART Score 4 Filed at: 01/20/2023 0410                   Initial Sepsis Screening     Row Name 01/20/23 5360 Is the patient's history suggestive of a new or worsening infection? Yes (Proceed)  -PG        Suspected source of infection pneumonia  -PG        Are two or more of the following signs & symptoms of infection both present and new to the patient? No  -PG        Indicate SIRS criteria Leukocytosis (WBC > 80234 IJL)  -PG        If the answer is yes to both questions, suspicion of sepsis is present --        If severe sepsis is present AND tissue hypoperfusion perists in the hour after fluid resuscitation or lactate > 4, the patient meets criteria for SEPTIC SHOCK --        Are any of the following organ dysfunction criteria present within 6 hours of suspected infection and SIRS criteria that are NOT considered to be chronic conditions?  --        Organ dysfunction --        Date of presentation of severe sepsis --        Time of presentation of severe sepsis --        Tissue hypoperfusion persists in the hour after crystalloid fluid administration, evidenced, by either: --        Was hypotension present within one hour of the conclusion of crystalloid fluid administration? --        Date of presentation of septic shock --        Time of presentation of septic shock --              User Key  (r) = Recorded By, (t) = Taken By, (c) = Cosigned By    [address] Name Provider Type    PG Andrey Eisenberg, DO Resident                    Nina Ruiz Criteria for PE    Flowsheet Row Most Recent Value   [facility] Criteria for PE    Clinical signs and symptoms of DVT 0 Filed at: 01/20/2023 0410   PE is primary diagnosis or equally likely 0 Filed at: 01/20/2023 0410   HR >100 0 Filed at: 01/20/2023 0410   Immobilization at least 3 days or Surgery in the previous 4 weeks 1 5 Filed at: 01/20/2023 0410   Previous, objectively diagnosed PE or DVT 0 Filed at: 01/20/2023 0410   Hemoptysis 0 Filed at: 01/20/2023 0410   Malignancy with treatment within 6 months or palliative 0 Filed at: 01/20/2023 0410   [facility] Criteria Total 1 5 Filed at: 01/20/2023 0410            Medical Decision Making  Patient presented from nursing home due to 1 week of weakness and cough  Upon arrival to the ED patient looks tired and weak, and actively had a wet cough  Lungs diffuse rhonchi  Upon further testing, patient was found to have influenza A   CT scan was performed due to an elevated D-dimer and was no PEs were found, however consolidated airspace disease suspicious for pneumonia was shown, likely secondary to aspiration  Patient started on Levaquin and clindamycin due to penicillin allergy  Tamiflu was given as well  Patient will be admitted to [facility] for further evaluation and treatment  Aspiration pneumonia ([facility] [address] ): self-limited or minor problem  Cough: complicated acute illness or injury  Influenza A: complicated acute illness or injury  Weakness: complicated acute illness or injury  Amount and/or Complexity of Data Reviewed  Labs: ordered  Decision-making details documented in ED Course  Radiology: ordered  Decision-making details documented in ED Course  Risk  Prescription drug management  Decision regarding hospitalization  Disposition  Final diagnoses:   Aspiration pneumonia (HCC)   Influenza A   Weakness   Cough     Time reflects when diagnosis was documented in both MDM as applicable and the Disposition within this note     Time User Action Codes Description Comment    1/20/2023  3:18 AM Early Haste Add [J69 0] Aspiration pneumonia ([facility] [address] )     1/20/2023  3:18 AM Ede Mckeon F Add [J10 1] Influenza A     1/20/2023  3:18 AM Early Haste Add [R53 1] Weakness     1/20/2023  3:18 AM Early Haste Add [R05 9] Cough       ED Disposition     ED Disposition   Admit    Condition   Stable    Date/Time   Fri Jan 20, 2023  3:18 AM    Comment   Case was discussed with AALIYAH and the patient's admission status was agreed to be Admission Status: inpatient status to the service of Dr Leann Hernandez              Follow-up Information None         Patient's Medications   Discharge Prescriptions    No medications on file     No discharge procedures on file  PDMP Review       Value Time User    PDMP Reviewed  Yes 1/19/2023 10:51 PM Jay Floyd MD           ED Provider  Attending physically available and evaluated Nelly Angel  LUCERO managed the patient along with the ED Attending      Electronically Signed by         Mathew Alcala, DO  01/20/23 [address], DO  01/20/23 3232

## 2023-01-20 NOTE — ASSESSMENT & PLAN NOTE
Patient has a history of hypertension  Elevated blood pressure on admission 170/79    Plan:  Amlodipine 2 5 mg daily, home medication

## 2023-01-21 PROBLEM — J18.9 PNEUMONIA: Status: ACTIVE | Noted: 2023-01-20

## 2023-01-21 LAB
ANION GAP SERPL CALCULATED.3IONS-SCNC: 7 MMOL/L (ref 4–13)
BASOPHILS # BLD AUTO: 0.01 THOUSANDS/ÂΜL (ref 0–0.1)
BASOPHILS NFR BLD AUTO: 0 % (ref 0–1)
BUN SERPL-MCNC: 12 MG/DL (ref 5–25)
CALCIUM SERPL-MCNC: 8.1 MG/DL (ref 8.4–10.2)
CHLORIDE SERPL-SCNC: 99 MMOL/L (ref 96–108)
CO2 SERPL-SCNC: 25 MMOL/L (ref 21–32)
CREAT SERPL-MCNC: 0.75 MG/DL (ref 0.6–1.3)
EOSINOPHIL # BLD AUTO: 0 THOUSAND/ÂΜL (ref 0–0.61)
EOSINOPHIL NFR BLD AUTO: 0 % (ref 0–6)
ERYTHROCYTE [DISTWIDTH] IN BLOOD BY AUTOMATED COUNT: 14.5 % (ref 11.6–15.1)
GFR SERPL CREATININE-BSD FRML MDRD: 73 ML/MIN/1.73SQ M
GLUCOSE SERPL-MCNC: 98 MG/DL (ref 65–140)
HCT VFR BLD AUTO: 31 % (ref 34.8–46.1)
HGB BLD-MCNC: 9.8 G/DL (ref 11.5–15.4)
IMM GRANULOCYTES # BLD AUTO: 0.04 THOUSAND/UL (ref 0–0.2)
IMM GRANULOCYTES NFR BLD AUTO: 1 % (ref 0–2)
LYMPHOCYTES # BLD AUTO: 1.11 THOUSANDS/ÂΜL (ref 0.6–4.47)
LYMPHOCYTES NFR BLD AUTO: 13 % (ref 14–44)
MCH RBC QN AUTO: 29.2 PG (ref 26.8–34.3)
MCHC RBC AUTO-ENTMCNC: 31.6 G/DL (ref 31.4–37.4)
MCV RBC AUTO: 92 FL (ref 82–98)
MONOCYTES # BLD AUTO: 0.48 THOUSAND/ÂΜL (ref 0.17–1.22)
MONOCYTES NFR BLD AUTO: 6 % (ref 4–12)
MRSA NOSE QL CULT: NORMAL
NEUTROPHILS # BLD AUTO: 6.71 THOUSANDS/ÂΜL (ref 1.85–7.62)
NEUTS SEG NFR BLD AUTO: 80 % (ref 43–75)
NRBC BLD AUTO-RTO: 0 /100 WBCS
PLATELET # BLD AUTO: 221 THOUSANDS/UL (ref 149–390)
PMV BLD AUTO: 10.4 FL (ref 8.9–12.7)
POTASSIUM SERPL-SCNC: 3.8 MMOL/L (ref 3.5–5.3)
PROCALCITONIN SERPL-MCNC: 2.01 NG/ML
RBC # BLD AUTO: 3.36 MILLION/UL (ref 3.81–5.12)
SODIUM SERPL-SCNC: 131 MMOL/L (ref 135–147)
WBC # BLD AUTO: 8.35 THOUSAND/UL (ref 4.31–10.16)

## 2023-01-21 RX ADMIN — OSELTAMIVIR PHOSPHATE 30 MG: 6 FOR SUSPENSION ORAL at 21:17

## 2023-01-21 RX ADMIN — LIDOCAINE 5% 2 PATCH: 700 PATCH TOPICAL at 08:14

## 2023-01-21 RX ADMIN — Medication 3 MG: at 21:17

## 2023-01-21 RX ADMIN — AMLODIPINE BESYLATE 2.5 MG: 2.5 TABLET ORAL at 08:14

## 2023-01-21 RX ADMIN — GUAIFENESIN AND DEXTROMETHORPHAN 10 ML: 100; 10 SYRUP ORAL at 16:02

## 2023-01-21 RX ADMIN — AMLODIPINE BESYLATE 2.5 MG: 2.5 TABLET ORAL at 21:17

## 2023-01-21 RX ADMIN — DICLOFENAC SODIUM TOPICAL GEL, 1%, 2 G: 10 GEL TOPICAL at 18:01

## 2023-01-21 RX ADMIN — FLUTICASONE PROPIONATE 1 SPRAY: 50 SPRAY, METERED NASAL at 08:14

## 2023-01-21 RX ADMIN — DICLOFENAC SODIUM TOPICAL GEL, 1%, 2 G: 10 GEL TOPICAL at 21:17

## 2023-01-21 RX ADMIN — GABAPENTIN 100 MG: 100 CAPSULE ORAL at 21:17

## 2023-01-21 RX ADMIN — ENOXAPARIN SODIUM 40 MG: 40 INJECTION SUBCUTANEOUS at 08:14

## 2023-01-21 RX ADMIN — BENZONATATE 100 MG: 100 CAPSULE ORAL at 16:02

## 2023-01-21 RX ADMIN — BENZONATATE 100 MG: 100 CAPSULE ORAL at 08:14

## 2023-01-21 RX ADMIN — GUAIFENESIN AND DEXTROMETHORPHAN 10 ML: 100; 10 SYRUP ORAL at 21:17

## 2023-01-21 RX ADMIN — OSELTAMIVIR PHOSPHATE 30 MG: 6 FOR SUSPENSION ORAL at 08:14

## 2023-01-21 RX ADMIN — DICLOFENAC SODIUM TOPICAL GEL, 1%, 2 G: 10 GEL TOPICAL at 08:14

## 2023-01-21 RX ADMIN — GUAIFENESIN AND DEXTROMETHORPHAN 10 ML: 100; 10 SYRUP ORAL at 08:14

## 2023-01-21 RX ADMIN — DICLOFENAC SODIUM TOPICAL GEL, 1%, 2 G: 10 GEL TOPICAL at 11:48

## 2023-01-21 RX ADMIN — CEFTRIAXONE SODIUM 1000 MG: 10 INJECTION, POWDER, FOR SOLUTION INTRAVENOUS at 11:48

## 2023-01-21 RX ADMIN — BENZONATATE 100 MG: 100 CAPSULE ORAL at 21:17

## 2023-01-21 NOTE — PLAN OF CARE
Problem: Prexisting or High Potential for Compromised Skin Integrity  Goal: Skin integrity is maintained or improved  Description: INTERVENTIONS:  - Identify patients at risk for skin breakdown  - Assess and monitor skin integrity  - Assess and monitor nutrition and hydration status  - Monitor labs   - Assess for incontinence   - Turn and reposition patient  - Assist with mobility/ambulation  - Relieve pressure over bony prominences  - Avoid friction and shearing  - Provide appropriate hygiene as needed including keeping skin clean and dry  - Evaluate need for skin moisturizer/barrier cream  - Collaborate with interdisciplinary team   - Patient/family teaching  - Consider wound care consult   Outcome: Progressing     Problem: MOBILITY - ADULT  Goal: Maintain or return to baseline ADL function  Description: INTERVENTIONS:  -  Assess patient's ability to carry out ADLs; assess patient's baseline for ADL function and identify physical deficits which impact ability to perform ADLs (bathing, care of mouth/teeth, toileting, grooming, dressing, etc )  - Assess/evaluate cause of self-care deficits   - Assess range of motion  - Assess patient's mobility; develop plan if impaired  - Assess patient's need for assistive devices and provide as appropriate  - Encourage maximum independence but intervene and supervise when necessary  - Involve family in performance of ADLs  - Assess for home care needs following discharge   - Consider OT consult to assist with ADL evaluation and planning for discharge  - Provide patient education as appropriate  Outcome: Progressing  Goal: Maintains/Returns to pre admission functional level  Description: INTERVENTIONS:  - Perform BMAT or MOVE assessment daily    - Set and communicate daily mobility goal to care team and patient/family/caregiver  - Collaborate with rehabilitation services on mobility goals if consulted  - Perform Range of Motion  times a day    - Reposition patient every hours   - Dangle patient  times a day  - Stand patient  times a day  - Ambulate patient  times a day  - Out of bed to chair  times a day   - Out of bed for ayesha times a day  - Out of bed for toileting  - Record patient progress and toleration of activity level   Outcome: Progressing     Problem: Nutrition/Hydration-ADULT  Goal: Nutrient/Hydration intake appropriate for improving, restoring or maintaining nutritional needs  Description: Monitor and assess patient's nutrition/hydration status for malnutrition  Collaborate with interdisciplinary team and initiate plan and interventions as ordered  Monitor patient's weight and dietary intake as ordered or per policy  Utilize nutrition screening tool and intervene as necessary  Determine patient's food preferences and provide high-protein, high-caloric foods as appropriate       INTERVENTIONS:  - Monitor oral intake, urinary output, labs, and treatment plans  - Assess nutrition and hydration status and recommend course of action  - Evaluate amount of meals eaten  - Assist patient with eating if necessary   - Allow adequate time for meals  - Recommend/ encourage appropriate diets, oral nutritional supplements, and vitamin/mineral supplements  - Order, calculate, and assess calorie counts as needed  - Recommend, monitor, and adjust tube feedings and TPN/PPN based on assessed needs  - Assess need for intravenous fluids  - Provide specific nutrition/hydration education as appropriate  - Include patient/family/caregiver in decisions related to nutrition  Outcome: Progressing

## 2023-01-21 NOTE — CASE MANAGEMENT
Case Management Assessment & Discharge Planning Note    Patient name Tay HALE /W -02 MRN [id number]  : 1940 Date 2023       Current Admission Date: 2023  Current Admission Diagnosis:Aspiration pneumonia [facility])   Patient Active Problem List    Diagnosis Date Noted   • Aspiration pneumonia ([facility] ) 2023   • Influenza A 2023   • Elevated alkaline phosphatase level 2023   • Asthma 2023   • QT prolongation 2023   • Adjustment disorder with anxious mood 2022   • History of stroke 2022   • Ambulatory dysfunction 2022   • Pruritus 2022   • Urinary frequency 10/31/2022   • Closed fracture of ramus of right pubis (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 ) 2020   • Personal history of transient ischemic attack (TIA), and cerebral infarction without residual deficits 2020   • Personal history of breast cancer 2020   • History of falling 2020   • Age-related osteoporosis without current pathological fracture 2020   • Constipation, unspecified 2020   • Anxiety    • Hypertension 11/10/2020   • Stroke [facility])    • Mild cognitive impairment    • Vitamin D deficiency 10/22/2020   • Closed fracture of nasal bone 10/21/2020   • Age-related osteoporosis with current pathological fracture 10/21/2020   • Fall 10/19/2020   • Traumatic hematoma of forehead 10/19/2020   • Closed fracture of proximal end of right humerus 10/19/2020   • Antibiotic-associated diarrhea 06/10/2020   • B12 deficiency 07/25/2018   • Abnormal MRI 10/17/2017   • Major neurocognitive disorder, due to vascular disease, without behavioral disturbance, mild 06/30/2017   • HLD (hyperlipidemia)    • Knee injury 11/29/2016      LOS (days): 1  Geometric Mean LOS (GMLOS) (days): 3 60  Days to GMLOS:2 2     OBJECTIVE:    Risk of Unplanned Readmission Score: 17 83         Current admission status: Inpatient       Preferred Pharmacy:   [id number] [facility] [place] [address] [place] [address] [id number]  Phone: [phone] Fax: [phone]    Primary Care Provider: VALDEMAR Allen    Primary Insurance: MEDICARE  Secondary Insurance: BLUE CROSS    ASSESSMENT:  [address], [address] [facility], Legal Guardian   Primary Phone: [phone] (Mobile)                                                                DISCHARGE DETAILS:    Discharge planning discussed with[de-identified] Pt's KELLY Burch  Freedom of Choice: Yes  Comments - Freedom of Choice: CM called Jesse Burch who is the pt's current POA  Jesse Burch relayed to CM the pt was previously at [facility] for rehab and used her twenty days, she is unsure when the pt discharged from [facility] or if the pt had her supplemental insurance at that time  If the pt is able to return to [facility], where she has resided for the past month, this is where the family would prefer for her to go if there are no Medicare days available for the pt  The pt is currently in the process of selling her home with Patricia's help to assist her with her living expenses    CM contacted family/caregiver?: Yes  Were Treatment Team discharge recommendations reviewed with patient/caregiver?: Yes  Did patient/caregiver verbalize understanding of patient care needs?: Yes  Were patient/caregiver advised of the risks associated with not following Treatment Team discharge recommendations?: Yes    Contacts  Patient Contacts: Daniela Ferrer  Relationship to Patient[de-identified] Family (Della Baker dad was the pt's first cousin)  Contact Method: Phone  Phone Number: [phone]  Reason/Outcome: Discharge Planning, Emergency Contact, Referral, Continuity of Care              Other Referral/Resources/Interventions Provided:  Interventions: Short Term Rehab, Facility Return  Referral Comments: CM placed a referral for [facility] to enquire the pt's Medicare days and if the pt's secondary insurance is accepted at their facility  CM called [facility] who confirmed Daniela Ferrer was the pt's POA and not Susu Gabriel  The pt was previously at [facility] after a fall and has lived at the same residence her whole life, a family home which Daniela Ferrer is in the process of selling for her to continue her stay at [facility]           Treatment Team Recommendation: Short Term Rehab

## 2023-01-22 PROBLEM — D64.9 ANEMIA: Status: ACTIVE | Noted: 2023-01-22

## 2023-01-22 LAB
ANION GAP SERPL CALCULATED.3IONS-SCNC: 5 MMOL/L (ref 4–13)
ATRIAL RATE: 73 BPM
BASOPHILS # BLD AUTO: 0.02 THOUSANDS/ÂΜL (ref 0–0.1)
BASOPHILS NFR BLD AUTO: 0 % (ref 0–1)
BUN SERPL-MCNC: 12 MG/DL (ref 5–25)
CALCIUM SERPL-MCNC: 8.1 MG/DL (ref 8.4–10.2)
CHLORIDE SERPL-SCNC: 102 MMOL/L (ref 96–108)
CO2 SERPL-SCNC: 27 MMOL/L (ref 21–32)
CREAT SERPL-MCNC: 0.75 MG/DL (ref 0.6–1.3)
EOSINOPHIL # BLD AUTO: 0.02 THOUSAND/ÂΜL (ref 0–0.61)
EOSINOPHIL NFR BLD AUTO: 0 % (ref 0–6)
ERYTHROCYTE [DISTWIDTH] IN BLOOD BY AUTOMATED COUNT: 14.5 % (ref 11.6–15.1)
FERRITIN SERPL-MCNC: 877 NG/ML (ref 8–388)
GFR SERPL CREATININE-BSD FRML MDRD: 73 ML/MIN/1.73SQ M
GLUCOSE SERPL-MCNC: 96 MG/DL (ref 65–140)
HCT VFR BLD AUTO: 31 % (ref 34.8–46.1)
HGB BLD-MCNC: 9.8 G/DL (ref 11.5–15.4)
IMM GRANULOCYTES # BLD AUTO: 0.08 THOUSAND/UL (ref 0–0.2)
IMM GRANULOCYTES NFR BLD AUTO: 1 % (ref 0–2)
IRON SATN MFR SERPL: 18 % (ref 15–50)
IRON SERPL-MCNC: 34 UG/DL (ref 50–170)
LYMPHOCYTES # BLD AUTO: 0.99 THOUSANDS/ÂΜL (ref 0.6–4.47)
LYMPHOCYTES NFR BLD AUTO: 16 % (ref 14–44)
MCH RBC QN AUTO: 29.4 PG (ref 26.8–34.3)
MCHC RBC AUTO-ENTMCNC: 31.6 G/DL (ref 31.4–37.4)
MCV RBC AUTO: 93 FL (ref 82–98)
MONOCYTES # BLD AUTO: 0.35 THOUSAND/ÂΜL (ref 0.17–1.22)
MONOCYTES NFR BLD AUTO: 6 % (ref 4–12)
NEUTROPHILS # BLD AUTO: 4.64 THOUSANDS/ÂΜL (ref 1.85–7.62)
NEUTS SEG NFR BLD AUTO: 77 % (ref 43–75)
NRBC BLD AUTO-RTO: 0 /100 WBCS
P AXIS: 0 DEGREES
PLATELET # BLD AUTO: 236 THOUSANDS/UL (ref 149–390)
PMV BLD AUTO: 10.6 FL (ref 8.9–12.7)
POTASSIUM SERPL-SCNC: 3.8 MMOL/L (ref 3.5–5.3)
PR INTERVAL: 130 MS
QRS AXIS: 7 DEGREES
QRSD INTERVAL: 78 MS
QT INTERVAL: 438 MS
QTC INTERVAL: 482 MS
RBC # BLD AUTO: 3.33 MILLION/UL (ref 3.81–5.12)
SODIUM SERPL-SCNC: 134 MMOL/L (ref 135–147)
T WAVE AXIS: 34 DEGREES
TIBC SERPL-MCNC: 186 UG/DL (ref 250–450)
VENTRICULAR RATE: 73 BPM
WBC # BLD AUTO: 6.1 THOUSAND/UL (ref 4.31–10.16)

## 2023-01-22 RX ORDER — POLYETHYLENE GLYCOL 3350 17 G/17G
17 POWDER, FOR SOLUTION ORAL ONCE
Status: COMPLETED | OUTPATIENT
Start: 2023-01-22 | End: 2023-01-22

## 2023-01-22 RX ORDER — POLYETHYLENE GLYCOL 3350 17 G/17G
17 POWDER, FOR SOLUTION ORAL ONCE
Status: CANCELLED | OUTPATIENT
Start: 2023-01-22

## 2023-01-22 RX ADMIN — DICLOFENAC SODIUM TOPICAL GEL, 1%, 2 G: 10 GEL TOPICAL at 17:06

## 2023-01-22 RX ADMIN — OSELTAMIVIR PHOSPHATE 30 MG: 6 FOR SUSPENSION ORAL at 22:13

## 2023-01-22 RX ADMIN — GUAIFENESIN AND DEXTROMETHORPHAN 10 ML: 100; 10 SYRUP ORAL at 15:19

## 2023-01-22 RX ADMIN — BENZONATATE 100 MG: 100 CAPSULE ORAL at 08:03

## 2023-01-22 RX ADMIN — FLUTICASONE PROPIONATE 1 SPRAY: 50 SPRAY, METERED NASAL at 08:07

## 2023-01-22 RX ADMIN — GUAIFENESIN AND DEXTROMETHORPHAN 10 ML: 100; 10 SYRUP ORAL at 22:13

## 2023-01-22 RX ADMIN — Medication 3 MG: at 22:13

## 2023-01-22 RX ADMIN — LIDOCAINE 5% 2 PATCH: 700 PATCH TOPICAL at 08:03

## 2023-01-22 RX ADMIN — GABAPENTIN 100 MG: 100 CAPSULE ORAL at 22:13

## 2023-01-22 RX ADMIN — POLYETHYLENE GLYCOL 3350 17 G: 17 POWDER, FOR SOLUTION ORAL at 15:19

## 2023-01-22 RX ADMIN — BENZONATATE 100 MG: 100 CAPSULE ORAL at 15:19

## 2023-01-22 RX ADMIN — DICLOFENAC SODIUM TOPICAL GEL, 1%, 2 G: 10 GEL TOPICAL at 08:07

## 2023-01-22 RX ADMIN — DICLOFENAC SODIUM TOPICAL GEL, 1%, 2 G: 10 GEL TOPICAL at 13:23

## 2023-01-22 RX ADMIN — ENOXAPARIN SODIUM 40 MG: 40 INJECTION SUBCUTANEOUS at 08:03

## 2023-01-22 RX ADMIN — AMLODIPINE BESYLATE 2.5 MG: 2.5 TABLET ORAL at 08:03

## 2023-01-22 RX ADMIN — ACETAMINOPHEN 650 MG: 325 TABLET, FILM COATED ORAL at 09:41

## 2023-01-22 RX ADMIN — BENZONATATE 100 MG: 100 CAPSULE ORAL at 22:13

## 2023-01-22 RX ADMIN — DICLOFENAC SODIUM TOPICAL GEL, 1%, 2 G: 10 GEL TOPICAL at 22:13

## 2023-01-22 RX ADMIN — AMLODIPINE BESYLATE 2.5 MG: 2.5 TABLET ORAL at 22:13

## 2023-01-22 RX ADMIN — OSELTAMIVIR PHOSPHATE 30 MG: 6 FOR SUSPENSION ORAL at 09:13

## 2023-01-22 RX ADMIN — CEFTRIAXONE SODIUM 1000 MG: 10 INJECTION, POWDER, FOR SOLUTION INTRAVENOUS at 11:24

## 2023-01-22 RX ADMIN — GUAIFENESIN AND DEXTROMETHORPHAN 10 ML: 100; 10 SYRUP ORAL at 08:03

## 2023-01-22 NOTE — ASSESSMENT & PLAN NOTE
Patient presenting with elevated alkaline phosphatase level of 141  Denying any abdominal pain  No abdominal pain to palpation    Plan:  · Monitor clinically and CMP as needed given stable and asymptomatic

## 2023-01-22 NOTE — ASSESSMENT & PLAN NOTE
Patient has a history of hypertension  Stable       Plan:  · Amlodipine 2 5 mg daily, home medication

## 2023-01-22 NOTE — ASSESSMENT & PLAN NOTE
Patient has a history of hypertension  BP stable       Plan:  · Amlodipine 2 5 mg daily, home medication

## 2023-01-22 NOTE — DISCHARGE INSTR - AVS FIRST PAGE
Dear Alana Arnold,     It was our pleasure to care for you here at [facility]  It is our hope that we were always able to exceed the expected standards for your care during your stay  You were hospitalized due to cough and weakness  You were cared for on the W 4th floor by Haylie Suthelrand DO under the service of Delfina Shabazz MD with the [facility] who covers for your primary care physician (PCP), VALDEMAR Kelly, while you were hospitalized  If you have any questions or concerns related to this hospitalization, you may contact us at [phone]  For follow up as well as any medication refills, we recommend that you follow up with your primary care physician  A registered nurse will reach out to you by phone within a few days after your discharge to answer any additional questions that you may have after going home  However, at this time we provide for you here, the most important instructions / recommendations at discharge:     Notable Medication Adjustments -   Continue taking Robitussin and/or Tessalon perles as needed for up to 1 week or symptom resolution  Begin taking Ferrous sulfate 324 mg daily and follow up with PCP  Testing Required after Discharge -   None  Important follow up information -   Please follow-up with PCP within 1 week of discharge  Other Instructions -   None  Please review this entire after visit summary as additional general instructions including medication list, appointments, activity, diet, any pertinent wound care, and other additional recommendations from your care team that may be provided for you        Sincerely,     Haylie Sutherland DO

## 2023-01-22 NOTE — ASSESSMENT & PLAN NOTE
Patient has a history of lumbar back pain secondary to sciatica  Presenting with worsening low back pain    Plan:  · Voltaren gel for hips and lumbar back  · Gabapentin 100 mg daily, home medication  · Lidocaine patch  · Aqua K pad  · Oxycodone 2 5 mg every 6 hours, home medication  · PT/OT eval and treat - recommended post-acute rehab vs return to [facility] w/skilled PT services, pending rehab placement

## 2023-01-22 NOTE — PLAN OF CARE
Problem: Prexisting or High Potential for Compromised Skin Integrity  Goal: Skin integrity is maintained or improved  Description: INTERVENTIONS:  - Identify patients at risk for skin breakdown  - Assess and monitor skin integrity  - Assess and monitor nutrition and hydration status  - Monitor labs   - Assess for incontinence   - Turn and reposition patient  - Assist with mobility/ambulation  - Relieve pressure over bony prominences  - Avoid friction and shearing  - Provide appropriate hygiene as needed including keeping skin clean and dry  - Evaluate need for skin moisturizer/barrier cream  - Collaborate with interdisciplinary team   - Patient/family teaching  - Consider wound care consult   Outcome: Progressing     Problem: MOBILITY - ADULT  Goal: Maintain or return to baseline ADL function  Description: INTERVENTIONS:  -  Assess patient's ability to carry out ADLs; assess patient's baseline for ADL function and identify physical deficits which impact ability to perform ADLs (bathing, care of mouth/teeth, toileting, grooming, dressing, etc )  - Assess/evaluate cause of self-care deficits   - Assess range of motion  - Assess patient's mobility; develop plan if impaired  - Assess patient's need for assistive devices and provide as appropriate  - Encourage maximum independence but intervene and supervise when necessary  - Involve family in performance of ADLs  - Assess for home care needs following discharge   - Consider OT consult to assist with ADL evaluation and planning for discharge  - Provide patient education as appropriate  Outcome: Progressing  Goal: Maintains/Returns to pre admission functional level  Description: INTERVENTIONS:  - Perform BMAT or MOVE assessment daily    - Set and communicate daily mobility goal to care team and patient/family/caregiver  - Collaborate with rehabilitation services on mobility goals if consulted  - Perform Range of Motion  times a day    - Reposition patient every hours   - Dangle patient  times a day  - Stand patient  times a day  - Ambulate patient  times a day  - Out of bed to chair  times a day   - Out of bed for me times a day  - Out of bed for toileting  - Record patient progress and toleration of activity level   Outcome: Progressing     Problem: Nutrition/Hydration-ADULT  Goal: Nutrient/Hydration intake appropriate for improving, restoring or maintaining nutritional needs  Description: Monitor and assess patient's nutrition/hydration status for malnutrition  Collaborate with interdisciplinary team and initiate plan and interventions as ordered  Monitor patient's weight and dietary intake as ordered or per policy  Utilize nutrition screening tool and intervene as necessary  Determine patient's food preferences and provide high-protein, high-caloric foods as appropriate       INTERVENTIONS:  - Monitor oral intake, urinary output, labs, and treatment plans  - Assess nutrition and hydration status and recommend course of action  - Evaluate amount of meals eaten  - Assist patient with eating if necessary   - Allow adequate time for meals  - Recommend/ encourage appropriate diets, oral nutritional supplements, and vitamin/mineral supplements  - Order, calculate, and assess calorie counts as needed  - Recommend, monitor, and adjust tube feedings and TPN/PPN based on assessed needs  - Assess need for intravenous fluids  - Provide specific nutrition/hydration education as appropriate  - Include patient/family/caregiver in decisions related to nutrition  Outcome: Progressing

## 2023-01-22 NOTE — ASSESSMENT & PLAN NOTE
Patient presenting with ambulatory dysfunction  Patient admits she is unsteady on her feet and uses a walker to ambulate  Long-term resident at [facility]:  · PT/OT evaluation and treat - recommended post-acute rehab vs return to [facility] w/skilled PT services  · Fall precautions  · Out of bed with assistance  · Consider calcium supplements

## 2023-01-22 NOTE — PROGRESS NOTES
[facility]   PROGRESS NOTE- Bethany Carter, 1940, 79 y o  female MRN: [id number]   Unit/Bed#: W /W -01 Encounter: [id number]   Primary Care Physician: Viktor Diehl   Date and Time Admitted to Hospital: 1/19/2023 10:32 PM     Assessment/Plan:   * Pneumonia  Assessment & Plan  Patient presenting with 1 week history of cough and weakness from [facility]  History of intellectual disability  Vitals on admission: no hypoxia, no fever, hypertensive 170/79  Elevated Pro-Vlad 1 14  Elevated   White blood cell leukocytosis 12 75  Influenza A+  Elevated D-dimer 11 02  Lactate within normal limits  CTA PE negative for PE, however demonstrating bilateral consolidative airspace disease in the bilateral lower lobes, suspicious for aspiration pneumonia  Patient denies any history of dysphagia or recent choking episodes, however patient is a poor historian  Given 1 dose of Levaquin and clindamycin in the ED  · Suspect community-acquired pneumonia likely due to Influenza A +/- superimposed bacterial infection  Urine Legionella and blood negative  Blood cultures no growth to date  · WBC now within normal limits and overall improved respiratory status  However, Procal trending up      Plan:  · Discontinue Levaquin given prolonged QTC and patient has recently received Keflex x7 days for UTI in the outpatient setting and confirmed with patient no adverse reaction noted -start empiric IV ceftriaxone, total antibiotic treatment day 2/5   · Follow culture results: Blood, MRSA, and sputum  · Tylenol as needed for fever and mild pain  · Tessalon Perles 3 times daily  · Robitussin 3 times daily  · Aspiration precautions - dental soft dysphagia diet until speech pathology evaluation    Influenza A  Assessment & Plan  Patient tested positive for influenza A in the emergency department  1 week history of cough and weakness  Bilateral consolidative airspace disease in the lower lobes, concerning for aspiration pneumonia  Given 1 dose of Tamiflu in the ED    Plan:  · Continue oseltamivir 30 mg every 12 hours day 2/5  · Tylenol as needed  · Monitor vitals  · Tessalon Perles 3 times daily  · Robitussin 3 times daily    QT prolongation  Assessment & Plan  POA: Patient presenting with prolonged QTc interval of 490 ms with repeat ECG showing stable QTC  Plan:  · Discontinue the Levaquin and started on ceftriaxone  · Avoid other QTC prolonging medications    Asthma  Assessment & Plan  Patient has a history of asthma    Plan:  Albuterol inhaler, home medication  Fluticasone inhaler, home medication    Elevated alkaline phosphatase level  Assessment & Plan  Patient presenting with elevated alkaline phosphatase level of 141  Denying any abdominal pain  No abdominal pain to palpation    Plan:  · Monitor clinically and CMP as needed given stable and asymptomatic     Ambulatory dysfunction  Assessment & Plan  Patient presenting with ambulatory dysfunction  Patient admits she is unsteady on her feet and uses a walker to ambulate  Long-term resident at [facility]:  · PT/OT evaluation and treat  · Fall precautions  · Out of bed with assistance    Sciatica  Assessment & Plan  Patient has a history of lumbar back pain secondary to sciatica  Presenting with worsening low back pain    Plan:  · Voltaren gel for hips and lumbar back  · Gabapentin 100 mg daily, home medication  · Lidocaine patch  · Aqua K pad  · Oxycodone 2 5 mg every 6 hours, home medication  · PT/OT eval and treat    Hypertension  Assessment & Plan  Patient has a history of hypertension  BP stable  Plan:  · Amlodipine 2 5 mg daily, home medication       VTE Pharmacologic Prophylaxis: VTE Score: 4 High Risk (Score >/= 5) - Pharmacological DVT Prophylaxis Ordered: enoxaparin (Lovenox)  Sequential Compression Devices Ordered  Patient Centered Rounds:  Performed bedside rounds with nursing staff     Discussions with Specialists or Other Care Team Provider: None  Education and Discussions with Family / Patient: Attempted to update  (cousin) via phone  Unable to contact  Current Length of Stay: 1 day(s)  Current Patient Status: Inpatient   Discharge Plan: Anticipate discharge in 24-48 hrs to discharge location to be determined pending rehab evaluations  Code Status: Level 3 - DNAR and DNI    Subjective:   Patient was seen at bedside this a m  in no acute distress  Patient reports feeling much better today and improved shortness of breath  Denies fever, chills, dizziness/lightheadedness, headache, N/V, chest pain, palpitations, diarrhea, abdominal pain, or urinary symptoms  Patient remains hemodynamically stable with no significant events overnight and has no new concerns at this time  Objective:     Vitals:   Temp (24hrs), Av °F (36 7 °C), Min:97 8 °F (36 6 °C), Max:98 2 °F (36 8 °C)    Temp:  [97 8 °F (36 6 °C)-98 2 °F (36 8 °C)] 98 2 °F (36 8 °C)  HR:  [65-74] 73  Resp:  [16-19] 16  BP: (113-141)/(62-70) 122/64  SpO2:  [94 %-95 %] 95 %  Body mass index is 18 25 kg/m²  Input and Output Summary (last 24 hours): Intake/Output Summary (Last 24 hours) at 20234  Last data filed at 2023 1940  Gross per 24 hour   Intake 848 ml   Output --   Net 848 ml       Physical Exam  Vitals reviewed  Constitutional:       General: She is not in acute distress  Appearance: She is underweight  She is not ill-appearing or toxic-appearing  HENT:      Head: Normocephalic and atraumatic  Nose: Nose normal       Mouth/Throat:      Mouth: Mucous membranes are moist       Pharynx: Oropharynx is clear  Eyes:      Extraocular Movements: Extraocular movements intact  Pupils: Pupils are equal, round, and reactive to light  Cardiovascular:      Rate and Rhythm: Normal rate and regular rhythm  Pulses: Normal pulses  Heart sounds: Normal heart sounds  No murmur heard      No gallop  Pulmonary:      Effort: Pulmonary effort is normal  No respiratory distress  Breath sounds: Decreased breath sounds present  No wheezing, rhonchi or rales  Abdominal:      General: Bowel sounds are normal  There is no distension  Palpations: Abdomen is soft  Tenderness: There is no abdominal tenderness  Musculoskeletal:         General: Normal range of motion  Cervical back: Normal range of motion and neck supple  Right lower leg: No edema  Left lower leg: No edema  Skin:     General: Skin is warm and dry  Findings: No lesion or rash  Neurological:      General: No focal deficit present  Mental Status: She is alert and oriented to person, place, and time  Mental status is at baseline     Psychiatric:         Mood and Affect: Mood normal          Behavior: Behavior normal           Additional Data:     Labs:  Results from last 7 days   Lab Units 01/21/23  0237   WBC Thousand/uL 8 35   HEMOGLOBIN g/dL 9 8*   HEMATOCRIT % 31 0*   PLATELETS Thousands/uL 221   NEUTROS PCT % 80*   LYMPHS PCT % 13*   MONOS PCT % 6   EOS PCT % 0     Results from last 7 days   Lab Units 01/21/23  0237 01/20/23  0630   SODIUM mmol/L 131* 131*   POTASSIUM mmol/L 3 8 3 8   CHLORIDE mmol/L 99 99   CO2 mmol/L 25 23   BUN mg/dL 12 9   CREATININE mg/dL 0 75 0 60   ANION GAP mmol/L 7 9   CALCIUM mg/dL 8 1* 8 0*   ALBUMIN g/dL  --  3 2*   TOTAL BILIRUBIN mg/dL  --  0 44   ALK PHOS U/L  --  114*   ALT U/L  --  12   AST U/L  --  18   GLUCOSE RANDOM mg/dL 98 93     Results from last 7 days   Lab Units 01/19/23  2247   INR  0 93             Results from last 7 days   Lab Units 01/21/23  0237 01/19/23  2316 01/19/23  2247   LACTIC ACID mmol/L  --  1 5  --    PROCALCITONIN ng/ml 2 01*  --  1 14*       Lines/Drains:  Invasive Devices     Peripheral Intravenous Line  Duration           Peripheral IV 01/19/23 Left;Ventral (anterior) Forearm 1 day          Drain  Duration           External Urinary Catheter 90 days                    Imaging: Reviewed pertinent radiology reports from this admission  Recent Cultures (last 7 days):   Results from last 7 days   Lab Units 01/20/23  1245 01/19/23  2329 01/19/23  2316   BLOOD CULTURE   --  No Growth at 24 hrs  No Growth at 24 hrs  LEGIONELLA URINARY ANTIGEN  Negative  --   --        Last 24 Hours Medication List:   Current Facility-Administered Medications   Medication Dose Route Frequency Provider Last Rate   • acetaminophen  650 mg Oral Q6H PRN Ashley Rea MD     • albuterol  2 puff Inhalation Q6H PRN Ashley Rea MD     • amLODIPine  2 5 mg Oral BID Ashley Rea MD     • benzonatate  100 mg Oral TID Ashley Rea MD     • cefTRIAXone  1,000 mg Intravenous Q24H Stacey Martel MD Stopped (01/21/23 1233)   • dextromethorphan-guaiFENesin  10 mL Oral TID Ashley Rea MD     • Diclofenac Sodium  2 g Topical 4x Daily Ashley Rea MD     • enoxaparin  40 mg Subcutaneous Daily Ashley Rea MD     • fluticasone  1 spray Nasal Daily Ashley Rea MD     • gabapentin  100 mg Oral HS Ashley Rea MD     • lidocaine  2 patch Topical Daily Ashley Rea MD     • melatonin  3 mg Oral HS Ashley Rea MD     • oseltamivir  30 mg Oral Q12H [address] Stacey Martel MD     • oxyCODONE  2 5 mg Oral Q6H PRN Ashley Rea MD     • trimethobenzamide  200 mg Intramuscular Q6H PRN Ashley Rea MD          Today, Patient Was Seen By: Stacey Martel MD    **Please Note: This note may have been constructed using a voice recognition system  **

## 2023-01-22 NOTE — ASSESSMENT & PLAN NOTE
Patient has a history of lumbar back pain secondary to sciatica  Presenting with worsening low back pain    Plan:  · Voltaren gel for hips and lumbar back  · Gabapentin 100 mg daily, home medication  · Lidocaine patch  · Aqua K pad  · Oxycodone 2 5 mg every 6 hours, home medication  · PT/OT eval and treat - recommended post-acute rehab vs return to [facility] w/skilled PT services

## 2023-01-22 NOTE — PLAN OF CARE
Problem: Prexisting or High Potential for Compromised Skin Integrity  Goal: Skin integrity is maintained or improved  Description: INTERVENTIONS:  - Identify patients at risk for skin breakdown  - Assess and monitor skin integrity  - Assess and monitor nutrition and hydration status  - Monitor labs   - Assess for incontinence   - Turn and reposition patient  - Assist with mobility/ambulation  - Relieve pressure over bony prominences  - Avoid friction and shearing  - Provide appropriate hygiene as needed including keeping skin clean and dry  - Evaluate need for skin moisturizer/barrier cream  - Collaborate with interdisciplinary team   - Patient/family teaching  - Consider wound care consult   Outcome: Progressing     Problem: MOBILITY - ADULT  Goal: Maintain or return to baseline ADL function  Description: INTERVENTIONS:  -  Assess patient's ability to carry out ADLs; assess patient's baseline for ADL function and identify physical deficits which impact ability to perform ADLs (bathing, care of mouth/teeth, toileting, grooming, dressing, etc )  - Assess/evaluate cause of self-care deficits   - Assess range of motion  - Assess patient's mobility; develop plan if impaired  - Assess patient's need for assistive devices and provide as appropriate  - Encourage maximum independence but intervene and supervise when necessary  - Involve family in performance of ADLs  - Assess for home care needs following discharge   - Consider OT consult to assist with ADL evaluation and planning for discharge  - Provide patient education as appropriate  Outcome: Progressing     Problem: MOBILITY - ADULT  Goal: Maintains/Returns to pre admission functional level  Description: INTERVENTIONS:  - Perform BMAT or MOVE assessment daily    - Set and communicate daily mobility goal to care team and patient/family/caregiver  - Collaborate with rehabilitation services on mobility goals if consulted  - Perform Range of Motion 3 times a day    - Reposition patient every 3 hours  - Dangle patient 3 times a day  - Stand patient 3 times a day  - Ambulate patient 3 times a day  - Out of bed to chair 3 times a day   - Out of bed for meals 3 times a day  - Out of bed for toileting  - Record patient progress and toleration of activity level   Outcome: Progressing     Problem: Nutrition/Hydration-ADULT  Goal: Nutrient/Hydration intake appropriate for improving, restoring or maintaining nutritional needs  Description: Monitor and assess patient's nutrition/hydration status for malnutrition  Collaborate with interdisciplinary team and initiate plan and interventions as ordered  Monitor patient's weight and dietary intake as ordered or per policy  Utilize nutrition screening tool and intervene as necessary  Determine patient's food preferences and provide high-protein, high-caloric foods as appropriate       INTERVENTIONS:  - Monitor oral intake, urinary output, labs, and treatment plans  - Assess nutrition and hydration status and recommend course of action  - Evaluate amount of meals eaten  - Assist patient with eating if necessary   - Allow adequate time for meals  - Recommend/ encourage appropriate diets, oral nutritional supplements, and vitamin/mineral supplements  - Order, calculate, and assess calorie counts as needed  - Recommend, monitor, and adjust tube feedings and TPN/PPN based on assessed needs  - Assess need for intravenous fluids  - Provide specific nutrition/hydration education as appropriate  - Include patient/family/caregiver in decisions related to nutrition  Outcome: Progressing

## 2023-01-22 NOTE — PROGRESS NOTES
[facility]  Progress Note - Jodene Cockayne 1940, 79 y o  female MRN: [id number]  Unit/Bed#: W -01 Encounter: [id number]  Primary Care Provider: VALDEMAR Mosqueda   Date and time admitted to hospital: 1/19/2023 10:32 PM    * Pneumonia  Assessment & Plan  Patient presenting with 1 week history of cough and weakness from [facility]  History of intellectual disability  Vitals on admission: no hypoxia, no fever, hypertensive 170/79  Elevated Pro-Vlad 1 14  Elevated   White blood cell leukocytosis 12 75  Influenza A+  Elevated D-dimer 11 02  Lactate within normal limits  CTA PE negative for PE, however demonstrating bilateral consolidative airspace disease in the bilateral lower lobes, suspicious for aspiration pneumonia  Patient denies any history of dysphagia or recent choking episodes, however patient is a poor historian  Given 1 dose of Levaquin and clindamycin in the ED  · Suspect community-acquired pneumonia likely due to Influenza A +/- superimposed bacterial infection  Urine Legionella and blood negative  Blood cultures no growth to date  · WBC now within normal limits and overall improved respiratory status  However, Procal was trending up    · Patient reports improvement with current Abx regimen - cough with phlegm present but improving  Plan:  · Continue empiric IV ceftriaxone  · Discontinued Levaquin given prolonged QTC and patient has recently received Keflex x7 days for UTI in the outpatient setting and confirmed with patient no adverse reaction noted -start empiric IV ceftriaxone  · total antibiotic treatment day 3/5   · Follow culture results: Blood, MRSA, and sputum  · F/u Procalc in AM  · Tylenol as needed for fever and mild pain  · Tessalon Perles 3 times daily  · Robitussin 3 times daily  · Aspiration precautions - dental soft dysphagia diet until speech pathology evaluation  · Wean O2 as tolerated    Influenza A  Assessment & Plan  Patient tested positive for influenza A in the emergency department  1 week history of cough and weakness  Bilateral consolidative airspace disease in the lower lobes, concerning for aspiration pneumonia  Given 1 dose of Tamiflu in the ED    Plan:  · Continue oseltamivir 30 mg every 12 hours for total of 10 doses  · Tylenol as needed  · Monitor vitals  · Tessalon Perles 3 times daily  · Robitussin 3 times daily  · Wean O2 as tolerated     Ambulatory dysfunction  Assessment & Plan  Patient presenting with ambulatory dysfunction  Patient admits she is unsteady on her feet and uses a walker to ambulate  Long-term resident at [facility]:  · PT/OT evaluation and treat - recommended post-acute rehab vs return to [facility] w/skilled PT services  · Fall precautions  · Out of bed with assistance  · Consider calcium supplements    Anemia  Assessment & Plan  Hemoglobin   Date Value Ref Range Status   01/22/2023 9 8 (L) 11 5 - 15 4 g/dL Final      Patient concentrated on presentation  Plan  - Transfuse < 7 0  - CBC in AM    QT prolongation  Assessment & Plan  POA: Patient presenting with prolonged QTc interval of 490 ms with repeat ECG showing stable QTC      Plan:  · Discontinued the Levaquin and started on ceftriaxone  · Avoid other QTC prolonging medications    Asthma  Assessment & Plan  Patient has a history of asthma    Plan:  Albuterol inhaler, home medication  Fluticasone inhaler, home medication    Elevated alkaline phosphatase level  Assessment & Plan  Patient presenting with elevated alkaline phosphatase level of 141  Denying any abdominal pain  No abdominal pain to palpation    Plan:  · Monitor clinically and CMP as needed given stable and asymptomatic     Sciatica  Assessment & Plan  Patient has a history of lumbar back pain secondary to sciatica  Presenting with worsening low back pain    Plan:  · Voltaren gel for hips and lumbar back  · Gabapentin 100 mg daily, home medication  · Lidocaine patch  · Aqua K pad  · Oxycodone 2 5 mg every 6 hours, home medication  · PT/OT eval and treat - recommended post-acute rehab vs return to [facility] w/skilled PT services    Hypertension  Assessment & Plan  Patient has a history of hypertension  BP stable  Plan:  · Amlodipine 2 5 mg daily, home medication      VTE Pharmacologic Prophylaxis: VTE Score: 4 Moderate Risk (Score 3-4) - Pharmacological DVT Prophylaxis Ordered: enoxaparin (Lovenox)  Patient Centered Rounds: I performed bedside rounds with nursing staff today  Discussions with Specialists or Other Care Team Provider: None    Education and Discussions with Family / Patient: Updated  (POA/cousin Janette Bergnce) at bedside  Current Length of Stay: 2 day(s)  Current Patient Status: Inpatient   Discharge Plan: Anticipate discharge in 24-48 hrs to D    Code Status: Level 3 - DNAR and DNI    Subjective:   Patient seen and evaluated at bedside, in no acute distress  No acute events overnight  Patient reports she is feeling overall better but does have some cough with mucus still  Patient denies seeing blood in urine or phlegm  Per nursing, no blood in sputum or urine noted as well  Nurse also stating patient completing about 25% meals but is receiving ensure as well  No bowel movement since admission per patient  Reports a decreased appetite  Patient denies f/c, cp, sob, n/v/d/c  Objective:     Vitals:   Temp (24hrs), Av 8 °F (36 6 °C), Min:97 3 °F (36 3 °C), Max:98 2 °F (36 8 °C)    Temp:  [97 3 °F (36 3 °C)-98 2 °F (36 8 °C)] 97 3 °F (36 3 °C)  HR:  [65-78] 78  Resp:  [16] 16  BP: (113-122)/(64-68) 120/64  SpO2:  [91 %-95 %] 93 %  Body mass index is 18 25 kg/m²  Input and Output Summary (last 24 hours): Intake/Output Summary (Last 24 hours) at 2023 1236  Last data filed at 2023 0900  Gross per 24 hour   Intake 488 ml   Output 200 ml   Net 288 ml       Physical Exam:   Physical Exam  Vitals reviewed     Constitutional: General: She is not in acute distress  Appearance: She is underweight  She is not ill-appearing or toxic-appearing  HENT:      Head: Normocephalic and atraumatic  Nose: Nose normal       Mouth/Throat:      Mouth: Mucous membranes are moist       Pharynx: Oropharynx is clear  Eyes:      Extraocular Movements: Extraocular movements intact  Pupils: Pupils are equal, round, and reactive to light  Cardiovascular:      Rate and Rhythm: Normal rate and regular rhythm  Pulses: Normal pulses  Heart sounds: Normal heart sounds  No murmur heard  No gallop  Pulmonary:      Effort: Pulmonary effort is normal  No respiratory distress  Breath sounds: Decreased breath sounds present  No wheezing, rhonchi or rales  Abdominal:      General: Bowel sounds are normal  There is no distension  Palpations: Abdomen is soft  Tenderness: There is no abdominal tenderness  Musculoskeletal:         General: Normal range of motion  Cervical back: Normal range of motion and neck supple  Right lower leg: No edema  Left lower leg: No edema  Skin:     General: Skin is warm and dry  Findings: No lesion or rash  Neurological:      General: No focal deficit present  Mental Status: She is alert and oriented to person, place, and time  Mental status is at baseline     Psychiatric:         Mood and Affect: Mood normal          Behavior: Behavior normal           Additional Data:     Labs:  Results from last 7 days   Lab Units 01/22/23  0613   WBC Thousand/uL 6 10   HEMOGLOBIN g/dL 9 8*   HEMATOCRIT % 31 0*   PLATELETS Thousands/uL 236   NEUTROS PCT % 77*   LYMPHS PCT % 16   MONOS PCT % 6   EOS PCT % 0     Results from last 7 days   Lab Units 01/22/23  0613 01/21/23  0237 01/20/23  0630   SODIUM mmol/L 134*   < > 131*   POTASSIUM mmol/L 3 8   < > 3 8   CHLORIDE mmol/L 102   < > 99   CO2 mmol/L 27   < > 23   BUN mg/dL 12   < > 9   CREATININE mg/dL 0 75   < > 0 60   ANION GAP mmol/L 5   < > 9   CALCIUM mg/dL 8 1*   < > 8 0*   ALBUMIN g/dL  --   --  3 2*   TOTAL BILIRUBIN mg/dL  --   --  0 44   ALK PHOS U/L  --   --  114*   ALT U/L  --   --  12   AST U/L  --   --  18   GLUCOSE RANDOM mg/dL 96   < > 93    < > = values in this interval not displayed  Results from last 7 days   Lab Units 01/19/23  2247   INR  0 93             Results from last 7 days   Lab Units 01/21/23  0237 01/19/23  2316 01/19/23  2247   LACTIC ACID mmol/L  --  1 5  --    PROCALCITONIN ng/ml 2 01*  --  1 14*       Lines/Drains:  Invasive Devices     Peripheral Intravenous Line  Duration           Peripheral IV 01/19/23 Left;Ventral (anterior) Forearm 2 days          Drain  Duration           External Urinary Catheter 91 days                      Imaging: No pertinent imaging reviewed  Recent Cultures (last 7 days):   Results from last 7 days   Lab Units 01/21/23  1603 01/20/23  1245 01/19/23  2329 01/19/23  2316   BLOOD CULTURE   --   --  No Growth at 48 hrs  No Growth at 48 hrs     SPUTUM CULTURE  No Growth at 24 hrs   --   --   --    LEGIONELLA URINARY ANTIGEN   --  Negative  --   --        Last 24 Hours Medication List:   Current Facility-Administered Medications   Medication Dose Route Frequency Provider Last Rate   • acetaminophen  650 mg Oral Q6H PRN Ariadne Keith MD     • albuterol  2 puff Inhalation Q6H PRN Ariadne Keith MD     • amLODIPine  2 5 mg Oral BID Ariadne Keith MD     • benzonatate  100 mg Oral TID [facility] NakiaercMD linda     • cefTRIAXone  1,000 mg Intravenous Q24H Livia Schneider MD 1,000 mg (01/22/23 1124)   • dextromethorphan-guaiFENesin  10 mL Oral TID Ariadne Keith MD     • Diclofenac Sodium  2 g Topical 4x Daily Ariadne Keith MD     • enoxaparin  40 mg Subcutaneous Daily Ariadne Keith MD     • fluticasone  1 spray Nasal Daily Ariadne Keith MD     • gabapentin  100 mg Oral HS Ariadne Keith MD     • lidocaine  2 patch Topical Daily [facility] MD Consuelo     • melatonin  3 mg Oral HS Kaitlni Menard MD     • oseltamivir  30 mg Oral Q12H Polo Huynh MD     • oxyCODONE  2 5 mg Oral Q6H PRN Kaitlin Menard MD     • trimethobenzamide  200 mg Intramuscular Q6H PRN Kaitlin Menard MD          Today, Patient Was Seen By: Pascale Ritchie DO    **Please Note: This note may have been constructed using a voice recognition system  **

## 2023-01-22 NOTE — ASSESSMENT & PLAN NOTE
Patient presenting with 1 week history of cough and weakness from [facility]  History of intellectual disability  Vitals on admission: no hypoxia, no fever, hypertensive 170/79  Elevated Pro-Vlad 1 14  Elevated   White blood cell leukocytosis 12 75  Influenza A+  Elevated D-dimer 11 02  Lactate within normal limits  CTA PE negative for PE, however demonstrating bilateral consolidative airspace disease in the bilateral lower lobes, suspicious for aspiration pneumonia  Patient denies any history of dysphagia or recent choking episodes, however patient is a poor historian  Given 1 dose of Levaquin and clindamycin in the ED  · Suspect community-acquired pneumonia likely due to Influenza A +/- superimposed bacterial infection  Urine Legionella and blood negative  Blood cultures no growth to date  · WBC now within normal limits and overall improved respiratory status  However, Procal was trending up    · Patient reports improvement with current Abx regimen - cough with phlegm present but improving  Plan:  · Continue empiric IV ceftriaxone  · Discontinued Levaquin given prolonged QTC and patient has recently received Keflex x7 days for UTI in the outpatient setting and confirmed with patient no adverse reaction noted -start empiric IV ceftriaxone  · total antibiotic treatment day 3/5   · Follow culture results: Blood, MRSA, and sputum  · F/u Procalc in AM  · Tylenol as needed for fever and mild pain  · Tessalon Perles 3 times daily  · Robitussin 3 times daily  · Aspiration precautions - dental soft dysphagia diet until speech pathology evaluation  · Wean O2 as tolerated

## 2023-01-22 NOTE — DISCHARGE SUMMARY
[facility]  Discharge- Alana Arnold 1940, 79 y o  female MRN: [id number]  Unit/Bed#: W -01 Encounter: [id number]  Primary Care Provider: VALDEMAR Kelly   Date and time admitted to hospital: 1/19/2023 10:32 PM    * Pneumonia  Assessment & Plan  Patient presenting with 1 week history of cough and weakness from [facility]  History of intellectual disability  Vitals on admission: no hypoxia, no fever, hypertensive 170/79  Elevated Pro-Vlad 1 14  Elevated   White blood cell leukocytosis 12 75  Influenza A+  Elevated D-dimer 11 02  Lactate within normal limits  CTA PE negative for PE, however demonstrating bilateral consolidative airspace disease in the bilateral lower lobes, suspicious for aspiration pneumonia  Patient denies any history of dysphagia or recent choking episodes, however patient is a poor historian  Given 1 dose of Levaquin and clindamycin in the ED  · Suspect community-acquired pneumonia likely due to Influenza A +/- superimposed bacterial infection  Urine Legionella and blood negative  · WBC now within normal limits and overall improved respiratory status  Procalcitonin downtrending and patient reports improvement with current Abx regimen - cough with phlegm present but improving  · Sputum culture: Gram-positive    Cocci, gram-positive rods  · 01/23 - patient O2 sat 90-91% off O2  · 01/24 - O2 saturation > 90% on room air    Plan:  · Completed empiric IV ceftriaxone, total Abx tx day 5/5  · Follow culture results: Blood, MRSA, and sputum  · Tylenol as needed for fever and mild pain  · Tessalon Perles 3 times daily  · Robitussin 3 times daily  · Aspiration precautions - dental soft dysphagia 3 diet w/thin liquids  · Wean O2 as tolerated    Influenza A  Assessment & Plan  Patient tested positive for influenza A in the emergency department  1 week history of cough and weakness  Bilateral consolidative airspace disease in the lower lobes, concerning for aspiration pneumonia  Given 1 dose of Tamiflu in the ED    Plan:  · Completed oseltamivir 30 mg every 12 hours for total of 10 doses  · Tylenol as needed  · Monitor vitals  · Tessalon Perles 3 times daily  · Robitussin 3 times daily  · Wean O2 as tolerated     Ambulatory dysfunction  Assessment & Plan  Patient presenting with ambulatory dysfunction  Patient admits she is unsteady on her feet and uses a walker to ambulate  Long-term resident at [facility]:  · PT/OT evaluation and treat - recommended post-acute rehab vs return to [facility] w/skilled PT services  · Patient being discharged to acute rehab at [facility]  · Fall precautions  · Out of bed with assistance  · Consider calcium supplements    Anemia  Assessment & Plan  Hemoglobin   Date Value Ref Range Status   01/24/2023 10 2 (L) 11 5 - 15 4 g/dL Final      Stable  Iron Panel: TIBC 186, ferritin 877, iron 34, % iron saturation 18  -likely mixed DEB and Anemia of Chronic disease    Plan  - Transfuse < 7 0  - monitor CBC  -Discharged on ferrous sulfate 324 mg daily  -Outpatient follow-up with PCP    QT prolongation  Assessment & Plan  POA: Patient presenting with prolonged QTc interval of 490 ms with repeat ECG showing stable QTC      Plan:  · Discontinued the Levaquin and started on ceftriaxone  · Avoid other QTC prolonging medications    Asthma  Assessment & Plan  Patient has a history of asthma    Plan:  Albuterol inhaler, home medication  Fluticasone inhaler, home medication    Elevated alkaline phosphatase level  Assessment & Plan  Patient presenting with elevated alkaline phosphatase level of 141, downtrending 1/20  Denying any abdominal pain  No abdominal pain to palpation    Plan:  · Monitor clinically and CMP as needed given stable and asymptomatic     Sciatica  Assessment & Plan  Patient has a history of lumbar back pain secondary to sciatica  Presenting with worsening low back pain    Plan:  · Voltaren gel for hips and lumbar back  · Gabapentin 100 mg daily, home medication  · Lidocaine patch  · Aqua K pad  · Oxycodone 2 5 mg every 6 hours, home medication  · Outpatient follow-up with PCP    Hypertension  Assessment & Plan  Patient has a history of hypertension  Stable  Plan:  · Amlodipine 2 5 mg daily, home medication    Medical Problems     Resolved Problems  Date Reviewed: 1/23/2023   None       Discharging Resident: Kasi Joiner,   Discharging Attending: No att  providers found  PCP: Viktor Malone  Admission Date:   Admission Orders (From admission, onward)     Ordered        01/20/23 0319  INPATIENT ADMISSION  Once                      Discharge Date: 01/24/23    Consultations During Hospital Stay:  · Speech and language pathology    Procedures Performed:   · None    Significant Findings / Test Results:   · Urine and blood Legionella negative  · Influenza A+ in ED  · X-ray lumbar spine (c/o LBP): No apparent acute deformity; lower lumbar spine does not well visualized; moderate scoliosis  · X-ray chest: Left basilar opacity better characterized on subsequent CTA chest  · CTA chest: Consolidative airspace disease at bilateral lower lobes consistent with pneumonia, likely secondary to aspiration  · Iron panel: TIBC 186, ferritin 877, iron 34, % iron saturation 18    Incidental Findings:   · None     Test Results Pending at Discharge (will require follow up): · None     Outpatient Tests Requested:  · None    Complications:  None    Reason for Admission: cough, weakness    Hospital Course:   Derrick Díaz is a 79 y o  female patient who originally presented to the hospital on 1/19/2023 due to 1 week history of cough and weakness from [facility]   In the emergency department patient was hemodynamically stable, with laboratory work-up demonstrating hyponatremia of 133, elevated alkaline phosphatase of 141, elevated BNP of 190, elevated procalcitonin of 1 14, leukocytosis with WBC of 12 75, positive influenza A test, and an elevated D-dimer of 11 02  CTA of the chest was negative for pulmonary embolism, however demonstrated bilateral consolidative airspace diseases in the lower lobes  Patient was also found to be influenza A positive  She was started on Levaquin due to penicillin allergy  Patient was found to have a prolonged QTC was switched to Ceftriaxone for strep pneumoniae coverage  (Patient was recently on Keflex outpatient and reported no adverse reactions)  Patient was also maintained on oseltamivir for flu, Tessalon Perles, and Robitussin during her course  She reported improvement with regimen  Blood, MRSA and sputum cultures were collected  Sputum sample grew GP cocci in pairs and GP rods  Blood culture showed no growth after 4 days  Patient was also on 3L supplemental O2 initially but was weaned off as tolerated and did not have any O2 requirements on discharge  Patient remained without s/s of abdominal complaints  Patient was evaluated by PT/OT who recommended postacute rehabilitation versus living facility with skilled PT services  Patient is being discharged to [facility] for acute rehab services  Patient completed oseltamivir course of 10 doses and IV antibiotic course of 5 days during her hospital course  Patient should continue taking ferrous sulfate 324 mg daily for anemia and should follow-up with PCP within 1 week of discharge  Please see above list of diagnoses and related plan for additional information  Condition at Discharge: stable    Discharge Day Visit / Exam:   Subjective:  Patient seen and evaluated at bedside, no acute distress  No acute events overnight  Patient reports she is feeling better today however still has occasional cough with mucus production  She reports her appetite has been improving  She denies fever/chills, chest pain, shortness of breath, nausea/vomiting/diarrhea/constipation  Reports BM today     Vitals: Blood Pressure: 123/72 (01/24/23 0705)  Pulse: 73 (01/24/23 0705)  Temperature: 98 3 °F (36 8 °C) (01/24/23 0705)  Temp Source: Oral (01/20/23 0656)  Respirations: 17 (01/23/23 1502)  Weight - Scale: 46 7 kg (103 lb) (01/19/23 2234)  SpO2: 92 % (01/24/23 0705)  Exam:   Physical Exam  Vitals reviewed  Constitutional:       General: She is not in acute distress  Appearance: She is underweight  She is not ill-appearing or toxic-appearing  HENT:      Head: Normocephalic and atraumatic  Nose: Nose normal       Mouth/Throat:      Mouth: Mucous membranes are moist       Pharynx: Oropharynx is clear  Eyes:      Extraocular Movements: Extraocular movements intact  Pupils: Pupils are equal, round, and reactive to light  Cardiovascular:      Rate and Rhythm: Normal rate and regular rhythm  Pulses: Normal pulses  Heart sounds: Normal heart sounds  No murmur heard  No gallop  Pulmonary:      Effort: Pulmonary effort is normal  No respiratory distress  Breath sounds: Normal breath sounds  No wheezing, rhonchi or rales  Abdominal:      General: Bowel sounds are normal  There is no distension  Palpations: Abdomen is soft  Tenderness: There is no abdominal tenderness  Musculoskeletal:         General: Normal range of motion  Cervical back: Normal range of motion and neck supple  Right lower leg: No edema  Left lower leg: No edema  Skin:     General: Skin is warm and dry  Findings: No lesion or rash  Neurological:      General: No focal deficit present  Mental Status: She is alert and oriented to person, place, and time  Mental status is at baseline  Psychiatric:         Mood and Affect: Mood normal          Behavior: Behavior normal           Discussion with Family: Updated  Edi Sanchez) via phone  Discharge instructions/Information to patient and family:   See after visit summary for information provided to patient and family  Provisions for Follow-Up Care:  See after visit summary for information related to follow-up care and any pertinent home health orders  Disposition:   Acute Rehab at [facility]    Planned Readmission: No    Discharge Medications:  See after visit summary for reconciled discharge medications provided to patient and/or family        **Please Note: This note may have been constructed using a voice recognition system**

## 2023-01-22 NOTE — ASSESSMENT & PLAN NOTE
Patient presenting with elevated alkaline phosphatase level of 141, downtrending 1/20  Denying any abdominal pain  No abdominal pain to palpation    Plan:  · Monitor clinically and CMP as needed given stable and asymptomatic

## 2023-01-22 NOTE — ASSESSMENT & PLAN NOTE
Hemoglobin   Date Value Ref Range Status   01/23/2023 10 4 (L) 11 5 - 15 4 g/dL Final      Stable     Iron Panel: TIBC 186, ferritin 877, iron 34, % iron saturation 18  -likely mixed DEB and Anemia of Chronic disease    Plan  - Transfuse < 7 0  - monitor CBC  -Outpatient follow-up with PCP

## 2023-01-22 NOTE — ASSESSMENT & PLAN NOTE
POA: Patient presenting with prolonged QTc interval of 490 ms with repeat ECG showing stable QTC      Plan:  · Discontinued the Levaquin and started on ceftriaxone  · Avoid other QTC prolonging medications

## 2023-01-22 NOTE — ASSESSMENT & PLAN NOTE
Patient tested positive for influenza A in the emergency department  1 week history of cough and weakness  Bilateral consolidative airspace disease in the lower lobes, concerning for aspiration pneumonia  Given 1 dose of Tamiflu in the ED    Plan:  · Continue oseltamivir 30 mg every 12 hours for total of 10 doses  · Tylenol as needed  · Monitor vitals  · Tessalon Perles 3 times daily  · Robitussin 3 times daily  · Wean O2 as tolerated

## 2023-01-22 NOTE — ASSESSMENT & PLAN NOTE
Patient presenting with ambulatory dysfunction  Patient admits she is unsteady on her feet and uses a walker to ambulate  Long-term resident at [facility]:  · PT/OT evaluation and treat - recommended post-acute rehab vs return to [facility] w/skilled PT services  · Pending rehab placement   · Fall precautions  · Out of bed with assistance  · Consider calcium supplements

## 2023-01-22 NOTE — ASSESSMENT & PLAN NOTE
Patient presenting with 1 week history of cough and weakness from [facility]  History of intellectual disability  Vitals on admission: no hypoxia, no fever, hypertensive 170/79  Elevated Pro-Vlad 1 14  Elevated   White blood cell leukocytosis 12 75  Influenza A+  Elevated D-dimer 11 02  Lactate within normal limits  CTA PE negative for PE, however demonstrating bilateral consolidative airspace disease in the bilateral lower lobes, suspicious for aspiration pneumonia  Patient denies any history of dysphagia or recent choking episodes, however patient is a poor historian  Given 1 dose of Levaquin and clindamycin in the ED  · Suspect community-acquired pneumonia likely due to Influenza A +/- superimposed bacterial infection  Urine Legionella and blood negative  · WBC now within normal limits and overall improved respiratory status  Procalcitonin downtrending and patient reports improvement with current Abx regimen - cough with phlegm present but improving  · Sputum culture: Gram-positive    Cocci, gram-positive rods  01/23 - patient O2 sat 90-91% off O2    Plan:  · Continue empiric IV ceftriaxone, total Abx tx day 4/5  · Follow culture results: Blood, MRSA, and sputum  · Tylenol as needed for fever and mild pain  · Tessalon Perles 3 times daily  · Robitussin 3 times daily  · Aspiration precautions - dental soft dysphagia 3 diet w/thin liquids  · Wean O2 as tolerated

## 2023-01-23 LAB
ANION GAP SERPL CALCULATED.3IONS-SCNC: 6 MMOL/L (ref 4–13)
BACTERIA SPT RESP CULT: ABNORMAL
BACTERIA SPT RESP CULT: ABNORMAL
BASOPHILS # BLD MANUAL: 0 THOUSAND/UL (ref 0–0.1)
BASOPHILS NFR MAR MANUAL: 0 % (ref 0–1)
BUN SERPL-MCNC: 11 MG/DL (ref 5–25)
CALCIUM SERPL-MCNC: 8.1 MG/DL (ref 8.4–10.2)
CHLORIDE SERPL-SCNC: 103 MMOL/L (ref 96–108)
CO2 SERPL-SCNC: 27 MMOL/L (ref 21–32)
CREAT SERPL-MCNC: 0.7 MG/DL (ref 0.6–1.3)
EOSINOPHIL # BLD MANUAL: 0.11 THOUSAND/UL (ref 0–0.4)
EOSINOPHIL NFR BLD MANUAL: 2 % (ref 0–6)
ERYTHROCYTE [DISTWIDTH] IN BLOOD BY AUTOMATED COUNT: 14.6 % (ref 11.6–15.1)
GFR SERPL CREATININE-BSD FRML MDRD: 80 ML/MIN/1.73SQ M
GLUCOSE SERPL-MCNC: 92 MG/DL (ref 65–140)
GRAM STN SPEC: ABNORMAL
HCT VFR BLD AUTO: 32.9 % (ref 34.8–46.1)
HGB BLD-MCNC: 10.4 G/DL (ref 11.5–15.4)
LYMPHOCYTES # BLD AUTO: 0.59 THOUSAND/UL (ref 0.6–4.47)
LYMPHOCYTES # BLD AUTO: 11 % (ref 14–44)
MCH RBC QN AUTO: 30 PG (ref 26.8–34.3)
MCHC RBC AUTO-ENTMCNC: 31.6 G/DL (ref 31.4–37.4)
MCV RBC AUTO: 95 FL (ref 82–98)
MONOCYTES # BLD AUTO: 0.37 THOUSAND/UL (ref 0–1.22)
MONOCYTES NFR BLD: 7 % (ref 4–12)
MYELOCYTES NFR BLD MANUAL: 2 % (ref 0–1)
NEUTROPHILS # BLD MANUAL: 4.15 THOUSAND/UL (ref 1.85–7.62)
NEUTS BAND NFR BLD MANUAL: 1 % (ref 0–8)
NEUTS SEG NFR BLD AUTO: 77 % (ref 43–75)
PLATELET # BLD AUTO: 266 THOUSANDS/UL (ref 149–390)
PLATELET BLD QL SMEAR: ADEQUATE
PMV BLD AUTO: 10.3 FL (ref 8.9–12.7)
POTASSIUM SERPL-SCNC: 4.1 MMOL/L (ref 3.5–5.3)
PROCALCITONIN SERPL-MCNC: 0.38 NG/ML
RBC # BLD AUTO: 3.47 MILLION/UL (ref 3.81–5.12)
RBC MORPH BLD: NORMAL
SODIUM SERPL-SCNC: 136 MMOL/L (ref 135–147)
WBC # BLD AUTO: 5.32 THOUSAND/UL (ref 4.31–10.16)

## 2023-01-23 RX ORDER — POLYETHYLENE GLYCOL 3350 17 G/17G
17 POWDER, FOR SOLUTION ORAL DAILY
Status: DISCONTINUED | OUTPATIENT
Start: 2023-01-23 | End: 2023-01-24 | Stop reason: HOSPADM

## 2023-01-23 RX ADMIN — BENZONATATE 100 MG: 100 CAPSULE ORAL at 16:43

## 2023-01-23 RX ADMIN — ENOXAPARIN SODIUM 40 MG: 40 INJECTION SUBCUTANEOUS at 09:57

## 2023-01-23 RX ADMIN — AMLODIPINE BESYLATE 2.5 MG: 2.5 TABLET ORAL at 09:57

## 2023-01-23 RX ADMIN — BENZONATATE 100 MG: 100 CAPSULE ORAL at 21:34

## 2023-01-23 RX ADMIN — Medication 3 MG: at 21:34

## 2023-01-23 RX ADMIN — GUAIFENESIN AND DEXTROMETHORPHAN 10 ML: 100; 10 SYRUP ORAL at 09:57

## 2023-01-23 RX ADMIN — DICLOFENAC SODIUM TOPICAL GEL, 1%, 2 G: 10 GEL TOPICAL at 12:35

## 2023-01-23 RX ADMIN — DICLOFENAC SODIUM TOPICAL GEL, 1%, 2 G: 10 GEL TOPICAL at 18:52

## 2023-01-23 RX ADMIN — POLYETHYLENE GLYCOL 3350 17 G: 17 POWDER, FOR SOLUTION ORAL at 14:36

## 2023-01-23 RX ADMIN — OSELTAMIVIR PHOSPHATE 30 MG: 6 FOR SUSPENSION ORAL at 21:36

## 2023-01-23 RX ADMIN — BENZONATATE 100 MG: 100 CAPSULE ORAL at 09:57

## 2023-01-23 RX ADMIN — GABAPENTIN 100 MG: 100 CAPSULE ORAL at 21:34

## 2023-01-23 RX ADMIN — CEFTRIAXONE SODIUM 1000 MG: 10 INJECTION, POWDER, FOR SOLUTION INTRAVENOUS at 12:27

## 2023-01-23 RX ADMIN — GUAIFENESIN AND DEXTROMETHORPHAN 10 ML: 100; 10 SYRUP ORAL at 16:43

## 2023-01-23 RX ADMIN — DICLOFENAC SODIUM TOPICAL GEL, 1%, 2 G: 10 GEL TOPICAL at 09:59

## 2023-01-23 RX ADMIN — AMLODIPINE BESYLATE 2.5 MG: 2.5 TABLET ORAL at 21:34

## 2023-01-23 RX ADMIN — DICLOFENAC SODIUM TOPICAL GEL, 1%, 2 G: 10 GEL TOPICAL at 21:33

## 2023-01-23 RX ADMIN — FLUTICASONE PROPIONATE 1 SPRAY: 50 SPRAY, METERED NASAL at 09:59

## 2023-01-23 RX ADMIN — GUAIFENESIN AND DEXTROMETHORPHAN 10 ML: 100; 10 SYRUP ORAL at 21:34

## 2023-01-23 RX ADMIN — LIDOCAINE 5% 2 PATCH: 700 PATCH TOPICAL at 09:57

## 2023-01-23 RX ADMIN — OSELTAMIVIR PHOSPHATE 30 MG: 6 FOR SUSPENSION ORAL at 10:40

## 2023-01-23 NOTE — OCCUPATIONAL THERAPY NOTE
OT TREATMENT    The patient's raw score on the AM-PAC Daily Activity inpatient short form is 15, standardized score is 34 69, less than 39 4  Patients at this level are likely to benefit from DC to post-acute rehabilitation services  Please refer to the recommendation of the Occupational Therapist for safe DC planning  01/23/23 1448   OT Last Visit   OT Visit Date 01/23/23   Note Type   Note Type Treatment   Pain Assessment   Pain Assessment Tool 0-10   Pain Score No Pain   Restrictions/Precautions   Weight Bearing Precautions Per Order No   Other Precautions Contact/isolation;Droplet precautions;Cognitive; Chair Alarm; Bed Alarm;Multiple lines;O2;Fall Risk   Lifestyle   Autonomy PTA pt living at [facility], pt requires (A) with ADLs and IADLs, (-)falls, (-)drives, use of RW at baseline   Reciprocal Relationships supportive facility staff   Service to Others retired   [address] enjoys going to music events at [phone]  Modified independent   [address]; Beverage management   Eating Comments Able to manage from bedside table   Grooming Assistance 4  Minimal Assistance   Grooming Deficit Setup;Verbal cueing; Increased time to complete;Brushing hair   Grooming Comments sitting in recliner, mod A to wash hair, min A to comb   UB Bathing Assistance Unable to assess   LB Bathing Assistance Unable to assess   LB Dressing Assistance 2  Maximal Assistance   LB Dressing Deficit Setup;Verbal cueing;Steadying;Supervision/safety; Increased time to complete; Don/doff R sock; Don/doff L sock   LB Dressing Comments sitting in recliner, excessive effort provided, able to doff socks over heel, unable to doff over toes  A to don socks over toes, and able to pull up legs once placed over heel  Limited by arthritic changes present in hands   Toileting Assistance  3  Moderate Assistance   Toileting Deficit Setup;Steadying;Supervison/safety;Verbal cueing; Increased time to complete;Grab bar use;Perineal hygiene   Toileting Comments Pt incontinent of bladder on trip to bathroom, able to void additional on toilet, able to perform renata care sitting however required overall mod A for thoroughness in stance   Bed Mobility   Supine to Sit 3  Moderate assistance   Additional items Assist x 1;HOB elevated; Increased time required;Verbal cues;LE management   Sit to Supine Unable to assess   Additional Comments Pt OOB to recliner at end of session   Transfers   Sit to Stand 3  Moderate assistance   Additional items Assist x 1; Increased time required;Verbal cues   Stand to Sit 3  Moderate assistance   Additional items Assist x 1; Increased time required;Verbal cues   Stand pivot 4  Minimal assistance   Additional items Assist x 1; Increased time required;Verbal cues   Toilet transfer 3  Moderate assistance   Additional items Assist x 1; Increased time required;Verbal cues;Standard toilet  (use of grab bar R; physical A to guide hips safely onto toilet, step by step instruction for safety)   Additional Comments SPT from EOB>recliner  VC for safe hand placement, ocasionally required step by step instruction for safety   Functional Mobility   Functional Mobility 4  Minimal assistance   Additional Comments Short household distance to bathroom and back with RW with Min (A)  Occasional LOB during mobility, slow gait, limited by fatigue  Incontinent of bladder with mobility  Additional items Rolling walker   Toilet Transfers   Toilet Transfer From   (Recliner)   Toilet Transfer Type To and from   Toilet Transfer to [employer] Transfers Moderate assistance   Toilet Transfers Comments Max VC for use of grab bar R   Subjective   Subjective "I feel like one of those kids they have to change their diapers at school" "Oh my poor eyebrows, they're gone"   Cognition   Overall Cognitive Status Impaired   Arousal/Participation Alert; Cooperative   Attention Attends with cues to redirect   Orientation Level Oriented X4   Memory Decreased recall of precautions;Decreased recall of recent events;Decreased short term memory   Following Commands Follows one step commands with increased time or repetition   Comments Pleasant however limited insight into deficits, poor safety awareness and problem solving  Required frequent VC for redirection to task  Activity Tolerance   Activity Tolerance Patient limited by fatigue   Medical Staff Made Aware yes, RN Andra ndiaye to see pt and present for latter portion of session   Assessment   Assessment Patient participated in Skilled OT session this date with interventions consisting of ADL re training with the use of correct body mechanics, Energy Conservation techniques, Work simplification skills , safety awareness and fall prevention techniques, elicit righting and equilibrium reactions for improved postural control and alignment during transitional movements, increase dynamic sit/ stand balance during functional activity , increase postural control and increase OOB/ sitting tolerance   Patient agreeable to OT treatment session, upon arrival patient was found supine in bed  In comparison to previous session, patient with improvements in participation in ADLs  Pt frequently stating "I'll try anything once" during ADLs  Pt able to perform LB dressing with max A this session, improved functional reach  Pt able to walk to bathroom and back to perform toileting with overall mod A  Pt continues to be limited by weakness and deconditioning  Patient requiring frequent re direction, verbal cues for safety, verbal cues for correct technique, verbal cues for pacing thru activity steps, cognitive assistance to anticipate next step, one step directives, frequent rest periods and ocassional safety reminders   Patient continues to be functioning below baseline level, occupational performance remains limited secondary to factors listed above and increased risk for falls and injury  From OT standpoint, recommendation at time of d/c would be Post acute rehabilitation services  Patient to benefit from continued Occupational Therapy treatment while in the hospital to address deficits as defined above and maximize level of functional independence with ADLs and functional mobility  Plan   Treatment Interventions ADL retraining;Functional transfer training; Endurance training;Cognitive reorientation;Patient/family training;Equipment evaluation/education; Compensatory technique education;Continued evaluation; Energy conservation; Activityengagement   Goal Expiration Date 02/03/23   OT Treatment Day 1   OT Frequency 3-5x/wk   Recommendation   OT Discharge Recommendation Post acute rehabilitation services  (Vs return to facility with rehabilitation services pending progress towards goals and level of support facility staff can provide)   AM-PAC Daily Activity Inpatient   Lower Body Dressing 2   Bathing 2   Toileting 2   Upper Body Dressing 3   Grooming 3   Eating 3   Daily Activity Raw Score 15   Daily Activity Standardized Score (Calc for Raw Score >=11) 34 69   AM-PAC Applied Cognition Inpatient   Following a Speech/Presentation 2   Understanding Ordinary Conversation 3   Taking Medications 2   Remembering Where Things Are Placed or Put Away 2   Remembering List of 4-5 Errands 1   Taking Care of Complicated Tasks 1   Applied Cognition Raw Score 11   Applied Cognition Standardized Score 27 03   End of Consult   Patient Position at End of Consult Bedside chair;Bed/Chair alarm activated; All needs within reach   Nurse Communication Nurse aware of consult     Lory Andrade OTR/L

## 2023-01-23 NOTE — CASE MANAGEMENT
Case Management Discharge Planning Note    Patient name Torsten Juan  Location W /W -83 MRN [id number]  : 1940 Date 2023       Current Admission Date: 2023  Current Admission Diagnosis:Pneumonia   Patient Active Problem List    Diagnosis Date Noted   • Anemia 2023   • Pneumonia 2023   • Influenza A 2023   • Elevated alkaline phosphatase level 2023   • Asthma 2023   • QT prolongation 2023   • Adjustment disorder with anxious mood 2022   • History of stroke 2022   • Ambulatory dysfunction 2022   • Pruritus 2022   • Urinary frequency 10/31/2022   • Closed fracture of ramus of right pubis ([facility] [address] ) 10/25/2022   • 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   • Right shoulder pain 10/25/2022   • Periorbital hematoma, right 10/23/2022   • Allergic rhinitis due to allergen 2022   • Upper respiratory symptom 2022   • Sciatica 2021   • Chronic pain of right knee    • Arthritis of knee, right    • Encounter for support and coordination of transition of care 2021   • Fx humeral neck, left, closed, initial encounter 2020   • Elevated liver enzymes 2020   • Abnormal results of liver function studies 2020   • Difficult or painful urination 2020   • Hereditary and idiopathic neuropathy, unspecified 2020   • Rheumatoid arthritis, unspecified ([facility] [address] ) 2020   • Personal history of transient ischemic attack (TIA), and cerebral infarction without residual deficits 2020   • Personal history of breast cancer 2020   • History of falling 2020   • Age-related osteoporosis without current pathological fracture 2020   • Constipation, unspecified 2020   • Anxiety    • Hypertension 11/10/2020   • Stroke [facility])    • Mild cognitive impairment    • Vitamin D deficiency 10/22/2020   • Closed fracture of nasal bone 10/21/2020   • Age-related osteoporosis with current pathological fracture 10/21/2020   • Fall 10/19/2020   • Traumatic hematoma of forehead 10/19/2020   • Closed fracture of proximal end of right humerus 10/19/2020   • Antibiotic-associated diarrhea 06/10/2020   • B12 deficiency 07/25/2018   • Abnormal MRI 10/17/2017   • Major neurocognitive disorder, due to vascular disease, without behavioral disturbance, mild 06/30/2017   • HLD (hyperlipidemia)    • Knee injury 11/29/2016      LOS (days): 3  Geometric Mean LOS (GMLOS) (days): 2 90  Days to GMLOS:-0 7     OBJECTIVE:  Risk of Unplanned Readmission Score: 19 86         Current admission status: Inpatient   Preferred Pharmacy:   [id number] [place] - [address] [place] [address]  Phone: [phone] Fax: [phone]    Primary Care Provider: VALDEMAR Larson    Primary Insurance: MEDICARE  Secondary Insurance: BLUE CROSS    DISCHARGE DETAILS:    Discharge planning discussed with[de-identified] patient's Kate Rack of Choice: Yes  Comments - Freedom of Choice: `CM f/u with pt's POA Patricia re: STR f/u  [facility] not currently able to offer bed for pt, Allegra Abbott open to STR blanket referral, POA aware bed availability limited due to Flu isolation  CM f/u with Allegra Abbott in late PM re: current available options  Several facilities unavailable, Carisa Martines and Geovani gordon currently reviewing  [facility] currently only availabe bed - after pt given 5 days of Tamiflu treatment  CM s/w Major Fix [facility] admissions) if able to offer bed on Tuesday or would need to wait until Wednesday Brigid Banegas to reach out to [facility] and f/u with CM re: same  Allegra Abbott agreeable to bed offer at [facility] but is aware CM to f/u in AM re: possible availability at other reviewing locations for tomorrow bed availability as none available for today    CM contacted family/caregiver?: Yes             Contacts  Patient Contacts: Allegra Abbott  Relationship to Patient[de-identified] Family  Contact Method: Phone  Phone Number: [phone]  Reason/Outcome: Discharge Planning, Emergency Contact, Referral, Continuity of Care              Other Referral/Resources/Interventions Provided:  Interventions: Short Term Rehab  Referral Comments: STR blanket referral sent in aidin

## 2023-01-23 NOTE — PLAN OF CARE
Problem: Prexisting or High Potential for Compromised Skin Integrity  Goal: Skin integrity is maintained or improved  Description: INTERVENTIONS:  - Identify patients at risk for skin breakdown  - Assess and monitor skin integrity  - Assess and monitor nutrition and hydration status  - Monitor labs   - Assess for incontinence   - Turn and reposition patient  - Assist with mobility/ambulation  - Relieve pressure over bony prominences  - Avoid friction and shearing  - Provide appropriate hygiene as needed including keeping skin clean and dry  - Evaluate need for skin moisturizer/barrier cream  - Collaborate with interdisciplinary team   - Patient/family teaching  - Consider wound care consult   Outcome: Progressing     Problem: MOBILITY - ADULT  Goal: Maintain or return to baseline ADL function  Description: INTERVENTIONS:  -  Assess patient's ability to carry out ADLs; assess patient's baseline for ADL function and identify physical deficits which impact ability to perform ADLs (bathing, care of mouth/teeth, toileting, grooming, dressing, etc )  - Assess/evaluate cause of self-care deficits   - Assess range of motion  - Assess patient's mobility; develop plan if impaired  - Assess patient's need for assistive devices and provide as appropriate  - Encourage maximum independence but intervene and supervise when necessary  - Involve family in performance of ADLs  - Assess for home care needs following discharge   - Consider OT consult to assist with ADL evaluation and planning for discharge  - Provide patient education as appropriate  Outcome: Progressing  Goal: Maintains/Returns to pre admission functional level  Description: INTERVENTIONS:  - Perform BMAT or MOVE assessment daily    - Set and communicate daily mobility goal to care team and patient/family/caregiver  - Collaborate with rehabilitation services on mobility goals if consulted  - Perform Range of Motion  times a day    - Reposition patient every hours   - Dangle patient  times a day  - Stand patient  times a day  - Ambulate patient  times a day  - Out of bed to chair  times a day   - Out of bed for meal times a day  - Out of bed for toileting  - Record patient progress and toleration of activity level   Outcome: Progressing     Problem: Nutrition/Hydration-ADULT  Goal: Nutrient/Hydration intake appropriate for improving, restoring or maintaining nutritional needs  Description: Monitor and assess patient's nutrition/hydration status for malnutrition  Collaborate with interdisciplinary team and initiate plan and interventions as ordered  Monitor patient's weight and dietary intake as ordered or per policy  Utilize nutrition screening tool and intervene as necessary  Determine patient's food preferences and provide high-protein, high-caloric foods as appropriate       INTERVENTIONS:  - Monitor oral intake, urinary output, labs, and treatment plans  - Assess nutrition and hydration status and recommend course of action  - Evaluate amount of meals eaten  - Assist patient with eating if necessary   - Allow adequate time for meals  - Recommend/ encourage appropriate diets, oral nutritional supplements, and vitamin/mineral supplements  - Order, calculate, and assess calorie counts as needed  - Recommend, monitor, and adjust tube feedings and TPN/PPN based on assessed needs  - Assess need for intravenous fluids  - Provide specific nutrition/hydration education as appropriate  - Include patient/family/caregiver in decisions related to nutrition  Outcome: Progressing

## 2023-01-23 NOTE — SPEECH THERAPY NOTE
Speech Language/Pathology    Speech/Language Pathology Progress Note    Patient Name: Lucy Luna  CKSEO'C Date: 1/23/2023       Subjective:  Pt seen for dysphagia tx follow up at lunch  Pt c/o being fatigued, but able to self feed  Pt on dysphagia 3 diet w/ thin liquids  Objective:  Pt assisted w/ feeding- ate chopped green beans, mashed sweet potatoes, and chopped turkey  Pt w/ functional appearing oral processing, longer mastication w/ meat than vegetables  Pt took single sips of thin liquids by cup and straw w/ good oral control  occas delayed moist cough noted throughout session but did not appear related to aspiration risk  Assessment:  Pt appears to be tolerating dysphagia 3 diet w/ thin liquids w/o overt dysphagia symptoms    Plan/Recommendations:  No further follow up needed, cont dysphagia 3 diet w/ thin liquids  Aspiration precautions    meds as tolerated       Emily Gibson CCC-SLP  Speech Pathologist  Available via  tiger text

## 2023-01-23 NOTE — PROGRESS NOTES
[facility]  Progress Note - Lucy Gear 1940, 79 y o  female MRN: [id number]  Unit/Bed#: W -01 Encounter: [id number]  Primary Care Provider: VALDEMAR Richter   Date and time admitted to hospital: 1/19/2023 10:32 PM    * Pneumonia  Assessment & Plan  Patient presenting with 1 week history of cough and weakness from [facility]  History of intellectual disability  Vitals on admission: no hypoxia, no fever, hypertensive 170/79  Elevated Pro-Vlad 1 14  Elevated   White blood cell leukocytosis 12 75  Influenza A+  Elevated D-dimer 11 02  Lactate within normal limits  CTA PE negative for PE, however demonstrating bilateral consolidative airspace disease in the bilateral lower lobes, suspicious for aspiration pneumonia  Patient denies any history of dysphagia or recent choking episodes, however patient is a poor historian  Given 1 dose of Levaquin and clindamycin in the ED  · Suspect community-acquired pneumonia likely due to Influenza A +/- superimposed bacterial infection  Urine Legionella and blood negative  · WBC now within normal limits and overall improved respiratory status  Procalcitonin downtrending and patient reports improvement with current Abx regimen - cough with phlegm present but improving  · Sputum culture: Gram-positive    Cocci, gram-positive rods  01/23 - patient O2 sat 90-91% off O2    Plan:  · Continue empiric IV ceftriaxone, total Abx tx day 4/5  · Follow culture results: Blood, MRSA, and sputum  · Tylenol as needed for fever and mild pain  · Tessalon Perles 3 times daily  · Robitussin 3 times daily  · Aspiration precautions - dental soft dysphagia 3 diet w/thin liquids  · Wean O2 as tolerated    Influenza A  Assessment & Plan  Patient tested positive for influenza A in the emergency department  1 week history of cough and weakness  Bilateral consolidative airspace disease in the lower lobes, concerning for aspiration pneumonia  Given 1 dose of Tamiflu in the ED    Plan:  · Continue oseltamivir 30 mg every 12 hours for total of 10 doses  · Tylenol as needed  · Monitor vitals  · Tessalon Perles 3 times daily  · Robitussin 3 times daily  · Wean O2 as tolerated     Ambulatory dysfunction  Assessment & Plan  Patient presenting with ambulatory dysfunction  Patient admits she is unsteady on her feet and uses a walker to ambulate  [facility] resident at [facility]:  · PT/OT evaluation and treat - recommended post-acute rehab vs return to [facility] w/skilled PT services  · Pending rehab placement   · Fall precautions  · Out of bed with assistance  · Consider calcium supplements    Anemia  Assessment & Plan  Hemoglobin   Date Value Ref Range Status   01/23/2023 10 4 (L) 11 5 - 15 4 g/dL Final      Stable  Iron Panel: TIBC 186, ferritin 877, iron 34, % iron saturation 18  -likely mixed DEB and Anemia of Chronic disease    Plan  - Transfuse < 7 0  - monitor CBC  -Outpatient follow-up with PCP    QT prolongation  Assessment & Plan  POA: Patient presenting with prolonged QTc interval of 490 ms with repeat ECG showing stable QTC      Plan:  · Discontinued the Levaquin and started on ceftriaxone  · Avoid other QTC prolonging medications    Asthma  Assessment & Plan  Patient has a history of asthma    Plan:  Albuterol inhaler, home medication  Fluticasone inhaler, home medication    Elevated alkaline phosphatase level  Assessment & Plan  Patient presenting with elevated alkaline phosphatase level of 141, downtrending 1/20  Denying any abdominal pain  No abdominal pain to palpation    Plan:  · Monitor clinically and CMP as needed given stable and asymptomatic     Sciatica  Assessment & Plan  Patient has a history of lumbar back pain secondary to sciatica  Presenting with worsening low back pain    Plan:  · Voltaren gel for hips and lumbar back  · Gabapentin 100 mg daily, home medication  · Lidocaine patch  · Aqua K pad  · Oxycodone 2 5 mg every 6 hours, home medication  · PT/OT eval and treat - recommended post-acute rehab vs return to [facility] w/skilled PT services, pending rehab placement     Hypertension  Assessment & Plan  Patient has a history of hypertension  Stable  Plan:  · Amlodipine 2 5 mg daily, home medication      VTE Pharmacologic Prophylaxis: VTE Score: 4 Moderate Risk (Score 3-4) - Pharmacological DVT Prophylaxis Ordered: enoxaparin (Lovenox)  Patient Centered Rounds: I performed bedside rounds with nursing staff today  Discussions with Specialists or Other Care Team Provider: None    Education and Discussions with Family / Patient: Updated  Maninder Apple) at bedside  Current Length of Stay: 3 day(s)  Current Patient Status: Inpatient   Discharge Plan: Anticipate discharge in 24-48 hrs to rehab facility  Code Status: Level 3 - DNAR and DNI    Subjective:   Patient seen and evaluated at bedside, no acute distress  No acute events overnight  Patient denies fevers/chills, chest pain, shortness of breath, vomiting/diarrhea/constipation/abd pain  Patient reports she is feeling better, though still has a cough with some mucus production  She continues to states she has some nausea and decreased appetite, though it is improving  Per nurse, last BM was     Objective:     Vitals:   Temp (24hrs), Av °F (36 7 °C), Min:97 3 °F (36 3 °C), Max:98 6 °F (37 °C)    Temp:  [97 3 °F (36 3 °C)-98 6 °F (37 °C)] 98 1 °F (36 7 °C)  HR:  [66-82] 75  Resp:  [16] 16  BP: (103-137)/(53-76) 103/55  SpO2:  [89 %-93 %] 90 %  Body mass index is 18 25 kg/m²  Input and Output Summary (last 24 hours): Intake/Output Summary (Last 24 hours) at 2023 1420  Last data filed at 2023 1805  Gross per 24 hour   Intake 240 ml   Output 250 ml   Net -10 ml       Physical Exam:   Physical Exam  Vitals reviewed  Constitutional:       General: She is not in acute distress  Appearance: She is underweight   She is not ill-appearing or toxic-appearing  HENT:      Head: Normocephalic and atraumatic  Nose: Nose normal       Mouth/Throat:      Mouth: Mucous membranes are moist       Pharynx: Oropharynx is clear  Eyes:      Extraocular Movements: Extraocular movements intact  Pupils: Pupils are equal, round, and reactive to light  Cardiovascular:      Rate and Rhythm: Normal rate and regular rhythm  Pulses: Normal pulses  Heart sounds: Normal heart sounds  No murmur heard  No gallop  Pulmonary:      Effort: Pulmonary effort is normal  No respiratory distress  Breath sounds: Decreased breath sounds present  No wheezing, rhonchi or rales  Comments: improving  Abdominal:      General: Bowel sounds are normal  There is no distension  Palpations: Abdomen is soft  Tenderness: There is no abdominal tenderness  Musculoskeletal:         General: Normal range of motion  Cervical back: Normal range of motion and neck supple  Right lower leg: No edema  Left lower leg: No edema  Skin:     General: Skin is warm and dry  Findings: No lesion or rash  Neurological:      General: No focal deficit present  Mental Status: She is alert and oriented to person, place, and time  Mental status is at baseline     Psychiatric:         Mood and Affect: Mood normal          Behavior: Behavior normal          Additional Data:     Labs:  Results from last 7 days   Lab Units 01/23/23  0521 01/22/23  0613   WBC Thousand/uL 5 32 6 10   HEMOGLOBIN g/dL 10 4* 9 8*   HEMATOCRIT % 32 9* 31 0*   PLATELETS Thousands/uL 266 236   BANDS PCT % 1  --    NEUTROS PCT %  --  77*   LYMPHS PCT %  --  16   LYMPHO PCT % 11*  --    MONOS PCT %  --  6   MONO PCT % 7  --    EOS PCT % 2 0     Results from last 7 days   Lab Units 01/23/23  0521 01/21/23  0237 01/20/23  0630   SODIUM mmol/L 136   < > 131*   POTASSIUM mmol/L 4 1   < > 3 8   CHLORIDE mmol/L 103   < > 99   CO2 mmol/L 27   < > 23   BUN mg/dL 11   < > 9   CREATININE mg/dL 0 70   < > 0 60   ANION GAP mmol/L 6   < > 9   CALCIUM mg/dL 8 1*   < > 8 0*   ALBUMIN g/dL  --   --  3 2*   TOTAL BILIRUBIN mg/dL  --   --  0 44   ALK PHOS U/L  --   --  114*   ALT U/L  --   --  12   AST U/L  --   --  18   GLUCOSE RANDOM mg/dL 92   < > 93    < > = values in this interval not displayed  Results from last 7 days   Lab Units 01/19/23  2247   INR  0 93             Results from last 7 days   Lab Units 01/23/23  0521 01/21/23  0237 01/19/23  2316 01/19/23  2247   LACTIC ACID mmol/L  --   --  1 5  --    PROCALCITONIN ng/ml 0 38* 2 01*  --  1 14*       Lines/Drains:  Invasive Devices     Peripheral Intravenous Line  Duration           Peripheral IV 01/23/23 Distal;Left;Ventral (anterior) Forearm <1 day          Drain  Duration           External Urinary Catheter 92 days                      Imaging: No pertinent imaging reviewed  Recent Cultures (last 7 days):   Results from last 7 days   Lab Units 01/21/23  1603 01/20/23  1245 01/19/23  2329 01/19/23  2316   BLOOD CULTURE   --   --  No Growth at 72 hrs  No Growth at 72 hrs  SPUTUM CULTURE  1+ Growth of  Commensal respiratory baljeet only; No significant growth of Staph aureus/MRSA or Pseudomonas aeruginosa    --   --   --    GRAM STAIN RESULT  3+ Polys*  1+ Gram positive cocci in pairs*  1+ Gram positive rods*  --   --   --    LEGIONELLA URINARY ANTIGEN   --  Negative  --   --        Last 24 Hours Medication List:   Current Facility-Administered Medications   Medication Dose Route Frequency Provider Last Rate   • acetaminophen  650 mg Oral Q6H PRN Topher Emery MD     • albuterol  2 puff Inhalation Q6H PRN Topher Emery MD     • amLODIPine  2 5 mg Oral BID Topher Emery MD     • benzonatate  100 mg Oral TID Topher Emery MD     • cefTRIAXone  1,000 mg Intravenous Q24H Hema Engle MD 1,000 mg (01/23/23 1227)   • dextromethorphan-guaiFENesin  10 mL Oral TID Topher Emery MD     • Diclofenac Sodium  2 g Topical 4x Daily Aren Good MD     • enoxaparin  40 mg Subcutaneous Daily Aren Good MD     • fluticasone  1 spray Nasal Daily Aren Good MD     • gabapentin  100 mg Oral HS Aren Good MD     • lidocaine  2 patch Topical Daily Aren Good MD     • melatonin  3 mg Oral HS Aren Good MD     • oseltamivir  30 mg Oral Q12H [address] James Nicole MD     • oxyCODONE  2 5 mg Oral Q6H PRN Aren Good MD     • polyethylene glycol  17 g Oral Daily Kamlesh Nichols DO     • trimethobenzamide  200 mg Intramuscular Q6H PRN Aren Good MD          Today, Patient Was Seen By: Kamlesh Nichols DO    **Please Note: This note may have been constructed using a voice recognition system  **

## 2023-01-23 NOTE — PLAN OF CARE
Problem: OCCUPATIONAL THERAPY ADULT  Goal: Performs self-care activities at highest level of function for planned discharge setting  See evaluation for individualized goals  Description: Treatment Interventions: ADL retraining, Functional transfer training, Endurance training, Patient/family training, Cognitive reorientation, Compensatory technique education, Activityengagement, Equipment evaluation/education          See flowsheet documentation for full assessment, interventions and recommendations  Outcome: Progressing  Note: Limitation: Decreased ADL status, Decreased UE strength, Decreased Safe judgement during ADL, Decreased cognition, Decreased endurance, Decreased self-care trans, Decreased high-level ADLs  Prognosis: Good  Assessment: Patient participated in Skilled OT session this date with interventions consisting of ADL re training with the use of correct body mechanics, Energy Conservation techniques, Work simplification skills , safety awareness and fall prevention techniques, elicit righting and equilibrium reactions for improved postural control and alignment during transitional movements, increase dynamic sit/ stand balance during functional activity , increase postural control and increase OOB/ sitting tolerance   Patient agreeable to OT treatment session, upon arrival patient was found supine in bed  In comparison to previous session, patient with improvements in participation in ADLs  Pt frequently stating "I'll try anything once" during ADLs  Pt able to perform LB dressing with max A this session, improved functional reach  Pt able to walk to bathroom and back to perform toileting with overall mod A  Pt continues to be limited by weakness and deconditioning   Patient requiring frequent re direction, verbal cues for safety, verbal cues for correct technique, verbal cues for pacing thru activity steps, cognitive assistance to anticipate next step, one step directives, frequent rest periods and ocassional safety reminders  Patient continues to be functioning below baseline level, occupational performance remains limited secondary to factors listed above and increased risk for falls and injury  From OT standpoint, recommendation at time of d/c would be Post acute rehabilitation services  Patient to benefit from continued Occupational Therapy treatment while in the hospital to address deficits as defined above and maximize level of functional independence with ADLs and functional mobility       OT Discharge Recommendation: Post acute rehabilitation services     Ena Benton, OTR/L

## 2023-01-24 ENCOUNTER — TRANSITIONAL CARE MANAGEMENT (OUTPATIENT)
Dept: FAMILY MEDICINE CLINIC | Facility: CLINIC | Age: 83
End: 2023-01-24

## 2023-01-24 ENCOUNTER — TELEPHONE (OUTPATIENT)
Dept: FAMILY MEDICINE CLINIC | Facility: CLINIC | Age: 83
End: 2023-01-24

## 2023-01-24 VITALS
SYSTOLIC BLOOD PRESSURE: 123 MMHG | TEMPERATURE: 98.3 F | HEART RATE: 73 BPM | WEIGHT: 103 LBS | OXYGEN SATURATION: 92 % | RESPIRATION RATE: 17 BRPM | BODY MASS INDEX: 18.25 KG/M2 | DIASTOLIC BLOOD PRESSURE: 72 MMHG

## 2023-01-24 LAB
ANION GAP SERPL CALCULATED.3IONS-SCNC: 6 MMOL/L (ref 4–13)
BUN SERPL-MCNC: 11 MG/DL (ref 5–25)
CALCIUM SERPL-MCNC: 8.1 MG/DL (ref 8.4–10.2)
CHLORIDE SERPL-SCNC: 102 MMOL/L (ref 96–108)
CO2 SERPL-SCNC: 26 MMOL/L (ref 21–32)
CREAT SERPL-MCNC: 0.65 MG/DL (ref 0.6–1.3)
GFR SERPL CREATININE-BSD FRML MDRD: 82 ML/MIN/1.73SQ M
GLUCOSE SERPL-MCNC: 88 MG/DL (ref 65–140)
HGB BLD-MCNC: 10.2 G/DL (ref 11.5–15.4)
POTASSIUM SERPL-SCNC: 4.1 MMOL/L (ref 3.5–5.3)
SODIUM SERPL-SCNC: 134 MMOL/L (ref 135–147)

## 2023-01-24 RX ORDER — GUAIFENESIN/DEXTROMETHORPHAN 100-10MG/5
10 SYRUP ORAL 3 TIMES DAILY PRN
Qty: 210 ML | Refills: 0
Start: 2023-01-24 | End: 2023-01-31

## 2023-01-24 RX ORDER — FERROUS SULFATE TAB EC 324 MG (65 MG FE EQUIVALENT) 324 (65 FE) MG
324 TABLET DELAYED RESPONSE ORAL
Qty: 30 TABLET | Refills: 0
Start: 2023-01-24 | End: 2023-02-23

## 2023-01-24 RX ORDER — BENZONATATE 100 MG/1
100 CAPSULE ORAL 3 TIMES DAILY PRN
Qty: 20 CAPSULE | Refills: 0
Start: 2023-01-24 | End: 2023-01-31

## 2023-01-24 RX ADMIN — DICLOFENAC SODIUM TOPICAL GEL, 1%, 2 G: 10 GEL TOPICAL at 12:55

## 2023-01-24 RX ADMIN — ENOXAPARIN SODIUM 40 MG: 40 INJECTION SUBCUTANEOUS at 10:24

## 2023-01-24 RX ADMIN — AMLODIPINE BESYLATE 2.5 MG: 2.5 TABLET ORAL at 10:25

## 2023-01-24 RX ADMIN — CEFTRIAXONE SODIUM 1000 MG: 10 INJECTION, POWDER, FOR SOLUTION INTRAVENOUS at 12:55

## 2023-01-24 RX ADMIN — GUAIFENESIN AND DEXTROMETHORPHAN 10 ML: 100; 10 SYRUP ORAL at 10:25

## 2023-01-24 RX ADMIN — FLUTICASONE PROPIONATE 1 SPRAY: 50 SPRAY, METERED NASAL at 10:26

## 2023-01-24 RX ADMIN — OSELTAMIVIR PHOSPHATE 30 MG: 6 FOR SUSPENSION ORAL at 10:43

## 2023-01-24 RX ADMIN — DICLOFENAC SODIUM TOPICAL GEL, 1%, 2 G: 10 GEL TOPICAL at 10:26

## 2023-01-24 RX ADMIN — BENZONATATE 100 MG: 100 CAPSULE ORAL at 10:25

## 2023-01-24 RX ADMIN — TRIMETHOBENZAMIDE HYDROCHLORIDE 200 MG: 100 INJECTION INTRAMUSCULAR at 10:31

## 2023-01-24 NOTE — CASE MANAGEMENT
Case Management Discharge Planning Note    Patient name Nelida De La Cruz  Location W /W -51 MRN [id number]  : 1940 Date 2023       Current Admission Date: 2023  Current Admission Diagnosis:Pneumonia   Patient Active Problem List    Diagnosis Date Noted   • Anemia 2023   • Pneumonia 2023   • Influenza A 2023   • Elevated alkaline phosphatase level 2023   • Asthma 2023   • QT prolongation 2023   • Adjustment disorder with anxious mood 2022   • History of stroke 2022   • Ambulatory dysfunction 2022   • Pruritus 2022   • Urinary frequency 10/31/2022   • Closed fracture of ramus of right pubis ([facility] [address] ) 10/25/2022   • 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   • Right shoulder pain 10/25/2022   • Periorbital hematoma, right 10/23/2022   • Allergic rhinitis due to allergen 2022   • Upper respiratory symptom 2022   • Sciatica 2021   • Chronic pain of right knee    • Arthritis of knee, right    • Encounter for support and coordination of transition of care 2021   • Fx humeral neck, left, closed, initial encounter 2020   • Elevated liver enzymes 2020   • Abnormal results of liver function studies 2020   • Difficult or painful urination 2020   • Hereditary and idiopathic neuropathy, unspecified 2020   • Rheumatoid arthritis, unspecified ([facility] [address] ) 2020   • Personal history of transient ischemic attack (TIA), and cerebral infarction without residual deficits 2020   • Personal history of breast cancer 2020   • History of falling 2020   • Age-related osteoporosis without current pathological fracture 2020   • Constipation, unspecified 2020   • Anxiety    • Hypertension 11/10/2020   • Stroke [facility])    • Mild cognitive impairment    • Vitamin D deficiency 10/22/2020   • Closed fracture of nasal bone 10/21/2020   • Age-related osteoporosis with current pathological fracture 10/21/2020   • Fall 10/19/2020   • Traumatic hematoma of forehead 10/19/2020   • Closed fracture of proximal end of right humerus 10/19/2020   • Antibiotic-associated diarrhea 06/10/2020   • B12 deficiency 07/25/2018   • Abnormal MRI 10/17/2017   • Major neurocognitive disorder, due to vascular disease, without behavioral disturbance, mild 06/30/2017   • HLD (hyperlipidemia)    • Knee injury 11/29/2016      LOS (days): 4  Geometric Mean LOS (GMLOS) (days): 2 90  Days to GMLOS:-1 4     OBJECTIVE:  Risk of Unplanned Readmission Score: 20 04         Current admission status: Inpatient   Preferred Pharmacy:   [id number] [place], [address]  [address] [facility]  Phone: [phone] Fax: [phone]    Primary Care Provider: VALDEMAR Silva    Primary Insurance: MEDICARE  Secondary Insurance: BLUE CROSS    DISCHARGE DETAILS:    Discharge planning discussed with[de-identified] patient's Bobby Gaudy of Choice: Yes  Comments - Freedom of Choice: CM f/u with patient's cousin Miky eSrrano, re: STR options  [facility] no longer able to offer bed - [facility] able to offer iso bed for patient today  No other facility available at this time due to iso need  Miky Serrano chose to move forward with bed offer at [facility] location - facility notified of pt choice  CM contacted family/caregiver?: Yes             Contacts  Patient Contacts: Miky Serrano  Relationship to Patient[de-identified] Family  Contact Method: Phone  Phone Number: [phone]  Reason/Outcome: Discharge Planning, Emergency Contact, Referral, Continuity of Care              Other Referral/Resources/Interventions Provided:  Interventions: Short Term Rehab, Transportation  Referral Comments: [facility] reserved in aidin  Transport referral placed to [facility], requested for 1400 via [facility] - awaiting confirmation           Treatment Team Recommendation: Short Term Rehab  Discharge Destination Plan[de-identified] Short Term Rehab  Transport at Discharge : Wheelchair [facility]                          IMM reviewed with patient's caregiver, patient's caregiver agrees with discharge determination    IMM Given (Date):: 01/24/23  IMM Given to[de-identified] Family  Family notified[de-identified] Antonio Garcia (cousin/ POA)       [address] Name, [facility] & State : [facility]  Receiving Facility/Agency Phone Number: [phone]  Facility/Agency Fax Number: [phone]

## 2023-01-24 NOTE — ASSESSMENT & PLAN NOTE
Patient tested positive for influenza A in the emergency department  1 week history of cough and weakness  Bilateral consolidative airspace disease in the lower lobes, concerning for aspiration pneumonia  Given 1 dose of Tamiflu in the ED    Plan:  · Completed oseltamivir 30 mg every 12 hours for total of 10 doses  · Tylenol as needed  · Monitor vitals  · Tessalon Perles 3 times daily  · Robitussin 3 times daily  · Wean O2 as tolerated

## 2023-01-24 NOTE — CASE MANAGEMENT
Case Management Progress Note    Patient name Marisol Mccullough  Location W /W -88 MRN [id number]  : 1940 Date 2023       LOS (days): 4  Geometric Mean LOS (GMLOS) (days): 2 90  Days to GMLOS:-1 4        OBJECTIVE:        Current admission status: Inpatient  Preferred Pharmacy:   [address], [address]  Phone: [phone] Fax: [phone]    Primary Care Provider: Osmany Huang    Primary Insurance: MEDICARE  Secondary Insurance: BLUE CROSS    PROGRESS NOTE:    CM was informed the POA dropped off clothing for the pt to go to rehab with, per pt, no coat was dropped off  CM called pt's POA, John Ann was on her way to  shoes, and will get a coat for the pt as well, but will drop it off to the facility

## 2023-01-24 NOTE — PHYSICAL THERAPY NOTE
PHYSICAL THERAPY NOTE          Patient Name: Alina PRITCHARD Date: 1/24/2023 01/24/23 0944   PT Last Visit   PT Visit Date 01/24/23   Note Type   Note Type Treatment   Pain Assessment   Pain Assessment Tool 0-10   Pain Score 5   Pain Location/Orientation Location: Neck; Location: Back   Pain Onset/Description Onset: Sudden   Effect of Pain on Daily Activities limited functional mobility and activity tolerance   Patient's Stated Pain Goal No pain   Hospital Pain Intervention(s) Repositioned; Ambulation/increased activity; Emotional support; Rest   Multiple Pain Sites Yes   Restrictions/Precautions   Weight Bearing Precautions Per Order No   Other Precautions Contact/isolation;Droplet precautions;Cognitive; Chair Alarm; Bed Alarm; Fall Risk;Pain   General   Chart Reviewed Yes   Additional Pertinent History PT intervention limited due to pt feeling nauseous RN made aware   Response to Previous Treatment Other (Comment)  (pt reports pain but able to participate in PT intervention)   Family/Caregiver Present No   Cognition   Overall Cognitive Status Impaired   Arousal/Participation Alert; Responsive; Cooperative   Attention Attends with cues to redirect   Orientation Level Oriented to person;Oriented to place;Oriented to situation;Disoriented to time   Memory Decreased short term memory;Decreased recall of recent events;Decreased recall of precautions   Following Commands Follows one step commands with increased time or repetition   Comments pt was cooperative throughout tx session   Subjective   Subjective pt was agreeable to participate in PT intervention   Bed Mobility   Supine to Sit 3  Moderate assistance   Additional items Assist x 1;HOB elevated; Bedrails; Increased time required;Verbal cues;LE management; Other;Comment  (trunk management)   Sit to Supine Unable to assess   Additional Comments pt seated OOB in the recliner post tx session with call bell, chair alarm activated   Transfers   Sit to Stand 4  Minimal assistance   Additional items Assist x 1; Increased time required  (w/ RW)   Stand to Sit 3  Moderate assistance   Additional items Assist x 1; Armrests; Increased time required;Verbal cues   Stand pivot 4  Minimal assistance   Additional items Assist x 1; Armrests; Increased time required;Verbal cues  (w/ RW and RW management)   Toilet transfer 3  Moderate assistance   Additional items Assist x 1; Increased time required;Verbal cues; Commode  (w/ RW)   Additional Comments pt was able to perform multiple STS and SPT with RW in todays tx session with VC/s RW management   Ambulation/Elevation   Gait pattern Improper Weight shift;Narrow EDWIN; Decreased foot clearance;Shuffling; Foward flexed; Short stride; Excessively slow;Decreased heel strike;Decreased toe off   Gait Assistance 4  Minimal assist   Additional items Assist x 1;Verbal cues  (RW management for SPT)   Assistive Device Rolling walker   Distance 5'x2 RW   Stair Management Assistance Not tested   Balance   Static Sitting Fair +   Dynamic Sitting Fair   Static Standing Poor +   Dynamic Standing Poor +   Ambulatory Poor +  (w/ RW)   Endurance Deficit   Endurance Deficit Yes   Endurance Deficit Description limited activity tolerance and ambulation distance   Activity Tolerance   Activity Tolerance Patient limited by fatigue;Patient limited by pain   Nurse Made Aware Spoke to RN Sara   Assessment   Prognosis Fair   Problem List Decreased strength;Decreased endurance; Impaired balance;Decreased mobility; Impaired judgement;Decreased cognition;Decreased safety awareness; Impaired hearing   Assessment pt began tx session lying supine in bed and was agreeable to participate in PT intervention  pt continues to remain consistant with mod Ax1 for all bed mobility as pt required LE and trunk management in order to complete a supine<>sit EOB transfer   pt initially required min Ax1 with sitting balance but then was able to maintain sitting balance by herself for 5 minutes while commode was getting prepared  pt continues to be functioning below base line level and remains limited with functional mobility, activity tolerance, functional transfers and ambulation distance as pt continues to demonstrate the inability to ambulate house hold distances  pt continues to be at an increased risk for falls and injuries due to limited standing tolerance and functional mobility  pt was limited to 5'x2 RW of ambulation with a slight posterior lean with initial STS that required min Ax1 to correct  pt would benefit from cotninued skilled PT intervention in order to strengthren LE's and increase activity tolerance and funcitonal mobility  post tx pt in recliner with call bell, chair alarm activated and all pt needs met   Goals   Patient Goals to feel better due to nauseousness   STG Expiration Date 01/30/23   PT Treatment Day 2   Plan   Treatment/Interventions Functional transfer training;LE strengthening/ROM; Therapeutic exercise; Endurance training;Cognitive reorientation;Patient/family training;Equipment eval/education; Bed mobility;Gait training;Spoke to nursing   Progress Slow progress, decreased activity tolerance   PT Frequency 3-5x/wk   Recommendation   PT Discharge Recommendation Post acute rehabilitation services  (vs return to facility ([facility]) with skilled PT intervention)   Equipment Recommended [place] walker   Change/add to [place]fer[place]?  No   AM-PAC Basic Mobility Inpatient   Turning in Flat Bed Without Bedrails 2   Lying on Back to Sitting on Edge of Flat Bed Without Bedrails 2   Moving Bed to Chair 2   Standing Up From Chair Using Arms 2   Walk in Room 3   Climb 3-5 Stairs With Railing 1   Basic Mobility Inpatient Raw Score 12   Basic Mobility Standardized Score 32 23   Highest Level Of Mobility   -[facility] Goal 4: Move to chair/commode   -HL Achieved 4: Move to chair/commode   Education   Education Provided Mobility training; Other  (bed mobility and functioanl transfers)   Patient Reinforcement needed   End of Consult   Patient Position at End of Consult Bedside chair;Bed/Chair alarm activated; All needs within reach   The patient's AM-PAC Basic Mobility Inpatient Short Form Raw Score is 12  A Raw score of less than or equal to 16 suggests the patient may benefit from discharge to post-acute rehabilitation services  Please also refer to the recommendation of the Physical Therapist for safe discharge planning       Lynette Galloway

## 2023-01-24 NOTE — ASSESSMENT & PLAN NOTE
Hemoglobin   Date Value Ref Range Status   01/24/2023 10 2 (L) 11 5 - 15 4 g/dL Final      Stable     Iron Panel: TIBC 186, ferritin 877, iron 34, % iron saturation 18  -likely mixed DEB and Anemia of Chronic disease    Plan  - Transfuse < 7 0  - monitor CBC  -Discharged on ferrous sulfate 324 mg daily  -Outpatient follow-up with PCP

## 2023-01-24 NOTE — CASE MANAGEMENT
Case Management Progress Note    Patient name Faith Quinones  Location W /W -43 MRN [id number]  : 1940 Date 2023       LOS (days): 4  Geometric Mean LOS (GMLOS) (days): 2 90  Days to GMLOS:-1 4        OBJECTIVE:        Current admission status: Inpatient  Preferred Pharmacy[facility]   [id number] [place], [address]  Phone: [phone] Fax: [phone]    Primary Care Provider: VALDEMAR Pappas    Primary Insurance: MEDICARE  Secondary Insurance: BLUE CROSS    PROGRESS NOTE:      CM received a call from intake at [facility], CM updated them on pt's transportation time to facility

## 2023-01-24 NOTE — PROGRESS NOTES
-- Patient:  -- MRN: [id number]  -- Aidin Request ID: [id number]  -- Level of care reserved: Vic Álvarez  -- Partner Reserved: Jimena Krishna [facility], [address] [place] [address] [phone]  -- Clinical needs requested:  -- Geography searched: 20 miles around [id number]  -- Start of Service:  -- Request sent: 1:08pm EST on 1/21/2023 by Evelia Hallman  -- Partner reserved: 10:26am EST on 1/24/2023 by Vanda Calderon  -- Choice list shared: 10:15am EST on 1/24/2023 by Vanda Calderon

## 2023-01-24 NOTE — CASE MANAGEMENT
Case Management Discharge Planning Note    Patient name Rondel Ganser  Location W /W -56 MRN [id number]  : 1940 Date 2023       Current Admission Date: 2023  Current Admission Diagnosis:Pneumonia   Patient Active Problem List    Diagnosis Date Noted   • Anemia 2023   • Pneumonia 2023   • Influenza A 2023   • Elevated alkaline phosphatase level 2023   • Asthma 2023   • QT prolongation 2023   • Adjustment disorder with anxious mood 2022   • History of stroke 2022   • Ambulatory dysfunction 2022   • Pruritus 2022   • Urinary frequency 10/31/2022   • Closed fracture of ramus of right pubis ([facility] [address] ) 10/25/2022   • 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   • Right shoulder pain 10/25/2022   • Periorbital hematoma, right 10/23/2022   • Allergic rhinitis due to allergen 2022   • Upper respiratory symptom 2022   • Sciatica 2021   • Chronic pain of right knee    • Arthritis of knee, right    • Encounter for support and coordination of transition of care 2021   • Fx humeral neck, left, closed, initial encounter 2020   • Elevated liver enzymes 2020   • Abnormal results of liver function studies 2020   • Difficult or painful urination 2020   • Hereditary and idiopathic neuropathy, unspecified 2020   • Rheumatoid arthritis, unspecified ([facility] [address] ) 2020   • Personal history of transient ischemic attack (TIA), and cerebral infarction without residual deficits 2020   • Personal history of breast cancer 2020   • History of falling 2020   • Age-related osteoporosis without current pathological fracture 2020   • Constipation, unspecified 2020   • Anxiety    • Hypertension 11/10/2020   • Stroke [facility])    • Mild cognitive impairment    • Vitamin D deficiency 10/22/2020   • Closed fracture of nasal bone 10/21/2020   • Age-related osteoporosis with current pathological fracture 10/21/2020   • Fall 10/19/2020   • Traumatic hematoma of forehead 10/19/2020   • Closed fracture of proximal end of right humerus 10/19/2020   • Antibiotic-associated diarrhea 06/10/2020   • B12 deficiency 07/25/2018   • Abnormal MRI 10/17/2017   • Major neurocognitive disorder, due to vascular disease, without behavioral disturbance, mild 06/30/2017   • HLD (hyperlipidemia)    • Knee injury 11/29/2016      LOS (days): 4  Geometric Mean LOS (GMLOS) (days): 2 90  Days to GMLOS:-1 4     OBJECTIVE:  Risk of Unplanned Readmission Score: 20 04         Current admission status: Inpatient   [facility]   [id number] [address]  [address]  Phone: [phone] Fax: [phone]    Primary Care Provider: Ted Crawford    Primary Insurance: MEDICARE  Secondary Insurance: BLUE CROSS    DISCHARGE DETAILS:    CM contacted family/caregiver?: Yes             Contacts  Patient Contacts: Lucy Mcmillan  Relationship to Patient[de-identified] Family  Contact Method: Phone  Phone Number: [phone]  Reason/Outcome: Discharge Planning, Emergency Contact, Referral, Continuity of Care              Other Referral/Resources/Interventions Provided:  Interventions: Transportation  Referral Comments: Transport confirmed for 1400 via [employer] to [facility] today  All parties notified of same           Treatment Team Recommendation: Short Term Rehab  Discharge Destination Plan[de-identified] Short Term Rehab  [facility] at Discharge [facility] [address] [facility]  ETA of Transport (Date): 01/24/23  ETA of Transport (Time): 1400

## 2023-01-24 NOTE — ASSESSMENT & PLAN NOTE
Patient presenting with ambulatory dysfunction  Patient admits she is unsteady on her feet and uses a walker to ambulate  Long-term resident at [facility]:  · PT/OT evaluation and treat - recommended post-acute rehab vs return to [facility] w/skilled PT services  · Patient being discharged to acute rehab at [facility]  · Fall precautions  · Out of bed with assistance  · Consider calcium supplements

## 2023-01-24 NOTE — PLAN OF CARE
Problem: PHYSICAL THERAPY ADULT  Goal: Performs mobility at highest level of function for planned discharge setting  See evaluation for individualized goals  Description: Treatment/Interventions: Functional transfer training, LE strengthening/ROM, Therapeutic exercise, Endurance training, Cognitive reorientation, Patient/family training, Equipment eval/education, Bed mobility, Gait training  Equipment Recommended: Lashon Israel       See flowsheet documentation for full assessment, interventions and recommendations  Outcome: Progressing  Note: Prognosis: Fair  Problem List: Decreased strength, Decreased endurance, Impaired balance, Decreased mobility, Impaired judgement, Decreased cognition, Decreased safety awareness, Impaired hearing  Assessment: pt began tx session lying supine in bed and was agreeable to participate in PT intervention  pt continues to remain consistant with mod Ax1 for all bed mobility as pt required LE and trunk management in order to complete a supine<>sit EOB transfer  pt initially required min Ax1 with sitting balance but then was able to maintain sitting balance by herself for 5 minutes while commode was getting prepared  pt continues to be functioning below base line level and remains limited with functional mobility, activity tolerance, functional transfers and ambulation distance as pt continues to demonstrate the inability to ambulate house hold distances  pt continues to be at an increased risk for falls and injuries due to limited standing tolerance and functional mobility  pt was limited to 5'x2 RW of ambulation with a slight posterior lean with initial STS that required min Ax1 to correct  pt would benefit from cotninued skilled PT intervention in order to strengthren LE's and increase activity tolerance and funcitonal mobility   post tx pt in recliner with call bell, chair alarm activated and all pt needs met        PT Discharge Recommendation: Post acute rehabilitation services (vs return to facility ([facility]) with skilled PT intervention)    See flowsheet documentation for full assessment

## 2023-01-24 NOTE — ASSESSMENT & PLAN NOTE
Patient presenting with 1 week history of cough and weakness from [facility]  History of intellectual disability  Vitals on admission: no hypoxia, no fever, hypertensive 170/79  Elevated Pro-Vlad 1 14  Elevated   White blood cell leukocytosis 12 75  Influenza A+  Elevated D-dimer 11 02  Lactate within normal limits  CTA PE negative for PE, however demonstrating bilateral consolidative airspace disease in the bilateral lower lobes, suspicious for aspiration pneumonia  Patient denies any history of dysphagia or recent choking episodes, however patient is a poor historian  Given 1 dose of Levaquin and clindamycin in the ED  · Suspect community-acquired pneumonia likely due to Influenza A +/- superimposed bacterial infection  Urine Legionella and blood negative  · WBC now within normal limits and overall improved respiratory status  Procalcitonin downtrending and patient reports improvement with current Abx regimen - cough with phlegm present but improving  · Sputum culture: Gram-positive    Cocci, gram-positive rods  · 01/23 - patient O2 sat 90-91% off O2  · 01/24 - O2 saturation > 90% on room air    Plan:  · Completed empiric IV ceftriaxone, total Abx tx day 5/5  · Follow culture results: Blood, MRSA, and sputum  · Tylenol as needed for fever and mild pain  · Tessalon Perles 3 times daily  · Robitussin 3 times daily  · Aspiration precautions - dental soft dysphagia 3 diet w/thin liquids  · Wean O2 as tolerated

## 2023-01-25 LAB
BACTERIA BLD CULT: NORMAL
BACTERIA BLD CULT: NORMAL

## 2023-01-25 NOTE — TELEPHONE ENCOUNTER
Spoke with patients relative in regards patients hospital visit 1/19 - 1/24 patients relative stated patient is currently at a rehab center

## 2023-03-23 PROBLEM — J18.9 PNEUMONIA: Status: RESOLVED | Noted: 2023-01-20 | Resolved: 2023-03-23

## 2023-03-23 PROBLEM — J10.1 INFLUENZA A: Status: RESOLVED | Noted: 2023-01-20 | Resolved: 2023-03-23

## 2023-03-31 NOTE — ASSESSMENT & PLAN NOTE
Patient has a history of lumbar back pain secondary to sciatica  Presenting with worsening low back pain    Plan:  · Voltaren gel for hips and lumbar back  · Gabapentin 100 mg daily, home medication  · Lidocaine patch  · Aqua K pad  · Oxycodone 2 5 mg every 6 hours, home medication  · Outpatient follow-up with PCP No

## 2023-11-16 ENCOUNTER — OFFICE VISIT (OUTPATIENT)
Dept: PODIATRY | Facility: CLINIC | Age: 83
End: 2023-11-16
Payer: MEDICARE

## 2023-11-16 VITALS — SYSTOLIC BLOOD PRESSURE: 118 MMHG | HEART RATE: 80 BPM | RESPIRATION RATE: 18 BRPM | DIASTOLIC BLOOD PRESSURE: 74 MMHG

## 2023-11-16 DIAGNOSIS — S90.415D: ICD-10-CM

## 2023-11-16 DIAGNOSIS — L60.3 NAIL DYSTROPHY: Primary | ICD-10-CM

## 2023-11-16 DIAGNOSIS — M20.12 HALLUX VALGUS OF LEFT FOOT: ICD-10-CM

## 2023-11-16 PROCEDURE — 99212 OFFICE O/P EST SF 10 MIN: CPT | Performed by: PODIATRIST

## 2023-12-08 ENCOUNTER — APPOINTMENT (EMERGENCY)
Dept: RADIOLOGY | Facility: HOSPITAL | Age: 83
End: 2023-12-08
Payer: MEDICARE

## 2023-12-08 ENCOUNTER — HOSPITAL ENCOUNTER (EMERGENCY)
Facility: HOSPITAL | Age: 83
Discharge: HOME | End: 2023-12-08
Attending: EMERGENCY MEDICINE
Payer: MEDICARE

## 2023-12-08 VITALS
TEMPERATURE: 98.9 F | WEIGHT: 104.94 LBS | DIASTOLIC BLOOD PRESSURE: 70 MMHG | RESPIRATION RATE: 18 BRPM | OXYGEN SATURATION: 96 % | BODY MASS INDEX: 18.59 KG/M2 | SYSTOLIC BLOOD PRESSURE: 150 MMHG | HEART RATE: 68 BPM

## 2023-12-08 DIAGNOSIS — R53.1 GENERALIZED WEAKNESS: Primary | ICD-10-CM

## 2023-12-08 DIAGNOSIS — U07.1 COVID-19 VIRUS INFECTION: ICD-10-CM

## 2023-12-08 LAB
ALBUMIN SERPL BCP-MCNC: 3.6 G/DL (ref 3.5–5)
ALP SERPL-CCNC: 140 U/L (ref 34–104)
ALT SERPL W P-5'-P-CCNC: 19 U/L (ref 7–52)
ANION GAP SERPL CALCULATED.3IONS-SCNC: 6 MMOL/L
AST SERPL W P-5'-P-CCNC: 30 U/L (ref 13–39)
ATRIAL RATE: 78 BPM
BASOPHILS # BLD AUTO: 0.01 THOUSANDS/ÂΜL (ref 0–0.1)
BASOPHILS NFR BLD AUTO: 0 % (ref 0–1)
BILIRUB SERPL-MCNC: 0.35 MG/DL (ref 0.2–1)
BUN SERPL-MCNC: 19 MG/DL (ref 5–25)
CALCIUM SERPL-MCNC: 8.6 MG/DL (ref 8.4–10.2)
CARDIAC TROPONIN I PNL SERPL HS: 4 NG/L
CHLORIDE SERPL-SCNC: 103 MMOL/L (ref 96–108)
CO2 SERPL-SCNC: 27 MMOL/L (ref 21–32)
CREAT SERPL-MCNC: 0.83 MG/DL (ref 0.6–1.3)
EOSINOPHIL # BLD AUTO: 0.11 THOUSAND/ÂΜL (ref 0–0.61)
EOSINOPHIL NFR BLD AUTO: 2 % (ref 0–6)
ERYTHROCYTE [DISTWIDTH] IN BLOOD BY AUTOMATED COUNT: 14.5 % (ref 11.6–15.1)
GFR SERPL CREATININE-BSD FRML MDRD: 64 ML/MIN/1.73SQ M
GLUCOSE SERPL-MCNC: 93 MG/DL (ref 65–140)
HCT VFR BLD AUTO: 42.5 % (ref 34.8–46.1)
HGB BLD-MCNC: 13.3 G/DL (ref 11.5–15.4)
IMM GRANULOCYTES # BLD AUTO: 0.05 THOUSAND/UL (ref 0–0.2)
IMM GRANULOCYTES NFR BLD AUTO: 1 % (ref 0–2)
LYMPHOCYTES # BLD AUTO: 1.08 THOUSANDS/ÂΜL (ref 0.6–4.47)
LYMPHOCYTES NFR BLD AUTO: 19 % (ref 14–44)
MCH RBC QN AUTO: 30.1 PG (ref 26.8–34.3)
MCHC RBC AUTO-ENTMCNC: 31.3 G/DL (ref 31.4–37.4)
MCV RBC AUTO: 96 FL (ref 82–98)
MONOCYTES # BLD AUTO: 0.37 THOUSAND/ÂΜL (ref 0.17–1.22)
MONOCYTES NFR BLD AUTO: 6 % (ref 4–12)
NEUTROPHILS # BLD AUTO: 4.18 THOUSANDS/ÂΜL (ref 1.85–7.62)
NEUTS SEG NFR BLD AUTO: 72 % (ref 43–75)
NRBC BLD AUTO-RTO: 0 /100 WBCS
P AXIS: 4 DEGREES
PLATELET # BLD AUTO: 261 THOUSANDS/UL (ref 149–390)
PMV BLD AUTO: 10.5 FL (ref 8.9–12.7)
POTASSIUM SERPL-SCNC: 4.2 MMOL/L (ref 3.5–5.3)
PR INTERVAL: 126 MS
PROT SERPL-MCNC: 7.1 G/DL (ref 6.4–8.4)
QRS AXIS: 4 DEGREES
QRSD INTERVAL: 76 MS
QT INTERVAL: 400 MS
QTC INTERVAL: 456 MS
RBC # BLD AUTO: 4.42 MILLION/UL (ref 3.81–5.12)
SODIUM SERPL-SCNC: 136 MMOL/L (ref 135–147)
T WAVE AXIS: 24 DEGREES
VENTRICULAR RATE: 78 BPM
WBC # BLD AUTO: 5.8 THOUSAND/UL (ref 4.31–10.16)

## 2023-12-08 PROCEDURE — 71045 X-RAY EXAM CHEST 1 VIEW: CPT

## 2023-12-08 PROCEDURE — 80053 COMPREHEN METABOLIC PANEL: CPT | Performed by: EMERGENCY MEDICINE

## 2023-12-08 PROCEDURE — 96365 THER/PROPH/DIAG IV INF INIT: CPT

## 2023-12-08 PROCEDURE — 84484 ASSAY OF TROPONIN QUANT: CPT | Performed by: EMERGENCY MEDICINE

## 2023-12-08 PROCEDURE — 96366 THER/PROPH/DIAG IV INF ADDON: CPT

## 2023-12-08 PROCEDURE — 93005 ELECTROCARDIOGRAM TRACING: CPT

## 2023-12-08 PROCEDURE — 36415 COLL VENOUS BLD VENIPUNCTURE: CPT | Performed by: EMERGENCY MEDICINE

## 2023-12-08 PROCEDURE — 99284 EMERGENCY DEPT VISIT MOD MDM: CPT | Performed by: EMERGENCY MEDICINE

## 2023-12-08 PROCEDURE — 85025 COMPLETE CBC W/AUTO DIFF WBC: CPT | Performed by: EMERGENCY MEDICINE

## 2023-12-08 PROCEDURE — 99285 EMERGENCY DEPT VISIT HI MDM: CPT

## 2023-12-08 RX ORDER — ACETAMINOPHEN 325 MG/1
975 TABLET ORAL ONCE
Status: COMPLETED | OUTPATIENT
Start: 2023-12-08 | End: 2023-12-08

## 2023-12-08 RX ORDER — SODIUM CHLORIDE, SODIUM GLUCONATE, SODIUM ACETATE, POTASSIUM CHLORIDE, MAGNESIUM CHLORIDE, SODIUM PHOSPHATE, DIBASIC, AND POTASSIUM PHOSPHATE .53; .5; .37; .037; .03; .012; .00082 G/100ML; G/100ML; G/100ML; G/100ML; G/100ML; G/100ML; G/100ML
1000 INJECTION, SOLUTION INTRAVENOUS ONCE
Status: COMPLETED | OUTPATIENT
Start: 2023-12-08 | End: 2023-12-08

## 2023-12-08 RX ADMIN — SODIUM CHLORIDE, SODIUM GLUCONATE, SODIUM ACETATE, POTASSIUM CHLORIDE, MAGNESIUM CHLORIDE, SODIUM PHOSPHATE, DIBASIC, AND POTASSIUM PHOSPHATE 1000 ML: .53; .5; .37; .037; .03; .012; .00082 INJECTION, SOLUTION INTRAVENOUS at 09:42

## 2023-12-08 RX ADMIN — ACETAMINOPHEN 975 MG: 325 TABLET, FILM COATED ORAL at 09:42

## 2023-12-08 NOTE — ED PROVIDER NOTES
History  Chief Complaint   Patient presents with    Weakness - Generalized     PT presents from [facility] with covid, generalized weakness and body aches. PT "had fever of 99.8, but resolved and was 98.2 upon EMS arrival" per EMS. PT c/o "head feeling weird"     This is a 79 y.o. old female who presents to the ED for evaluation of weakness. States she has covid, but still doesn't feel well. Per records, she was positive on , today, she's still feeling out of it. No CP or dyspnea. Was coughing, but is not much anymore. She has poor appetite. Is on her PX meds, no other meidcations at this time. Is not on paxlovid per the records. Prior to Admission Medications   Prescriptions Last Dose Informant Patient Reported? Taking?    Diclofenac Sodium (VOLTAREN) 1 %  Self No No   Sig: Apply 2 g topically 4 (four) times a day = To right hip and pelvis   Sodium Phosphates (FLEET ENEMA RE)  Self Yes No   Sig: Insert 1 Bottle into the rectum daily as needed   acetaminophen (TYLENOL) 325 mg tablet  Self Yes No   Sig: Take 975 mg by mouth every 8 (eight) hours as needed   albuterol (PROVENTIL HFA,VENTOLIN HFA) 90 mcg/act inhaler  Self Yes No   Sig: Inhale 2 puffs every 6 (six) hours as needed for wheezing   amLODIPine (NORVASC) 2.5 mg tablet  Self No No   Sig: Take 1 tablet (2.5 mg total) by mouth 2 (two) times a day   cholecalciferol (VITAMIN D3) 1,000 units tablet  Self No No   Sig: Take 1 tablet (1,000 Units total) by mouth daily   ferrous sulfate 324 (65 Fe) mg   No No   Sig: Take 1 tablet (324 mg total) by mouth daily before breakfast   fluticasone (FLONASE) 50 mcg/act nasal spray  Self No No   Si spray into each nostril daily   gabapentin (NEURONTIN) 100 mg capsule  Self No No   Sig: Take 1 capsule (100 mg total) by mouth daily at bedtime   lidocaine (LMX) 4 % cream  Self Yes No   Sig: Apply 1 application topically 2 (two) times a day as needed = to LUE and hands   magnesium hydroxide (MILK OF MAGNESIA) 400 mg/5 mL oral suspension  Self Yes No   Sig: Take 30 mL by mouth 2 (two) times a day as needed   melatonin 3 mg  Self No No   Sig: Take 1 tablet (3 mg total) by mouth daily at bedtime   oxyCODONE (OXY-IR) 5 MG capsule  Self Yes No   Sig: Take 2.5 mg by mouth every 6 (six) hours as needed for moderate pain   senna-docusate sodium (SENOKOT S) 8.6-50 mg per tablet  Self No No   Sig: Take 1 tablet by mouth 2 (two) times a day      Facility-Administered Medications: None     Past Medical History:   Diagnosis Date    Allergic     Anxiety     Arthritis     Cancer ([facility])     HLD (hyperlipidemia)     Hypertension     Hyperthyroidism     Osteoporosis     Stroke ([facility])     TIA (transient ischemic attack)      Past Surgical History:   Procedure Laterality Date    BREAST LUMPECTOMY Left     HYSTERECTOMY      KNEE SURGERY Right     ORIF TIBIA FRACTURE Right 12/8/2016    Procedure: OPEN REDUCTION W/ INTERNAL FIXATION (ORIF) TIBIA;  Surgeon: Griselda Pinks, MD;  Location: [facility];  Service:      Family History   Problem Relation Age of Onset    Coronary artery disease Family     Other Family         DJD     I have reviewed and agree with the history as documented. E-Cigarette/Vaping    E-Cigarette Use Never User      E-Cigarette/Vaping Substances    Nicotine No     THC No     CBD No     Flavoring No     Other No     Unknown No      Social History     Tobacco Use    Smoking status: Never    Smokeless tobacco: Never    Tobacco comments:     Former smoker - As per [employer] Use: Never used   Substance Use Topics    Alcohol use: Not Currently     Comment: Alcohol intake:   Occasional  - As per [place]     Drug use: Never     Review of Systems   Constitutional:  Negative for chills, fatigue, fever and unexpected weight change. HENT:  Negative for congestion, rhinorrhea and sore throat. Eyes:  Negative for redness and visual disturbance. Respiratory:  Negative for cough and shortness of breath.     Cardiovascular: Negative for chest pain and leg swelling. Gastrointestinal:  Negative for abdominal pain, constipation, diarrhea, nausea and vomiting. Endocrine: Negative for cold intolerance and heat intolerance. Genitourinary:  Negative for dysuria, frequency and urgency. Musculoskeletal:  Negative for back pain. Skin:  Negative for rash. Neurological:  Negative for dizziness, syncope and numbness. All other systems reviewed and are negative. Physical Exam  Physical Exam  Vitals and nursing note reviewed. Constitutional:       General: She is not in acute distress. Appearance: She is well-developed. She is not diaphoretic. HENT:      Head: Normocephalic and atraumatic. Right Ear: External ear normal.      Left Ear: External ear normal.      Nose: Nose normal.      Mouth/Throat:      Mouth: Mucous membranes are moist.      Pharynx: No oropharyngeal exudate. Eyes:      Conjunctiva/sclera: Conjunctivae normal.      Pupils: Pupils are equal, round, and reactive to light. Cardiovascular:      Rate and Rhythm: Normal rate and regular rhythm. Heart sounds: Normal heart sounds. No murmur heard. No gallop. Pulmonary:      Effort: Pulmonary effort is normal.      Breath sounds: Normal breath sounds. No wheezing. Chest:      Chest wall: No tenderness. Abdominal:      General: Bowel sounds are normal. There is no distension. Palpations: Abdomen is soft. Tenderness: There is no abdominal tenderness. There is no guarding or rebound. Musculoskeletal:         General: No tenderness or deformity. Normal range of motion. Cervical back: Normal range of motion and neck supple. Lymphadenopathy:      Cervical: No cervical adenopathy. Skin:     General: Skin is warm and dry. Findings: No erythema or rash. Neurological:      Mental Status: She is alert and oriented to person, place, and time. Cranial Nerves: No cranial nerve deficit.        Vital Signs  ED Triage Vitals Temperature Pulse Respirations Blood Pressure SpO2   12/08/23 0754 12/08/23 0756 12/08/23 0756 12/08/23 0756 12/08/23 0756   98.9 °F (37.2 °C) 74 18 154/76 95 %      Temp Source Heart Rate Source Patient Position - Orthostatic VS BP Location FiO2 (%)   12/08/23 0754 12/08/23 0756 12/08/23 0756 12/08/23 0756 --   Oral Monitor Lying Right arm       Pain Score       12/08/23 1014       No Pain         ED Medications  Medications   multi-electrolyte (ISOLYTE-S PH 7.4 equivalent) IV solution 1,000 mL (0 mL Intravenous Stopped 12/8/23 1131)   acetaminophen (TYLENOL) tablet 975 mg (975 mg Oral Given 12/8/23 0942)       Diagnostic Studies  Results Reviewed       Procedure Component Value Units Date/Time    HS Troponin 0hr (reflex protocol) [[id number]]  (Normal) Collected: 12/08/23 0904    Lab Status: Final result Specimen: Blood from Arm, Left Updated: 12/08/23 0933     hs TnI 0hr 4 ng/L     Comprehensive metabolic panel [[id number]]  (Abnormal) Collected: 12/08/23 0904    Lab Status: Final result Specimen: Blood from Arm, Left Updated: 12/08/23 0930     Sodium 136 mmol/L      Potassium 4.2 mmol/L      Chloride 103 mmol/L      CO2 27 mmol/L      ANION GAP 6 mmol/L      BUN 19 mg/dL      Creatinine 0.83 mg/dL      Glucose 93 mg/dL      Calcium 8.6 mg/dL      AST 30 U/L      ALT 19 U/L      Alkaline Phosphatase 140 U/L      Total Protein 7.1 g/dL      Albumin 3.6 g/dL      Total Bilirubin 0.35 mg/dL      eGFR 64 ml/min/1.73sq m     Narrative:      [place] guidelines for Chronic Kidney Disease (CKD):     Stage 1 with normal or high GFR (GFR > 90 mL/min/1.73 square meters)    Stage 2 Mild CKD (GFR = 60-89 mL/min/1.73 square meters)    Stage 3A Moderate CKD (GFR = 45-59 mL/min/1.73 square meters)    Stage 3B Moderate CKD (GFR = 30-44 mL/min/1.73 square meters)    Stage 4 Severe CKD (GFR = 15-29 mL/min/1.73 square meters)    Stage 5 End Stage CKD (GFR <15 mL/min/1.73 square meters)  Note: GFR calculation is accurate only with a steady state creatinine    CBC and differential [[id number]]  (Abnormal) Collected: 12/08/23 0904    Lab Status: Final result Specimen: Blood from Arm, Left Updated: 12/08/23 0911     WBC 5.80 Thousand/uL      RBC 4.42 Million/uL      Hemoglobin 13.3 g/dL      Hematocrit 42.5 %      MCV 96 fL      MCH 30.1 pg      MCHC 31.3 g/dL      RDW 14.5 %      MPV 10.5 fL      Platelets 969 Thousands/uL      nRBC 0 /100 WBCs      Neutrophils Relative 72 %      Immat GRANS % 1 %      Lymphocytes Relative 19 %      Monocytes Relative 6 %      Eosinophils Relative 2 %      Basophils Relative 0 %      Neutrophils Absolute 4.18 Thousands/µL      Immature Grans Absolute 0.05 Thousand/uL      Lymphocytes Absolute 1.08 Thousands/µL      Monocytes Absolute 0.37 Thousand/µL      Eosinophils Absolute 0.11 Thousand/µL      Basophils Absolute 0.01 Thousands/µL           XR chest 1 view portable   ED Interpretation by Justo Zapata MD (12/08 7584)   Chest xray shows no evidence of focal consolidation, pleural effusion, pneumothorax, or other acute pulmonary pathology as interpreted by me. Final Result by Lay Luna MD (12/08 2050)      No acute cardiopulmonary disease. Workstation performed: [id number]           Procedures  ECG 12 Lead Documentation Only    Date/Time: 12/8/2023 8:34 AM    Performed by: Justo Zapata MD  Authorized by: Justo Zapata MD    Indications / Diagnosis:  COVID  ECG reviewed by me, the ED Provider: yes    Patient location:  ED  Previous ECG:     Previous ECG:  Unavailable    Comparison to cardiac monitor: Yes    Comments:      NSR 78, normal intervals, normal axi, no acute st twave changes. No previosu EKG available. ED Course  ED Course as of 12/08/23 1631   Fri Dec 08, 2023   3008 Labs and EKG are unremarkable. Will attempt to ambulate the patient and see how she does. Anticipate returning her to her facility.    [address] Patient Tiffanie Grajeda with window for Paxlovid. Her exam at this time is unremarkable she will ambulate without difficulty and her labs are reassuring. At this time she can be discharged back to her facility with supportive care. A/P: This is a 79 y.o. female who presents to the ED for evaluation of weakness. She has covid. Labs, CXR, IVF. Concern for sequela of covid, dehydration. Reevaluate and dispo accordingly. Medical Decision Making  Amount and/or Complexity of Data Reviewed  Labs: ordered. Radiology: ordered. Risk  OTC drugs. Prescription drug management. Disposition  Final diagnoses:   Generalized weakness   COVID-19 virus infection     Time reflects when diagnosis was documented in both MDM as applicable and the Disposition within this note       Time User Action Codes Description Comment    12/8/2023 11:12 AM [place] Add [R53.1] Generalized weakness     12/8/2023 11:12 AM [place] Add [Z71.89] Advice given about COVID-19 virus infection     12/8/2023 11:12 AM [place] Remove [J57.12] Advice given about COVID-19 virus infection     12/8/2023 11:12 AM [place] Add [U07.1] COVID-19 virus infection           ED Disposition       ED Disposition   Discharge    Condition   Stable    Date/Time   Fri Dec 8, 2023 11:12 AM    Comment   Eufemia Hannon discharge to home/self care.                    Follow-up Information       Follow up With Specialties Details Why Contact Orly Dorado Nurse Practitioner, Family Medicine Call  As needed [facility] [address]  [phone]              Discharge Medication List as of 12/8/2023 11:32 AM        CONTINUE these medications which have NOT CHANGED    Details   acetaminophen (TYLENOL) 325 mg tablet Take 975 mg by mouth every 8 (eight) hours as needed, Historical Med      albuterol (PROVENTIL HFA,VENTOLIN HFA) 90 mcg/act inhaler Inhale 2 puffs every 6 (six) hours as needed for wheezing, Historical Med      amLODIPine (NORVASC) 2.5 mg tablet Take 1 tablet (2.5 mg total) by mouth 2 (two) times a day, Starting Thu 7/1/2021, Normal      cholecalciferol (VITAMIN D3) 1,000 units tablet Take 1 tablet (1,000 Units total) by mouth daily, Starting Fri 1/6/2023, Normal      Diclofenac Sodium (VOLTAREN) 1 % Apply 2 g topically 4 (four) times a day = To right hip and pelvis, Starting Fri 1/6/2023, Normal      ferrous sulfate 324 (65 Fe) mg Take 1 tablet (324 mg total) by mouth daily before breakfast, Starting Tue 1/24/2023, Until Thu 2/23/2023, No Print      fluticasone (FLONASE) 50 mcg/act nasal spray 1 spray into each nostril daily, Starting Thu 10/22/2020, No Print      gabapentin (NEURONTIN) 100 mg capsule Take 1 capsule (100 mg total) by mouth daily at bedtime, Starting Fri 1/6/2023, Normal      lidocaine (LMX) 4 % cream Apply 1 application topically 2 (two) times a day as needed = to LUE and hands, Historical Med      magnesium hydroxide (MILK OF MAGNESIA) 400 mg/5 mL oral suspension Take 30 mL by mouth 2 (two) times a day as needed, Historical Med      melatonin 3 mg Take 1 tablet (3 mg total) by mouth daily at bedtime, Starting Fri 1/6/2023, Normal      oxyCODONE (OXY-IR) 5 MG capsule Take 2.5 mg by mouth every 6 (six) hours as needed for moderate pain, Historical Med      senna-docusate sodium (SENOKOT S) 8.6-50 mg per tablet Take 1 tablet by mouth 2 (two) times a day, Starting Fri 1/6/2023, Normal      Sodium Phosphates (FLEET ENEMA RE) Insert 1 Bottle into the rectum daily as needed, Historical Med           No discharge procedures on file.     PDMP Review         Value Time User    PDMP Reviewed  Yes 1/19/2023 10:51 PM Enio Jamil MD            ED Provider  Electronically Signed by             Kelle Fu MD  12/08/23 5381

## 2023-12-26 ENCOUNTER — HOSPITAL ENCOUNTER (EMERGENCY)
Facility: HOSPITAL | Age: 83
Discharge: HOME | End: 2023-12-26
Attending: EMERGENCY MEDICINE
Payer: MEDICARE

## 2023-12-26 VITALS
HEART RATE: 82 BPM | DIASTOLIC BLOOD PRESSURE: 57 MMHG | RESPIRATION RATE: 18 BRPM | SYSTOLIC BLOOD PRESSURE: 116 MMHG | OXYGEN SATURATION: 95 % | TEMPERATURE: 97.8 F

## 2023-12-26 DIAGNOSIS — R42 DIZZINESS: Primary | ICD-10-CM

## 2023-12-26 LAB
ALBUMIN SERPL BCP-MCNC: 3.5 G/DL (ref 3.5–5)
ALP SERPL-CCNC: 141 U/L (ref 34–104)
ALT SERPL W P-5'-P-CCNC: 18 U/L (ref 7–52)
ANION GAP SERPL CALCULATED.3IONS-SCNC: 6 MMOL/L
AST SERPL W P-5'-P-CCNC: 19 U/L (ref 13–39)
ATRIAL RATE: 81 BPM
BASOPHILS # BLD AUTO: 0.04 THOUSANDS/ÂΜL (ref 0–0.1)
BASOPHILS NFR BLD AUTO: 0 % (ref 0–1)
BILIRUB SERPL-MCNC: 0.31 MG/DL (ref 0.2–1)
BUN SERPL-MCNC: 18 MG/DL (ref 5–25)
CALCIUM SERPL-MCNC: 8.9 MG/DL (ref 8.4–10.2)
CHLORIDE SERPL-SCNC: 105 MMOL/L (ref 96–108)
CO2 SERPL-SCNC: 24 MMOL/L (ref 21–32)
CREAT SERPL-MCNC: 0.97 MG/DL (ref 0.6–1.3)
EOSINOPHIL # BLD AUTO: 0.12 THOUSAND/ÂΜL (ref 0–0.61)
EOSINOPHIL NFR BLD AUTO: 1 % (ref 0–6)
ERYTHROCYTE [DISTWIDTH] IN BLOOD BY AUTOMATED COUNT: 14.7 % (ref 11.6–15.1)
GFR SERPL CREATININE-BSD FRML MDRD: 53 ML/MIN/1.73SQ M
GLUCOSE SERPL-MCNC: 112 MG/DL (ref 65–140)
HCT VFR BLD AUTO: 38 % (ref 34.8–46.1)
HGB BLD-MCNC: 11.6 G/DL (ref 11.5–15.4)
IMM GRANULOCYTES # BLD AUTO: 0.07 THOUSAND/UL (ref 0–0.2)
IMM GRANULOCYTES NFR BLD AUTO: 1 % (ref 0–2)
LYMPHOCYTES # BLD AUTO: 0.79 THOUSANDS/ÂΜL (ref 0.6–4.47)
LYMPHOCYTES NFR BLD AUTO: 9 % (ref 14–44)
MCH RBC QN AUTO: 30 PG (ref 26.8–34.3)
MCHC RBC AUTO-ENTMCNC: 30.5 G/DL (ref 31.4–37.4)
MCV RBC AUTO: 98 FL (ref 82–98)
MONOCYTES # BLD AUTO: 0.61 THOUSAND/ÂΜL (ref 0.17–1.22)
MONOCYTES NFR BLD AUTO: 7 % (ref 4–12)
NEUTROPHILS # BLD AUTO: 7.69 THOUSANDS/ÂΜL (ref 1.85–7.62)
NEUTS SEG NFR BLD AUTO: 82 % (ref 43–75)
NRBC BLD AUTO-RTO: 0 /100 WBCS
P AXIS: -2 DEGREES
PLATELET # BLD AUTO: 385 THOUSANDS/UL (ref 149–390)
PMV BLD AUTO: 9.9 FL (ref 8.9–12.7)
POTASSIUM SERPL-SCNC: 5.2 MMOL/L (ref 3.5–5.3)
PR INTERVAL: 120 MS
PROT SERPL-MCNC: 6.8 G/DL (ref 6.4–8.4)
QRS AXIS: 14 DEGREES
QRSD INTERVAL: 78 MS
QT INTERVAL: 384 MS
QTC INTERVAL: 446 MS
RBC # BLD AUTO: 3.87 MILLION/UL (ref 3.81–5.12)
SODIUM SERPL-SCNC: 135 MMOL/L (ref 135–147)
T WAVE AXIS: 20 DEGREES
VENTRICULAR RATE: 81 BPM
WBC # BLD AUTO: 9.32 THOUSAND/UL (ref 4.31–10.16)

## 2023-12-26 PROCEDURE — 99285 EMERGENCY DEPT VISIT HI MDM: CPT | Performed by: EMERGENCY MEDICINE

## 2023-12-26 PROCEDURE — 93005 ELECTROCARDIOGRAM TRACING: CPT

## 2023-12-26 PROCEDURE — 85025 COMPLETE CBC W/AUTO DIFF WBC: CPT | Performed by: EMERGENCY MEDICINE

## 2023-12-26 PROCEDURE — 36415 COLL VENOUS BLD VENIPUNCTURE: CPT | Performed by: EMERGENCY MEDICINE

## 2023-12-26 PROCEDURE — 99284 EMERGENCY DEPT VISIT MOD MDM: CPT

## 2023-12-26 PROCEDURE — 80053 COMPREHEN METABOLIC PANEL: CPT | Performed by: EMERGENCY MEDICINE

## 2023-12-26 PROCEDURE — 96365 THER/PROPH/DIAG IV INF INIT: CPT

## 2023-12-26 RX ORDER — ACETAMINOPHEN 325 MG/1
650 TABLET ORAL ONCE
Status: COMPLETED | OUTPATIENT
Start: 2023-12-26 | End: 2023-12-26

## 2023-12-26 RX ADMIN — SODIUM CHLORIDE, SODIUM LACTATE, POTASSIUM CHLORIDE, AND CALCIUM CHLORIDE 500 ML: .6; .31; .03; .02 INJECTION, SOLUTION INTRAVENOUS at 13:00

## 2023-12-26 RX ADMIN — ACETAMINOPHEN 650 MG: 325 TABLET, FILM COATED ORAL at 12:43

## 2023-12-26 NOTE — ED PROCEDURE NOTE
PROCEDURE  ECG 12 Lead Documentation Only    Date/Time: 12/26/2023 1:11 PM    Performed by: Imtiaz Burns MD  Authorized by: Imtiaz Burns MD    Indications / Diagnosis:  Dizziness  ECG reviewed by me, the ED Provider: yes    Patient location:  ED  Previous ECG:     Previous ECG:  Unavailable    Comparison to cardiac monitor: Yes    Interpretation:     Interpretation: non-specific    Rate:     ECG rate:  81    ECG rate assessment: normal    Rhythm:     Rhythm: sinus rhythm    Ectopy:     Ectopy: none    QRS:     QRS axis:  Normal    QRS intervals:  Normal  Conduction:     Conduction: normal    ST segments:     ST segments:  Normal  T waves:     T waves: flattening      Flattening:  III, V1 and V3  Other findings:     Other findings: LVH and U wave    Comments:      No ecg signs of ischemia/ injury / r heart strain / luly/pericarditis - no ecg signs of short- long qtc- wpw- brugada syndrome- hcm- epsilon waves        Imtiaz Burns MD  12/26/23 8400

## 2023-12-26 NOTE — DISCHARGE INSTRUCTIONS
Diagnosis dizziness    - activity as tolerated     - the patient should not be allowed to walk with walker with out assistance    - please return to  the er for any new/ worsening/concerning symptoms to you

## 2023-12-26 NOTE — ED PROVIDER NOTES
"History  Chief Complaint   Patient presents with    Dizziness     Has been having some syncopal episodes since this weekend. No falls.  Feels \"wooshy\".      84 yo F who lives in [facility] presenting for an episode of dizziness this morning that lasted 3 minutes and resolved with rest. She woke up this AM feeling fatigued and generally unwell. She then went to do an activity upstairs, and started feeling dizzy in the elevator. The dizziness resolved with rest. The dizziness is associated with a 'funny feeling' in her head, but no LOC, no syncopal episode, no head trauma. She first had this feeling many years ago when she was a young girl and was told she was anemic. She also endorses soreness in her right leg that is intermittent.       Dizziness      Prior to Admission Medications   Prescriptions Last Dose Informant Patient Reported? Taking?   Diclofenac Sodium (VOLTAREN) 1 %  Self No No   Sig: Apply 2 g topically 4 (four) times a day = To right hip and pelvis   Sodium Phosphates (FLEET ENEMA RE)  Self Yes No   Sig: Insert 1 Bottle into the rectum daily as needed   acetaminophen (TYLENOL) 325 mg tablet  Self Yes No   Sig: Take 975 mg by mouth every 8 (eight) hours as needed   albuterol (PROVENTIL HFA,VENTOLIN HFA) 90 mcg/act inhaler  Self Yes No   Sig: Inhale 2 puffs every 6 (six) hours as needed for wheezing   amLODIPine (NORVASC) 2.5 mg tablet  Self No No   Sig: Take 1 tablet (2.5 mg total) by mouth 2 (two) times a day   cholecalciferol (VITAMIN D3) 1,000 units tablet  Self No No   Sig: Take 1 tablet (1,000 Units total) by mouth daily   ferrous sulfate 324 (65 Fe) mg   No No   Sig: Take 1 tablet (324 mg total) by mouth daily before breakfast   fluticasone (FLONASE) 50 mcg/act nasal spray  Self No No   Si spray into each nostril daily   gabapentin (NEURONTIN) 100 mg capsule  Self No No   Sig: Take 1 capsule (100 mg total) by mouth daily at bedtime   lidocaine (LMX) 4 % cream  Self Yes No   Sig: Apply 1 " application topically 2 (two) times a day as needed = to LUE and hands   magnesium hydroxide (MILK OF MAGNESIA) 400 mg/5 mL oral suspension  Self Yes No   Sig: Take 30 mL by mouth 2 (two) times a day as needed   melatonin 3 mg  Self No No   Sig: Take 1 tablet (3 mg total) by mouth daily at bedtime   oxyCODONE (OXY-IR) 5 MG capsule  Self Yes No   Sig: Take 2.5 mg by mouth every 6 (six) hours as needed for moderate pain   senna-docusate sodium (SENOKOT S) 8.6-50 mg per tablet  Self No No   Sig: Take 1 tablet by mouth 2 (two) times a day      Facility-Administered Medications: None       Past Medical History:   Diagnosis Date    Allergic     Anxiety     Arthritis     Cancer (HCC)     HLD (hyperlipidemia)     Hypertension     Hyperthyroidism     Osteoporosis     Stroke (HCC)     TIA (transient ischemic attack)        Past Surgical History:   Procedure Laterality Date    BREAST LUMPECTOMY Left     HYSTERECTOMY      KNEE SURGERY Right     ORIF TIBIA FRACTURE Right 12/8/2016    Procedure: OPEN REDUCTION W/ INTERNAL FIXATION (ORIF) TIBIA;  Surgeon: Angie Case MD;  Location: [facility] OR;  Service:        Family History   Problem Relation Age of Onset    Coronary artery disease Family     Other Family         DJD     I have reviewed and agree with the history as documented.    E-Cigarette/Vaping    E-Cigarette Use Never User      E-Cigarette/Vaping Substances    Nicotine No     THC No     CBD No     Flavoring No     Other No     Unknown No      Social History     Tobacco Use    Smoking status: Never    Smokeless tobacco: Never    Tobacco comments:     Former smoker - As per Mead    Vaping Use    Vaping status: Never Used   Substance Use Topics    Alcohol use: Not Currently     Comment: Alcohol intake:   Occasional  - As per Kelly     Drug use: Never        Review of Systems   Constitutional:  Positive for fatigue. Negative for activity change, appetite change, chills, diaphoresis, fever and unexpected weight change.    HENT: Negative.     Eyes: Negative.    Respiratory: Negative.     Cardiovascular: Negative.    Gastrointestinal: Negative.    Genitourinary:  Negative for dysuria and flank pain.   Musculoskeletal:  Positive for myalgias.   Skin: Negative.    Neurological:  Positive for dizziness.   Hematological: Negative.    Psychiatric/Behavioral: Negative.         Physical Exam  ED Triage Vitals   Temperature Pulse Respirations Blood Pressure SpO2   12/26/23 1129 12/26/23 1129 12/26/23 1129 12/26/23 1129 12/26/23 1129   97.8 °F (36.6 °C) 82 18 116/57 95 %      Temp Source Heart Rate Source Patient Position - Orthostatic VS BP Location FiO2 (%)   12/26/23 1129 12/26/23 1129 12/26/23 1129 12/26/23 1129 --   Axillary Monitor Lying Right arm       Pain Score       12/26/23 1243       4             Orthostatic Vital Signs  Vitals:    12/26/23 1129   BP: 116/57   Pulse: 82   Patient Position - Orthostatic VS: Lying       Physical Exam  Constitutional:       Appearance: Normal appearance. She is normal weight.   HENT:      Head: Normocephalic and atraumatic.   Eyes:      General: No visual field deficit.  Cardiovascular:      Rate and Rhythm: Normal rate and regular rhythm.      Pulses: Normal pulses.      Heart sounds: Normal heart sounds.   Pulmonary:      Effort: Pulmonary effort is normal.      Breath sounds: Normal breath sounds.   Abdominal:      General: Abdomen is flat.      Palpations: Abdomen is soft.   Musculoskeletal:      Right lower leg: No swelling.      Left lower leg: No swelling. No edema.   Skin:     General: Skin is warm and dry.   Neurological:      General: No focal deficit present.      Mental Status: She is alert.      Cranial Nerves: Cranial nerves 2-12 are intact. No dysarthria or facial asymmetry.      Sensory: Sensation is intact.      Motor: Motor function is intact.   Psychiatric:         Mood and Affect: Mood normal.         Behavior: Behavior normal.         Thought Content: Thought content normal.          Judgment: Judgment normal.         ED Medications  Medications   lactated ringers bolus 500 mL (500 mL Intravenous New Bag 12/26/23 1300)   acetaminophen (TYLENOL) tablet 650 mg (650 mg Oral Given 12/26/23 1243)       Diagnostic Studies  Results Reviewed       Procedure Component Value Units Date/Time    Comprehensive metabolic panel [[id number]]  (Abnormal) Collected: 12/26/23 1242    Lab Status: Final result Specimen: Blood from Arm, Right Updated: 12/26/23 1309     Sodium 135 mmol/L      Potassium 5.2 mmol/L      Chloride 105 mmol/L      CO2 24 mmol/L      ANION GAP 6 mmol/L      BUN 18 mg/dL      Creatinine 0.97 mg/dL      Glucose 112 mg/dL      Calcium 8.9 mg/dL      AST 19 U/L      ALT 18 U/L      Alkaline Phosphatase 141 U/L      Total Protein 6.8 g/dL      Albumin 3.5 g/dL      Total Bilirubin 0.31 mg/dL      eGFR 53 ml/min/1.73sq m     Narrative:      National Kid[employer] Disease Foundation guidelines for Chronic Kidney Disease (CKD):     Stage 1 with normal or high GFR (GFR > 90 mL/min/1.73 square meters)    Stage 2 Mild CKD (GFR = 60-89 mL/min/1.73 square meters)    Stage 3A Moderate CKD (GFR = 45-59 mL/min/1.73 square meters)    Stage 3B Moderate CKD (GFR = 30-44 mL/min/1.73 square meters)    Stage 4 Severe CKD (GFR = 15-29 mL/min/1.73 square meters)    Stage 5 End Stage CKD (GFR <15 mL/min/1.73 square meters)  Note: GFR calculation is accurate only with a steady state creatinine    CBC and differential [[id number]]  (Abnormal) Collected: 12/26/23 1242    Lab Status: Final result Specimen: Blood from Arm, Right Updated: 12/26/23 1255     WBC 9.32 Thousand/uL      RBC 3.87 Million/uL      Hemoglobin 11.6 g/dL      Hematocrit 38.0 %      MCV 98 fL      MCH 30.0 pg      MCHC 30.5 g/dL      RDW 14.7 %      MPV 9.9 fL      Platelets 385 Thousands/uL      nRBC 0 /100 WBCs      Neutrophils Relative 82 %      Immat GRANS % 1 %      Lymphocytes Relative 9 %      Monocytes Relative 7 %      Eosinophils Relative 1 %       Basophils Relative 0 %      Neutrophils Absolute 7.69 Thousands/µL      Immature Grans Absolute 0.07 Thousand/uL      Lymphocytes Absolute 0.79 Thousands/µL      Monocytes Absolute 0.61 Thousand/µL      Eosinophils Absolute 0.12 Thousand/µL      Basophils Absolute 0.04 Thousands/µL                    No orders to display         Procedures  ECG 12 Lead Documentation Only    Date/Time: 12/26/2023 12:07 PM    Performed by: Anna Donahue MD  Authorized by: Anna Donahue MD    ECG reviewed by me, the ED Provider: yes    Patient location:  ED  Previous ECG:     Previous ECG:  Compared to current    Similarity:  No change  Rate:     ECG rate assessment: normal    Rhythm:     Rhythm: sinus rhythm    QRS:     QRS axis:  Normal  ST segments:     ST segments:  Normal  T waves:     T waves: normal          ED Course   Patient seen and evaluated. EKG ordered, normal sinus rhythm. CBC and CMP ordered. Not symptomatic currently. Walked in ED with no issues. Patient cleared for discharge back to facility.                                     Medical Decision Making  Amount and/or Complexity of Data Reviewed  Labs: ordered.    Risk  OTC drugs.      Patient's dizziness is likely a chronic issue that can be worked up by PCP. Acute cardiac cause unlikely based on history and EKG.     Disposition  Final diagnoses:   Dizziness     Time reflects when diagnosis was documented in both MDM as applicable and the Disposition within this note       Time User Action Codes Description Comment    12/26/2023  1:24 PM Anna Donahue Add [R42] Dizziness           ED Disposition       ED Disposition   Discharge    Condition   Stable    Date/Time   Tue Dec 26, 2023  1:43 PM    Comment   Doris Campbell discharge to [facility].                    Follow-up Information    None         Patient's Medications   Discharge Prescriptions    No medications on file     No discharge procedures on file.    PDMP Review         Value Time User    PDMP  Reviewed  Yes 1/19/2023 10:51 PM Gerald Hyde MD             ED Provider  Attending physically available and evaluated Doris Campbell. I managed the patient along with the ED Attending.    Electronically Signed by           Anna Donahue MD  12/26/23 8046

## 2023-12-27 NOTE — ED ATTENDING ATTESTATION
12/26/2023  IImtiaz MD, saw and evaluated the patient. I have discussed the patient with the resident/non-physician practitioner and agree with the resident's/non-physician practitioner's findings, Plan of Care, and MDM as documented in the resident's/non-physician practitioner's note, except where noted. All available labs and Radiology studies were reviewed.  I was present for key portions of any procedure(s) performed by the resident/non-physician practitioner and I was immediately available to provide assistance.       At this point I agree with the current assessment done in the Emergency Department.  I have conducted an independent evaluation of this patient a history and physical is as follows:see h and p above     ED Course  ED Course as of 12/27/23 1802   Tue Dec 26, 2023   1240 -    1242 - er md note- recent er visit /chart- labs/ ecg and imaging report all reviewed by er md   1319 Er md note- current labs reviewed and compared to previous- no sign changes   1327 Er md note- pt seen and thoroughly evaluated by er md - case d/w er resident - all studies reviewed by er md -- 83 yr female states- felt like she was going to pass out earlier today at [facility] no fall- no recent injury -  c/o feeling whoozy in head but no headache- no cp/sob/ no fevers- no v/d- no melena/.brbpr / no urinary comps- no fevers/uri/cough - no new meds- avss- pulse ox  95 % on ra  a and o x 2- non focal neuro exam - rrr s1/s2/cta-b/soft nt/nd- trace ble pretibial edema/ nt/ no asym/ erythema/ equal bilateral radial/dp pulses   1344 Er md note- pt tolerated ambulation with  walker with er md at baseline with no new complaints- c/o chronic feet pain  julian/ leticia         Critical Care Time  Procedures

## 2024-02-21 PROBLEM — K52.1 ANTIBIOTIC-ASSOCIATED DIARRHEA: Status: RESOLVED | Noted: 2020-06-10 | Resolved: 2024-02-21

## 2024-02-21 PROBLEM — T36.95XA ANTIBIOTIC-ASSOCIATED DIARRHEA: Status: RESOLVED | Noted: 2020-06-10 | Resolved: 2024-02-21

## 2024-03-26 ENCOUNTER — HOSPITAL ENCOUNTER (EMERGENCY)
Facility: HOSPITAL | Age: 84
Discharge: HOME | End: 2024-03-26
Attending: EMERGENCY MEDICINE | Admitting: INTERNAL MEDICINE
Payer: MEDICARE

## 2024-03-26 ENCOUNTER — APPOINTMENT (EMERGENCY)
Dept: RADIOLOGY | Facility: HOSPITAL | Age: 84
End: 2024-03-26
Payer: MEDICARE

## 2024-03-26 VITALS
DIASTOLIC BLOOD PRESSURE: 70 MMHG | HEART RATE: 75 BPM | RESPIRATION RATE: 16 BRPM | SYSTOLIC BLOOD PRESSURE: 125 MMHG | TEMPERATURE: 97.5 F | OXYGEN SATURATION: 96 %

## 2024-03-26 DIAGNOSIS — N39.0 UTI (URINARY TRACT INFECTION): Primary | ICD-10-CM

## 2024-03-26 DIAGNOSIS — R55 SYNCOPE: ICD-10-CM

## 2024-03-26 LAB
2HR DELTA HS TROPONIN: 0 NG/L
ALBUMIN SERPL BCP-MCNC: 3.6 G/DL (ref 3.5–5)
ALP SERPL-CCNC: 160 U/L (ref 34–104)
ALT SERPL W P-5'-P-CCNC: 19 U/L (ref 7–52)
ANION GAP SERPL CALCULATED.3IONS-SCNC: 8 MMOL/L (ref 4–13)
AST SERPL W P-5'-P-CCNC: 18 U/L (ref 13–39)
BACTERIA UR QL AUTO: ABNORMAL /HPF
BASOPHILS # BLD AUTO: 0.05 THOUSANDS/ÂΜL (ref 0–0.1)
BASOPHILS NFR BLD AUTO: 1 % (ref 0–1)
BILIRUB SERPL-MCNC: 0.35 MG/DL (ref 0.2–1)
BILIRUB UR QL STRIP: NEGATIVE
BUN SERPL-MCNC: 27 MG/DL (ref 5–25)
CALCIUM SERPL-MCNC: 8.8 MG/DL (ref 8.4–10.2)
CARDIAC TROPONIN I PNL SERPL HS: 3 NG/L
CARDIAC TROPONIN I PNL SERPL HS: 3 NG/L
CHLORIDE SERPL-SCNC: 104 MMOL/L (ref 96–108)
CLARITY UR: CLEAR
CO2 SERPL-SCNC: 23 MMOL/L (ref 21–32)
COLOR UR: ABNORMAL
CREAT SERPL-MCNC: 1.16 MG/DL (ref 0.6–1.3)
EOSINOPHIL # BLD AUTO: 0.24 THOUSAND/ÂΜL (ref 0–0.61)
EOSINOPHIL NFR BLD AUTO: 3 % (ref 0–6)
ERYTHROCYTE [DISTWIDTH] IN BLOOD BY AUTOMATED COUNT: 15 % (ref 11.6–15.1)
GFR SERPL CREATININE-BSD FRML MDRD: 43 ML/MIN/1.73SQ M
GLUCOSE SERPL-MCNC: 87 MG/DL (ref 65–140)
GLUCOSE UR STRIP-MCNC: NEGATIVE MG/DL
HCT VFR BLD AUTO: 37.3 % (ref 34.8–46.1)
HGB BLD-MCNC: 11.4 G/DL (ref 11.5–15.4)
HGB UR QL STRIP.AUTO: NEGATIVE
IMM GRANULOCYTES # BLD AUTO: 0.06 THOUSAND/UL (ref 0–0.2)
IMM GRANULOCYTES NFR BLD AUTO: 1 % (ref 0–2)
KETONES UR STRIP-MCNC: NEGATIVE MG/DL
LEUKOCYTE ESTERASE UR QL STRIP: ABNORMAL
LYMPHOCYTES # BLD AUTO: 1.02 THOUSANDS/ÂΜL (ref 0.6–4.47)
LYMPHOCYTES NFR BLD AUTO: 12 % (ref 14–44)
MCH RBC QN AUTO: 29.8 PG (ref 26.8–34.3)
MCHC RBC AUTO-ENTMCNC: 30.6 G/DL (ref 31.4–37.4)
MCV RBC AUTO: 98 FL (ref 82–98)
MONOCYTES # BLD AUTO: 0.63 THOUSAND/ÂΜL (ref 0.17–1.22)
MONOCYTES NFR BLD AUTO: 8 % (ref 4–12)
NEUTROPHILS # BLD AUTO: 6.25 THOUSANDS/ÂΜL (ref 1.85–7.62)
NEUTS SEG NFR BLD AUTO: 75 % (ref 43–75)
NITRITE UR QL STRIP: NEGATIVE
NON-SQ EPI CELLS URNS QL MICRO: ABNORMAL /HPF
NRBC BLD AUTO-RTO: 0 /100 WBCS
PH UR STRIP.AUTO: 5.5 [PH]
PLATELET # BLD AUTO: 395 THOUSANDS/UL (ref 149–390)
PMV BLD AUTO: 10.3 FL (ref 8.9–12.7)
POTASSIUM SERPL-SCNC: 4.5 MMOL/L (ref 3.5–5.3)
PROT SERPL-MCNC: 7.1 G/DL (ref 6.4–8.4)
PROT UR STRIP-MCNC: NEGATIVE MG/DL
RBC # BLD AUTO: 3.82 MILLION/UL (ref 3.81–5.12)
RBC #/AREA URNS AUTO: ABNORMAL /HPF
SODIUM SERPL-SCNC: 135 MMOL/L (ref 135–147)
SP GR UR STRIP.AUTO: 1.01 (ref 1–1.03)
UROBILINOGEN UR STRIP-ACNC: <2 MG/DL
WBC # BLD AUTO: 8.25 THOUSAND/UL (ref 4.31–10.16)
WBC #/AREA URNS AUTO: ABNORMAL /HPF

## 2024-03-26 PROCEDURE — 93005 ELECTROCARDIOGRAM TRACING: CPT

## 2024-03-26 PROCEDURE — 81001 URINALYSIS AUTO W/SCOPE: CPT

## 2024-03-26 PROCEDURE — 71046 X-RAY EXAM CHEST 2 VIEWS: CPT

## 2024-03-26 PROCEDURE — 99285 EMERGENCY DEPT VISIT HI MDM: CPT

## 2024-03-26 PROCEDURE — 80053 COMPREHEN METABOLIC PANEL: CPT

## 2024-03-26 PROCEDURE — 85025 COMPLETE CBC W/AUTO DIFF WBC: CPT

## 2024-03-26 PROCEDURE — 99285 EMERGENCY DEPT VISIT HI MDM: CPT | Performed by: EMERGENCY MEDICINE

## 2024-03-26 PROCEDURE — 84484 ASSAY OF TROPONIN QUANT: CPT

## 2024-03-26 PROCEDURE — 36415 COLL VENOUS BLD VENIPUNCTURE: CPT

## 2024-03-26 PROCEDURE — 87086 URINE CULTURE/COLONY COUNT: CPT

## 2024-03-26 RX ORDER — CEPHALEXIN 250 MG/1
500 CAPSULE ORAL ONCE
Status: COMPLETED | OUTPATIENT
Start: 2024-03-26 | End: 2024-03-26

## 2024-03-26 RX ORDER — CEPHALEXIN 500 MG/1
500 CAPSULE ORAL EVERY 12 HOURS SCHEDULED
Qty: 14 CAPSULE | Refills: 0 | Status: SHIPPED | OUTPATIENT
Start: 2024-03-26 | End: 2024-04-02

## 2024-03-26 RX ADMIN — CEPHALEXIN 500 MG: 250 CAPSULE ORAL at 14:34

## 2024-03-26 NOTE — ED PROVIDER NOTES
"History  Chief Complaint   Patient presents with    Syncope     Pt presents after syncopal episode in chair at [facility]. States she does not know how long she passed out for. No complaints at this time, states she feels back to normal     (Doris Campbell) Doris Campbell is a 83 y.o. female     They presented to the emergency department on March 26, 2024. Patient presents with:  Syncope: Pt presents after syncopal episode in chair at [facility]. States she does not know how long she passed out for.  PMH of previous syncopal episodes, strokes, TIA, asthma, vitamin D deficiency. The patient denies fall and head strike. Patient states she felt lightheaded and her head felt \"empty\" prior to the syncopal episode. The patient states she is back to her baseline. Patient denies wetting herself or tasting blood after the syncopal episode. Patient denies headache, chest pain, SOB, cough, abdominal pain, diarrhea, constipation, dysuria, polyuria, hematuria, or any other complaint at this time.                Prior to Admission Medications   Prescriptions Last Dose Informant Patient Reported? Taking?   Diclofenac Sodium (VOLTAREN) 1 %  Self No No   Sig: Apply 2 g topically 4 (four) times a day = To right hip and pelvis   Sodium Phosphates (FLEET ENEMA RE)  Self Yes No   Sig: Insert 1 Bottle into the rectum daily as needed   acetaminophen (TYLENOL) 325 mg tablet  Self Yes No   Sig: Take 975 mg by mouth every 8 (eight) hours as needed   albuterol (PROVENTIL HFA,VENTOLIN HFA) 90 mcg/act inhaler  Self Yes No   Sig: Inhale 2 puffs every 6 (six) hours as needed for wheezing   amLODIPine (NORVASC) 2.5 mg tablet  Self No No   Sig: Take 1 tablet (2.5 mg total) by mouth 2 (two) times a day   cholecalciferol (VITAMIN D3) 1,000 units tablet  Self No No   Sig: Take 1 tablet (1,000 Units total) by mouth daily   ferrous sulfate 324 (65 Fe) mg   No No   Sig: Take 1 tablet (324 mg total) by mouth daily before breakfast "   fluticasone (FLONASE) 50 mcg/act nasal spray  Self No No   Si spray into each nostril daily   gabapentin (NEURONTIN) 100 mg capsule  Self No No   Sig: Take 1 capsule (100 mg total) by mouth daily at bedtime   lidocaine (LMX) 4 % cream  Self Yes No   Sig: Apply 1 application topically 2 (two) times a day as needed = to LUE and hands   magnesium hydroxide (MILK OF MAGNESIA) 400 mg/5 mL oral suspension  Self Yes No   Sig: Take 30 mL by mouth 2 (two) times a day as needed   melatonin 3 mg  Self No No   Sig: Take 1 tablet (3 mg total) by mouth daily at bedtime   oxyCODONE (OXY-IR) 5 MG capsule  Self Yes No   Sig: Take 2.5 mg by mouth every 6 (six) hours as needed for moderate pain   senna-docusate sodium (SENOKOT S) 8.6-50 mg per tablet  Self No No   Sig: Take 1 tablet by mouth 2 (two) times a day      Facility-Administered Medications: None       Past Medical History:   Diagnosis Date    Allergic     Anxiety     Arthritis     Cancer (HCC)     HLD (hyperlipidemia)     Hypertension     Hyperthyroidism     Osteoporosis     Stroke (HCC)     TIA (transient ischemic attack)        Past Surgical History:   Procedure Laterality Date    BREAST LUMPECTOMY Left     HYSTERECTOMY      KNEE SURGERY Right     ORIF TIBIA FRACTURE Right 2016    Procedure: OPEN REDUCTION W/ INTERNAL FIXATION (ORIF) TIBIA;  Surgeon: Angie Case MD;  Location: [facility];  Service:        Family History   Problem Relation Age of Onset    Coronary artery disease Family     Other Family         DJD     I have reviewed and agree with the history as documented.    E-Cigarette/Vaping    E-Cigarette Use Never User      E-Cigarette/Vaping Substances    Nicotine No     THC No     CBD No     Flavoring No     Other No     Unknown No      Social History     Tobacco Use    Smoking status: Never    Smokeless tobacco: Never    Tobacco comments:     Former smoker - As per [place]    Vaping Use    Vaping status: Never Used   Substance Use Topics     Alcohol use: Not Currently     Comment: Alcohol intake:   Occasional  - As per [place]     Drug use: Never        Review of Systems   Constitutional:  Negative for chills and fever.   Respiratory:  Negative for cough and shortness of breath.    Cardiovascular:  Negative for chest pain and palpitations.   Gastrointestinal:  Negative for abdominal pain, constipation, diarrhea, nausea and vomiting.   Genitourinary:  Negative for dysuria and hematuria.   Musculoskeletal:  Negative for arthralgias and back pain.   Skin:  Negative for color change and rash.   Neurological:  Positive for syncope. Negative for seizures.   All other systems reviewed and are negative.      Physical Exam  ED Triage Vitals [03/26/24 1113]   Temperature Pulse Respirations Blood Pressure SpO2   97.5 °F (36.4 °C) 69 16 160/76 98 %      Temp Source Heart Rate Source Patient Position - Orthostatic VS BP Location FiO2 (%)   Oral Monitor Sitting Right arm --      Pain Score       --             Orthostatic Vital Signs  Vitals:    03/26/24 1113 03/26/24 1300 03/26/24 1430   BP: 160/76 127/69 125/70   Pulse: 69 73 75   Patient Position - Orthostatic VS: Sitting Sitting Sitting       Physical Exam  Vitals and nursing note reviewed.   Constitutional:       General: She is not in acute distress.     Appearance: She is well-developed.   HENT:      Head: Normocephalic and atraumatic.   Eyes:      Extraocular Movements: Extraocular movements intact.      Conjunctiva/sclera: Conjunctivae normal.      Comments: Right pupil is enlarged comparatively to left. Both are reactive to light.   Cardiovascular:      Rate and Rhythm: Normal rate and regular rhythm.      Pulses: Normal pulses.      Heart sounds: Murmur (systolic) heard.      No friction rub. No gallop.   Pulmonary:      Effort: Pulmonary effort is normal. No respiratory distress.      Breath sounds: Normal breath sounds. No stridor. No wheezing, rhonchi or rales.   Abdominal:      Palpations: Abdomen is  soft.      Tenderness: There is no abdominal tenderness.   Musculoskeletal:         General: No swelling.      Cervical back: Neck supple.   Skin:     General: Skin is warm and dry.      Capillary Refill: Capillary refill takes less than 2 seconds.   Neurological:      Mental Status: She is alert.      Comments:  Face symmetric, Tongue midline. CN II-XII intact. 5/5 strength in the bilateral upper and lower extremities with intact sensation to light touch throughout. Normal Finger-to-nose, Heel-to-shin, and Rapid alternating movements bilaterally. No pronator drift. Normal speech.   Psychiatric:         Mood and Affect: Mood normal.         ED Medications  Medications   cephalexin (KEFLEX) capsule 500 mg (500 mg Oral Given 3/26/24 1434)       Diagnostic Studies  Results Reviewed       Procedure Component Value Units Date/Time    HS Troponin I 2hr [[id number]]  (Normal) Collected: 03/26/24 1359    Lab Status: Final result Specimen: Blood from Arm, Right Updated: 03/26/24 1442     hs TnI 2hr 3 ng/L      Delta 2hr hsTnI 0 ng/L     Urine Microscopic [[id number]]  (Abnormal) Collected: 03/26/24 1240    Lab Status: Final result Specimen: Urine, Other Updated: 03/26/24 1256     RBC, UA 1-2 /hpf      WBC, UA 20-30 /hpf      Epithelial Cells Occasional /hpf      Bacteria, UA None Seen /hpf     Urine culture [[id number]] Collected: 03/26/24 1240    Lab Status: In process Specimen: Urine, Other Updated: 03/26/24 1256    UA w Reflex to Microscopic w Reflex to Culture [[id number]]  (Abnormal) Collected: 03/26/24 1240    Lab Status: Final result Specimen: Urine, Other Updated: 03/26/24 1254     Color, UA Light Yellow     Clarity, UA Clear     Specific Gravity, UA 1.009     pH, UA 5.5     Leukocytes, UA Moderate     Nitrite, UA Negative     Protein, UA Negative mg/dl      Glucose, UA Negative mg/dl      Ketones, UA Negative mg/dl      Urobilinogen, UA <2.0 mg/dl      Bilirubin, UA Negative     Occult Blood, UA Negative    HS Troponin  0hr (reflex protocol) [[id number]]  (Normal) Collected: 03/26/24 1211    Lab Status: Final result Specimen: Blood from Arm, Right Updated: 03/26/24 1244     hs TnI 0hr 3 ng/L     Comprehensive metabolic panel [[id number]]  (Abnormal) Collected: 03/26/24 1211    Lab Status: Final result Specimen: Blood from Arm, Right Updated: 03/26/24 1235     Sodium 135 mmol/L      Potassium 4.5 mmol/L      Chloride 104 mmol/L      CO2 23 mmol/L      ANION GAP 8 mmol/L      BUN 27 mg/dL      Creatinine 1.16 mg/dL      Glucose 87 mg/dL      Calcium 8.8 mg/dL      AST 18 U/L      ALT 19 U/L      Alkaline Phosphatase 160 U/L      Total Protein 7.1 g/dL      Albumin 3.6 g/dL      Total Bilirubin 0.35 mg/dL      eGFR 43 ml/min/1.73sq m     Narrative:      National Kidney Disease Foundation guidelines for Chronic Kidney Disease (CKD):     Stage 1 with normal or high GFR (GFR > 90 mL/min/1.73 square meters)    Stage 2 Mild CKD (GFR = 60-89 mL/min/1.73 square meters)    Stage 3A Moderate CKD (GFR = 45-59 mL/min/1.73 square meters)    Stage 3B Moderate CKD (GFR = 30-44 mL/min/1.73 square meters)    Stage 4 Severe CKD (GFR = 15-29 mL/min/1.73 square meters)    Stage 5 End Stage CKD (GFR <15 mL/min/1.73 square meters)  Note: GFR calculation is accurate only with a steady state creatinine    CBC and differential [[id number]]  (Abnormal) Collected: 03/26/24 1211    Lab Status: Final result Specimen: Blood from Arm, Right Updated: 03/26/24 1220     WBC 8.25 Thousand/uL      RBC 3.82 Million/uL      Hemoglobin 11.4 g/dL      Hematocrit 37.3 %      MCV 98 fL      MCH 29.8 pg      MCHC 30.6 g/dL      RDW 15.0 %      MPV 10.3 fL      Platelets 395 Thousands/uL      nRBC 0 /100 WBCs      Neutrophils Relative 75 %      Immature Grans % 1 %      Lymphocytes Relative 12 %      Monocytes Relative 8 %      Eosinophils Relative 3 %      Basophils Relative 1 %      Neutrophils Absolute 6.25 Thousands/µL      Absolute Immature Grans 0.06 Thousand/uL       "Absolute Lymphocytes 1.02 Thousands/µL      Absolute Monocytes 0.63 Thousand/µL      Eosinophils Absolute 0.24 Thousand/µL      Basophils Absolute 0.05 Thousands/µL                    XR chest 2 views    (Results Pending)         Procedures  ECG 12 Lead Documentation Only    Date/Time: 3/26/2024 1:55 PM    Performed by: Mathew Cook MD  Authorized by: Mathew Cook MD    Indications / Diagnosis:  Syncope  Patient location:  ED  Previous ECG:     Previous ECG:  Compared to current    Similarity:  No change  Interpretation:     Interpretation: normal    Rate:     ECG rate:  69    ECG rate assessment: normal    Rhythm:     Rhythm: sinus rhythm    Ectopy:     Ectopy: none    QRS:     QRS axis:  Normal    QRS intervals:  Normal  Conduction:     Conduction: normal    ST segments:     ST segments:  Normal  T waves:     T waves: normal          ED Course             HEART Risk Score      Flowsheet Row Most Recent Value   Heart Score Risk Calculator    History 1 Filed at: 03/26/2024 1604   ECG 0 Filed at: 03/26/2024 1604   Age 2 Filed at: 03/26/2024 1604   Risk Factors 2 Filed at: 03/26/2024 1604   Troponin 0 Filed at: 03/26/2024 1604   HEART Score 5 Filed at: 03/26/2024 1604                                  Medical Decision Making  Patient presents with:  Syncope: Pt presents after syncopal episode in chair at [facility]. States she does not know how long she passed out for.  PMH of previous syncopal episodes, strokes, TIA, asthma, vitamin D deficiency. The patient denies fall and head strike. Patient states she felt lightheaded and her head felt \"empty\" prior to the syncopal episode. The patient states she is back to her baseline. Patient denies wetting herself or tasting blood after the syncopal episode.    Patient seen and examined noted to have systolic murmur, different sized pupils.      Differential diagnosis includes but is not limited to UTI, cardiac arrhythmia, ACS, electrolyte abnormality.      Patient's labs " notable for: 20-30 WBC in UA. Unremarkable CBC, CMP, troponin.    Imaging revealed:   Chest x-ray  Wet read: no acute cardiopulmonary abnormalities present. Compared to previous x-ray in January.    EKG: interpreted by myself as above.     Patient appears well, is nontoxic appearing, Doris Campbell expresses understanding and agrees with plan of care at this time.  In light of this patient would benefit from outpatient management.    Patient was rx'd as below     Amount and/or Complexity of Data Reviewed  Labs: ordered.  Radiology: ordered.    Risk  Prescription drug management.          Disposition  Final diagnoses:   UTI (urinary tract infection)   Syncope     Time reflects when diagnosis was documented in both MDM as applicable and the Disposition within this note       Time User Action Codes Description Comment    3/26/2024  2:32 PM Mathew Cook [N39.0] UTI (urinary tract infection)     3/26/2024  2:32 PM Mathew Cook [R55] Syncope           ED Disposition       ED Disposition   Discharge    Condition   Stable    Date/Time   Tue Mar 26, 2024 1545    Comment   Doris Campbell discharge to home/self care.                     Follow-up Information       Follow up With Specialties Details Why Contact Info    Tuyet Cantor MD Family Medicine   [address] [place] [address]  [phone]              Patient's Medications   Discharge Prescriptions    CEPHALEXIN (KEFLEX) 500 MG CAPSULE    Take 1 capsule (500 mg total) by mouth every 12 (twelve) hours for 7 days       Start Date: 3/26/2024 End Date: 4/2/2024       Order Dose: 500 mg       Quantity: 14 capsule    Refills: 0     No discharge procedures on file.    PDMP Review         Value Time User    PDMP Reviewed  Yes 1/19/2023 10:51 PM Gerald Hdye MD             ED Provider  Attending physically available and evaluated Doris Campbell. I managed the patient along with the ED Attending.    Electronically Signed by           Mathew Cook  MD  03/26/24 1609       Mathew Cook MD  03/26/24 4205

## 2024-03-26 NOTE — Clinical Note
Case was discussed with Dr. Gallegos and the patient's admission status was agreed to be Admission Status: observation status to the service of Dr. Gallegos .

## 2024-03-26 NOTE — ED NOTES
Transport set up for patient at this time, awaiting p/u time     Kate Grewal, KENZIE  03/26/24 2944

## 2024-03-28 LAB — BACTERIA UR CULT: NORMAL

## 2024-03-28 NOTE — ED ATTENDING ATTESTATION
3/26/2024  IEliu DO, saw and evaluated the patient. I have discussed the patient with the resident/non-physician practitioner and agree with the resident's/non-physician practitioner's findings, Plan of Care, and MDM as documented in the resident's/non-physician practitioner's note, except where noted. All available labs and Radiology studies were reviewed.  I was present for key portions of any procedure(s) performed by the resident/non-physician practitioner and I was immediately available to provide assistance.       At this point I agree with the current assessment done in the Emergency Department.  I have conducted an independent evaluation of this patient a history and physical is as follows:    82 yo F coming into the ED for eval of a syncopal episode, from nursing facility at [facility].  She apparently passed out while sitting in a chair, states felt lightheaded prior to the episode. Denies chest pain, sob, palpitations, headaches, abdominal pain. She feels well at this time. No reported seizure-like activity, no episodes or urinary or fecal incontinence.    PE:  The patient is well appearing, non-toxic, in NAD. Head: normocephalic, atraumatic. HEENT: mucous membranes moist.  Lungs: CTA b/l, no resp distress. Heart: RRR. No M/R/G. Abdomen: NT, ND, no R/R/G. Neuro: CN2-12 intact, GCS 15. Normal strength and sensation, normal speech and gait. Cap refill < 2 sec, skin warm and dry. No rashes or lesions.    ED workup for syncopal episode unremarkable, no arrhythmias, normal EKG, unchanged from baseline. Low risk for serious adverse events, based on [place] syncope rule.  + UTI on UA. Treat w/ abx.  Pt ambulated in the ED. Felt stable enough for discharge back to facility.      ED Course         Critical Care Time  Procedures

## 2024-03-31 LAB
ATRIAL RATE: 69 BPM
P AXIS: -14 DEGREES
PR INTERVAL: 118 MS
QRS AXIS: -2 DEGREES
QRSD INTERVAL: 80 MS
QT INTERVAL: 422 MS
QTC INTERVAL: 452 MS
T WAVE AXIS: 37 DEGREES
VENTRICULAR RATE: 69 BPM

## 2024-03-31 PROCEDURE — 93010 ELECTROCARDIOGRAM REPORT: CPT | Performed by: INTERNAL MEDICINE

## 2024-04-06 ENCOUNTER — APPOINTMENT (EMERGENCY)
Dept: CT IMAGING | Facility: HOSPITAL | Age: 84
DRG: 184 | End: 2024-04-06
Payer: MEDICARE

## 2024-04-06 ENCOUNTER — HOSPITAL ENCOUNTER (INPATIENT)
Facility: HOSPITAL | Age: 84
LOS: 4 days | Discharge: NURSING, REHAB OR LONG-TERM CARE | DRG: 184 | End: 2024-04-10
Attending: EMERGENCY MEDICINE | Admitting: SURGERY
Payer: MEDICARE

## 2024-04-06 ENCOUNTER — APPOINTMENT (EMERGENCY)
Dept: RADIOLOGY | Facility: HOSPITAL | Age: 84
DRG: 184 | End: 2024-04-06
Payer: MEDICARE

## 2024-04-06 ENCOUNTER — APPOINTMENT (INPATIENT)
Dept: CT IMAGING | Facility: HOSPITAL | Age: 84
DRG: 184 | End: 2024-04-06
Payer: MEDICARE

## 2024-04-06 DIAGNOSIS — S02.40FA CLOSED FRACTURE OF LEFT ZYGOMATIC ARCH, INITIAL ENCOUNTER (HCC): ICD-10-CM

## 2024-04-06 DIAGNOSIS — S32.019A CLOSED FRACTURE OF FIRST LUMBAR VERTEBRA, UNSPECIFIED FRACTURE MORPHOLOGY, INITIAL ENCOUNTER (HCC): ICD-10-CM

## 2024-04-06 DIAGNOSIS — G89.11 ACUTE PAIN DUE TO TRAUMA: ICD-10-CM

## 2024-04-06 DIAGNOSIS — S32.591A INFERIOR PUBIC RAMUS FRACTURE, RIGHT, CLOSED, INITIAL ENCOUNTER (HCC): ICD-10-CM
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
## 2024-04-06 DIAGNOSIS — S91.309A WOUND OF FOOT: ICD-10-CM
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
## 2024-04-06 DIAGNOSIS — S02.85XA CLOSED FRACTURE OF ORBIT, INITIAL ENCOUNTER (HCC): Primary | ICD-10-CM

## 2024-04-06 PROBLEM — S02.402A ZYGOMATIC FRACTURE (HCC): Status: ACTIVE | Noted: 2024-04-06

## 2024-04-06 PROBLEM — S22.49XA MULTIPLE RIB FRACTURES: Status: ACTIVE | Noted: 2024-04-06

## 2024-04-06 LAB
ABO GROUP BLD: NORMAL
ALBUMIN SERPL BCP-MCNC: 3.4 G/DL (ref 3.5–5)
ALP SERPL-CCNC: 182 U/L (ref 34–104)
ALT SERPL W P-5'-P-CCNC: 27 U/L (ref 7–52)
ANION GAP SERPL CALCULATED.3IONS-SCNC: 7 MMOL/L (ref 4–13)
AST SERPL W P-5'-P-CCNC: 30 U/L (ref 13–39)
ATRIAL RATE: 79 BPM
BASOPHILS # BLD AUTO: 0.06 THOUSANDS/ÂΜL (ref 0–0.1)
BASOPHILS NFR BLD AUTO: 1 % (ref 0–1)
BILIRUB SERPL-MCNC: 0.31 MG/DL (ref 0.2–1)
BLD GP AB SCN SERPL QL: NEGATIVE
BUN SERPL-MCNC: 17 MG/DL (ref 5–25)
CALCIUM ALBUM COR SERPL-MCNC: 9.5 MG/DL (ref 8.3–10.1)
CALCIUM SERPL-MCNC: 9 MG/DL (ref 8.4–10.2)
CHLORIDE SERPL-SCNC: 105 MMOL/L (ref 96–108)
CO2 SERPL-SCNC: 23 MMOL/L (ref 21–32)
CREAT SERPL-MCNC: 0.94 MG/DL (ref 0.6–1.3)
EOSINOPHIL # BLD AUTO: 0.24 THOUSAND/ÂΜL (ref 0–0.61)
EOSINOPHIL NFR BLD AUTO: 3 % (ref 0–6)
ERYTHROCYTE [DISTWIDTH] IN BLOOD BY AUTOMATED COUNT: 15 % (ref 11.6–15.1)
GFR SERPL CREATININE-BSD FRML MDRD: 55 ML/MIN/1.73SQ M
GLUCOSE SERPL-MCNC: 92 MG/DL (ref 65–140)
HCT VFR BLD AUTO: 36 % (ref 34.8–46.1)
HGB BLD-MCNC: 11.1 G/DL (ref 11.5–15.4)
IMM GRANULOCYTES # BLD AUTO: 0.07 THOUSAND/UL (ref 0–0.2)
IMM GRANULOCYTES NFR BLD AUTO: 1 % (ref 0–2)
LYMPHOCYTES # BLD AUTO: 0.82 THOUSANDS/ÂΜL (ref 0.6–4.47)
LYMPHOCYTES NFR BLD AUTO: 9 % (ref 14–44)
MCH RBC QN AUTO: 29.9 PG (ref 26.8–34.3)
MCHC RBC AUTO-ENTMCNC: 30.8 G/DL (ref 31.4–37.4)
MCV RBC AUTO: 97 FL (ref 82–98)
MONOCYTES # BLD AUTO: 0.55 THOUSAND/ÂΜL (ref 0.17–1.22)
MONOCYTES NFR BLD AUTO: 6 % (ref 4–12)
NEUTROPHILS # BLD AUTO: 7.78 THOUSANDS/ÂΜL (ref 1.85–7.62)
NEUTS SEG NFR BLD AUTO: 80 % (ref 43–75)
NRBC BLD AUTO-RTO: 0 /100 WBCS
P AXIS: -1 DEGREES
PLATELET # BLD AUTO: 400 THOUSANDS/UL (ref 149–390)
PMV BLD AUTO: 9.6 FL (ref 8.9–12.7)
POTASSIUM SERPL-SCNC: 5.1 MMOL/L (ref 3.5–5.3)
PR INTERVAL: 124 MS
PROT SERPL-MCNC: 7.1 G/DL (ref 6.4–8.4)
QRS AXIS: 12 DEGREES
QRSD INTERVAL: 74 MS
QT INTERVAL: 394 MS
QTC INTERVAL: 451 MS
RBC # BLD AUTO: 3.71 MILLION/UL (ref 3.81–5.12)
RH BLD: POSITIVE
SODIUM SERPL-SCNC: 135 MMOL/L (ref 135–147)
SPECIMEN EXPIRATION DATE: NORMAL
T WAVE AXIS: 40 DEGREES
VENTRICULAR RATE: 79 BPM
WBC # BLD AUTO: 9.52 THOUSAND/UL (ref 4.31–10.16)

## 2024-04-06 PROCEDURE — 99285 EMERGENCY DEPT VISIT HI MDM: CPT | Performed by: EMERGENCY MEDICINE

## 2024-04-06 PROCEDURE — 93010 ELECTROCARDIOGRAM REPORT: CPT | Performed by: INTERNAL MEDICINE

## 2024-04-06 PROCEDURE — 86850 RBC ANTIBODY SCREEN: CPT | Performed by: SURGERY

## 2024-04-06 PROCEDURE — 86901 BLOOD TYPING SEROLOGIC RH(D): CPT | Performed by: SURGERY

## 2024-04-06 PROCEDURE — 94760 N-INVAS EAR/PLS OXIMETRY 1: CPT

## 2024-04-06 PROCEDURE — 74177 CT ABD & PELVIS W/CONTRAST: CPT

## 2024-04-06 PROCEDURE — 86900 BLOOD TYPING SEROLOGIC ABO: CPT | Performed by: SURGERY

## 2024-04-06 PROCEDURE — 93005 ELECTROCARDIOGRAM TRACING: CPT

## 2024-04-06 PROCEDURE — 71250 CT THORAX DX C-: CPT

## 2024-04-06 PROCEDURE — 70450 CT HEAD/BRAIN W/O DYE: CPT

## 2024-04-06 PROCEDURE — 36415 COLL VENOUS BLD VENIPUNCTURE: CPT | Performed by: EMERGENCY MEDICINE

## 2024-04-06 PROCEDURE — 85025 COMPLETE CBC W/AUTO DIFF WBC: CPT | Performed by: EMERGENCY MEDICINE

## 2024-04-06 PROCEDURE — 73502 X-RAY EXAM HIP UNI 2-3 VIEWS: CPT

## 2024-04-06 PROCEDURE — 80053 COMPREHEN METABOLIC PANEL: CPT | Performed by: EMERGENCY MEDICINE

## 2024-04-06 PROCEDURE — 94664 DEMO&/EVAL PT USE INHALER: CPT

## 2024-04-06 PROCEDURE — 72125 CT NECK SPINE W/O DYE: CPT

## 2024-04-06 PROCEDURE — 70486 CT MAXILLOFACIAL W/O DYE: CPT

## 2024-04-06 RX ORDER — GABAPENTIN 100 MG/1
100 CAPSULE ORAL
Status: DISCONTINUED | OUTPATIENT
Start: 2024-04-06 | End: 2024-04-06

## 2024-04-06 RX ORDER — TRAMADOL HYDROCHLORIDE 50 MG/1
25 TABLET ORAL EVERY 6 HOURS PRN
COMMUNITY
End: 2024-04-10

## 2024-04-06 RX ORDER — GABAPENTIN 100 MG/1
100 CAPSULE ORAL DAILY
Status: DISCONTINUED | OUTPATIENT
Start: 2024-04-07 | End: 2024-04-10 | Stop reason: HOSPADM

## 2024-04-06 RX ORDER — HYDROMORPHONE HCL IN WATER/PF 6 MG/30 ML
0.2 PATIENT CONTROLLED ANALGESIA SYRINGE INTRAVENOUS EVERY 2 HOUR PRN
Status: DISCONTINUED | OUTPATIENT
Start: 2024-04-06 | End: 2024-04-08

## 2024-04-06 RX ORDER — AMOXICILLIN 250 MG
1 CAPSULE ORAL
Status: DISCONTINUED | OUTPATIENT
Start: 2024-04-06 | End: 2024-04-09

## 2024-04-06 RX ORDER — ACETAMINOPHEN 325 MG/1
975 TABLET ORAL EVERY 8 HOURS SCHEDULED
Status: DISCONTINUED | OUTPATIENT
Start: 2024-04-06 | End: 2024-04-10 | Stop reason: HOSPADM

## 2024-04-06 RX ORDER — GABAPENTIN 100 MG/1
200 CAPSULE ORAL
Status: DISCONTINUED | OUTPATIENT
Start: 2024-04-06 | End: 2024-04-10 | Stop reason: HOSPADM

## 2024-04-06 RX ORDER — ENOXAPARIN SODIUM 100 MG/ML
30 INJECTION SUBCUTANEOUS EVERY 12 HOURS SCHEDULED
Status: DISCONTINUED | OUTPATIENT
Start: 2024-04-06 | End: 2024-04-08

## 2024-04-06 RX ORDER — AMLODIPINE BESYLATE 2.5 MG/1
2.5 TABLET ORAL 2 TIMES DAILY
Status: DISCONTINUED | OUTPATIENT
Start: 2024-04-06 | End: 2024-04-10 | Stop reason: HOSPADM

## 2024-04-06 RX ORDER — MELOXICAM 7.5 MG/1
7.5 TABLET ORAL 2 TIMES DAILY
COMMUNITY

## 2024-04-06 RX ORDER — GABAPENTIN 100 MG/1
200 CAPSULE ORAL
COMMUNITY
End: 2024-04-10

## 2024-04-06 RX ORDER — LIDOCAINE 50 MG/G
2 PATCH TOPICAL DAILY
Status: DISCONTINUED | OUTPATIENT
Start: 2024-04-06 | End: 2024-04-10 | Stop reason: HOSPADM

## 2024-04-06 RX ORDER — CALCITONIN SALMON 200 [IU]/.09ML
1 SPRAY, METERED NASAL DAILY
COMMUNITY

## 2024-04-06 RX ORDER — LANOLIN ALCOHOL/MO/W.PET/CERES
3 CREAM (GRAM) TOPICAL
Status: DISCONTINUED | OUTPATIENT
Start: 2024-04-06 | End: 2024-04-10 | Stop reason: HOSPADM

## 2024-04-06 RX ORDER — OXYCODONE HYDROCHLORIDE 5 MG/1
5 TABLET ORAL EVERY 4 HOURS PRN
Status: DISCONTINUED | OUTPATIENT
Start: 2024-04-06 | End: 2024-04-10 | Stop reason: HOSPADM

## 2024-04-06 RX ADMIN — IOHEXOL 70 ML: 350 INJECTION, SOLUTION INTRAVENOUS at 16:09

## 2024-04-06 RX ADMIN — LIDOCAINE 5% 2 PATCH: 700 PATCH TOPICAL at 15:16

## 2024-04-06 RX ADMIN — ACETAMINOPHEN 975 MG: 325 TABLET, FILM COATED ORAL at 22:08

## 2024-04-06 RX ADMIN — GABAPENTIN 200 MG: 100 CAPSULE ORAL at 22:08

## 2024-04-06 RX ADMIN — AMLODIPINE BESYLATE 2.5 MG: 2.5 TABLET ORAL at 22:08

## 2024-04-06 RX ADMIN — ENOXAPARIN SODIUM 30 MG: 30 INJECTION SUBCUTANEOUS at 22:08

## 2024-04-06 RX ADMIN — Medication 3 MG: at 22:08

## 2024-04-06 RX ADMIN — ACETAMINOPHEN 975 MG: 325 TABLET, FILM COATED ORAL at 15:16

## 2024-04-06 RX ADMIN — SENNOSIDES AND DOCUSATE SODIUM 1 TABLET: 8.6; 5 TABLET ORAL at 22:08

## 2024-04-06 NOTE — RESPIRATORY THERAPY NOTE
RT Protocol Note  Doris Campbell 83 y.o. female MRN: [id number]  Unit/Bed#: ED-17 Encounter: [id number]    Assessment    Active Problems:    Mild cognitive impairment    Hypertension    History of falling    Multiple rib fractures    Acute pain due to trauma    L1 vertebral fracture (HCC)    Zygomatic fracture (HCC)      Home Pulmonary Medications:  none  Home Devices/Therapy: Other (Comment) (none)    Past Medical History:   Diagnosis Date    Allergic     Anxiety     Arthritis     Cancer (HCC)     HLD (hyperlipidemia)     Hypertension     Hyperthyroidism     Osteoporosis     Stroke (HCC)     TIA (transient ischemic attack)      Social History     Socioeconomic History    Marital status:      Spouse name: None    Number of children: None    Years of education: None    Highest education level: None   Occupational History    None   Tobacco Use    Smoking status: Never    Smokeless tobacco: Never    Tobacco comments:     Former smoker - As per [place]    Vaping Use    Vaping status: Never Used   Substance and Sexual Activity    Alcohol use: Not Currently     Comment: Alcohol intake:   Occasional  - As per [place]     Drug use: Never    Sexual activity: Not Currently   Other Topics Concern    None   Social History Narrative    ** Merged History Encounter **         Living at SNF    · Caffeine intake:   None        Social Determinants of Health     Financial Resource Strain: Low Risk  (2/8/2021)    Overall Financial Resource Strain (CARDIA)     Difficulty of Paying Living Expenses: Not hard at all   Food Insecurity: No Food Insecurity (10/25/2022)    Hunger Vital Sign     Worried About Running Out of Food in the Last Year: Never true     Ran Out of Food in the Last Year: Never true   Transportation Needs: No Transportation Needs (10/25/2022)    PRAPARE - Transportation     Lack of Transportation (Medical): No     Lack of Transportation (Non-Medical): No   Physical Activity: Inactive (6/10/2020)    Exercise Vital  "Sign     Days of Exercise per Week: 0 days     Minutes of Exercise per Session: 0 min   Stress: No Stress Concern Present (6/10/2020)    [employer] [employer] - Occupational Stress Questionnaire     Feeling of Stress : Only a little   Social Connections: Socially Isolated (6/10/2020)    Social Connection and Isolation Panel [NHANES]     Frequency of Communication with Friends and Family: More than three times a week     Frequency of Social Gatherings with Friends and Family: More than three times a week     Attends Jain Services: Never     Active Member of Clubs or Organizations: No     Attends Club or Organization Meetings: Never     Marital Status:    Intimate Partner Violence: Not At Risk (6/10/2020)    Humiliation, Afraid, Rape, and Kick questionnaire     Fear of Current or Ex-Partner: No     Emotionally Abused: No     Physically Abused: No     Sexually Abused: No   Housing Stability: Low Risk  (10/25/2022)    Housing Stability Vital Sign     Unable to Pay for Housing in the Last Year: No     Number of Places Lived in the Last Year: 1     Unstable Housing in the Last Year: No       Subjective         Objective    Physical Exam:   Assessment Type: Assess only  General Appearance: Awake, Alert  Respiratory Pattern: Normal  Chest Assessment: Chest expansion symmetrical  Bilateral Breath Sounds: Clear, Diminished  O2 Device: RA    Vitals:  Blood pressure 148/71, pulse 82, temperature 97.8 °F (36.6 °C), temperature source Oral, resp. rate 16, height 5' 3\" (1.6 m), weight 47.6 kg (104 lb 15 oz), SpO2 95%.          Imaging and other studies:     O2 Device: RA     Plan    Respiratory Plan: No distress/Pulmonary history, Discontinue Protocol  Airway Clearance Plan: Incentive Spirometer     Resp Comments: Assessed patient per protocol, patient denies any nebs/inhaler use, bipap/cpap use and home oxygen use. Patient on RA. Instructed on use of IS goal 786 achieved 700   "

## 2024-04-06 NOTE — CONSULTS
Patient Name: Doris Campbell YOB: 1940    Medical Record No.: [id number]     Admit/Registration Date: 4/6/2024 10:32 AM  Date of Consult: 04/06/24      Oral and Maxillofacial Surgery Consult Note    Assessment:  83 y.o. female with minimally displaced left ZMC fracture w/o ocular involvement.      Plan/Recs:  - no acute surgical intervention indicated   - ice to face prn for 1-2 days   - avoid contact to face.   - diet as tolerated.   - Abx per primary team   - Sinus pre-cautions, avoid holding sneezes, decongestants prn.     -----------------------------------------    Chief Complaint:  Left facial swelling.     HPI:  83 y.o. female who presents with left facial swelling after fall to face after syncopal episode. +LOC, Pt is A&O x 3 though lethargic and slow to respond to questions     Pain location: left face   Pain quality: throbbing   Pain scale: 2/10      Pre-admission records reviewed. PMH/PSH/Meds/Allergies Reviewed.    Past Medical History:   Diagnosis Date    Allergic     Anxiety     Arthritis     Cancer (HCC)     HLD (hyperlipidemia)     Hypertension     Hyperthyroidism     Osteoporosis     Stroke (HCC)     TIA (transient ischemic attack)      Past Surgical History:   Procedure Laterality Date    BREAST LUMPECTOMY Left     HYSTERECTOMY      KNEE SURGERY Right     ORIF TIBIA FRACTURE Right 12/8/2016    Procedure: OPEN REDUCTION W/ INTERNAL FIXATION (ORIF) TIBIA;  Surgeon: Angie Case MD;  Location: [facility];  Service:        Allergies   Allergen Reactions    Alendronate     Raloxifene     Risedronate Sodium     Sulfa Antibiotics Swelling    Penicillins Swelling     Patient received 7 day treatment course Keflex without any adverse reactions in 11/2022.        Social History     Socioeconomic History    Marital status:      Spouse name: Not on file    Number of children: Not on file    Years of education: Not on file    Highest education level: Not on file   Occupational  History    Not on file   Tobacco Use    Smoking status: Never    Smokeless tobacco: Never    Tobacco comments:     Former smoker - As per Kelly    Vaping Use    Vaping status: Never Used   Substance and Sexual Activity    Alcohol use: Not Currently     Comment: Alcohol intake:   Occasional  - As per Kelly     Drug use: Never    Sexual activity: Not Currently   Other Topics Concern    Not on file   Social History Narrative    ** Merged History Encounter **         Living at [facility]    · Caffeine intake:   None        Social Determinants of Health     Financial Resource Strain: Low Risk  (2/8/2021)    Overall Financial Resource Strain (CARDIA)     Difficulty of Paying Living Expenses: Not hard at all   Food Insecurity: No Food Insecurity (10/25/2022)    Hunger Vital Sign     Worried About Running Out of Food in the Last Year: Never true     Ran Out of Food in the Last Year: Never true   Transportation Needs: No Transportation Needs (10/25/2022)    PRAPARE - Transportation     Lack of Transportation (Medical): No     Lack of Transportation (Non-Medical): No   Physical Activity: Inactive (6/10/2020)    Exercise Vital Sign     Days of Exercise per Week: 0 days     Minutes of Exercise per Session: 0 min   Stress: No Stress Concern Present (6/10/2020)    [employer] - Occupational Stress Questionnaire     Feeling of Stress : Only a little   Social Connections: Socially Isolated (6/10/2020)    Social Connection and Isolation Panel [NHANES]     Frequency of Communication with Friends and Family: More than three times a week     Frequency of Social Gatherings with Friends and Family: More than three times a week     Attends Confucianism Services: Never     Active Member of Clubs or Organizations: No     Attends Club or Organization Meetings: Never     Marital Status:    Intimate Partner Violence: Not At Risk (6/10/2020)    Humiliation, Afraid, Rape, and Kick questionnaire     Fear of Current or  "Ex-Partner: No     Emotionally Abused: No     Physically Abused: No     Sexually Abused: No   Housing Stability: Low Risk  (10/25/2022)    Housing Stability Vital Sign     Unable to Pay for Housing in the Last Year: No     Number of Places Lived in the Last Year: 1     Unstable Housing in the Last Year: No       Scheduled Medications  Current Facility-Administered Medications   Medication Dose Route Frequency Provider Last Rate    acetaminophen  975 mg Oral Q8H [facility] [place] PA-C      amLODIPine  2.5 mg Oral BID [facility] [place] PA-C      enoxaparin  30 mg Subcutaneous Q12H [facility] [place] PA-C      [START ON 4/7/2024] gabapentin  100 mg Oral Daily [facility] [place] PA-C      gabapentin  200 mg Oral HS [facility] [place] PA-C      HYDROmorphone  0.2 mg Intravenous Q2H PRN [facility] [place] PA-C      lidocaine  2 patch Topical Daily [facility] [place] PA-C      melatonin  3 mg Oral HS [facility] [place] PA-C      naloxone  0.04 mg Intravenous Q1MIN PRN [facility] [place] PA-C      oxyCODONE  2.5 mg Oral Q4H PRN [facility] [place] PA-C      Or    oxyCODONE  5 mg Oral Q4H PRN [facility] [place] PA-C      senna-docusate sodium  1 tablet Oral HS [facility] [place] PA-C         PRN Medications    HYDROmorphone    naloxone    oxyCODONE **OR** oxyCODONE    Medication Infusions       Review of Systems    Vitals:    Temp:  [97.7 °F (36.5 °C)-97.8 °F (36.6 °C)] 97.7 °F (36.5 °C)  HR:  [74-82] 80  Resp:  [15-16] 15  BP: (136-164)/(65-76) 136/73  Wt Readings from Last 1 Encounters:   04/06/24 47.6 kg (104 lb 15 oz)     Ht Readings from Last 1 Encounters:   04/06/24 5' 3\" (1.6 m)     Body mass index is 18.59 kg/m².  Respiratory      Lab Data (Last 4 hours)      None           O2/Vent Data (Last 4 hours)      None                  Patient Lines/Drains/Airways Status       Active Airway       None                  I/O:  Current Diet Order:        Diet Orders   (From admission, onward)                 Start     Ordered    04/06/24 0149  Diet NPO; " Sips with meds  Diet effective now        References:    Adult Nutrition Support Algorithm    RD Therapeutic Diet Order Protocol   Question Answer Comment   Diet Type NPO    NPO Except: Sips with meds    RD to adjust diet per protocol? Yes        04/06/24 1441                   No intake or output data in the 24 hours ending 04/06/24 1811    Labs:  Results from last 7 days   Lab Units 04/06/24  1239   WBC Thousand/uL 9.52   HEMOGLOBIN g/dL 11.1*   HEMATOCRIT % 36.0   PLATELETS Thousands/uL 400*     Results from last 7 days   Lab Units 04/06/24  1239   POTASSIUM mmol/L 5.1   CHLORIDE mmol/L 105   CO2 mmol/L 23   BUN mg/dL 17   CREATININE mg/dL 0.94   CALCIUM mg/dL 9.0             Pain Management Panel           No data to display                Imaging:   CT: Facial bones - IMPRESSION:     Acute left anterior and lateral maxillary sinus, left lateral orbit and equivocal left zygomatic arch fractures as above.      Physical Exam:   General: Integment: skin warm and dry, patient is WD/WN, Voice quality: normal, in NAD  Neuro Exam: AAO x 3 , CN V,VII grossly intact, [facility]  Head: Normocephalic, no scalp lacerations or hematoma   Face: moderate left midface swelling, with ecchymosis that is soft and minimally ttp  Ears: Pinna wnl bilaterally, no otorrhea, hearing grossly intact   Eyes/Periorbital: Pupils equal, round, reactive to light, intercanthal distance wnl moderate left periorbital edema.    Nose: External nose symmetrical/no gross deformity, no nasal crepitus, no nasal septal hematoma, no rhinorrhea, no epistaxis, bilateral nares patent   Oral Exam:Lips and mucosal surfaces wnl, floor of mouth is soft with no palpable masses, tongue protrusion is midline and has full range of motion, no pharyngeal edema or exudate   Dentalalveolar Exam:  generally poor OH, and multiple carious teeth, with no acute s/s of infection  , HEATHER 45mm, lateral excursive movements wnl    Lymph/Neck Exam: Neck is soft, trachea is midline, no gross cervical lymphadenopathy bilaterally,        Zander Ahuja, DMD

## 2024-04-06 NOTE — RESPIRATORY THERAPY NOTE
04/06/24 1501   Incentive Spirometry Tx   IS level of assistance Assisted by respiratory care provider   Frequency q1hr W/A   Respiratory Effort Normal   Treatment Tolerance Tolerated well   Incentive Spirometry Goal (mL) (S)  786 mL  (per rib fx prot)   Incentive Spirometry Achieved (mL) 700 mL   Chest Physiotherapy Tx   $ Demo/Eval of Ender, DONALD, IPPB, CPT Yes

## 2024-04-06 NOTE — ED PROVIDER NOTES
History  Chief Complaint   Patient presents with    Fall     Arrives bls from [facility]. Unwitnessed fall, headstrike to L supraorbital.  Alert to self. Place. Situation.  Ccollar in place.      83 year old Female with PMH of HTN, HLD, CVA, and cancer that presents to the ED via ambulance from her SNF after a fall that occurred prior to arrival. Patient states that she was walking and lost her footing however, cannot remember the exact events that happened. She was placed in cervical collar prior to being seen in the ED room and is complaining of pain in left rib cage, left hip, her neck, as well as above her left eye. She did hit her head when she fell with significant swelling above her left eye. She cannot recall if she had LOC and does not take any antiplatelet/anticoagulation therapy.  Patient ambulates with a walker at baseline and also denies feeling lightheaded, dizzy, shortness of breath, or chest pain prior to fall. Denies chest pain, SOB, cough, abdominal pain, n/v/d, fever, chills, dizziness, lightheadedness, HA, dysuria, hematuria, hematochezia, or melena.           Prior to Admission Medications   Prescriptions Last Dose Informant Patient Reported? Taking?   Calcium-Vitamin D-Vitamin K (VIACTIV PO) 4/6/2024  Yes Yes   Sig: Take 1 tablet by mouth daily after breakfast   Diclofenac Sodium (VOLTAREN) 1 % Past Month Self No Yes   Sig: Apply 2 g topically 4 (four) times a day = To right hip and pelvis   Sodium Phosphates (FLEET ENEMA RE)  Self Yes No   Sig: Insert 1 Bottle into the rectum daily as needed   acetaminophen (TYLENOL) 325 mg tablet Past Month Self Yes Yes   Sig: Take 975 mg by mouth every 8 (eight) hours as needed   albuterol (PROVENTIL HFA,VENTOLIN HFA) 90 mcg/act inhaler  Self Yes No   Sig: Inhale 2 puffs every 6 (six) hours as needed for wheezing   amLODIPine (NORVASC) 2.5 mg tablet 4/6/2024 Self No Yes   Sig: Take 1 tablet (2.5 mg total) by mouth 2 (two) times a day   calcitonin,  salmon, (MIACALCIN) 200 units/act nasal spray 2024  Yes Yes   Si spray into each nostril daily   cholecalciferol (VITAMIN D3) 1,000 units tablet 2024 Self No Yes   Sig: Take 1 tablet (1,000 Units total) by mouth daily   ferrous sulfate 324 (65 Fe) mg 2024  No Yes   Sig: Take 1 tablet (324 mg total) by mouth daily before breakfast   fluticasone (FLONASE) 50 mcg/act nasal spray 2024 Self No Yes   Si spray into each nostril daily   gabapentin (NEURONTIN) 100 mg capsule 2024 Self No Yes   Sig: Take 1 capsule (100 mg total) by mouth daily at bedtime   gabapentin (NEURONTIN) 100 mg capsule 2024  Yes Yes   Sig: Take 200 mg by mouth daily at bedtime   lidocaine (LMX) 4 % cream  Self Yes No   Sig: Apply 1 application topically 2 (two) times a day as needed = to LUE and hands   magnesium hydroxide (MILK OF MAGNESIA) 400 mg/5 mL oral suspension Past Month Self Yes Yes   Sig: Take 30 mL by mouth 2 (two) times a day as needed   melatonin 3 mg Past Week Self No Yes   Sig: Take 1 tablet (3 mg total) by mouth daily at bedtime   meloxicam (MOBIC) 7.5 mg tablet 2024  Yes Yes   Sig: Take 7.5 mg by mouth 2 (two) times a day   oxyCODONE (OXY-IR) 5 MG capsule Past Week Self Yes Yes   Sig: Take 2.5 mg by mouth every 6 (six) hours as needed for moderate pain   senna-docusate sodium (SENOKOT S) 8.6-50 mg per tablet  Self No No   Sig: Take 1 tablet by mouth 2 (two) times a day   traMADol (ULTRAM) 50 mg tablet Past Week  Yes Yes   Sig: Take 25 mg by mouth every 6 (six) hours as needed for moderate pain      Facility-Administered Medications: None       Past Medical History:   Diagnosis Date    Allergic     Anxiety     Arthritis     Cancer (HCC)     HLD (hyperlipidemia)     Hypertension     Hyperthyroidism     Osteoporosis     Stroke (HCC)     TIA (transient ischemic attack)        Past Surgical History:   Procedure Laterality Date    BREAST LUMPECTOMY Left     HYSTERECTOMY      KNEE SURGERY Right     ORIF  TIBIA FRACTURE Right 12/8/2016    Procedure: OPEN REDUCTION W/ INTERNAL FIXATION (ORIF) TIBIA;  Surgeon: Angie Case MD;  Location: [facility];  Service:        Family History   Problem Relation Age of Onset    Coronary artery disease Family     Other Family         DJD     I have reviewed and agree with the history as documented.    E-Cigarette/Vaping    E-Cigarette Use Never User      E-Cigarette/Vaping Substances    Nicotine No     THC No     CBD No     Flavoring No     Other No     Unknown No      Social History     Tobacco Use    Smoking status: Never    Smokeless tobacco: Never    Tobacco comments:     Former smoker - As per Kelly    Vaping Use    Vaping status: Never Used   Substance Use Topics    Alcohol use: Not Currently     Comment: Alcohol intake:   Occasional  - As per Kelly     Drug use: Never        Review of Systems   Constitutional:  Negative for appetite change, chills, diaphoresis, fatigue and fever.   HENT:  Negative for congestion, ear pain, postnasal drip, rhinorrhea, sore throat and trouble swallowing.    Eyes:  Positive for pain. Negative for visual disturbance.        Pain above Left eye with swelling   Respiratory:  Negative for cough and shortness of breath.    Cardiovascular:  Negative for chest pain and palpitations.   Gastrointestinal:  Negative for abdominal pain, constipation, diarrhea, nausea and vomiting.   Genitourinary:  Negative for decreased urine volume, dysuria and hematuria.   Musculoskeletal:  Positive for neck pain. Negative for arthralgias and back pain.        Left hip and rib cage pain.    Skin:  Negative for color change and rash.   Neurological:  Negative for dizziness, seizures, syncope, weakness, light-headedness and headaches.   All other systems reviewed and are negative.      Physical Exam  ED Triage Vitals   Temperature Pulse Respirations Blood Pressure SpO2   04/06/24 1041 04/06/24 1041 04/06/24 1041 04/06/24 1041 04/06/24 1041   97.8 °F (36.6 °C) 79 16  164/76 95 %      Temp Source Heart Rate Source Patient Position - Orthostatic VS BP Location FiO2 (%)   04/06/24 1041 04/06/24 1041 04/06/24 1041 04/06/24 1041 --   Oral Monitor Lying Right arm       Pain Score       04/06/24 1516       No Pain             Orthostatic Vital Signs  Vitals:    04/06/24 1041 04/06/24 1243 04/06/24 1430 04/06/24 1545   BP: 164/76 136/65 148/71 146/70   Pulse: 79 78 82 74   Patient Position - Orthostatic VS: Lying Lying  Lying       Physical Exam  Vitals and nursing note reviewed.   Constitutional:       Appearance: Normal appearance. She is normal weight.   HENT:      Head: Normocephalic.      Comments: Left supraorbital edema and ecchymosis noted along the lateral border. Small skin abrasion noted in same location with no active bleeding or drainage. Tenderness to palpation of the inferior orbital region but no noted entrapment with EOM.     Right Ear: External ear normal.      Left Ear: External ear normal.      Nose: Nose normal.      Mouth/Throat:      Pharynx: Oropharynx is clear.   Eyes:      Extraocular Movements: Extraocular movements intact.      Conjunctiva/sclera: Conjunctivae normal.      Pupils: Pupils are equal, round, and reactive to light.   Neck:      Comments: Mild tenderness to palpation over the midline cervical spine. C collar in place. No signs of abrasions, ecchymosis, erythema, or gross deformity was noted.    Cardiovascular:      Rate and Rhythm: Normal rate and regular rhythm.      Pulses: Normal pulses.      Heart sounds: Normal heart sounds.      Comments: RRR with +S1 and S2, no murmurs appreciated on exam. Peripheral pulses intact.    Pulmonary:      Effort: Pulmonary effort is normal. No respiratory distress.      Breath sounds: Normal breath sounds. No wheezing, rhonchi or rales.      Comments: CTABL with no abnormal lung sounds such as wheezes or rales appreciated on exam.   Tenderness to palpation over the left sided thoracic cage along the 3rd and 4th  ribs in the mid clavicular line  Chest:      Chest wall: Tenderness present.   Abdominal:      General: Abdomen is flat. Bowel sounds are normal. There is no distension.      Palpations: Abdomen is soft.      Tenderness: There is no abdominal tenderness.      Comments: Soft, non tender, normo-active bowel sounds. Without rigidity, guarding, or distension.     Musculoskeletal:         General: Tenderness present. No deformity. Normal range of motion.      Cervical back: Normal range of motion. Tenderness present.      Right lower leg: No edema.      Left lower leg: No edema.      Comments: Mild tenderness to palpation of the Left hip along the greater trochanter. Full ROM with active and passive movement of the joints. 5/5 strength in BL upper and lower extremities. No signs of abrasions, ecchymosis, erythema, or gross deformity was noted.     Skin:     General: Skin is warm and dry.   Neurological:      General: No focal deficit present.      Mental Status: She is alert and oriented to person, place, and time. Mental status is at baseline.      Comments: CN grossly intact on visualization. No focal neurologic deficits noted on exam.  5/5 strength in all extremities. Neurovascularly intact with normal sensation and motor function.             ED Medications  Medications   enoxaparin (LOVENOX) subcutaneous injection 30 mg (has no administration in time range)   acetaminophen (TYLENOL) tablet 975 mg (975 mg Oral Given 4/6/24 1516)   oxyCODONE (ROXICODONE) split tablet 2.5 mg (has no administration in time range)     Or   oxyCODONE (ROXICODONE) IR tablet 5 mg (has no administration in time range)   HYDROmorphone HCl (DILAUDID) injection 0.2 mg (has no administration in time range)   naloxone (NARCAN) 0.04 mg/mL syringe 0.04 mg (has no administration in time range)   senna-docusate sodium (SENOKOT S) 8.6-50 mg per tablet 1 tablet (has no administration in time range)   lidocaine (LIDODERM) 5 % patch 2 patch (2 patches  Topical Medication Applied 4/6/24 1516)   amLODIPine (NORVASC) tablet 2.5 mg (has no administration in time range)   gabapentin (NEURONTIN) capsule 200 mg (has no administration in time range)   melatonin tablet 3 mg (has no administration in time range)   gabapentin (NEURONTIN) capsule 100 mg (has no administration in time range)   iohexol (OMNIPAQUE) 350 MG/ML injection (MULTI-DOSE) 70 mL (70 mL Intravenous Given 4/6/24 1609)       Diagnostic Studies  Results Reviewed       Procedure Component Value Units Date/Time    Urinalysis with microscopic [[id number]]     Lab Status: No result Specimen: Urine     Comprehensive metabolic panel [[id number]]  (Abnormal) Collected: 04/06/24 1239    Lab Status: Final result Specimen: Blood from Arm, Right Updated: 04/06/24 1312     Sodium 135 mmol/L      Potassium 5.1 mmol/L      Chloride 105 mmol/L      CO2 23 mmol/L      ANION GAP 7 mmol/L      BUN 17 mg/dL      Creatinine 0.94 mg/dL      Glucose 92 mg/dL      Calcium 9.0 mg/dL      Corrected Calcium 9.5 mg/dL      AST 30 U/L      ALT 27 U/L      Alkaline Phosphatase 182 U/L      Total Protein 7.1 g/dL      Albumin 3.4 g/dL      Total Bilirubin 0.31 mg/dL      eGFR 55 ml/min/1.73sq m     Narrative:      [employer] guidelines for Chronic Kidney Disease (CKD):     Stage 1 with normal or high GFR (GFR > 90 mL/min/1.73 square meters)    Stage 2 Mild CKD (GFR = 60-89 mL/min/1.73 square meters)    Stage 3A Moderate CKD (GFR = 45-59 mL/min/1.73 square meters)    Stage 3B Moderate CKD (GFR = 30-44 mL/min/1.73 square meters)    Stage 4 Severe CKD (GFR = 15-29 mL/min/1.73 square meters)    Stage 5 End Stage CKD (GFR <15 mL/min/1.73 square meters)  Note: GFR calculation is accurate only with a steady state creatinine    CBC and differential [[id number]]  (Abnormal) Collected: 04/06/24 1239    Lab Status: Final result Specimen: Blood from Arm, Right Updated: 04/06/24 1250     WBC 9.52 Thousand/uL      RBC 3.71  Million/uL      Hemoglobin 11.1 g/dL      Hematocrit 36.0 %      MCV 97 fL      MCH 29.9 pg      MCHC 30.8 g/dL      RDW 15.0 %      MPV 9.6 fL      Platelets 400 Thousands/uL      nRBC 0 /100 WBCs      Neutrophils Relative 80 %      Immature Grans % 1 %      Lymphocytes Relative 9 %      Monocytes Relative 6 %      Eosinophils Relative 3 %      Basophils Relative 1 %      Neutrophils Absolute 7.78 Thousands/µL      Absolute Immature Grans 0.07 Thousand/uL      Absolute Lymphocytes 0.82 Thousands/µL      Absolute Monocytes 0.55 Thousand/µL      Eosinophils Absolute 0.24 Thousand/µL      Basophils Absolute 0.06 Thousands/µL                    CT head without contrast   Final Result by Moody Saenz MD (04/06 1256)      No acute intracranial process.      No skull fracture.      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         Workstation performed: [id number]         CT spine cervical without contrast   Final Result by Moody Saenz MD (04/06 1303)      No cervical spine fracture or traumatic malalignment.            Workstation performed: [id number]         CT chest without contrast   Final Result by Moody Saenz MD (04/06 1341)      Minimal bibasilar lower lobe subsegmental atelectasis, left greater than right.      No other evidence of acute intrathoracic process.      Subacute nondisplaced fracture of the left anterior 5th and 6th ribs.      Subacute nondisplaced fracture of the right posterior 12th rib.      Subacute minimally displaced fracture of the right posterior 11th rib.      Moderate subacute compression fracture of L1 without significant retropulsion.      Additional chronic findings and negatives as above.      The study was marked in EPIC for immediate notification.         Workstation performed: [id number]         CT facial bones without contrast   Final Result by Moody Saenz MD (04/06 1318)      Acute left anterior and  lateral maxillary sinus, left lateral orbit and equivocal left zygomatic arch fractures as above.      Globes are intact. No orbital hematoma.      The study was marked in EPIC for immediate notification.               Workstation performed: [id number]         XR hip/pelv 2-3 vws left   ED Interpretation by Bethany Forde MD (04/06 1210)   No acute fracture or malalignment      CT abdomen pelvis w contrast    (Results Pending)         Procedures  ECG 12 Lead Documentation Only    Date/Time: 4/6/2024 12:34 PM    Performed by: Matt Bonilla MD  Authorized by: Matt Bonilla MD    ECG reviewed by me, the ED Provider: yes    Patient location:  ED  Previous ECG:     Previous ECG:  Compared to current    Similarity:  No change  Interpretation:     Interpretation: normal    Rate:     ECG rate:  79    ECG rate assessment: normal    Rhythm:     Rhythm: sinus rhythm    Ectopy:     Ectopy: none    QRS:     QRS axis:  Normal    QRS intervals:  Normal  Conduction:     Conduction: normal    ST segments:     ST segments:  Normal  T waves:     T waves: normal          ED Course  ED Course as of 04/06/24 1626   Sat Apr 06, 2024   1138 Tdap was last given in 2020 and is up to date.   1302 CT head without contrast  No acute intracranial process.  No skull fracture.  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    As per radiology   1401 CT spine cervical without contrast  No cervical spine fracture or traumatic malalignment. As per radiology   1402 CT facial bones without contrast  Acute left anterior and lateral maxillary sinus, left lateral orbit and equivocal left zygomatic arch fractures as above.  Globes are intact. No orbital hematoma.  As per radiology   1403 CT chest without contrast  Minimal bibasilar lower lobe subsegmental atelectasis, left greater than right.     No other evidence of acute intrathoracic process.     Subacute nondisplaced fracture of the left anterior 5th and 6th ribs.     Subacute  nondisplaced fracture of the right posterior 12th rib.     Subacute minimally displaced fracture of the right posterior 11th rib.     Moderate subacute compression fracture of L1 without significant retropulsion.     Additional chronic findings and negatives as above.  As per radiology   1403 Cleared cervical spine and removed C collar at bedside.   1412 Spoke to Dr. Bess from Trauma who said they would come and evaluate the patient.   1506 Trauma service evaluated patient and agreed to admit her for several fractures that were found.                                       Medical Decision Making  83-year-old female with PMH of HTN, HLD, CVA, and cancer that presented to the ED via ambulance from her SNF after a fall that occurred prior to arrival.  Patient's labs and imaging was obtained and reviewed by the ED provider.  See ED course for details about patient's ED stay.  Upon review, patient's EKG showed normal sinus rhythm with a rate of 79 bpm.  Patient's lab work also showed no acute concerns.  Patient's CT facial bones without contrast showed acute left anterior and lateral maxillary sinus with a left lateral orbit and left zygomatic arch fractures as per radiology.  Patient's CT chest also showed subacute rib fractures of the left fifth and sixth rib along with the right 11th and 12th rib.  Patient's left hip showed no acute fracture or malalignment as per radiology.  Patient's case was discussed with the trauma service for recommendations as to patient's disposition.  Upon evaluation, trauma service agreed to accept the patient as inpatient admission under the service of Dr. Bess.    Amount and/or Complexity of Data Reviewed  Labs: ordered.  Radiology: ordered and independent interpretation performed. Decision-making details documented in ED Course.          Disposition  Final diagnoses:   Closed fracture of orbit, initial encounter (HCC)   Fall, initial encounter   Fall, sequela   Closed fracture  of first lumbar vertebra, unspecified fracture morphology, initial encounter (HCC)   Closed fracture of left zygomatic arch, initial encounter (HCC)     Time reflects when diagnosis was documented in both MDM as applicable and the Disposition within this note       Time User Action Codes Description Comment    4/6/2024  2:39 PM Knipe, Tres Add [S02.85XA] Closed fracture of orbit, initial encounter (HCC)     4/6/2024  2:41 PM Knipe, Tres Add [W19.XXXA] Fall, initial encounter     4/6/2024  2:41 PM Knipe, Tres Add [W19.XXXS] Fall, sequela     4/6/2024  2:42 PM Knipe, Tres Add [S32.019A] Closed fracture of first lumbar vertebra, unspecified fracture morphology, initial encounter (HCC)     4/6/2024  3:00 PM Knipe, Tres Add [S02.40FA] Closed fracture of left zygomatic arch, initial encounter (HCC)           ED Disposition       None          Follow-up Information    None         Patient's Medications   Discharge Prescriptions    No medications on file     No discharge procedures on file.    PDMP Review         Value Time User    PDMP Reviewed  Yes 1/19/2023 10:51 PM Gerald Hyde MD             ED Provider  Attending physically available and evaluated Doris Campbell. I managed the patient along with the ED Attending.    Electronically Signed by           Matt Bonilla MD  04/06/24 3628

## 2024-04-06 NOTE — ASSESSMENT & PLAN NOTE
- Multiple rib fractures, present on admission.  - Rib fractures range from acute to subacute  - Continue rib fracture protocol.  - Continue to encourage incentive spirometer use and adequate pulmonary hygiene.  Currently pulling 500-750 mL on I.S.  - PIC score is pending.  - Supplemental oxygen via nasal cannula as needed to maintain saturations greater than or equal to 94%.  - PT and OT evaluation and treatment as indicated.  - Outpatient follow-up in the trauma clinic for re-evaluation in approximately 2 weeks.

## 2024-04-06 NOTE — ASSESSMENT & PLAN NOTE
- L1 compression fracture, present on admission.  - Neurosurgery evaluation and recommendations appreciated.  - Bracing: pending evaluation  - Spine precautions.  - Monitor neurovascular exam.  - Multimodal analgesic regimen as needed.  - PT and OT evaluation and treatment as indicated.  - Outpatient follow up with Neurosurgery for re-evaluation.

## 2024-04-06 NOTE — ASSESSMENT & PLAN NOTE
- Acute pain secondary to traumatic injuries.  - Continue multimodal analgesic regimen.  - Bowel regimen as long as using opioids.  - Continue to monitor pain and adjust regimen as indicated.

## 2024-04-06 NOTE — ED ATTENDING ATTESTATION
"4/6/2024  I, Bethany Forde MD, saw and evaluated the patient. I have discussed the patient with the resident/non-physician practitioner and agree with the resident's/non-physician practitioner's findings, Plan of Care, and MDM as documented in the resident's/non-physician practitioner's note, except where noted. All available labs and Radiology studies were reviewed.  I was present for key portions of any procedure(s) performed by the resident/non-physician practitioner and I was immediately available to provide assistance.       At this point I agree with the current assessment done in the Emergency Department.  I have conducted an independent evaluation of this patient a history and physical is as follows:    83-year-old female with history of ambulatory dysfunction for which she uses a walker.  Patient presents for evaluation after a ground-level fall at her nursing facility.  The patient is unsure exactly how she fell but states that she landed on her left side, striking her head on the ground.  No blood thinners or antiplatelet agents.  She is unsure whether she lost consciousness.  Denies any chest pain, shortness of breath, or palpitations.  Reports \"that my head feels funny\" but is unable to further characterize this feeling.  Reports some \"tenderness\" to the left chest wall with palpation as well as the left hip. No nausea or vomiting.       Physical Exam  Vitals and nursing note reviewed.   Constitutional:       General: She is not in acute distress.     Appearance: She is well-developed. She is not ill-appearing, toxic-appearing or diaphoretic.   HENT:      Head: Normocephalic and atraumatic.      Comments: Swelling with ecchymosis to the L superior orbital ridge with associated TTP.      Right Ear: External ear normal.      Left Ear: External ear normal.      Nose: Nose normal.   Eyes:      Extraocular Movements: Extraocular movements intact.      Conjunctiva/sclera: Conjunctivae normal.      Pupils: " "Pupils are equal, round, and reactive to light.   Neck:      Comments: No midline TTP over the cervical spine  Cardiovascular:      Rate and Rhythm: Normal rate and regular rhythm.      Pulses: Normal pulses.      Heart sounds: Normal heart sounds. No murmur heard.     No friction rub. No gallop.   Pulmonary:      Effort: Pulmonary effort is normal. No respiratory distress.      Breath sounds: Normal breath sounds. No wheezing or rales.   Chest:      Chest wall: Tenderness (TTP over the inferior aspect of the L anterior chest wall. No crepitus or ecchymosis) present.   Abdominal:      General: Bowel sounds are normal. There is no distension.      Palpations: Abdomen is soft.      Tenderness: There is no abdominal tenderness. There is no guarding.   Musculoskeletal:         General: No deformity. Normal range of motion.      Cervical back: Normal range of motion and neck supple.      Comments: \"Soreness\" reported with palpation of the L hip. No ecchymosis. Full active ROM of the joints of the bilateral upper and lower extremiteis.    Skin:     General: Skin is warm and dry.   Neurological:      General: No focal deficit present.      Mental Status: She is alert and oriented to person, place, and time. Mental status is at baseline.      Motor: No abnormal muscle tone.   Psychiatric:         Mood and Affect: Mood normal.           ED Course  ED Course as of 04/06/24 1549   Sat Apr 06, 2024   1156 Patient recalls falling but is unsure exactly how she fell and is unsure whether or not she lost consciousness.  Will obtain EKG and basic labs.  She has ecchymosis noted to the left superior orbital ridge with associated tenderness.  Will obtain CT scan of the head, face, C-spine, and chest for further evaluation of areas of injury as well as x-ray of the left hip.   1242 I personally interpreted the patient's EKG which reveals normal rate, normal sinus rhythm, normal axis, normal intervals, no ST segment deviation, " pathologic T wave inversions, or pathologic Q waves.  Unchanged from most recent prior EKG, no evidence of WPW, Brugada, or HOCM.   1408 CT scan of the chest shows subacute nondisplaced fractures to left anterior ribs 5 and 6, subacute nondisplaced fracture of right posterior rib 12, subacute minimally displaced fracture of right posterior rib 11, and moderate subacute compression fracture of L1 without significant retropulsion.  On reassessment, patient now admits to falling 1 week ago as well.  CT of her facial bones shows acute fractures to the left anterior and lateral maxillary sinus today, left lateral orbit, and a possible left zygomatic arch fracture.  Globes are intact without orbital edema.  There is no evidence of entrapment on exam and patient denies double vision.  Will discuss case with trauma team to determine best disposition.   1430 Patient was evaluated by trauma surgery team- they will be admitting the patient to their service for further management.          Critical Care Time  Procedures

## 2024-04-06 NOTE — H&P
[facility]  H&P  Name: Doris Campbell 83 y.o. female I MRN: [id number]  Unit/Bed#: ED-17 I Date of Admission: 4/6/2024   Date of Service: 4/6/2024 I Hospital Day: 0      Assessment/Plan   Zygomatic fracture (HCC)  Assessment & Plan  - OMFS consulted, appreciate input  -Follow-up recommendations      L1 vertebral fracture (HCC)  Assessment & Plan  - L1 compression fracture, present on admission.  - Neurosurgery evaluation and recommendations appreciated.  - Bracing: pending evaluation  - Spine precautions.  - Monitor neurovascular exam.  - Multimodal analgesic regimen as needed.  - PT and OT evaluation and treatment as indicated.  - Outpatient follow up with Neurosurgery for re-evaluation.      Acute pain due to trauma  Assessment & Plan  - Acute pain secondary to traumatic injuries.  - Continue multimodal analgesic regimen.  - Bowel regimen as long as using opioids.  - Continue to monitor pain and adjust regimen as indicated.      Multiple rib fractures  Assessment & Plan  - Multiple rib fractures, present on admission.  - Rib fractures range from acute to subacute  - Continue rib fracture protocol.  - Continue to encourage incentive spirometer use and adequate pulmonary hygiene.  Currently pulling 500-750 mL on I.S.  - PIC score is pending.  - Supplemental oxygen via nasal cannula as needed to maintain saturations greater than or equal to 94%.  - PT and OT evaluation and treatment as indicated.  - Outpatient follow-up in the trauma clinic for re-evaluation in approximately 2 weeks.      History of falling  Assessment & Plan  - Status post fall with the below noted injuries.  - Fall precautions.  - Geriatric Medicine consultation for evaluation, medication review and recommendations.  - PT and OT evaluation and treatment as indicated.  - Case Management consultation for disposition planning.      Hypertension  Assessment & Plan  - Continue current medication regimen.  - Outpatient  "follow-up with PCP.      Mild cognitive impairment  Assessment & Plan  - Continue current medication regimen.  - Outpatient follow-up with PCP.  - Geriatrics consult    DVT prophylaxis: Follow-up final results of CT scan; anticipate initiation of DVT prophylaxis once completed a CT scan  PT and OT: Eval and treat    Disposition: Admit to [facility].  Refracture protocol.  Follow-up OMFS and neurosurgery consultation.  CT scan of abdomen pelvis pending.    Trauma Alert: Evaluation; trauma team notified at 2:30 via text   Model of Arrival: Ambulance    Trauma Team: Attending Shahriar and ELI [place]  Consultants:     Neurosurgery: routine consult; Epic consult order placed;      Oral Maxillofacial: routine consult; Epic consult order placed;       History of Present Illness     Chief Complaint: \"My ribs hurt.\"  Mechanism:Fall     HPI:    Doris Campbell is a 83 y.o. female who presents with no significant past medical history other than hypertension and multiple falls.  Reported mild cognitive impairment.  She takes no anticoagulants or antiplatelet agents.  She has a history of multiple falls over the course of the last week.  Patient is a poor historian and cannot recall all the events regarding her falls.  She comes in with subacute rib fractures and an L1 fracture.  She has tenderness both with her chest wall as well as severe lower back.  She is also noted to have a left zygomatic fracture.  Attempted to contact family multiple times; there was no answer.    Review of Systems   Reason unable to perform ROS: Mild cognitive impairment.     12-point, complete review of systems was reviewed and negative except as stated above.     Historical Information     Past Medical History:   Diagnosis Date    Allergic     Anxiety     Arthritis     Cancer (HCC)     HLD (hyperlipidemia)     Hypertension     Hyperthyroidism     Osteoporosis     Stroke (HCC)     TIA (transient ischemic attack)      Past Surgical History:   Procedure " Laterality Date    BREAST LUMPECTOMY Left     HYSTERECTOMY      KNEE SURGERY Right     ORIF TIBIA FRACTURE Right 12/8/2016    Procedure: OPEN REDUCTION W/ INTERNAL FIXATION (ORIF) TIBIA;  Surgeon: Angie Case MD;  Location: [facility];  Service:         Social History     Tobacco Use    Smoking status: Never    Smokeless tobacco: Never    Tobacco comments:     Former smoker - As per [place]    Vaping Use    Vaping status: Never Used   Substance Use Topics    Alcohol use: Not Currently     Comment: Alcohol intake:   Occasional  - As per [place]     Drug use: Never     Immunization History   Administered Date(s) Administered    Pneumococcal Conjugate 13-Valent 08/16/2007    Tdap 10/19/2020     Last Tetanus: Up-to-date  Family History: Non-contributory    1. Before the illness or injury that brought you to the Emergency, did you need someone to help you on a regular basis? 1=Yes   2. Since the illness or injury that brought you to the Emergency, have you needed more help than usual to take care of yourself? 1=Yes   3. Have you been hospitalized for one or more nights during the past 6 months (excluding a stay in the Emergency Department)? 1=Yes   4. In general, do you see well? 0=Yes   5. In general, do you have serious problems with your memory? 1=Yes   6. Do you take more than three different medications everyday? 1=Yes   TOTAL   5     Did you order a geriatric consult if the score was 2 or greater?: yes     Meds/Allergies   PTA meds:   Prior to Admission Medications   Prescriptions Last Dose Informant Patient Reported? Taking?   Calcium-Vitamin D-Vitamin K (VIACTIV PO) 4/6/2024  Yes Yes   Sig: Take 1 tablet by mouth daily after breakfast   Diclofenac Sodium (VOLTAREN) 1 % Past Month Self No Yes   Sig: Apply 2 g topically 4 (four) times a day = To right hip and pelvis   Sodium Phosphates (FLEET ENEMA RE)  Self Yes No   Sig: Insert 1 Bottle into the rectum daily as needed   acetaminophen (TYLENOL) 325 mg tablet  Past Month Self Yes Yes   Sig: Take 975 mg by mouth every 8 (eight) hours as needed   albuterol (PROVENTIL HFA,VENTOLIN HFA) 90 mcg/act inhaler  Self Yes No   Sig: Inhale 2 puffs every 6 (six) hours as needed for wheezing   amLODIPine (NORVASC) 2.5 mg tablet 2024 Self No Yes   Sig: Take 1 tablet (2.5 mg total) by mouth 2 (two) times a day   calcitonin, salmon, (MIACALCIN) 200 units/act nasal spray 2024  Yes Yes   Si spray into each nostril daily   cholecalciferol (VITAMIN D3) 1,000 units tablet 2024 Self No Yes   Sig: Take 1 tablet (1,000 Units total) by mouth daily   ferrous sulfate 324 (65 Fe) mg 2024  No Yes   Sig: Take 1 tablet (324 mg total) by mouth daily before breakfast   fluticasone (FLONASE) 50 mcg/act nasal spray 2024 Self No Yes   Si spray into each nostril daily   gabapentin (NEURONTIN) 100 mg capsule 2024 Self No Yes   Sig: Take 1 capsule (100 mg total) by mouth daily at bedtime   gabapentin (NEURONTIN) 100 mg capsule 2024  Yes Yes   Sig: Take 200 mg by mouth daily at bedtime   lidocaine (LMX) 4 % cream  Self Yes No   Sig: Apply 1 application topically 2 (two) times a day as needed = to LUE and hands   magnesium hydroxide (MILK OF MAGNESIA) 400 mg/5 mL oral suspension Past Month Self Yes Yes   Sig: Take 30 mL by mouth 2 (two) times a day as needed   melatonin 3 mg Past Week Self No Yes   Sig: Take 1 tablet (3 mg total) by mouth daily at bedtime   meloxicam (MOBIC) 7.5 mg tablet 2024  Yes Yes   Sig: Take 7.5 mg by mouth 2 (two) times a day   oxyCODONE (OXY-IR) 5 MG capsule Past Week Self Yes Yes   Sig: Take 2.5 mg by mouth every 6 (six) hours as needed for moderate pain   senna-docusate sodium (SENOKOT S) 8.6-50 mg per tablet  Self No No   Sig: Take 1 tablet by mouth 2 (two) times a day   traMADol (ULTRAM) 50 mg tablet Past Week  Yes Yes   Sig: Take 25 mg by mouth every 6 (six) hours as needed for moderate pain      Facility-Administered Medications: None         Allergies   Allergen Reactions    Alendronate     Raloxifene     Risedronate Sodium     Sulfa Antibiotics Swelling    Penicillins Swelling     Patient received 7 day treatment course Keflex without any adverse reactions in 11/2022.        Objective   Initial Vitals:   Temperature: 97.8 °F (36.6 °C) (04/06/24 1041)  Pulse: 79 (04/06/24 1041)  Respirations: 16 (04/06/24 1041)  Blood Pressure: 164/76 (04/06/24 1041)    Primary Survey:   Airway:        Status: patent;        Pre-hospital Interventions: none        Hospital Interventions: none  Breathing:        Pre-hospital Interventions: none       Effort: normal       Right breath sounds: normal       Left breath sounds: normal  Circulation:        Rhythm: regular       Rate: regular   Right Pulses Left Pulses    R radial: 2+  R femoral: 2+  R pedal: 2+  R carotid: 2+  R popliteal: 2+ L radial: 2+  L femoral: 2+  L pedal: 2+  L carotid: 2+  L popliteal: 2+   Disability: Interventions: Intermittent confusion       GCS: Eye: 4; Verbal: 5 Motor: 6 Total: 15       Right Pupil: round;  reactive         Left Pupil:  round;  reactive      R Motor Strength L Motor Strength    R : 5/5  R dorsiflex: 5/5  R plantarflex: 5/5 L : 5/5  L dorsiflex: 5/5  L plantarflex: 5/5        Sensory:  No sensory deficit  Exposure:       Completed: Yes      Secondary Survey:  Physical Exam  Vitals reviewed.   Constitutional:       Appearance: Normal appearance.   HENT:      Head:      Comments: Left-sided facial abrasion and ecchymosis     Right Ear: External ear normal.      Left Ear: External ear normal.      Nose: Nose normal.   Eyes:      Pupils: Pupils are equal, round, and reactive to light.   Cardiovascular:      Rate and Rhythm: Normal rate and regular rhythm.      Pulses: Normal pulses.      Heart sounds: Normal heart sounds.   Pulmonary:      Effort: Pulmonary effort is normal. No respiratory distress.      Breath sounds: Normal breath sounds.      Comments: Chest wall  tenderness over noted rib fractures appreciated  Chest:      Chest wall: Tenderness present.   Abdominal:      General: There is no distension.      Palpations: Abdomen is soft.      Tenderness: There is no abdominal tenderness. There is no guarding.   Musculoskeletal:         General: No swelling or tenderness. Normal range of motion.      Cervical back: Normal range of motion. No tenderness.   Skin:     General: Skin is warm and dry.   Neurological:      Mental Status: She is alert.         Invasive Devices       None                 Lab Results: I have personally reviewed all pertinent laboratory/test results 04/06/24 and in the preceding 24 hours.  Recent Labs     04/06/24  1239   WBC 9.52   HGB 11.1*   HCT 36.0   *   SODIUM 135   K 5.1      CO2 23   BUN 17   CREATININE 0.94   GLUC 92   AST 30   ALT 27   ALB 3.4*   TBILI 0.31   ALKPHOS 182*       Imaging Results: I have personally reviewed pertinent images saved in PACS. CT scan findings (and other pertinent positive findings on images) were discussed with radiology. My interpretation of the images/reports are as follows:  Chest Xray(s): N/A   FAST exam(s): N/A   CT Scan(s): positive for acute findings: Multiple rib fractures, L1 fracture, left segmented fracture   Additional Xray(s): pending     Other Studies: No other studies    Code Status: Level 1 - Full Code  Advance Directive and Living Will:      Power of :    POLST:    I have spent 39 minutes with Patient  today in which greater than 50% of this time was spent in counseling/coordination of care regarding Diagnostic results, Prognosis, Risks and benefits of tx options, Instructions for management, Patient and family education, Importance of tx compliance, Risk factor reductions, Impressions, Counseling / Coordination of care, Documenting in the medical record, Reviewing / ordering tests, medicine, procedures  , Obtaining or reviewing history  , and Communicating with other healthcare  professionals .

## 2024-04-07 ENCOUNTER — APPOINTMENT (INPATIENT)
Dept: RADIOLOGY | Facility: HOSPITAL | Age: 84
DRG: 184 | End: 2024-04-07
Payer: MEDICARE

## 2024-04-07 LAB
ANION GAP SERPL CALCULATED.3IONS-SCNC: 10 MMOL/L (ref 4–13)
APTT PPP: 32 SECONDS (ref 23–37)
ATRIAL RATE: 79 BPM
BASOPHILS # BLD AUTO: 0.03 THOUSANDS/ÂΜL (ref 0–0.1)
BASOPHILS NFR BLD AUTO: 1 % (ref 0–1)
BUN SERPL-MCNC: 17 MG/DL (ref 5–25)
CALCIUM SERPL-MCNC: 8.7 MG/DL (ref 8.4–10.2)
CHLORIDE SERPL-SCNC: 104 MMOL/L (ref 96–108)
CO2 SERPL-SCNC: 22 MMOL/L (ref 21–32)
CREAT SERPL-MCNC: 0.94 MG/DL (ref 0.6–1.3)
EOSINOPHIL # BLD AUTO: 0.17 THOUSAND/ÂΜL (ref 0–0.61)
EOSINOPHIL NFR BLD AUTO: 3 % (ref 0–6)
ERYTHROCYTE [DISTWIDTH] IN BLOOD BY AUTOMATED COUNT: 14.9 % (ref 11.6–15.1)
GFR SERPL CREATININE-BSD FRML MDRD: 55 ML/MIN/1.73SQ M
GLUCOSE SERPL-MCNC: 65 MG/DL (ref 65–140)
HCT VFR BLD AUTO: 35.4 % (ref 34.8–46.1)
HGB BLD-MCNC: 10.9 G/DL (ref 11.5–15.4)
IMM GRANULOCYTES # BLD AUTO: 0.03 THOUSAND/UL (ref 0–0.2)
IMM GRANULOCYTES NFR BLD AUTO: 1 % (ref 0–2)
INR PPP: 0.97 (ref 0.84–1.19)
LYMPHOCYTES # BLD AUTO: 0.94 THOUSANDS/ÂΜL (ref 0.6–4.47)
LYMPHOCYTES NFR BLD AUTO: 15 % (ref 14–44)
MAGNESIUM SERPL-MCNC: 2.1 MG/DL (ref 1.9–2.7)
MCH RBC QN AUTO: 29.7 PG (ref 26.8–34.3)
MCHC RBC AUTO-ENTMCNC: 30.8 G/DL (ref 31.4–37.4)
MCV RBC AUTO: 97 FL (ref 82–98)
MONOCYTES # BLD AUTO: 0.38 THOUSAND/ÂΜL (ref 0.17–1.22)
MONOCYTES NFR BLD AUTO: 6 % (ref 4–12)
NEUTROPHILS # BLD AUTO: 4.72 THOUSANDS/ÂΜL (ref 1.85–7.62)
NEUTS SEG NFR BLD AUTO: 74 % (ref 43–75)
NRBC BLD AUTO-RTO: 0 /100 WBCS
P AXIS: -7 DEGREES
PHOSPHATE SERPL-MCNC: 4.4 MG/DL (ref 2.3–4.1)
PLATELET # BLD AUTO: 394 THOUSANDS/UL (ref 149–390)
PMV BLD AUTO: 10 FL (ref 8.9–12.7)
POTASSIUM SERPL-SCNC: 4.6 MMOL/L (ref 3.5–5.3)
PR INTERVAL: 128 MS
PROTHROMBIN TIME: 13.5 SECONDS (ref 11.6–14.5)
QRS AXIS: 13 DEGREES
QRSD INTERVAL: 76 MS
QT INTERVAL: 408 MS
QTC INTERVAL: 467 MS
RBC # BLD AUTO: 3.67 MILLION/UL (ref 3.81–5.12)
SODIUM SERPL-SCNC: 136 MMOL/L (ref 135–147)
T WAVE AXIS: 45 DEGREES
VENTRICULAR RATE: 79 BPM
WBC # BLD AUTO: 6.27 THOUSAND/UL (ref 4.31–10.16)

## 2024-04-07 PROCEDURE — 84100 ASSAY OF PHOSPHORUS: CPT | Performed by: PHYSICIAN ASSISTANT

## 2024-04-07 PROCEDURE — 85730 THROMBOPLASTIN TIME PARTIAL: CPT | Performed by: PHYSICIAN ASSISTANT

## 2024-04-07 PROCEDURE — 99223 1ST HOSP IP/OBS HIGH 75: CPT | Performed by: PHYSICIAN ASSISTANT

## 2024-04-07 PROCEDURE — 93010 ELECTROCARDIOGRAM REPORT: CPT | Performed by: INTERNAL MEDICINE

## 2024-04-07 PROCEDURE — 72100 X-RAY EXAM L-S SPINE 2/3 VWS: CPT

## 2024-04-07 PROCEDURE — 94664 DEMO&/EVAL PT USE INHALER: CPT

## 2024-04-07 PROCEDURE — 85610 PROTHROMBIN TIME: CPT | Performed by: PHYSICIAN ASSISTANT

## 2024-04-07 PROCEDURE — 97163 PT EVAL HIGH COMPLEX 45 MIN: CPT

## 2024-04-07 PROCEDURE — 97116 GAIT TRAINING THERAPY: CPT

## 2024-04-07 PROCEDURE — 97167 OT EVAL HIGH COMPLEX 60 MIN: CPT

## 2024-04-07 PROCEDURE — 85025 COMPLETE CBC W/AUTO DIFF WBC: CPT | Performed by: PHYSICIAN ASSISTANT

## 2024-04-07 PROCEDURE — 83735 ASSAY OF MAGNESIUM: CPT | Performed by: PHYSICIAN ASSISTANT

## 2024-04-07 PROCEDURE — 80048 BASIC METABOLIC PNL TOTAL CA: CPT | Performed by: PHYSICIAN ASSISTANT

## 2024-04-07 RX ADMIN — AMLODIPINE BESYLATE 2.5 MG: 2.5 TABLET ORAL at 09:26

## 2024-04-07 RX ADMIN — ENOXAPARIN SODIUM 30 MG: 30 INJECTION SUBCUTANEOUS at 21:01

## 2024-04-07 RX ADMIN — AMLODIPINE BESYLATE 2.5 MG: 2.5 TABLET ORAL at 21:01

## 2024-04-07 RX ADMIN — GABAPENTIN 200 MG: 100 CAPSULE ORAL at 21:01

## 2024-04-07 RX ADMIN — Medication 3 MG: at 21:01

## 2024-04-07 RX ADMIN — ENOXAPARIN SODIUM 30 MG: 30 INJECTION SUBCUTANEOUS at 09:25

## 2024-04-07 RX ADMIN — GABAPENTIN 100 MG: 100 CAPSULE ORAL at 09:25

## 2024-04-07 RX ADMIN — ACETAMINOPHEN 975 MG: 325 TABLET, FILM COATED ORAL at 21:01

## 2024-04-07 RX ADMIN — SENNOSIDES AND DOCUSATE SODIUM 1 TABLET: 8.6; 5 TABLET ORAL at 21:01

## 2024-04-07 RX ADMIN — ACETAMINOPHEN 975 MG: 325 TABLET, FILM COATED ORAL at 05:30

## 2024-04-07 NOTE — PLAN OF CARE
Problem: PHYSICAL THERAPY ADULT  Goal: Performs mobility at highest level of function for planned discharge setting.  See evaluation for individualized goals.  Description: Treatment/Interventions: Functional transfer training, LE strengthening/ROM, Therapeutic exercise, Endurance training, Cognitive reorientation, Patient/family training, Equipment eval/education, Bed mobility, Gait training  Equipment Recommended: Walker       See flowsheet documentation for full assessment, interventions and recommendations.  Note: Prognosis: Fair  Problem List: Decreased strength, Decreased endurance, Impaired balance, Decreased mobility, Decreased cognition, Impaired judgement, Decreased safety awareness  Assessment: Pt is a 83 y.o. female seen for PT evaluation s/p admit to [facility] on 8/18/2022 w/ fall and resulting rib fractures, L1 fracture, and zygomatic fracture.  Order placed for PT. Comorbidities affecting pt's physical performance at time of assessment include: HTN, cancer history, and CVA. Personal factors affecting pt at time of IE include: advanced age, limited insight into impairments, and recent fall(s). Prior to admission, pt was  living at [facility] . Per pt, has been ambulating with mod I and RW at baseline.  Upon evaluation: Pt requires min A for bed mobility, mod to min A for sit to stand transfers, and mod A to min A for ambulation with RW.   (Please find full objective findings from PT assessment regarding body systems outlined above). Impairments and limitations also listed above, especially due to  weakness, impaired balance, decreased endurance, gait deviations, decreased activity tolerance, decreased safety awareness, impaired judgement, fall risk, and decreased cognition.  Pt's clinical presentation is currently unstable/unpredictable seen in pt's presentation of fall risk, limited insight into deficits, and significant decline in functional mobility compared to baseline.  Pt to  benefit from continued skilled PT tx while in hospital and upon DC to address deficits as defined above and maximize level of functional mobility.   Recommend  progression of ambulation and initiation of HEP as appropriate .        Rehab Resource Intensity Level, PT: II (Moderate Resource Intensity) (may progress to level 3 pending accurate PLOF and level of assist available at facility)    See flowsheet documentation for full assessment.

## 2024-04-07 NOTE — QUICK NOTE
Lumbar XR personally reviewed, final read pending. Per my interpretation imaging is grossly stable when compared to yesterday's CT imaging.     Plan:  TLSO brace PRN pain control  Pain control per primary team  PT/OT    Neurosurgery will sign off at this time. Follow up as needed. Please call with questions or concerns.

## 2024-04-07 NOTE — PROGRESS NOTES
[facility]  Progress Note  Name: Doris Campbell I  MRN: [id number]  Unit/Bed#: W -01 I Date of Admission: 4/6/2024   Date of Service: 4/7/2024 I Hospital Day: 1    Assessment/Plan   Zygomatic fracture (HCC)  Assessment & Plan  - OMFS consulted, appreciate input  - non-operative management  - outpatient follow-up as needed      L1 vertebral fracture (HCC)  Assessment & Plan  - L1 compression fracture, present on admission.  - Also noted subacute L2 and L4 rib fractures  - Neurosurgery evaluation and recommendations appreciated.  - Bracing: pending evaluation  - Spine precautions.  - Monitor neurovascular exam.  - Multimodal analgesic regimen as needed.  - PT and OT evaluation and treatment as indicated.  - Outpatient follow up with Neurosurgery for re-evaluation.      Acute pain due to trauma  Assessment & Plan  - Acute pain secondary to traumatic injuries.  - Continue multimodal analgesic regimen.  - Bowel regimen as long as using opioids.  - Continue to monitor pain and adjust regimen as indicated.      Multiple rib fractures  Assessment & Plan  - Multiple rib fractures, present on admission.  - Rib fractures range from acute to subacute  - Continue rib fracture protocol.  - Continue to encourage incentive spirometer use and adequate pulmonary hygiene.  Currently pulling 500-750 mL on I.S.  - PIC score is 7  - Supplemental oxygen via nasal cannula as needed to maintain saturations greater than or equal to 94%.  - PT and OT evaluation and treatment as indicated.  - Outpatient follow-up in the trauma clinic for re-evaluation in approximately 2 weeks.      History of falling  Assessment & Plan  - Status post fall with the below noted injuries.  - Fall precautions.  - Geriatric Medicine consultation for evaluation, medication review and recommendations.  - PT and OT evaluation and treatment as indicated.  - Case Management consultation for disposition  "planning.      Hypertension  Assessment & Plan  - Continue current medication regimen.  - Outpatient follow-up with PCP.      Mild cognitive impairment  Assessment & Plan  - Continue current medication regimen.  - Outpatient follow-up with PCP.  - Geriatrics consult       DVT prophylaxis: SCDs and Lovenox  PT and OT: eval and treat    Disposition: DC planning.  Was able to get in contact with patient POA.  No further updates at this time.  Neurosurgery signing off.  Monitor respiratory status. OMFS signed off.    TRAUMA TERTIARY SURVEY NOTE    Code status:  Level 1 - Full Code    Consultants: IP CONSULT TO ORAL AND MAXILLOFACIAL SURGERY  IP CONSULT TO CASE MANAGEMENT  IP CONSULT TO NEUROSURGERY  IP CONSULT TO GERONTOLOGY    Subjective   Transfer from: not a transfer    Mechanism of Injury:Fall     Chief Complaint: \" My pain is better.\"    HPI/Last 24 hour events: No acute events overnight.  Patient is doing clinically well this morning.     Objective   Vitals:   Temp:  [97.3 °F (36.3 °C)-97.8 °F (36.6 °C)] 97.8 °F (36.6 °C)  HR:  [74-83] 78  Resp:  [12-16] 16  BP: (130-164)/(65-76) 130/68    I/O         04/05 0701  04/06 0700 04/06 0701  04/07 0700 04/07 0701  04/08 0700           Unmeasured Urine Occurrence  2 x              Physical Exam:   GENERAL APPEARANCE: NAD  NEURO: GCS 14/15 with intermittent confusion  HEENT: Left periorbital swelling and ecchymosis  CV: Regular rate and rhythm  LUNGS: CTA bilaterally  GI: RRR  : CTA b/l  MSK: Non-tender, non-distended  SKIN: warm, dry, intact    Invasive Devices       Peripheral Intravenous Line  Duration             Peripheral IV 04/06/24 Right;Dorsal (posterior) Hand <1 day                       1. Before the illness or injury that brought you to the Emergency, did you need someone to help you on a regular basis? 1=Yes   2. Since the illness or injury that brought you to the Emergency, have you needed more help than usual to take care of yourself? 1=Yes   3. Have you " been hospitalized for one or more nights during the past 6 months (excluding a stay in the Emergency Department)? 1=Yes   4. In general, do you see well? 0=Yes   5. In general, do you have serious problems with your memory? 1=Yes   6. Do you take more than three different medications everyday? 1=Yes   TOTAL   5     Did you order a geriatric consult if the score was 2 or greater?: yes       PIC Score  PIC Pain Score: 3 (4/7/2024  4:14 AM)  PIC Incentive Spirometry Score: 2 (4/7/2024  4:14 AM)  PIC Cough Description: 2 (4/7/2024  4:14 AM)  PIC Total Score: 7 (4/7/2024  4:14 AM)       If the Total PIC Score </=5, did you consult APS and evaluate patient for further intervention?: no      Pain:    Incentive Spirometry  Cough  3 = Controlled  4 = Above goal volume 3 = Strong  2 = Moderate  3 = Goal to alert volume 2 = Weak  1 = Severe  2 = Below alert volume 1 = Absent     1 = Unable to perform IS      Lab Results: Results: I have personally reviewed all pertinent laboratory/tests results, BMP/CMP:   Lab Results   Component Value Date    SODIUM 136 04/07/2024    K 4.6 04/07/2024     04/07/2024    CO2 22 04/07/2024    BUN 17 04/07/2024    CREATININE 0.94 04/07/2024    CALCIUM 8.7 04/07/2024    AST 30 04/06/2024    ALT 27 04/06/2024    ALKPHOS 182 (H) 04/06/2024    EGFR 55 04/07/2024   , and CBC:   Lab Results   Component Value Date    WBC 6.27 04/07/2024    HGB 10.9 (L) 04/07/2024    HCT 35.4 04/07/2024    MCV 97 04/07/2024     (H) 04/07/2024    RBC 3.67 (L) 04/07/2024    MCH 29.7 04/07/2024    MCHC 30.8 (L) 04/07/2024    RDW 14.9 04/07/2024    MPV 10.0 04/07/2024    NRBC 0 04/07/2024       Imaging Results: I have personally reviewed pertinent reports.    Chest Xray(s): negative for acute findings   FAST exam(s): N/A   CT Scan(s): positive for acute findings: L1, 2, 4, subacute fractures, multiple rib fractures, zygomatic fracture   Additional Xray(s): N/A     Other Studies: no other studies

## 2024-04-07 NOTE — ASSESSMENT & PLAN NOTE
- L1 compression fracture, present on admission.  - Also noted subacute L2 and L4 rib fractures  - Neurosurgery evaluation and recommendations appreciated.  - Bracing: pending evaluation  - Spine precautions.  - Monitor neurovascular exam.  - Multimodal analgesic regimen as needed.  - PT and OT evaluation and treatment as indicated.  - Outpatient follow up with Neurosurgery for re-evaluation.

## 2024-04-07 NOTE — CONSULTS
[facility]  Consult  Name: Doris Campbell 83 y.o. female I MRN: [id number]  Unit/Bed#: W -01 I Date of Admission: 4/6/2024   Date of Service: 4/7/2024 I Hospital Day: 1    Inpatient consult to Neurosurgery  Consult performed by: Rhea Connelly PA-C  Consult ordered by: Tres Oviedo PA-C      Imaging personally reviewed 4/6/24 at 7:30pm  Patient examined at bedside 4/7/24 at 7:20am    Assessment/Plan   L1 vertebral fracture (HCC)  Assessment & Plan  Age indeterminate L1 SEP compression fracture, TLICS 1  Appreciated on imaging after a syncopal fall  Initially c/o back pain, but no back pain complaints today  Exam nonfocal, NTTP    Imaging:  CT chest w/o, 4/6/24: Moderate subacute compression fracture of L1 without significant retropulsion     Plan:  Continue to monitor neurological exam  Upright x-ray ordered and pending   TLSO brace as needed pain  Medical management and pain control per primary team  PT/OT evaluation, okay to mobilize with or without brace  DVT ppx: SCDs, lovenox  No neurosurgical intervention is anticipated at this time    Neurosurgery will follow from the periphery and review x-ray once obtained.  Please call questions or concerns.             History of Present Illness     HPI: Doris Campbell is a 83 y.o. year old female with PMH including hypertension, osteoporosis, history of stroke, multiple falls who presents with complaint of possible syncopal fall.  She is a poor historian and cannot recall the details of her fall.  This morning, she is not sure why she is in the hospital.  Imaging was obtained and significant for subacute rib fractures, subacute L1 fracture, and left zygomatic fracture.  Yesterday she was complaining of back pain.  Today, she has no complaints of back pain and her pain scores have been 0 overnight.  She denies any radiating pain down her legs or in her groin.  She denies any weakness or sensory changes in her legs.      Review of  Systems    Historical Information   Past Medical History:   Diagnosis Date    Allergic     Anxiety     Arthritis     Cancer (HCC)     HLD (hyperlipidemia)     Hypertension     Hyperthyroidism     Osteoporosis     Stroke (HCC)     TIA (transient ischemic attack)      Past Surgical History:   Procedure Laterality Date    BREAST LUMPECTOMY Left     HYSTERECTOMY      KNEE SURGERY Right     ORIF TIBIA FRACTURE Right 12/8/2016    Procedure: OPEN REDUCTION W/ INTERNAL FIXATION (ORIF) TIBIA;  Surgeon: Angie Case MD;  Location: [facility];  Service:      Social History     Substance and Sexual Activity   Alcohol Use Not Currently    Comment: Alcohol intake:   Occasional  - As per [place]      Social History     Substance and Sexual Activity   Drug Use Never     Social History     Tobacco Use   Smoking Status Never   Smokeless Tobacco Never   Tobacco Comments    Former smoker - As per Kelly      Family History   Problem Relation Age of Onset    Coronary artery disease Family     Other Family         DJD       Meds/Allergies   all current active meds have been reviewed, current meds:   Current Facility-Administered Medications   Medication Dose Route Frequency    acetaminophen (TYLENOL) tablet 975 mg  975 mg Oral Q8H ANDER    amLODIPine (NORVASC) tablet 2.5 mg  2.5 mg Oral BID    enoxaparin (LOVENOX) subcutaneous injection 30 mg  30 mg Subcutaneous Q12H ANDER    gabapentin (NEURONTIN) capsule 100 mg  100 mg Oral Daily    gabapentin (NEURONTIN) capsule 200 mg  200 mg Oral HS    HYDROmorphone HCl (DILAUDID) injection 0.2 mg  0.2 mg Intravenous Q2H PRN    lidocaine (LIDODERM) 5 % patch 2 patch  2 patch Topical Daily    melatonin tablet 3 mg  3 mg Oral HS    naloxone (NARCAN) 0.04 mg/mL syringe 0.04 mg  0.04 mg Intravenous Q1MIN PRN    oxyCODONE (ROXICODONE) split tablet 2.5 mg  2.5 mg Oral Q4H PRN    Or    oxyCODONE (ROXICODONE) IR tablet 5 mg  5 mg Oral Q4H PRN    senna-docusate sodium (SENOKOT S) 8.6-50 mg per tablet 1  tablet  1 tablet Oral HS   , and PTA meds:   Prior to Admission Medications   Prescriptions Last Dose Informant Patient Reported? Taking?   Calcium-Vitamin D-Vitamin K (VIACTIV PO) 2024  Yes Yes   Sig: Take 1 tablet by mouth daily after breakfast   Diclofenac Sodium (VOLTAREN) 1 % Past Month Self No Yes   Sig: Apply 2 g topically 4 (four) times a day = To right hip and pelvis   Sodium Phosphates (FLEET ENEMA RE)  Self Yes No   Sig: Insert 1 Bottle into the rectum daily as needed   acetaminophen (TYLENOL) 325 mg tablet Past Month Self Yes Yes   Sig: Take 975 mg by mouth every 8 (eight) hours as needed   albuterol (PROVENTIL HFA,VENTOLIN HFA) 90 mcg/act inhaler  Self Yes No   Sig: Inhale 2 puffs every 6 (six) hours as needed for wheezing   amLODIPine (NORVASC) 2.5 mg tablet 2024 Self No Yes   Sig: Take 1 tablet (2.5 mg total) by mouth 2 (two) times a day   calcitonin, salmon, (MIACALCIN) 200 units/act nasal spray 2024  Yes Yes   Si spray into each nostril daily   cholecalciferol (VITAMIN D3) 1,000 units tablet 2024 Self No Yes   Sig: Take 1 tablet (1,000 Units total) by mouth daily   ferrous sulfate 324 (65 Fe) mg 2024  No Yes   Sig: Take 1 tablet (324 mg total) by mouth daily before breakfast   fluticasone (FLONASE) 50 mcg/act nasal spray 2024 Self No Yes   Si spray into each nostril daily   gabapentin (NEURONTIN) 100 mg capsule 2024 Self No Yes   Sig: Take 1 capsule (100 mg total) by mouth daily at bedtime   gabapentin (NEURONTIN) 100 mg capsule 2024  Yes Yes   Sig: Take 200 mg by mouth daily at bedtime   lidocaine (LMX) 4 % cream  Self Yes No   Sig: Apply 1 application topically 2 (two) times a day as needed = to LUE and hands   magnesium hydroxide (MILK OF MAGNESIA) 400 mg/5 mL oral suspension Past Month Self Yes Yes   Sig: Take 30 mL by mouth 2 (two) times a day as needed   melatonin 3 mg Past Week Self No Yes   Sig: Take 1 tablet (3 mg total) by mouth daily at bedtime    meloxicam (MOBIC) 7.5 mg tablet 4/6/2024  Yes Yes   Sig: Take 7.5 mg by mouth 2 (two) times a day   oxyCODONE (OXY-IR) 5 MG capsule Past Week Self Yes Yes   Sig: Take 2.5 mg by mouth every 6 (six) hours as needed for moderate pain   senna-docusate sodium (SENOKOT S) 8.6-50 mg per tablet  Self No No   Sig: Take 1 tablet by mouth 2 (two) times a day   traMADol (ULTRAM) 50 mg tablet Past Week  Yes Yes   Sig: Take 25 mg by mouth every 6 (six) hours as needed for moderate pain      Facility-Administered Medications: None     Allergies   Allergen Reactions    Alendronate     Raloxifene     Risedronate Sodium     Sulfa Antibiotics Swelling    Penicillins Swelling     Patient received 7 day treatment course Keflex without any adverse reactions in 11/2022.        Objective   I/O         04/05 0701 04/06 0700 04/06 0701 04/07 0700 04/07 0701 04/08 0700           Unmeasured Urine Occurrence  2 x             Physical Exam  Vitals and nursing note reviewed.   Constitutional:       Appearance: Normal appearance. She is well-developed and normal weight.   HENT:      Head: Normocephalic and atraumatic.   Eyes:      Extraocular Movements: Extraocular movements intact.      Pupils: Pupils are equal, round, and reactive to light.   Cardiovascular:      Rate and Rhythm: Normal rate.   Pulmonary:      Effort: Pulmonary effort is normal. No respiratory distress.   Abdominal:      Palpations: Abdomen is soft.   Musculoskeletal:         General: Normal range of motion.      Cervical back: Normal range of motion.   Skin:     General: Skin is warm and dry.   Neurological:      Mental Status: She is alert and oriented to person, place, and time.      Cranial Nerves: Cranial nerves 2-12 are intact.      Motor: Motor strength is normal.     Deep Tendon Reflexes:      Reflex Scores:       Patellar reflexes are 1+ on the right side and 1+ on the left side.  Psychiatric:         Mood and Affect: Mood normal.         Speech: Speech normal.     "     Behavior: Behavior normal.         Thought Content: Thought content normal.         Judgment: Judgment normal.       Neurologic Exam     Mental Status   Oriented to person, place, and time.   Follows 2 step commands.   Attention: normal. Concentration: normal.   Speech: speech is normal   Level of consciousness: alert  Knowledge: good.   Able to repeat. Normal comprehension.     Cranial Nerves   Cranial nerves II through XII intact.     CN III, IV, VI   Pupils are equal, round, and reactive to light.    Motor Exam   Muscle bulk: normal  Overall muscle tone: normal    Strength   Strength 5/5 throughout.     Sensory Exam   Light touch normal.   NTTP midline lumbar spine     Gait, Coordination, and Reflexes     Tremor   Resting tremor: absent    Reflexes   Right patellar: 1+  Left patellar: 1+  Right ankle clonus: absent  Left ankle clonus: absent      Vitals:Blood pressure 130/68, pulse 78, temperature 97.8 °F (36.6 °C), resp. rate 16, height 5' 3\" (1.6 m), weight 47.6 kg (104 lb 15 oz), SpO2 93%.,Body mass index is 18.59 kg/m².     Lab Results:   Results from last 7 days   Lab Units 04/07/24  0703 04/06/24  1239   WBC Thousand/uL 6.27 9.52   HEMOGLOBIN g/dL 10.9* 11.1*   HEMATOCRIT % 35.4 36.0   PLATELETS Thousands/uL 394* 400*   NEUTROS PCT % 74 80*   MONOS PCT % 6 6   EOS PCT % 3 3     Results from last 7 days   Lab Units 04/06/24  1239   POTASSIUM mmol/L 5.1   CHLORIDE mmol/L 105   CO2 mmol/L 23   BUN mg/dL 17   CREATININE mg/dL 0.94   CALCIUM mg/dL 9.0   ALK PHOS U/L 182*   ALT U/L 27   AST U/L 30                 No results found for: \"TROPONINT\"  ABG:No results found for: \"PHART\", \"DFI7WTT\", \"PO2ART\", \"XZX9MOP\", \"R8BLGJWN\", \"BEART\", \"SOURCE\"    Imaging Studies: I have personally reviewed pertinent reports.   and I have personally reviewed pertinent films in PACS    XR hip/pelv 2-3 vws left    Result Date: 4/6/2024  Narrative: XR HIP/PELV 2-3 VWS LEFT  W PELVIS IF PERFORMED INDICATION: mechanical fall with " left hip pain. COMPARISON: Subsequent CT of the abdomen and pelvis, and radiographs of the pelvis from 10/23/2022. FINDINGS: Evaluation is limited by generalized osteopenia. A partially imaged dynamic hip screw with several in addition interlocking screws is seen in the left proximal femur. No lucencies are seen around the hardware. There is no acute fracture or dislocation. The pelvic and obturator rings are intact. There is a chronic deformity of the left inferior pubic ramus. Bilateral femoral heads are smooth and intact. Bilateral SI joints and lower lumbar spine are poorly visualized. There are no definite lytic or sclerotic osseous lesions.     Impression: No acute fracture. No hip dislocation. Partially imaged dynamic left hip screw in place, unchanged in configuration with no surrounding lucencies. Workstation performed: [id number]     CT abdomen pelvis w contrast    Result Date: 4/6/2024  Narrative: CT ABDOMEN AND PELVIS WITH IV CONTRAST INDICATION: Pain. COMPARISON: 10/23/2022 TECHNIQUE: CT examination of the abdomen and pelvis was performed. Multiplanar 2D reformatted images were created from the source data. This examination, like all CT scans performed in the [facility], was performed utilizing techniques to minimize radiation dose exposure, including the use of iterative reconstruction and automated exposure control. Radiation dose length product (DLP) for this visit: 468 mGy-cm IV Contrast: 70 mL of iohexol (OMNIPAQUE) Enteric Contrast: Not administered. Lack of oral contrast limits the sensitivity for pathology within the bowel. FINDINGS: ABDOMEN LOWER CHEST: Bibasilar atelectasis. LIVER/BILIARY TREE: Unremarkable. GALLBLADDER: No calcified gallstones. No pericholecystic inflammatory change. SPLEEN: Unremarkable. PANCREAS: Unremarkable. ADRENAL GLANDS: Unremarkable. KIDNEYS/URETERS: There is a 3 mm calculus in the right kidney. No hydronephrosis. Subcentimeter hypodensities likely  represent tiny cysts. STOMACH AND BOWEL: Colonic diverticulosis without findings of acute diverticulitis. APPENDIX: No findings to suggest appendicitis. ABDOMINOPELVIC CAVITY: No ascites. No pneumoperitoneum. No lymphadenopathy. VESSELS: Atherosclerosis without abdominal aortic aneurysm. PELVIS REPRODUCTIVE ORGANS: Post hysterectomy. URINARY BLADDER: Unremarkable. ABDOMINAL WALL/INGUINAL REGIONS: Unremarkable. BONES: There are compression fractures of L1, L2 and L4. The L4 compression fracture was present on the CT scan from 10/23/2022. The L1 compression fracture was described on the recent chest CT examination. The L2 fracture is likely subacute in age. There is approximate 5 mm of retropulsion into the canal causing a mild central canal stenosis. ORIF left hip. Subacute rib fractures described on recent chest CT reidentified.     Impression: Multiple likely subacute fractures as described above. Clinical correlation is advised. Workstation performed: [id number]     CT chest without contrast    Result Date: 4/6/2024  Narrative: CT CHEST WITHOUT IV CONTRAST INDICATION: mechanical fall with tenderness to palpation over the left rib cage. COMPARISON: CT chest 1/20/2023 TECHNIQUE: CT examination of the chest was performed without intravenous contrast. Multiplanar 2D reformatted images were created from the source data. This examination, like all CT scans performed in the [facility], was performed utilizing techniques to minimize radiation dose exposure, including the use of iterative reconstruction and automated exposure control. Radiation dose length product (DLP) for this visit: 218 mGy-cm FINDINGS: LUNGS: Minimal bibasilar lower lobe subsegmental atelectasis, left greater than right. Multilobar pulmonary scarring, stable. No pulmonary contusion. No suspicious pulmonary nodule. Punctate calcified granuloma in the left upper lobe. Stable peripheral right middle lobe chronic mucous plugging. Central  airways are clear. PLEURA: Unremarkable. HEART/GREAT VESSELS: Heart is not enlarged.  No pericardial effusion.  Aortic and coronary artery calcification.  No thoracic aortic aneurysm. MEDIASTINUM AND JAMISON: Minimal fluid in the diffusely patulous esophagus may be sequela of chronic gastroesophageal reflux or esophageal dysmotility. Otherwise unremarkable. CHEST WALL AND LOWER NECK: Left axillary surgical clips. VISUALIZED STRUCTURES IN THE UPPER ABDOMEN: Colonic diverticulosis. Punctate right renal nonobstructing calculus. OSSEOUS STRUCTURES: Subacute nondisplaced fracture of the left anterior fifth and sixth ribs. Subacute nondisplaced fracture of the right 12th posterior rib. Subacute minimally displaced fracture of the right 11th posterior rib. Old right posterior rib fractures. Old right humeral head and neck fracture. Moderate subacute compression fracture of L1 without significant retropulsion. Stable moderate chronic compression fracture of T7. No osseous destructive lesion identified.  Degenerative changes of the  spine. Dextroconvex thoracic scoliosis.     Impression: Minimal bibasilar lower lobe subsegmental atelectasis, left greater than right. No other evidence of acute intrathoracic process. Subacute nondisplaced fracture of the left anterior 5th and 6th ribs. Subacute nondisplaced fracture of the right posterior 12th rib. Subacute minimally displaced fracture of the right posterior 11th rib. Moderate subacute compression fracture of L1 without significant retropulsion. Additional chronic findings and negatives as above. The study was marked in EPIC for immediate notification. Workstation performed: [id number]     CT facial bones without contrast    Result Date: 4/6/2024  Narrative: CT FACIAL BONES WITHOUT INTRAVENOUS CONTRAST INDICATION:   L orbital swelling and pain. COMPARISON: CT facial bones 10/23/2022 TECHNIQUE:  Axial CT images were obtained through the facial bones with additional sagittal and  coronal reconstructions. Radiation dose length product (DLP) for this visit:  263 mGy-cm .  This examination, like all CT scans performed in the [facility], was performed utilizing techniques to minimize radiation dose exposure, including the use of iterative reconstruction and automated exposure control. IMAGE QUALITY:  Diagnostic. FINDINGS: FACIAL BONES:   Acute minimally displaced fracture of the lateral wall of the left maxillary sinus. Acute nondisplaced buckled fracture of the anterior wall of the left maxillary sinus. Acute nondisplaced fracture of the lateral wall of the left orbit. Equivocal nondisplaced fracture of the left zygomatic arch. Normal alignment of the temporomandibular joints.  No lytic or blastic lesion. ORBITS: Changes of bilateral lens replacements noted. Otherwise, orbital globes, optic nerves, and extraocular muscles appear symmetric and normal. There is no evidence of retrobulbar mass, abscess, or hematoma. SINUSES: Trace gas fluid level in the left maxillary sinus. Chronic deviation of the nasal septum. SOFT TISSUES: Left periorbital soft tissue swelling/hematoma.     Impression: Acute left anterior and lateral maxillary sinus, left lateral orbit and equivocal left zygomatic arch fractures as above. Globes are intact. No orbital hematoma. The study was marked in EPIC for immediate notification. Workstation performed: [id number]     CT spine cervical without contrast    Result Date: 4/6/2024  Narrative: CT CERVICAL SPINE - WITHOUT CONTRAST INDICATION:   mechanical fall with head strike. COMPARISON: CT cervical spine 10/23/2022 TECHNIQUE:  CT examination of the cervical spine was performed without intravenous contrast.  Contiguous axial images were obtained. Multiplanar 2D reformatted images were created from the source data. Radiation dose length product (DLP) for this visit:  149 mGy-cm .  This examination, like all CT scans performed in the [facility],  was performed utilizing techniques to minimize radiation dose exposure, including the use of iterative reconstruction and automated exposure control. IMAGE QUALITY:  Diagnostic. FINDINGS: ALIGNMENT: Straightening of the usual cervical lordosis likely chronic. No subluxation. VERTEBRAE: No fracture. Diffuse osteopenia. DEGENERATIVE CHANGES: Moderate multilevel cervical degenerative changes are noted. No critical central canal stenosis. PREVERTEBRAL AND PARASPINAL SOFT TISSUES: No prevertebral soft tissue swelling.  Bilateral carotid artery calcification. THORACIC INLET: Please refer to the concurrent chest CT report for thoracic inlet findings.     Impression: No cervical spine fracture or traumatic malalignment. Workstation performed: [id number]     CT head without contrast    Result Date: 4/6/2024  Narrative: CT BRAIN - WITHOUT CONTRAST INDICATION:   Mechanical fall with headstrike. COMPARISON: CT head 10/23/2022 TECHNIQUE:  CT examination of the brain was performed.  Multiplanar 2D reformatted images were created from the source data. Radiation dose length product (DLP) for this visit:  984 mGy-cm .  This examination, like all CT scans performed in the [facility], was performed utilizing techniques to minimize radiation dose exposure, including the use of iterative reconstruction and automated exposure control. IMAGE QUALITY:  Diagnostic. FINDINGS: PARENCHYMA:  No intracranial mass, mass effect or midline shift. No CT signs of acute infarction.  No acute parenchymal hemorrhage. Moderate left parietal encephalomalacia extending to the posterior left frontal and temporal lobes attributed to old infarction. Periventricular, centrum semiovale, and subcortical white matter hypodensity is a nonspecific finding likely representing chronic microangiopathy. Chronic lacunar infarct in the left basal ganglia. Calcification bilateral cavernous internal carotid arteries. VENTRICLES AND EXTRA-AXIAL SPACES: No  acute hydrocephalus. Stable mild ex vacuo dilation of the atrium of the left lateral ventricle. Mild prominence of CSF spaces diffusely attributed to generalized volume loss. VISUALIZED ORBITS: Changes of bilateral lens replacements noted. Left periorbital soft tissue swelling/hematoma PARANASAL SINUSES: Trace gas fluid level in the left maxillary sinus. CT facial bones will be reported separately. CALVARIUM AND EXTRACRANIAL SOFT TISSUES: No acute scalp findings. No skull fracture. Bilateral hyperostosis frontalis. Left mastoid effusion.     Impression: No acute intracranial process. No skull fracture. 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 Workstation performed: [id number]     XR chest 2 views    Result Date: 3/26/2024  Narrative: CHEST INDICATION:   syncope. COMPARISON: CXR 12/8/2023 and chest CT 1/20/2023. EXAM PERFORMED/VIEWS: AP AND LATERAL CHEST RADIOGRAPHS. FINDINGS: Mild cardiomegaly. Mild left base atelectasis with no acute disease. Mild elevation of the left hemidiaphragm. Left axillary clips. Upper abdomen normal. Osteopenia. Possible old left humeral neck fracture.     Impression: No acute cardiopulmonary disease. Workstation performed: [id number]     EKG, Pathology, and Other Studies: I have personally reviewed pertinent reports.      VTE Prophylaxis: Sequential compression device (Venodyne)  and Enoxaparin (Lovenox)    Code Status: Level 1 - Full Code  Advance Directive and Living Will:      Power of :    POLST:      Counseling / Coordination of Care  I spent 30 minutes with the patient.

## 2024-04-07 NOTE — ASSESSMENT & PLAN NOTE
- Multiple rib fractures, present on admission.  - Rib fractures range from acute to subacute  - Continue rib fracture protocol.  - Continue to encourage incentive spirometer use and adequate pulmonary hygiene.  Currently pulling 500-750 mL on I.S.  - PIC score is 7  - Supplemental oxygen via nasal cannula as needed to maintain saturations greater than or equal to 94%.  - PT and OT evaluation and treatment as indicated.  - Outpatient follow-up in the trauma clinic for re-evaluation in approximately 2 weeks.

## 2024-04-07 NOTE — OCCUPATIONAL THERAPY NOTE
Occupational Therapy Evaluation     Patient Name: Doris Campbell  Today's Date: 4/7/2024  Problem List  Active Problems:    Mild cognitive impairment    Hypertension    History of falling    Multiple rib fractures    Acute pain due to trauma    L1 vertebral fracture (HCC)    Zygomatic fracture (HCC)    Past Medical History  Past Medical History:   Diagnosis Date    Allergic     Anxiety     Arthritis     Cancer (HCC)     HLD (hyperlipidemia)     Hypertension     Hyperthyroidism     Osteoporosis     Stroke (HCC)     TIA (transient ischemic attack)      Past Surgical History  Past Surgical History:   Procedure Laterality Date    BREAST LUMPECTOMY Left     HYSTERECTOMY      KNEE SURGERY Right     ORIF TIBIA FRACTURE Right 12/8/2016    Procedure: OPEN REDUCTION W/ INTERNAL FIXATION (ORIF) TIBIA;  Surgeon: Angie Case MD;  Location:  MAIN OR;  Service:            04/07/24 1258   OT Last Visit   OT Visit Date 04/07/24   Note Type   Note type Evaluation   Pain Assessment   Pain Assessment Tool 0-10   Pain Score No Pain   Restrictions/Precautions   Weight Bearing Precautions Per Order No   Braces or Orthoses (S)  Other (Comment)  (Neurosx note reports TLSO PRN for pain, pt OK to mobilise with or without brace. Pt confused, however verbalizes and expresses 0/10 pain during eval; no TLSO given by PT during session)   Other Precautions Cognitive;Chair Alarm;Bed Alarm;Fall Risk;Pain;Limb alert;Spinal precautions  (LUE limb alert. Sinus precautions)   Home Living   Type of Home Assisted living  ([facility])   Home Layout One level;Access   Home Equipment Walker   Additional Comments mod (I) c RW at baseline   Prior Function   Level of [place] Independent with functional mobility;Needs assistance with ADLs;Needs assistance with IADLS  (A c LB ADLs, UB ADLs PRN, and showering at baseline)   Lives With Facility staff   Receives Help From Personal care attendant   IADLs Family/Friend/Other provides  transportation;Family/Friend/Other provides meals;Family/Friend/Other provides medication management   Falls in the last 6 months 1 to 4  (pt reports 2 falls; 1 leading to admission.)   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 A with all IADLs. mod (I) c RW. Lives at [facility] staff   Service to Others retired, used to make costumes   Intrinsic Gratification enjoys music and dance events   General   Additional Pertinent History Pt admitted s/p fall resulting in L1 vertebrl fx, orbital fx and zygomatic arch fracture, multiple rib fractures. Neurosx: TLSO PRN. Dx of major neurocognitive disorder and MCI at baseline. Hx of CVA, breast cancer, multiple falls, ambulatory fysfunction.   Family/Caregiver Present No   Subjective   Subjective Agreeable to OT session   ADL   Eating Assistance 5  Supervision/Setup   Grooming Assistance 5  Supervision/Setup   UB Bathing Assistance 4  Minimal Assistance   LB Bathing Assistance 3  Moderate Assistance   UB Dressing Assistance 4  Minimal Assistance   LB Dressing Assistance 2  Maximal Assistance   LB Dressing Deficit Thread LLE into underwear;Thread RLE into underwear;Increased time to complete;Supervision/safety;Requires assistive device for steadying;Verbal cueing;Setup;Pull up over hips  (max cueing for sequencing through task, difficulty c motor planning. A to place BLE through underwear. total A to pull over hips c BUE on RW + Max A steadying of 2nd staff)   Toileting Assistance  3  Moderate Assistance   Functional Assistance 3  Moderate Assistance  (mod - Min A)   Additional Comments Did not observe eating, grooming, UB bathing, LB bathing, UB dressing, and toileting at time of evaluation, with use of clinical reasoning, pt's performance throughout evaluation indicates that pt may be able to perform these tasks at the levels listed  above   Bed Mobility   Supine to Sit 4  Minimal assistance   Additional items HOB elevated;Increased time required;Assist x 1   Sit to Supine   (seated OOB in recliner at end of session)   Additional Comments sat EOB with close supervision, denies lightheaded/dizziness c positional changes   Transfers   Sit to Stand 3  Moderate assistance   Additional items Assist x 1;Increased time required;Verbal cues   Stand to Sit 3  Moderate assistance   Additional items Assist x 1;Increased time required;Verbal cues   Stand pivot 3  Moderate assistance   Additional items Assist x 1;Increased time required;Verbal cues  (ambulatory transfer c RW)   Additional Comments initial Mod A x1 for sit<>stand transfers and ambulatory transfer bed > recliner. Increased time for motor planning, benefits from target surfaces. Progresses to Min A with subsequent transfers from recliner   Functional Mobility   Functional Mobility   (Mod A progressing to Min A on 2nd trial)   Additional Comments short distance Mod A. progresses with household distance mobility into hallway with Min A, but significantly increased time needed. cueing needed to navigate through environment.   Additional items Rolling walker   Balance   Static Sitting Fair -   Dynamic Sitting Poor +   Static Standing Poor  (variable poor + to poor with RW)   Dynamic Standing Poor  (variable poor + to poor with RW)   Activity Tolerance   Activity Tolerance Patient tolerated treatment well   Medical Staff Made Aware Care coordination c PT Linda.   Nurse Made Aware KENZIE Moore pre/post session   RUE Assessment   RUE Assessment WFL   LUE Assessment   LUE Assessment WFL   Hand Function   Gross Motor Coordination Impaired   Fine Motor Coordination Impaired   Hand Function Comments bradykinetic movements throughout.   Sensation   Light Touch No apparent deficits   Vision-Basic Assessment   Current Vision Wears glasses only for reading  (not at bedside)   Patient Visual Report   (reports  "\"black [place]\" and \"blurred vision\". but inconsistent reports reL onset. Very limited d/t cognition.)   Vision - Complex Assessment   Ocular Range of Motion Intact   Tracking Intact   Saccades Impaired  (decreased smooth pursuit)   Acuity Able to read clock/calendar on wall without difficulty;Able to read employee name badge without difficulty   Visual Screen Results Decreased visual acuity   Additional Comments reports blurred vision in R eye with L eye occluded. unclear onset of symptoms (baseline vs acute). glasses not at bedside further limiting evaluation   Cognition   Overall Cognitive Status Impaired   Arousal/Participation Alert;Cooperative   Attention Attends with cues to redirect   Orientation Level Oriented to person;Oriented to place;Disoriented to time;Disoriented to situation  (requires cues for last name)   Memory Decreased short term memory;Decreased recall of recent events;Decreased recall of precautions   Following Commands Follows one step commands with increased time or repetition   Comments (S)  impaired sequencing, motor planning, memory, insight, problem solving. Orders received for COG eval. Per EMR, hx of MCI and major neurocognitive disorder. Upon review of previous therapy notes, pt with hx of 0/5 [place], has had ongoing difficulties with sequencing, direction following, motor planning. Pt appears to be at / near baseline status, no cog evaluation indicated at this time. Will continue to assess in f/u sessions   Assessment   Limitation Decreased UE strength;Decreased ADL status;Decreased Safe judgement during ADL;Decreased cognition;Decreased endurance;Decreased high-level ADLs;Decreased self-care trans  (trunk control, problem solving, activity tolerance, memory, insight, global mental status, sequencing, motor planning, coordination, balance.)   Prognosis Good   Assessment Patient is a 83 y.o. female seen for OT evaluation at [facility] following admission on 4/6/2024  " s/p fall. Please see above for comprehensive list of comorbidities and significant PMHx impacting functional performance.  Upon initial evaluation, pt appears to be performing below baseline functional status.   Occupational performance is affected by the following deficits: endurance ,  decreased muscular strength , motor planning, decreased balance , decreased standing tolerance for self care tasks , decreased dynamic balance impacting functional reach, decreased trunk control , decreased activity tolerance , impaired memory , impaired judgement and problem solving , impaired safety awareness , (+) pain , and impaired global mental status. Personal/Environmental factors impacting D/C include: (+) Hx of falls , Assistance needed for ADL/IADLs, decreased insight toward deficits , decreased recall of precautions , and baseline cognitive deficits. Supporting factors include: accessible home environment Patient would benefit from OT services within the acute care setting to maximize level of functional independence in the following areas self-care transfers, functional mobility, and ADLs.  From OT standpoint, recommendation at time of D/C would be Level 2: moderate resource intensity . May progress to level III pending confirmation of PLOF and clarification of assistance available at facility.   Goals   Patient Goals none stated 2/;2 cognitive status, will continue to follow   Plan   Treatment Interventions ADL retraining;UE strengthening/ROM;Functional transfer training;Endurance training;Cognitive reorientation;Patient/family training;Fine motor coordination activities;Compensatory technique education;Continued evaluation;Activityengagement   Goal Expiration Date 04/17/24   OT Treatment Day 0   OT Frequency 3-5x/wk   Discharge Recommendation   Rehab Resource Intensity Level, OT II (Moderate Resource Intensity)  (May progress to level III pending confirmation of PLOF and clarification of assistance available at  facility.)   Additional Comments  The patient's raw score on the AM-PAC Daily Activity Inpatient Short Form is 16. A raw score of less than 19 suggests the patient may benefit from discharge to post-acute rehabilitation services. Please refer to the recommendation of the Occupational Therapist for safe discharge planning.   AM-PAC Daily Activity Inpatient   Lower Body Dressing 2   Bathing 2   Toileting 2   Upper Body Dressing 3   Grooming 3   Eating 4   Daily Activity Raw Score 16   Daily Activity Standardized Score (Calc for Raw Score >=11) 35.96   AM-PAC Applied Cognition Inpatient   Following a Speech/Presentation 1   Understanding Ordinary Conversation 3   Taking Medications 1   Remembering Where Things Are Placed or Put Away 2   Remembering List of 4-5 Errands 1   Taking Care of Complicated Tasks 1   Applied Cognition Raw Score 9   Applied Cognition Standardized Score 22.48   End of Consult   Patient Position at End of Consult Bed/Chair alarm activated;All needs within reach;Bedside chair   Nurse Communication Nurse aware of consult   Goals established on initial evaluation in order to achieve goal of maximizing functional independence during ADL tasks.      Pt will complete UB ADLs Mod independent   for increased ADL independence within 10 days.     Pt will complete LB ADLs Min A  for increased ADL independence within 10 days.     Pt will complete toileting Mod independent   with use of DME for increased ADL independence within 10 days.     Pt will demonstrate proper body mechanics to complete self-care transfers and functional mobility household distances with Mod independent  and use of LRAD for increased safety and functional independence within 10 days.       Pt will complete bed mobility Mod independent  for increased independence in repositioning, pressure offloading, and managing comfort.     Pt will demonstrate proper body mechanics and fall prevention strategies during 100% of tx sessions for  increased safety awareness during ADL/IADLs    Pt will demonstrate activity tolerance of 30 min in therapeutic tasks for increased participation in meaningful activities upon D/C.    Pt will attend to treatment task or activity for 4 minutes without need for redirection to improve activity engagement within 10 days.     Pt will demonstrate use of pain management techniques PRN for increased activity tolerance and engagement.     Pt will demonstrate OOB sitting tolerance of 2-4 hr/day for increased activity tolerance and engagement in leisure activities within 10 days.     Pt benefited from co-session of skilled OT and PT therapists in order to most appropriately address functional deficits d/t decreased activity tolerance, extensive cognitive deficits impacting safe mobility.  OT/PT objectives were addressed separately; please see PT note for specific goal areas targeted.    Karley Sandhu, OT

## 2024-04-07 NOTE — PHYSICAL THERAPY NOTE
PHYSICAL THERAPY EVALUATION  NAME: Doris Campbell  AGE:   83 y.o.  MRN:  [id number]  ADMIT DX: Head injury [S09.90XA]  Fall, sequela [W19.XXXS]  Closed fracture of orbit, initial encounter ([facility]) [S02.85XA]  Fall, initial encounter [W19.XXXA]  Closed fracture of first lumbar vertebra, unspecified fracture morphology, initial encounter ([facility]) [S32.019A]  Closed fracture of left zygomatic arch, initial encounter ([facility]) [S02.40FA]    PMH:   Past Medical History:   Diagnosis Date    Allergic     Anxiety     Arthritis     Cancer ([facility])     HLD (hyperlipidemia)     Hypertension     Hyperthyroidism     Osteoporosis     Stroke ([facility])     TIA (transient ischemic attack)      LENGTH OF STAY: 1        04/07/24 1253   PT Last Visit   PT Visit Date 04/07/24   Note Type   Note type Evaluation   Pain Assessment   Pain Assessment Tool 0-10   Pain Score No Pain   Restrictions/Precautions   Weight Bearing Precautions Per Order No   Braces or Orthoses (S)  Other (Comment)  (Per Neurosurgery note, TLSO if needed for pain, ok to mobilize with or without brace; pt is confused, however had 0/10 pain in back during session; no TLSO given)   Home Living   Type of Home Assisted living  ([facility])   Home Layout One level   Home Equipment Walker   Additional Comments Ambulates with mod I and RW at baseline.   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 Inconsistent information throughout.   General   Family/Caregiver Present No   Cognition   Overall Cognitive Status  Impaired   Arousal/Participation Cooperative   Attention Attends with cues to redirect   Orientation Level Oriented to person;Oriented to place;Disoriented to time;Disoriented to situation  (requires cues for last name)   Memory Decreased short term memory;Decreased recall of recent events;Decreased recall of precautions   Following Commands Follows one step commands with increased time or repetition   Comments Pt identified by name and .   Subjective   Subjective Agrees to PT evaluation and is confused and cooperative throughout session.   RLE Assessment   RLE Assessment X   Strength RLE   RLE Overall Strength 4-/5  (functionally)   LLE Assessment   LLE Assessment X   Strength LLE   LLE Overall Strength 4-/5  (functionally)   Bed Mobility   Supine to Sit 4  Minimal assistance   Additional items HOB elevated;Bedrails;Increased time required;Verbal cues   Sit to Supine Unable to assess  (left OOB in chair post session with alarm intact)   Transfers   Sit to Stand 3  Moderate assistance   Additional items Assist x 1;Increased time required;Verbal cues  (progressed to min A from armchair)   Stand to Sit 3  Moderate assistance   Additional items Assist x 1;Armrests;Increased time required;Verbal cues  (progressed to min A)   Stand pivot 3  Moderate assistance   Additional items Assist x 1;Increased time required;Verbal cues  (with RW)   Additional Comments significant retropulsion noted initially; requires increased time and verbal cues to complete all tasks   Ambulation/Elevation   Gait pattern Improper Weight shift;Decreased foot clearance;Inconsistent jose;Short stride;Excessively slow  (genu valgum)   Gait Assistance 3  Moderate assist  (progressed from mod A to min A)   Additional items Assist x 1;Verbal cues  (with chair follow)   Assistive Device Rolling walker   Distance 4` x1   Balance   Static Sitting Fair -   Dynamic Sitting Poor +   Static Standing Poor +   Dynamic Standing Poor   Ambulatory Poor    Endurance Deficit   Endurance Deficit Yes   Endurance Deficit Description limited ambulation distance, fatigue   Activity Tolerance   Activity Tolerance Patient tolerated treatment well;Patient limited by fatigue   Medical Staff Made Aware OT Karley   Nurse Made Aware Per RN, pt appropriate to evaluate   Assessment   Prognosis Fair   Problem List Decreased strength;Decreased endurance;Impaired balance;Decreased mobility;Decreased cognition;Impaired judgement;Decreased safety awareness   Assessment Pt is a 83 y.o. female seen for PT evaluation s/p admit to [facility] on 8/18/2022 w/ fall and resulting rib fractures, L1 fracture, and zygomatic fracture.  Order placed for PT. Comorbidities affecting pt's physical performance at time of assessment include: HTN, cancer history, and CVA. Personal factors affecting pt at time of IE include: advanced age, limited insight into impairments, and recent fall(s). Prior to admission, pt was  living at [facility] . Per pt, has been ambulating with mod I and RW at baseline.  Upon evaluation: Pt requires min A for bed mobility, mod to min A for sit to stand transfers, and mod A to min A for ambulation with RW.   (Please find full objective findings from PT assessment regarding body systems outlined above). Impairments and limitations also listed above, especially due to  weakness, impaired balance, decreased endurance, gait deviations, decreased activity tolerance, decreased safety awareness, impaired judgement, fall risk, and decreased cognition.  Pt's clinical presentation is currently unstable/unpredictable seen in pt's presentation of fall risk, limited insight into deficits, and significant decline in functional mobility compared to baseline.  Pt to benefit from continued skilled PT tx while in hospital and upon DC to address deficits as defined above and maximize level of functional mobility.   Recommend  progression of ambulation and initiation of HEP as  appropriate .   Goals   Patient Goals unable to state due to cognitive status   STG Expiration Date 04/17/24   Short Term Goal #1 Pt will be able to: (1) perform bed mobility with supervision to promote OOB activity (2) perform sit to stand with supervision to decrease burden of care (3) ambulate at least 200` with supervision and least restrictive AD to increase activity tolerance (4) increase standing balance by 1 grade to decrease risk of falls   PT Treatment Day 1   Plan   Treatment/Interventions Functional transfer training;LE strengthening/ROM;Therapeutic exercise;Endurance training;Cognitive reorientation;Patient/family training;Equipment eval/education;Bed mobility;Gait training   PT Frequency 3-5x/wk   Discharge Recommendation   Rehab Resource Intensity Level, PT II (Moderate Resource Intensity)  (may progress to level 3 pending accurate PLOF and level of assist available at facility)   Equipment Recommended Walker   Walker Package Recommended Wheeled walker   AM-PAC Basic Mobility Inpatient   Turning in Flat Bed Without Bedrails 3   Lying on Back to Sitting on Edge of Flat Bed Without Bedrails 3   Moving Bed to Chair 2   Standing Up From Chair Using Arms 2   Walk in Room 2   Climb 3-5 Stairs With Railing 1   Basic Mobility Inpatient Raw Score 13   Basic Mobility Standardized Score 33.99   [facility] Highest Level Of Mobility   -[facility] Goal 4: Move to chair/commode   -[facility] Achieved 7: Walk 25 feet or more   Additional Treatment Session   Start Time 1320   End Time 1328   Treatment Assessment Pt agrees to PT treatment and further ambulation.  Able to ambulate an additional ~35` x1 with min A and use of RW with chair follow.  Requires increased time to complete task and verbal cues for safety and redirection to task.  Will continue to benefit from ongoing skilled PT to maximize her functional mobility and increase her level of independence.   Equipment Use RW   Additional Treatment Day 1   End of Consult    Patient Position at End of Consult Bedside chair;Bed/Chair alarm activated;All needs within reach   Pt was seen for a co-eval with OT due to potential need for significant physical assist, poor pain control, impaired mental status, limiting behaviors, and poor adherence to precautions.     The patient's -[facility] Basic Mobility Inpatient Short Form Raw Score is 13, Standardized Score is 33.99.  A Raw Score of less than 16 suggests the patient may benefit from discharge to post-acute rehabilitation services. However please refer to therapist recommendation for discharge planning given other factors that may influence destination.     Adapted from Slim KEITH, Haritha J, Pauline J, Liseth MESA. [facility] “6-Clicks” Basic Mobility and Daily Activity Scores With Discharge Destination. Physical Therapy, 2021;101:1-9. DOI: [email or web address]      Linda Rankin PT,DPT

## 2024-04-07 NOTE — ASSESSMENT & PLAN NOTE
- OMFS consulted, appreciate input  - non-operative management  - outpatient follow-up as needed

## 2024-04-07 NOTE — PLAN OF CARE
Problem: Prexisting or High Potential for Compromised Skin Integrity  Goal: Skin integrity is maintained or improved  Description: INTERVENTIONS:  - Identify patients at risk for skin breakdown  - Assess and monitor skin integrity  - Assess and monitor nutrition and hydration status  - Monitor labs   - Assess for incontinence   - Turn and reposition patient  - Assist with mobility/ambulation  - Relieve pressure over bony prominences  - Avoid friction and shearing  - Provide appropriate hygiene as needed including keeping skin clean and dry  - Evaluate need for skin moisturizer/barrier cream  - Collaborate with interdisciplinary team   - Patient/family teaching  - Consider wound care consult   Outcome: Progressing     Problem: PAIN - ADULT  Goal: Verbalizes/displays adequate comfort level or baseline comfort level  Description: Interventions:  - Encourage patient to monitor pain and request assistance  - Assess pain using appropriate pain scale  - Administer analgesics based on type and severity of pain and evaluate response  - Implement non-pharmacological measures as appropriate and evaluate response  - Consider cultural and social influences on pain and pain management  - Notify physician/advanced practitioner if interventions unsuccessful or patient reports new pain  Outcome: Progressing     Problem: INFECTION - ADULT  Goal: Absence or prevention of progression during hospitalization  Description: INTERVENTIONS:  - Assess and monitor for signs and symptoms of infection  - Monitor lab/diagnostic results  - Monitor all insertion sites, i.e. indwelling lines, tubes, and drains  - Monitor endotracheal if appropriate and nasal secretions for changes in amount and color  - [place] appropriate cooling/warming therapies per order  - Administer medications as ordered  - Instruct and encourage patient and family to use good hand hygiene technique  - Identify and instruct in appropriate isolation precautions for  identified infection/condition  Outcome: Progressing  Goal: Absence of fever/infection during neutropenic period  Description: INTERVENTIONS:  - Monitor WBC    Outcome: Progressing     Problem: SAFETY ADULT  Goal: Patient will remain free of falls  Description: INTERVENTIONS:  - Educate patient/family on patient safety including physical limitations  - Instruct patient to call for assistance with activity   - Consult OT/PT to assist with strengthening/mobility   - Keep Call bell within reach  - Keep bed low and locked with side rails adjusted as appropriate  - Keep care items and personal belongings within reach  - Initiate and maintain comfort rounds  - Make Fall Risk Sign visible to staff  - Offer Toileting every  Hours, in advance of need  - Initiate/Maintain alarm  - Obtain necessary fall risk management equipment:   - Apply yellow socks and bracelet for high fall risk patients  - Consider moving patient to room near nurses station  Outcome: Progressing  Goal: Maintain or return to baseline ADL function  Description: INTERVENTIONS:  -  Assess patient's ability to carry out ADLs; assess patient's baseline for ADL function and identify physical deficits which impact ability to perform ADLs (bathing, care of mouth/teeth, toileting, grooming, dressing, etc.)  - Assess/evaluate cause of self-care deficits   - Assess range of motion  - Assess patient's mobility; develop plan if impaired  - Assess patient's need for assistive devices and provide as appropriate  - Encourage maximum independence but intervene and supervise when necessary  - Involve family in performance of ADLs  - Assess for home care needs following discharge   - Consider OT consult to assist with ADL evaluation and planning for discharge  - Provide patient education as appropriate  Outcome: Progressing  Goal: Maintains/Returns to pre admission functional level  Description: INTERVENTIONS:  - Perform AM-PAC 6 Click Basic Mobility/ Daily Activity  assessment daily.  - Set and communicate daily mobility goal to care team and patient/family/caregiver.   - Collaborate with rehabilitation services on mobility goals if consulted  - Perform Range of Motion  times a day.  - Reposition patient every  hours.  - Dangle patient  times a day  - Stand patient  times a day  - Ambulate patient  times a day  - Out of bed to chair  times a day   - Out of bed for meals  times a day  - Out of bed for toileting  - Record patient progress and toleration of activity level   Outcome: Progressing     Problem: DISCHARGE PLANNING  Goal: Discharge to home or other facility with appropriate resources  Description: INTERVENTIONS:  - Identify barriers to discharge w/patient and caregiver  - Arrange for needed discharge resources and transportation as appropriate  - Identify discharge learning needs (meds, wound care, etc.)  - Arrange for interpretive services to assist at discharge as needed  - Refer to Case Management Department for coordinating discharge planning if the patient needs post-hospital services based on physician/advanced practitioner order or complex needs related to functional status, cognitive ability, or social support system  Outcome: Progressing     Problem: Knowledge Deficit  Goal: Patient/family/caregiver demonstrates understanding of disease process, treatment plan, medications, and discharge instructions  Description: Complete learning assessment and assess knowledge base.  Interventions:  - Provide teaching at level of understanding  - Provide teaching via preferred learning methods  Outcome: Progressing

## 2024-04-07 NOTE — PLAN OF CARE
Problem: OCCUPATIONAL THERAPY ADULT  Goal: Performs self-care activities at highest level of function for planned discharge setting.  See evaluation for individualized goals.  Description: Treatment Interventions: ADL retraining, UE strengthening/ROM, Functional transfer training, Endurance training, Cognitive reorientation, Patient/family training, Fine motor coordination activities, Compensatory technique education, Continued evaluation, Activityengagement          See flowsheet documentation for full assessment, interventions and recommendations.   Note: Limitation: Decreased UE strength, Decreased ADL status, Decreased Safe judgement during ADL, Decreased cognition, Decreased endurance, Decreased high-level ADLs, Decreased self-care trans (trunk control, problem solving, activity tolerance, memory, insight, global mental status, sequencing, motor planning, coordination, balance.)  Prognosis: Good  Assessment: Patient is a 83 y.o. female seen for OT evaluation at [facility] following admission on 4/6/2024  s/p fall. Please see above for comprehensive list of comorbidities and significant PMHx impacting functional performance.  Upon initial evaluation, pt appears to be performing below baseline functional status.   Occupational performance is affected by the following deficits: endurance ,  decreased muscular strength , motor planning, decreased balance , decreased standing tolerance for self care tasks , decreased dynamic balance impacting functional reach, decreased trunk control , decreased activity tolerance , impaired memory , impaired judgement and problem solving , impaired safety awareness , (+) pain , and impaired global mental status. Personal/Environmental factors impacting D/C include: (+) Hx of falls , Assistance needed for ADL/IADLs, decreased insight toward deficits , decreased recall of precautions , and baseline cognitive deficits. Supporting factors include: accessible home  environment Patient would benefit from OT services within the acute care setting to maximize level of functional independence in the following areas self-care transfers, functional mobility, and ADLs.  From OT standpoint, recommendation at time of D/C would be Level 2: moderate resource intensity . May progress to level III pending confirmation of PLOF and clarification of assistance available at facility.     Rehab Resource Intensity Level, OT: II (Moderate Resource Intensity) (May progress to level III pending confirmation of PLOF and clarification of assistance available at facility.)        Karley Sandhu, OT

## 2024-04-07 NOTE — ASSESSMENT & PLAN NOTE
Age indeterminate L1 SEP compression fracture, TLICS 1  Appreciated on imaging after a syncopal fall  Initially c/o back pain, but no back pain complaints today  Exam nonfocal, NTTP    Imaging:  CT chest w/o, 4/6/24: Moderate subacute compression fracture of L1 without significant retropulsion     Plan:  Continue to monitor neurological exam  Upright x-ray ordered and pending   TLSO brace as needed pain  Medical management and pain control per primary team  PT/OT evaluation, okay to mobilize with or without brace  DVT ppx: SCDs, lovenox  No neurosurgical intervention is anticipated at this time    Neurosurgery will follow from the periphery and review x-ray once obtained.  Please call questions or concerns.

## 2024-04-07 NOTE — PLAN OF CARE
Problem: Prexisting or High Potential for Compromised Skin Integrity  Goal: Skin integrity is maintained or improved  Description: INTERVENTIONS:  - Identify patients at risk for skin breakdown  - Assess and monitor skin integrity  - Assess and monitor nutrition and hydration status  - Monitor labs   - Assess for incontinence   - Turn and reposition patient  - Assist with mobility/ambulation  - Relieve pressure over bony prominences  - Avoid friction and shearing  - Provide appropriate hygiene as needed including keeping skin clean and dry  - Evaluate need for skin moisturizer/barrier cream  - Collaborate with interdisciplinary team   - Patient/family teaching  - Consider wound care consult   4/6/2024 2222 by Pawan Wasserman RN  Outcome: Progressing  4/6/2024 2222 by Pawan Wasserman RN  Outcome: Progressing     Problem: PAIN - ADULT  Goal: Verbalizes/displays adequate comfort level or baseline comfort level  Description: Interventions:  - Encourage patient to monitor pain and request assistance  - Assess pain using appropriate pain scale  - Administer analgesics based on type and severity of pain and evaluate response  - Implement non-pharmacological measures as appropriate and evaluate response  - Consider cultural and social influences on pain and pain management  - Notify physician/advanced practitioner if interventions unsuccessful or patient reports new pain  Outcome: Progressing     Problem: INFECTION - ADULT  Goal: Absence or prevention of progression during hospitalization  Description: INTERVENTIONS:  - Assess and monitor for signs and symptoms of infection  - Monitor lab/diagnostic results  - Monitor all insertion sites, i.e. indwelling lines, tubes, and drains  - Monitor endotracheal if appropriate and nasal secretions for changes in amount and color  - [place] appropriate cooling/warming therapies per order  - Administer medications as ordered  - Instruct and encourage patient and family to  use good hand hygiene technique  - Identify and instruct in appropriate isolation precautions for identified infection/condition  Outcome: Progressing  Goal: Absence of fever/infection during neutropenic period  Description: INTERVENTIONS:  - Monitor WBC    Outcome: Progressing     Problem: SAFETY ADULT  Goal: Patient will remain free of falls  Description: INTERVENTIONS:  - Educate patient/family on patient safety including physical limitations  - Instruct patient to call for assistance with activity   - Consult OT/PT to assist with strengthening/mobility   - Keep Call bell within reach  - Keep bed low and locked with side rails adjusted as appropriate  - Keep care items and personal belongings within reach  - Initiate and maintain comfort rounds  - Make Fall Risk Sign visible to staff  - Offer Toileting every  Hours, in advance of need  - Initiate/Maintain alarm  - Obtain necessary fall risk management equipment:   - Apply yellow socks and bracelet for high fall risk patients  - Consider moving patient to room near nurses station  Outcome: Progressing  Goal: Maintain or return to baseline ADL function  Description: INTERVENTIONS:  -  Assess patient's ability to carry out ADLs; assess patient's baseline for ADL function and identify physical deficits which impact ability to perform ADLs (bathing, care of mouth/teeth, toileting, grooming, dressing, etc.)  - Assess/evaluate cause of self-care deficits   - Assess range of motion  - Assess patient's mobility; develop plan if impaired  - Assess patient's need for assistive devices and provide as appropriate  - Encourage maximum independence but intervene and supervise when necessary  - Involve family in performance of ADLs  - Assess for home care needs following discharge   - Consider OT consult to assist with ADL evaluation and planning for discharge  - Provide patient education as appropriate  Outcome: Progressing  Goal: Maintains/Returns to pre admission functional  level  Description: INTERVENTIONS:  - Perform AM-PAC 6 Click Basic Mobility/ Daily Activity assessment daily.  - Set and communicate daily mobility goal to care team and patient/family/caregiver.   - Collaborate with rehabilitation services on mobility goals if consulted  - Perform Range of Motion  times a day.  - Reposition patient every  hours.  - Dangle patient  times a day  - Stand patient  times a day  - Ambulate patient times a day  - Out of bed to chair times a day   - Out of bed for meals  times a day  - Out of bed for toileting  - Record patient progress and toleration of activity level   Outcome: Progressing     Problem: DISCHARGE PLANNING  Goal: Discharge to home or other facility with appropriate resources  Description: INTERVENTIONS:  - Identify barriers to discharge w/patient and caregiver  - Arrange for needed discharge resources and transportation as appropriate  - Identify discharge learning needs (meds, wound care, etc.)  - Arrange for interpretive services to assist at discharge as needed  - Refer to Case Management Department for coordinating discharge planning if the patient needs post-hospital services based on physician/advanced practitioner order or complex needs related to functional status, cognitive ability, or social support system  Outcome: Progressing     Problem: Knowledge Deficit  Goal: Patient/family/caregiver demonstrates understanding of disease process, treatment plan, medications, and discharge instructions  Description: Complete learning assessment and assess knowledge base.  Interventions:  - Provide teaching at level of understanding  - Provide teaching via preferred learning methods  Outcome: Progressing

## 2024-04-08 LAB
ANION GAP SERPL CALCULATED.3IONS-SCNC: 9 MMOL/L (ref 4–13)
BASOPHILS # BLD AUTO: 0.04 THOUSANDS/ÂΜL (ref 0–0.1)
BASOPHILS NFR BLD AUTO: 1 % (ref 0–1)
BUN SERPL-MCNC: 28 MG/DL (ref 5–25)
CALCIUM SERPL-MCNC: 8.8 MG/DL (ref 8.4–10.2)
CHLORIDE SERPL-SCNC: 105 MMOL/L (ref 96–108)
CO2 SERPL-SCNC: 22 MMOL/L (ref 21–32)
CREAT SERPL-MCNC: 1.13 MG/DL (ref 0.6–1.3)
EOSINOPHIL # BLD AUTO: 0.2 THOUSAND/ÂΜL (ref 0–0.61)
EOSINOPHIL NFR BLD AUTO: 3 % (ref 0–6)
ERYTHROCYTE [DISTWIDTH] IN BLOOD BY AUTOMATED COUNT: 14.6 % (ref 11.6–15.1)
GFR SERPL CREATININE-BSD FRML MDRD: 44 ML/MIN/1.73SQ M
GLUCOSE SERPL-MCNC: 73 MG/DL (ref 65–140)
HCT VFR BLD AUTO: 35.5 % (ref 34.8–46.1)
HGB BLD-MCNC: 11 G/DL (ref 11.5–15.4)
IMM GRANULOCYTES # BLD AUTO: 0.04 THOUSAND/UL (ref 0–0.2)
IMM GRANULOCYTES NFR BLD AUTO: 1 % (ref 0–2)
LYMPHOCYTES # BLD AUTO: 0.88 THOUSANDS/ÂΜL (ref 0.6–4.47)
LYMPHOCYTES NFR BLD AUTO: 14 % (ref 14–44)
MAGNESIUM SERPL-MCNC: 2.2 MG/DL (ref 1.9–2.7)
MCH RBC QN AUTO: 29.9 PG (ref 26.8–34.3)
MCHC RBC AUTO-ENTMCNC: 31 G/DL (ref 31.4–37.4)
MCV RBC AUTO: 97 FL (ref 82–98)
MONOCYTES # BLD AUTO: 0.46 THOUSAND/ÂΜL (ref 0.17–1.22)
MONOCYTES NFR BLD AUTO: 7 % (ref 4–12)
NEUTROPHILS # BLD AUTO: 4.87 THOUSANDS/ÂΜL (ref 1.85–7.62)
NEUTS SEG NFR BLD AUTO: 74 % (ref 43–75)
NRBC BLD AUTO-RTO: 0 /100 WBCS
PHOSPHATE SERPL-MCNC: 4.3 MG/DL (ref 2.3–4.1)
PLATELET # BLD AUTO: 388 THOUSANDS/UL (ref 149–390)
PMV BLD AUTO: 9.6 FL (ref 8.9–12.7)
POTASSIUM SERPL-SCNC: 4.5 MMOL/L (ref 3.5–5.3)
RBC # BLD AUTO: 3.68 MILLION/UL (ref 3.81–5.12)
SODIUM SERPL-SCNC: 136 MMOL/L (ref 135–147)
WBC # BLD AUTO: 6.49 THOUSAND/UL (ref 4.31–10.16)

## 2024-04-08 PROCEDURE — 97530 THERAPEUTIC ACTIVITIES: CPT

## 2024-04-08 PROCEDURE — 97116 GAIT TRAINING THERAPY: CPT

## 2024-04-08 PROCEDURE — 80048 BASIC METABOLIC PNL TOTAL CA: CPT | Performed by: PHYSICIAN ASSISTANT

## 2024-04-08 PROCEDURE — 83735 ASSAY OF MAGNESIUM: CPT | Performed by: PHYSICIAN ASSISTANT

## 2024-04-08 PROCEDURE — 99221 1ST HOSP IP/OBS SF/LOW 40: CPT | Performed by: PODIATRIST

## 2024-04-08 PROCEDURE — 85025 COMPLETE CBC W/AUTO DIFF WBC: CPT | Performed by: PHYSICIAN ASSISTANT

## 2024-04-08 PROCEDURE — 84100 ASSAY OF PHOSPHORUS: CPT | Performed by: PHYSICIAN ASSISTANT

## 2024-04-08 PROCEDURE — 99222 1ST HOSP IP/OBS MODERATE 55: CPT | Performed by: NURSE PRACTITIONER

## 2024-04-08 RX ORDER — CLOTRIMAZOLE AND BETAMETHASONE DIPROPIONATE 10; .64 MG/G; MG/G
CREAM TOPICAL 2 TIMES DAILY
Status: DISCONTINUED | OUTPATIENT
Start: 2024-04-08 | End: 2024-04-10 | Stop reason: HOSPADM

## 2024-04-08 RX ORDER — ENOXAPARIN SODIUM 100 MG/ML
30 INJECTION SUBCUTANEOUS
Status: DISCONTINUED | OUTPATIENT
Start: 2024-04-09 | End: 2024-04-10 | Stop reason: HOSPADM

## 2024-04-08 RX ADMIN — GABAPENTIN 100 MG: 100 CAPSULE ORAL at 09:22

## 2024-04-08 RX ADMIN — CLOTRIMAZOLE AND BETAMETHASONE DIPROPIONATE: 10; .5 CREAM TOPICAL at 18:05

## 2024-04-08 RX ADMIN — AMLODIPINE BESYLATE 2.5 MG: 2.5 TABLET ORAL at 09:23

## 2024-04-08 RX ADMIN — ENOXAPARIN SODIUM 30 MG: 30 INJECTION SUBCUTANEOUS at 09:22

## 2024-04-08 RX ADMIN — AMLODIPINE BESYLATE 2.5 MG: 2.5 TABLET ORAL at 22:03

## 2024-04-08 RX ADMIN — Medication 3 MG: at 22:03

## 2024-04-08 RX ADMIN — SENNOSIDES AND DOCUSATE SODIUM 1 TABLET: 8.6; 5 TABLET ORAL at 22:03

## 2024-04-08 RX ADMIN — GABAPENTIN 200 MG: 100 CAPSULE ORAL at 22:03

## 2024-04-08 RX ADMIN — ACETAMINOPHEN 975 MG: 325 TABLET, FILM COATED ORAL at 22:03

## 2024-04-08 NOTE — PHYSICAL THERAPY NOTE
"                 PHYSICAL THERAPY NOTE    Patient Name: Doris Campbell  Today's Date: 24 1142   PT Last Visit   PT Visit Date 24   Note Type   Note Type Treatment   Pain Assessment   Pain Assessment Tool 0-10   Pain Score No Pain   Restrictions/Precautions   Weight Bearing Precautions Per Order No   Braces or Orthoses Other (Comment)  (Neurosx note reports TLSO PRN for pain, pt OK to mobilize with or without brace. Pt confused, however verbalizes and expresses 0/10 pain during eval; no TLSO given by PT; updated neurosurgery)   Other Precautions Cognitive;Chair Alarm;Bed Alarm;Fall Risk;Pain;Limb alert;Spinal precautions   General   Chart Reviewed Yes   Response to Previous Treatment Patient with no complaints from previous session.   Family/Caregiver Present No   Cognition   Overall Cognitive Status Impaired   Arousal/Participation Alert;Cooperative   Attention Attends with cues to redirect   Comments Pt identified by name and .   Subjective   Subjective Agrees to PT treatment and is cooperative throughout session.  \"I could sleep all day.\"   Bed Mobility   Supine to Sit 4  Minimal assistance   Additional items Assist x 1;HOB elevated;Bedrails;Increased time required;Verbal cues;LE management   Sit to Supine Unable to assess  (left OOB in chair post session with alarm intact)   Transfers   Sit to Stand 3  Moderate assistance   Additional items Assist x 1;Increased time required;Verbal cues  (retropulsive)   Stand to Sit 3  Moderate assistance   Additional items Increased time required;Assist x 1;Verbal cues  (hand placement)   Additional Comments bradykinetic movements   Ambulation/Elevation   Gait pattern Improper Weight shift;Retropulsion;Decreased foot clearance;Short stride;Excessively slow   Gait Assistance 3  Moderate assist  (initially mod A progressing to min A with RW)   Additional items Assist x 1;Verbal cues   Assistive Device Rolling walker   Distance ~15` x1 + 20` x1 "   Balance   Static Sitting Fair -   Dynamic Sitting Poor +   Static Standing Poor +   Dynamic Standing Poor   Ambulatory Poor   Endurance Deficit   Endurance Deficit Yes   Endurance Deficit Description limited ambulation distance, fatigue   Activity Tolerance   Activity Tolerance Patient limited by fatigue   Nurse Made Aware updated RN on status   Exercises   Knee AROM Long Arc Quad Sitting;5 reps;AROM;Bilateral   Ankle Pumps Sitting;10 reps;AROM;Bilateral   Assessment   Prognosis Fair   Problem List Decreased strength;Decreased endurance;Impaired balance;Decreased mobility;Decreased cognition;Impaired judgement;Decreased safety awareness   Assessment Pt agrees to PT treatment and is cooperative throughout session.  Continues to require mod to min A for transfers and ambulation.  Initially retropulsive and bradykinetic with all mobility.  Requires significant redirection to task throughout treatment.  Pt benefits from target surfaces as pt gets distracted easily.  Will continue to benefit from ongoing skilled PT to maximize her functional mobility and increase her level of independence.   Goals   Patient Goals unable to state due to cognitive status   STG Expiration Date 04/17/24   Short Term Goal #1 Pt will be able to: (1) perform bed mobility with supervision to promote OOB activity (2) perform sit to stand with supervision to decrease burden of care (3) ambulate at least 200` with supervision and least restrictive AD to increase activity tolerance (4) increase standing balance by 1 grade to decrease risk of falls   PT Treatment Day 2   Plan   Treatment/Interventions Functional transfer training;LE strengthening/ROM;Therapeutic exercise;Endurance training;Cognitive reorientation;Patient/family training;Equipment eval/education;Bed mobility;Gait training   Progress Slow progress, decreased activity tolerance   PT Frequency 3-5x/wk   Discharge Recommendation   Rehab Resource Intensity Level, PT II (Moderate Resource  Intensity)  (may progress to level 3 pending accurate PLOF and level of assist available at facility)   Equipment Recommended Walker   Walker Package Recommended Wheeled walker   [facility] Basic Mobility Inpatient   Turning in Flat Bed Without Bedrails 3   Lying on Back to Sitting on Edge of Flat Bed Without Bedrails 3   Moving Bed to Chair 2   Standing Up From Chair Using Arms 2   Walk in Room 2   Climb 3-5 Stairs With Railing 1   Basic Mobility Inpatient Raw Score 13   Basic Mobility Standardized Score 33.99   [facility] Highest Level Of Mobility   [facility] Goal 4: Move to chair/commode   -[facility] Achieved 7: Walk 25 feet or more   Education   Education Provided Mobility training;Assistive device   Patient Reinforcement needed   End of Consult   Patient Position at End of Consult Bedside chair;Bed/Chair alarm activated;All needs within reach   The patient's [facility] Basic Mobility Inpatient Short Form Raw Score is 13. A Raw score of less than or equal to 16 suggests the patient may benefit from discharge to post-acute rehabilitation services.     However please refer to therapist recommendation for discharge planning given other factors that may influence destination.     Adapted from Slim KEITH, Haritha J, Pauline J, Liseth MESA. Association of -PAC “6-Clicks” Basic Mobility and Daily Activity Scores With Discharge Destination. Physical Therapy, 2021;101:1-9. DOI: [email or web address]    Linda Rankin DPT

## 2024-04-08 NOTE — CASE MANAGEMENT
Case Management Assessment & Discharge Planning Note    Patient name Doris Campbell  Location W /W -01 MRN [id number]  : 1940 Date 2024       Current Admission Date: 2024  Current Admission Diagnosis:History of falling   Patient Active Problem List    Diagnosis Date Noted    Multiple rib fractures 2024    Acute pain due to trauma 2024    L1 vertebral fracture (HCC) 2024    Zygomatic fracture (HCC) 2024    Anemia 2023    Elevated alkaline phosphatase level 2023    Asthma 2023    QT prolongation 2023    Adjustment disorder with anxious mood 2022    History of stroke 2022    Ambulatory dysfunction 2022    Pruritus 2022    Urinary frequency 10/31/2022    Closed fracture of ramus of right pubis (HCC) 10/25/2022    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    Right shoulder pain 10/25/2022    Periorbital hematoma, right 10/23/2022    Allergic rhinitis due to allergen 2022    Upper respiratory symptom 2022    Sciatica 2021    Chronic pain of right knee     Arthritis of knee, right     Encounter for support and coordination of transition of care 2021    Fx humeral neck, left, closed, initial encounter 2020    Elevated liver enzymes 2020    Abnormal results of liver function studies 2020    Difficult or painful urination 2020    Hereditary and idiopathic neuropathy, unspecified 2020    Rheumatoid arthritis, unspecified (HCC) 2020    Personal history of transient ischemic attack (TIA), and cerebral infarction without residual deficits 2020    Personal history of breast cancer 2020    History of falling 2020    Age-related osteoporosis without current pathological fracture 2020    Constipation, unspecified 2020    Anxiety     Hypertension 11/10/2020    Stroke ([facility])     Mild cognitive impairment     Vitamin D deficiency 10/22/2020    Closed  fracture of nasal bone 10/21/2020    Age-related osteoporosis with current pathological fracture 10/21/2020    Fall 10/19/2020    Traumatic hematoma of forehead 10/19/2020    Closed fracture of proximal end of right humerus 10/19/2020    B12 deficiency 07/25/2018    Abnormal MRI 10/17/2017    Major neurocognitive disorder, due to vascular disease, without behavioral disturbance, mild 06/30/2017    HLD (hyperlipidemia)     Knee injury 11/29/2016      LOS (days): 2  Geometric Mean LOS (GMLOS) (days): 3.1  Days to GMLOS:1.2     OBJECTIVE:    Risk of Unplanned Readmission Score: 14.13         Current admission status: Inpatient       Preferred Pharmacy:   [id number] [place] - [address]  [address] [facility]  [place] [address]  Phone: [phone] Fax: [phone]    [id number] - [place] - [address]  [address]  [place] [address]  Phone: [phone] Fax: [phone]    PATIENT/FAMILY REPORTS NO PREFERRED PHARMACY  No address on file      Primary Care Provider: Tuyet Cantor MD    Primary Insurance: MEDICARE  Secondary Insurance: BLUE CROSS    ASSESSMENT:  Active Health Care Proxies       Patricia Caro Health Care Agent - Relative, Legal Guardian   Primary Phone: [phone] (Mobile)  Home Phone: [phone]            Readmission Root Cause  30 Day Readmission: No    Patient Information  Admitted from:: Facility  Mental Status: Confused  During Assessment patient was accompanied by: Not accompanied during assessment  Assessment information provided by:: Other - please comment (Cousin)  Primary Caregiver: Self  Support Systems: Family members  County of Residence: [place]  What [facility] do you live in?: [place]  Home entry access options. Select all that apply.: No steps to enter home  Type of Current Residence: Facility  Upon entering residence, is there a bedroom on the main floor (no further steps)?: Yes  Upon entering residence, is there a bathroom on the main  floor (no further steps)?: Yes  Living Arrangements: Other (Comment) (Lives at facility)    Activities of Daily Living Prior to Admission  Functional Status: Assistance  Completes ADLs independently?: No  Level of ADL dependence: Assistance  Ambulates independently?: Yes  Does patient use assisted devices?: Yes  Assisted Devices (DME) used: Walker  Does patient currently own DME?: Yes  What DME does the patient currently own?: Walker  Does patient have a history of Outpatient Therapy (PT/OT)?: Yes  Does the patient have a history of Short-Term Rehab?: Yes  Does patient have a history of HHC?: Yes  Does patient currently have HHC?: No    Patient Information Continued  Income Source: Pension/correction  Does patient have prescription coverage?: Yes  Does patient receive dialysis treatments?: No  Does patient have a history of substance abuse?: No  Does patient have a history of Mental Health Diagnosis?: No    Means of Transportation  Means of Transport to Appts:: Family transport    Social Determinants of Health (SDOH)      Flowsheet Row Most Recent Value   Housing Stability    In the last 12 months, was there a time when you were not able to pay the mortgage or rent on time? N   In the last 12 months, how many places have you lived? 1   In the last 12 months, was there a time when you did not have a steady place to sleep or slept in a shelter (including now)? N   Transportation Needs    In the past 12 months, has lack of transportation kept you from medical appointments or from getting medications? no   In the past 12 months, has lack of transportation kept you from meetings, work, or from getting things needed for daily living? No   Food Insecurity    Within the past 12 months, you worried that your food would run out before you got the money to buy more. Never true   Within the past 12 months, the food you bought just didn't last and you didn't have money to get more. Never true   Utilities    In the past 12 months  has the electric, gas, oil, or water company threatened to shut off services in your home? No            DISCHARGE DETAILS:    Discharge planning discussed with:: Patricia lowery over phone  Freedom of Choice: Yes  Comments - Freedom of Choice: STR  CM contacted family/caregiver?: Yes  Were Treatment Team discharge recommendations reviewed with patient/caregiver?: Yes  Did patient/caregiver verbalize understanding of patient care needs?: N/A- going to facility  Were patient/caregiver advised of the risks associated with not following Treatment Team discharge recommendations?: Yes    Contacts  Patient Contacts: Patricia  Relationship to Patient:: Family (cousin)  Contact Method: Phone  Phone Number: [phone]  Reason/Outcome: Discharge Planning, Emergency Contact, Referral, Continuity of Care    Requested Home Health Care         Is the patient interested in HHC at discharge?: No    DME Referral Provided  Referral made for DME?: No    Other Referral/Resources/Interventions Provided:  Interventions: SNF, Short Term Rehab  Referral Comments: Patient admitted to [facility] s/p fall. CM spoke with patients cousin/ KELLY Soi over phone, to complete assessment/ discuss dcp, as patient is confused. Patient lives at [facility]. Patient requires assistance at baseline with ADLs, uses a walker. Patient has a history of STR and HHC. Discussed therapy recommendations of STR, Patricia agreeable to blanket referral. Referral placed via [facility]. CM department to follow up as able to continue with dcp.     Would you like to participate in our [facility] service program?  : No - Declined    Treatment Team Recommendation: Short Term Rehab, SNF  Discharge Destination Plan:: Short Term Rehab, SNF  Transport at Discharge : [facility] Ambulance

## 2024-04-08 NOTE — CONSULTS
Consultation - Geriatric Medicine   Doris Campbell 83 y.o. female MRN: [id number]  Unit/Bed#: W -01 Encounter: [id number]      Assessment/Plan   Multiple falls  [place] fall precautions   Assess patient frequently for physical needs, encourage use of assistant devices as needed and directed by PT/OT  Identify cognitive and physical deficits and behaviors that affect risk of falls  Consider moving patient closer to nursing station to monitor more closely for impulsive behavior which may increase risk of falls  Educate patient/family on patient safety including physical limitations and importance of using call bell for assistance  Modify environment to reduce risk of injury including disconnecting from pole when not in use, ensuring adequate lighting in room and restroom, ensuring that path to restroom is clear and free of trip hazards  PT/OT consulted to assist with strengthening/mobility and assist with discharge planning to appropriate level of care    Multiple rib fractures  CT chest showed- Subacute nondisplaced fracture of the left anterior 5th and 6th ribs.Subacute nondisplaced fracture of the right posterior 12th rib.Subacute minimally displaced fracture of the right posterior 11th rib.  Rib fracture protocol  Encourage coughing, deep breathing, and incentive spirometry  Multimodal pain regimen including geriatric pain protocol  Patient is currently on Tylenol 975 every 8, lidocaine patch daily, oxycodone 2.5 mg 2 per p.m. for moderate pain, oxycodone 5 mg every 4 as needed for severe pain  Management per trauma    L1 vertebral fracture  S/p falls  CT showed subacute compression fracture of L1 without significant retropulsion  Neurosurgery was consulted  Recommended TLSO brace as needed  Multimodal pain regimen  No neurosurgical intervention anticipated  Follow-up outpatient as needed    Zygomatic fracture  CT showed equivocal nondisplaced fracture of the left zygomatic arch  OMFS was consulted  No  acute surgical intervention indicated  Ice as needed  Sinus precautions, avoid holding status, decongestions as needed    Hypertension  Blood pressure this morning 142/75  Blood pressures have trended between 117/60 to 164/76 over the last 2 days  Currently on amlodipine 2.5 mg daily  Avoid hypotension  Management per primary    Cognitive impairment  Patient is alert oriented to person, situation, and time- thought she was   Most recent MoCA 9 out of 30 from October 2022  Appears to have a diagnosis of mild cognitive impairment  Most recent TSH 2.718 from January 2023  Recommend checking vitamin B12 levels  CT head showed chronic microangiopathy and old left PCA territory infarction  Most recent EKGs showed QTc 451  At risk for delirium due to hospitalization, medications, sleep disturbance  Recommend delirium precautions  Maintain sleep-wake cycle, avoid nighttime interruptions  minimize overnight interruptions, group overnight vitals/labs/nursing checks as possible  dim lights, close blinds and turn off tv to minimize stimulation and encourage sleep environment in evenings  Provide adequate pain control  Avoid urinary retention and constipation  Provide frequent and early mobilization  Provide frequent redirection and reorientation as needed  Avoid medications that may worsen or precipitate delirium such as tramadol, benzodiazepines, anticholinergics, and Benadryl  Redirect unwanted behaviors as first-line therapy, avoid physical restraints as able to  Continue to monitor    Anxiety  Denies on exam  Not on any current medications  Continue to provide emotional support    Insomnia  First-line treatment is behavior modification  Maintain sleep-wake cycle, avoid nighttime interruptions  Avoid caffeine throughout the day  Avoid napping throughout the day  Encourage physical activity throughout the day  Avoid sedative hypnotics including benzodiazepines and benadryl  Continue melatonin 3 mg nightly    Vision  impairment  Patient does have vision impairment  Wears glasses at baseline   Recommend use of corrective lenses at all appropriate times  Encourage adequate lighting and encourage use of assistance with ambulation  Keep personal belongings close to avoid reaching  Encourage appropriate footwear at all times  Recommend large font for printed materials provided to patient    Hard of hearing  Patient does have hearing impairment   Does not wear hearing aids  Hearing impairment  correlated with depression, cognitive impairment, delirium and falls in the older adult  Speak clearly  Use sound amplifier  Speak face to face  Use clear dictation and enunciation of word    Frailty  Clinical Frail Scale: 6 living with moderate frailty  Total protein 7.1 and albumin 3.4  Encourage adequate hydration and nutrition, including protein in meals  OOB as tolerated  PT/OT consulted  Encourage early and frequent mobilization    Ambulatory dysfunction  At a baseline ambulates with walker- contact guard for assistance  PT/OT following  Fall precautions  Out of bed as tolerated  Encourage early and frequent mobilization  Encourage adequate hydration and nutrition  Provide adequate pain management   Goal is to STR  Continue with PT/OT for continued strength and balance training       Home medication review  Amlodipine 2.5 mg twice daily  Calcitonin/salmon 200 units used 1 spray in each nostril daily  Ferrous sulfate 325 mg daily before breakfast  Fluticasone spray 1 spray each nostril daily  Gabapentin 100 mg in the morning  Gabapentin 200 mg nightly  Melatonin 3 mg nightly  Meloxicam 7.5 mg twice daily  Oxycodone 2.5 mg daily at bedtime  Tramadol 25 mg daily in the morning  Viactiv tablet daily  Vitamin D3 1000 units daily  Acetaminophen 975 every 8 as needed  Artificial tears 1 drop both eyes 3 times daily as needed  Aspercreme lidocaine 4% patch daily as needed  Voltaren 1% gel 5 times daily as needed  Imodium 2 mg capsule 4 times daily  as needed  Milk of magnesia 30 mL twice daily as needed for constipation  Tussin DM syrup every 8 as needed    Personally confirmed with medication list sent from [facility]    History of Present Illness   Physician Requesting Consult: Mauro Bess,*  Reason for Consult / Principal Problem: Fall, ISAR 5  Hx and PE limited by: Cognitive impairment  Additional history obtained from: Chart review and patient evaluation      HPI: Doris Campbell is a 83 y.o. year old female who presents with history of anxiety, arthritis, hyperlipidemia, hypertension, osteoporosis, stroke, frequent falls, and ambulatory dysfunction.  She presented to the ER after having multiple falls over the last week.    Patient lives at [facility] assisted living facility.  She uses a rolling walker for ambulation.  She has had multiple falls over the last 6 weeks.  She requires minimal assistance for bathing and dressing and toileting.  The facility manages all the cooking, cleaning, medications.  She has a POA who manages her finances.  She was eyeglasses, no dentures or hearing aids.  She is occasional urinary leakage.  She has grab bars discharge.  In the bathrooms.  She denies any insomnia, anxiety, or depression.  She reports her appetite has been stable.  She does not drive.  She denies any issues with memory.  History provided by patient and also by nurse at [facility].    Upon exam patient was lying bed, resting.  She appeared comfortable and was in no acute distress.  She denied any significant pain.  She slept well overnight.  Appetite is stable.  Last bowel movement was prior to hospitalization.  Per nursing no acute concerns or issues at this time.  Inpatient consult to Gerontology  Consult performed by: VALDEMAR Rogers  Consult ordered by: Tres Oviedo PA-C          Review of Systems   Constitutional:  Negative for activity change, appetite change, chills, fatigue and fever.   HENT:  Negative for  sore throat and trouble swallowing.    Eyes:  Negative for visual disturbance.   Respiratory:  Negative for cough and shortness of breath.    Cardiovascular:  Negative for chest pain and palpitations.   Gastrointestinal:  Negative for abdominal pain, constipation, diarrhea, nausea and vomiting.   Genitourinary:  Negative for difficulty urinating, dysuria and hematuria.   Musculoskeletal:  Positive for arthralgias and gait problem. Negative for back pain.   Skin:  Negative for color change and rash.   Neurological:  Positive for weakness. Negative for dizziness, seizures, syncope, light-headedness and headaches.   Psychiatric/Behavioral:  Positive for confusion and sleep disturbance. Negative for hallucinations. The patient is not nervous/anxious.        Historical Information   Past Medical History:   Diagnosis Date    Allergic     Anxiety     Arthritis     Cancer (HCC)     HLD (hyperlipidemia)     Hypertension     Hyperthyroidism     Osteoporosis     Stroke (HCC)     TIA (transient ischemic attack)      Past Surgical History:   Procedure Laterality Date    BREAST LUMPECTOMY Left     HYSTERECTOMY      KNEE SURGERY Right     ORIF TIBIA FRACTURE Right 12/8/2016    Procedure: OPEN REDUCTION W/ INTERNAL FIXATION (ORIF) TIBIA;  Surgeon: Angie Case MD;  Location: [facility];  Service:      Social History   Social History     Substance and Sexual Activity   Alcohol Use Not Currently    Comment: Alcohol intake:   Occasional  - As per Kelly      Social History     Substance and Sexual Activity   Drug Use Never     Social History     Tobacco Use   Smoking Status Never   Smokeless Tobacco Never   Tobacco Comments    Former smoker - As per Kelly      Family History:   Family History   Problem Relation Age of Onset    Coronary artery disease Family     Other Family         DJD       Meds/Allergies   all current active meds have been reviewed    Allergies   Allergen Reactions    Alendronate     Raloxifene      Risedronate Sodium     Sulfa Antibiotics Swelling    Penicillins Swelling     Patient received 7 day treatment course Keflex without any adverse reactions in 11/2022.        Objective     Intake/Output Summary (Last 24 hours) at 4/8/2024 0951  Last data filed at 4/7/2024 1757  Gross per 24 hour   Intake 100 ml   Output --   Net 100 ml     Invasive Devices       Peripheral Intravenous Line  Duration             Peripheral IV 04/06/24 Right;Dorsal (posterior) Hand 1 day                    Physical Exam  Vitals reviewed.   Constitutional:       General: She is not in acute distress.     Appearance: She is well-developed. She is not ill-appearing.      Comments: Frail appearing    HENT:      Head: Normocephalic.   Cardiovascular:      Rate and Rhythm: Normal rate and regular rhythm.      Heart sounds: Murmur heard.   Pulmonary:      Effort: Pulmonary effort is normal. No respiratory distress.      Breath sounds: Normal breath sounds.   Abdominal:      General: Bowel sounds are normal. There is no distension.      Palpations: Abdomen is soft.      Tenderness: There is no abdominal tenderness.   Musculoskeletal:         General: Tenderness and signs of injury present. No swelling.      Right lower leg: No edema.      Left lower leg: No edema.   Skin:     General: Skin is warm and dry.      Coloration: Skin is pale.      Findings: Bruising present.   Neurological:      General: No focal deficit present.      Mental Status: She is alert. Mental status is at baseline.      Cranial Nerves: No cranial nerve deficit.      Motor: Weakness present.      Gait: Gait abnormal.      Comments: Alert to person, situation, and time- disoriented to palce   Psychiatric:         Mood and Affect: Mood normal.         Lab Results:   I have personally reviewed pertinent lab results including the following:  -CBC, BMP, troponin, magnesium, phosphorus    I have personally reviewed the following imaging study reports in PACS:  -X-ray hip, CT  "head, CT spine, CT chest, CT facial bones and CT abdomen    Therapies:   PT: consulted  OT: consulted    VTE Prophylaxis: Sequential compression device (Venodyne)  and Enoxaparin (Lovenox)    Code Status: Level 1 - Full Code  Advance Directive and Living Will:    yes  Power of :  yes  POLST:      Family and Social Support:   Living arrangements: Lives at [facility]  Assistance needed: Yes    Goals of Care: Likely STR    Please note:  Voice-recognition software may have been used in the preparation of this document.  Occasional wrong word or \"sound-alike\" substitutions may have occurred due to the inherent limitations of voice recognition software.  Interpretation should be guided by context.    "

## 2024-04-08 NOTE — PROGRESS NOTES
[facility]  Progress Note  Name: Doris Campbell I  MRN: [id number]  Unit/Bed#: W -01 I Date of Admission: 4/6/2024   Date of Service: 4/8/2024 I Hospital Day: 2    Assessment/Plan   Zygomatic fracture (HCC)  Assessment & Plan  - OMFS consulted, appreciate input  - non-operative management  - outpatient follow-up as needed      L1 vertebral fracture (HCC)  Assessment & Plan  - L1 compression fracture, present on admission.  - Also noted subacute L2 and L4 rib fractures  - Neurosurgery evaluation and recommendations appreciated.  - Bracing: pending evaluation  - Spine precautions.  - Monitor neurovascular exam.  - Multimodal analgesic regimen as needed.  - PT and OT evaluation and treatment as indicated.  - Outpatient follow up with Neurosurgery for re-evaluation.      Acute pain due to trauma  Assessment & Plan  - Acute pain secondary to traumatic injuries.  - Continue multimodal analgesic regimen.  - Bowel regimen as long as using opioids.  - Continue to monitor pain and adjust regimen as indicated.      Multiple rib fractures  Assessment & Plan  - Multiple rib fractures, present on admission.  - Rib fractures range from acute to subacute  - Continue rib fracture protocol.  - Continue to encourage incentive spirometer use and adequate pulmonary hygiene.  Currently pulling 500-750 mL on I.S.  - PIC score is 7  - Supplemental oxygen via nasal cannula as needed to maintain saturations greater than or equal to 94%.  - PT and OT evaluation and treatment as indicated.  - Outpatient follow-up in the trauma clinic for re-evaluation in approximately 2 weeks.      History of falling  Assessment & Plan  - Status post fall with the below noted injuries.  - Fall precautions.  - Geriatric Medicine consultation for evaluation, medication review and recommendations.  - PT and OT evaluation and treatment as indicated.  - Case Management consultation for disposition  planning.      Hypertension  Assessment & Plan  - Continue current medication regimen.  - Outpatient follow-up with PCP.      Mild cognitive impairment  Assessment & Plan  - Continue current medication regimen.  - Outpatient follow-up with PCP.  - Geriatrics consult       DVT prophylaxis: SCDs and Lovenox  PT and OT: eval and treat    Disposition: DC planning.  Anticipate discharge possibly later today. Attempted to contact family. No answer. Continue supportive measures.     Subjective   Chief Complaint: No new complaints.     Subjective: Patient is offering no new complaints today.      Objective   Vitals:   Temp:  [96.9 °F (36.1 °C)-97.7 °F (36.5 °C)] 97.3 °F (36.3 °C)  HR:  [72-80] 72  Resp:  [12-16] 12  BP: (117-127)/(60-67) 119/60    I/O         04/06 0701  04/07 0700 04/07 0701  04/08 0700 04/08 0701  04/09 0700    P.O.  100     Total Intake(mL/kg)  100 (2.1)     Net  +100            Unmeasured Urine Occurrence 2 x 2 x              Physical Exam:   GENERAL APPEARANCE: NAD  NEURO: no focal deficits, intermittent confusion, following commands  HEENT: Left sided facial swelling  CV: RRR  LUNGS: CTA b/l  GI: Non-tender, non-distended  : no muniz  MSK: moving all extremities  SKIN: warm, dry, intact    Invasive Devices       Peripheral Intravenous Line  Duration             Peripheral IV 04/06/24 Right;Dorsal (posterior) Hand 1 day                     PIC Score  PIC Pain Score: 3 (4/8/2024  5:00 AM)  PIC Incentive Spirometry Score: 1 (pt sleeping) (4/8/2024  5:00 AM)  PIC Cough Description: 2 (4/8/2024  5:00 AM)  PIC Total Score: 6 (4/8/2024  5:00 AM)       If the Total PIC Score </=5, did you consult APS and evaluate patient for further intervention?: no      Pain:    Incentive Spirometry  Cough  3 = Controlled  4 = Above goal volume 3 = Strong  2 = Moderate  3 = Goal to alert volume 2 = Weak  1 = Severe  2 = Below alert volume 1 = Absent     1 = Unable to perform IS         Lab Results: Results: I have  personally reviewed all pertinent laboratory/tests results, BMP/CMP:   Lab Results   Component Value Date    SODIUM 136 04/08/2024    K 4.5 04/08/2024     04/08/2024    CO2 22 04/08/2024    BUN 28 (H) 04/08/2024    CREATININE 1.13 04/08/2024    CALCIUM 8.8 04/08/2024    EGFR 44 04/08/2024   , and CBC:   Lab Results   Component Value Date    WBC 6.49 04/08/2024    HGB 11.0 (L) 04/08/2024    HCT 35.5 04/08/2024    MCV 97 04/08/2024     04/08/2024    RBC 3.68 (L) 04/08/2024    MCH 29.9 04/08/2024    MCHC 31.0 (L) 04/08/2024    RDW 14.6 04/08/2024    MPV 9.6 04/08/2024    NRBC 0 04/08/2024     Imaging: I have personally reviewed pertinent reports.     Other Studies: no other studies

## 2024-04-08 NOTE — QUICK NOTE
Advance care planning note:  Called and updated patient family.  Spoke with them over the phone.  Updated them regarding the current care plan.  No further workup at this time.  Awaiting placement.

## 2024-04-08 NOTE — CONSULTS
Consult - Podiatry   Doris Campbell 83 y.o. female MRN: [id number]  Unit/Bed#: W -01 Encounter: [id number]        ASSESSMENT:  Ingrown nail / paronychia left great toe lateral nail border.  Itching in feet due to xerosis vs tinea pedis.      PLAN:  Reviewed medical records.  Discussed her condition and treatment plan.  Will plan bedside nail procedure tomorrow.  Lotrisone cream bid for her feet.        Imaging: I have personally reviewed pertinent films in PACS  EKG, Pathology, labs, and Other Studies: I have personally reviewed pertinent reports.        History of Present Illness     Doris Campbell is a 83 y.o. female who consulted for wound on her foot.  She has some pain in left great toe.  She is a poor historian and not able to tell me how long she had it.  She complained of itching around toes bilaterally.  No history of diabetes.          Inpatient consult to Podiatry  Consult performed by: Jomar Adams DPM  Consult ordered by: Tres Oviedo PA-C          Review of Systems   Constitutional: Negative.    HENT: Negative.    Eyes: Negative.    Respiratory: Negative.    Cardiovascular: Negative.    Gastrointestinal: Negative.    Musculoskeletal: Negative   Skin: See HPI  Neurological: Negative.   Psych: negative.       Historical Information   Past Medical History:   Diagnosis Date    Allergic     Anxiety     Arthritis     Cancer (HCC)     HLD (hyperlipidemia)     Hypertension     Hyperthyroidism     Osteoporosis     Stroke (HCC)     TIA (transient ischemic attack)      Past Surgical History:   Procedure Laterality Date    BREAST LUMPECTOMY Left     HYSTERECTOMY      KNEE SURGERY Right     ORIF TIBIA FRACTURE Right 12/8/2016    Procedure: OPEN REDUCTION W/ INTERNAL FIXATION (ORIF) TIBIA;  Surgeon: Angie Case MD;  Location: [facility];  Service:      Social History   Social History     Substance and Sexual Activity   Alcohol Use Not Currently    Comment: Alcohol intake:   Occasional  - As per  "Kelly      Social History     Substance and Sexual Activity   Drug Use Never     Social History     Tobacco Use   Smoking Status Never   Smokeless Tobacco Never   Tobacco Comments    Former smoker - As per [place]      Family History:   Family History   Problem Relation Age of Onset    Coronary artery disease Family     Other Family         DJD       Meds/Allergies   Medications Prior to Admission   Medication    acetaminophen (TYLENOL) 325 mg tablet    amLODIPine (NORVASC) 2.5 mg tablet    calcitonin, salmon, (MIACALCIN) 200 units/act nasal spray    Calcium-Vitamin D-Vitamin K (VIACTIV PO)    cholecalciferol (VITAMIN D3) 1,000 units tablet    Diclofenac Sodium (VOLTAREN) 1 %    ferrous sulfate 324 (65 Fe) mg    fluticasone (FLONASE) 50 mcg/act nasal spray    gabapentin (NEURONTIN) 100 mg capsule    gabapentin (NEURONTIN) 100 mg capsule    magnesium hydroxide (MILK OF MAGNESIA) 400 mg/5 mL oral suspension    melatonin 3 mg    meloxicam (MOBIC) 7.5 mg tablet    oxyCODONE (OXY-IR) 5 MG capsule    traMADol (ULTRAM) 50 mg tablet    albuterol (PROVENTIL HFA,VENTOLIN HFA) 90 mcg/act inhaler    lidocaine (LMX) 4 % cream    senna-docusate sodium (SENOKOT S) 8.6-50 mg per tablet    Sodium Phosphates (FLEET ENEMA RE)     Allergies   Allergen Reactions    Alendronate     Raloxifene     Risedronate Sodium     Sulfa Antibiotics Swelling    Penicillins Swelling     Patient received 7 day treatment course Keflex without any adverse reactions in 11/2022.        Objective   First Vitals:   Blood Pressure: 164/76 (04/06/24 1041)  Pulse: 79 (04/06/24 1041)  Temperature: 97.8 °F (36.6 °C) (04/06/24 1041)  Temp Source: Oral (04/06/24 1041)  Respirations: 16 (04/06/24 1041)  Height: 5' 3\" (160 cm) (04/06/24 1509)  Weight - Scale: 47.6 kg (104 lb 15 oz) (04/06/24 1041)  SpO2: 95 % (04/06/24 1041)    Current Vitals:   Blood Pressure: 110/60 (04/08/24 1518)  Pulse: 75 (04/08/24 1518)  Temperature: (!) 97.3 °F (36.3 °C) (04/08/24 1518)  Temp " "Source: Temporal (04/07/24 1530)  Respirations: 16 (04/08/24 1518)  Height: 5' 3\" (160 cm) (04/06/24 1509)  Weight - Scale: 47.6 kg (104 lb 15 oz) (04/06/24 1041)  SpO2: 96 % (04/08/24 1518)        /60   Pulse 75   Temp (!) 97.3 °F (36.3 °C)   Resp 16   Ht 5' 3\" (1.6 m)   Wt 47.6 kg (104 lb 15 oz)   SpO2 96%   BMI 18.59 kg/m²      PHYSICAL EXAMINATIONS:    General appearance: Afebrile, cooperative, NAD  HEENT: Normocephalic, atraumatic, without obvious abnormality.    HEART: Normal rate and rhythm  Lungs: Non-labored breathing.  No acute respiratory distress  Abdomen: No distension  Psychiatric: No confusion  LE Vascular: Right DP -  palpable.  Left DP -  palpable.  Right PT -  palpable. Left PT -  palpable.  CRT - WNL.    LE Musculoskeletal: No calf pain.  ROM intact.  Bunion presents.    Dermatological: Mild granuloma and pain in lateral nail border left great toe.  Mild xerosis around toes and plantar feet.    Neurological:  Gross sensation intact.  No focal neurologic deficit.        Lab Results:     Admission on 04/06/2024   Component Date Value    WBC 04/06/2024 9.52     RBC 04/06/2024 3.71 (L)     Hemoglobin 04/06/2024 11.1 (L)     Hematocrit 04/06/2024 36.0     MCV 04/06/2024 97     MCH 04/06/2024 29.9     MCHC 04/06/2024 30.8 (L)     RDW 04/06/2024 15.0     MPV 04/06/2024 9.6     Platelets 04/06/2024 400 (H)     nRBC 04/06/2024 0     Neutrophils Relative 04/06/2024 80 (H)     Immature Grans % 04/06/2024 1     Lymphocytes Relative 04/06/2024 9 (L)     Monocytes Relative 04/06/2024 6     Eosinophils Relative 04/06/2024 3     Basophils Relative 04/06/2024 1     Neutrophils Absolute 04/06/2024 7.78 (H)     Absolute Immature Grans 04/06/2024 0.07     Absolute Lymphocytes 04/06/2024 0.82     Absolute Monocytes 04/06/2024 0.55     Eosinophils Absolute 04/06/2024 0.24     Basophils Absolute 04/06/2024 0.06     Sodium 04/06/2024 135     Potassium 04/06/2024 5.1     Chloride 04/06/2024 105     CO2 " 04/06/2024 23     ANION GAP 04/06/2024 7     BUN 04/06/2024 17     Creatinine 04/06/2024 0.94     Glucose 04/06/2024 92     Calcium 04/06/2024 9.0     Corrected Calcium 04/06/2024 9.5     AST 04/06/2024 30     ALT 04/06/2024 27     Alkaline Phosphatase 04/06/2024 182 (H)     Total Protein 04/06/2024 7.1     Albumin 04/06/2024 3.4 (L)     Total Bilirubin 04/06/2024 0.31     eGFR 04/06/2024 55     Ventricular Rate 04/06/2024 79     Atrial Rate 04/06/2024 79     [place] Interval 04/06/2024 124     QRSD Interval 04/06/2024 74     QT Interval 04/06/2024 394     QTC Interval 04/06/2024 451     P Axis 04/06/2024 -1     QRS [place] 04/06/2024 12     T Wave Axis 04/06/2024 40     Ventricular Rate 04/06/2024 79     Atrial Rate 04/06/2024 79     [place] Interval 04/06/2024 128     QRSD Interval 04/06/2024 76     QT Interval 04/06/2024 408     QTC Interval 04/06/2024 467     P Axis 04/06/2024 -7     QRS [place] 04/06/2024 13     T Wave [place] 04/06/2024 45     ABO Grouping 04/06/2024 A     Rh Factor 04/06/2024 Positive     Antibody Screen 04/06/2024 Negative     Specimen Expiration Date 04/06/2024 [id number]     Sodium 04/07/2024 136     Potassium 04/07/2024 4.6     Chloride 04/07/2024 104     CO2 04/07/2024 22     ANION GAP 04/07/2024 10     BUN 04/07/2024 17     Creatinine 04/07/2024 0.94     Glucose 04/07/2024 65     Calcium 04/07/2024 8.7     eGFR 04/07/2024 55     Magnesium 04/07/2024 2.1     Phosphorus 04/07/2024 4.4 (H)     WBC 04/07/2024 6.27     RBC 04/07/2024 3.67 (L)     Hemoglobin 04/07/2024 10.9 (L)     Hematocrit 04/07/2024 35.4     MCV 04/07/2024 97     MCH 04/07/2024 29.7     MCHC 04/07/2024 30.8 (L)     RDW 04/07/2024 14.9     MPV 04/07/2024 10.0     Platelets 04/07/2024 394 (H)     nRBC 04/07/2024 0     Neutrophils Relative 04/07/2024 74     Immature Grans % 04/07/2024 1     Lymphocytes Relative 04/07/2024 15     Monocytes Relative 04/07/2024 6     Eosinophils Relative 04/07/2024 3     Basophils Relative 04/07/2024 1      Neutrophils Absolute 04/07/2024 4.72     Absolute Immature Grans 04/07/2024 0.03     Absolute Lymphocytes 04/07/2024 0.94     Absolute Monocytes 04/07/2024 0.38     Eosinophils Absolute 04/07/2024 0.17     Basophils Absolute 04/07/2024 0.03     Protime 04/07/2024 13.5     INR 04/07/2024 0.97     PTT 04/07/2024 32     WBC 04/08/2024 6.49     RBC 04/08/2024 3.68 (L)     Hemoglobin 04/08/2024 11.0 (L)     Hematocrit 04/08/2024 35.5     MCV 04/08/2024 97     MCH 04/08/2024 29.9     MCHC 04/08/2024 31.0 (L)     RDW 04/08/2024 14.6     MPV 04/08/2024 9.6     Platelets 04/08/2024 388     nRBC 04/08/2024 0     Neutrophils Relative 04/08/2024 74     Immature Grans % 04/08/2024 1     Lymphocytes Relative 04/08/2024 14     Monocytes Relative 04/08/2024 7     Eosinophils Relative 04/08/2024 3     Basophils Relative 04/08/2024 1     Neutrophils Absolute 04/08/2024 4.87     Absolute Immature Grans 04/08/2024 0.04     Absolute Lymphocytes 04/08/2024 0.88     Absolute Monocytes 04/08/2024 0.46     Eosinophils Absolute 04/08/2024 0.20     Basophils Absolute 04/08/2024 0.04     Sodium 04/08/2024 136     Potassium 04/08/2024 4.5     Chloride 04/08/2024 105     CO2 04/08/2024 22     ANION GAP 04/08/2024 9     BUN 04/08/2024 28 (H)     Creatinine 04/08/2024 1.13     Glucose 04/08/2024 73     Calcium 04/08/2024 8.8     eGFR 04/08/2024 44     Magnesium 04/08/2024 2.2     Phosphorus 04/08/2024 4.3 (H)

## 2024-04-08 NOTE — PLAN OF CARE
Problem: Prexisting or High Potential for Compromised Skin Integrity  Goal: Skin integrity is maintained or improved  Description: INTERVENTIONS:  - Identify patients at risk for skin breakdown  - Assess and monitor skin integrity  - Assess and monitor nutrition and hydration status  - Monitor labs   - Assess for incontinence   - Turn and reposition patient  - Assist with mobility/ambulation  - Relieve pressure over bony prominences  - Avoid friction and shearing  - Provide appropriate hygiene as needed including keeping skin clean and dry  - Evaluate need for skin moisturizer/barrier cream  - Collaborate with interdisciplinary team   - Patient/family teaching  - Consider wound care consult   Outcome: Progressing     Problem: PAIN - ADULT  Goal: Verbalizes/displays adequate comfort level or baseline comfort level  Description: Interventions:  - Encourage patient to monitor pain and request assistance  - Assess pain using appropriate pain scale  - Administer analgesics based on type and severity of pain and evaluate response  - Implement non-pharmacological measures as appropriate and evaluate response  - Consider cultural and social influences on pain and pain management  - Notify physician/advanced practitioner if interventions unsuccessful or patient reports new pain  Outcome: Progressing     Problem: INFECTION - ADULT  Goal: Absence or prevention of progression during hospitalization  Description: INTERVENTIONS:  - Assess and monitor for signs and symptoms of infection  - Monitor lab/diagnostic results  - Monitor all insertion sites, i.e. indwelling lines, tubes, and drains  - Monitor endotracheal if appropriate and nasal secretions for changes in amount and color  - [place] appropriate cooling/warming therapies per order  - Administer medications as ordered  - Instruct and encourage patient and family to use good hand hygiene technique  - Identify and instruct in appropriate isolation precautions for  identified infection/condition  Outcome: Progressing  Goal: Absence of fever/infection during neutropenic period  Description: INTERVENTIONS:  - Monitor WBC    Outcome: Progressing     Problem: SAFETY ADULT  Goal: Patient will remain free of falls  Description: INTERVENTIONS:  - Educate patient/family on patient safety including physical limitations  - Instruct patient to call for assistance with activity   - Consult OT/PT to assist with strengthening/mobility   - Keep Call bell within reach  - Keep bed low and locked with side rails adjusted as appropriate  - Keep care items and personal belongings within reach  - Initiate and maintain comfort rounds  - Make Fall Risk Sign visible to staff  - Offer Toileting every  Hours, in advance of need  - Initiate/Maintain alarm  - Obtain necessary fall risk management equipment:   - Apply yellow socks and bracelet for high fall risk patients  - Consider moving patient to room near nurses station  Outcome: Progressing  Goal: Maintain or return to baseline ADL function  Description: INTERVENTIONS:  -  Assess patient's ability to carry out ADLs; assess patient's baseline for ADL function and identify physical deficits which impact ability to perform ADLs (bathing, care of mouth/teeth, toileting, grooming, dressing, etc.)  - Assess/evaluate cause of self-care deficits   - Assess range of motion  - Assess patient's mobility; develop plan if impaired  - Assess patient's need for assistive devices and provide as appropriate  - Encourage maximum independence but intervene and supervise when necessary  - Involve family in performance of ADLs  - Assess for home care needs following discharge   - Consider OT consult to assist with ADL evaluation and planning for discharge  - Provide patient education as appropriate  Outcome: Progressing  Goal: Maintains/Returns to pre admission functional level  Description: INTERVENTIONS:  - Perform AM-PAC 6 Click Basic Mobility/ Daily Activity  assessment daily.  - Set and communicate daily mobility goal to care team and patient/family/caregiver.   - Collaborate with rehabilitation services on mobility goals if consulted  - Perform Range of Motion  times a day.  - Reposition patient every  hours.  - Dangle patient  times a day  - Stand patient  times a day  - Ambulate patient  times a day  - Out of bed to chair  times a day   - Out of bed for meals times a day  - Out of bed for toileting  - Record patient progress and toleration of activity level   Outcome: Progressing     Problem: DISCHARGE PLANNING  Goal: Discharge to home or other facility with appropriate resources  Description: INTERVENTIONS:  - Identify barriers to discharge w/patient and caregiver  - Arrange for needed discharge resources and transportation as appropriate  - Identify discharge learning needs (meds, wound care, etc.)  - Arrange for interpretive services to assist at discharge as needed  - Refer to Case Management Department for coordinating discharge planning if the patient needs post-hospital services based on physician/advanced practitioner order or complex needs related to functional status, cognitive ability, or social support system  Outcome: Progressing     Problem: Knowledge Deficit  Goal: Patient/family/caregiver demonstrates understanding of disease process, treatment plan, medications, and discharge instructions  Description: Complete learning assessment and assess knowledge base.  Interventions:  - Provide teaching at level of understanding  - Provide teaching via preferred learning methods  Outcome: Progressing

## 2024-04-08 NOTE — PLAN OF CARE
Problem: PHYSICAL THERAPY ADULT  Goal: Performs mobility at highest level of function for planned discharge setting.  See evaluation for individualized goals.  Description: Treatment/Interventions: Functional transfer training, LE strengthening/ROM, Therapeutic exercise, Endurance training, Cognitive reorientation, Patient/family training, Equipment eval/education, Bed mobility, Gait training  Equipment Recommended: Walker       See flowsheet documentation for full assessment, interventions and recommendations.  Outcome: Progressing  Note: Prognosis: Fair  Problem List: Decreased strength, Decreased endurance, Impaired balance, Decreased mobility, Decreased cognition, Impaired judgement, Decreased safety awareness  Assessment: Pt agrees to PT treatment and is cooperative throughout session.  Continues to require mod to min A for transfers and ambulation.  Initially retropulsive and bradykinetic with all mobility.  Requires significant redirection to task throughout treatment.  Pt benefits from target surfaces as pt gets distracted easily.  Will continue to benefit from ongoing skilled PT to maximize her functional mobility and increase her level of independence.        Rehab Resource Intensity Level, PT: II (Moderate Resource Intensity) (may progress to level 3 pending accurate PLOF and level of assist available at facility)    See flowsheet documentation for full assessment.

## 2024-04-08 NOTE — PLAN OF CARE
Problem: Prexisting or High Potential for Compromised Skin Integrity  Goal: Skin integrity is maintained or improved  Description: INTERVENTIONS:  - Identify patients at risk for skin breakdown  - Assess and monitor skin integrity  - Assess and monitor nutrition and hydration status  - Monitor labs   - Assess for incontinence   - Turn and reposition patient  - Assist with mobility/ambulation  - Relieve pressure over bony prominences  - Avoid friction and shearing  - Provide appropriate hygiene as needed including keeping skin clean and dry  - Evaluate need for skin moisturizer/barrier cream  - Collaborate with interdisciplinary team   - Patient/family teaching  - Consider wound care consult   Outcome: Progressing     Problem: PAIN - ADULT  Goal: Verbalizes/displays adequate comfort level or baseline comfort level  Description: Interventions:  - Encourage patient to monitor pain and request assistance  - Assess pain using appropriate pain scale  - Administer analgesics based on type and severity of pain and evaluate response  - Implement non-pharmacological measures as appropriate and evaluate response  - Consider cultural and social influences on pain and pain management  - Notify physician/advanced practitioner if interventions unsuccessful or patient reports new pain  Outcome: Progressing     Problem: INFECTION - ADULT  Goal: Absence or prevention of progression during hospitalization  Description: INTERVENTIONS:  - Assess and monitor for signs and symptoms of infection  - Monitor lab/diagnostic results  - Monitor all insertion sites, i.e. indwelling lines, tubes, and drains  - Monitor endotracheal if appropriate and nasal secretions for changes in amount and color  - [place] appropriate cooling/warming therapies per order  - Administer medications as ordered  - Instruct and encourage patient and family to use good hand hygiene technique  - Identify and instruct in appropriate isolation precautions for  identified infection/condition  Outcome: Progressing  Goal: Absence of fever/infection during neutropenic period  Description: INTERVENTIONS:  - Monitor WBC    Outcome: Progressing     Problem: SAFETY ADULT  Goal: Patient will remain free of falls  Description: INTERVENTIONS:  - Educate patient/family on patient safety including physical limitations  - Instruct patient to call for assistance with activity   - Consult OT/PT to assist with strengthening/mobility   - Keep Call bell within reach  - Keep bed low and locked with side rails adjusted as appropriate  - Keep care items and personal belongings within reach  - Initiate and maintain comfort rounds  - Make Fall Risk Sign visible to staff  - Offer Toileting every  Hours, in advance of need  - Initiate/Maintain alarm  - Obtain necessary fall risk management equipment:   - Apply yellow socks and bracelet for high fall risk patients  - Consider moving patient to room near nurses station  Outcome: Progressing  Goal: Maintain or return to baseline ADL function  Description: INTERVENTIONS:  -  Assess patient's ability to carry out ADLs; assess patient's baseline for ADL function and identify physical deficits which impact ability to perform ADLs (bathing, care of mouth/teeth, toileting, grooming, dressing, etc.)  - Assess/evaluate cause of self-care deficits   - Assess range of motion  - Assess patient's mobility; develop plan if impaired  - Assess patient's need for assistive devices and provide as appropriate  - Encourage maximum independence but intervene and supervise when necessary  - Involve family in performance of ADLs  - Assess for home care needs following discharge   - Consider OT consult to assist with ADL evaluation and planning for discharge  - Provide patient education as appropriate  Outcome: Progressing  Goal: Maintains/Returns to pre admission functional level  Description: INTERVENTIONS:  - Perform AM-PAC 6 Click Basic Mobility/ Daily Activity  assessment daily.  - Set and communicate daily mobility goal to care team and patient/family/caregiver.   - Collaborate with rehabilitation services on mobility goals if consulted  - Perform Range of Motion  times a day.  - Reposition patient every  hours.  - Dangle patient  times a day  - Stand patient  times a day  - Ambulate patient  times a day  - Out of bed to chair  times a day   - Out of bed for meals times a day  - Out of bed for toileting  - Record patient progress and toleration of activity level   Outcome: Progressing     Problem: DISCHARGE PLANNING  Goal: Discharge to home or other facility with appropriate resources  Description: INTERVENTIONS:  - Identify barriers to discharge w/patient and caregiver  - Arrange for needed discharge resources and transportation as appropriate  - Identify discharge learning needs (meds, wound care, etc.)  - Arrange for interpretive services to assist at discharge as needed  - Refer to Case Management Department for coordinating discharge planning if the patient needs post-hospital services based on physician/advanced practitioner order or complex needs related to functional status, cognitive ability, or social support system  Outcome: Progressing     Problem: Knowledge Deficit  Goal: Patient/family/caregiver demonstrates understanding of disease process, treatment plan, medications, and discharge instructions  Description: Complete learning assessment and assess knowledge base.  Interventions:  - Provide teaching at level of understanding  - Provide teaching via preferred learning methods  Outcome: Progressing

## 2024-04-09 PROBLEM — L60.0 INGROWN NAIL: Status: ACTIVE | Noted: 2024-04-09

## 2024-04-09 LAB
ANION GAP SERPL CALCULATED.3IONS-SCNC: 7 MMOL/L (ref 4–13)
BASOPHILS # BLD AUTO: 0.04 THOUSANDS/ÂΜL (ref 0–0.1)
BASOPHILS NFR BLD AUTO: 1 % (ref 0–1)
BUN SERPL-MCNC: 27 MG/DL (ref 5–25)
CALCIUM SERPL-MCNC: 8.8 MG/DL (ref 8.4–10.2)
CHLORIDE SERPL-SCNC: 107 MMOL/L (ref 96–108)
CO2 SERPL-SCNC: 22 MMOL/L (ref 21–32)
CREAT SERPL-MCNC: 0.96 MG/DL (ref 0.6–1.3)
EOSINOPHIL # BLD AUTO: 0.25 THOUSAND/ÂΜL (ref 0–0.61)
EOSINOPHIL NFR BLD AUTO: 4 % (ref 0–6)
ERYTHROCYTE [DISTWIDTH] IN BLOOD BY AUTOMATED COUNT: 14.9 % (ref 11.6–15.1)
GFR SERPL CREATININE-BSD FRML MDRD: 54 ML/MIN/1.73SQ M
GLUCOSE SERPL-MCNC: 86 MG/DL (ref 65–140)
HCT VFR BLD AUTO: 37 % (ref 34.8–46.1)
HGB BLD-MCNC: 11.5 G/DL (ref 11.5–15.4)
IMM GRANULOCYTES # BLD AUTO: 0.03 THOUSAND/UL (ref 0–0.2)
IMM GRANULOCYTES NFR BLD AUTO: 1 % (ref 0–2)
LYMPHOCYTES # BLD AUTO: 0.99 THOUSANDS/ÂΜL (ref 0.6–4.47)
LYMPHOCYTES NFR BLD AUTO: 16 % (ref 14–44)
MAGNESIUM SERPL-MCNC: 2.2 MG/DL (ref 1.9–2.7)
MCH RBC QN AUTO: 29.8 PG (ref 26.8–34.3)
MCHC RBC AUTO-ENTMCNC: 31.1 G/DL (ref 31.4–37.4)
MCV RBC AUTO: 96 FL (ref 82–98)
MONOCYTES # BLD AUTO: 0.45 THOUSAND/ÂΜL (ref 0.17–1.22)
MONOCYTES NFR BLD AUTO: 7 % (ref 4–12)
NEUTROPHILS # BLD AUTO: 4.37 THOUSANDS/ÂΜL (ref 1.85–7.62)
NEUTS SEG NFR BLD AUTO: 71 % (ref 43–75)
NRBC BLD AUTO-RTO: 0 /100 WBCS
PHOSPHATE SERPL-MCNC: 3.5 MG/DL (ref 2.3–4.1)
PLATELET # BLD AUTO: 398 THOUSANDS/UL (ref 149–390)
PMV BLD AUTO: 10 FL (ref 8.9–12.7)
POTASSIUM SERPL-SCNC: 4.4 MMOL/L (ref 3.5–5.3)
RBC # BLD AUTO: 3.86 MILLION/UL (ref 3.81–5.12)
SODIUM SERPL-SCNC: 136 MMOL/L (ref 135–147)
WBC # BLD AUTO: 6.13 THOUSAND/UL (ref 4.31–10.16)

## 2024-04-09 PROCEDURE — 99232 SBSQ HOSP IP/OBS MODERATE 35: CPT | Performed by: NURSE PRACTITIONER

## 2024-04-09 PROCEDURE — 80048 BASIC METABOLIC PNL TOTAL CA: CPT | Performed by: PHYSICIAN ASSISTANT

## 2024-04-09 PROCEDURE — 83735 ASSAY OF MAGNESIUM: CPT | Performed by: PHYSICIAN ASSISTANT

## 2024-04-09 PROCEDURE — 99231 SBSQ HOSP IP/OBS SF/LOW 25: CPT | Performed by: PODIATRIST

## 2024-04-09 PROCEDURE — 84100 ASSAY OF PHOSPHORUS: CPT | Performed by: PHYSICIAN ASSISTANT

## 2024-04-09 PROCEDURE — 85025 COMPLETE CBC W/AUTO DIFF WBC: CPT | Performed by: PHYSICIAN ASSISTANT

## 2024-04-09 RX ORDER — AMOXICILLIN 250 MG
1 CAPSULE ORAL 2 TIMES DAILY
Status: DISCONTINUED | OUTPATIENT
Start: 2024-04-09 | End: 2024-04-10 | Stop reason: HOSPADM

## 2024-04-09 RX ADMIN — ACETAMINOPHEN 975 MG: 325 TABLET, FILM COATED ORAL at 21:55

## 2024-04-09 RX ADMIN — CLOTRIMAZOLE AND BETAMETHASONE DIPROPIONATE: 10; .5 CREAM TOPICAL at 09:19

## 2024-04-09 RX ADMIN — GABAPENTIN 200 MG: 100 CAPSULE ORAL at 21:55

## 2024-04-09 RX ADMIN — ACETAMINOPHEN 975 MG: 325 TABLET, FILM COATED ORAL at 16:22

## 2024-04-09 RX ADMIN — CLOTRIMAZOLE AND BETAMETHASONE DIPROPIONATE: 10; .5 CREAM TOPICAL at 17:33

## 2024-04-09 RX ADMIN — Medication 3 MG: at 21:55

## 2024-04-09 RX ADMIN — AMLODIPINE BESYLATE 2.5 MG: 2.5 TABLET ORAL at 21:55

## 2024-04-09 RX ADMIN — AMLODIPINE BESYLATE 2.5 MG: 2.5 TABLET ORAL at 09:19

## 2024-04-09 RX ADMIN — ENOXAPARIN SODIUM 30 MG: 30 INJECTION SUBCUTANEOUS at 09:19

## 2024-04-09 RX ADMIN — GABAPENTIN 100 MG: 100 CAPSULE ORAL at 09:19

## 2024-04-09 RX ADMIN — SENNOSIDES AND DOCUSATE SODIUM 1 TABLET: 8.6; 5 TABLET ORAL at 17:33

## 2024-04-09 NOTE — PROGRESS NOTES
Patient:    MRN:  [id number]    Aidin Request ID:  [id number]    Level of care reserved:  Skilled Nursing Facility    Partner Reserved:  [facility], [place] [id number] [phone]    Clinical needs requested:    Geography searched:  10 miles around [id number]    Start of Service:    Request sent:  2:24pm EDT on 4/8/2024 by Poncho Phan    Partner reserved:  1:43pm EDT on 4/9/2024 by Poncho Phan    Choice list shared:  9:51am EDT on 4/9/2024 by Poncho Phan

## 2024-04-09 NOTE — PLAN OF CARE
Problem: Prexisting or High Potential for Compromised Skin Integrity  Goal: Skin integrity is maintained or improved  Description: INTERVENTIONS:  - Identify patients at risk for skin breakdown  - Assess and monitor skin integrity  - Assess and monitor nutrition and hydration status  - Monitor labs   - Assess for incontinence   - Turn and reposition patient  - Assist with mobility/ambulation  - Relieve pressure over bony prominences  - Avoid friction and shearing  - Provide appropriate hygiene as needed including keeping skin clean and dry  - Evaluate need for skin moisturizer/barrier cream  - Collaborate with interdisciplinary team   - Patient/family teaching  - Consider wound care consult   Outcome: Progressing     Problem: PAIN - ADULT  Goal: Verbalizes/displays adequate comfort level or baseline comfort level  Description: Interventions:  - Encourage patient to monitor pain and request assistance  - Assess pain using appropriate pain scale  - Administer analgesics based on type and severity of pain and evaluate response  - Implement non-pharmacological measures as appropriate and evaluate response  - Consider cultural and social influences on pain and pain management  - Notify physician/advanced practitioner if interventions unsuccessful or patient reports new pain  Outcome: Progressing     Problem: INFECTION - ADULT  Goal: Absence or prevention of progression during hospitalization  Description: INTERVENTIONS:  - Assess and monitor for signs and symptoms of infection  - Monitor lab/diagnostic results  - Monitor all insertion sites, i.e. indwelling lines, tubes, and drains  - Monitor endotracheal if appropriate and nasal secretions for changes in amount and color  - [place] appropriate cooling/warming therapies per order  - Administer medications as ordered  - Instruct and encourage patient and family to use good hand hygiene technique  - Identify and instruct in appropriate isolation precautions for  identified infection/condition  Outcome: Progressing  Goal: Absence of fever/infection during neutropenic period  Description: INTERVENTIONS:  - Monitor WBC    Outcome: Progressing     Problem: SAFETY ADULT  Goal: Patient will remain free of falls  Description: INTERVENTIONS:  - Educate patient/family on patient safety including physical limitations  - Instruct patient to call for assistance with activity   - Consult OT/PT to assist with strengthening/mobility   - Keep Call bell within reach  - Keep bed low and locked with side rails adjusted as appropriate  - Keep care items and personal belongings within reach  - Initiate and maintain comfort rounds  - Make Fall Risk Sign visible to staff  - Offer Toileting every  Hours, in advance of need  - Initiate/Maintain alarm  - Obtain necessary fall risk management equipment:   - Apply yellow socks and bracelet for high fall risk patients  - Consider moving patient to room near nurses station  Outcome: Progressing  Goal: Maintain or return to baseline ADL function  Description: INTERVENTIONS:  -  Assess patient's ability to carry out ADLs; assess patient's baseline for ADL function and identify physical deficits which impact ability to perform ADLs (bathing, care of mouth/teeth, toileting, grooming, dressing, etc.)  - Assess/evaluate cause of self-care deficits   - Assess range of motion  - Assess patient's mobility; develop plan if impaired  - Assess patient's need for assistive devices and provide as appropriate  - Encourage maximum independence but intervene and supervise when necessary  - Involve family in performance of ADLs  - Assess for home care needs following discharge   - Consider OT consult to assist with ADL evaluation and planning for discharge  - Provide patient education as appropriate  Outcome: Progressing  Goal: Maintains/Returns to pre admission functional level  Description: INTERVENTIONS:  - Perform AM-PAC 6 Click Basic Mobility/ Daily Activity  assessment daily.  - Set and communicate daily mobility goal to care team and patient/family/caregiver.   - Collaborate with rehabilitation services on mobility goals if consulted  - Perform Range of Motion  times a day.  - Reposition patient every  hours.  - Dangle patient  times a day  - Stand patient  times a day  - Ambulate patient  times a day  - Out of bed to chair  times a day   - Out of bed for meals times a day  - Out of bed for toileting  - Record patient progress and toleration of activity level   Outcome: Progressing     Problem: DISCHARGE PLANNING  Goal: Discharge to home or other facility with appropriate resources  Description: INTERVENTIONS:  - Identify barriers to discharge w/patient and caregiver  - Arrange for needed discharge resources and transportation as appropriate  - Identify discharge learning needs (meds, wound care, etc.)  - Arrange for interpretive services to assist at discharge as needed  - Refer to Case Management Department for coordinating discharge planning if the patient needs post-hospital services based on physician/advanced practitioner order or complex needs related to functional status, cognitive ability, or social support system  Outcome: Progressing     Problem: Knowledge Deficit  Goal: Patient/family/caregiver demonstrates understanding of disease process, treatment plan, medications, and discharge instructions  Description: Complete learning assessment and assess knowledge base.  Interventions:  - Provide teaching at level of understanding  - Provide teaching via preferred learning methods  Outcome: Progressing     Problem: Nutrition/Hydration-ADULT  Goal: Nutrient/Hydration intake appropriate for improving, restoring or maintaining nutritional needs  Description: Monitor and assess patient's nutrition/hydration status for malnutrition. Collaborate with interdisciplinary team and initiate plan and interventions as ordered.  Monitor patient's weight and dietary intake as  ordered or per policy. Utilize nutrition screening tool and intervene as necessary. Determine patient's food preferences and provide high-protein, high-caloric foods as appropriate.     INTERVENTIONS:  - Monitor oral intake, urinary output, labs, and treatment plans  - Assess nutrition and hydration status and recommend course of action  - Evaluate amount of meals eaten  - Assist patient with eating if necessary   - Allow adequate time for meals  - Recommend/ encourage appropriate diets, oral nutritional supplements, and vitamin/mineral supplements  - Order, calculate, and assess calorie counts as needed  - Recommend, monitor, and adjust tube feedings and TPN/PPN based on assessed needs  - Assess need for intravenous fluids  - Provide specific nutrition/hydration education as appropriate  - Include patient/family/caregiver in decisions related to nutrition  Outcome: Progressing

## 2024-04-09 NOTE — CASE MANAGEMENT
Case Management Progress Note    Patient name Doris Campbell  Location W /W -01 MRN [id number]  : 1940 Date 2024       LOS (days): 3  Geometric Mean LOS (GMLOS) (days): 3.1  Days to GMLOS:0.3        OBJECTIVE:        Current admission status: Inpatient  Preferred Pharmacy:   [facility] #[id number] - [place] - [address]  [address]  [place] [address]  Phone: [phone] Fax: [phone]    [id number] - [place] - [address]  [address]  [place] [address]  Phone: [phone] Fax: [phone]    PATIENT/FAMILY REPORTS NO PREFERRED PHARMACY  No address on file      Primary Care Provider: Tuyet Cantor MD    Primary Insurance: MEDICARE  Secondary Insurance: BLUE CROSS    PROGRESS NOTE:  CM spoke with cousinPatricia over phone regarding available STRs. Patient choice list e-mailed to her at [email or web address].     Patricia aware CM will follow up in the early afternoon.

## 2024-04-09 NOTE — ASSESSMENT & PLAN NOTE
Plan  - Continue current medication regimen with amlodipine 2.5 mg BID  - Outpatient follow-up with PCP

## 2024-04-09 NOTE — PROGRESS NOTES
Progress Note - Geriatric Medicine   Doris Campbell 83 y.o. female MRN: [id number]  Unit/Bed#: W -01 Encounter: [id number]      Assessment/Plan:  Multiple falls  Multiple falls over the last week  [place] fall precautions   Assess patient frequently for physical needs, encourage use of assistant devices as needed and directed by PT/OT  Identify cognitive and physical deficits and behaviors that affect risk of falls  Consider moving patient closer to nursing station to monitor more closely for impulsive behavior which may increase risk of falls  Educate patient/family on patient safety including physical limitations and importance of using call bell for assistance  Modify environment to reduce risk of injury including disconnecting from pole when not in use, ensuring adequate lighting in room and restroom, ensuring that path to restroom is clear and free of trip hazards  PT/OT consulted to assist with strengthening/mobility and assist with discharge planning to appropriate level of care  Patient will discharge to short-term rehab once medically stable     Multiple rib fractures  CT chest showed- Subacute nondisplaced fracture of the left anterior 5th and 6th ribs.Subacute nondisplaced fracture of the right posterior 12th rib.Subacute minimally displaced fracture of the right posterior 11th rib.  Rib fracture protocol  Encourage coughing, deep breathing, and incentive spirometry  Respiratory status currently stable on room air, did require 2 L nasal cannula overnight  Multimodal pain regimen including geriatric pain protocol  Patient is currently on Tylenol 975 every 8, lidocaine patch daily, oxycodone 2.5 mg 2 per p.m. for moderate pain,  Management per trauma    L1 vertebral fracture  S/p falls  CT showed subacute compression fracture of L1 without significant retropulsion  Neurosurgery was consulted  Recommended TLSO brace as needed  Multimodal pain regimen  No neurosurgical intervention  anticipated  Follow-up outpatient as needed with neurosurgery    Zygomatic fracture  CT showed equivocal nondisplaced fracture of the left zygomatic arch  OMFS was consulted  No acute surgical intervention indicated  Ice as needed  Sinus precautions, avoid holding status, decongestions as needed    Ingrown nail  Left great toe  Was seen by podiatry yesterday who plans to do bedside nail procedure today  Lotrisone cream twice daily for feet  Nursing staff at [facility] reports she had refused to see podiatry previously at [facility]    Insomnia  First-line treatment is behavior modification  Maintain sleep-wake cycle, avoid nighttime interruptions  Avoid caffeine throughout the day  Avoid napping throughout the day  Encourage physical activity throughout the day  Avoid sedative hypnotics including benzodiazepines and benadryl  Patient had trouble falling asleep last night  Continue melatonin 3 mg nightly    Constipation  Patient complains of constipation  Last BM was prior to hospitalization  Is currently on Senokot-S 1 tablet nightly  Will increase Senokot to twice daily  Encourage adequate p.o. 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  At risk for delirium due to hospitalization, medications, age, sleep disturbance, pain  Recommend delirium precautions  Maintain sleep-wake cycle, avoid nighttime interruptions  minimize overnight interruptions, group overnight vitals/labs/nursing checks as possible  dim lights, close blinds and turn off tv to minimize stimulation and encourage sleep environment in evenings  Provide adequate pain control  Avoid urinary retention and  constipation  Provide frequent and early mobilization  Provide frequent redirection and reorientation as needed  Avoid medications that may worsen or precipitate delirium such as tramadol, benzodiazepines, anticholinergics, and Benadryl  Redirect unwanted behaviors as first-line therapy, avoid physical restraints as able to  Continue to monitor      Ambulatory dysfunction  At a baseline ambulates with walker  PT/OT following  Fall precautions  Out of bed as tolerated  Encourage early and frequent mobilization  Encourage adequate hydration and nutrition  Provide adequate pain management   Goal is short-term rehab once medically stable  Continue with PT/OT for continued strength and balance training     Subjective:   Doris is an 83-year-old female being seen for a geriatrics follow-up.  Upon exam patient was lying in bed, eating breakfast.  She appeared comfortable and was in no acute distress.  She denied any significant pain.  Reports she has some trouble sleeping but did eventually fall asleep and slept later into the morning.  Appetite is stable.  She has not yet moved her bowels since hospitalized.  Per nursing no acute concerns or issues at this time.    Review of Systems   Constitutional:  Negative for activity change, appetite change, chills, fatigue and fever.   HENT:  Negative for sore throat and trouble swallowing.    Eyes:  Negative for visual disturbance.   Respiratory:  Negative for cough and shortness of breath.    Cardiovascular:  Negative for chest pain and palpitations.   Gastrointestinal:  Positive for constipation. Negative for abdominal pain, diarrhea, nausea and vomiting.   Genitourinary:  Negative for difficulty urinating, dysuria and hematuria.   Musculoskeletal:  Positive for arthralgias and gait problem. Negative for back pain.   Skin:  Negative for color change and rash.   Neurological:  Positive for weakness. Negative for dizziness, seizures, syncope, light-headedness and headaches.  "  Psychiatric/Behavioral:  Positive for confusion and sleep disturbance. Negative for hallucinations. The patient is not nervous/anxious.          Objective:     Vitals: Blood pressure 124/70, pulse 73, temperature 98 °F (36.7 °C), resp. rate 13, height 5' 3\" (1.6 m), weight 47.6 kg (104 lb 15 oz), SpO2 96%.,Body mass index is 18.59 kg/m².      Intake/Output Summary (Last 24 hours) at 4/9/2024 1011  Last data filed at 4/8/2024 1305  Gross per 24 hour   Intake 240 ml   Output --   Net 240 ml       Current Medications: Reviewed    Physical Exam:   Physical Exam  Vitals reviewed.   Constitutional:       General: She is not in acute distress.     Appearance: She is well-developed. She is not ill-appearing.      Comments: Frail appearing    HENT:      Head: Normocephalic.   Cardiovascular:      Rate and Rhythm: Normal rate and regular rhythm.      Heart sounds: Murmur heard.   Pulmonary:      Effort: Pulmonary effort is normal. No respiratory distress.      Breath sounds: Normal breath sounds.   Abdominal:      General: Bowel sounds are normal. There is no distension.      Palpations: Abdomen is soft.      Tenderness: There is no abdominal tenderness.   Musculoskeletal:         General: Tenderness and signs of injury present. No swelling.      Right lower leg: No edema.      Left lower leg: No edema.   Skin:     General: Skin is warm and dry.      Coloration: Skin is pale.      Findings: Bruising present.   Neurological:      General: No focal deficit present.      Mental Status: She is alert. Mental status is at baseline.      Cranial Nerves: No cranial nerve deficit.      Motor: Weakness present.      Gait: Gait abnormal.      Comments: Alert to person, place, and situation disoriented to time   Psychiatric:         Mood and Affect: Mood normal.          Invasive Devices       Peripheral Intravenous Line  Duration             Peripheral IV 04/06/24 Right;Dorsal (posterior) Hand 2 days                    Lab, Imaging " "and other studies:    Lab Results:   I have personally reviewed pertinent lab results including the following:  -CBC, BMP, magnesium, phosphorus    I have personally reviewed the following imaging study reports in PACS:  No new imaging to review  - I have personally reviewed pertinent reports.      Please note:  Voice-recognition software may have been used in the preparation of this document.  Occasional wrong word or \"sound-alike\" substitutions may have occurred due to the inherent limitations of voice recognition software.  Interpretation should be guided by context.    "

## 2024-04-09 NOTE — PROGRESS NOTES
[facility]  Progress Note  Name: Doris Campbell I  MRN: [id number]  Unit/Bed#: W -01 I Date of Admission: 4/6/2024   Date of Service: 4/9/2024 I Hospital Day: 3    Assessment/Plan   Zygomatic fracture (HCC)  Assessment & Plan  - OMFS consulted, appreciate input  - non-operative management  - outpatient follow-up as needed      L1 vertebral fracture (HCC)  Assessment & Plan  - L1 compression fracture, present on admission.  - Also noted subacute L2 and L4 rib fractures  - Neurosurgery evaluation and recommendations appreciated.  - Bracing: pending evaluation  - Spine precautions.  - Monitor neurovascular exam.  - Multimodal analgesic regimen as needed.  - PT and OT evaluation and treatment as indicated.  - Outpatient follow up with Neurosurgery for re-evaluation.      Acute pain due to trauma  Assessment & Plan  - Acute pain secondary to traumatic injuries.  - Continue multimodal analgesic regimen.  - Bowel regimen as long as using opioids.  - Continue to monitor pain and adjust regimen as indicated.      Multiple rib fractures  Assessment & Plan  - Multiple rib fractures, present on admission.  - Rib fractures range from acute to subacute  - Continue rib fracture protocol.  - Continue to encourage incentive spirometer use and adequate pulmonary hygiene.  Currently pulling 500-750 mL on I.S.  - PIC score is 7  - Supplemental oxygen via nasal cannula as needed to maintain saturations greater than or equal to 94%.  - PT and OT evaluation and treatment as indicated.  - Outpatient follow-up in the trauma clinic for re-evaluation in approximately 2 weeks.      History of falling  Assessment & Plan  - Status post fall with the below noted injuries.  - Fall precautions.  - Geriatric Medicine consultation for evaluation, medication review and recommendations.  - PT and OT evaluation and treatment as indicated.  - Case Management consultation for disposition  "planning.      Hypertension  Assessment & Plan  - Continue current medication regimen.  - Outpatient follow-up with PCP.      Mild cognitive impairment  Assessment & Plan  - Continue current medication regimen.  - Outpatient follow-up with PCP.  - Geriatrics consult               VTE Prophylaxis:Sequential compression device (Venodyne)  and Enoxaparin (Lovenox)     Disposition: DC planning, Anticipate discharge hopefully later today.    Subjective   Chief Complaint: \"Nothing is bothering me\"    Subjective: Patient offers no new complaints at this time, eager to be discharged.     Objective   Vitals:   Temp:  [95.9 °F (35.5 °C)-98 °F (36.7 °C)] 98 °F (36.7 °C)  HR:  [67-80] 73  Resp:  [13-16] 13  BP: (109-141)/(60-73) 124/70    I/O         04/07 0701  04/08 0700 04/08 0701  04/09 0700 04/09 0701  04/10 0700    P.O. 100 240     Total Intake(mL/kg) 100 (2.1) 240 (5)     Net +100 +240            Unmeasured Urine Occurrence 2 x               Physical Exam:   GENERAL APPEARANCE: No acute distress, well-appearing  NEURO: Intermittent confusion, following commands without issue, no focal deficits  HEENT: Mild ecchymosis overlying left aspect of frontal forehead  CV: Regular rate and rhythm  LUNGS: There to auscultation bilaterally  GI: Soft, nontender, no guarding, no rebound  : No Irving  MSK: Moving all extremities  SKIN: Warm, dry, intact    Invasive Devices       Peripheral Intravenous Line  Duration             Peripheral IV 04/06/24 Right;Dorsal (posterior) Hand 2 days                     PIC Score  PIC Pain Score: 3 (4/9/2024  8:30 AM)  PIC Incentive Spirometry Score: 1 (pt sleeping) (4/9/2024  4:00 AM)  PIC Cough Description: 2 (4/9/2024 12:00 AM)  PIC Total Score: 7 (4/9/2024 12:00 AM)       If the Total PIC Score </=5, did you consult APS and evaluate patient for further intervention?: no      Pain:    Incentive Spirometry  Cough  3 = Controlled  4 = Above goal volume 3 = Strong  2 = Moderate  3 = Goal to alert " volume 2 = Weak  1 = Severe  2 = Below alert volume 1 = Absent     1 = Unable to perform IS         Lab Results: Results: I have personally reviewed all pertinent laboratory/tests results, BMP/CMP:   Lab Results   Component Value Date    SODIUM 136 04/09/2024    K 4.4 04/09/2024     04/09/2024    CO2 22 04/09/2024    BUN 27 (H) 04/09/2024    CREATININE 0.96 04/09/2024    CALCIUM 8.8 04/09/2024    EGFR 54 04/09/2024   , and CBC:   Lab Results   Component Value Date    WBC 6.13 04/09/2024    HGB 11.5 04/09/2024    HCT 37.0 04/09/2024    MCV 96 04/09/2024     (H) 04/09/2024    RBC 3.86 04/09/2024    MCH 29.8 04/09/2024    MCHC 31.1 (L) 04/09/2024    RDW 14.9 04/09/2024    MPV 10.0 04/09/2024    NRBC 0 04/09/2024     Imaging: I have personally reviewed pertinent reports.   and I have personally reviewed pertinent films in PACS   Other Studies: no other studies

## 2024-04-09 NOTE — ASSESSMENT & PLAN NOTE
- CT Chest 4/6 showing multiple rib fractures  - Rib fractures range from acute to subacute    Plan  - Continue rib fracture protocol.  - Continue to encourage incentive spirometer use and adequate pulmonary hygiene.  Currently pulling 500-750 mL on I.S.  - PIC score is 7  - Supplemental oxygen via nasal cannula as needed to maintain saturations greater than or equal to 94%.  - PT and OT evaluation and treatment as indicated.  - Outpatient follow-up in the trauma clinic for re-evaluation in approximately 2 weeks

## 2024-04-09 NOTE — PROGRESS NOTES
"Progress Note - Orthopedics   Doris Campbell 83 y.o. female MRN: [id number]  Unit/Bed#: W -01 Encounter: [id number]    Assessment:  -Painful ingrown nail to the lateral border of the left great toe  -Onychomycosis    Plan:  -The patient's bilateral feet were examined; there is a moderately tender incurvated lateral border to her left hallux nail plate without any purulent drainage.  There is no ascending cellulitis.  The remaining nail plates are brittle, thickened and discolored and dystrophic consistent with onychomycosis.  The interdigital spaces are clear without duration.  Pulses are palpable bilaterally.  -The offending nail border was resected with a slant back procedure utilizing a pair of sterile nail clippers at bedside without complication.  A dressing consisting of silver alginate and a dry gauze was applied to the left hallux.  Start daily local wound care with triple antibiotic ointment and a Band-Aid starting tomorrow for 7 days.  -The remaining pedal nail plates were sharply debrided with a sterile nail clipper totaling 10 nails at bedside without complication.  -There are no other acute pedal issues.  Recall as needed.    Weight bearing: Weightbearing as tolerated to the bilateral lower extremities      Subjective: The patient is seen laying at bedside resting in NAD.  She has a tender ingrown nail to her left great toe.  She states that she has follow-up with Dr. Zamora in the past for podiatric care, but has not seen him in a while.    Vitals: Blood pressure 117/66, pulse 74, temperature 98 °F (36.7 °C), resp. rate 16, height 5' 3\" (1.6 m), weight 47.6 kg (104 lb 15 oz), SpO2 96%.,Body mass index is 18.59 kg/m².    No intake or output data in the 24 hours ending 04/09/24 1634    Invasive Devices       Peripheral Intravenous Line  Duration             Peripheral IV 04/06/24 Right;Dorsal (posterior) Hand 3 days                    Physical Exam: The patient's bilateral feet were examined; " there is a moderately tender incurvated lateral border to her left hallux nail plate without any purulent drainage.  There is no ascending cellulitis.  The remaining nail plates are brittle, thickened and discolored and dystrophic consistent with onychomycosis.  The interdigital spaces are clear without duration.  Pulses are palpable bilaterally.    Lab, Imaging and other studies: I have personally reviewed pertinent lab results.

## 2024-04-09 NOTE — ASSESSMENT & PLAN NOTE
- CT Chest 4/6 showing L1 compression fracture  - Also noted subacute L2 and L4 rib fractures    Plan  - Neurosurgery evaluation and recommendations appreciated.  - Bracing: pending evaluation  - Spine precautions.  - Monitor neurovascular exam.  - Multimodal analgesic regimen as needed.  - PT and OT evaluation and treatment as indicated.  - Outpatient follow up with Neurosurgery for re-evaluation

## 2024-04-09 NOTE — CASE MANAGEMENT
Case Management Discharge Planning Note    Patient name Doris Campbell  Location W /W -01 MRN [id number]  : 1940 Date 2024       Current Admission Date: 2024  Current Admission Diagnosis:History of falling   Patient Active Problem List    Diagnosis Date Noted    Multiple rib fractures 2024    Acute pain due to trauma 2024    L1 vertebral fracture (HCC) 2024    Zygomatic fracture (HCC) 2024    Anemia 2023    Elevated alkaline phosphatase level 2023    Asthma 2023    QT prolongation 2023    Adjustment disorder with anxious mood 2022    History of stroke 2022    Ambulatory dysfunction 2022    Pruritus 2022    Urinary frequency 10/31/2022    Closed fracture of ramus of right pubis (HCC) 10/25/2022    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    Right shoulder pain 10/25/2022    Periorbital hematoma, right 10/23/2022    Allergic rhinitis due to allergen 2022    Upper respiratory symptom 2022    Sciatica 2021    Chronic pain of right knee     Arthritis of knee, right     Encounter for support and coordination of transition of care 2021    Fx humeral neck, left, closed, initial encounter 2020    Elevated liver enzymes 2020    Abnormal results of liver function studies 2020    Difficult or painful urination 2020    Hereditary and idiopathic neuropathy, unspecified 2020    Rheumatoid arthritis, unspecified (HCC) 2020    Personal history of transient ischemic attack (TIA), and cerebral infarction without residual deficits 2020    Personal history of breast cancer 2020    History of falling 2020    Age-related osteoporosis without current pathological fracture 2020    Constipation, unspecified 2020    Anxiety     Hypertension 11/10/2020    Stroke ([facility])     Mild cognitive impairment     Vitamin D deficiency 10/22/2020    Closed fracture of nasal  bone 10/21/2020    Age-related osteoporosis with current pathological fracture 10/21/2020    Fall 10/19/2020    Traumatic hematoma of forehead 10/19/2020    Closed fracture of proximal end of right humerus 10/19/2020    B12 deficiency 07/25/2018    Abnormal MRI 10/17/2017    Major neurocognitive disorder, due to vascular disease, without behavioral disturbance, mild 06/30/2017    HLD (hyperlipidemia)     Knee injury 11/29/2016      LOS (days): 3  Geometric Mean LOS (GMLOS) (days): 3.1  Days to GMLOS:0.1     OBJECTIVE:  Risk of Unplanned Readmission Score: 13.21         Current admission status: Inpatient   Preferred Pharmacy:   [id number] [place] - [address]  [address]  [place] [address]  Phone: [phone] Fax: [phone]    [id number] [place] - [address]  [facility]  [place] [address]  Phone: [phone] Fax: [phone]    PATIENT/FAMILY REPORTS NO PREFERRED PHARMACY  No address on file      Primary Care Provider: Tuyet Cantor MD    Primary Insurance: MEDICARE  Secondary Insurance: BLUE CROSS    DISCHARGE DETAILS:    Discharge planning discussed with:: Patricia lowery over phone  Freedom of Choice: Yes  Comments - [place] of Choice: S  CM contacted family/caregiver?: Yes  Were Treatment Team discharge recommendations reviewed with patient/caregiver?: Yes  Did patient/caregiver verbalize understanding of patient care needs?: N/A- going to facility  Were patient/caregiver advised of the risks associated with not following Treatment Team discharge recommendations?: Yes    Contacts  Patient Contacts: Patricia  Relationship to Patient:: Family (cousin)  Contact Method: Phone  Phone Number: [phone]  Reason/Outcome: Discharge Planning, Emergency Contact, Referral, Continuity of Care    Requested Home Health Care         Is the patient interested in C at discharge?: No    DME Referral Provided  Referral made for DME?: No    Other  Referral/Resources/Interventions Provided:  Interventions: SNF, Short Term Rehab  Referral Comments: CM spoke with patients POA/ cousin Patricia over phone. Patricia stating they would like to accept bed at [facility]- facility reserved in [place]. Per admissions, they can accept patient tomorrow afternoon. CM to follow up in AM to continue with dcp.    Would you like to participate in our [facility] service program?  : No - Declined    Treatment Team Recommendation: Short Term Rehab, SNF  Discharge Destination Plan:: Short Term Rehab, SNF     Additional Comments: Patricia provided with patients room phone number ([phone]).

## 2024-04-09 NOTE — PLAN OF CARE
Problem: Prexisting or High Potential for Compromised Skin Integrity  Goal: Skin integrity is maintained or improved  Description: INTERVENTIONS:  - Identify patients at risk for skin breakdown  - Assess and monitor skin integrity  - Assess and monitor nutrition and hydration status  - Monitor labs   - Assess for incontinence   - Turn and reposition patient  - Assist with mobility/ambulation  - Relieve pressure over bony prominences  - Avoid friction and shearing  - Provide appropriate hygiene as needed including keeping skin clean and dry  - Evaluate need for skin moisturizer/barrier cream  - Collaborate with interdisciplinary team   - Patient/family teaching  - Consider wound care consult   Outcome: Progressing     Problem: PAIN - ADULT  Goal: Verbalizes/displays adequate comfort level or baseline comfort level  Description: Interventions:  - Encourage patient to monitor pain and request assistance  - Assess pain using appropriate pain scale  - Administer analgesics based on type and severity of pain and evaluate response  - Implement non-pharmacological measures as appropriate and evaluate response  - Consider cultural and social influences on pain and pain management  - Notify physician/advanced practitioner if interventions unsuccessful or patient reports new pain  Outcome: Progressing     Problem: INFECTION - ADULT  Goal: Absence or prevention of progression during hospitalization  Description: INTERVENTIONS:  - Assess and monitor for signs and symptoms of infection  - Monitor lab/diagnostic results  - Monitor all insertion sites, i.e. indwelling lines, tubes, and drains  - Monitor endotracheal if appropriate and nasal secretions for changes in amount and color  - Paden appropriate cooling/warming therapies per order  - Administer medications as ordered  - Instruct and encourage patient and family to use good hand hygiene technique  - Identify and instruct in appropriate isolation precautions for  identified infection/condition  Outcome: Progressing  Goal: Absence of fever/infection during neutropenic period  Description: INTERVENTIONS:  - Monitor WBC    Outcome: Progressing     Problem: SAFETY ADULT  Goal: Patient will remain free of falls  Description: INTERVENTIONS:  - Educate patient/family on patient safety including physical limitations  - Instruct patient to call for assistance with activity   - Consult OT/PT to assist with strengthening/mobility   - Keep Call bell within reach  - Keep bed low and locked with side rails adjusted as appropriate  - Keep care items and personal belongings within reach  - Initiate and maintain comfort rounds  - Make Fall Risk Sign visible to staff  - Offer Toileting every  Hours, in advance of need  - Initiate/Maintain alarm  - Obtain necessary fall risk management equipment:   - Apply yellow socks and bracelet for high fall risk patients  - Consider moving patient to room near nurses station  Outcome: Progressing  Goal: Maintain or return to baseline ADL function  Description: INTERVENTIONS:  -  Assess patient's ability to carry out ADLs; assess patient's baseline for ADL function and identify physical deficits which impact ability to perform ADLs (bathing, care of mouth/teeth, toileting, grooming, dressing, etc.)  - Assess/evaluate cause of self-care deficits   - Assess range of motion  - Assess patient's mobility; develop plan if impaired  - Assess patient's need for assistive devices and provide as appropriate  - Encourage maximum independence but intervene and supervise when necessary  - Involve family in performance of ADLs  - Assess for home care needs following discharge   - Consider OT consult to assist with ADL evaluation and planning for discharge  - Provide patient education as appropriate  Outcome: Progressing  Goal: Maintains/Returns to pre admission functional level  Description: INTERVENTIONS:  - Perform AM-PAC 6 Click Basic Mobility/ Daily Activity  assessment daily.  - Set and communicate daily mobility goal to care team and patient/family/caregiver.   - Collaborate with rehabilitation services on mobility goals if consulted  - Perform Range of Motion  times a day.  - Reposition patient every  hours.  - Dangle patient  times a day  - Stand patient times a day  - Ambulate patient  times a day  - Out of bed to chair  times a day   - Out of bed for meals  times a day  - Out of bed for toileting  - Record patient progress and toleration of activity level   Outcome: Progressing     Problem: DISCHARGE PLANNING  Goal: Discharge to home or other facility with appropriate resources  Description: INTERVENTIONS:  - Identify barriers to discharge w/patient and caregiver  - Arrange for needed discharge resources and transportation as appropriate  - Identify discharge learning needs (meds, wound care, etc.)  - Arrange for interpretive services to assist at discharge as needed  - Refer to Case Management Department for coordinating discharge planning if the patient needs post-hospital services based on physician/advanced practitioner order or complex needs related to functional status, cognitive ability, or social support system  Outcome: Progressing     Problem: Knowledge Deficit  Goal: Patient/family/caregiver demonstrates understanding of disease process, treatment plan, medications, and discharge instructions  Description: Complete learning assessment and assess knowledge base.  Interventions:  - Provide teaching at level of understanding  - Provide teaching via preferred learning methods  Outcome: Progressing     Problem: Nutrition/Hydration-ADULT  Goal: Nutrient/Hydration intake appropriate for improving, restoring or maintaining nutritional needs  Description: Monitor and assess patient's nutrition/hydration status for malnutrition. Collaborate with interdisciplinary team and initiate plan and interventions as ordered.  Monitor patient's weight and dietary intake as  ordered or per policy. Utilize nutrition screening tool and intervene as necessary. Determine patient's food preferences and provide high-protein, high-caloric foods as appropriate.     INTERVENTIONS:  - Monitor oral intake, urinary output, labs, and treatment plans  - Assess nutrition and hydration status and recommend course of action  - Evaluate amount of meals eaten  - Assist patient with eating if necessary   - Allow adequate time for meals  - Recommend/ encourage appropriate diets, oral nutritional supplements, and vitamin/mineral supplements  - Order, calculate, and assess calorie counts as needed  - Recommend, monitor, and adjust tube feedings and TPN/PPN based on assessed needs  - Assess need for intravenous fluids  - Provide specific nutrition/hydration education as appropriate  - Include patient/family/caregiver in decisions related to nutrition  Outcome: Progressing

## 2024-04-09 NOTE — QUICK NOTE
Advanced Care Planning:   Called and updated patient family over the phone.  Spoke with them regarding the current care plan.  No other updates at this time.  Will anticipate discharge in next 24 hours.

## 2024-04-09 NOTE — ASSESSMENT & PLAN NOTE
Plan  - OMFS consulted, appreciate input  - non-operative management  - outpatient follow-up as needed

## 2024-04-09 NOTE — ASSESSMENT & PLAN NOTE
- Status post fall with the below noted injuries    Plan  - Fall precautions.  - Geriatric Medicine consultation for evaluation, medication review and recommendations.  - PT and OT evaluation and treatment as indicated.  - Case Management consultation for disposition planning, likely discharge 4/10 to rehab given ambulatory dysfunction

## 2024-04-09 NOTE — ASSESSMENT & PLAN NOTE
Plan  - Continue current medication regimen.  - Outpatient follow-up with PCP.  - Geriatrics consult and recommendations

## 2024-04-09 NOTE — ASSESSMENT & PLAN NOTE
- Acute pain secondary to noted traumatic injuries    Plan  - Continue multimodal analgesic regimen.  - Bowel regimen as long as using opioids.  - Continue to monitor pain and adjust regimen as indicated

## 2024-04-10 VITALS
OXYGEN SATURATION: 93 % | TEMPERATURE: 97.1 F | HEIGHT: 63 IN | RESPIRATION RATE: 16 BRPM | SYSTOLIC BLOOD PRESSURE: 107 MMHG | BODY MASS INDEX: 18.59 KG/M2 | DIASTOLIC BLOOD PRESSURE: 60 MMHG | WEIGHT: 104.94 LBS | HEART RATE: 78 BPM

## 2024-04-10 PROCEDURE — 0241U HB NFCT DS VIR RESP RNA 4 TRGT: CPT | Performed by: PHYSICIAN ASSISTANT

## 2024-04-10 PROCEDURE — 97110 THERAPEUTIC EXERCISES: CPT

## 2024-04-10 PROCEDURE — 99232 SBSQ HOSP IP/OBS MODERATE 35: CPT | Performed by: NURSE PRACTITIONER

## 2024-04-10 PROCEDURE — 97530 THERAPEUTIC ACTIVITIES: CPT

## 2024-04-10 PROCEDURE — 97116 GAIT TRAINING THERAPY: CPT

## 2024-04-10 RX ORDER — GABAPENTIN 100 MG/1
100 CAPSULE ORAL DAILY
Qty: 30 CAPSULE | Refills: 0 | Status: SHIPPED | OUTPATIENT
Start: 2024-04-11

## 2024-04-10 RX ORDER — CLOTRIMAZOLE AND BETAMETHASONE DIPROPIONATE 10; .64 MG/G; MG/G
CREAM TOPICAL 2 TIMES DAILY
Start: 2024-04-10

## 2024-04-10 RX ORDER — LIDOCAINE 50 MG/G
2 PATCH TOPICAL DAILY
Start: 2024-04-11

## 2024-04-10 RX ADMIN — GABAPENTIN 100 MG: 100 CAPSULE ORAL at 09:06

## 2024-04-10 RX ADMIN — ENOXAPARIN SODIUM 30 MG: 30 INJECTION SUBCUTANEOUS at 09:06

## 2024-04-10 RX ADMIN — SENNOSIDES AND DOCUSATE SODIUM 1 TABLET: 8.6; 5 TABLET ORAL at 09:06

## 2024-04-10 RX ADMIN — AMLODIPINE BESYLATE 2.5 MG: 2.5 TABLET ORAL at 09:06

## 2024-04-10 RX ADMIN — ACETAMINOPHEN 975 MG: 325 TABLET, FILM COATED ORAL at 05:27

## 2024-04-10 RX ADMIN — ACETAMINOPHEN 975 MG: 325 TABLET, FILM COATED ORAL at 14:13

## 2024-04-10 RX ADMIN — CLOTRIMAZOLE AND BETAMETHASONE DIPROPIONATE: 10; .5 CREAM TOPICAL at 09:07

## 2024-04-10 NOTE — PROGRESS NOTES
[facility] Note  Name: Doris Campbell I  MRN: [id number]  Unit/Bed#: W -01 I Date of Admission: 4/6/2024   Date of Service: 4/10/2024 I Hospital Day: 4    Assessment/Plan   L1 vertebral fracture (HCC)  Assessment & Plan  - CT Chest 4/6 showing L1 compression fracture  - Also noted subacute L2 and L4 rib fractures    Plan  - Neurosurgery evaluation and recommendations appreciated.  - Bracing: pending evaluation  - Spine precautions.  - Monitor neurovascular exam.  - Multimodal analgesic regimen as needed.  - PT and OT evaluation and treatment as indicated.  - Outpatient follow up with Neurosurgery for re-evaluation      Multiple rib fractures  Assessment & Plan  - CT Chest 4/6 showing multiple rib fractures  - Rib fractures range from acute to subacute    Plan  - Continue rib fracture protocol.  - Continue to encourage incentive spirometer use and adequate pulmonary hygiene.  Currently pulling 500-750 mL on I.S.  - PIC score is 7  - Supplemental oxygen via nasal cannula as needed to maintain saturations greater than or equal to 94%.  - PT and OT evaluation and treatment as indicated.  - Outpatient follow-up in the trauma clinic for re-evaluation in approximately 2 weeks      History of falling  Assessment & Plan  - Status post fall with the below noted injuries    Plan  - Fall precautions.  - Geriatric Medicine consultation for evaluation, medication review and recommendations.  - PT and OT evaluation and treatment as indicated.  - Case Management consultation for disposition planning, likely discharge 4/10 to rehab given ambulatory dysfunction      * Zygomatic fracture (HCC)  Assessment & Plan  Plan  - OMFS consulted, appreciate input  - non-operative management  - outpatient follow-up as needed      Ingrown nail  Assessment & Plan  -Left great toe  -Podiatry performed bedside evaluation and procedure on 4/9 for treatment    Acute pain due to trauma  Assessment & Plan  -  Acute pain secondary to noted traumatic injuries    Plan  - Continue multimodal analgesic regimen.  - Bowel regimen as long as using opioids.  - Continue to monitor pain and adjust regimen as indicated      Hypertension  Assessment & Plan  Plan  - Continue current medication regimen with amlodipine 2.5 mg BID  - Outpatient follow-up with PCP      Mild cognitive impairment  Assessment & Plan  Plan  - Continue current medication regimen.  - Outpatient follow-up with PCP.  - Geriatrics consult and recommendations           Bowel Regimen: Senokot  VTE Prophylaxis:Enoxaparin (Lovenox)     Disposition: Likely discharge to SNF today    Subjective   Chief Complaint: Left face and back pain    Subjective: Patient is a 83-year-old female who presented after multiple falls, noted to have multiple rib fractures as well as left zygomatic fracture, with a L1 fracture.  Injury is nonoperative, pain well-managed, patient likely to be discharged today 4/10 to acute rehab.     Objective   Vitals:   Temp:  [96.4 °F (35.8 °C)-98 °F (36.7 °C)] 97.8 °F (36.6 °C)  HR:  [70-79] 70  Resp:  [13-16] 16  BP: (105-130)/(55-70) 126/68    I/O         04/08 0701  04/09 0700 04/09 0701  04/10 0700    P.O. 240 240    Total Intake(mL/kg) 240 (5) 240 (5)    Urine (mL/kg/hr)  0 (0)    Total Output  0    Net +240 +240                   Physical Exam:   General-comfortable  Neuro-no acute neurological deficits; alert and oriented x3  HEENT-EOM intact no pain on EOM, oropharynx clear and patent, contusion of left forehead  Cardiac-regular rate rhythm, no murmurs rubs or gallops  Pulmonary-clear to auscultation bilaterally, no adventitious breath sounds  GI-soft, nontender, no distension, normal bowel sounds  -voiding  Musculoskeletal-moves all extremities; neurovascularly intact  Skin-warm and well perfused      Invasive Devices       Peripheral Intravenous Line  Duration             Peripheral IV 04/06/24 Right;Dorsal (posterior) Hand 3 days          "            PIC Score  PIC Pain Score: 3 (4/9/2024  9:55 PM)  PIC Incentive Spirometry Score: 2 (4/9/2024  8:00 PM)  PIC Cough Description: 2 (4/9/2024  8:00 PM)  PIC Total Score: 7 (4/9/2024  8:00 PM)       If the Total PIC Score </=5, did you consult APS and evaluate patient for further intervention?: no      Pain:    Incentive Spirometry  Cough  3 = Controlled  4 = Above goal volume 3 = Strong  2 = Moderate  3 = Goal to alert volume 2 = Weak  1 = Severe  2 = Below alert volume 1 = Absent     1 = Unable to perform IS         Lab Results: Results: I have personally reviewed all pertinent laboratory/tests results, BMP/CMP:   No results found for: \"SODIUM\", \"K\", \"CL\", \"CO2\", \"ANIONGAP\", \"BUN\", \"CREATININE\", \"GLUCOSE\", \"CALCIUM\", \"AST\", \"ALT\", \"ALKPHOS\", \"PROT\", \"BILITOT\", \"EGFR\"  , and CBC:   No results found for: \"WBC\", \"HGB\", \"HCT\", \"MCV\", \"PLT\", \"ADJUSTEDWBC\", \"RBC\", \"MCH\", \"MCHC\", \"RDW\", \"MPV\", \"NRBC\"    Imaging: I have personally reviewed pertinent reports.     Other Studies: N/A       "

## 2024-04-10 NOTE — DISCHARGE INSTR - AVS FIRST PAGE
Facial Fracture Instructions  Maintain sinus precautions for 4 weeks: avoid holding sneezes, blowing your nose, lying on  your face, using straws. Use decongestants as needed.   Seek medical attn if you develop increased facial pain or pressure, fevers/chills/sweats, headaches.       Rib Fracture Discharge Instructions  Seek medical attention if you develop worsening chest pain or shortness of breath, dizziness/lightheadness, fevers/chills or sweats.   No heavy lifting, pushing or pulling >10 pounds and no strenuous physical activity until cleared by physician or until your rib pain resolves.   No work or driving while taking narcotic pain medications and until cleared by physician.   Use your incentive spirometer at least hourly while awake.       Spine Instructions  Wear TLSO brace as needed for comfort  Seek medical attn if you develop increased pain, numbness/weakness/tingling, inability to urinate, or difficulty moving.   No heavy lifting, pushing or pulling >10 pounds and no strenuous physical activity until cleared by physician or until your back pain resolves.   F/u with neurosurgery on an as needed basis.

## 2024-04-10 NOTE — PLAN OF CARE
Problem: PHYSICAL THERAPY ADULT  Goal: Performs mobility at highest level of function for planned discharge setting.  See evaluation for individualized goals.  Description: Treatment/Interventions: Functional transfer training, LE strengthening/ROM, Therapeutic exercise, Endurance training, Cognitive reorientation, Patient/family training, Equipment eval/education, Bed mobility, Gait training  Equipment Recommended: Walker       See flowsheet documentation for full assessment, interventions and recommendations.  Outcome: Progressing  Note: Prognosis: Fair  Problem List: Decreased strength, Decreased endurance, Decreased mobility, Impaired balance, Decreased cognition, Impaired judgement, Decreased safety awareness  Assessment: pt began tx session lying supine in the bed and was agreeable to participate in PT intervention. Since last PT intervention pt required an increase in level of assistance required for all bed mobility mod Ax1 w/ LE/trunk management in order to roll L and complete a supine<>sit EOB transfer. pt was able to sit EOB w/o LOB while being educated on functional transfers with RW. pt continues to remain consistant for requiring mod Ax1 for all functional transfers and ambulation with RW in todays tx session due to generalized weakness and impaired balance. pt continues to demonstrate the inability to ambulate house hold distance and would benefit from continued skilled PT intervention in order to address all pt deficits. Continue to recommend DC w/ level 2 moderate rehab resource intensity when medically cleared        Rehab Resource Intensity Level, PT: II (Moderate Resource Intensity) (may progress to level 3 pending accurate PLOF at facility)    See flowsheet documentation for full assessment.

## 2024-04-10 NOTE — ASSESSMENT & PLAN NOTE
- CT Chest 4/6 showing L1 compression fracture  - Also noted subacute L2 and L4 rib fractures    Plan  - Neurosurgery evaluation and recommendations appreciated.  - Bracing: TLSO brace prn comfort  - Spine precautions.  - Monitor neurovascular exam.  - Multimodal analgesic regimen as needed.  - PT and OT evaluation and treatment as indicated.  - Outpatient follow up with Neurosurgery for re-evaluation

## 2024-04-10 NOTE — PHYSICAL THERAPY NOTE
PHYSICAL THERAPY NOTE          Patient Name: Doris Campbell  Today's Date: 4/10/2024           04/10/24 0914   PT Last Visit   PT Visit Date 04/10/24   Note Type   Note Type Treatment   Pain Assessment   Pain Assessment Tool 0-10   Pain Score No Pain   Patient's Stated Pain Goal No pain   Hospital Pain Intervention(s) Repositioned;Ambulation/increased activity;Elevated;Emotional support;Rest   Multiple Pain Sites No   Restrictions/Precautions   Weight Bearing Precautions Per Order No   Braces or Orthoses Other (Comment)  (TLSO PRN)   Other Precautions Cognitive;Chair Alarm;Bed Alarm;Fall Risk;Pain;Spinal precautions;Limb alert   General   Chart Reviewed Yes   Response to Previous Treatment Other (Comment)  (pt reports R eye discomfort)   Family/Caregiver Present No   Cognition   Overall Cognitive Status Impaired   Arousal/Participation Alert;Responsive;Cooperative   Attention Attends with cues to redirect   Orientation Level Oriented X4   Memory Decreased short term memory;Decreased recall of recent events;Decreased recall of precautions   Following Commands Follows one step commands with increased time or repetition   Comments pt identified by name and    Subjective   Subjective pt was agreeable to participate in PT intervention as pt stated 0/10 pain pre/post tx session but complained of R eye discomfort   Bed Mobility   Rolling L 3  Moderate assistance   Additional items Assist x 1;HOB elevated;Bedrails;Increased time required;Verbal cues;LE management;Other  (trunk management)   Supine to Sit 3  Moderate assistance   Additional items Assist x 1;HOB elevated;Bedrails;Increased time required;Verbal cues;LE management;Other  (trunk management)   Sit to Supine Unable to assess   Additional Comments pt seated OOB in the recliner post tx session with call bell, chair alarm activated, legs elevated and all pt needs met   Transfers    Sit to Stand 3  Moderate assistance   Additional items Assist x 1;Increased time required;Verbal cues   Stand to Sit 3  Moderate assistance   Additional items Assist x 1;Armrests;Increased time required;Verbal cues   Stand pivot 3  Moderate assistance   Additional items Assist x 1;Armrests;Increased time required;Verbal cues   Additional Comments pt continues to require mod Ax1 for all funcitonal transfers to and from RW. pt w/ continued retropusion and VC's for hand placement while ascending to RW and descending into recliner   Ambulation/Elevation   Gait pattern Decreased foot clearance;Retropulsion;Narrow EDWIN;Shuffling;Excessively slow;Step to;Decreased hip extension;Decreased heel strike;Decreased toe off   Gait Assistance 3  Moderate assist   Additional items Assist x 1;Verbal cues   Assistive Device Rolling walker   Distance 25'x1 RW   Stair Management Assistance Not tested   Balance   Static Sitting Fair -   Dynamic Sitting Poor +   Static Standing Poor +   Dynamic Standing Poor   Ambulatory Poor  (w/ RW)   Endurance Deficit   Endurance Deficit Yes   Endurance Deficit Description limited activity tolerance, funcitonal mobility and ambulation distance   Activity Tolerance   Activity Tolerance Patient limited by fatigue;Other (Comment)  (generalized weakness)   Nurse Made Aware Spoke to RN   Exercises   Hip Abduction Sitting;15 reps;AROM;Bilateral   Hip Adduction Sitting;15 reps;AROM;Bilateral  (pillow squeezes)   Knee AROM Long Arc Quad Sitting;10 reps;AROM;Bilateral   Ankle Pumps Sitting;10 reps;AROM;Bilateral  (long sit position)   Marching Sitting;5 reps;AROM;Bilateral   Assessment   Prognosis Fair   Problem List Decreased strength;Decreased endurance;Decreased mobility;Impaired balance;Decreased cognition;Impaired judgement;Decreased safety awareness   Assessment pt began tx session lying supine in the bed and was agreeable to participate in PT intervention. Since last PT intervention pt required an  increase in level of assistance required for all bed mobility mod Ax1 w/ LE/trunk management in order to roll L and complete a supine<>sit EOB transfer. pt was able to sit EOB w/o LOB while being educated on functional transfers with RW. pt continues to remain consistant for requiring mod Ax1 for all functional transfers and ambulation with RW in todays tx session due to generalized weakness and impaired balance. pt continues to demonstrate the inability to ambulate house hold distance as pt required >5 minutes to ambulate 25'x1 RW. Pt would benefit from continued skilled PT intervention in order to address all pt deficits. Continue to recommend DC w/ level 2 moderate rehab resource intensity when medically cleared   Goals   Patient Goals none stated   STG Expiration Date 04/17/24   PT Treatment Day 3   Plan   Treatment/Interventions Functional transfer training;LE strengthening/ROM;Therapeutic exercise;Cognitive reorientation;Endurance training;Patient/family training;Equipment eval/education;Bed mobility;Gait training;Spoke to nursing   Progress Slow progress, decreased activity tolerance   PT Frequency 3-5x/wk   Discharge Recommendation   Rehab Resource Intensity Level, PT II (Moderate Resource Intensity)  (may progress to level 3 pending accurate PLOF at facility)   Equipment Recommended Walker   Walker Package Recommended Wheeled walker   AM-PAC Basic Mobility Inpatient   Turning in Flat Bed Without Bedrails 2   Lying on Back to Sitting on Edge of Flat Bed Without Bedrails 2   Moving Bed to Chair 2   Standing Up From Chair Using Arms 2   Walk in Room 2   Climb 3-5 Stairs With Railing 1   Basic Mobility Inpatient Raw Score 11   Basic Mobility Standardized Score 30.25   [facility] Highest Level Of Mobility   -[facility] Goal 4: Move to chair/commode   -[facility] Achieved 7: Walk 25 feet or more   Education   Education Provided Mobility training;Assistive device   Patient Reinforcement needed   End of Consult   Patient  Position at End of Consult Bedside chair;Bed/Chair alarm activated;All needs within reach

## 2024-04-10 NOTE — DISCHARGE SUMMARY
[facility]  Discharge- Doris Campbell 1940, 83 y.o. female MRN: [id number]  Unit/Bed#: W -01 Encounter: [id number]  Primary Care Provider: Tuyet Cantor MD   Date and time admitted to hospital: 4/6/2024 10:32 AM    History of falling  Assessment & Plan  - Status post fall with the below noted injuries    Plan  - Fall precautions.  - Geriatric Medicine consultation for evaluation, medication review and recommendations.  - PT and OT evaluation and treatment as indicated.  - Case Management consultation for disposition planning. Discharge to rehab today.       L1 vertebral fracture (HCC)  Assessment & Plan  - CT Chest 4/6 showing L1 compression fracture  - Also noted subacute L2 and L4 rib fractures    Plan  - Neurosurgery evaluation and recommendations appreciated.  - Bracing: TLSO brace prn comfort  - Spine precautions.  - Monitor neurovascular exam.  - Multimodal analgesic regimen as needed.  - PT and OT evaluation and treatment as indicated.  - Outpatient follow up with Neurosurgery for re-evaluation      Multiple rib fractures  Assessment & Plan  - CT Chest 4/6 showing multiple rib fractures  - Rib fractures range from acute to subacute    Plan  - Continue rib fracture protocol.  - Continue to encourage incentive spirometer use and adequate pulmonary hygiene.  Currently pulling 500-750 mL on I.S.  - PIC score is 7  - Supplemental oxygen via nasal cannula as needed to maintain saturations greater than or equal to 94%.  - PT and OT evaluation and treatment as indicated.  - Outpatient follow-up in the trauma clinic for re-evaluation in approximately 2 weeks      * Zygomatic fracture (HCC)  Assessment & Plan  Plan  - OMFS consulted, appreciate input  - non-operative management  - outpatient follow-up as needed      Ingrown nail  Assessment & Plan  -Left great toe  -Podiatry performed bedside evaluation and procedure on 4/9 for treatment    Acute pain due to trauma  Assessment  "& Plan  - Acute pain secondary to noted traumatic injuries    Plan  - Continue multimodal analgesic regimen.  - Bowel regimen as long as using opioids.  - Continue to monitor pain and adjust regimen as indicated      Hypertension  Assessment & Plan  Plan  - Continue current medication regimen with amlodipine 2.5 mg BID  - Outpatient follow-up with PCP      Mild cognitive impairment  Assessment & Plan  Plan  - Continue current medication regimen.  - Outpatient follow-up with PCP.  - Geriatrics consult and recommendations              Medical Problems       Resolved Problems  Date Reviewed: 4/8/2024   None         Admission Date:   Admission Orders (From admission, onward)       Ordered        04/06/24 1441  Inpatient Admission  Once                            Admitting Diagnosis: Head injury [S09.90XA]  Closed fracture of orbit, initial encounter ([facility]) [S02.85XA]  Closed fracture of first lumbar vertebra, unspecified fracture morphology, initial encounter ([facility]) [S32.019A]  Closed fracture of left zygomatic arch, initial encounter ([facility]) [S02.40FA]    HPI: As documented by Marck Oviedo PA-C who evaluated the patient on admission, \"Doris Campbell is a 83 y.o. female who presents with no significant past medical history other than hypertension and multiple falls.  Reported mild cognitive impairment.  She takes no anticoagulants or antiplatelet agents.  She has a history of multiple falls over the course of the last week.  Patient is a poor historian and cannot recall all the events regarding her falls.  She comes in with subacute rib fractures and an L1 fracture.  She has tenderness both with her chest wall as well as severe lower back.  She is also noted to have a left zygomatic fracture.  Attempted to contact family multiple times; there was no answer. \"    Procedures Performed:   Orders Placed This Encounter   Procedures    ED ECG Documentation Only       Summary of Hospital Course: Patient was placed on the trauma " service following fall when she sustained a L ZMC fracture, rib fractures and an L1 fracture. She was seen by OMFS and recommended non operative management and to maintain sinus precautions. She was seen by neurosurgery who recommended TLSO brace as needed for comfort. Upright xrays showed stable alignment and she remained neurologically intact. She was placed on rib fracture protocol and did well from that standpoint. She had minimal pain which as controlled with tylenol and lidoderm patches. She was seen by PT/OT and recommended for inpatient rehab. She has been secured at bed at [facility]. She will d/c there today. She will f/u with OMFS, neurosurgery and trauma as needed. She can f/u with her PCP in 2 weeks for continuity of care.         Significant Findings, Care, Treatment and Services Provided:   XR spine lumbar 2 or 3 views injury    Result Date: 4/8/2024  Impression: Stable appearance of L1, L2, and L4 compression fractures. Workstation performed: [id number]     XR hip/pelv 2-3 vws left    Result Date: 4/6/2024  Impression: No acute fracture. No hip dislocation. Partially imaged dynamic left hip screw in place, unchanged in configuration with no surrounding lucencies. Workstation performed: [id number]     CT abdomen pelvis w contrast    Result Date: 4/6/2024  Impression: Multiple likely subacute fractures as described above. Clinical correlation is advised. Workstation performed: [id number]     CT chest without contrast    Result Date: 4/6/2024  Impression: Minimal bibasilar lower lobe subsegmental atelectasis, left greater than right. No other evidence of acute intrathoracic process. Subacute nondisplaced fracture of the left anterior 5th and 6th ribs. Subacute nondisplaced fracture of the right posterior 12th rib. Subacute minimally displaced fracture of the right posterior 11th rib. Moderate subacute compression fracture of L1 without significant retropulsion. Additional chronic findings and  negatives as above. The study was marked in EPIC for immediate notification. Workstation performed: [id number]     CT facial bones without contrast    Result Date: 4/6/2024  Impression: Acute left anterior and lateral maxillary sinus, left lateral orbit and equivocal left zygomatic arch fractures as above. Globes are intact. No orbital hematoma. The study was marked in EPIC for immediate notification. Workstation performed: [id number]     CT spine cervical without contrast    Result Date: 4/6/2024  Impression: No cervical spine fracture or traumatic malalignment. Workstation performed: [id number]     CT head without contrast    Result Date: 4/6/2024  Impression: No acute intracranial process. No skull fracture. 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 Workstation performed: [id number]        Complications: none    Condition at Discharge: good         Discharge instructions/Information to patient and family:   See after visit summary for information provided to patient and family.      Provisions for Follow-Up Care:  See after visit summary for information related to follow-up care and any pertinent home health orders.      PCP: Tuyet Cantor MD    Disposition: Short-term rehab at [facility]    Planned Readmission: No    Discharge Statement   I spent 25 minutes discharging the patient. This time was spent on the day of discharge. I had direct contact with the patient on the day of discharge. Additional documentation is required if more than 30 minutes were spent on discharge.     Discharge Medications:  See after visit summary for reconciled discharge medications provided to patient and family.

## 2024-04-10 NOTE — PROGRESS NOTES
Progress Note - Geriatric Medicine   Doris Campbell 83 y.o. female MRN: [id number]  Unit/Bed#: W -01 Encounter: [id number]      Assessment/Plan:  Multiple falls  Multiple falls over the last week  [place] fall precautions   Assess patient frequently for physical needs, encourage use of assistant devices as needed and directed by PT/OT  Identify cognitive and physical deficits and behaviors that affect risk of falls  Consider moving patient closer to nursing station to monitor more closely for impulsive behavior which may increase risk of falls  Educate patient/family on patient safety including physical limitations and importance of using call bell for assistance  Modify environment to reduce risk of injury including disconnecting from pole when not in use, ensuring adequate lighting in room and restroom, ensuring that path to restroom is clear and free of trip hazards  PT/OT consulted to assist with strengthening/mobility and assist with discharge planning to appropriate level of care  Patient will discharge to short-term rehab once medically stable  Will discharge to short-term rehab today     Multiple rib fractures  CT chest showed- Subacute nondisplaced fracture of the left anterior 5th and 6th ribs.Subacute nondisplaced fracture of the right posterior 12th rib.Subacute minimally displaced fracture of the right posterior 11th rib.  Rib fracture protocol  Encourage coughing, deep breathing, and incentive spirometry  Respiratory status currently stable on room air  Multimodal pain regimen including geriatric pain protocol  Patient is currently on Tylenol 975 every 8, lidocaine patch daily, oxycodone 2.5 mg 2 per p.m. for moderate pain,  Management per trauma-outpatient follow-up with trauma    L1 vertebral fracture  S/p falls  CT showed subacute compression fracture of L1 without significant retropulsion  Neurosurgery was consulted  Recommended TLSO brace as needed  Multimodal pain regimen  No  neurosurgical intervention anticipated  Follow-up outpatient as needed with neurosurgery    Zygomatic fracture  CT showed equivocal nondisplaced fracture of the left zygomatic arch  OMFS was consulted  No acute surgical intervention indicated  Ice as needed  Sinus precautions, avoid holding status, decongestions as needed  Follow-up outpatient as needed    Ingrown nail  Left great toe  Was seen by podiatry who completed nail procedure yesterday  Lotrisone cream twice daily for feet  Nursing staff at [facility] reports she had refused to see podiatry previously at [facility]    Insomnia  First-line treatment is behavior modification  Maintain sleep-wake cycle, avoid nighttime interruptions  Avoid caffeine throughout the day  Avoid napping throughout the day  Encourage physical activity throughout the day  Avoid sedative hypnotics including benzodiazepines and benadryl  Patient reports she slept better last night  Continue melatonin 3 mg nightly    Constipation  Patient complains of constipation  Last BM was prior to hospitalization  Is currently on Senokot-S 1 tablet nightly  Will increase Senokot to twice daily  Recommend adding MiraLAX daily  Encourage adequate p.o. 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  At risk for delirium due to hospitalization, medications, age, sleep disturbance, pain  Recommend delirium precautions  Maintain sleep-wake cycle, avoid nighttime interruptions  minimize overnight interruptions, group overnight vitals/labs/nursing checks as possible  dim lights, close blinds and turn off tv to minimize stimulation and encourage sleep environment in  evenings  Provide adequate pain control  Avoid urinary retention and constipation  Provide frequent and early mobilization  Provide frequent redirection and reorientation as needed  Avoid medications that may worsen or precipitate delirium such as tramadol, benzodiazepines, anticholinergics, and Benadryl  Redirect unwanted behaviors as first-line therapy, avoid physical restraints as able to  Continue to monitor      Ambulatory dysfunction  At a baseline ambulates with walker  PT/OT following  Fall precautions  Out of bed as tolerated  Encourage early and frequent mobilization  Encourage adequate hydration and nutrition  Provide adequate pain management   Goal is short-term rehab today  Continue with PT/OT for continued strength and balance training     Subjective:   Doris is an 83-year-old female being seen for a geriatrics follow-up.  Upon exam patient was out of bed in the chair, resting.  She appeared comfortable and was in no acute distress.  She reported some mild pain.  She slept well overnight.  Appetite is stable.  She has not moved her bowels since hospitalized.  She will discharge to short-term rehab today.  Nursing no acute concerns or issues at this time.    Review of Systems   Constitutional:  Negative for activity change, appetite change, chills, fatigue and fever.   HENT:  Negative for sore throat and trouble swallowing.    Eyes:  Negative for visual disturbance.   Respiratory:  Negative for shortness of breath.    Cardiovascular:  Negative for chest pain and palpitations.   Gastrointestinal:  Positive for constipation. Negative for abdominal pain, diarrhea, nausea and vomiting.   Genitourinary:  Negative for difficulty urinating, dysuria and hematuria.   Musculoskeletal:  Positive for arthralgias and gait problem. Negative for back pain.   Skin:  Negative for color change and rash.   Neurological:  Positive for weakness. Negative for dizziness, seizures, syncope, light-headedness and headaches.  "  Psychiatric/Behavioral:  Positive for confusion and sleep disturbance. Negative for hallucinations. The patient is not nervous/anxious.          Objective:     Vitals: Blood pressure 107/60, pulse 78, temperature (!) 97.1 °F (36.2 °C), resp. rate 16, height 5' 3\" (1.6 m), weight 47.6 kg (104 lb 15 oz), SpO2 93%.,Body mass index is 18.59 kg/m².      Intake/Output Summary (Last 24 hours) at 4/10/2024 1311  Last data filed at 4/10/2024 0900  Gross per 24 hour   Intake 480 ml   Output 0 ml   Net 480 ml       Current Medications: Reviewed    Physical Exam:   Physical Exam  Vitals reviewed.   Constitutional:       General: She is not in acute distress.     Appearance: She is well-developed. She is not ill-appearing.      Comments: Frail appearing    HENT:      Head: Normocephalic.   Cardiovascular:      Rate and Rhythm: Normal rate and regular rhythm.      Heart sounds: Murmur heard.   Pulmonary:      Effort: Pulmonary effort is normal. No respiratory distress.      Breath sounds: Normal breath sounds.   Abdominal:      General: Bowel sounds are normal. There is no distension.      Palpations: Abdomen is soft.      Tenderness: There is no abdominal tenderness.   Musculoskeletal:         General: No swelling.      Right lower leg: No edema.      Left lower leg: No edema.   Skin:     General: Skin is warm and dry.      Coloration: Skin is pale.      Findings: Bruising present.   Neurological:      General: No focal deficit present.      Mental Status: She is alert. Mental status is at baseline.      Cranial Nerves: No cranial nerve deficit.      Motor: Weakness present.      Gait: Gait abnormal.      Comments: Alert to person, place, and situation disoriented to time   Psychiatric:         Mood and Affect: Mood normal.          Invasive Devices       Peripheral Intravenous Line  Duration             Peripheral IV 04/06/24 Right;Dorsal (posterior) Hand 3 days                    Lab, Imaging and other studies:    Lab " "Results:   I have personally reviewed pertinent lab results including the following:  -COVID flu RSV swab    I have personally reviewed the following imaging study reports in PACS:  No new imaging to review  - I have personally reviewed pertinent reports.      Please note:  Voice-recognition software may have been used in the preparation of this document.  Occasional wrong word or \"sound-alike\" substitutions may have occurred due to the inherent limitations of voice recognition software.  Interpretation should be guided by context.    "

## 2024-04-10 NOTE — CASE MANAGEMENT
Case Management Discharge Planning Note    Patient name Doris Campbell  Location W /W -01 MRN [id number]  : 1940 Date 4/10/2024       Current Admission Date: 2024  Current Admission Diagnosis:Zygomatic fracture (HCC)   Patient Active Problem List    Diagnosis Date Noted    Ingrown nail 2024    Multiple rib fractures 2024    Acute pain due to trauma 2024    L1 vertebral fracture (HCC) 2024    Zygomatic fracture (HCC) 2024    Anemia 2023    Elevated alkaline phosphatase level 2023    Asthma 2023    QT prolongation 2023    Adjustment disorder with anxious mood 2022    History of stroke 2022    Ambulatory dysfunction 2022    Pruritus 2022    Urinary frequency 10/31/2022    Closed fracture of ramus of right pubis (HCC) 10/25/2022    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    Right shoulder pain 10/25/2022    Periorbital hematoma, right 10/23/2022    Allergic rhinitis due to allergen 2022    Upper respiratory symptom 2022    Sciatica 2021    Chronic pain of right knee     Arthritis of knee, right     Encounter for support and coordination of transition of care 2021    Fx humeral neck, left, closed, initial encounter 2020    Elevated liver enzymes 2020    Abnormal results of liver function studies 2020    Difficult or painful urination 2020    Hereditary and idiopathic neuropathy, unspecified 2020    Rheumatoid arthritis, unspecified (HCC) 2020    Personal history of transient ischemic attack (TIA), and cerebral infarction without residual deficits 2020    Personal history of breast cancer 2020    History of falling 2020    Age-related osteoporosis without current pathological fracture 2020    Constipation, unspecified 2020    Anxiety     Hypertension 11/10/2020    Stroke ([facility])     Mild cognitive impairment     Vitamin D deficiency  10/22/2020    Closed fracture of nasal bone 10/21/2020    Age-related osteoporosis with current pathological fracture 10/21/2020    Fall 10/19/2020    Traumatic hematoma of forehead 10/19/2020    Closed fracture of proximal end of right humerus 10/19/2020    B12 deficiency 07/25/2018    Abnormal MRI 10/17/2017    Major neurocognitive disorder, due to vascular disease, without behavioral disturbance, mild 06/30/2017    HLD (hyperlipidemia)     Knee injury 11/29/2016      LOS (days): 4  Geometric Mean LOS (GMLOS) (days): 3.1  Days to GMLOS:-0.8     OBJECTIVE:  Risk of Unplanned Readmission Score: 13.37         Current admission status: Inpatient   Preferred Pharmacy:   [id number] [place] - [address]  [address] [facility]  [place] [address]  Phone: [phone] Fax: [phone]    [id number] [place] - [address]  [facility]  [place] [address]  Phone: [phone] Fax: [phone]    PATIENT/FAMILY REPORTS NO PREFERRED PHARMACY  No address on file      Primary Care Provider: Tuyet Cantor MD    Primary Insurance: MEDICARE  Secondary Insurance: BLUE CROSS    DISCHARGE DETAILS:    Discharge planning discussed with:: Patricia lowery over phone  Freedom of Choice: Yes  Comments - [place] of Choice: S  CM contacted family/caregiver?: Yes  Were Treatment Team discharge recommendations reviewed with patient/caregiver?: Yes  Did patient/caregiver verbalize understanding of patient care needs?: N/A- going to facility  Were patient/caregiver advised of the risks associated with not following Treatment Team discharge recommendations?: Yes    Contacts  Patient Contacts: Patricia  Relationship to Patient:: Family  Contact Method: Phone  Phone Number: [phone]  Reason/Outcome: Discharge Planning, Emergency Contact, Referral, Continuity of Care    Requested Home Health Care         Is the patient interested in C at discharge?: No    DME Referral Provided  Referral made for  DME?: No    Other Referral/Resources/Interventions Provided:  Interventions: SNF, Short Term Rehab  Referral Comments: 2:30PM BLS confirmed, all appropriate parties aware- med necessity placed in binder on W4. CM spoke with cousin, Patricia over phone- Patricia aware and agreeable to dcp to facility today. COVID swab and PASRR uploaded to [facility], per facility request. No further CM needs anticipated at this time.    Would you like to participate in our [facility] service program?  : No - Declined    Treatment Team Recommendation: Short Term Rehab, SNF  Discharge Destination Plan:: Short Term Rehab, SNF  Transport at Discharge : [facility]  Transported by (Company and Unit #): MICHELLE    IMM Given (Date):: 04/10/24  IMM Given to:: Family (reviewed with cousin, over phone)  IMM reviewed with patient's caregiver, patient's caregiver agrees with discharge determination.     [facility]  Re[facility] Facility/Agency Phone Number: [phone]  Facility/Agency Fax Number: [phone]

## 2024-04-10 NOTE — INCIDENTAL FINDINGS
The following findings require follow up:  Radiographic finding   Finding:There is a 3 mm calculus in the right kidney. No hydronephrosis. Subcentimeter hypodensities likely represent tiny cysts.     Colonic diverticulosis without findings of acute diverticulitis.    Follow up required: family doctor   Follow up should be done within 2 week(s)      Patient informed of incidental findings and recommended follow up. She verbalized understanding.

## 2024-04-10 NOTE — ASSESSMENT & PLAN NOTE
- Status post fall with the below noted injuries    Plan  - Fall precautions.  - Geriatric Medicine consultation for evaluation, medication review and recommendations.  - PT and OT evaluation and treatment as indicated.  - Case Management consultation for disposition planning. Discharge to rehab today.

## 2024-04-11 ENCOUNTER — NURSING HOME VISIT (OUTPATIENT)
Dept: GERIATRICS | Facility: OTHER | Age: 84
End: 2024-04-11
Payer: MEDICARE

## 2024-04-11 DIAGNOSIS — S22.43XD MULTIPLE CLOSED FRACTURES OF RIBS OF BOTH SIDES WITH ROUTINE HEALING, SUBSEQUENT ENCOUNTER: ICD-10-CM

## 2024-04-11 DIAGNOSIS — S02.40FD CLOSED FRACTURE OF LEFT ZYGOMATIC ARCH WITH ROUTINE HEALING, SUBSEQUENT ENCOUNTER: ICD-10-CM

## 2024-04-11 DIAGNOSIS — R13.19 OTHER DYSPHAGIA: ICD-10-CM

## 2024-04-11 DIAGNOSIS — R26.2 AMBULATORY DYSFUNCTION: ICD-10-CM

## 2024-04-11 DIAGNOSIS — J45.20 MILD INTERMITTENT ASTHMA WITHOUT COMPLICATION: ICD-10-CM

## 2024-04-11 DIAGNOSIS — R29.6 RECURRENT FALLS: ICD-10-CM

## 2024-04-11 DIAGNOSIS — J30.1 SEASONAL ALLERGIC RHINITIS DUE TO POLLEN: ICD-10-CM

## 2024-04-11 DIAGNOSIS — S32.018D OTHER CLOSED FRACTURE OF FIRST LUMBAR VERTEBRA WITH ROUTINE HEALING, SUBSEQUENT ENCOUNTER: Primary | ICD-10-CM

## 2024-04-11 DIAGNOSIS — K59.01 SLOW TRANSIT CONSTIPATION: ICD-10-CM

## 2024-04-11 PROCEDURE — 99305 1ST NF CARE MODERATE MDM 35: CPT | Performed by: FAMILY MEDICINE

## 2024-04-11 NOTE — PROGRESS NOTES
[facility]  History and Physical  POS: SNF-31    Records Reviewed include: [facility]] records    Chief Complaint/ Reason for Admission: L1 fracture; Rib fractures; Zygomatic fracture; Recurrent falls    History of Present Illness:            83 year old female admitted for SNF rehab following hospitalization at [facility]. Recurrent falls with multiple traumatic injuries including L1 compression fracture (along with subacute L2 and chronic L4 compression fractures), facial bone fractures and bilateral rib fractures. Evaluated by trauma, neurosurgery and OMFS teams with plan for nonoperative management. See A&P for additional HPI. Since SNF admission, no complaints of pain or discomfort reported. Speech therapy evaluation with concern for dysphagia and downgrade in diet to mechanical soft with thin liquids.          Allergies   Allergen Reactions    Alendronate     Raloxifene     Risedronate Sodium     Sulfa Antibiotics Swelling    Penicillins Swelling     Patient received 7 day treatment course Keflex without any adverse reactions in 11/2022.         Past Medical History  Past Medical History:   Diagnosis Date    Allergic     Anxiety     Arthritis     Cancer (HCC)     HLD (hyperlipidemia)     Hypertension     Hyperthyroidism     Osteoporosis     Stroke (HCC)     TIA (transient ischemic attack)         Past Surgical History:   Procedure Laterality Date    BREAST LUMPECTOMY Left     HYSTERECTOMY      KNEE SURGERY Right     ORIF TIBIA FRACTURE Right 12/8/2016    Procedure: OPEN REDUCTION W/ INTERNAL FIXATION (ORIF) TIBIA;  Surgeon: Angie Case MD;  Location: [facility];  Service:         Family History   Problem Relation Age of Onset    Coronary artery disease Family     Other Family         DJD        Social History  Tobacco Use: Low Risk  (4/21/2024)    Patient History     Smoking Tobacco Use: Never     Smokeless Tobacco Use: Never      Passive Exposure: Not on file           Physical Exam    Vitals reviewed in SNF EMR    Physical Exam  Vitals and nursing note reviewed.   Constitutional:       General: She is awake. She is not in acute distress.     Appearance: She is not toxic-appearing or diaphoretic.   HENT:      Head:      Comments: +periorbital bruising     Nose: No rhinorrhea.      Mouth/Throat:      Mouth: Mucous membranes are moist.   Eyes:      General:         Right eye: No discharge.         Left eye: No discharge.   Cardiovascular:      Rate and Rhythm: Normal rate.   Pulmonary:      Effort: Pulmonary effort is normal. No respiratory distress.   Abdominal:      General: There is no distension.   Musculoskeletal:      Cervical back: No rigidity.   Skin:     Coloration: Skin is not jaundiced or pale.      Findings: Bruising (Facial ecchymosis) present.   Neurological:      Mental Status: She is alert.      Cranial Nerves: No dysarthria or facial asymmetry.   Psychiatric:         Behavior: Behavior is cooperative.         Review of Systems:  Review of Systems   Constitutional:  Negative for fever.   Respiratory:  Negative for cough and shortness of breath.    Musculoskeletal:  Negative for arthralgias and back pain.        List of Current Medications: Medication list reviewed and updated in Epic to reflect most current [facility] orders    Labs/Diagnostics (reviewed by this provider): Hospital Paperwork  Lab Results   Component Value Date    SODIUM 136 04/09/2024    K 4.4 04/09/2024     04/09/2024    CO2 22 04/09/2024    BUN 27 (H) 04/09/2024    CREATININE 0.96 04/09/2024    GLUC 86 04/09/2024    CALCIUM 8.8 04/09/2024     Lab Results   Component Value Date    WBC 6.13 04/09/2024    HGB 11.5 04/09/2024    HCT 37.0 04/09/2024    MCV 96 04/09/2024     (H) 04/09/2024         Imaging Reviewed:  Xray lumbar spine (4/7/24) Stable L1, L2, L4 compression fractures  CT Abd/Pelvis (4/6/24) Acute L1 compression fracture; subacute L2 compression  fracture; chronic L4 compression fracture  CT facial bones (4/6/24) acute left anterior and lateral maxillary sinus, left lateral orbit and left zygomatic arch fractures  CT chest (4/6/24) subacute nondisplaced fracture left anterior 5th and 6th ribs; subacute nondisplaced fracture of the right posterior rib; subacute minimally displaced fracture of the right posterior 11th rib  EKG (4/6/24) NSR    Assessment/Plan:  83 year old female with:    L1 vertebral fracture (HCC)  Acute L1 compression fracture  Subacute L2 fracture  Chronic L4 fractures  Continue scheduled acetaminophen for pain  TLSO brace PRN for comfort  Follow up with neurosurgery    Multiple rib fractures  Encourage deep breathing, incentive spirometry  Scheduled acetaminophen, topical lidocaine for pain control  Monitor respiratory status closely, at risk for pneumonia in setting of rib fractures in addition to dysphagia- speech therapy consulted    Zygomatic fracture (HCC)  Acute left anterior and lateral maxillary sinus; left lateral orbit; left zygomatic arch fractures  Pain control as outlined; follow up with FS    Dysphagia  Speech therapy consulted  Continue modified diet  Aspiration precautions    Constipation  Encourage PO hydration  Continue senna-s BID    Asthma  Monitor respiratory status closely; at risk for decompensation in setting of bilateral rib fractures  Continue albuterol PRN  Continue flonase for allergies     Ambulatory dysfunction  With recurrent falls  Multifactorial  Admit to SNF for rehab  PT and OT consults placed- evaluate and treat  Supportive care, nutritional support, ADL support  Fall precautions      Advanced Directives: POLST completed following SNF admission  Code status: Updated to DNR while at SNF  PCP: MD Britney Hutton, DO  4/11/24

## 2024-04-15 ENCOUNTER — NURSING HOME VISIT (OUTPATIENT)
Dept: GERIATRICS | Facility: OTHER | Age: 84
End: 2024-04-15
Payer: MEDICARE

## 2024-04-15 DIAGNOSIS — R30.0 DIFFICULT OR PAINFUL URINATION: ICD-10-CM

## 2024-04-15 DIAGNOSIS — D64.9 ANEMIA, UNSPECIFIED TYPE: ICD-10-CM

## 2024-04-15 DIAGNOSIS — I10 PRIMARY HYPERTENSION: ICD-10-CM

## 2024-04-15 DIAGNOSIS — R26.2 AMBULATORY DYSFUNCTION: Primary | ICD-10-CM

## 2024-04-15 DIAGNOSIS — G89.11 ACUTE PAIN DUE TO TRAUMA: ICD-10-CM

## 2024-04-15 PROCEDURE — 99309 SBSQ NF CARE MODERATE MDM 30: CPT | Performed by: NURSE PRACTITIONER

## 2024-04-15 NOTE — PROGRESS NOTES
[facility] at [address]  TRAVIS Gonzalez  [id number] [phone]    SNF Rehab: [facility]  Progress Note     ASSESSMENT AND PLAN:  1. Ambulatory dysfunction  Assessment & Plan:  Post [facility], was hospitalized 4/6-4/10/2024 for multiple rib fractures, L1 vertebral fracture, and a zygomatic bone fracture.   Per patient pain is under control with current regimen despite slight shoulder/upper back discomfort, will add Aspercreme prn to decrease discomfort. TLSO brace ordered PRN, continue to encourage use for comfort measures.   Facial bruising is starting to resolve, and abrasions are scabbed with no signs of infection. Sinus precautions in place.   Being followed by PT/OT services at [facility].   Continue to follow with PT/OT, fall precautions in place.        2. Acute pain due to trauma  Assessment & Plan:  S/p fall with multiple injuries.   Patient currently says pain in under control, does report some discomfort in BL upper shoulders/back.   Plan to continue 975 mg of scheduled Tylenol q8, Gabapentin 100 mg daily, Lidoderm patches and Voltaren gel.   Will add Aspercreme PRN for shoulders to help decrease pain especially at nighttime for restful sleep.        3. Difficult or painful urination  Assessment & Plan:  Patient reporting burning with urination. Chronic issue per patient.   Denies pelvic pain, hematuria, or feelings of bladder fullness.   Will initiate watchful waiting protocol, plan to obtain a urine sample with culture if patient develops for symptoms or becomes vitally unstable (I.e. fevers, tachycardia).       4. Primary hypertension  Assessment & Plan:  BP is 142/78. Goal BP less then 140-150/90.   BP has been consistently around the same reading.   No BP medications ordered at [facility] [place] for HTN, will resume Amlodipine PO 2.5mg daily for blood pressure control.   Continue to monitor blood pressures BID and as needed.   GFR slightly decreased at 52. Avoid  "hypotension, encourage PO intake.   Repeat BMP reordered for one week on 2024.       5. Anemia, unspecified type  Assessment & Plan:  Last Hgb was 10.9 on 4/10/2024. Prior to discharge her Hgb was 11.5.   Plan to repeat CBC with differential for 2024.   Plan to monitor for S&S of bleeding.           Name: Doris Campbell                 : 1940               Sex: female    HPI:  Patient evaluated today in SNF rehab to follow-up on acute and chronic medical conditions. Upon examination, patient is awake, alert and in no acute distress. She currently does not have any complaints other than her upper back and shoulders being sore. She is agreeable to trying Aspercreme as needed to see if it helps decrease her discomfort. She recently had fallen at [facility] causing multiple fractures and injuries. She is being [facility] at [facility] for PT/OT services. Progression noted with therapy.  Refer to assessment and plan for further HPI. The following portions of the patient's history were reviewed and updated as appropriate: allergies, current medications, past family history, past medical history, past social history, past surgical history and problem list.    ROS: Review of Systems   Constitutional: Negative.  Negative for activity change, appetite change, chills, fatigue and fever.   HENT: Negative.     Eyes:  Positive for pain (discomfort right eye) and visual disturbance (right eye, chronic issue). Negative for photophobia, discharge, redness and itching.   Respiratory: Negative.  Negative for cough, chest tightness, shortness of breath and wheezing.    Cardiovascular: Negative.  Negative for chest pain, palpitations and leg swelling.   Gastrointestinal: Negative.  Negative for abdominal distention, blood in stool, constipation, diarrhea, nausea and vomiting.   Endocrine: Negative.    Genitourinary:  Positive for dysuria (\"burning\" with urination, states she always has this). Negative for difficulty " urinating, flank pain, hematuria and urgency.   Musculoskeletal:  Positive for arthralgias, back pain, gait problem and myalgias. Negative for neck pain and neck stiffness.        Pain across upper back/shoulders d/t fall   Skin:  Positive for wound (healing facial fracture, scabbing aorund left eye).   Allergic/Immunologic: Negative.    Neurological:  Negative for dizziness, facial asymmetry, speech difficulty, weakness, light-headedness, numbness and headaches.   Hematological: Negative.    Psychiatric/Behavioral:  Positive for sleep disturbance (issues sleeping at night due to the pain). Negative for behavioral problems, confusion, decreased concentration and dysphoric mood. The patient is not nervous/anxious.          Allergies   Allergen Reactions    Alendronate     Raloxifene     Risedronate Sodium     Sulfa Antibiotics Swelling    Penicillins Swelling     Patient received 7 day treatment course Keflex without any adverse reactions in 11/2022.        Medications:    Current Outpatient Medications on File Prior to Visit   Medication Sig Dispense Refill    acetaminophen (TYLENOL) 325 mg tablet Take 975 mg by mouth every 8 (eight) hours as needed      albuterol (PROVENTIL HFA,VENTOLIN HFA) 90 mcg/act inhaler Inhale 2 puffs every 6 (six) hours as needed for wheezing      calcitonin, salmon, (MIACALCIN) 200 units/act nasal spray 1 spray into each nostril daily      Calcium-Vitamin D-Vitamin K (VIACTIV PO) Take 1 tablet by mouth daily after breakfast      cholecalciferol (VITAMIN D3) 1,000 units tablet Take 1 tablet (1,000 Units total) by mouth daily 90 tablet 2    clotrimazole-betamethasone (LOTRISONE) 1-0.05 % cream Apply topically 2 (two) times a day      Diclofenac Sodium (VOLTAREN) 1 % Apply 2 g topically 4 (four) times a day = To right hip and pelvis 2 g 0    fluticasone (FLONASE) 50 mcg/act nasal spray 1 spray into each nostril daily  0    gabapentin (NEURONTIN) 100 mg capsule Take 1 capsule (100 mg total) by  mouth daily 30 capsule 0    lidocaine (LIDODERM) 5 % Apply 2 patches topically over 12 hours daily Remove & Discard patch within 12 hours or as directed by MD      magnesium hydroxide (MILK OF MAGNESIA) 400 mg/5 mL oral suspension Take 30 mL by mouth 2 (two) times a day as needed      meloxicam (MOBIC) 7.5 mg tablet Take 7.5 mg by mouth 2 (two) times a day      senna-docusate sodium (SENOKOT S) 8.6-50 mg per tablet Take 1 tablet by mouth 2 (two) times a day 60 tablet 0    Sodium Phosphates (FLEET ENEMA RE) Insert 1 Bottle into the rectum daily as needed       No current facility-administered medications on file prior to visit.       History:  Past Medical History:   Diagnosis Date    Allergic     Anxiety     Arthritis     Cancer (HCC)     HLD (hyperlipidemia)     Hypertension     Hyperthyroidism     Osteoporosis     Stroke (HCC)     TIA (transient ischemic attack)      Past Surgical History:   Procedure Laterality Date    BREAST LUMPECTOMY Left     HYSTERECTOMY      KNEE SURGERY Right     ORIF TIBIA FRACTURE Right 12/8/2016    Procedure: OPEN REDUCTION W/ INTERNAL FIXATION (ORIF) TIBIA;  Surgeon: Angie Case MD;  Location: [facility] OR;  Service:      Family History   Problem Relation Age of Onset    Coronary artery disease Family     Other Family         DJD     Social History     Socioeconomic History    Marital status:      Spouse name: Not on file    Number of children: Not on file    Years of education: Not on file    Highest education level: Not on file   Occupational History    Not on file   Tobacco Use    Smoking status: Never    Smokeless tobacco: Never    Tobacco comments:     Former smoker - As per Kelly    Vaping Use    Vaping status: Never Used   Substance and Sexual Activity    Alcohol use: Not Currently     Comment: Alcohol intake:   Occasional  - As per Dale     Drug use: Never    Sexual activity: Not Currently   Other Topics Concern    Not on file   Social History Narrative    **  Merged History Encounter **         Living at SNF    · Caffeine intake:   None        Social Determinants of Health     Financial Resource Strain: Low Risk  (2/8/2021)    Overall Financial Resource Strain (CARDIA)     Difficulty of Paying Living Expenses: Not hard at all   Food Insecurity: No Food Insecurity (4/8/2024)    Hunger Vital Sign     Worried About Running Out of Food in the Last Year: Never true     Ran Out of Food in the Last Year: Never true   Transportation Needs: No Transportation Needs (4/8/2024)    PRAPARE - Transportation     Lack of Transportation (Medical): No     Lack of Transportation (Non-Medical): No   Physical Activity: Inactive (6/10/2020)    Exercise Vital Sign     Days of Exercise per Week: 0 days     Minutes of Exercise per Session: 0 min   Stress: No Stress Concern Present (6/10/2020)    [employer] - Occupational Stress Questionnaire     Feeling of Stress : Only a little   Social Connections: Socially Isolated (6/10/2020)    Social Connection and Isolation Panel [NHANES]     Frequency of Communication with Friends and Family: More than three times a week     Frequency of Social Gatherings with Friends and Family: More than three times a week     Attends [employer] Services: Never     Active Member of Clubs or Organizations: No     Attends Club or Organization Meetings: Never     Marital Status:    Intimate Partner Violence: Not At Risk (6/10/2020)    Humiliation, Afraid, Rape, and Kick questionnaire     Fear of Current or Ex-Partner: No     Emotionally Abused: No     Physically Abused: No     Sexually Abused: No   Housing Stability: Low Risk  (4/8/2024)    Housing Stability Vital Sign     Unable to Pay for Housing in the Last Year: No     Number of Places Lived in the Last Year: 1     Unstable Housing in the Last Year: No     Past Surgical History:   Procedure Laterality Date    BREAST LUMPECTOMY Left     HYSTERECTOMY      KNEE SURGERY Right     ORIF  TIBIA FRACTURE Right 12/8/2016    Procedure: OPEN REDUCTION W/ INTERNAL FIXATION (ORIF) TIBIA;  Surgeon: Angie Case MD;  Location: [facility];  Service:        OBJECTIVE:  Vital Signs and nursing notes reviewed in point click care, EMR.    Physical Exam  Constitutional:       General: She is not in acute distress.     Appearance: Normal appearance. She is normal weight. She is not ill-appearing.   HENT:      Head: Normocephalic. Abrasion and contusion present.        Right Ear: External ear normal.      Left Ear: External ear normal.      Nose: Nose normal.      Mouth/Throat:      Mouth: Mucous membranes are moist.      Pharynx: Oropharynx is clear.   Eyes:      General:         Right eye: No discharge.         Left eye: No discharge.      Extraocular Movements: Extraocular movements intact.      Conjunctiva/sclera: Conjunctivae normal.   Cardiovascular:      Rate and Rhythm: Normal rate and regular rhythm.      Pulses: Normal pulses.      Heart sounds: Normal heart sounds. No murmur heard.     No friction rub.   Pulmonary:      Effort: Pulmonary effort is normal. No respiratory distress.      Breath sounds: Normal breath sounds. No wheezing or rhonchi.   Abdominal:      General: Abdomen is flat. Bowel sounds are normal. There is no distension.      Tenderness: There is no abdominal tenderness.   Musculoskeletal:         General: Tenderness and signs of injury present.      Right shoulder: Tenderness present.      Left shoulder: Tenderness present.      Cervical back: Normal range of motion and neck supple. No rigidity or tenderness.      Right lower leg: No edema.      Left lower leg: No edema.      Comments: Tenderness across BL upper back/shoulders    Skin:     General: Skin is warm and dry.      Capillary Refill: Capillary refill takes less than 2 seconds.      Findings: Bruising (healing bruising on left side of the face around the eye) present.   Neurological:      General: No focal deficit present.       Mental Status: She is alert and oriented to person, place, and time. Mental status is at baseline.      Sensory: No sensory deficit.      Motor: Weakness present.      Coordination: Coordination normal.      Gait: Gait abnormal.   Psychiatric:         Mood and Affect: Mood normal.         Behavior: Behavior normal.         Thought Content: Thought content normal.         Judgment: Judgment normal.         Labs & Imaging Reviewed:   Lab Results   Component Value Date    WBC 6.13 04/09/2024    HGB 11.5 04/09/2024    HCT 37.0 04/09/2024    MCV 96 04/09/2024     (H) 04/09/2024     Lab Results   Component Value Date    SODIUM 136 04/09/2024    K 4.4 04/09/2024     04/09/2024    CO2 22 04/09/2024    BUN 27 (H) 04/09/2024    CREATININE 0.96 04/09/2024    GLUC 86 04/09/2024    CALCIUM 8.8 04/09/2024     Lab Results   Component Value Date    NTWTLEVI57 589 10/11/2018     Lab Results   Component Value Date    GHM6MCNVTVSO 2.718 01/19/2023     Lab Results   Component Value Date    SNAY17WPFPAM 31.4 06/17/2022        VALDEMAR Rois  Geriatric Medicine  04/15/2024

## 2024-04-15 NOTE — ASSESSMENT & PLAN NOTE
BP is 142/78. Goal BP less then 140-150/90.   BP has been consistently around the same reading.   No BP medications ordered at [facility] for HTN, will resume Amlodipine PO 2.5mg daily for blood pressure control.   Continue to monitor blood pressures BID and as needed.   GFR slightly decreased at 52. Avoid hypotension, encourage PO intake.   Repeat BMP reordered for one week on 4/22/2024.

## 2024-04-15 NOTE — ASSESSMENT & PLAN NOTE
S/p fall with multiple injuries.   Patient currently says pain in under control, does report some discomfort in BL upper shoulders/back.   Plan to continue 975 mg of scheduled Tylenol q8, Gabapentin 100 mg daily, Lidoderm patches and Voltaren gel.   Will add Aspercreme PRN for shoulders to help decrease pain especially at nighttime for restful sleep.

## 2024-04-15 NOTE — ASSESSMENT & PLAN NOTE
Last Hgb was 10.9 on 4/10/2024. Prior to discharge her Hgb was 11.5.   Plan to repeat CBC with differential for 4/22/2024.   Plan to monitor for S&S of bleeding.

## 2024-04-15 NOTE — ASSESSMENT & PLAN NOTE
Post fall at [facility], was hospitalized 4/6-4/10/2024 for multiple rib fractures, L1 vertebral fracture, and a zygomatic bone fracture.   Per patient pain is under control with current regimen despite slight shoulder/upper back discomfort, will add Aspercreme prn to decrease discomfort. TLSO brace ordered PRN, continue to encourage use for comfort measures.   Facial bruising is starting to resolve, and abrasions are scabbed with no signs of infection. Sinus precautions in place.   Being followed by PT/OT services at [facility].   Continue to follow with PT/OT, fall precautions in place.

## 2024-04-15 NOTE — ASSESSMENT & PLAN NOTE
Patient reporting burning with urination. Chronic issue per patient.   Denies pelvic pain, hematuria, or feelings of bladder fullness.   Will initiate watchful waiting protocol, plan to obtain a urine sample with culture if patient develops for symptoms or becomes vitally unstable (I.e. fevers, tachycardia).

## 2024-04-21 PROBLEM — R74.8 ELEVATED ALKALINE PHOSPHATASE LEVEL: Status: RESOLVED | Noted: 2023-01-20 | Resolved: 2024-04-21

## 2024-04-21 PROBLEM — M81.0 AGE-RELATED OSTEOPOROSIS WITHOUT CURRENT PATHOLOGICAL FRACTURE: Status: RESOLVED | Noted: 2020-11-24 | Resolved: 2024-04-21

## 2024-04-21 PROBLEM — M54.30 SCIATICA: Status: RESOLVED | Noted: 2021-07-01 | Resolved: 2024-04-21

## 2024-04-21 PROBLEM — S42.201A CLOSED FRACTURE OF PROXIMAL END OF RIGHT HUMERUS: Status: RESOLVED | Noted: 2020-10-19 | Resolved: 2024-04-21

## 2024-04-21 PROBLEM — R74.8 ELEVATED LIVER ENZYMES: Status: RESOLVED | Noted: 2020-12-21 | Resolved: 2024-04-21

## 2024-04-21 PROBLEM — S00.83XA TRAUMATIC HEMATOMA OF FOREHEAD: Status: RESOLVED | Noted: 2020-10-19 | Resolved: 2024-04-21

## 2024-04-21 PROBLEM — S02.2XXA CLOSED FRACTURE OF NASAL BONE: Status: RESOLVED | Noted: 2020-10-21 | Resolved: 2024-04-21

## 2024-04-21 PROBLEM — S32.591A CLOSED FRACTURE OF RAMUS OF RIGHT PUBIS (HCC): Status: RESOLVED | Noted: 2022-10-25 | Resolved: 2024-04-21

## 2024-04-21 PROBLEM — L29.9 PRURITUS: Status: RESOLVED | Noted: 2022-11-08 | Resolved: 2024-04-21

## 2024-04-21 PROBLEM — S42.212A FX HUMERAL NECK, LEFT, CLOSED, INITIAL ENCOUNTER: Status: RESOLVED | Noted: 2020-12-21 | Resolved: 2024-04-21

## 2024-04-21 PROBLEM — R94.31 QT PROLONGATION: Status: RESOLVED | Noted: 2023-01-20 | Resolved: 2024-04-21

## 2024-04-21 PROBLEM — R09.89 UPPER RESPIRATORY SYMPTOM: Status: RESOLVED | Noted: 2022-02-07 | Resolved: 2024-04-21

## 2024-04-21 PROBLEM — R29.6 RECURRENT FALLS: Status: ACTIVE | Noted: 2024-04-21

## 2024-04-21 PROBLEM — R13.10 DYSPHAGIA: Status: ACTIVE | Noted: 2024-04-21

## 2024-04-21 PROBLEM — Z91.81 HISTORY OF FALLING: Status: RESOLVED | Noted: 2020-11-24 | Resolved: 2024-04-21

## 2024-04-21 PROBLEM — H05.231: Status: RESOLVED | Noted: 2022-10-23 | Resolved: 2024-04-21

## 2024-04-21 PROBLEM — R35.0 URINARY FREQUENCY: Status: RESOLVED | Noted: 2022-10-31 | Resolved: 2024-04-21

## 2024-04-21 PROBLEM — M25.511 RIGHT SHOULDER PAIN: Status: RESOLVED | Noted: 2022-10-25 | Resolved: 2024-04-21

## 2024-04-21 PROBLEM — Z76.89 ENCOUNTER FOR SUPPORT AND COORDINATION OF TRANSITION OF CARE: Status: RESOLVED | Noted: 2021-01-08 | Resolved: 2024-04-21

## 2024-04-21 PROBLEM — R94.5 ABNORMAL RESULTS OF LIVER FUNCTION STUDIES: Status: RESOLVED | Noted: 2020-12-02 | Resolved: 2024-04-21

## 2024-04-21 PROBLEM — R93.89 ABNORMAL MRI: Status: RESOLVED | Noted: 2017-10-17 | Resolved: 2024-04-21

## 2024-04-21 NOTE — ASSESSMENT & PLAN NOTE
Monitor respiratory status closely; at risk for decompensation in setting of bilateral rib fractures  Continue albuterol PRN  Continue flonase for allergies

## 2024-04-21 NOTE — ASSESSMENT & PLAN NOTE
With recurrent falls  Multifactorial  Admit to SNF for rehab  PT and OT consults placed- evaluate and treat  Supportive care, nutritional support, ADL support  Fall precautions

## 2024-04-21 NOTE — ASSESSMENT & PLAN NOTE
Acute left anterior and lateral maxillary sinus; left lateral orbit; left zygomatic arch fractures  Pain control as outlined; follow up with [facility]

## 2024-04-21 NOTE — ASSESSMENT & PLAN NOTE
Encourage deep breathing, incentive spirometry  Scheduled acetaminophen, topical lidocaine for pain control  Monitor respiratory status closely, at risk for pneumonia in setting of rib fractures in addition to dysphagia- speech therapy consulted

## 2024-04-21 NOTE — ASSESSMENT & PLAN NOTE
Acute L1 compression fracture  Subacute L2 fracture  Chronic L4 fractures  Continue scheduled acetaminophen for pain  TLSO brace PRN for comfort  Follow up with neurosurgery

## 2024-04-22 ENCOUNTER — NURSING HOME VISIT (OUTPATIENT)
Dept: GERIATRICS | Facility: OTHER | Age: 84
End: 2024-04-22
Payer: MEDICARE

## 2024-04-22 VITALS
OXYGEN SATURATION: 94 % | HEART RATE: 85 BPM | DIASTOLIC BLOOD PRESSURE: 72 MMHG | SYSTOLIC BLOOD PRESSURE: 124 MMHG | TEMPERATURE: 97.8 F

## 2024-04-22 DIAGNOSIS — S02.40FD CLOSED FRACTURE OF LEFT ZYGOMATIC ARCH WITH ROUTINE HEALING, SUBSEQUENT ENCOUNTER: ICD-10-CM

## 2024-04-22 DIAGNOSIS — S22.43XD MULTIPLE CLOSED FRACTURES OF RIBS OF BOTH SIDES WITH ROUTINE HEALING, SUBSEQUENT ENCOUNTER: ICD-10-CM

## 2024-04-22 DIAGNOSIS — S32.018D OTHER CLOSED FRACTURE OF FIRST LUMBAR VERTEBRA WITH ROUTINE HEALING, SUBSEQUENT ENCOUNTER: Primary | ICD-10-CM

## 2024-04-22 DIAGNOSIS — R26.2 AMBULATORY DYSFUNCTION: ICD-10-CM

## 2024-04-22 DIAGNOSIS — G31.84 MILD COGNITIVE IMPAIRMENT: ICD-10-CM

## 2024-04-22 DIAGNOSIS — I10 PRIMARY HYPERTENSION: ICD-10-CM

## 2024-04-22 DIAGNOSIS — R29.6 RECURRENT FALLS: ICD-10-CM

## 2024-04-22 PROCEDURE — 99309 SBSQ NF CARE MODERATE MDM 30: CPT

## 2024-04-22 NOTE — PROGRESS NOTES
[facility]  [address], [place], [place]  [phone]  SNF Rehab: [facility]    NAME: Doris Campbell  AGE: 83 y.o. SEX: female  : 1940     DATE: 2024    CODE STATUS: No CPR     Assessment and Plan:     Problem List Items Addressed This Visit       Mild cognitive impairment     Fall/safety precautions  Supportive care, assistance with ADLs   Avoid deliriogenic medications   Encourage adequate hydration and nutrition   Maintain sleep/wake cycle            Hypertension     BP improved  Continue amlodipine 2.5 mg daily  Monitor BP  Avoid hypotension         Ambulatory dysfunction     With recurrent falls  Continue PT/OT  Maintain fall and safety precautions   following for discharge planning           Multiple rib fractures     CT chest revealed- Subacute nondisplaced fracture of the left anterior 5th and 6th ribs. Subacute nondisplaced fracture of the right posterior 12th rib. Subacute minimally displaced fracture of the right posterior 11th rib.  Encourage deep breathing, incentive spirometry  Scheduled acetaminophen, topical lidocaine for pain control  Monitor respiratory status closely, at risk for pneumonia in setting of rib fractures in addition to dysphagia         L1 vertebral fracture (HCC) - Primary     Multiple falls  Acute L1 compression fracture  Subacute L2 fracture  Chronic L4 fractures  Continue scheduled acetaminophen for pain  TLSO brace PRN for comfort  Follow up with neurosurgery         Zygomatic fracture (HCC)     Acute left anterior and lateral maxillary sinus; left lateral orbit; left zygomatic arch fractures  Pain control as outlined; follow up with [facility]         Recurrent falls     Multiple falls resulting in above injuries  Continue PT/OT  Encourage proper DME use                History of Present Illness:     Patient is a 83 y.o. old female being seen at [facility] for follow up of acute and chronic medical conditions. She is  seen and examined sitting in recliner without acute distress. She reports improved pain. She is alert and oriented x2-3. She denies issue with bowel or bladder. Is not wearing TLSO brace. Reports some discomfort with deep breathing when she is tired. Denies CP or SOB at present time.         The following portions of the patient's history were reviewed and updated as appropriate: allergies, current medications, past family history, past medical history, past social history, past surgical history and problem list.     Review of Systems:     Review of Systems   Eyes:  Positive for visual disturbance.        R eye (appears to be chronic issue)   Musculoskeletal:  Positive for arthralgias, back pain and myalgias.   All other systems reviewed and are negative.       Problem List:     Patient Active Problem List   Diagnosis    HLD (hyperlipidemia)    Fall    Age-related osteoporosis with current pathological fracture    Vitamin D deficiency    Mild cognitive impairment    Hypertension    Anxiety    Chronic pain of right knee    Arthritis of knee, right    Rheumatoid arthritis, unspecified (HCC)    Personal history of transient ischemic attack (TIA), and cerebral infarction without residual deficits    Personal history of breast cancer    Major neurocognitive disorder, due to vascular disease, without behavioral disturbance, mild    Hereditary and idiopathic neuropathy, unspecified    B12 deficiency    Constipation    Difficult or painful urination    Allergic rhinitis due to allergen    Vesicovaginal fistula    History of stroke    Ambulatory dysfunction    Adjustment disorder with anxious mood    Asthma    Anemia    Multiple rib fractures    Acute pain due to trauma    L1 vertebral fracture (HCC)    Zygomatic fracture (HCC)    Ingrown nail    Dysphagia    Recurrent falls        Objective:     /72   Pulse 85   Temp 97.8 °F (36.6 °C)   SpO2 94%     Physical Exam  Vitals and nursing note reviewed.   Constitutional:        General: She is not in acute distress.     Appearance: She is well-developed.   HENT:      Head: Normocephalic and atraumatic.   Eyes:      Conjunctiva/sclera: Conjunctivae normal.   Cardiovascular:      Rate and Rhythm: Normal rate and regular rhythm.      Heart sounds: No murmur heard.  Pulmonary:      Effort: Pulmonary effort is normal. No respiratory distress.      Breath sounds: Normal breath sounds.   Abdominal:      Palpations: Abdomen is soft.      Tenderness: There is no abdominal tenderness.   Musculoskeletal:         General: No swelling.      Cervical back: Neck supple.   Skin:     General: Skin is warm and dry.      Capillary Refill: Capillary refill takes less than 2 seconds.      Findings: Bruising present.      Comments: L orbit   Neurological:      Mental Status: She is alert. She is confused.   Psychiatric:         Mood and Affect: Mood normal.         Speech: Speech normal.         Cognition and Memory: Cognition is impaired.         Pertinent Laboratory/Diagnostic Studies:    Laboratory Results: I have personally reviewed the pertinent laboratory results/reports     Radiology/Other Diagnostic Testing Results: I have personally reviewed pertinent reports.      VALDEMAR Fajardo  Geriatric Medicine

## 2024-04-22 NOTE — ASSESSMENT & PLAN NOTE
Fall/safety precautions  Supportive care, assistance with ADLs   Avoid deliriogenic medications   Encourage adequate hydration and nutrition   Maintain sleep/wake cycle

## 2024-04-22 NOTE — ASSESSMENT & PLAN NOTE
CT chest revealed- Subacute nondisplaced fracture of the left anterior 5th and 6th ribs. Subacute nondisplaced fracture of the right posterior 12th rib. Subacute minimally displaced fracture of the right posterior 11th rib.  Encourage deep breathing, incentive spirometry  Scheduled acetaminophen, topical lidocaine for pain control  Monitor respiratory status closely, at risk for pneumonia in setting of rib fractures in addition to dysphagia

## 2024-04-22 NOTE — ASSESSMENT & PLAN NOTE
Multiple falls  Acute L1 compression fracture  Subacute L2 fracture  Chronic L4 fractures  Continue scheduled acetaminophen for pain  TLSO brace PRN for comfort  Follow up with neurosurgery

## 2024-04-22 NOTE — ASSESSMENT & PLAN NOTE
With recurrent falls  Continue PT/OT  Maintain fall and safety precautions   following for discharge planning     12-Jul-2021 11:00

## 2024-04-24 ENCOUNTER — NURSING HOME VISIT (OUTPATIENT)
Dept: GERIATRICS | Facility: OTHER | Age: 84
End: 2024-04-24
Payer: MEDICARE

## 2024-04-24 VITALS
SYSTOLIC BLOOD PRESSURE: 135 MMHG | OXYGEN SATURATION: 98 % | TEMPERATURE: 97.4 F | DIASTOLIC BLOOD PRESSURE: 66 MMHG | HEART RATE: 80 BPM

## 2024-04-24 DIAGNOSIS — R13.19 OTHER DYSPHAGIA: ICD-10-CM

## 2024-04-24 DIAGNOSIS — S22.43XD MULTIPLE CLOSED FRACTURES OF RIBS OF BOTH SIDES WITH ROUTINE HEALING, SUBSEQUENT ENCOUNTER: ICD-10-CM

## 2024-04-24 DIAGNOSIS — K59.01 SLOW TRANSIT CONSTIPATION: ICD-10-CM

## 2024-04-24 DIAGNOSIS — I10 PRIMARY HYPERTENSION: ICD-10-CM

## 2024-04-24 DIAGNOSIS — G31.84 MILD COGNITIVE IMPAIRMENT: ICD-10-CM

## 2024-04-24 DIAGNOSIS — S32.018D OTHER CLOSED FRACTURE OF FIRST LUMBAR VERTEBRA WITH ROUTINE HEALING, SUBSEQUENT ENCOUNTER: Primary | ICD-10-CM

## 2024-04-24 DIAGNOSIS — R26.2 AMBULATORY DYSFUNCTION: ICD-10-CM

## 2024-04-24 DIAGNOSIS — R05.1 ACUTE COUGH: ICD-10-CM

## 2024-04-24 DIAGNOSIS — J45.20 MILD INTERMITTENT ASTHMA WITHOUT COMPLICATION: ICD-10-CM

## 2024-04-24 DIAGNOSIS — S02.40FD CLOSED FRACTURE OF LEFT ZYGOMATIC ARCH WITH ROUTINE HEALING, SUBSEQUENT ENCOUNTER: ICD-10-CM

## 2024-04-24 PROCEDURE — 99316 NF DSCHRG MGMT 30 MIN+: CPT

## 2024-04-24 NOTE — ASSESSMENT & PLAN NOTE
Fall/safety precautions  Supportive care, assistance with ADLs   Avoid deliriogenic medications   Encourage adequate hydration and nutrition   Maintain sleep/wake cycle      Patient has the following symptoms: Fell 4 feet on incline to slide.  The symptoms have been present for about 30 minutes    Pharmacy has been verified.

## 2024-04-24 NOTE — ASSESSMENT & PLAN NOTE
Acute left anterior and lateral maxillary sinus; left lateral orbit; left zygomatic arch fractures  Seen by [facility] inpatient, nonoperative management   Pain control as outlined; follow up with [facility]

## 2024-04-24 NOTE — ASSESSMENT & PLAN NOTE
CT chest revealed- Subacute nondisplaced fracture of the left anterior 5th and 6th ribs. Subacute nondisplaced fracture of the right posterior 12th rib. Subacute minimally displaced fracture of the right posterior 11th rib.  Encourage deep breathing, incentive spirometry  Continue scheduled acetaminophen and  topical lidocaine for pain control

## 2024-04-24 NOTE — PROGRESS NOTES
[facility]  [address] [facility] [facility] [address] [address]  [phone]  FACILITY: S  Code 31 ([facility])      Discharging Physician / Practitioner: VALDEMAR Fajardo  PCP: Tuyet Cantor MD  Admission Date: 04/10/24  Discharge Date: 04/24/24    1. Other closed fracture of first lumbar vertebra with routine healing, subsequent encounter  Assessment & Plan:  Multiple falls  Acute L1 compression fracture  Subacute L2 fracture  Chronic L4 fractures  Continue scheduled acetaminophen for pain  TLSO brace PRN for comfort  Follow up with neurosurgery      2. Multiple closed fractures of ribs of both sides with routine healing, subsequent encounter  Assessment & Plan:  CT chest revealed- Subacute nondisplaced fracture of the left anterior 5th and 6th ribs. Subacute nondisplaced fracture of the right posterior 12th rib. Subacute minimally displaced fracture of the right posterior 11th rib.  Encourage deep breathing, incentive spirometry  Continue scheduled acetaminophen and  topical lidocaine for pain control        3. Closed fracture of left zygomatic arch with routine healing, subsequent encounter  Assessment & Plan:  Acute left anterior and lateral maxillary sinus; left lateral orbit; left zygomatic arch fractures  Seen by [facility] inpatient, nonoperative management   Pain control as outlined; follow up with FS      4. Primary hypertension  Assessment & Plan:  BP improved  Continue amlodipine 2.5 mg daily  Avoid hypotension  F/u with PCP      5. Mild intermittent asthma without complication  Assessment & Plan:  Stable, no acute exacerbation  Continue albuterol PRN  Continue flonase for allergies       6. Other dysphagia  Assessment & Plan:  Continue modified diet- mechanical soft, thin liquids, no straws  Aspiration precautions      7. Mild cognitive impairment  Assessment & Plan:  Fall/safety precautions  Supportive care, assistance with ADLs   Avoid deliriogenic medications   Encourage adequate hydration and nutrition    Maintain sleep/wake cycle         8. Ambulatory dysfunction  Assessment & Plan:  With recurrent falls  Continue PT/OT  Maintain fall and safety precautions          9. Slow transit constipation  Assessment & Plan:  Encourage PO hydration  Continue senna-s BID          Reason for Admission: L1 fracture; Rib fractures; Zygomatic fracture; Recurrent falls     History of Present Illness:     Patient is a 83 y.o. old female with past medical history of but not limited to hypertension, asthma, osteoporosis, frequent falls, anxiety, hyperlipidemia, and stroke.    Hospital course:  Patient was hospitalized at [facility] from 4/6/2024 to 4/10/2024 following recurrent falls at facility.  She was found to have L1 compression fracture along with subacute to chronic L4 compression fractures, facial bone fractures and bilateral rib fractures.  She is seen by trauma, neurosurgery and OMFS with nonoperative management.  She was also seen inpatient by speech therapy with concern for dysphagia and was downgraded to mechanical soft diet.  She was subsequently discharged to [facility] for subacute rehab.    Rehab course:  Participated in comprehensive physical and occupational therapy. Significant events during the stay include: n/a.      She is seen and examined sitting in recliner without acute distress. She reports cough that started overnight. Reports yellow mucus production. She is alert and oriented x2-3. She denies issue with bowel or bladder. Is not wearing TLSO brace. Denies CP or SOB at present time. Per PT noted patient is ambulating 125 ft with RW with CGA. Transfer Status: mod Bed mobility:  mod/max. OT notes:  ADLs UB:  mod  LB: dep  Toilet : dep. Plan to return home to [facility] tomorrow.         Please see above list of diagnoses and related plan for additional information.     Condition at Discharge: stable     Discharge Day Visit / Exam:     Subjective:   Review of Systems    Constitutional:  Positive for fatigue.   Respiratory:  Positive for cough.    All other systems reviewed and are negative.    Vitals: Blood Pressure: 135/66 (04/24/24 1106)  Pulse: 80 (04/24/24 1106)  Temperature: (!) 97.4 °F (36.3 °C) (04/24/24 1106)  SpO2: 98 % (04/24/24 1106)  Exam:   Physical Exam  Vitals reviewed.   Constitutional:       General: She is not in acute distress.     Appearance: Normal appearance. She is not ill-appearing, toxic-appearing or diaphoretic.   HENT:      Head: Normocephalic and atraumatic.   Eyes:      Conjunctiva/sclera: Conjunctivae normal.   Cardiovascular:      Rate and Rhythm: Normal rate and regular rhythm.      Pulses: Normal pulses.      Heart sounds: Normal heart sounds. No murmur heard.  Pulmonary:      Effort: Pulmonary effort is normal. No respiratory distress.      Breath sounds: Normal breath sounds.   Abdominal:      General: Bowel sounds are normal. There is no distension.      Palpations: Abdomen is soft.      Tenderness: There is no abdominal tenderness.   Musculoskeletal:      Right lower leg: No edema.      Left lower leg: No edema.   Skin:     General: Skin is warm and dry.   Neurological:      General: No focal deficit present.      Mental Status: She is alert. She is disoriented.      Motor: Weakness present.      Gait: Gait abnormal.   Psychiatric:         Mood and Affect: Mood normal.         Behavior: Behavior normal.         Thought Content: Thought content normal.         Cognition and Memory: Cognition is impaired. Memory is impaired.           Discharge instructions/Information to patient and family:   See after visit summary for information provided to patient and family.      Provisions for Follow-Up Care:  See after visit summary for information related to follow-up care and any pertinent home health orders.      Disposition:     Assisted Living Facility at [facility]       Discharge Statement:  I spent 50 minutes discharging the patient. This  time was spent on the day of discharge. I had direct contact with the patient on the day of discharge. Greater than 50% of the total time was spent examining patient, answering all patient questions, arranging and discussing plan of care with patient as well as directly providing post-discharge instructions.  Additional time then spent on discharge activities.    Discharge Medications:  See after visit summary for reconciled discharge medications provided to patient and family.      ** Please Note: This note has been constructed using a voice recognition system **

## 2024-05-13 ENCOUNTER — APPOINTMENT (EMERGENCY)
Dept: CT IMAGING | Facility: HOSPITAL | Age: 84
DRG: 690 | End: 2024-05-13
Payer: MEDICARE

## 2024-05-13 ENCOUNTER — HOSPITAL ENCOUNTER (INPATIENT)
Facility: HOSPITAL | Age: 84
LOS: 2 days | Discharge: HOSPICE | DRG: 690 | End: 2024-05-15
Attending: EMERGENCY MEDICINE | Admitting: INTERNAL MEDICINE
Payer: MEDICARE

## 2024-05-13 ENCOUNTER — APPOINTMENT (EMERGENCY)
Dept: RADIOLOGY | Facility: HOSPITAL | Age: 84
DRG: 690 | End: 2024-05-13
Payer: MEDICARE

## 2024-05-13 DIAGNOSIS — R05.1 ACUTE COUGH: ICD-10-CM

## 2024-05-13 DIAGNOSIS — M80.00XD AGE-RELATED OSTEOPOROSIS WITH CURRENT PATHOLOGICAL FRACTURE WITH ROUTINE HEALING, SUBSEQUENT ENCOUNTER: ICD-10-CM

## 2024-05-13 DIAGNOSIS — G31.84 MILD COGNITIVE IMPAIRMENT: ICD-10-CM

## 2024-05-13 DIAGNOSIS — R55 SYNCOPE: Primary | ICD-10-CM

## 2024-05-13 DIAGNOSIS — N39.0 UTI (URINARY TRACT INFECTION): ICD-10-CM

## 2024-05-13 DIAGNOSIS — Z86.73 HISTORY OF CVA (CEREBROVASCULAR ACCIDENT): ICD-10-CM

## 2024-05-13 DIAGNOSIS — J18.9 PNEUMONIA: ICD-10-CM

## 2024-05-13 PROBLEM — E87.1 HYPONATREMIA: Status: ACTIVE | Noted: 2024-05-13

## 2024-05-13 LAB
2HR DELTA HS TROPONIN: 1 NG/L
4HR DELTA HS TROPONIN: 0 NG/L
ALBUMIN SERPL BCP-MCNC: 3.6 G/DL (ref 3.5–5)
ALP SERPL-CCNC: 138 U/L (ref 34–104)
ALT SERPL W P-5'-P-CCNC: 14 U/L (ref 7–52)
ANION GAP SERPL CALCULATED.3IONS-SCNC: 9 MMOL/L (ref 4–13)
AST SERPL W P-5'-P-CCNC: 20 U/L (ref 13–39)
BACTERIA UR QL AUTO: ABNORMAL /HPF
BASOPHILS # BLD AUTO: 0.03 THOUSANDS/ÂΜL (ref 0–0.1)
BASOPHILS NFR BLD AUTO: 0 % (ref 0–1)
BILIRUB SERPL-MCNC: 0.36 MG/DL (ref 0.2–1)
BILIRUB UR QL STRIP: NEGATIVE
BUN SERPL-MCNC: 25 MG/DL (ref 5–25)
CALCIUM SERPL-MCNC: 8.9 MG/DL (ref 8.4–10.2)
CARDIAC TROPONIN I PNL SERPL HS: 3 NG/L
CARDIAC TROPONIN I PNL SERPL HS: 3 NG/L
CARDIAC TROPONIN I PNL SERPL HS: 4 NG/L
CHLORIDE SERPL-SCNC: 102 MMOL/L (ref 96–108)
CLARITY UR: CLEAR
CO2 SERPL-SCNC: 22 MMOL/L (ref 21–32)
COLOR UR: ABNORMAL
CREAT SERPL-MCNC: 1.06 MG/DL (ref 0.6–1.3)
EOSINOPHIL # BLD AUTO: 0.05 THOUSAND/ÂΜL (ref 0–0.61)
EOSINOPHIL NFR BLD AUTO: 1 % (ref 0–6)
ERYTHROCYTE [DISTWIDTH] IN BLOOD BY AUTOMATED COUNT: 16.4 % (ref 11.6–15.1)
GFR SERPL CREATININE-BSD FRML MDRD: 48 ML/MIN/1.73SQ M
GLUCOSE SERPL-MCNC: 85 MG/DL (ref 65–140)
GLUCOSE UR STRIP-MCNC: NEGATIVE MG/DL
HCT VFR BLD AUTO: 38.6 % (ref 34.8–46.1)
HGB BLD-MCNC: 12.1 G/DL (ref 11.5–15.4)
HGB UR QL STRIP.AUTO: NEGATIVE
IMM GRANULOCYTES # BLD AUTO: 0.06 THOUSAND/UL (ref 0–0.2)
IMM GRANULOCYTES NFR BLD AUTO: 1 % (ref 0–2)
KETONES UR STRIP-MCNC: NEGATIVE MG/DL
LEUKOCYTE ESTERASE UR QL STRIP: ABNORMAL
LYMPHOCYTES # BLD AUTO: 1.04 THOUSANDS/ÂΜL (ref 0.6–4.47)
LYMPHOCYTES NFR BLD AUTO: 12 % (ref 14–44)
MAGNESIUM SERPL-MCNC: 2.4 MG/DL (ref 1.9–2.7)
MCH RBC QN AUTO: 30.4 PG (ref 26.8–34.3)
MCHC RBC AUTO-ENTMCNC: 31.3 G/DL (ref 31.4–37.4)
MCV RBC AUTO: 97 FL (ref 82–98)
MONOCYTES # BLD AUTO: 0.62 THOUSAND/ÂΜL (ref 0.17–1.22)
MONOCYTES NFR BLD AUTO: 7 % (ref 4–12)
NEUTROPHILS # BLD AUTO: 6.65 THOUSANDS/ÂΜL (ref 1.85–7.62)
NEUTS SEG NFR BLD AUTO: 79 % (ref 43–75)
NITRITE UR QL STRIP: NEGATIVE
NON-SQ EPI CELLS URNS QL MICRO: ABNORMAL /HPF
NRBC BLD AUTO-RTO: 0 /100 WBCS
PH UR STRIP.AUTO: 6 [PH]
PLATELET # BLD AUTO: 332 THOUSANDS/UL (ref 149–390)
PMV BLD AUTO: 10.3 FL (ref 8.9–12.7)
POTASSIUM SERPL-SCNC: 4.8 MMOL/L (ref 3.5–5.3)
PROCALCITONIN SERPL-MCNC: 0.1 NG/ML
PROT SERPL-MCNC: 7.1 G/DL (ref 6.4–8.4)
PROT UR STRIP-MCNC: ABNORMAL MG/DL
RBC # BLD AUTO: 3.98 MILLION/UL (ref 3.81–5.12)
RBC #/AREA URNS AUTO: ABNORMAL /HPF
SODIUM SERPL-SCNC: 133 MMOL/L (ref 135–147)
SP GR UR STRIP.AUTO: 1.02 (ref 1–1.03)
TSH SERPL DL<=0.05 MIU/L-ACNC: 1.18 UIU/ML (ref 0.45–4.5)
UROBILINOGEN UR STRIP-ACNC: <2 MG/DL
WBC # BLD AUTO: 8.45 THOUSAND/UL (ref 4.31–10.16)
WBC #/AREA URNS AUTO: ABNORMAL /HPF

## 2024-05-13 PROCEDURE — 99222 1ST HOSP IP/OBS MODERATE 55: CPT | Performed by: HOSPITALIST

## 2024-05-13 PROCEDURE — 36415 COLL VENOUS BLD VENIPUNCTURE: CPT

## 2024-05-13 PROCEDURE — 85025 COMPLETE CBC W/AUTO DIFF WBC: CPT

## 2024-05-13 PROCEDURE — 84145 PROCALCITONIN (PCT): CPT

## 2024-05-13 PROCEDURE — 99285 EMERGENCY DEPT VISIT HI MDM: CPT | Performed by: EMERGENCY MEDICINE

## 2024-05-13 PROCEDURE — 71045 X-RAY EXAM CHEST 1 VIEW: CPT

## 2024-05-13 PROCEDURE — 93005 ELECTROCARDIOGRAM TRACING: CPT

## 2024-05-13 PROCEDURE — 80053 COMPREHEN METABOLIC PANEL: CPT

## 2024-05-13 PROCEDURE — 70450 CT HEAD/BRAIN W/O DYE: CPT

## 2024-05-13 PROCEDURE — 84484 ASSAY OF TROPONIN QUANT: CPT

## 2024-05-13 PROCEDURE — 81001 URINALYSIS AUTO W/SCOPE: CPT

## 2024-05-13 PROCEDURE — 83735 ASSAY OF MAGNESIUM: CPT

## 2024-05-13 PROCEDURE — 87086 URINE CULTURE/COLONY COUNT: CPT

## 2024-05-13 PROCEDURE — 84443 ASSAY THYROID STIM HORMONE: CPT

## 2024-05-13 PROCEDURE — 99285 EMERGENCY DEPT VISIT HI MDM: CPT

## 2024-05-13 RX ORDER — AMLODIPINE BESYLATE 2.5 MG/1
2.5 TABLET ORAL DAILY
COMMUNITY

## 2024-05-13 RX ORDER — LANOLIN ALCOHOL/MO/W.PET/CERES
3 CREAM (GRAM) TOPICAL
Status: DISCONTINUED | OUTPATIENT
Start: 2024-05-13 | End: 2024-05-15 | Stop reason: HOSPADM

## 2024-05-13 RX ORDER — CALCITONIN SALMON 200 [IU]/.09ML
1 SPRAY, METERED NASAL DAILY
Status: DISCONTINUED | OUTPATIENT
Start: 2024-05-14 | End: 2024-05-15 | Stop reason: HOSPADM

## 2024-05-13 RX ORDER — ENOXAPARIN SODIUM 100 MG/ML
30 INJECTION SUBCUTANEOUS DAILY
Status: DISCONTINUED | OUTPATIENT
Start: 2024-05-14 | End: 2024-05-14 | Stop reason: DRUGHIGH

## 2024-05-13 RX ORDER — SODIUM CHLORIDE, SODIUM GLUCONATE, SODIUM ACETATE, POTASSIUM CHLORIDE, MAGNESIUM CHLORIDE, SODIUM PHOSPHATE, DIBASIC, AND POTASSIUM PHOSPHATE .53; .5; .37; .037; .03; .012; .00082 G/100ML; G/100ML; G/100ML; G/100ML; G/100ML; G/100ML; G/100ML
75 INJECTION, SOLUTION INTRAVENOUS CONTINUOUS
Status: DISCONTINUED | OUTPATIENT
Start: 2024-05-13 | End: 2024-05-14

## 2024-05-13 RX ORDER — ASPIRIN 81 MG/1
81 TABLET, CHEWABLE ORAL DAILY
Status: DISCONTINUED | OUTPATIENT
Start: 2024-05-14 | End: 2024-05-15 | Stop reason: HOSPADM

## 2024-05-13 RX ORDER — CLOTRIMAZOLE AND BETAMETHASONE DIPROPIONATE 10; .64 MG/G; MG/G
CREAM TOPICAL 2 TIMES DAILY
Status: DISCONTINUED | OUTPATIENT
Start: 2024-05-13 | End: 2024-05-15 | Stop reason: HOSPADM

## 2024-05-13 RX ORDER — ACETAMINOPHEN 325 MG/1
650 TABLET ORAL EVERY 6 HOURS PRN
Status: DISCONTINUED | OUTPATIENT
Start: 2024-05-13 | End: 2024-05-15 | Stop reason: HOSPADM

## 2024-05-13 RX ADMIN — SODIUM CHLORIDE 1000 ML: 0.9 INJECTION, SOLUTION INTRAVENOUS at 16:38

## 2024-05-13 RX ADMIN — CLOTRIMAZOLE AND BETAMETHASONE DIPROPIONATE: 10; .64 CREAM TOPICAL at 20:13

## 2024-05-13 RX ADMIN — Medication 3 MG: at 23:21

## 2024-05-13 RX ADMIN — SODIUM CHLORIDE, SODIUM GLUCONATE, SODIUM ACETATE, POTASSIUM CHLORIDE, MAGNESIUM CHLORIDE, SODIUM PHOSPHATE, DIBASIC, AND POTASSIUM PHOSPHATE 75 ML/HR: .53; .5; .37; .037; .03; .012; .00082 INJECTION, SOLUTION INTRAVENOUS at 20:13

## 2024-05-13 RX ADMIN — CEFTRIAXONE SODIUM 1000 MG: 10 INJECTION, POWDER, FOR SOLUTION INTRAVENOUS at 16:41

## 2024-05-13 NOTE — ED ATTENDING ATTESTATION
5/13/2024  IBilly MD, saw and evaluated the patient. I have discussed the patient with the resident/non-physician practitioner and agree with the resident's/non-physician practitioner's findings, Plan of Care, and MDM as documented in the resident's/non-physician practitioner's note, except where noted. All available labs and Radiology studies were reviewed.  I was present for key portions of any procedure(s) performed by the resident/non-physician practitioner and I was immediately available to provide assistance.       At this point I agree with the current assessment done in the Emergency Department.  I have conducted an independent evaluation of this patient a history and physical is as follows:    This is an 83-year-old female with a relevant past medical history of rheumatoid arthritis, hyperlipidemia, presenting from [facility] after syncopal episode.  Patient was engaging in her physical therapy, lost consciousness.  She did not have a prodrome.  Denied any preceding chest pain, shortness of breath, and she did not fall, hit her head, staff were able to catch her.  She did not have a prolonged unconscious period, and did not have any flailing movements of her limbs to suggest seizure-like activity.  Upon arrival to the ED she does not display any evidence of trauma.  She is alert and oriented to person, place, time and situation.  Her exam was for the most part unremarkable.  Her differential diagnosis includes: Vasovagal syncope versus cardiac syncope versus hypovolemia versus other.  Patient had a CBC which was for the most part unremarkable, however she did have a left shift.  Her metabolic panel showed a slight hyponatremia.  Her urinalysis did show evidence for potential infection, and patient was complaining of some dysuria.  Troponin was normal, magnesium was normal, and underwent a chest x-ray showing evidence for lingular consolidation, atelectasis versus pneumonia.  Patient  does have a complex medical history, and with her Cinque apprising and with obvious urinary tract infection and potential for a lingular pneumonia, patient was started on Rocephin, and was requested be hospitalized by the hospitalist Massimo earl without any further orders requested.    ED Course         Critical Care Time  Procedures

## 2024-05-13 NOTE — ED PROVIDER NOTES
"History  Chief Complaint   Patient presents with    Syncope     Patient from [facility], was in her PT/OT session when they witnessed her having a syncopal episode. -HS -LOC -thinners     HPI    83-year-old female presents for evaluation of syncope.  She has a history of arthritis, HTN, CVA, TIA.  She had a syncopal episode earlier today while at physical therapy today at [facility]. She denies head strike, LOC. She had no seizure like activity noted. She reports grogginess and \"not feeling right\" in her head. She denies recent illness, fever, chest pain, SOB.     Prior to Admission Medications   Prescriptions Last Dose Informant Patient Reported? Taking?   Calcium-Vitamin D-Vitamin K (VIACTIV PO) Not Taking  Yes No   Sig: Take 1 tablet by mouth daily after breakfast   Patient not taking: Reported on 2024   Diclofenac Sodium (VOLTAREN) 1 % 2024 Self No Yes   Sig: Apply 2 g topically 4 (four) times a day = To right hip and pelvis   Sodium Phosphates (FLEET ENEMA RE) Not Taking Self Yes No   Sig: Insert 1 Bottle into the rectum daily as needed   Patient not taking: Reported on 2024   acetaminophen (TYLENOL) 325 mg tablet 2024 Self Yes Yes   Sig: Take 975 mg by mouth every 8 (eight) hours as needed   albuterol (PROVENTIL HFA,VENTOLIN HFA) 90 mcg/act inhaler More than a month Self Yes No   Sig: Inhale 2 puffs every 6 (six) hours as needed for wheezing   amLODIPine (NORVASC) 2.5 mg tablet 2024  Yes Yes   Sig: Take 2.5 mg by mouth daily   calcitonin, salmon, (MIACALCIN) 200 units/act nasal spray 2024  Yes Yes   Si spray into each nostril daily   cholecalciferol (VITAMIN D3) 1,000 units tablet 2024 Self No Yes   Sig: Take 1 tablet (1,000 Units total) by mouth daily   clotrimazole-betamethasone (LOTRISONE) 1-0.05 % cream 2024  No Yes   Sig: Apply topically 2 (two) times a day   fluticasone (FLONASE) 50 mcg/act nasal spray 2024 Self No Yes   Si spray into each " nostril daily   gabapentin (NEURONTIN) 100 mg capsule 5/12/2024  No Yes   Sig: Take 1 capsule (100 mg total) by mouth daily   lidocaine (LIDODERM) 5 % Not Taking  No No   Sig: Apply 2 patches topically over 12 hours daily Remove & Discard patch within 12 hours or as directed by MD   Patient not taking: Reported on 5/13/2024   magnesium hydroxide (MILK OF MAGNESIA) 400 mg/5 mL oral suspension Past Week Self Yes Yes   Sig: Take 30 mL by mouth 2 (two) times a day as needed   meloxicam (MOBIC) 7.5 mg tablet Not Taking  Yes No   Sig: Take 7.5 mg by mouth 2 (two) times a day   Patient not taking: Reported on 5/13/2024   senna-docusate sodium (SENOKOT S) 8.6-50 mg per tablet 5/13/2024 Self No Yes   Sig: Take 1 tablet by mouth 2 (two) times a day      Facility-Administered Medications: None       Past Medical History:   Diagnosis Date    Allergic     Anxiety     Arthritis     Cancer (HCC)     HLD (hyperlipidemia)     Hypertension     Hyperthyroidism     Osteoporosis     Stroke (HCC)     TIA (transient ischemic attack)        Past Surgical History:   Procedure Laterality Date    BREAST LUMPECTOMY Left     HYSTERECTOMY      KNEE SURGERY Right     ORIF TIBIA FRACTURE Right 12/8/2016    Procedure: OPEN REDUCTION W/ INTERNAL FIXATION (ORIF) TIBIA;  Surgeon: Angie Case MD;  Location: [facility];  Service:        Family History   Problem Relation Age of Onset    Coronary artery disease Family     Other Family         DJD     I have reviewed and agree with the history as documented.    E-Cigarette/Vaping    E-Cigarette Use Never User      E-Cigarette/Vaping Substances    Nicotine No     THC No     CBD No     Flavoring No     Other No     Unknown No      Social History     Tobacco Use    Smoking status: Never    Smokeless tobacco: Never    Tobacco comments:     Former smoker - As per Marion    Vaping Use    Vaping status: Never Used   Substance Use Topics    Alcohol use: Not Currently     Comment: Alcohol intake:    Occasional  - As per Kelly     Drug use: Never        Review of Systems   Constitutional:  Negative for fever.   Eyes:  Negative for visual disturbance.   Respiratory:  Positive for cough. Negative for shortness of breath.    Cardiovascular:  Positive for syncope. Negative for chest pain and palpitations.   Gastrointestinal:  Negative for abdominal pain, nausea and vomiting.   Genitourinary:  Positive for dysuria. Negative for hematuria.   Neurological:  Positive for syncope, light-headedness and headaches. Negative for seizures.   All other systems reviewed and are negative.      Physical Exam  ED Triage Vitals   Temperature Pulse Respirations Blood Pressure SpO2   05/13/24 1347 05/13/24 1347 05/13/24 1347 05/13/24 1351 05/13/24 1347   97.7 °F (36.5 °C) 63 18 (!) 171/79 98 %      Temp Source Heart Rate Source Patient Position - Orthostatic VS BP Location FiO2 (%)   05/13/24 1347 05/13/24 1347 05/13/24 1347 05/13/24 1347 --   Oral Monitor Lying Right arm       Pain Score       05/13/24 1347       No Pain             Orthostatic Vital Signs  Vitals:    05/15/24 1157 05/15/24 1327 05/15/24 1432 05/15/24 1523   BP: 169/88 121/66 133/75 148/77   Pulse: 80 77 76 70   Patient Position - Orthostatic VS:           Physical Exam  Vitals and nursing note reviewed.   Constitutional:       General: She is not in acute distress.     Appearance: She is well-developed.   HENT:      Head: Normocephalic and atraumatic.   Eyes:      General: No visual field deficit.     Extraocular Movements: Extraocular movements intact.      Conjunctiva/sclera: Conjunctivae normal.      Pupils: Pupils are equal, round, and reactive to light.   Cardiovascular:      Rate and Rhythm: Normal rate and regular rhythm.      Pulses: Normal pulses.      Heart sounds: Normal heart sounds. No murmur heard.  Pulmonary:      Effort: Pulmonary effort is normal. No respiratory distress.      Breath sounds: Normal breath sounds.   Abdominal:      Palpations:  Abdomen is soft.      Tenderness: There is no abdominal tenderness.   Musculoskeletal:      Cervical back: Neck supple.   Skin:     General: Skin is warm and dry.      Capillary Refill: Capillary refill takes less than 2 seconds.   Neurological:      General: No focal deficit present.      Mental Status: She is alert and oriented to person, place, and time. Mental status is at baseline.      Cranial Nerves: Cranial nerves 2-12 are intact. No cranial nerve deficit, dysarthria or facial asymmetry.      Sensory: No sensory deficit.      Coordination: Finger-Nose-Finger Test and Heel to Shin Test normal.         ED Medications  Medications   sodium chloride 0.9 % bolus 1,000 mL (0 mL Intravenous Stopped 5/13/24 1802)   ceftriaxone (ROCEPHIN) 1 g/50 mL in dextrose IVPB (0 mg Intravenous Stopped 5/13/24 1730)       Diagnostic Studies  Results Reviewed       Procedure Component Value Units Date/Time    Urine culture [[id number]] Collected: 05/13/24 1433    Lab Status: Final result Specimen: Urine, Clean Catch Updated: 05/15/24 0801     Urine Culture 70,000-79,000 cfu/ml    Basic metabolic panel [[id number]]  (Abnormal) Collected: 05/14/24 0522    Lab Status: Final result Specimen: Blood from Hand, Left Updated: 05/14/24 0708     Sodium 137 mmol/L      Potassium 4.5 mmol/L      Chloride 108 mmol/L      CO2 22 mmol/L      ANION GAP 7 mmol/L      BUN 21 mg/dL      Creatinine 0.89 mg/dL      Glucose 85 mg/dL      Calcium 8.3 mg/dL      eGFR 59 ml/min/1.73sq m     Narrative:      National Kidney Disease Foundation guidelines for Chronic Kidney Disease (CKD):     Stage 1 with normal or high GFR (GFR > 90 mL/min/1.73 square meters)    Stage 2 Mild CKD (GFR = 60-89 mL/min/1.73 square meters)    Stage 3A Moderate CKD (GFR = 45-59 mL/min/1.73 square meters)    Stage 3B Moderate CKD (GFR = 30-44 mL/min/1.73 square meters)    Stage 4 Severe CKD (GFR = 15-29 mL/min/1.73 square meters)    Stage 5 End Stage CKD (GFR <15 mL/min/1.73 square  meters)  Note: GFR calculation is accurate only with a steady state creatinine    CBC (With Platelets) [[id number]]  (Abnormal) Collected: 05/14/24 0522    Lab Status: Final result Specimen: Blood from Arm, Left Updated: 05/14/24 0629     WBC 6.51 Thousand/uL      RBC 3.52 Million/uL      Hemoglobin 10.6 g/dL      Hematocrit 34.5 %      MCV 98 fL      MCH 30.1 pg      MCHC 30.7 g/dL      RDW 16.4 %      Platelets 307 Thousands/uL      MPV 10.4 fL     TSH, 3rd generation with Free T4 reflex [[id number]]  (Normal) Collected: 05/13/24 1424    Lab Status: Final result Specimen: Blood from Arm, Left Updated: 05/13/24 2028     TSH 3RD GENERATON 1.183 uIU/mL     HS Troponin I 4hr [[id number]]  (Normal) Collected: 05/13/24 1855    Lab Status: Final result Specimen: Blood from Arm, Left Updated: 05/13/24 1930     hs TnI 4hr 3 ng/L      Delta 4hr hsTnI 0 ng/L     Procalcitonin [[id number]]  (Normal) Collected: 05/13/24 1424    Lab Status: Final result Specimen: Blood from Arm, Left Updated: 05/13/24 1839     Procalcitonin 0.10 ng/ml     HS Troponin I 2hr [[id number]]  (Normal) Collected: 05/13/24 1636    Lab Status: Final result Specimen: Blood from Arm, Left Updated: 05/13/24 1712     hs TnI 2hr 4 ng/L      Delta 2hr hsTnI 1 ng/L     HS Troponin 0hr (reflex protocol) [[id number]]  (Normal) Collected: 05/13/24 1424    Lab Status: Final result Specimen: Blood from Arm, Left Updated: 05/13/24 1451     hs TnI 0hr 3 ng/L     Comprehensive metabolic panel [[id number]]  (Abnormal) Collected: 05/13/24 1424    Lab Status: Final result Specimen: Blood from Arm, Left Updated: 05/13/24 1448     Sodium 133 mmol/L      Potassium 4.8 mmol/L      Chloride 102 mmol/L      CO2 22 mmol/L      ANION GAP 9 mmol/L      BUN 25 mg/dL      Creatinine 1.06 mg/dL      Glucose 85 mg/dL      Calcium 8.9 mg/dL      AST 20 U/L      ALT 14 U/L      Alkaline Phosphatase 138 U/L      Total Protein 7.1 g/dL      Albumin 3.6 g/dL      Total Bilirubin 0.36 mg/dL       eGFR 48 ml/min/1.73sq m     Narrative:      National Kidney Disease Foundation guidelines for Chronic Kidney Disease (CKD):     Stage 1 with normal or high GFR (GFR > 90 mL/min/1.73 square meters)    Stage 2 Mild CKD (GFR = 60-89 mL/min/1.73 square meters)    Stage 3A Moderate CKD (GFR = 45-59 mL/min/1.73 square meters)    Stage 3B Moderate CKD (GFR = 30-44 mL/min/1.73 square meters)    Stage 4 Severe CKD (GFR = 15-29 mL/min/1.73 square meters)    Stage 5 End Stage CKD (GFR <15 mL/min/1.73 square meters)  Note: GFR calculation is accurate only with a steady state creatinine    Magnesium [[id number]]  (Normal) Collected: 05/13/24 1424    Lab Status: Final result Specimen: Blood from Arm, Left Updated: 05/13/24 1448     Magnesium 2.4 mg/dL     Urine Microscopic [[id number]]  (Abnormal) Collected: 05/13/24 1433    Lab Status: Final result Specimen: Urine, Clean Catch Updated: 05/13/24 1447     RBC, UA 2-4 /hpf      WBC, UA Innumerable /hpf      Epithelial Cells Occasional /hpf      Bacteria, UA Occasional /hpf     UA w Reflex to Microscopic w Reflex to Culture [[id number]]  (Abnormal) Collected: 05/13/24 1433    Lab Status: Final result Specimen: Urine, Clean Catch Updated: 05/13/24 1444     Color, UA Light Yellow     Clarity, UA Clear     Specific Gravity, UA 1.018     pH, UA 6.0     Leukocytes, UA Moderate     Nitrite, UA Negative     Protein, UA Trace mg/dl      Glucose, UA Negative mg/dl      Ketones, UA Negative mg/dl      Urobilinogen, UA <2.0 mg/dl      Bilirubin, UA Negative     Occult Blood, UA Negative    CBC and differential [[id number]]  (Abnormal) Collected: 05/13/24 1424    Lab Status: Final result Specimen: Blood from Arm, Left Updated: 05/13/24 1431     WBC 8.45 Thousand/uL      RBC 3.98 Million/uL      Hemoglobin 12.1 g/dL      Hematocrit 38.6 %      MCV 97 fL      MCH 30.4 pg      MCHC 31.3 g/dL      RDW 16.4 %      MPV 10.3 fL      Platelets 332 Thousands/uL      nRBC 0 /100 WBCs      Segmented % 79 %       Immature Grans % 1 %      Lymphocytes % 12 %      Monocytes % 7 %      Eosinophils Relative 1 %      Basophils Relative 0 %      Absolute Neutrophils 6.65 Thousands/µL      Absolute Immature Grans 0.06 Thousand/uL      Absolute Lymphocytes 1.04 Thousands/µL      Absolute Monocytes 0.62 Thousand/µL      Eosinophils Absolute 0.05 Thousand/µL      Basophils Absolute 0.03 Thousands/µL                    CT head wo contrast   Final Result by Jermain Forte MD (05/13 1542)      No acute intracranial hemorrhage. Given extensive background of microangiopathic changes, if there is concern for acute infarct, MRI is a more sensitive examination.                  Workstation performed: [id number]         XR chest 1 view portable   Final Result by Anam Daigle MD (05/14 0831)      No focal consolidation, pleural effusion, or pneumothorax.            Workstation performed: [id number]               Procedures  ECG 12 Lead Documentation Only    Date/Time: 5/13/2024 1:55 PM    Performed by: Rodriguez Roach MD  Authorized by: Rodriguez Roach MD    Indications / Diagnosis:  Syncope  Interpretation:     Interpretation: normal    Rate:     ECG rate:  79  Rhythm:     Rhythm: sinus rhythm    QRS:     QRS axis:  Normal    QRS intervals:  Normal  Conduction:     Conduction: normal    Comments:      NSR, 79 bpm. Normal axis. WY interval 124 ms, QRS 74 ms, QTc 451 ms. No ischemic changes or ST elevations noted. Compared to EKG of 03/26/2024, no significant changes noted.         ED Course                             SBIRT 22yo+      Flowsheet Row Most Recent Value   Initial Alcohol Screen: US AUDIT-C     1. How often do you have a drink containing alcohol? 0 Filed at: 05/13/2024 1418   2. How many drinks containing alcohol do you have on a typical day you are drinking?  0 Filed at: 05/13/2024 1418   3b. FEMALE Any Age, or MALE 65+: How often do you have 4 or more drinks on one occassion? 0 Filed at: 05/13/2024 1413    Audit-C Score 0 Filed at: 05/13/2024 1418   MASTER: How many times in the past year have you...    Used an illegal drug or used a prescription medication for non-medical reasons? Never Filed at: 05/13/2024 1419                  Medical Decision Making  82 yo female presents for evaluation of syncope    Differentials: vasovagal syncope, ACS, hypovolemia, infection  Workup: Labs, CXR, CT head, UA    On initial evaluation, pt in no acute distress, VSS. No signs of head trauma noted. Physical exam unremarkable. CT head negative for acute intracranial abnormality. Labs significant for a mild hyponatremia, and normal WBC with a left shift. CXR showed consolidation in Lingula, concerning for lingular pneumonia. UA showed innumerable WBC, and occasional bacteria, consistent with UTI. Pt given IVF bolus and IV rocephin in ED, and admitted to hospitalist service for further management.     Amount and/or Complexity of Data Reviewed  Labs: ordered.  Radiology: ordered.    Risk  Decision regarding hospitalization.          Disposition  Final diagnoses:   Syncope   Pneumonia   UTI (urinary tract infection)     Time reflects when diagnosis was documented in both MDM as applicable and the Disposition within this note       Time User Action Codes Description Comment    5/13/2024  4:05 PM Zoraida, Amr Add [R55] Syncope     5/13/2024  4:22 PM Awosika, Afopefoluwa Add [J18.9] Pneumonia     5/13/2024  4:22 PM Awosika, Afopefoluwa Add [N39.0] UTI (urinary tract infection)     5/15/2024 12:21 PM Douglas, Basanta Add [G31.84] Mild cognitive impairment     5/15/2024 12:21 PM Douglas, Basanta Add [R05.1] Acute cough     5/15/2024 12:21 PM Douglas, Basanta Add [M80.00XD] Age-related osteoporosis with current pathological fracture with routine healing, subsequent encounter     5/15/2024 12:21 PM Douglas, Basanta Add [Z86.73] History of CVA (cerebrovascular accident)           ED Disposition       ED Disposition   Admit    Condition   Stable     Date/Time   Mon May 13, 2024 5452    Comment   --             MD Documentation      Flowsheet Row Most Recent Value   Transported by (Company and Unit #) TBD          RN Documentation      Flowsheet Row Most Recent Value   Transported by (Company and Unit #) TBD          Follow-up Information       Follow up With Specialties Details Why Contact Info    Tuyet Cantor MD Family Medicine Schedule an appointment as soon as possible for a visit in 1 week(s)  [address]  [place] [address]  [phone]              Discharge Medication List as of 5/15/2024  3:15 PM        START taking these medications    Details   aspirin 81 mg chewable tablet Chew 1 tablet (81 mg total) daily, Starting Wed 5/15/2024, Until Fri 6/14/2024, Normal           CONTINUE these medications which have NOT CHANGED    Details   acetaminophen (TYLENOL) 325 mg tablet Take 975 mg by mouth every 8 (eight) hours as needed, Historical Med      amLODIPine (NORVASC) 2.5 mg tablet Take 2.5 mg by mouth daily, Historical Med      calcitonin, salmon, (MIACALCIN) 200 units/act nasal spray 1 spray into each nostril daily, Historical Med      cholecalciferol (VITAMIN D3) 1,000 units tablet Take 1 tablet (1,000 Units total) by mouth daily, Starting Fri 1/6/2023, Normal      clotrimazole-betamethasone (LOTRISONE) 1-0.05 % cream Apply topically 2 (two) times a day, Starting Wed 4/10/2024, No Print      Diclofenac Sodium (VOLTAREN) 1 % Apply 2 g topically 4 (four) times a day = To right hip and pelvis, Starting Fri 1/6/2023, Normal      fluticasone (FLONASE) 50 mcg/act nasal spray 1 spray into each nostril daily, Starting Thu 10/22/2020, No Print      gabapentin (NEURONTIN) 100 mg capsule Take 1 capsule (100 mg total) by mouth daily, Starting Thu 4/11/2024, Print      magnesium hydroxide (MILK OF MAGNESIA) 400 mg/5 mL oral suspension Take 30 mL by mouth 2 (two) times a day as needed, Historical Med      senna-docusate sodium (SENOKOT S) 8.6-50 mg per  tablet Take 1 tablet by mouth 2 (two) times a day, Starting Fri 1/6/2023, Normal      albuterol (PROVENTIL HFA,VENTOLIN HFA) 90 mcg/act inhaler Inhale 2 puffs every 6 (six) hours as needed for wheezing, Historical Med           STOP taking these medications       Calcium-Vitamin D-Vitamin K (VIACTIV PO) Comments:   Reason for Stopping:         lidocaine (LIDODERM) 5 % Comments:   Reason for Stopping:         meloxicam (MOBIC) 7.5 mg tablet Comments:   Reason for Stopping:         Sodium Phosphates (FLEET ENEMA RE) Comments:   Reason for Stopping:                 PDMP Review         Value Time User    PDMP Reviewed  Yes 4/10/2024 10:11 AM Hailee Castillo PA-C             ED Provider  Attending physically available and evaluated Doris Campbell. I managed the patient along with the ED Attending.    Electronically Signed by           Rodriguez Roach MD  05/17/24 5600

## 2024-05-13 NOTE — ASSESSMENT & PLAN NOTE
POA with episode of syncope during PT session.  At that time blood pressure appreciated to be low 60/40.  Rechecked at the facility with BP 74/50  Per facility staff as well as patient's power of  patient has poor oral intake.  Has been having previous episodes of near syncope.  Multiple falls in the past with the most recent falls in April 2024 with multiple bone fractures.  On admission hemodynamically stable with BP on higher side.  No signs of sepsis patient on room air.  No neurodeficits on physical exam.  EKG normal sinus rhythm.  CT head without contrast with no acute infarct, chronic microangiopathic changes.  Urinalysis with innumerable WBC, negative nitrates.  Procalcitonin negative  Chest x-ray pending final reading however not convincing indication for pneumonia.  S/p 1 L of IV fluids.    Plan:  Orthostatic vitals  Will hold amlodipine 2.5 mg given hypotension when she had the syncopal episode.  Continue IV hydration with 75 ml/h  Telemetry monitoring  Frequent neurochecks every 4 hours  Treatment for UTI  PT/OT

## 2024-05-13 NOTE — ASSESSMENT & PLAN NOTE
History of immune deficiency and severe osteoporosis according to DEXA scan.  Currently taking vitamin D supplementation.

## 2024-05-13 NOTE — ASSESSMENT & PLAN NOTE
Patient reports on and off symptoms of burning with urination.  Abnormal urinalysis on admission with innumerable WBC.  No sepsis criteria on admission.  One-time dose of IV ceftriaxone.  Will continue with IV ceftriaxone while pending urine culture.  No history of ESBL in the past.  Continue IV hydration

## 2024-05-13 NOTE — H&P
[facility]  H&P  Name: Doris Campbell 83 y.o. female I MRN: [id number]  Unit/Bed#: ED-16 I Date of Admission: 5/13/2024   Date of Service: 5/13/2024 I Hospital Day: 0      Assessment/Plan   * Syncope  Assessment & Plan  POA with episode of syncope during PT session.  At that time blood pressure appreciated to be low 60/40.  Rechecked at the facility with BP 74/50  Per facility staff as well as patient's power of  patient has poor oral intake.  Has been having previous episodes of near syncope.  Multiple falls in the past with the most recent falls in April 2024 with multiple bone fractures.  On admission hemodynamically stable with BP on higher side.  No signs of sepsis patient on room air.  No neurodeficits on physical exam.  EKG normal sinus rhythm.  CT head without contrast with no acute infarct, chronic microangiopathic changes.  Urinalysis with innumerable WBC, negative nitrates.  Procalcitonin negative  Chest x-ray pending final reading however not convincing indication for pneumonia.  S/p 1 L of IV fluids.    Plan:  Orthostatic vitals  Will hold amlodipine 2.5 mg given hypotension when she had the syncopal episode.  Continue IV hydration with 75 ml/h  Telemetry monitoring  Frequent neurochecks every 4 hours  Treatment for UTI  PT/OT    Hyponatremia  Assessment & Plan  Mild hyponatremia with sodium 133.  Suspected in the setting of poor oral intake.  Continue IV hydration and encourage oral intake.  Morning BMP      UTI (urinary tract infection)  Assessment & Plan  Patient reports on and off symptoms of burning with urination.  Abnormal urinalysis on admission with innumerable WBC.  No sepsis criteria on admission.  One-time dose of IV ceftriaxone.  Will continue with IV ceftriaxone while pending urine culture.  No history of ESBL in the past.  Continue IV hydration      Ambulatory dysfunction  Assessment & Plan  Patient reporting walking with a walker.   History of  multiple falls in the past.   undergoing physiotherapy at the facility post hospital discharge.  Consulted PT/OT    History of CVA (cerebrovascular accident)  Assessment & Plan  History of CVA diagnosed in 2017.  Has been following up with [facility] neurology.  At that time patient expressed symptoms of confusion, CT head showing encephalomalacia of left posterior parietal lobe  with follow up MRI brain showing left posterior parietal region of mildly complex signal intensity most consistent with subacute to early chronic infarction punctate hemorrhage  Has been previously on aspirin however according to current med list patient is not on aspirin anymore.  No recent notes stating the reason of aspirin discontinuation.  Currently not on statin.  CT head on admission shows no acute infarct.  Chronic microangiopathic changes.  Patient oriented x 2 however history of mild cognitive decline.  Reports right eye floaters however it is a chronic issue (has history of cataract repair).    Plan:  Given no significant neurodeficits on physical exam will hold off on MRI.  Frequent neurochecks every 4 hours  Restarted aspirin 81 for hx of CVS prophylaxis.        Constipation  Assessment & Plan  Continue with Senokot twice a day    Mild cognitive impairment  Assessment & Plan  Mini-Mental 2020 showed 23/30. Patient oriented x 2, to self and place.  Per patient's POA patient lives in assisted living facility however she probably needs more care at this time.  Delirium precaution order set  Pending TSH    Vitamin D deficiency  Assessment & Plan  History of immune deficiency and severe osteoporosis according to DEXA scan.  Currently taking vitamin D supplementation.    Age-related osteoporosis with current pathological fracture  Assessment & Plan  Severe osteoporosis according to DEXA scan in 2019. T score lumbar spine -4.8, femoral neck -4.7. History of recent fall in April with multiple rib fracture, L1, L2, L4 fracture, zygomatic bone  fractur.   According to allergy panel patient allergic to alendronate and raloxifene.  Currently on intranasal calcitonin daily, alternating nostrils           VTE Pharmacologic Prophylaxis: VTE Score: 3 Moderate Risk (Score 3-4) - Pharmacological DVT Prophylaxis Ordered: enoxaparin (Lovenox).  Code Status: Level 3 - DNAR and DNI patient reported  Discussion with family: Updated  (patient's relative-POA) via phone.    Anticipated Length of Stay: Patient will be admitted on an inpatient basis with an anticipated length of stay of greater than 2 midnights secondary to syncope and UTI.    Chief Complaint: Syncope episode during PT session    History of Present Illness:  Doris Campbell is a 83 y.o. female with a PMH of rheumatoid arthritis, breast cancer, osteoporosis with multiple pathological fractures including L1, L2, L4 compression fractures, vitamin D deficiency, history of CVA who presents with 1 episode of syncope that happened today during PT session.  Patient does not remember the episode and she is not sure if she lost her consciousness or not. Per [facility] facility staff patient syncopized but did not sustain fall.  Patient had a previous episode of near syncope on Friday. The blood pressure after current syncope was checked and it showed 60/40.  At about noon the blood pressure was rechecked and it showed 74/50.  Per facility patient has poor oral intake.  The poor oral intake was confirmed also by her POA.  She reports that she does not want to drink a lot of water to avoid using restroom a lot.  Patient mentions that she does not like the food at the facility. Patient also reports on and off burning with urination.  She had an episode of chills the other night however no night sweats, fever.  She also reported having productive cough with clear sputum in the past however she is not sure how long time ago.    On admission patient hemodynamically stable, on room air, does not meet  sepsis criteria.  Oriented x 2 and reports feeling not herself and having generalized weakness.  Denies focal neurodeficits.  She reports pain in her neck and her back.  Patient admitted under [facility] service for syncope workup and UTI management.    Review of Systems:  Review of Systems   Constitutional:  Positive for chills. Negative for fever.        Generalized weakness   HENT:  Negative for ear pain and sore throat.    Eyes:  Negative for pain and visual disturbance.   Respiratory:  Negative for cough and shortness of breath.    Cardiovascular:  Negative for chest pain and palpitations.   Gastrointestinal:  Negative for abdominal pain and vomiting.   Genitourinary:  Positive for dysuria. Negative for hematuria.   Musculoskeletal:  Negative for arthralgias and back pain.   Skin:  Negative for color change and rash.   Neurological:  Positive for syncope. Negative for seizures.   All other systems reviewed and are negative.      Past Medical and Surgical History:   Past Medical History:   Diagnosis Date    Allergic     Anxiety     Arthritis     Cancer (HCC)     HLD (hyperlipidemia)     Hypertension     Hyperthyroidism     Osteoporosis     Stroke (HCC)     TIA (transient ischemic attack)        Past Surgical History:   Procedure Laterality Date    BREAST LUMPECTOMY Left     HYSTERECTOMY      KNEE SURGERY Right     ORIF TIBIA FRACTURE Right 12/8/2016    Procedure: OPEN REDUCTION W/ INTERNAL FIXATION (ORIF) TIBIA;  Surgeon: Angie Case MD;  Location: [facility];  Service:        Meds/Allergies:  Prior to Admission medications    Medication Sig Start Date End Date Taking? Authorizing Provider   acetaminophen (TYLENOL) 325 mg tablet Take 975 mg by mouth every 8 (eight) hours as needed   Yes Historical Provider, MD   amLODIPine (NORVASC) 2.5 mg tablet Take 2.5 mg by mouth daily   Yes Historical Provider, MD   calcitonin, salmon, (MIACALCIN) 200 units/act nasal spray 1 spray into each nostril daily   Yes Historical  Provider, MD   cholecalciferol (VITAMIN D3) 1,000 units tablet Take 1 tablet (1,000 Units total) by mouth daily 1/6/23  Yes VALDEMAR Carranza   clotrimazole-betamethasone (LOTRISONE) 1-0.05 % cream Apply topically 2 (two) times a day 4/10/24  Yes Hailee Castillo PA-C   Diclofenac Sodium (VOLTAREN) 1 % Apply 2 g topically 4 (four) times a day = To right hip and pelvis 1/6/23  Yes VALDEMAR Carranza   fluticasone (FLONASE) 50 mcg/act nasal spray 1 spray into each nostril daily 10/22/20  Yes Camila Avilez PA-C   gabapentin (NEURONTIN) 100 mg capsule Take 1 capsule (100 mg total) by mouth daily 4/11/24  Yes Hailee Castillo PA-C   magnesium hydroxide (MILK OF MAGNESIA) 400 mg/5 mL oral suspension Take 30 mL by mouth 2 (two) times a day as needed   Yes Historical Provider, MD   senna-docusate sodium (SENOKOT S) 8.6-50 mg per tablet Take 1 tablet by mouth 2 (two) times a day 1/6/23  Yes VALDEMAR Carranza   albuterol (PROVENTIL HFA,VENTOLIN HFA) 90 mcg/act inhaler Inhale 2 puffs every 6 (six) hours as needed for wheezing    Historical Provider, MD   Calcium-Vitamin D-Vitamin K (VIACTIV PO) Take 1 tablet by mouth daily after breakfast  Patient not taking: Reported on 5/13/2024    Historical Provider, MD   lidocaine (LIDODERM) 5 % Apply 2 patches topically over 12 hours daily Remove & Discard patch within 12 hours or as directed by MD  Patient not taking: Reported on 5/13/2024 4/11/24   Hailee Castillo PA-C   meloxicam (MOBIC) 7.5 mg tablet Take 7.5 mg by mouth 2 (two) times a day  Patient not taking: Reported on 5/13/2024    Historical Provider, MD   Sodium Phosphates (FLEET ENEMA RE) Insert 1 Bottle into the rectum daily as needed  Patient not taking: Reported on 5/13/2024    Historical Provider, MD     I have reveiwed home medications using records provided by [facility].    Allergies:   Allergies   Allergen Reactions    Alendronate     Raloxifene     Risedronate Sodium     Sulfa  Antibiotics Swelling    Penicillins Swelling     Patient received 7 day treatment course Keflex without any adverse reactions in 11/2022.        Social History:  Marital Status:    Occupation: Retired  Patient Pre-hospital Living Situation: Skilled Nursing Facility: [facility]  Patient Pre-hospital Level of Mobility: walks with walker  Patient Pre-hospital Diet Restrictions: Diet mechanical soft with thin liquids  Substance Use History:   Social History     Substance and Sexual Activity   Alcohol Use Not Currently    Comment: Alcohol intake:   Occasional  - As per [place]      Social History     Tobacco Use   Smoking Status Never   Smokeless Tobacco Never   Tobacco Comments    Former smoker - As per [place]      Social History     Substance and Sexual Activity   Drug Use Never       Family History:  Family History   Problem Relation Age of Onset    Coronary artery disease Family     Other Family         DJD       Physical Exam:     Vitals:   Blood Pressure: 145/74 (05/13/24 1823)  Pulse: 71 (05/13/24 1823)  Temperature: 97.7 °F (36.5 °C) (05/13/24 1347)  Temp Source: Oral (05/13/24 1347)  Respirations: 18 (05/13/24 1823)  SpO2: 98 % (05/13/24 1823)    Physical Exam  Vitals and nursing note reviewed.   Constitutional:       General: She is not in acute distress.     Appearance: She is well-developed. She is ill-appearing.   HENT:      Head: Normocephalic and atraumatic.   Eyes:      Conjunctiva/sclera: Conjunctivae normal.   Cardiovascular:      Rate and Rhythm: Normal rate and regular rhythm.      Heart sounds: No murmur heard.  Pulmonary:      Effort: Pulmonary effort is normal. No respiratory distress.      Breath sounds: Normal breath sounds. No stridor. No wheezing, rhonchi or rales.   Chest:      Chest wall: No tenderness.   Abdominal:      Palpations: Abdomen is soft.      Tenderness: There is no abdominal tenderness.   Musculoskeletal:         General: No swelling.      Cervical back: Neck supple.       Right lower leg: No edema.      Left lower leg: No edema.   Skin:     General: Skin is warm and dry.      Capillary Refill: Capillary refill takes less than 2 seconds.      Comments: Increased skin turgor   Neurological:      Mental Status: She is alert. She is disoriented.      Cranial Nerves: No cranial nerve deficit.      Motor: No weakness.      Coordination: Coordination normal.      Comments: Oriented x 2   Psychiatric:         Mood and Affect: Mood normal.          Additional Data:     Lab Results:  Results from last 7 days   Lab Units 05/13/24  1424   WBC Thousand/uL 8.45   HEMOGLOBIN g/dL 12.1   HEMATOCRIT % 38.6   PLATELETS Thousands/uL 332   SEGS PCT % 79*   LYMPHO PCT % 12*   MONO PCT % 7   EOS PCT % 1     Results from last 7 days   Lab Units 05/13/24  1424   SODIUM mmol/L 133*   POTASSIUM mmol/L 4.8   CHLORIDE mmol/L 102   CO2 mmol/L 22   BUN mg/dL 25   CREATININE mg/dL 1.06   ANION GAP mmol/L 9   CALCIUM mg/dL 8.9   ALBUMIN g/dL 3.6   TOTAL BILIRUBIN mg/dL 0.36   ALK PHOS U/L 138*   ALT U/L 14   AST U/L 20   GLUCOSE RANDOM mg/dL 85                 Results from last 7 days   Lab Units 05/13/24  1424   PROCALCITONIN ng/ml 0.10       Lines/Drains:  Invasive Devices       Peripheral Intravenous Line  Duration             Peripheral IV 05/13/24 Left;Proximal;Ventral (anterior) Forearm <1 day                        Imaging: Reviewed radiology reports from this admission including: chest xray and CT head  CT head wo contrast   Final Result by Jermain Forte MD (05/13 1542)      No acute intracranial hemorrhage. Given extensive background of microangiopathic changes, if there is concern for acute infarct, MRI is a more sensitive examination.                  Workstation performed: [id number]         XR chest 1 view portable    (Results Pending)       EKG and Other Studies Reviewed on Admission:   EKG: NSR. HR 71.    ** Please Note: This note has been constructed using a voice recognition system.  **

## 2024-05-13 NOTE — ED PROCEDURE NOTE
Procedure  POC Cardiac US    Date/Time: 5/13/2024 5:51 PM    Performed by: Nathaniel Yang MD  Authorized by: Nathaniel Yang MD    Patient location:  ED  Other Assisting Provider: Yes (comment) (MS)    Procedure details:     Exam Type:  Diagnostic    Indications comment:  Syncope    Assessment / Evaluation for: cardiac function and pericardial effusion      Exam Type: initial exam      Image quality: non-diagnostic      Image availability:  Images available in PACS  Interpretation:      Heavily limited exam, nondiagnostic                   Nathaniel Yang MD  05/13/24 5380

## 2024-05-13 NOTE — ASSESSMENT & PLAN NOTE
Severe osteoporosis according to DEXA scan in 2019. T score lumbar spine -4.8, femoral neck -4.7  Currently not on any medication for osteoporosis.  According to allergy panel patient allergic to alendronate and raloxifene.  History of recent fall in April with multiple rib fracture, L1, L2, L4 fracture, zygomatic bone fracture.

## 2024-05-13 NOTE — ASSESSMENT & PLAN NOTE
History of CVA diagnosed in 2017.  Has been following up with [facility] neurology.  At that time patient expressed symptoms of confusion, CT head showing encephalomalacia of left posterior parietal lobe  with follow up MRI brain showing left posterior parietal region of mildly complex signal intensity most consistent with subacute to early chronic infarction punctate hemorrhage  Has been previously on aspirin however according to current med list patient is not on aspirin anymore.  No recent notes stating the reason of aspirin discontinuation.  Currently not on statin.  CT head on admission shows no acute infarct.  Chronic microangiopathic changes.  Patient oriented x 2 however history of mild cognitive decline.  Reports right eye floaters however it is a chronic issue (has history of cataract repair).    Plan:  Given no significant neurodeficits on physical exam will hold off on MRI.  Frequent neurochecks every 4 hours  Restarted aspirin 81 for hx of CVS prophylaxis.

## 2024-05-13 NOTE — ASSESSMENT & PLAN NOTE
Patient reporting walking with a walker.   History of multiple falls in the past.   undergoing physiotherapy at the facility post hospital discharge.  Consulted PT/OT

## 2024-05-13 NOTE — ASSESSMENT & PLAN NOTE
Mini-Mental 2020 showed 23/30. Patient oriented x 2, to self and place.  Per patient's POA patient lives in assisted living facility however she probably needs more care at this time.  Delirium precaution order set  Pending TSH

## 2024-05-14 PROBLEM — E87.1 HYPONATREMIA: Status: RESOLVED | Noted: 2024-05-13 | Resolved: 2024-05-14

## 2024-05-14 LAB
ANION GAP SERPL CALCULATED.3IONS-SCNC: 7 MMOL/L (ref 4–13)
BUN SERPL-MCNC: 21 MG/DL (ref 5–25)
CALCIUM SERPL-MCNC: 8.3 MG/DL (ref 8.4–10.2)
CHLORIDE SERPL-SCNC: 108 MMOL/L (ref 96–108)
CO2 SERPL-SCNC: 22 MMOL/L (ref 21–32)
CREAT SERPL-MCNC: 0.89 MG/DL (ref 0.6–1.3)
ERYTHROCYTE [DISTWIDTH] IN BLOOD BY AUTOMATED COUNT: 16.4 % (ref 11.6–15.1)
GFR SERPL CREATININE-BSD FRML MDRD: 59 ML/MIN/1.73SQ M
GLUCOSE SERPL-MCNC: 85 MG/DL (ref 65–140)
HCT VFR BLD AUTO: 34.5 % (ref 34.8–46.1)
HGB BLD-MCNC: 10.6 G/DL (ref 11.5–15.4)
MCH RBC QN AUTO: 30.1 PG (ref 26.8–34.3)
MCHC RBC AUTO-ENTMCNC: 30.7 G/DL (ref 31.4–37.4)
MCV RBC AUTO: 98 FL (ref 82–98)
PLATELET # BLD AUTO: 307 THOUSANDS/UL (ref 149–390)
PMV BLD AUTO: 10.4 FL (ref 8.9–12.7)
POTASSIUM SERPL-SCNC: 4.5 MMOL/L (ref 3.5–5.3)
RBC # BLD AUTO: 3.52 MILLION/UL (ref 3.81–5.12)
SODIUM SERPL-SCNC: 137 MMOL/L (ref 135–147)
WBC # BLD AUTO: 6.51 THOUSAND/UL (ref 4.31–10.16)

## 2024-05-14 PROCEDURE — 97535 SELF CARE MNGMENT TRAINING: CPT

## 2024-05-14 PROCEDURE — 97163 PT EVAL HIGH COMPLEX 45 MIN: CPT

## 2024-05-14 PROCEDURE — 97167 OT EVAL HIGH COMPLEX 60 MIN: CPT

## 2024-05-14 PROCEDURE — 80048 BASIC METABOLIC PNL TOTAL CA: CPT

## 2024-05-14 PROCEDURE — 85027 COMPLETE CBC AUTOMATED: CPT

## 2024-05-14 PROCEDURE — 99232 SBSQ HOSP IP/OBS MODERATE 35: CPT | Performed by: INTERNAL MEDICINE

## 2024-05-14 RX ORDER — FLUTICASONE PROPIONATE 50 MCG
1 SPRAY, SUSPENSION (ML) NASAL DAILY
Status: DISCONTINUED | OUTPATIENT
Start: 2024-05-14 | End: 2024-05-15 | Stop reason: HOSPADM

## 2024-05-14 RX ORDER — GABAPENTIN 100 MG/1
100 CAPSULE ORAL
Status: DISCONTINUED | OUTPATIENT
Start: 2024-05-14 | End: 2024-05-15 | Stop reason: HOSPADM

## 2024-05-14 RX ORDER — CEFPODOXIME PROXETIL 100 MG/1
100 TABLET, FILM COATED ORAL 2 TIMES DAILY
Qty: 2 TABLET | Refills: 0 | Status: CANCELLED | OUTPATIENT
Start: 2024-05-15 | End: 2024-05-16

## 2024-05-14 RX ORDER — ENOXAPARIN SODIUM 100 MG/ML
40 INJECTION SUBCUTANEOUS
Status: DISCONTINUED | OUTPATIENT
Start: 2024-05-15 | End: 2024-05-15 | Stop reason: HOSPADM

## 2024-05-14 RX ORDER — AMOXICILLIN 250 MG
1 CAPSULE ORAL 2 TIMES DAILY
Status: DISCONTINUED | OUTPATIENT
Start: 2024-05-14 | End: 2024-05-15 | Stop reason: HOSPADM

## 2024-05-14 RX ADMIN — FLUTICASONE PROPIONATE 1 SPRAY: 50 SPRAY, METERED NASAL at 15:43

## 2024-05-14 RX ADMIN — GABAPENTIN 100 MG: 100 CAPSULE ORAL at 22:03

## 2024-05-14 RX ADMIN — Medication 2000 UNITS: at 08:16

## 2024-05-14 RX ADMIN — ASPIRIN 81 MG CHEWABLE TABLET 81 MG: 81 TABLET CHEWABLE at 08:16

## 2024-05-14 RX ADMIN — CEFTRIAXONE SODIUM 1000 MG: 10 INJECTION, POWDER, FOR SOLUTION INTRAVENOUS at 16:13

## 2024-05-14 RX ADMIN — ENOXAPARIN SODIUM 30 MG: 30 INJECTION SUBCUTANEOUS at 08:16

## 2024-05-14 RX ADMIN — SENNOSIDES, DOCUSATE SODIUM 1 TABLET: 8.6; 5 TABLET ORAL at 17:37

## 2024-05-14 RX ADMIN — CALCITONIN SALMON 1 SPRAY: 200 SPRAY, METERED NASAL at 12:42

## 2024-05-14 RX ADMIN — Medication 3 MG: at 22:03

## 2024-05-14 NOTE — PLAN OF CARE
Problem: PHYSICAL THERAPY ADULT  Goal: Performs mobility at highest level of function for planned discharge setting.  See evaluation for individualized goals.  Description: Treatment/Interventions: Functional transfer training, LE strengthening/ROM, Elevations, Therapeutic exercise, Cognitive reorientation, Patient/family training, Equipment eval/education, Bed mobility, Gait training, Spoke to nursing, Spoke to case management, OT          See flowsheet documentation for full assessment, interventions and recommendations.  Note: Prognosis: Fair  Problem List: Decreased strength, Impaired balance, Decreased endurance, Decreased mobility, Decreased cognition, Decreased skin integrity, Decreased safety awareness  Assessment: Pt is a 83 y.o. female seen for PT evaluation s/p admit to [facility] on 5/13/2024. Pt was admitted with a primary dx of: syncope, UTI, pneumonia.  PT now consulted for assessment of mobility and d/c needs. Pt with Up and OOB as tolerated orders.  Pts current comorbidities and personal factors effecting treatment include: osteoporosis with pathological fracture, Mild cognitive impairment, HTN, anxiety, history of TIA and CVA.. Pts current clinical presentation is Unstable/Unpredictable (high complexity) due to Ongoing medical management for primary dx, Decreased activity tolerance compared to baseline, Fall risk, Increased assistance needed from caregiver at current time, Cog status. Prior to admission, pt was independent with use of RW. Upon evaluation, pt currently is requiring; ModA for transfers and Mono for ambulation 20 ft w/ RW. Pt presents at PT eval functioning below baseline and currently w/ overall mobility deficits 2* to: BLE weakness, impaired balance, gait deviations, pain, decreased activity tolerance compared to baseline, decreased functional mobility tolerance compared to baseline, decreased safety awareness, impaired judgement, decreased cognition. Pt currently at a  fall risk 2* to impairments listed above.  Pt will continue to benefit from skilled acute PT interventions to address stated impairments; to maximize functional mobility; for ongoing pt/ family training; and DME needs. At conclusion of PT session chair alarm engaged, all needs in reach, RN notified of session findings/recommendations, and pt returned back in recliner chair with phone and call bell within reach. Pt denies any further questions at this time. Recommend Level III (Minimum Resource Intensity)  upon hospital D/C.        Rehab Resource Intensity Level, PT: III (Minimum Resource Intensity)    See flowsheet documentation for full assessment.

## 2024-05-14 NOTE — PROGRESS NOTES
[facility]  Progress Note  Name: Doris Campbell I  MRN: [id number]  Unit/Bed#: S -01 I Date of Admission: 5/13/2024   Date of Service: 5/14/2024 I Hospital Day: 1    Assessment/Plan   * Syncope  Assessment & Plan  POA with episode of syncope during PT session.  At that time blood pressure appreciated to be low 60/40.  Rechecked at the facility with BP 74/50  Per facility staff as well as patient's power of  patient has poor oral intake.  Has been having previous episodes of near syncope.  Multiple falls in the past with the most recent falls in April 2024 with multiple bone fractures.   Urinalysis with innumerable WBC, negative nitrates.  Procalcitonin negative  5/14 CT head without contrast with no acute infarct, chronic microangiopathic changes.  5/14 chest x-ray no acute cardiopulmonary process.   Orthostatics were positive in the ED, s/p 1 L of IVF. Etiology likely orthostatic hypotension in the setting of decreased p.o. intake as well as use of antihypertensive. There could be some neurologic component to it as she does have chronic microangiopathic changes on CT head.  EKG with NSR with negative tropes, low suspicion for cardiac etiology.  5/14 orthostatic vitals negative s/p IVF.    Plan:  Hold onto amlodipine 2.5 mg given hypotension.  PT/OT-recommended minimal resources.    UTI (urinary tract infection)  Assessment & Plan  Patient reports on and off symptoms of burning with urination.  UA with pyuria with occasional bacteria. No sepsis criteria on admission.    Plan:  Continue with IV ceftriaxone, total 3 days.  Follow-up on urine cultures.     History of CVA (cerebrovascular accident)  Assessment & Plan  History of CVA diagnosed in 2017.  Has been following up with [facility] neurology.  At that time patient expressed symptoms of confusion, CT head showing encephalomalacia of left posterior parietal lobe  with follow up MRI brain showing left posterior parietal region of  mildly complex signal intensity most consistent with subacute to early chronic infarction punctate hemorrhage  CT head on admission shows no acute infarct.  Chronic microangiopathic changes.  Patient oriented x 2 however history of mild cognitive decline.  Reports right eye floaters however it is a chronic issue (has history of cataract repair).  Has been previously on aspirin however according to current med list patient is not on aspirin anymore.  No recent notes stating the reason of aspirin discontinuation.Currently not on statin.    Plan:  Given no significant neurodeficits on physical exam will hold off on MRI.  Restarted aspirin 81 for hx of CVA prophylaxis.    Mild cognitive impairment  Assessment & Plan  Mini-Mental 2020 showed 23/30. Patient oriented x 2, to self and place.  Per patient's POA patient lives in assisted living facility however she probably needs more care at this time.  Delirium precaution order set  TSH within normal limit.    Age-related osteoporosis with current pathological fracture  Assessment & Plan  Severe osteoporosis according to DEXA scan in 2019. T score lumbar spine -4.8, femoral neck -4.7. History of recent fall in April with multiple rib fracture, L1, L2, L4 fracture, zygomatic bone fractur.  According to allergy panel patient allergic to alendronate and raloxifene.  Currently on intranasal calcitonin daily, alternating nostrils      Ambulatory dysfunction  Assessment & Plan  Patient reporting walking with a walker.   History of multiple falls in the past, undergoing physiotherapy at the facility post hospital discharge.  Consulted PT/OT    Vitamin D deficiency  Assessment & Plan  History of immune deficiency and severe osteoporosis according to DEXA scan.  Currently taking vitamin D supplementation.    Constipation  Assessment & Plan  Continue with Senokot twice a day    Hyponatremia-resolved as of 5/14/2024  Assessment & Plan  Mild hyponatremia with sodium 133.  Suspected in  the setting of poor oral intake.  Continue IV hydration and encourage oral intake -improved.                 VTE Pharmacologic Prophylaxis: VTE Score: 3 Moderate Risk (Score 3-4) - Pharmacological DVT Prophylaxis Ordered: enoxaparin (Lovenox).    Mobility:   Basic Mobility Inpatient Raw Score: 14  JH-HLM Goal: 4: Move to chair/commode  JH-HLM Achieved: 7: Walk 25 feet or more  JH-HLM Goal achieved. Continue to encourage appropriate mobility.    Patient Centered Rounds: I performed bedside rounds with nursing staff today.   Discussions with Specialists or Other Care Team Provider:     Education and Discussions with Family / Patient: Updated  (CESAR MENDOZA) via phone.      Current Length of Stay: 1 day(s)  Current Patient Status: Inpatient   Discharge Plan: Anticipate discharge in 24-48 hrs to prior assisted or independent living facility.    Code Status: Level 3 - DNAR and DNI    Subjective:   Acute overnight events. Patient was seen bedside this morning. Orthostatic vitals obtained this morning negative s/p IVF.   Objective:     Vitals:   Temp (24hrs), Av.4 °F (36.9 °C), Min:98.3 °F (36.8 °C), Max:98.4 °F (36.9 °C)    Temp:  [98.3 °F (36.8 °C)-98.4 °F (36.9 °C)] 98.4 °F (36.9 °C)  HR:  [66-94] 70  Resp:  [16-18] 17  BP: (100-161)/(55-87) 157/84  SpO2:  [91 %-98 %] 96 %  There is no height or weight on file to calculate BMI.     Input and Output Summary (last 24 hours):     Intake/Output Summary (Last 24 hours) at 2024 1543  Last data filed at 2024 1147  Gross per 24 hour   Intake 1000 ml   Output 539 ml   Net 461 ml       Physical Exam:   Physical Exam  Constitutional:       General: She is not in acute distress.     Appearance: Normal appearance. She is not ill-appearing.   HENT:      Head: Normocephalic and atraumatic.      Mouth/Throat:      Mouth: Mucous membranes are moist.      Pharynx: Oropharynx is clear.   Eyes:      Extraocular Movements: Extraocular movements intact.      Pupils:  Pupils are equal, round, and reactive to light.   Cardiovascular:      Rate and Rhythm: Normal rate and regular rhythm.   Pulmonary:      Effort: Pulmonary effort is normal.      Breath sounds: Rales present.   Abdominal:      General: Abdomen is flat.      Palpations: Abdomen is soft.   Skin:     General: Skin is warm and dry.      Capillary Refill: Capillary refill takes less than 2 seconds.   Neurological:      General: No focal deficit present.      Mental Status: She is alert and oriented to person, place, and time.          Additional Data:     Labs:  Results from last 7 days   Lab Units 05/14/24  0522 05/13/24  1424   WBC Thousand/uL 6.51 8.45   HEMOGLOBIN g/dL 10.6* 12.1   HEMATOCRIT % 34.5* 38.6   PLATELETS Thousands/uL 307 332   SEGS PCT %  --  79*   LYMPHO PCT %  --  12*   MONO PCT %  --  7   EOS PCT %  --  1     Results from last 7 days   Lab Units 05/14/24  0522 05/13/24  1424   SODIUM mmol/L 137 133*   POTASSIUM mmol/L 4.5 4.8   CHLORIDE mmol/L 108 102   CO2 mmol/L 22 22   BUN mg/dL 21 25   CREATININE mg/dL 0.89 1.06   ANION GAP mmol/L 7 9   CALCIUM mg/dL 8.3* 8.9   ALBUMIN g/dL  --  3.6   TOTAL BILIRUBIN mg/dL  --  0.36   ALK PHOS U/L  --  138*   ALT U/L  --  14   AST U/L  --  20   GLUCOSE RANDOM mg/dL 85 85                 Results from last 7 days   Lab Units 05/13/24  1424   PROCALCITONIN ng/ml 0.10       Lines/Drains:  Invasive Devices       Peripheral Intravenous Line  Duration             Peripheral IV 05/13/24 Left;Proximal;Ventral (anterior) Forearm 1 day                      Telemetry:  Telemetry Orders (From admission, onward)               24 Hour Telemetry Monitoring  Continuous x 24 Hours (Telem)        Question:  Reason for 24 Hour Telemetry  Answer:  Syncope suspected to be cardiac in origin                     Telemetry Reviewed: Normal Sinus Rhythm  Indication for Continued Telemetry Use: No indication for continued use. Will discontinue.              Imaging: No pertinent imaging  reviewed.    Recent Cultures (last 7 days):   Results from last 7 days   Lab Units 05/13/24  1433   URINE CULTURE  Culture too young- will reincubate       Last 24 Hours Medication List:   Current Facility-Administered Medications   Medication Dose Route Frequency Provider Last Rate    acetaminophen  650 mg Oral Q6H PRN Rayna Orosco MD      aspirin  81 mg Oral Daily Rayna Orosco MD      calcitonin (salmon)  1 spray Alternating Nares Daily Rayna Orosco MD      cefTRIAXone  1,000 mg Intravenous Q24H Rayna Orosco MD      Cholecalciferol  2,000 Units Oral Daily Rayna Orosco MD      clotrimazole-betamethasone   Topical BID Rayna Orosco MD      [START ON 5/15/2024] enoxaparin  40 mg Subcutaneous Q24H ANDER Rayna Orosco MD      fluticasone  1 spray Nasal Daily Rayna Orosco MD      gabapentin  100 mg Oral HS Rayna Orosco MD      melatonin  3 mg Oral HS Rayna Orosco MD      senna-docusate sodium  1 tablet Oral BID Rayna Orosco MD          Today, Patient Was Seen By: Marquise Cole DO    **Please Note: This note may have been constructed using a voice recognition system.**

## 2024-05-14 NOTE — PLAN OF CARE
Problem: OCCUPATIONAL THERAPY ADULT  Goal: Performs self-care activities at highest level of function for planned discharge setting.  See evaluation for individualized goals.  Description: Treatment Interventions: ADL retraining, Functional transfer training, UE strengthening/ROM, Endurance training, Cognitive reorientation, Patient/family training, Equipment evaluation/education, Compensatory technique education, Energy conservation, Activityengagement (cognitive assessments)          See flowsheet documentation for full assessment, interventions and recommendations.   Note: Limitation: Decreased ADL status, Decreased UE ROM, Decreased UE strength, Decreased Safe judgement during ADL, Decreased cognition, Decreased endurance, Decreased high-level ADLs, Decreased self-care trans  Prognosis: Fair  Assessment: Patient is a 83 y.o. female seen for OT evaluation and treatment  at [facility] following admission on 5/13/2024  s/p Syncope. Please see above for comprehensive list of comorbidities and significant PMHx impacting functional performance.  At baseline, Pt resides at [facility] - assist with ADLs/IADLS and SERAFIN for functional mobility w/ use of RW. (+) falls. (-) driving. Retired. Upon initial evaluation, pt appears to be performing below baseline functional status. Pt requires ADRIANA for UB ADLs, MODA for LB ADLs, MODA for transfers and ADRIANA for functional mobility short distance  with RW. The AM-PAC & Barthel Index outcome tools were used to assist in determining pt safety w/self care /mobility and appropriate d/c recommendations, see above for score. Occupational performance is affected by the following deficits: endurance ,  decreased muscular strength , decreased range of motion , decreased balance , decreased functional reach , decreased activity tolerance , impaired memory , impaired judgement and problem solving , impaired safety awareness , and (+) pain . Personal/Environmental factors impacting  D/C include: (+) Hx of falls , Assistance needed for ADL/IADLs, Assistance needed for ADLs and functional mobility, High fall risk , decreased insight toward deficits , decreased recall of precautions , and baseline cognitive deficits. Supporting factors include: able to maintain FFSU, accessible home environment, support system available, facility staff available for continued assistance, and attitude towards recovery Patient would benefit from OT services within the acute care setting to maximize level of functional independence in the following areas fall prevention , self-care transfers, bed mobility , functional mobility, formal cognitive evaluation, and ADLs.  From OT standpoint, recommendation at time of D/C would be level III (minimum resource intensity).     Rehab Resource Intensity Level, OT: III (Minimum Resource Intensity)     Kendra Garcia MS OTR/L   [place] Licensure# [id number]

## 2024-05-14 NOTE — ASSESSMENT & PLAN NOTE
Severe osteoporosis according to DEXA scan in 2019. T score lumbar spine -4.8, femoral neck -4.7. History of recent fall in April with multiple rib fracture, L1, L2, L4 fracture, zygomatic bone fractur.  According to allergy panel patient allergic to alendronate and raloxifene.  Currently on intranasal calcitonin daily, alternating nostrils

## 2024-05-14 NOTE — ASSESSMENT & PLAN NOTE
Patient reports on and off symptoms of burning with urination.  UA with pyuria with occasional bacteria. No sepsis criteria on admission.    Plan:  Continue with IV ceftriaxone, total 3 days.  Follow-up on urine cultures.

## 2024-05-14 NOTE — ASSESSMENT & PLAN NOTE
History of CVA diagnosed in 2017.  Has been following up with [facility] neurology.  At that time patient expressed symptoms of confusion, CT head showing encephalomalacia of left posterior parietal lobe  with follow up MRI brain showing left posterior parietal region of mildly complex signal intensity most consistent with subacute to early chronic infarction punctate hemorrhage  CT head on admission shows no acute infarct.  Chronic microangiopathic changes.  Patient oriented x 2 however history of mild cognitive decline.  Reports right eye floaters however it is a chronic issue (has history of cataract repair).  Has been previously on aspirin however according to current med list patient is not on aspirin anymore.  No recent notes stating the reason of aspirin discontinuation.Currently not on statin.    Plan:  Given no significant neurodeficits on physical exam will hold off on MRI.  Restarted aspirin 81 for hx of CVA prophylaxis.

## 2024-05-14 NOTE — ASSESSMENT & PLAN NOTE
Mild hyponatremia with sodium 133.  Suspected in the setting of poor oral intake.  Continue IV hydration and encourage oral intake -improved.

## 2024-05-14 NOTE — DISCHARGE INSTR - AVS FIRST PAGE
Dear Doris Campbell,     It was our pleasure to care for you here at [facility].  It is our hope that we were always able to exceed the expected standards for your care during your stay.  You were hospitalized due to syncope.  You were cared for on the fourth floor by Marquise Cole DO under the service of Rohini Rangel MD with the [facility] who covers for your primary care physician (PCP), Tuyet Cantor MD, while you were hospitalized.  If you have any questions or concerns related to this hospitalization, you may contact us at .  For follow up as well as any medication refills, we recommend that you follow up with your primary care physician.  A registered nurse will reach out to you by phone within a few days after your discharge to answer any additional questions that you may have after going home.  However, at this time we provide for you here, the most important instructions / recommendations at discharge:     Notable Medication Adjustments -   Please start taking aspirin 81 mg daily.  Testing Required after Discharge -   None  Important follow up information -   Please follow with your PCP within 1 week of discharge-recommend rechecking blood pressure to see if you need to resume taking amlodipine.  Other Instructions -   Should your symptoms worsen or return, please return to ED for further evaluation.  Please review this entire after visit summary as additional general instructions including medication list, appointments, activity, diet, any pertinent wound care, and other additional recommendations from your care team that may be provided for you.      Sincerely,     Marquise Cole DO

## 2024-05-14 NOTE — ASSESSMENT & PLAN NOTE
Mini-Mental 2020 showed 23/30. Patient oriented x 2, to self and place.  Per patient's POA patient lives in assisted living facility however she probably needs more care at this time.  Delirium precaution order set  TSH within normal limit.

## 2024-05-14 NOTE — DISCHARGE SUMMARY
[facility]  Discharge- Doris Campbell 1940, 83 y.o. female MRN: [id number]  Unit/Bed#: S -01 Encounter: [id number]  Primary Care Provider: Tuyet Cantor MD   Date and time admitted to hospital: 5/13/2024  1:44 PM    * Syncope  Assessment & Plan  POA with episode of syncope during PT session.  At that time blood pressure appreciated to be low 60/40.  Rechecked at the facility with BP 74/50  Per facility staff as well as patient's power of  patient has poor oral intake.  Has been having previous episodes of near syncope.  Multiple falls in the past with the most recent falls in April 2024 with multiple bone fractures.   Urinalysis with innumerable WBC, negative nitrates.  Procalcitonin negative  5/14 CT head without contrast with no acute infarct, chronic microangiopathic changes.  5/14 chest x-ray no acute cardiopulmonary process.   Orthostatics were positive in the ED, s/p 1 L of IVF. Etiology likely orthostatic hypotension in the setting of decreased p.o. intake as well as use of antihypertensive. EKG with NSR with negative tropes, low suspicion for cardiac etiology.  5/14 orthostatic vitals negative s/p IVF.    Plan:  PT/OT-recommended minimal resources.  Blood pressure elevated systolic in the [id number] resume amlodipine 2.5 mg.  Encourage p.o. intake.    UTI (urinary tract infection)  Assessment & Plan  Patient reports on and off symptoms of burning with urination.  UA with pyuria with occasional bacteria. No sepsis criteria on admission.    Plan:  Completed IV ceftriaxone, total 3 days.       History of CVA (cerebrovascular accident)  Assessment & Plan  History of CVA diagnosed in 2017.  Has been following up with [facility] neurology.  At that time patient expressed symptoms of confusion, CT head showing encephalomalacia of left posterior parietal lobe  with follow up MRI brain showing left posterior parietal region of mildly complex signal intensity most  consistent with subacute to early chronic infarction punctate hemorrhage  CT head on admission shows no acute infarct.  Chronic microangiopathic changes.  Patient oriented x 2 however history of mild cognitive decline.  Reports right eye floaters however it is a chronic issue (has history of cataract repair).  Has been previously on aspirin however according to current med list patient is not on aspirin anymore.  No recent notes stating the reason of aspirin discontinuation.Currently not on statin.    Plan:  Given no significant neurodeficits on physical exam will hold off on MRI.  Restarted aspirin 81 for hx of CVA prophylaxis.    Mild cognitive impairment  Assessment & Plan  Mini-Mental 2020 showed 23/30. Patient oriented x 2, to self and place.  Per patient's POA patient lives in assisted living facility however she probably needs more care at this time.  Delirium precaution order set  TSH within normal limit.    Age-related osteoporosis with current pathological fracture  Assessment & Plan  Severe osteoporosis according to DEXA scan in 2019. T score lumbar spine -4.8, femoral neck -4.7. History of recent fall in April with multiple rib fracture, L1, L2, L4 fracture, zygomatic bone fractur.  According to allergy panel patient allergic to alendronate and raloxifene.  Currently on intranasal calcitonin daily, alternating nostrils      Ambulatory dysfunction  Assessment & Plan  Patient reporting walking with a walker.   History of multiple falls in the past, undergoing physiotherapy at the facility post hospital discharge.  PT/OT-recommended minimal resources.    Vitamin D deficiency  Assessment & Plan  History of immune deficiency and severe osteoporosis according to DEXA scan.  Currently taking vitamin D supplementation.    Constipation  Assessment & Plan  Patient had a bowel movement on the day of admission -can continue Senokot twice a day as needed for constipation.        Medical Problems       Resolved  Problems  Date Reviewed: 5/13/2024   None       Discharging Resident: Marquise Cole DO  Discharging Attending: Rohini Rangel MD  PCP: Tuyet Cantor MD  Admission Date:   Admission Orders (From admission, onward)       Ordered        05/13/24 1624  INPATIENT ADMISSION  Once                          Discharge Date: 05/15/24    Consultations During Hospital Stay:  None    Procedures Performed:   None    Significant Findings / Test Results:   CT head wo contrast   Final Result by Jermain Forte MD (05/13 1542)      No acute intracranial hemorrhage. Given extensive background of microangiopathic changes, if there is concern for acute infarct, MRI is a more sensitive examination.                  Workstation performed: [id number]         XR chest 1 view portable   Final Result by Anam Daigle MD (05/14 0831)      No focal consolidation, pleural effusion, or pneumothorax.            Workstation performed: [id number]             Incidental Findings:   None       Test Results Pending at Discharge (will require follow up):       Outpatient Tests Requested:  None    Complications:  None    Reason for Admission: Syncope    Hospital Course:   Doris Campbell is a 83 y.o. female patient with RA, breast cancer, osteoporosis with multiple pathological fractures including L1, L2, L4 compression fractures, vitamin D deficiency, history of CVA who originally presented to the hospital on 5/13/2024 due to an episode of syncope without fall that occurred during PT session. Blood pressure check at that time was 60/40, recheck 75/50. Per facility and POA, decreased p.o. intake to which patient agrees, she does not want to drink as to avoid using restroom a lot.  Initial orthostatic vitals obtained were positive but improved after IVF, etiology of syncope likely orthostatic hypotension.  As, patient had EKG with NSR and negative tropes, low suspicion for cardiac etiology.  Patient was treated for UTI with 3 days of  ceftriaxone.  Due to history of CVA, aspirin was resumed.  Otherwise, patient was stable for discharge.    Please see above list of diagnoses and related plan for additional information.     Condition at Discharge: fair    Discharge Day Visit / Exam:   Subjective: Patient seen bedside this morning, did not have any other complaints.    Vitals: Blood Pressure: 169/88 (05/15/24 1157)  Pulse: 80 (05/15/24 1157)  Temperature: 98.7 °F (37.1 °C) (05/15/24 0733)  Temp Source: Oral (05/13/24 1347)  Respirations: 16 (05/15/24 0733)  SpO2: 95 % (05/15/24 1157)  Exam:   Physical Exam  Constitutional:       General: She is not in acute distress.     Appearance: Normal appearance. She is not ill-appearing.   HENT:      Head: Normocephalic and atraumatic.      Mouth/Throat:      Mouth: Mucous membranes are moist.      Pharynx: Oropharynx is clear.   Eyes:      Extraocular Movements: Extraocular movements intact.      Pupils: Pupils are equal, round, and reactive to light.   Cardiovascular:      Rate and Rhythm: Normal rate and regular rhythm.   Pulmonary:      Effort: Pulmonary effort is normal.      Breath sounds: Normal breath sounds.   Abdominal:      General: Abdomen is flat.      Palpations: Abdomen is soft.   Musculoskeletal:      Right lower leg: No edema.      Left lower leg: No edema.   Skin:     General: Skin is warm and dry.      Capillary Refill: Capillary refill takes less than 2 seconds.   Neurological:      General: No focal deficit present.      Mental Status: She is alert and oriented to person, place, and time.        Discussion with Family: Updated  (CESAR) at bedside.    Discharge instructions/Information to patient and family:   See after visit summary for information provided to patient and family.      Provisions for Follow-Up Care:  See after visit summary for information related to follow-up care and any pertinent home health orders.      Mobility at time of Discharge:   Basic Mobility  Inpatient Raw Score: 14  JH-HLM Goal: 4: Move to chair/commode  JH-HLM Achieved: 4: Move to chair/commode  HLM Goal achieved. Continue to encourage appropriate mobility.     Disposition:   Assisted Living Facility at [facility]    Planned Readmission: No    Discharge Medications:  See after visit summary for reconciled discharge medications provided to patient and/or family.      **Please Note: This note may have been constructed using a voice recognition system**

## 2024-05-14 NOTE — ASSESSMENT & PLAN NOTE
Mild hyponatremia with sodium 133.  Suspected in the setting of poor oral intake.  Continue IV hydration and encourage oral intake.  Morning BMP

## 2024-05-14 NOTE — PHYSICAL THERAPY NOTE
Physical Therapy Evaluation    Patient's Name: Doris Campbell    Admitting Diagnosis  Syncope [R55]  UTI (urinary tract infection) [N39.0]  Pneumonia [J18.9]    Problem List  Patient Active Problem List   Diagnosis    HLD (hyperlipidemia)    Fall    Age-related osteoporosis with current pathological fracture    Vitamin D deficiency    Mild cognitive impairment    Hypertension    Anxiety    Chronic pain of right knee    Arthritis of knee, right    Acute cough    Rheumatoid arthritis, unspecified (HCC)    Personal history of transient ischemic attack (TIA), and cerebral infarction without residual deficits    Personal history of breast cancer    Major neurocognitive disorder, due to vascular disease, without behavioral disturbance, mild    Hereditary and idiopathic neuropathy, unspecified    B12 deficiency    Constipation    Difficult or painful urination    Allergic rhinitis due to allergen    Vesicovaginal fistula    History of CVA (cerebrovascular accident)    Ambulatory dysfunction    Adjustment disorder with anxious mood    Asthma    Anemia    Multiple rib fractures    Acute pain due to trauma    L1 vertebral fracture (HCC)    Zygomatic fracture (HCC)    Ingrown nail    Dysphagia    Recurrent falls    Syncope    UTI (urinary tract infection)       Past Medical History  Past Medical History:   Diagnosis Date    Allergic     Anxiety     Arthritis     Cancer (HCC)     HLD (hyperlipidemia)     Hypertension     Hyperthyroidism     Osteoporosis     Stroke (HCC)     TIA (transient ischemic attack)        Past Surgical History  Past Surgical History:   Procedure Laterality Date    BREAST LUMPECTOMY Left     HYSTERECTOMY      KNEE SURGERY Right     ORIF TIBIA FRACTURE Right 12/8/2016    Procedure: OPEN REDUCTION W/ INTERNAL FIXATION (ORIF) TIBIA;  Surgeon: Angie Case MD;  [facility];  Service:         05/14/24 1355   PT Last Visit   PT Visit Date 05/14/24   Note Type   Note type Evaluation   Pain  Assessment   Pain Assessment Tool FLACC   Pain Location/Orientation Location: Back   Pain Rating: FLACC (Rest) - Face 1   Pain Rating: FLACC (Rest) - Legs 0   Pain Rating: FLACC (Rest) - Activity 0   Pain Rating: FLACC (Rest) - Cry 0   Pain Rating: FLACC (Rest) - Consolability 0   Score: FLACC (Rest) 1   Pain Rating: FLACC (Activity) - Face 1   Pain Rating: FLACC (Activity) - Legs 0   Pain Rating: FLACC (Activity) - Activity 0   Pain Rating: FLACC (Activity) - Cry 1   Pain Rating: FLACC (Activity) - Consolability 1   Score: FLACC (Activity) 3   Restrictions/Precautions   Weight Bearing Precautions Per Order No   Braces or Orthoses   (per previous admission pt to wear TLSO PRN for comfort-TLSO currently unavailable)   Other Precautions Cognitive;Chair Alarm;Bed Alarm;Fall Risk;Multiple lines   Home Living   Type of Home Assisted living  ([facility])   Home Layout One level;Performs ADLs on one level;Able to live on main level with bedroom/bathroom   Home Equipment Walker   Prior Function   Lives With Alone   Receives Help From Personal care attendant   IADLs Family/Friend/Other provides transportation;Family/Friend/Other provides meals;Family/Friend/Other provides medication management   Falls in the last 6 months 1 to 4  (x3- at least one additional fall since previous admission)   Vocational Retired   Comments pt is questionable historian. per EMR pt is mod I with RW   General   Family/Caregiver Present No   Cognition   Overall Cognitive Status Impaired   Orientation Level Oriented to person;Oriented to place;Oriented to situation;Disoriented to time   Following Commands Follows one step commands with increased time or repetition   Comments pt ID by wristband, name and    Subjective   Subjective pt agreeable to PT evaluation   RLE Assessment   RLE Assessment X  (4-/5 knee ext/flexion)   LLE Assessment   LLE Assessment X  (4-/5 knee ext/flexion)   Bed Mobility   Supine to Sit Unable to assess   Sit to  Supine Unable to assess   Additional Comments pt seated OOB in recliner pre/post PT evaluation   Transfers   Sit to Stand 3  Moderate assistance   Additional items Assist x 1;Increased time required;Verbal cues   Stand to Sit 3  Moderate assistance   Additional items Assist x 1;Increased time required;Verbal cues   Additional Comments retropulsive with x2 sts performed throughout session, requires vc for anterior weight shiift, forward gaze and improved posture to improve retropulsion   Ambulation/Elevation   Gait pattern Improper Weight shift;Short stride;Excessively slow;Decreased hip extension;Foward flexed   Gait Assistance 4  Minimal assist   Additional items Assist x 1;Verbal cues;Tactile cues   Assistive Device Rolling walker   Distance 5'x1, 15'1, 20'x1   Ambulation/Elevation Additional Comments vc for RW management, as pt ambulated consistently outside of frame of RW. vc for increased step length with no carryover of cue   Balance   Static Sitting Fair +   Static Standing Poor +   Dynamic Standing Poor +  (RW)   Ambulatory Poor  (RW)   Activity Tolerance   Activity Tolerance Patient limited by fatigue;Patient limited by pain   Medical Staff Made Aware OT senait, spoke with CM, TT with [facility] resident   Nurse Made Aware KENZIE meek pre\   Assessment   Prognosis Fair   Problem List Decreased strength;Impaired balance;Decreased endurance;Decreased mobility;Decreased cognition;Decreased skin integrity;Decreased safety awareness   Assessment Pt is a 83 y.o. female seen for PT evaluation s/p admit to [facility] on 5/13/2024. Pt was admitted with a primary dx of: syncope, UTI, pneumonia.  PT now consulted for assessment of mobility and d/c needs. Pt with Up and OOB as tolerated orders.  Pts current comorbidities and personal factors effecting treatment include: osteoporosis with pathological fracture, Mild cognitive impairment, HTN, anxiety, history of TIA and CVA.. Pts current clinical presentation is  Unstable/Unpredictable (high complexity) due to Ongoing medical management for primary dx, Decreased activity tolerance compared to baseline, Fall risk, Increased assistance needed from caregiver at current time, Cog status. Prior to admission, pt was independent with use of RW. Upon evaluation, pt currently is requiring; ModA for transfers and Mono for ambulation 20 ft w/ RW. Pt presents at PT eval functioning below baseline and currently w/ overall mobility deficits 2* to: BLE weakness, impaired balance, gait deviations, pain, decreased activity tolerance compared to baseline, decreased functional mobility tolerance compared to baseline, decreased safety awareness, impaired judgement, decreased cognition. Pt currently at a fall risk 2* to impairments listed above.  Pt will continue to benefit from skilled acute PT interventions to address stated impairments; to maximize functional mobility; for ongoing pt/ family training; and DME needs. At conclusion of PT session chair alarm engaged, all needs in reach, RN notified of session findings/recommendations, and pt returned back in recliner chair with phone and call bell within reach. Pt denies any further questions at this time. Recommend Level III (Minimum Resource Intensity)  upon hospital D/C.   Goals   Patient Goals no direct therapy goals were stated by pt   STG Expiration Date 05/28/24   Short Term Goal #1 In 14 days pt will be able to: 1. Demonstrate ability to perform all aspects of bed mobility independently to improve functional safety.  2. Perform functional transfers independently to facilitate safe return to previous living environment.  3.  Ambulate 150 ft with LRAD independently with stable vitals to improve safety with household distances and reduce fall risk.  4. Improve LE strength grades by 1 to increase ease of functional mobility with transfers and gait. 5. Pt will demonstrate improved balance by one grade in order to decrease risk of falls.   PT  Treatment Day 0   Plan   Treatment/Interventions Functional transfer training;LE strengthening/ROM;Elevations;Therapeutic exercise;Cognitive reorientation;Patient/family training;Equipment eval/education;Bed mobility;Gait training;Spoke to nursing;Spoke to case management;OT   PT Frequency 3-5x/wk   Discharge Recommendation   Rehab Resource Intensity Level, PT III (Minimum Resource Intensity)   Additional Comments reccomend return to facility with therapy services if facility can accomodate levels of assistance required   AM-PAC Basic Mobility Inpatient   Turning in Flat Bed Without Bedrails 2   Lying on Back to Sitting on Edge of Flat Bed Without Bedrails 2   Moving Bed to Chair 3   Standing Up From Chair Using Arms 2   Walk in Room 3   Climb 3-5 Stairs With Railing 2   Basic Mobility Inpatient Raw Score 14   Basic Mobility Standardized Score 35.55   [facility] Highest Level Of Mobility   -HL Goal 4: Move to chair/commode   -HLM Achieved 7: Walk 25 feet or more   End of Consult   Patient Position at End of Consult Bedside chair;Bed/Chair alarm activated;All needs within reach   The patient's AM-PAC Basic Mobility Inpatient Short Form Raw Score is 14. A Raw score of less than or equal to 16 suggests the patient may benefit from discharge to post-acute rehabilitation services. Please also refer to the recommendation of the Physical Therapist for safe discharge planning.  Umer Jalloh, PT

## 2024-05-14 NOTE — OCCUPATIONAL THERAPY NOTE
Occupational Therapy Evaluation + Treatment     Patient Name: Doris Campbell  Today's Date: 5/14/2024  Problem List  Principal Problem:    Syncope  Active Problems:    Age-related osteoporosis with current pathological fracture    Vitamin D deficiency    Mild cognitive impairment    Constipation    History of CVA (cerebrovascular accident)    Ambulatory dysfunction    UTI (urinary tract infection)    Past Medical History  Past Medical History:   Diagnosis Date    Allergic     Anxiety     Arthritis     Cancer (HCC)     HLD (hyperlipidemia)     Hypertension     Hyperthyroidism     Osteoporosis     Stroke (HCC)     TIA (transient ischemic attack)      Past Surgical History  Past Surgical History:   Procedure Laterality Date    BREAST LUMPECTOMY Left     HYSTERECTOMY      KNEE SURGERY Right     ORIF TIBIA FRACTURE Right 12/8/2016    Procedure: OPEN REDUCTION W/ INTERNAL FIXATION (ORIF) TIBIA;  Surgeon: Angie Case MD;  [facility];  Service:            05/14/24 2134   OT Last Visit   OT Visit Date 05/14/24   Note Type   Note type Evaluation  (+ treatment [id number])   Pain Assessment   Pain Assessment Tool FLACC   Pain Location/Orientation Location: Back   Pain Radiating Towards n/a   Pain Onset/Description Onset: Gradual;Descriptor: Discomfort   Effect of Pain on Daily Activities limits comfort, sitting tolerance, ease of ADLs   Patient's Stated Pain Goal No pain   Hospital Pain Intervention(s) Repositioned;Ambulation/increased activity;Emotional support   Multiple Pain Sites No   Pain Rating: FLACC (Rest) - Face 0   Pain Rating: FLACC (Rest) - Legs 0   Pain Rating: FLACC (Rest) - Activity 0   Pain Rating: FLACC (Rest) - Cry 0   Pain Rating: FLACC (Rest) - Consolability 0   Score: FLACC (Rest) 0   Pain Rating: FLACC (Activity) - Face 0   Pain Rating: FLACC (Activity) - Legs 0   Pain Rating: FLACC (Activity) - Activity 0   Pain Rating: FLACC (Activity) - Cry 1   Pain Rating: FLACC (Activity) -  Consolability 0   Score: FLACC (Activity) 1   Restrictions/Precautions   Weight Bearing Precautions Per Order No   Other Precautions Cognitive;Chair Alarm;Bed Alarm;Multiple lines;Fall Risk;Pain;Visual impairment   Home Living   Type of Home Assisted living  ([facility])   Home Layout One level;Performs ADLs on one level;Able to live on main level with bedroom/bathroom;Access   Home Equipment Walker   Additional Comments MOD I w/ RW at baseline per chart review   Prior Function   Level of Aline Independent with functional mobility;Needs assistance with ADLs;Needs assistance with IADLS  (Assist w/ LB ADLs and bathing at baseline + assist with UB ADLs PRN)   Lives With (S)  Alone   Receives Help From Personal care attendant;Other (Comment)  (facility staff. HHOT/HHPT)   IADLs Family/Friend/Other provides transportation;Family/Friend/Other provides meals;Family/Friend/Other provides medication management  (Facility assists with IADLs)   Falls in the last 6 months 1 to 4  (pt reports 3 falls, 1 leading to current admission)   Vocational Retired  (costume making)   Comments Pt is a questionable/poor historian at time of IE w/ mild cognitive decline. Per chart review, pt has assist w/ ADLs and IADLs. MOD I for functional mobility w/ use of RW.   Lifestyle   Autonomy Pt resides at [facility] - assist with ADLs/IADLS and SERAFIN for functional mobility w/ use of RW. (+) falls. (-) driving. Retired   Reciprocal Relationships Supportive facility staff and family   Service to Others Retired   General   Additional Pertinent History Pt admit from [facility] w/ syncopal episode. CT head on admission shows no acute infarct. PMHx rheumatoid arthritis, hyperlipidemia, CVA, mild cognitive decline, cataract repair w/ chronic eye floater, L1, L2, L4 compression fractures   Family/Caregiver Present No   Additional General Comments Of note, on recent admission 4/24: Dx: L1 vertebrl fx, orbital fx and zygomatic arch  "fracture, multiple rib fractures. Neurosx: TLSO PRN.   Subjective   Subjective \"My vision is not that great - it is blurry\"   ADL   Eating Assistance 5  Supervision/Setup   Grooming Assistance 5  Supervision/Setup   UB Bathing Assistance 4  Minimal Assistance   LB Bathing Assistance 3  Moderate Assistance   UB Dressing Assistance 4  Minimal Assistance   LB Dressing Assistance 3  Moderate Assistance   Toileting Assistance  3  Moderate Assistance   Additional Comments Unable to formally assess bathing, dressing, grooming or toileting at time of eval. The above levels of assistance are anticipated based on functional performance deficits with use of clinical judgement.   Bed Mobility   Supine to Sit Unable to assess   Additional items Comment  (pt received OOB in recliner chair)   Transfers   Sit to Stand 3  Moderate assistance   Additional items Assist x 1;Increased time required;Verbal cues;Armrests   Stand to Sit 3  Moderate assistance   Additional items Assist x 1;Increased time required;Verbal cues;Armrests   Additional Comments Retropulsive upon standing, increased assist w/ verbal and tactile cues to regain balance and prevent a fall. Use of RW for support.   Functional Mobility   Functional Mobility 4  Minimal assistance   Additional Comments Ax1 to ambulate short household distances to the [facility] w/ use of RW. Slow gait, completed w/ increased time.   Additional items Rolling walker   Balance   Static Sitting Fair -   Dynamic Sitting Poor +   Static Standing Poor   Dynamic Standing Poor -   Activity Tolerance   Activity Tolerance Patient limited by fatigue;Other (Comment)  (cognition)   Medical Staff Made Aware Spoke with PT KARLEY Becerra   Nurse Made Aware Spoke with RN pre/post   RUE Assessment   RUE Assessment X  (shoulder AROM 0~80, hx of humeral head fx. Elbow, wrist and hand WFL. Poor  strength)   LUE Assessment   LUE Assessment   (MMT 3+/5 grossly, poor  strength)   Hand Function   Gross Motor " Coordination Functional   Fine Motor Coordination Functional   Vision-Basic Assessment   Visual History Cataracts   Patient Visual Report Other (Comment)  (chronic eye floaters)   Cognition   Overall Cognitive Status Impaired  (Mini-Mental 2020 showed . Patient oriented x 2, to self and place at baseline.)   Arousal/Participation Alert;Responsive;Cooperative   Attention Attends with cues to redirect   Orientation Level Oriented to person;Oriented to place;Disoriented to time;Oriented to situation  (inconsistently oriented to situation t/o session)   Memory Decreased recall of precautions;Decreased recall of recent events;Decreased short term memory   Following Commands Follows one step commands with increased time or repetition   Comments Pt ID via wristband, name and . Pt is pleasently confused w/ poor insight into current limitiations and deficits.   Assessment   Limitation Decreased ADL status;Decreased UE ROM;Decreased UE strength;Decreased Safe judgement during ADL;Decreased cognition;Decreased endurance;Decreased high-level ADLs;Decreased self-care trans   Prognosis Fair   Assessment Patient is a 83 y.o. female seen for OT evaluation and treatment  at [facility] following admission on 2024  s/p Syncope. Please see above for comprehensive list of comorbidities and significant PMHx impacting functional performance.  At baseline, Pt resides at [facility] - assist with ADLs/IADLS and SERAFIN for functional mobility w/ use of RW. (+) falls. (-) driving. Retired. Upon initial evaluation, pt appears to be performing below baseline functional status. Pt requires ADRIANA for UB ADLs, MODA for LB ADLs, MODA for transfers and ADRIANA for functional mobility short distance  with RW. The AM-PAC & Barthel Index outcome tools were used to assist in determining pt safety w/self care /mobility and appropriate d/c recommendations, see above for score. Occupational performance is affected by the following deficits:  endurance ,  decreased muscular strength , decreased range of motion , decreased balance , decreased functional reach , decreased activity tolerance , impaired memory , impaired judgement and problem solving , impaired safety awareness , and (+) pain . Personal/Environmental factors impacting D/C include: (+) Hx of falls , Assistance needed for ADL/IADLs, Assistance needed for ADLs and functional mobility, High fall risk , decreased insight toward deficits , decreased recall of precautions , and baseline cognitive deficits. Supporting factors include: able to maintain FFSU, accessible home environment, support system available, facility staff available for continued assistance, and attitude towards recovery Patient would benefit from OT services within the acute care setting to maximize level of functional independence in the following areas fall prevention , self-care transfers, bed mobility , functional mobility, formal cognitive evaluation, and ADLs.  From OT standpoint, recommendation at time of D/C would be level III (minimum resource intensity).   Goals   Patient Goals not to go back to [facility] Time Frame 10-14   Long Term Goal See below   Plan   Treatment Interventions ADL retraining;Functional transfer training;UE strengthening/ROM;Endurance training;Cognitive reorientation;Patient/family training;Equipment evaluation/education;Compensatory technique education;Energy conservation;Activityengagement  (cognitive assessments)   Goal Expiration Date 05/24/24   OT Treatment Day 1   OT Frequency 3-5x/wk   Discharge Recommendation   Rehab Resource Intensity Level, OT III (Minimum Resource Intensity)   Additional Comments  The patient's raw score on the AM-PAC Daily Activity Inpatient Short Form is 15. A raw score of less than 19 suggests the patient may benefit from discharge to post-acute rehabilitation services. Please refer to the recommendation of the Occupational Therapist for safe discharge  planning.   AM-PAC Daily Activity Inpatient   Lower Body Dressing 2   Bathing 2   Toileting 2   Upper Body Dressing 3   Grooming 3   Eating 3   Daily Activity Raw Score 15   Daily Activity Standardized Score (Calc for Raw Score >=11) 34.69   AM-PAC Applied Cognition Inpatient   Following a Speech/Presentation 1   Understanding Ordinary Conversation 3   Taking Medications 1   Remembering Where Things Are Placed or Put Away 3   Remembering List of 4-5 Errands 2   Taking Care of Complicated Tasks 1   Applied Cognition Raw Score 11   Applied Cognition Standardized Score 27.03   Barthel Index   Feeding 5   Bathing 0   Grooming Score 0   Dressing Score 5   Bladder Score 5   Bowels Score 10   Toilet Use Score 5   Transfers (Bed/Chair) Score 5   Mobility (Level Surface) Score 0   Stairs Score 0   Barthel Index Score 35   Additional Treatment Session   Start Time 1345   End Time 1354   Treatment Assessment Pt participated in tx session following IE for ADL training. MODA x 1 person for ambulating transfer to/from [facility] w/ use of bilateral armrests. Setup w/ supervision and verbal cueing to sequence pericare. Additional assistance provided for thoroughness and completion of task in standing w/ ADRIANA x 1 person to steady. Pt demonstrates improved balance w/ mild retropulsion upon standing. ADRIANA x 1 person to ambulate functional household distance in the room w/ RW for support. Increased time to complete w/ slow gait, cues for RW management, body mechanics and directional changes. Additional stand to sit completed w/ ADRIANA x 1 person, pt w/ uncontrolled descend into chair. Continued education on safety awareness as patient demonstrates poor recall and carryover from IE. At the end of the session, pt remained seated in the recliner chair w/ alarm donned and all needs in reach. At this time, pt will benefit from continued skilled OT to address functional performance deficits and optimize independence in mobility and ADL tasks. At  time of d/c recommending, level III (minimum resource intensity).   End of Consult   Education Provided Yes   Nurse Communication Nurse aware of consult     Goals established on initial evaluation in order to achieve pt's goal of not returning to [facility].      Pt will complete UB ADLs Supervision for increased ADL independence within 10 days.     Pt will complete LB ADLs Min A for increased ADL independence within 10 days.     Pt will complete toileting Min A with use of DME for increased ADL independence within 10 days.     Pt will demonstrate proper body mechanics to complete self-care transfers and functional mobility with Supervision and use of LRAD for increased safety and functional independence within 10 days.     Pt will perform grooming tasks standing at the sink with Supervision in order to increase overall independence and return to [facility].     Pt will demonstrate standing tolerance of 2 min with Supervision and use of LRAD for increased activity tolerance during ADL/IADL tasks within 10 days.     Pt will complete bed mobility Min A for increased independence in repositioning, pressure offloading, and managing comfort.     Pt will demonstrate proper body mechanics and fall prevention strategies during 100% of tx sessions for increased safety awareness during ADL/IADLs    Pt will improve functional activity tolerance to 25 mins of sustained functional tasks to increase participation in basic self-care tasks and minimize assistance level.     Pt will consistently follow multi step directions during ADL performances w/ less than 1 cue and/or prompt to maximize independence in ad safety with ADL performance.     Pt will participate in ongoing cognitive assessments to assist with safe D/C planning and supervision/assistance recommendations.     Pt will verbalize and demonstrate understanding of B UE HEP program increase strength and endurance during self care transfers within 10 days.     Caregiver will  demonstrate good body mechanics and safe technique when assisting patient during functional ADLs/transfers and decrease assistance level.     Patient's caregivers will be independent with providing appropriate cognitive cues in order to facilitate patient's independence during functional tasks.     Pt benefited from co-session of skilled OT and PT therapists in order to most appropriately address functional deficits d/t regression from functioning level prior to admission  and decreased activity tolerance.  OT/PT objectives were addressed separately; please see PT note for specific goal areas targeted.    Kendra Garcia MS OTR/L   [place] Licensure# [id number]

## 2024-05-14 NOTE — ASSESSMENT & PLAN NOTE
Patient reporting walking with a walker.   History of multiple falls in the past, undergoing physiotherapy at the facility post hospital discharge.  Consulted PT/OT

## 2024-05-14 NOTE — ASSESSMENT & PLAN NOTE
POA with episode of syncope during PT session.  At that time blood pressure appreciated to be low 60/40.  Rechecked at the facility with BP 74/50  Per facility staff as well as patient's power of  patient has poor oral intake.  Has been having previous episodes of near syncope.  Multiple falls in the past with the most recent falls in April 2024 with multiple bone fractures.   Urinalysis with innumerable WBC, negative nitrates.  Procalcitonin negative  5/14 CT head without contrast with no acute infarct, chronic microangiopathic changes.  5/14 chest x-ray no acute cardiopulmonary process.   Orthostatics were positive in the ED, s/p 1 L of IVF. Etiology likely orthostatic hypotension in the setting of decreased p.o. intake as well as use of antihypertensive. There could be some neurologic component to it as she does have chronic microangiopathic changes on CT head.  EKG with NSR with negative tropes, low suspicion for cardiac etiology.  5/14 orthostatic vitals negative s/p IVF.    Plan:  Hold onto amlodipine 2.5 mg given hypotension.  PT/OT-recommended minimal resources.

## 2024-05-14 NOTE — CASE MANAGEMENT
Case Management Progress Note    Patient name Doris Campbell  Location S /S -01 MRN [id number]  : 1940 Date 2024       LOS (days): 1  Geometric Mean LOS (GMLOS) (days): 2.4  Days to GMLOS:1.5        OBJECTIVE:        Current admission status: Inpatient  Preferred Pharmacy:   [id number] [place] - [address]  [address] [facility] [address]  [place] [address]  Phone: [phone] Fax: [phone]    [id number] [place] - [address]  [facility]  [place] [address]  Phone: [phone] Fax: [phone]    PATIENT/FAMILY REPORTS NO PREFERRED PHARMACY  No address on file      Primary Care Provider: Tuyet Cantor MD    Primary Insurance: MEDICARE  Secondary Insurance: BLUE CROSS    PROGRESS NOTE:    CM called Marsha Agrawal to discuss patient and her discharge however was only able to leave a message requesting a call back.    CM department will continue to follow to assist with discharge coordination.

## 2024-05-15 VITALS
OXYGEN SATURATION: 95 % | RESPIRATION RATE: 16 BRPM | HEART RATE: 70 BPM | DIASTOLIC BLOOD PRESSURE: 77 MMHG | TEMPERATURE: 98.4 F | SYSTOLIC BLOOD PRESSURE: 148 MMHG

## 2024-05-15 LAB
ATRIAL RATE: 71 BPM
BACTERIA UR CULT: NORMAL
P AXIS: 69 DEGREES
PR INTERVAL: 128 MS
QRS AXIS: 30 DEGREES
QRSD INTERVAL: 68 MS
QT INTERVAL: 438 MS
QTC INTERVAL: 475 MS
T WAVE AXIS: 36 DEGREES
VENTRICULAR RATE: 71 BPM

## 2024-05-15 PROCEDURE — 93010 ELECTROCARDIOGRAM REPORT: CPT | Performed by: INTERNAL MEDICINE

## 2024-05-15 PROCEDURE — 99239 HOSP IP/OBS DSCHRG MGMT >30: CPT | Performed by: INTERNAL MEDICINE

## 2024-05-15 RX ORDER — AMLODIPINE BESYLATE 2.5 MG/1
2.5 TABLET ORAL DAILY
Status: DISCONTINUED | OUTPATIENT
Start: 2024-05-15 | End: 2024-05-15 | Stop reason: HOSPADM

## 2024-05-15 RX ORDER — ASPIRIN 81 MG/1
81 TABLET, CHEWABLE ORAL DAILY
Qty: 30 TABLET | Refills: 0 | Status: SHIPPED | OUTPATIENT
Start: 2024-05-15 | End: 2024-05-30

## 2024-05-15 RX ADMIN — ASPIRIN 81 MG CHEWABLE TABLET 81 MG: 81 TABLET CHEWABLE at 08:10

## 2024-05-15 RX ADMIN — AMLODIPINE BESYLATE 2.5 MG: 2.5 TABLET ORAL at 12:03

## 2024-05-15 RX ADMIN — SENNOSIDES, DOCUSATE SODIUM 1 TABLET: 8.6; 5 TABLET ORAL at 08:10

## 2024-05-15 RX ADMIN — ENOXAPARIN SODIUM 40 MG: 40 INJECTION SUBCUTANEOUS at 08:10

## 2024-05-15 RX ADMIN — CEFTRIAXONE SODIUM 1000 MG: 10 INJECTION, POWDER, FOR SOLUTION INTRAVENOUS at 14:11

## 2024-05-15 RX ADMIN — FLUTICASONE PROPIONATE 1 SPRAY: 50 SPRAY, METERED NASAL at 08:12

## 2024-05-15 RX ADMIN — Medication 2000 UNITS: at 08:09

## 2024-05-15 RX ADMIN — CALCITONIN SALMON 1 SPRAY: 200 SPRAY, METERED NASAL at 08:12

## 2024-05-15 NOTE — CASE MANAGEMENT
Case Management Discharge Planning Note    Patient name Doris Campbell  Location S /S -01 MRN [id number]  : 1940 Date 5/15/2024       Current Admission Date: 2024  Current Admission Diagnosis:Syncope   Patient Active Problem List    Diagnosis Date Noted Date Diagnosed    Syncope 2024     UTI (urinary tract infection) 2024     Dysphagia 2024     Recurrent falls 2024     Ingrown nail 2024     Multiple rib fractures 2024     Acute pain due to trauma 2024     L1 vertebral fracture (HCC) 2024     Zygomatic fracture (HCC) 2024     Anemia 2023     Asthma 2023     Adjustment disorder with anxious mood 2022     History of CVA (cerebrovascular accident) 2022     Ambulatory dysfunction 2022     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     Allergic rhinitis due to allergen 2022     Acute cough 2022     Chronic pain of right knee      Arthritis of knee, right      Difficult or painful urination 2020     Hereditary and idiopathic neuropathy, unspecified 2020     Rheumatoid arthritis, unspecified (HCC) 2020     Personal history of transient ischemic attack (TIA), and cerebral infarction without residual deficits 2020     Personal history of breast cancer 2020     Constipation 2020     Anxiety      Hypertension 11/10/2020     Mild cognitive impairment      Vitamin D deficiency 10/22/2020     Age-related osteoporosis with current pathological fracture 10/21/2020     Fall 10/19/2020     B12 deficiency 2018     Major neurocognitive disorder, due to vascular disease, without behavioral disturbance, mild 2017     HLD (hyperlipidemia)        LOS (days): 2  Geometric Mean LOS (GMLOS) (days): 2.4  Days to GMLOS:0.6     OBJECTIVE:  Risk of Unplanned Readmission Score: 15.57         Current admission status: Inpatient   Preferred Pharmacy:   [id number] - [place] - [address]  [address]  [place] [address]  Phone: [phone] Fax: [phone]    [id number] [place] - [address]  [facility] [place] [address]  Phone: [phone] Fax: [phone]    PATIENT/FAMILY REPORTS NO PREFERRED PHARMACY  No address on file      Primary Care Provider: Tuyet Cantor MD    Primary Insurance: MEDICARE  Secondary Insurance: BLUE CROSS    DISCHARGE DETAILS:    Discharge planning discussed with:: Patient, POA  Freedom of Choice: Yes  Comments - Freedom of Choice: Return to [facility]  CM contacted family/caregiver?: Yes (POA contacted via phone)  Were Treatment Team discharge recommendations reviewed with patient/caregiver?: Yes  Did patient/caregiver verbalize understanding of patient care needs?: N/A- going to facility  Were patient/caregiver advised of the risks associated with not following Treatment Team discharge recommendations?: Yes    Contacts  Patient Contacts: Patricia George  Relationship to Patient:: Other (Comment) (Patient, family)  Contact Method: Phone, In Person  Phone Number: [phone]  Reason/Outcome: Continuity of Care, Emergency Contact, Referral, Discharge Planning    Requested Home Health Care         Is the patient interested in HHC at discharge?: No    DME Referral Provided  Referral made for DME?: No    Other Referral/Resources/Interventions Provided:  Interventions: Other (Specify), Hospice  Referral Comments: KARLEY requested [facility] transport for 3:30pm via [facility] for pt to return to [facility]. KARLEY spoke with pt KELLY Gomez via phone to f/u re: d/c planning. Patricia aware of transport time of 3:30pm today. Patricia reported she would like pt to be evaluated by [facility] while at [facility]. KARLEY sent referral to [facility] via [facility]. KARLEY notified [facility] resident via TT that hospice needs eval and treat order for [facility]. KARLEY spoke with pt at bedside who was made aware of 3:30pm transport. KARLEY notified nurse  and [facility] resident of requested transport time.    Would you like to participate in our [facility] service program?  : No - Declined    Treatment Team Recommendation: Facility Return  Discharge Destination Plan:: Facility Return  Transport at Discharge : Wheelchair van  Dispatcher Contacted: Yes  Number/Name of Dispatcher: [facility] [phone]  Transported by (Company and Unit #): TBD  ETA of Transport (Date): 05/15/24  ETA of Transport (Time): 1530        Accompanied by: EMS personnel

## 2024-05-15 NOTE — CASE MANAGEMENT
Case Management Discharge Planning Note    Patient name Doris Campbell  Location S /S -01 MRN [id number]  : 1940 Date 5/15/2024       Current Admission Date: 2024  Current Admission Diagnosis:Syncope   Patient Active Problem List    Diagnosis Date Noted Date Diagnosed    Syncope 2024     UTI (urinary tract infection) 2024     Dysphagia 2024     Recurrent falls 2024     Ingrown nail 2024     Multiple rib fractures 2024     Acute pain due to trauma 2024     L1 vertebral fracture (HCC) 2024     Zygomatic fracture (HCC) 2024     Anemia 2023     Asthma 2023     Adjustment disorder with anxious mood 2022     History of CVA (cerebrovascular accident) 2022     Ambulatory dysfunction 2022     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     Allergic rhinitis due to allergen 2022     Acute cough 2022     Chronic pain of right knee      Arthritis of knee, right      Difficult or painful urination 2020     Hereditary and idiopathic neuropathy, unspecified 2020     Rheumatoid arthritis, unspecified (HCC) 2020     Personal history of transient ischemic attack (TIA), and cerebral infarction without residual deficits 2020     Personal history of breast cancer 2020     Constipation 2020     Anxiety      Hypertension 11/10/2020     Mild cognitive impairment      Vitamin D deficiency 10/22/2020     Age-related osteoporosis with current pathological fracture 10/21/2020     Fall 10/19/2020     B12 deficiency 2018     Major neurocognitive disorder, due to vascular disease, without behavioral disturbance, mild 2017     HLD (hyperlipidemia)        LOS (days): 2  Geometric Mean LOS (GMLOS) (days): 2.4  Days to GMLOS:0.4     OBJECTIVE:  Risk of Unplanned Readmission Score: 15.97         Current admission status: Inpatient   Preferred Pharmacy:   [facility][id number] - [place] - [address]  [address]  [place] [address]  Phone: [phone] Fax: [phone]    [id number] [place] - [address]  [place] [place] [address]  Phone: [phone] Fax: [phone]    PATIENT/FAMILY REPORTS NO PREFERRED PHARMACY  No address on file      Primary Care Provider: Tuyet Cantor MD    Primary Insurance: MEDICARE  Secondary Insurance: BLUE CROSS    DISCHARGE DETAILS:                                          Other Referral/Resources/Interventions Provided:  Interventions: Hospice       CM followed up on referral made as requested by family to [facility].  They had not reviewed the referral made in [facility] yet.  CM called [place] in admissions to discuss.  She requested referral tried to be faxed to [phone] or emailed to her at [email or web address]    CM sent the referral both ways and uploaded the hospice order for review.    They are aware that patient is returning to [facility] today at 3:30.    No further CM DC needs were discussed or identified at this time.

## 2024-05-15 NOTE — ASSESSMENT & PLAN NOTE
POA with episode of syncope during PT session.  At that time blood pressure appreciated to be low 60/40.  Rechecked at the facility with BP 74/50  Per facility staff as well as patient's power of  patient has poor oral intake.  Has been having previous episodes of near syncope.  Multiple falls in the past with the most recent falls in April 2024 with multiple bone fractures.   Urinalysis with innumerable WBC, negative nitrates.  Procalcitonin negative  5/14 CT head without contrast with no acute infarct, chronic microangiopathic changes.  5/14 chest x-ray no acute cardiopulmonary process.   Orthostatics were positive in the ED, s/p 1 L of IVF. Etiology likely orthostatic hypotension in the setting of decreased p.o. intake as well as use of antihypertensive. EKG with NSR with negative tropes, low suspicion for cardiac etiology.  5/14 orthostatic vitals negative s/p IVF.    Plan:  PT/OT-recommended minimal resources.  Blood pressure elevated systolic in the [id number] resume amlodipine 2.5 mg.  Encourage p.o. intake.

## 2024-05-15 NOTE — PROGRESS NOTES
Patient:    MRN:  [id number]    Aidin Request ID:  [id number]    Level of care reserved:  Hospice    Partner Reserved:  [facility], [place] [address] [phone]    Clinical needs requested:    Geography searched:  [id number]    Start of Service:    Request sent:  10:25am EDT on 5/15/2024 by Elisa Casanova    Partner reserved:  3:42pm EDT on 5/15/2024 by Freddy Olivia    Choice list shared:  3:42pm EDT on 5/15/2024 by Freddy Olivia

## 2024-05-15 NOTE — ASSESSMENT & PLAN NOTE
Patient had a bowel movement on the day of admission -can continue Senokot twice a day as needed for constipation.

## 2024-05-15 NOTE — ASSESSMENT & PLAN NOTE
History of CVA diagnosed in 2017.  Has been following up with [facility] neurology.  At that time patient expressed symptoms of confusion, CT head showing encephalomalacia of left posterior parietal lobe  with follow up MRI brain showing left posterior parietal region of mildly complex signal intensity most consistent with subacute to early chronic infarction punctate hemorrhage  CT head on admission shows no acute infarct.  Chronic microangiopathic changes.  Patient oriented x 2 however history of mild cognitive decline.  Reports right eye floaters however it is a chronic issue (has history of cataract repair).  Has been previously on aspirin however according to current med list patient is not on aspirin anymore.  No recent notes stating the reason of aspirin discontinuation.Currently not on statin.    Plan:  Given no significant neurodeficits on physical exam will hold off on MRI.  Restarted aspirin 81 for hx of CVA prophylaxis.

## 2024-05-15 NOTE — ASSESSMENT & PLAN NOTE
Patient reports on and off symptoms of burning with urination.  UA with pyuria with occasional bacteria. No sepsis criteria on admission.    Plan:  Completed IV ceftriaxone, total 3 days.

## 2024-05-15 NOTE — ASSESSMENT & PLAN NOTE
Patient reporting walking with a walker.   History of multiple falls in the past, undergoing physiotherapy at the facility post hospital discharge.  PT/OT-recommended minimal resources.

## 2024-05-15 NOTE — ASSESSMENT & PLAN NOTE
POA with episode of syncope during PT session.  At that time blood pressure appreciated to be low 60/40.  Rechecked at the facility with BP 74/50  Per facility staff as well as patient's power of  patient has poor oral intake.  Has been having previous episodes of near syncope.  Multiple falls in the past with the most recent falls in April 2024 with multiple bone fractures.   Urinalysis with innumerable WBC, negative nitrates.  Procalcitonin negative  5/14 CT head without contrast with no acute infarct, chronic microangiopathic changes.  5/14 chest x-ray no acute cardiopulmonary process.   Orthostatics were positive in the ED, s/p 1 L of IVF. Etiology likely orthostatic hypotension in the setting of decreased p.o. intake as well as use of antihypertensive. EKG with NSR with negative tropes, low suspicion for cardiac etiology.  5/14 orthostatic vitals negative s/p IVF.    Plan:  PT/OT-recommended minimal resources.  Blood pressure elevated systolic in the [id number] resume amlodipine 2.5 mg.  Encourage p.o. intake.

## 2024-05-15 NOTE — ASSESSMENT & PLAN NOTE
History of CVA diagnosed in 2017.  Has been following up with [facility] neurology.  At that time patient expressed symptoms of confusion, CT head showing encephalomalacia of left posterior parietal lobe  with follow up MRI brain showing left posterior parietal region of mildly complex signal intensity most consistent with subacute to early chronic infarction punctate hemorrhage  CT head on admission shows no acute infarct.  Chronic microangiopathic changes.  Patient oriented x 2 however history of mild cognitive decline.  Reports right eye floaters however it is a chronic issue (has history of cataract repair).  Has been previously on aspirin however according to current med list patient is not on aspirin anymore.  No recent notes stating the reason of aspirin discontinuation.Currently not on statin.    Plan:  Given no significant neurodeficits on physical exam will hold off on MRI.  Restarted aspirin 81 for hx of CVA prophylaxis.

## 2024-05-24 PROBLEM — R05.1 ACUTE COUGH: Status: RESOLVED | Noted: 2022-02-07 | Resolved: 2024-05-24

## 2024-05-29 DIAGNOSIS — Z86.73 HISTORY OF CVA (CEREBROVASCULAR ACCIDENT): ICD-10-CM

## 2024-05-30 RX ORDER — ASPIRIN 81 MG
81 TABLET,CHEWABLE ORAL DAILY
Qty: 90 TABLET | Refills: 1 | Status: SHIPPED | OUTPATIENT
Start: 2024-05-30

## 2024-06-09 ENCOUNTER — HOSPITAL ENCOUNTER (EMERGENCY)
Facility: HOSPITAL | Age: 84
Discharge: HOME | End: 2024-06-10
Attending: EMERGENCY MEDICINE
Payer: MEDICARE

## 2024-06-09 DIAGNOSIS — R51.9 HEADACHE: Primary | ICD-10-CM

## 2024-06-09 PROCEDURE — 36415 COLL VENOUS BLD VENIPUNCTURE: CPT

## 2024-06-09 PROCEDURE — 85025 COMPLETE CBC W/AUTO DIFF WBC: CPT

## 2024-06-09 PROCEDURE — 93005 ELECTROCARDIOGRAM TRACING: CPT

## 2024-06-09 PROCEDURE — 99284 EMERGENCY DEPT VISIT MOD MDM: CPT

## 2024-06-09 PROCEDURE — 80053 COMPREHEN METABOLIC PANEL: CPT

## 2024-06-09 RX ORDER — ACETAMINOPHEN 10 MG/ML
1000 INJECTION, SOLUTION INTRAVENOUS ONCE
Status: COMPLETED | OUTPATIENT
Start: 2024-06-10 | End: 2024-06-10

## 2024-06-10 ENCOUNTER — APPOINTMENT (EMERGENCY)
Dept: CT IMAGING | Facility: HOSPITAL | Age: 84
End: 2024-06-10
Payer: MEDICARE

## 2024-06-10 VITALS
TEMPERATURE: 97.7 F | WEIGHT: 101.63 LBS | HEART RATE: 72 BPM | SYSTOLIC BLOOD PRESSURE: 137 MMHG | OXYGEN SATURATION: 96 % | BODY MASS INDEX: 18 KG/M2 | DIASTOLIC BLOOD PRESSURE: 77 MMHG | RESPIRATION RATE: 18 BRPM

## 2024-06-10 LAB
ALBUMIN SERPL BCP-MCNC: 4.2 G/DL (ref 3.5–5)
ALP SERPL-CCNC: 178 U/L (ref 34–104)
ALT SERPL W P-5'-P-CCNC: 21 U/L (ref 7–52)
ANION GAP SERPL CALCULATED.3IONS-SCNC: 7 MMOL/L (ref 4–13)
AST SERPL W P-5'-P-CCNC: 24 U/L (ref 13–39)
ATRIAL RATE: 73 BPM
BASOPHILS # BLD AUTO: 0.03 THOUSANDS/ÂΜL (ref 0–0.1)
BASOPHILS NFR BLD AUTO: 1 % (ref 0–1)
BILIRUB SERPL-MCNC: 0.29 MG/DL (ref 0.2–1)
BUN SERPL-MCNC: 19 MG/DL (ref 5–25)
CALCIUM SERPL-MCNC: 9.5 MG/DL (ref 8.4–10.2)
CHLORIDE SERPL-SCNC: 104 MMOL/L (ref 96–108)
CO2 SERPL-SCNC: 27 MMOL/L (ref 21–32)
CREAT SERPL-MCNC: 0.84 MG/DL (ref 0.6–1.3)
EOSINOPHIL # BLD AUTO: 0.09 THOUSAND/ÂΜL (ref 0–0.61)
EOSINOPHIL NFR BLD AUTO: 2 % (ref 0–6)
ERYTHROCYTE [DISTWIDTH] IN BLOOD BY AUTOMATED COUNT: 16.1 % (ref 11.6–15.1)
GFR SERPL CREATININE-BSD FRML MDRD: 64 ML/MIN/1.73SQ M
GLUCOSE SERPL-MCNC: 103 MG/DL (ref 65–140)
HCT VFR BLD AUTO: 41.5 % (ref 34.8–46.1)
HGB BLD-MCNC: 12.7 G/DL (ref 11.5–15.4)
IMM GRANULOCYTES # BLD AUTO: 0.03 THOUSAND/UL (ref 0–0.2)
IMM GRANULOCYTES NFR BLD AUTO: 1 % (ref 0–2)
LYMPHOCYTES # BLD AUTO: 1.08 THOUSANDS/ÂΜL (ref 0.6–4.47)
LYMPHOCYTES NFR BLD AUTO: 19 % (ref 14–44)
MCH RBC QN AUTO: 30.5 PG (ref 26.8–34.3)
MCHC RBC AUTO-ENTMCNC: 30.6 G/DL (ref 31.4–37.4)
MCV RBC AUTO: 100 FL (ref 82–98)
MONOCYTES # BLD AUTO: 0.35 THOUSAND/ÂΜL (ref 0.17–1.22)
MONOCYTES NFR BLD AUTO: 6 % (ref 4–12)
NEUTROPHILS # BLD AUTO: 4 THOUSANDS/ÂΜL (ref 1.85–7.62)
NEUTS SEG NFR BLD AUTO: 71 % (ref 43–75)
NRBC BLD AUTO-RTO: 0 /100 WBCS
P AXIS: 55 DEGREES
PLATELET # BLD AUTO: 373 THOUSANDS/UL (ref 149–390)
PMV BLD AUTO: 10.2 FL (ref 8.9–12.7)
POTASSIUM SERPL-SCNC: 4.9 MMOL/L (ref 3.5–5.3)
PR INTERVAL: 116 MS
PROT SERPL-MCNC: 7.6 G/DL (ref 6.4–8.4)
QRS AXIS: 19 DEGREES
QRSD INTERVAL: 88 MS
QT INTERVAL: 416 MS
QTC INTERVAL: 458 MS
RBC # BLD AUTO: 4.16 MILLION/UL (ref 3.81–5.12)
SODIUM SERPL-SCNC: 138 MMOL/L (ref 135–147)
T WAVE AXIS: 69 DEGREES
VENTRICULAR RATE: 73 BPM
WBC # BLD AUTO: 5.58 THOUSAND/UL (ref 4.31–10.16)

## 2024-06-10 PROCEDURE — 96365 THER/PROPH/DIAG IV INF INIT: CPT

## 2024-06-10 PROCEDURE — 70496 CT ANGIOGRAPHY HEAD: CPT

## 2024-06-10 PROCEDURE — 93010 ELECTROCARDIOGRAM REPORT: CPT | Performed by: INTERNAL MEDICINE

## 2024-06-10 PROCEDURE — 99285 EMERGENCY DEPT VISIT HI MDM: CPT | Performed by: EMERGENCY MEDICINE

## 2024-06-10 PROCEDURE — 70498 CT ANGIOGRAPHY NECK: CPT

## 2024-06-10 RX ADMIN — IOHEXOL 85 ML: 350 INJECTION, SOLUTION INTRAVENOUS at 01:14

## 2024-06-10 RX ADMIN — ACETAMINOPHEN 1000 MG: 10 INJECTION INTRAVENOUS at 00:03

## 2024-06-10 NOTE — ED ATTENDING ATTESTATION
6/9/2024  I, Gerald Hyde MD, saw and evaluated the patient. I have discussed the patient with the resident/non-physician practitioner and agree with the resident's/non-physician practitioner's findings, Plan of Care, and MDM as documented in the resident's/non-physician practitioner's note, except where noted. All available labs and Radiology studies were reviewed.  I was present for key portions of any procedure(s) performed by the resident/non-physician practitioner and I was immediately available to provide assistance.       At this point I agree with the current assessment done in the Emergency Department.  I have conducted an independent evaluation of this patient a history and physical is as follows: Patient is a 83 year old female with intermittent headache for past 1 week. No known trauma in past week. No sob. No fever. No cough. No N/V. Was last seen in this ED on 5/13/24 for syncope and was admitted and has L1 fracture as well. Has low back pain. [facility] website checked on this patient and last Rx filled was on 3/20/24 for oxycodone for 30 day supply. Thrombolytics not indicated since sx over 24 hours. Normocephalic. PERRL. Moist mucous membranes. No scleral icterus. Lungs clear. Heart regular without murmur. Abdomen soft and nontender. Good bowel sounds. No edema. No rash noted. (+) mild weakness R UE. Other extremities normal strength. DDx including but not limited to: tension headache, cluster headache, migraine, ICH, SAH, tumor, CVA, HTN; doubt ACS, MI, meningitis, temporal arteritis, carbon monoxide poisoning, zoster, sinusitis. Will check labs, EKG and CT and give IV tylenol.     ED Course         Critical Care Time  Procedures

## 2024-06-10 NOTE — ED PROVIDER NOTES
History  Chief Complaint   Patient presents with    Headache     Pt comes from [facility] for a headache off and on the past week. Pt had a fall 3 weeks ago. -N/V, -dizziness, -sensitivity to light. Hx of strokes     HPI    Patient is an 84 yo F with history of stroke and recent admission after a syncopal episode and fall presenting with headaches. She also does have history of dementia so HPI is limited. Reportedly has had intermittent headache for the last week, has not been taking OTC remedies for this. Denies new trauma. She does not endorse any chest pain, SOB, peripheral numbness, weakness, changes in vision.     Prior to Admission Medications   Prescriptions Last Dose Informant Patient Reported? Taking?   Aspirin Low Dose 81 MG chewable tablet   No No   Sig: CHEW 1 TABLET BY MOUTH DAILY   Diclofenac Sodium (VOLTAREN) 1 %  Self No No   Sig: Apply 2 g topically 4 (four) times a day = To right hip and pelvis   acetaminophen (TYLENOL) 325 mg tablet  Self Yes No   Sig: Take 975 mg by mouth every 8 (eight) hours as needed   albuterol (PROVENTIL HFA,VENTOLIN HFA) 90 mcg/act inhaler  Self Yes No   Sig: Inhale 2 puffs every 6 (six) hours as needed for wheezing   amLODIPine (NORVASC) 2.5 mg tablet   Yes No   Sig: Take 2.5 mg by mouth daily   calcitonin, salmon, (MIACALCIN) 200 units/act nasal spray   Yes No   Si spray into each nostril daily   cholecalciferol (VITAMIN D3) 1,000 units tablet  Self No No   Sig: Take 1 tablet (1,000 Units total) by mouth daily   clotrimazole-betamethasone (LOTRISONE) 1-0.05 % cream   No No   Sig: Apply topically 2 (two) times a day   fluticasone (FLONASE) 50 mcg/act nasal spray  Self No No   Si spray into each nostril daily   gabapentin (NEURONTIN) 100 mg capsule   No No   Sig: Take 1 capsule (100 mg total) by mouth daily   magnesium hydroxide (MILK OF MAGNESIA) 400 mg/5 mL oral suspension  Self Yes No   Sig: Take 30 mL by mouth 2 (two) times a day as needed   senna-docusate  sodium (SENOKOT S) 8.6-50 mg per tablet  Self No No   Sig: Take 1 tablet by mouth 2 (two) times a day      Facility-Administered Medications: None       Past Medical History:   Diagnosis Date    Allergic     Anxiety     Arthritis     Cancer (HCC)     HLD (hyperlipidemia)     Hypertension     Hyperthyroidism     Osteoporosis     Stroke (HCC)     TIA (transient ischemic attack)        Past Surgical History:   Procedure Laterality Date    BREAST LUMPECTOMY Left     HYSTERECTOMY      KNEE SURGERY Right     ORIF TIBIA FRACTURE Right 12/8/2016    Procedure: OPEN REDUCTION W/ INTERNAL FIXATION (ORIF) TIBIA;  Surgeon: Angie Case MD;  Location: [facility];  Service:        Family History   Problem Relation Age of Onset    Coronary artery disease Family     Other Family         DJD     I have reviewed and agree with the history as documented.    E-Cigarette/Vaping    E-Cigarette Use Never User      E-Cigarette/Vaping Substances    Nicotine No     THC No     CBD No     Flavoring No     Other No     Unknown No      Social History     Tobacco Use    Smoking status: Never    Smokeless tobacco: Never    Tobacco comments:     Former smoker - As per [employer]    Vaping Use    Vaping status: Never Used   Substance Use Topics    Alcohol use: Not Currently     Comment: Alcohol intake:   Occasional  - As per [employer]     Drug use: Never        Review of Systems   Constitutional:  Negative for chills and fever.   Respiratory:  Negative for cough and shortness of breath.    Cardiovascular:  Negative for chest pain and palpitations.   Gastrointestinal:  Negative for abdominal pain and vomiting.   Genitourinary:  Negative for dysuria and hematuria.   Musculoskeletal:  Negative for arthralgias and back pain.   Skin:  Negative for color change and rash.   Neurological:  Positive for headaches. Negative for syncope.   All other systems reviewed and are negative.      Physical Exam  ED Triage Vitals   Temperature Pulse Respirations Blood  Pressure SpO2   06/09/24 2327 06/09/24 2325 06/09/24 2325 06/09/24 2326 06/09/24 2325   97.7 °F (36.5 °C) 79 16 (!) 184/86 98 %      Temp Source Heart Rate Source Patient Position - Orthostatic VS BP Location FiO2 (%)   06/09/24 2327 06/09/24 2325 -- 06/09/24 2325 --   Oral Monitor  Right arm       Pain Score       --                    Orthostatic Vital Signs  Vitals:    06/10/24 0100 06/10/24 0130 06/10/24 0200 06/10/24 0230   BP: 164/82 (!) 180/83 163/86 137/77   Pulse: 70 75 75 72       Physical Exam  Vitals and nursing note reviewed.   Constitutional:       General: She is not in acute distress.     Appearance: She is well-developed.   HENT:      Head: Normocephalic and atraumatic.   Eyes:      Extraocular Movements: Extraocular movements intact.      Pupils: Pupils are equal, round, and reactive to light.   Cardiovascular:      Rate and Rhythm: Normal rate and regular rhythm.      Heart sounds: No murmur heard.  Pulmonary:      Effort: Pulmonary effort is normal. No respiratory distress.      Breath sounds: Normal breath sounds.   Abdominal:      Palpations: Abdomen is soft.      Tenderness: There is no abdominal tenderness.   Musculoskeletal:         General: No swelling.      Cervical back: Neck supple. No tenderness.   Skin:     General: Skin is warm and dry.   Neurological:      Mental Status: She is alert.      Cranial Nerves: No cranial nerve deficit.      Sensory: No sensory deficit.      Motor: No weakness.   Psychiatric:         Mood and Affect: Mood normal.         ED Medications  Medications   acetaminophen (Ofirmev) injection 1,000 mg (0 mg Intravenous Stopped 6/10/24 0024)   iohexol (OMNIPAQUE) 350 MG/ML injection (MULTI-DOSE) 85 mL (85 mL Intravenous Given 6/10/24 0114)       Diagnostic Studies  Results Reviewed       Procedure Component Value Units Date/Time    Comprehensive metabolic panel [[id number]]  (Abnormal) Collected: 06/09/24 2354    Lab Status: Final result Specimen: Blood from Arm,  Right Updated: 06/10/24 0022     Sodium 138 mmol/L      Potassium 4.9 mmol/L      Chloride 104 mmol/L      CO2 27 mmol/L      ANION GAP 7 mmol/L      BUN 19 mg/dL      Creatinine 0.84 mg/dL      Glucose 103 mg/dL      Calcium 9.5 mg/dL      AST 24 U/L      ALT 21 U/L      Alkaline Phosphatase 178 U/L      Total Protein 7.6 g/dL      Albumin 4.2 g/dL      Total Bilirubin 0.29 mg/dL      eGFR 64 ml/min/1.73sq m     Narrative:      National Kidney Disease Foundation guidelines for Chronic Kidney Disease (CKD):     Stage 1 with normal or high GFR (GFR > 90 mL/min/1.73 square meters)    Stage 2 Mild CKD (GFR = 60-89 mL/min/1.73 square meters)    Stage 3A Moderate CKD (GFR = 45-59 mL/min/1.73 square meters)    Stage 3B Moderate CKD (GFR = 30-44 mL/min/1.73 square meters)    Stage 4 Severe CKD (GFR = 15-29 mL/min/1.73 square meters)    Stage 5 End Stage CKD (GFR <15 mL/min/1.73 square meters)  Note: GFR calculation is accurate only with a steady state creatinine    CBC and differential [[id number]]  (Abnormal) Collected: 06/09/24 8644    Lab Status: Final result Specimen: Blood from Arm, Right Updated: 06/10/24 0002     WBC 5.58 Thousand/uL      RBC 4.16 Million/uL      Hemoglobin 12.7 g/dL      Hematocrit 41.5 %       fL      MCH 30.5 pg      MCHC 30.6 g/dL      RDW 16.1 %      MPV 10.2 fL      Platelets 373 Thousands/uL      nRBC 0 /100 WBCs      Segmented % 71 %      Immature Grans % 1 %      Lymphocytes % 19 %      Monocytes % 6 %      Eosinophils Relative 2 %      Basophils Relative 1 %      Absolute Neutrophils 4.00 Thousands/µL      Absolute Immature Grans 0.03 Thousand/uL      Absolute Lymphocytes 1.08 Thousands/µL      Absolute Monocytes 0.35 Thousand/µL      Eosinophils Absolute 0.09 Thousand/µL      Basophils Absolute 0.03 Thousands/µL                    CTA head and neck with and without contrast   Final Result by Aliyah Judd MD (06/10 0210)      1.  No acute intracranial hemorrhage, mass effect or  extra-axial collection. Old left parietal region infarct   2.  No hemodynamically significant stenosis, dissection or occlusion of the carotid or vertebral arteries.   3.  No intracranial aneurysm.  No hemodynamically significant stenosis or occlusion of the major vessels of the [place].                  Workstation performed: [id number]               Procedures  ECG 12 Lead Documentation Only    Date/Time: 6/10/2024 12:01 AM    Performed by: Carmel Gloria DO  Authorized by: Carmel Gloria DO    Indications / Diagnosis:  Headaches  ECG reviewed by me, the ED Provider: yes    Patient location:  ED  Previous ECG:     Previous ECG:  Compared to current    Similarity:  No change  Interpretation:     Interpretation: normal    Rate:     ECG rate:  73    ECG rate assessment: normal    Rhythm:     Rhythm: sinus rhythm    Ectopy:     Ectopy: none    QRS:     QRS axis:  Normal  Conduction:     Conduction: normal    ST segments:     ST segments:  Normal  T waves:     T waves: normal          ED Course                                       Medical Decision Making  82 yo F with recent trauma, history of strokes presenting with intermittent headache. On initial evaluation, the pt was calm and with stable vitals, no focal neurologic deficit was noted on exam. Given HPI, concern is highest for a tension type headache, cervicogenic headache. Less likely migraine given description of headache and constellation of symptoms. Did want to rule out more acute pathology such as new stroke, intracranial bleed. Labs and CTA head/neck was drawn which showed no findings that would explain the patient's symptoms. Tylenol was given for symptomatic care. Ultimately, she was discharged in stable condition with counseling to follow with PCP or return if symptoms acutely worsen.    Problems Addressed:  Headache: acute illness or injury    Amount and/or Complexity of Data Reviewed  Labs:  ordered.  Radiology: ordered.    Risk  Prescription drug management.          Disposition  Final diagnoses:   Headache     Time reflects when diagnosis was documented in both MDM as applicable and the Disposition within this note       Time User Action Codes Description Comment    6/10/2024  2:32 AM JayCarmel huizar Add [R51.9] Headache           ED Disposition       ED Disposition   Discharge    Condition   Stable    Date/Time   Mon Shailesh 10, 2024  2:32 AM    Comment   Doris Campbell discharge to home/self care.                   MD Documentation      Flowsheet Row Most Recent Value   Transported by (Company and Unit #) [facility]          RN Documentation      Flowsheet Row Most Recent Value   Transported by (Company and Unit #) [facility]          Follow-up Information       Follow up With Specialties Details Why Contact Info Additional Information    Tuyet Cantor MD Family Medicine Call  for ER follow up [address] [facility] [address]  [phone]       [facility] Emergency Department Emergency Medicine Call  As needed [facility] [facility] [address]  [phone] [facility] Emergency Department, [facility] [place], [place], [address]            Discharge Medication List as of 6/10/2024  2:38 AM        CONTINUE these medications which have NOT CHANGED    Details   acetaminophen (TYLENOL) 325 mg tablet Take 975 mg by mouth every 8 (eight) hours as needed, Historical Med      albuterol (PROVENTIL HFA,VENTOLIN HFA) 90 mcg/act inhaler Inhale 2 puffs every 6 (six) hours as needed for wheezing, Historical Med      amLODIPine (NORVASC) 2.5 mg tablet Take 2.5 mg by mouth daily, Historical Med      Aspirin Low Dose 81 MG chewable tablet CHEW 1 TABLET BY MOUTH DAILY, Starting Thu 5/30/2024, Normal      calcitonin, salmon, (MIACALCIN) 200 units/act nasal spray 1 spray into each nostril  daily, Historical Med      cholecalciferol (VITAMIN D3) 1,000 units tablet Take 1 tablet (1,000 Units total) by mouth daily, Starting Fri 1/6/2023, Normal      clotrimazole-betamethasone (LOTRISONE) 1-0.05 % cream Apply topically 2 (two) times a day, Starting Wed 4/10/2024, No Print      Diclofenac Sodium (VOLTAREN) 1 % Apply 2 g topically 4 (four) times a day = To right hip and pelvis, Starting Fri 1/6/2023, Normal      fluticasone (FLONASE) 50 mcg/act nasal spray 1 spray into each nostril daily, Starting Thu 10/22/2020, No Print      gabapentin (NEURONTIN) 100 mg capsule Take 1 capsule (100 mg total) by mouth daily, Starting Thu 4/11/2024, Print      magnesium hydroxide (MILK OF MAGNESIA) 400 mg/5 mL oral suspension Take 30 mL by mouth 2 (two) times a day as needed, Historical Med      senna-docusate sodium (SENOKOT S) 8.6-50 mg per tablet Take 1 tablet by mouth 2 (two) times a day, Starting Fri 1/6/2023, Normal           No discharge procedures on file.    PDMP Review         Value Time User    PDMP Reviewed  Yes 6/9/2024 11:26 PM Gerald Hyde MD             ED Provider  Attending physically available and evaluated Doris Campbell. I managed the patient along with the ED Attending.    Electronically Signed by           Carmel Gloria DO  06/10/24 0616

## 2024-06-10 NOTE — DISCHARGE INSTRUCTIONS
You can use tylenol as needed for headaches at home, return if your symptoms acutely worsen. Otherwise follow up with your primary care doctor.

## 2024-06-12 PROBLEM — N39.0 UTI (URINARY TRACT INFECTION): Status: RESOLVED | Noted: 2024-05-13 | Resolved: 2024-06-12

## 2024-06-20 DIAGNOSIS — S42.291A OTHER CLOSED DISPLACED FRACTURE OF PROXIMAL END OF RIGHT HUMERUS, INITIAL ENCOUNTER: ICD-10-CM

## 2024-06-20 NOTE — TELEPHONE ENCOUNTER
Reason for call:   [x] Refill   [] Prior Auth  [] Other:     Office:   [] PCP/Provider -   [x] Specialty/Provider - VALDEMAR Fajardo     Medication: fluticasone (FLONASE) 50 mcg/act nasal spray     Dose/Frequency:     1 spray, Nasal, Daily       Quantity: 16ml    Pharmacy: [facility] - [place], [place] - [address] [facility]     Does the patient have enough for 3 days? UNKOWN  [] Yes   [] No - Send as HP to POD

## 2024-06-25 RX ORDER — FLUTICASONE PROPIONATE 50 MCG
1 SPRAY, SUSPENSION (ML) NASAL DAILY
Qty: 16 ML | Refills: 2 | Status: SHIPPED | OUTPATIENT
Start: 2024-06-25

## 2024-08-07 ENCOUNTER — HOSPITAL ENCOUNTER (EMERGENCY)
Facility: HOSPITAL | Age: 84
Discharge: HOME | End: 2024-08-07
Attending: EMERGENCY MEDICINE
Payer: MEDICARE

## 2024-08-07 VITALS
OXYGEN SATURATION: 97 % | BODY MASS INDEX: 17.89 KG/M2 | SYSTOLIC BLOOD PRESSURE: 165 MMHG | DIASTOLIC BLOOD PRESSURE: 77 MMHG | HEIGHT: 63 IN | TEMPERATURE: 97.7 F | HEART RATE: 73 BPM | RESPIRATION RATE: 18 BRPM | WEIGHT: 101 LBS

## 2024-08-07 DIAGNOSIS — N39.0 URINARY TRACT INFECTION: Primary | ICD-10-CM

## 2024-08-07 LAB
ALBUMIN SERPL BCG-MCNC: 3.6 G/DL (ref 3.5–5)
ALP SERPL-CCNC: 155 U/L (ref 34–104)
ALT SERPL W P-5'-P-CCNC: 17 U/L (ref 7–52)
ANION GAP SERPL CALCULATED.3IONS-SCNC: 8 MMOL/L (ref 4–13)
AST SERPL W P-5'-P-CCNC: 18 U/L (ref 13–39)
BACTERIA UR QL AUTO: ABNORMAL /HPF
BASOPHILS # BLD AUTO: 0.03 THOUSANDS/ÂΜL (ref 0–0.1)
BASOPHILS NFR BLD AUTO: 0 % (ref 0–1)
BILIRUB SERPL-MCNC: 0.31 MG/DL (ref 0.2–1)
BILIRUB UR QL STRIP: NEGATIVE
BUN SERPL-MCNC: 16 MG/DL (ref 5–25)
CALCIUM SERPL-MCNC: 9.1 MG/DL (ref 8.4–10.2)
CHLORIDE SERPL-SCNC: 105 MMOL/L (ref 96–108)
CLARITY UR: CLEAR
CO2 SERPL-SCNC: 24 MMOL/L (ref 21–32)
COLOR UR: YELLOW
CREAT SERPL-MCNC: 0.78 MG/DL (ref 0.6–1.3)
EOSINOPHIL # BLD AUTO: 0.13 THOUSAND/ÂΜL (ref 0–0.61)
EOSINOPHIL NFR BLD AUTO: 2 % (ref 0–6)
ERYTHROCYTE [DISTWIDTH] IN BLOOD BY AUTOMATED COUNT: 14.4 % (ref 11.6–15.1)
GFR SERPL CREATININE-BSD FRML MDRD: 70 ML/MIN/1.73SQ M
GLUCOSE SERPL-MCNC: 94 MG/DL (ref 65–140)
GLUCOSE UR STRIP-MCNC: NEGATIVE MG/DL
HCT VFR BLD AUTO: 36.2 % (ref 34.8–46.1)
HGB BLD-MCNC: 11.3 G/DL (ref 11.5–15.4)
HGB UR QL STRIP.AUTO: NEGATIVE
IMM GRANULOCYTES # BLD AUTO: 0.03 THOUSAND/UL (ref 0–0.2)
IMM GRANULOCYTES NFR BLD AUTO: 0 % (ref 0–2)
KETONES UR STRIP-MCNC: NEGATIVE MG/DL
LEUKOCYTE ESTERASE UR QL STRIP: ABNORMAL
LYMPHOCYTES # BLD AUTO: 0.87 THOUSANDS/ÂΜL (ref 0.6–4.47)
LYMPHOCYTES NFR BLD AUTO: 12 % (ref 14–44)
MCH RBC QN AUTO: 30.7 PG (ref 26.8–34.3)
MCHC RBC AUTO-ENTMCNC: 31.2 G/DL (ref 31.4–37.4)
MCV RBC AUTO: 98 FL (ref 82–98)
MONOCYTES # BLD AUTO: 0.59 THOUSAND/ÂΜL (ref 0.17–1.22)
MONOCYTES NFR BLD AUTO: 8 % (ref 4–12)
NEUTROPHILS # BLD AUTO: 5.94 THOUSANDS/ÂΜL (ref 1.85–7.62)
NEUTS SEG NFR BLD AUTO: 78 % (ref 43–75)
NITRITE UR QL STRIP: NEGATIVE
NON-SQ EPI CELLS URNS QL MICRO: ABNORMAL /HPF
NRBC BLD AUTO-RTO: 0 /100 WBCS
PH UR STRIP.AUTO: 6.5 [PH]
PLATELET # BLD AUTO: 388 THOUSANDS/UL (ref 149–390)
PMV BLD AUTO: 10 FL (ref 8.9–12.7)
POTASSIUM SERPL-SCNC: 4.6 MMOL/L (ref 3.5–5.3)
PROT SERPL-MCNC: 7.1 G/DL (ref 6.4–8.4)
PROT UR STRIP-MCNC: NEGATIVE MG/DL
RBC # BLD AUTO: 3.68 MILLION/UL (ref 3.81–5.12)
RBC #/AREA URNS AUTO: ABNORMAL /HPF
SODIUM SERPL-SCNC: 137 MMOL/L (ref 135–147)
SP GR UR STRIP.AUTO: 1.01 (ref 1–1.03)
UROBILINOGEN UR QL STRIP.AUTO: 0.2 E.U./DL
WBC # BLD AUTO: 7.59 THOUSAND/UL (ref 4.31–10.16)
WBC #/AREA URNS AUTO: ABNORMAL /HPF

## 2024-08-07 PROCEDURE — 81001 URINALYSIS AUTO W/SCOPE: CPT

## 2024-08-07 PROCEDURE — 36415 COLL VENOUS BLD VENIPUNCTURE: CPT

## 2024-08-07 PROCEDURE — 96360 HYDRATION IV INFUSION INIT: CPT

## 2024-08-07 PROCEDURE — 81003 URINALYSIS AUTO W/O SCOPE: CPT

## 2024-08-07 PROCEDURE — 85025 COMPLETE CBC W/AUTO DIFF WBC: CPT

## 2024-08-07 PROCEDURE — 87077 CULTURE AEROBIC IDENTIFY: CPT

## 2024-08-07 PROCEDURE — 99284 EMERGENCY DEPT VISIT MOD MDM: CPT | Performed by: EMERGENCY MEDICINE

## 2024-08-07 PROCEDURE — 93005 ELECTROCARDIOGRAM TRACING: CPT

## 2024-08-07 PROCEDURE — 80053 COMPREHEN METABOLIC PANEL: CPT

## 2024-08-07 PROCEDURE — 87086 URINE CULTURE/COLONY COUNT: CPT

## 2024-08-07 PROCEDURE — 99283 EMERGENCY DEPT VISIT LOW MDM: CPT

## 2024-08-07 RX ORDER — CEPHALEXIN 500 MG/1
500 CAPSULE ORAL EVERY 12 HOURS SCHEDULED
Qty: 14 CAPSULE | Refills: 0 | Status: SHIPPED | OUTPATIENT
Start: 2024-08-07 | End: 2024-08-14

## 2024-08-07 RX ADMIN — CEPHALEXIN 500 MG: 250 CAPSULE ORAL at 15:34

## 2024-08-07 RX ADMIN — SODIUM CHLORIDE 500 ML: 0.9 INJECTION, SOLUTION INTRAVENOUS at 12:55

## 2024-08-07 NOTE — ED PROVIDER NOTES
"History  Chief Complaint   Patient presents with    Possible UTI     Arrives via EMS from [facility] with report of testing positive for a UTI at the facility but staff there unsure what antibiotic to use to treat due to pt allergies. EMS also reports pt has L shoulder pain and a sore throat.     The pt is an 83 year old female who presents to ED for evaluation of difficulty urination. Pt states that her urination \"hasn't been right\" for 3 days and that \"I just don't feel right which is what happens when I get urinary infections\". She also notes some intermittent mid back pain. She denies abdominal pain, SOB, chest pain, fevers, falls, diarrhea, vomiting.   Paperwork accompanying the pt showed UA was done on  showing grownth of e. coli, actinotignum schaalii, and aerococcus urinae.        Prior to Admission Medications   Prescriptions Last Dose Informant Patient Reported? Taking?   Aspirin Low Dose 81 MG chewable tablet   No No   Sig: CHEW 1 TABLET BY MOUTH DAILY   Diclofenac Sodium (VOLTAREN) 1 %  Self No No   Sig: Apply 2 g topically 4 (four) times a day = To right hip and pelvis   acetaminophen (TYLENOL) 325 mg tablet  Self Yes No   Sig: Take 975 mg by mouth every 8 (eight) hours as needed   albuterol (PROVENTIL HFA,VENTOLIN HFA) 90 mcg/act inhaler  Self Yes No   Sig: Inhale 2 puffs every 6 (six) hours as needed for wheezing   amLODIPine (NORVASC) 2.5 mg tablet   Yes No   Sig: Take 2.5 mg by mouth daily   calcitonin, salmon, (MIACALCIN) 200 units/act nasal spray   Yes No   Si spray into each nostril daily   cholecalciferol (VITAMIN D3) 1,000 units tablet  Self No No   Sig: Take 1 tablet (1,000 Units total) by mouth daily   clotrimazole-betamethasone (LOTRISONE) 1-0.05 % cream   No No   Sig: Apply topically 2 (two) times a day   fluticasone (FLONASE) 50 mcg/act nasal spray   No No   Si spray into each nostril daily   gabapentin (NEURONTIN) 100 mg capsule   No No   Sig: Take 1 capsule (100 mg " total) by mouth daily   magnesium hydroxide (MILK OF MAGNESIA) 400 mg/5 mL oral suspension  Self Yes No   Sig: Take 30 mL by mouth 2 (two) times a day as needed   senna-docusate sodium (SENOKOT S) 8.6-50 mg per tablet  Self No No   Sig: Take 1 tablet by mouth 2 (two) times a day      Facility-Administered Medications: None       Past Medical History:   Diagnosis Date    Allergic     Anxiety     Arthritis     Cancer (HCC)     HLD (hyperlipidemia)     Hypertension     Hyperthyroidism     Osteoporosis     Stroke (HCC)     TIA (transient ischemic attack)        Past Surgical History:   Procedure Laterality Date    BREAST LUMPECTOMY Left     HYSTERECTOMY      KNEE SURGERY Right     ORIF TIBIA FRACTURE Right 12/8/2016    Procedure: OPEN REDUCTION W/ INTERNAL FIXATION (ORIF) TIBIA;  Surgeon: Angie Case MD;  Location:  MAIN OR;  Service:        Family History   Problem Relation Age of Onset    Coronary artery disease Family     Other Family         DJD     I have reviewed and agree with the history as documented.    E-Cigarette/Vaping    E-Cigarette Use Never User      E-Cigarette/Vaping Substances    Nicotine No     THC No     CBD No     Flavoring No     Other No     Unknown No      Social History     Tobacco Use    Smoking status: Never    Smokeless tobacco: Never    Tobacco comments:     Former smoker - As per [facility]    Vaping Use    Vaping status: Never Used   Substance Use Topics    Alcohol use: Not Currently     Comment: Alcohol intake:   Occasional  - As per [facility]     Drug use: Never        Review of Systems   Constitutional:  Negative for appetite change and fever.   HENT:  Negative for congestion and sore throat.    Respiratory:  Negative for cough and shortness of breath.    Cardiovascular:  Negative for chest pain.   Gastrointestinal:  Negative for diarrhea, nausea and vomiting.   Genitourinary:  Positive for difficulty urinating. Negative for decreased urine volume.   Musculoskeletal:  Positive for  back pain.   Neurological:  Negative for dizziness and syncope.       Physical Exam  ED Triage Vitals [08/07/24 1159]   Temperature Pulse Respirations Blood Pressure SpO2   97.7 °F (36.5 °C) 70 16 147/70 98 %      Temp Source Heart Rate Source Patient Position - Orthostatic VS BP Location FiO2 (%)   Oral Monitor Sitting Right arm --      Pain Score       2             Orthostatic Vital Signs  Vitals:    08/07/24 1159 08/07/24 1313 08/07/24 1413   BP: 147/70 148/73 165/77   Pulse: 70 73 73   Patient Position - Orthostatic VS: Sitting Sitting Lying       Physical Exam  Vitals and nursing note reviewed.   Constitutional:       General: She is not in acute distress.     Appearance: She is normal weight. She is not toxic-appearing.   HENT:      Head: Normocephalic and atraumatic.      Nose: Nose normal.      Mouth/Throat:      Pharynx: Oropharynx is clear.   Eyes:      Conjunctiva/sclera: Conjunctivae normal.   Cardiovascular:      Rate and Rhythm: Normal rate and regular rhythm.      Pulses: Normal pulses.      Heart sounds: Normal heart sounds.   Pulmonary:      Effort: Pulmonary effort is normal. No respiratory distress.      Breath sounds: Normal breath sounds. No stridor. No wheezing, rhonchi or rales.   Abdominal:      General: Abdomen is flat. Bowel sounds are normal.      Palpations: Abdomen is soft.      Tenderness: There is no abdominal tenderness. There is no right CVA tenderness, left CVA tenderness, guarding or rebound.   Musculoskeletal:         General: No swelling, deformity or signs of injury. Normal range of motion.      Cervical back: Neck supple.      Right lower leg: No edema.      Left lower leg: No edema.   Skin:     General: Skin is warm and dry.   Neurological:      General: No focal deficit present.      Mental Status: She is alert and oriented to person, place, and time.   Psychiatric:         Mood and Affect: Mood normal.         Behavior: Behavior normal.         Thought Content: Thought  content normal.         Judgment: Judgment normal.         ED Medications  Medications   sodium chloride 0.9 % bolus 500 mL (0 mL Intravenous Stopped 8/7/24 1402)   cephalexin (KEFLEX) capsule 500 mg (500 mg Oral Given 8/7/24 1534)       Diagnostic Studies  Results Reviewed       Procedure Component Value Units Date/Time    Urine Microscopic [[id number]]  (Abnormal) Collected: 08/07/24 1334    Lab Status: Final result Specimen: Urine, Clean Catch Updated: 08/07/24 1508     RBC, UA 0-5 /hpf      WBC, UA Innumerable /hpf      Epithelial Cells Occasional /hpf      Bacteria, UA Occasional /hpf     Urine culture [[id number]] Collected: 08/07/24 1334    Lab Status: In process Specimen: Urine, Clean Catch Updated: 08/07/24 1508    Comprehensive metabolic panel [[id number]]  (Abnormal) Collected: 08/07/24 1415    Lab Status: Final result Specimen: Blood from Arm, Right Updated: 08/07/24 1506     Sodium 137 mmol/L      Potassium 4.6 mmol/L      Chloride 105 mmol/L      CO2 24 mmol/L      ANION GAP 8 mmol/L      BUN 16 mg/dL      Creatinine 0.78 mg/dL      Glucose 94 mg/dL      Calcium 9.1 mg/dL      AST 18 U/L      ALT 17 U/L      Alkaline Phosphatase 155 U/L      Total Protein 7.1 g/dL      Albumin 3.6 g/dL      Total Bilirubin 0.31 mg/dL      eGFR 70 ml/min/1.73sq m     Narrative:      National Kid[employer] Disease Foundation guidelines for Chronic Kidney Disease (CKD):     Stage 1 with normal or high GFR (GFR > 90 mL/min/1.73 square meters)    Stage 2 Mild CKD (GFR = 60-89 mL/min/1.73 square meters)    Stage 3A Moderate CKD (GFR = 45-59 mL/min/1.73 square meters)    Stage 3B Moderate CKD (GFR = 30-44 mL/min/1.73 square meters)    Stage 4 Severe CKD (GFR = 15-29 mL/min/1.73 square meters)    Stage 5 End Stage CKD (GFR <15 mL/min/1.73 square meters)  Note: GFR calculation is accurate only with a steady state creatinine    UA w Reflex to Microscopic w Reflex to Culture [[id number]]  (Abnormal) Collected: 08/07/24 1334    Lab Status:  Final result Specimen: Urine, Clean Catch Updated: 08/07/24 1356     Color, UA Yellow     Clarity, UA Clear     Specific Gravity, UA 1.010     pH, UA 6.5     Leukocytes, UA 1+     Nitrite, UA Negative     Protein, UA Negative mg/dl      Glucose, UA Negative mg/dl      Ketones, UA Negative mg/dl      Urobilinogen, UA 0.2 E.U./dl      Bilirubin, UA Negative     Occult Blood, UA Negative    CBC and differential [[id number]]  (Abnormal) Collected: 08/07/24 1253    Lab Status: Final result Specimen: Blood from Arm, Right Updated: 08/07/24 1301     WBC 7.59 Thousand/uL      RBC 3.68 Million/uL      Hemoglobin 11.3 g/dL      Hematocrit 36.2 %      MCV 98 fL      MCH 30.7 pg      MCHC 31.2 g/dL      RDW 14.4 %      MPV 10.0 fL      Platelets 388 Thousands/uL      nRBC 0 /100 WBCs      Segmented % 78 %      Immature Grans % 0 %      Lymphocytes % 12 %      Monocytes % 8 %      Eosinophils Relative 2 %      Basophils Relative 0 %      Absolute Neutrophils 5.94 Thousands/µL      Absolute Immature Grans 0.03 Thousand/uL      Absolute Lymphocytes 0.87 Thousands/µL      Absolute Monocytes 0.59 Thousand/µL      Eosinophils Absolute 0.13 Thousand/µL      Basophils Absolute 0.03 Thousands/µL                    No orders to display         Procedures  ECG 12 Lead Documentation Only    Date/Time: 8/7/2024 12:16 PM    Performed by: Elayne Washington MD  Authorized by: Elayne Washington MD    ECG reviewed by me, the ED Provider: yes    Patient location:  ED  Previous ECG:     Previous ECG:  Compared to current    Comparison ECG info:  06/09/24    Similarity:  No change  Interpretation:     Interpretation: normal    Rate:     ECG rate:  69    ECG rate assessment: normal    Rhythm:     Rhythm: sinus rhythm    Ectopy:     Ectopy: none    QRS:     QRS axis:  Normal    QRS intervals:  Normal  Conduction:     Conduction: normal    ST segments:     ST segments:  Normal  T waves:     T waves: normal          ED Course  ED Course as of  08/07/24 1708   Wed Aug 07, 2024   1515 WBC, UA(!): Innumerable   1515 Bacteria, UA: Occasional   1515 Leukocytes, UA(!): 1+   1515 Nitrite, UA: Negative   1515 Comprehensive metabolic panel(!)  Elevated alk phos, otherwise grossly normal    1516 CBC and differential(!)  Mild anemia                             SBIRT 20yo+      Flowsheet Row Most Recent Value   Initial Alcohol Screen: US AUDIT-C     1. How often do you have a drink containing alcohol? 0 Filed at: 08/07/2024 1202   Audit-C Score 0 Filed at: 08/07/2024 1202   MASTER: How many times in the past year have you...    Used an illegal drug or used a prescription medication for non-medical reasons? Never Filed at: 08/07/2024 1202                  Medical Decision Making  Ddx; UTI, pyelonephritis, dehydration, electrolyte abnormality    UA showing innumerable WBC and bacteria.  No CVA tenderness  PT with allergies to sulfa drugs and penicillin. Per chart, pt has been able to tolerate keflex in past.   First dose keflex given in ED and RX given for remainder of course.     Amount and/or Complexity of Data Reviewed  External Data Reviewed: labs and notes.     Details: Lab report from facility.   Labs: ordered. Decision-making details documented in ED Course.    Risk  Prescription drug management.          Disposition  Final diagnoses:   Urinary tract infection     Time reflects when diagnosis was documented in both MDM as applicable and the Disposition within this note       Time User Action Codes Description Comment    8/7/2024  3:20 PM FelixElayne Alida Add [N39.0] Urinary tract infection           ED Disposition       ED Disposition   Discharge    Condition   Stable    Date/Time   Wed Aug 7, 2024 1520    Comment   Doris Campbell discharge to home/self care.                   Follow-up Information       Follow up With Specialties Details Why Contact Info    Tuyet Cantor MD Family Medicine In 1 week As needed [address] [place]  [id number]  [phone]              Patient's Medications   Discharge Prescriptions    CEPHALEXIN (KEFLEX) 500 MG CAPSULE    Take 1 capsule (500 mg total) by mouth every 12 (twelve) hours for 7 days       Start Date: 8/7/2024  End Date: 8/14/2024       Order Dose: 500 mg       Quantity: 14 capsule    Refills: 0     No discharge procedures on file.    PDMP Review         Value Time User    PDMP Reviewed  Yes 6/9/2024 11:26 PM Gerald Hyde MD             ED Provider  Attending physically available and evaluated Doris Campbell. I managed the patient along with the ED Attending.    Electronically Signed by           Elayne Washington MD  08/07/24 2537

## 2024-08-08 LAB
ATRIAL RATE: 69 BPM
P AXIS: -8 DEGREES
PR INTERVAL: 120 MS
QRS AXIS: 0 DEGREES
QRSD INTERVAL: 72 MS
QT INTERVAL: 410 MS
QTC INTERVAL: 439 MS
T WAVE AXIS: 28 DEGREES
VENTRICULAR RATE: 69 BPM

## 2024-08-08 PROCEDURE — 93010 ELECTROCARDIOGRAM REPORT: CPT | Performed by: INTERNAL MEDICINE

## 2024-08-09 LAB
BACTERIA UR CULT: ABNORMAL
BACTERIA UR CULT: ABNORMAL

## 2024-08-25 ENCOUNTER — HOSPITAL ENCOUNTER (EMERGENCY)
Facility: HOSPITAL | Age: 84
Discharge: HOME | End: 2024-08-25
Attending: EMERGENCY MEDICINE
Payer: MEDICARE

## 2024-08-25 ENCOUNTER — APPOINTMENT (EMERGENCY)
Dept: CT IMAGING | Facility: HOSPITAL | Age: 84
End: 2024-08-25
Payer: MEDICARE

## 2024-08-25 VITALS
SYSTOLIC BLOOD PRESSURE: 155 MMHG | BODY MASS INDEX: 18.59 KG/M2 | HEART RATE: 79 BPM | DIASTOLIC BLOOD PRESSURE: 83 MMHG | TEMPERATURE: 98 F | OXYGEN SATURATION: 92 % | RESPIRATION RATE: 16 BRPM | WEIGHT: 104.94 LBS

## 2024-08-25 DIAGNOSIS — R51.9 ACUTE NONINTRACTABLE HEADACHE, UNSPECIFIED HEADACHE TYPE: Primary | ICD-10-CM

## 2024-08-25 DIAGNOSIS — R70.0 ESR RAISED: ICD-10-CM

## 2024-08-25 LAB
ANION GAP SERPL CALCULATED.3IONS-SCNC: 8 MMOL/L (ref 4–13)
BASOPHILS # BLD AUTO: 0.04 THOUSANDS/ÂΜL (ref 0–0.1)
BASOPHILS NFR BLD AUTO: 1 % (ref 0–1)
BUN SERPL-MCNC: 20 MG/DL (ref 5–25)
CALCIUM SERPL-MCNC: 8.9 MG/DL (ref 8.4–10.2)
CHLORIDE SERPL-SCNC: 105 MMOL/L (ref 96–108)
CO2 SERPL-SCNC: 23 MMOL/L (ref 21–32)
CREAT SERPL-MCNC: 0.91 MG/DL (ref 0.6–1.3)
EOSINOPHIL # BLD AUTO: 0.16 THOUSAND/ÂΜL (ref 0–0.61)
EOSINOPHIL NFR BLD AUTO: 2 % (ref 0–6)
ERYTHROCYTE [DISTWIDTH] IN BLOOD BY AUTOMATED COUNT: 14.2 % (ref 11.6–15.1)
ERYTHROCYTE [SEDIMENTATION RATE] IN BLOOD: 97 MM/HOUR (ref 0–29)
GFR SERPL CREATININE-BSD FRML MDRD: 58 ML/MIN/1.73SQ M
GLUCOSE SERPL-MCNC: 88 MG/DL (ref 65–140)
HCT VFR BLD AUTO: 38.5 % (ref 34.8–46.1)
HGB BLD-MCNC: 12 G/DL (ref 11.5–15.4)
IMM GRANULOCYTES # BLD AUTO: 0.03 THOUSAND/UL (ref 0–0.2)
IMM GRANULOCYTES NFR BLD AUTO: 0 % (ref 0–2)
LYMPHOCYTES # BLD AUTO: 0.73 THOUSANDS/ÂΜL (ref 0.6–4.47)
LYMPHOCYTES NFR BLD AUTO: 11 % (ref 14–44)
MCH RBC QN AUTO: 30.5 PG (ref 26.8–34.3)
MCHC RBC AUTO-ENTMCNC: 31.2 G/DL (ref 31.4–37.4)
MCV RBC AUTO: 98 FL (ref 82–98)
MONOCYTES # BLD AUTO: 0.54 THOUSAND/ÂΜL (ref 0.17–1.22)
MONOCYTES NFR BLD AUTO: 8 % (ref 4–12)
NEUTROPHILS # BLD AUTO: 5.17 THOUSANDS/ÂΜL (ref 1.85–7.62)
NEUTS SEG NFR BLD AUTO: 78 % (ref 43–75)
NRBC BLD AUTO-RTO: 0 /100 WBCS
PLATELET # BLD AUTO: 362 THOUSANDS/UL (ref 149–390)
PMV BLD AUTO: 10.1 FL (ref 8.9–12.7)
POTASSIUM SERPL-SCNC: 4.8 MMOL/L (ref 3.5–5.3)
RBC # BLD AUTO: 3.94 MILLION/UL (ref 3.81–5.12)
SODIUM SERPL-SCNC: 136 MMOL/L (ref 135–147)
WBC # BLD AUTO: 6.67 THOUSAND/UL (ref 4.31–10.16)

## 2024-08-25 PROCEDURE — 99284 EMERGENCY DEPT VISIT MOD MDM: CPT | Performed by: EMERGENCY MEDICINE

## 2024-08-25 PROCEDURE — 96374 THER/PROPH/DIAG INJ IV PUSH: CPT

## 2024-08-25 PROCEDURE — 99284 EMERGENCY DEPT VISIT MOD MDM: CPT

## 2024-08-25 PROCEDURE — 85652 RBC SED RATE AUTOMATED: CPT | Performed by: EMERGENCY MEDICINE

## 2024-08-25 PROCEDURE — 36415 COLL VENOUS BLD VENIPUNCTURE: CPT | Performed by: EMERGENCY MEDICINE

## 2024-08-25 PROCEDURE — 96375 TX/PRO/DX INJ NEW DRUG ADDON: CPT

## 2024-08-25 PROCEDURE — 80048 BASIC METABOLIC PNL TOTAL CA: CPT | Performed by: EMERGENCY MEDICINE

## 2024-08-25 PROCEDURE — 70450 CT HEAD/BRAIN W/O DYE: CPT

## 2024-08-25 PROCEDURE — 85025 COMPLETE CBC W/AUTO DIFF WBC: CPT | Performed by: EMERGENCY MEDICINE

## 2024-08-25 RX ORDER — PREDNISONE 20 MG/1
60 TABLET ORAL ONCE
Status: COMPLETED | OUTPATIENT
Start: 2024-08-25 | End: 2024-08-25

## 2024-08-25 RX ORDER — PREDNISONE 20 MG/1
60 TABLET ORAL DAILY
Qty: 21 TABLET | Refills: 0 | Status: SHIPPED | OUTPATIENT
Start: 2024-08-25 | End: 2024-09-01

## 2024-08-25 RX ORDER — METOCLOPRAMIDE HYDROCHLORIDE 5 MG/ML
10 INJECTION INTRAMUSCULAR; INTRAVENOUS ONCE
Status: COMPLETED | OUTPATIENT
Start: 2024-08-25 | End: 2024-08-25

## 2024-08-25 RX ORDER — DIPHENHYDRAMINE HYDROCHLORIDE 50 MG/ML
25 INJECTION INTRAMUSCULAR; INTRAVENOUS ONCE
Status: COMPLETED | OUTPATIENT
Start: 2024-08-25 | End: 2024-08-25

## 2024-08-25 RX ADMIN — PREDNISONE 60 MG: 20 TABLET ORAL at 12:46

## 2024-08-25 RX ADMIN — DIPHENHYDRAMINE HYDROCHLORIDE 25 MG: 50 INJECTION, SOLUTION INTRAMUSCULAR; INTRAVENOUS at 11:41

## 2024-08-25 RX ADMIN — Medication 710 MG: at 11:48

## 2024-08-25 RX ADMIN — METOCLOPRAMIDE 10 MG: 5 INJECTION, SOLUTION INTRAMUSCULAR; INTRAVENOUS at 11:41

## 2024-08-25 NOTE — ED PROVIDER NOTES
Pt Name: Doris Campbell  MRN: [id number]  Birthdate 1940  Age/Sex: 83 y.o. female  Date of evaluation: 8/25/2024  PCP: Tuyet Cantor MD    CHIEF COMPLAINT    Chief Complaint   Patient presents with    Headache     X weeks off and on left sided         HPI    83 y.o. female presenting with headache.  Patient states that she has had 3 days of a headache, growing gradually worse over that time, the headache is primarily in the left side, dull, severe in intensity, radiating throughout the head, unchanged with movement position exertion lights and noise or any other factors.  Denies any pain with chewing although she states she has been chewing slower than usual.  She denies chest pain, shortness of breath, numbness, weakness, changes speech or vision, trauma, fevers, other symptoms.  Patient has had similar headaches in the past.      HPI      Past Medical and Surgical History    Past Medical History:   Diagnosis Date    Allergic     Anxiety     Arthritis     Cancer (HCC)     HLD (hyperlipidemia)     Hypertension     Hyperthyroidism     Osteoporosis     Stroke (HCC)     TIA (transient ischemic attack)        Past Surgical History:   Procedure Laterality Date    BREAST LUMPECTOMY Left     HYSTERECTOMY      KNEE SURGERY Right     ORIF TIBIA FRACTURE Right 12/8/2016    Procedure: OPEN REDUCTION W/ INTERNAL FIXATION (ORIF) TIBIA;  Surgeon: Angie Case MD;  Location: [facility];  Service:        Family History   Problem Relation Age of Onset    Coronary artery disease Family     Other Family         DJD       Social History     Tobacco Use    Smoking status: Never    Smokeless tobacco: Never    Tobacco comments:     Former smoker - As per [place]    Vaping Use    Vaping status: Never Used   Substance Use Topics    Alcohol use: Not Currently     Comment: Alcohol intake:   Occasional  - As per [place]     Drug use: Never           Allergies    Allergies   Allergen Reactions    Alendronate     Raloxifene      Risedronate Sodium     Sulfa Antibiotics Swelling    Penicillins Swelling     Patient received 7 day treatment course Keflex without any adverse reactions in 11/2022.        Home Medications    Prior to Admission medications    Medication Sig Start Date End Date Taking? Authorizing Provider   acetaminophen (TYLENOL) 325 mg tablet Take 975 mg by mouth every 8 (eight) hours as needed    Historical Provider, MD   albuterol (PROVENTIL HFA,VENTOLIN HFA) 90 mcg/act inhaler Inhale 2 puffs every 6 (six) hours as needed for wheezing    Historical Provider, MD   amLODIPine (NORVASC) 2.5 mg tablet Take 2.5 mg by mouth daily    Historical Provider, MD   Aspirin Low Dose 81 MG chewable tablet CHEW 1 TABLET BY MOUTH DAILY 5/30/24   Marquise Cole,    calcitonin, salmon, (MIACALCIN) 200 units/act nasal spray 1 spray into each nostril daily    Historical Provider, MD   cholecalciferol (VITAMIN D3) 1,000 units tablet Take 1 tablet (1,000 Units total) by mouth daily 1/6/23   VALDEMAR Carranza   clotrimazole-betamethasone (LOTRISONE) 1-0.05 % cream Apply topically 2 (two) times a day 4/10/24   Hailee Castillo PA-C   Diclofenac Sodium (VOLTAREN) 1 % Apply 2 g topically 4 (four) times a day = To right hip and pelvis 1/6/23   VALDEMAR Carranza   fluticasone (FLONASE) 50 mcg/act nasal spray 1 spray into each nostril daily 6/25/24   VALDEMAR Medeiros   gabapentin (NEURONTIN) 100 mg capsule Take 1 capsule (100 mg total) by mouth daily 4/11/24   Hailee Castillo PA-C   magnesium hydroxide (MILK OF MAGNESIA) 400 mg/5 mL oral suspension Take 30 mL by mouth 2 (two) times a day as needed    Historical Provider, MD   senna-docusate sodium (SENOKOT S) 8.6-50 mg per tablet Take 1 tablet by mouth 2 (two) times a day 1/6/23   VALDEMAR Carranza           Review of Systems    Review of Systems   Constitutional:  Negative for activity change, chills and fever.   HENT:  Negative for drooling and facial  swelling.    Eyes:  Negative for pain, discharge and visual disturbance.   Respiratory:  Negative for apnea, cough, chest tightness, shortness of breath and wheezing.    Cardiovascular:  Negative for chest pain and leg swelling.   Gastrointestinal:  Negative for abdominal pain, constipation, diarrhea, nausea and vomiting.   Genitourinary:  Negative for difficulty urinating, dysuria and urgency.   Musculoskeletal:  Negative for arthralgias, back pain and gait problem.   Skin:  Negative for color change and rash.   Neurological:  Positive for headaches. Negative for dizziness, speech difficulty and weakness.   Psychiatric/Behavioral:  Negative for agitation, behavioral problems and confusion.            All other systems reviewed and negative.    Physical Exam      ED Triage Vitals [08/25/24 1050]   Temperature Pulse Respirations Blood Pressure SpO2   98 °F (36.7 °C) 79 15 152/75 94 %      Temp Source Heart Rate Source Patient Position - Orthostatic VS BP Location FiO2 (%)   Oral Monitor Lying Right arm --      Pain Score       5               Physical Exam  Vitals and nursing note reviewed.   Constitutional:       General: She is not in acute distress.     Appearance: She is well-developed. She is not ill-appearing, toxic-appearing or diaphoretic.   HENT:      Head: Normocephalic and atraumatic.      Comments: Patient tender to palpation overlying the left temporal area.     Right Ear: External ear normal.      Left Ear: External ear normal.      Nose: Nose normal. No congestion or rhinorrhea.      Mouth/Throat:      Mouth: Mucous membranes are moist.      Pharynx: Oropharynx is clear. No oropharyngeal exudate or posterior oropharyngeal erythema.   Eyes:      Conjunctiva/sclera: Conjunctivae normal.      Pupils: Pupils are equal, round, and reactive to light.   Cardiovascular:      Rate and Rhythm: Normal rate and regular rhythm.      Pulses: Normal pulses.      Heart sounds: Normal heart sounds. No murmur heard.      No friction rub. No gallop.   Pulmonary:      Effort: Pulmonary effort is normal. No respiratory distress.      Breath sounds: Normal breath sounds. No wheezing or rales.   Abdominal:      General: There is no distension.      Palpations: Abdomen is soft.      Tenderness: There is no abdominal tenderness. There is no guarding or rebound.   Musculoskeletal:         General: No deformity. Normal range of motion.      Cervical back: Normal range of motion and neck supple.   Skin:     General: Skin is warm and dry.      Capillary Refill: Capillary refill takes less than 2 seconds.      Findings: No erythema or rash.   Neurological:      Mental Status: She is alert and oriented to person, place, and time.      Cranial Nerves: No cranial nerve deficit.      Sensory: No sensory deficit.      Motor: No weakness.      Coordination: Coordination normal.      Comments: Cranial nerves II through XII intact, 5 out of 5 strength in all extremities, normal speech and coordination.  Range of motion does seem limited in the right arm with the patient having difficulty fully straightening the arm but the patient states this is chronic.   Psychiatric:         Behavior: Behavior normal.         Thought Content: Thought content normal.         Judgment: Judgment normal.              Diagnostic Results      Labs:    Results Reviewed       Procedure Component Value Units Date/Time    Basic metabolic panel [[id number]] Collected: 08/25/24 1141    Lab Status: Final result Specimen: Blood from Arm, Left Updated: 08/25/24 1315     Sodium 136 mmol/L      Potassium 4.8 mmol/L      Chloride 105 mmol/L      CO2 23 mmol/L      ANION GAP 8 mmol/L      BUN 20 mg/dL      Creatinine 0.91 mg/dL      Glucose 88 mg/dL      Calcium 8.9 mg/dL      eGFR 58 ml/min/1.73sq m     Narrative:      National Kidney Disease Foundation guidelines for Chronic Kidney Disease (CKD):     Stage 1 with normal or high GFR (GFR > 90 mL/min/1.73 square meters)    Stage 2 Mild  CKD (GFR = 60-89 mL/min/1.73 square meters)    Stage 3A Moderate CKD (GFR = 45-59 mL/min/1.73 square meters)    Stage 3B Moderate CKD (GFR = 30-44 mL/min/1.73 square meters)    Stage 4 Severe CKD (GFR = 15-29 mL/min/1.73 square meters)    Stage 5 End Stage CKD (GFR <15 mL/min/1.73 square meters)  Note: GFR calculation is accurate only with a steady state creatinine    Sedimentation rate, automated [[id number]]  (Abnormal) Collected: 08/25/24 1141    Lab Status: Final result Specimen: Blood from Arm, Left Updated: 08/25/24 1226     Sed Rate 97 mm/hour     CBC and differential [[id number]]  (Abnormal) Collected: 08/25/24 1141    Lab Status: Final result Specimen: Blood from Arm, Left Updated: 08/25/24 1220     WBC 6.67 Thousand/uL      RBC 3.94 Million/uL      Hemoglobin 12.0 g/dL      Hematocrit 38.5 %      MCV 98 fL      MCH 30.5 pg      MCHC 31.2 g/dL      RDW 14.2 %      MPV 10.1 fL      Platelets 362 Thousands/uL      nRBC 0 /100 WBCs      Segmented % 78 %      Immature Grans % 0 %      Lymphocytes % 11 %      Monocytes % 8 %      Eosinophils Relative 2 %      Basophils Relative 1 %      Absolute Neutrophils 5.17 Thousands/µL      Absolute Immature Grans 0.03 Thousand/uL      Absolute Lymphocytes 0.73 Thousands/µL      Absolute Monocytes 0.54 Thousand/µL      Eosinophils Absolute 0.16 Thousand/µL      Basophils Absolute 0.04 Thousands/µL             All labs reviewed and utilized in the medical decision making process    Radiology:    CT head without contrast   Final Result      No acute intracranial hemorrhage or acute transcortical infarction.      Moderate cerebral chronic microangiopathic changes. Chronic deep cerebral gray and white matter lacunar infarctions as described above. If concerned of acute on chronic deep cerebral gray and white matter infarction, MR imaging is recommended.      Left parietal lobe gliosis and encephalomalacia.                        Workstation performed: [id number]             All  radiology studies independently viewed by me and interpreted by the radiologist.    Procedure    Procedures        ED Course of Care and Re-Assessments      Headache much improved with IV acetaminophen, Reglan, Benadryl.  Labs sent including ESR with concern for possible giant cell arteritis, returned significantly elevated, raising suspicion for same.  No vision loss or other significant visual symptoms noted, started on prednisone with plan for close outpatient follow-up.  Discussed briefly with general surgery who recommended consultation with vascular surgery, discussed with vascular surgery, who requested that patient's primary care doctor be looped in.  We discussed patient's current living situation in the skilled nursing facility, plan formed for facility to contact primary care doctor and work with them and vascular surgery to arrange biopsy of the temporal artery to rule in or out diagnosis.  Started on steroids.    Medications   metoclopramide (REGLAN) injection 10 mg (10 mg Intravenous Given 8/25/24 1141)   diphenhydrAMINE (BENADRYL) injection 25 mg (25 mg Intravenous Given 8/25/24 1141)   acetaminophen (Ofirmev) IVPB 710 mg (0 mg Intravenous Stopped 8/25/24 1203)   predniSONE tablet 60 mg (60 mg Oral Given 8/25/24 1246)           FINAL IMPRESSION    Final diagnoses:   Acute nonintractable headache, unspecified headache type   ESR raised         DISPOSITION/PLAN    Presentation as above with headache in the setting of elevated erythrocyte sedimentation rate and temporal tenderness concerning for temporal arteritis.  Vital signs reassuring, examination as above, nonfocal neurologic exam, no visual changes.  Differential diagnosis does include migraine or other primary headache although with significantly elevated erythrocyte sedimentation rate as well as temporal tenderness there exists concern for temporal arteritis, plan formed for close outpatient follow-up and biopsy as above.  Do not suspect  intracranial hemorrhage, sepsis, meningitis, encephalitis, critical increased ICP, diffuse axonal injury, other acute life threatening alternate cause of symptoms at this time.  Discharged with strict return precautions, follow-up primary care doctor as well as vascular surgery to facilitate biopsy.  Time reflects when diagnosis was documented in both MDM as applicable and the Disposition within this note       Time User Action Codes Description Comment    8/25/2024  2:26 PM Zacarias Reese Add [R51.9] Acute nonintractable headache, unspecified headache type     8/25/2024  2:26 PM Zacarias Reese Add [R70.0] ESR raised           ED Disposition       ED Disposition   Discharge    Condition   Stable    Date/Time   Sun Aug 25, 2024  2:26 PM    Comment   Doris Campbell discharge to home/self care.                   Follow-up Information       Follow up With Specialties Details Why Contact Info Additional Information    [facility] Emergency Department Emergency Medicine Go to  If symptoms worsen [address] [facility] [place] [facility] [address]  [phone] [facility] Emergency Department, [facility] [place], [place], [address]    Tuyet Cantor MD Family Medicine Call in 1 day To discuss this visit and schedule close follow-up.  Please have your doctor coordinate with vascular surgery to get the temporal artery biopsy. [address] [address]  [phone]       [facility] General Surgery Call in 1 day To discuss this visit and schedule close follow-up.  You will need a temporal artery biopsy as soon as possible to rule out temporal arteritis. [address]  [facility] [address] [phone] [facility], [address], [place], [address]    [facility] Vascular [facility]  Call in 1 day To discuss this visit and schedule close  follow-up. You will need a temporal artery biopsy as soon as possible to rule out temporal arteritis. [address] [facility]  [address]  [facility] [address]  [phone]               PATIENT REFERRED TO:    [facility] Emergency Department  [address] [address]  [phone]  Go to   If symptoms worsen    Tuyet Cantor MD  [address]  [place] [id number] [phone]    Call in 1 day  To discuss this visit and schedule close follow-up.  Please have your doctor coordinate with vascular surgery to get the temporal artery biopsy.    [facility]  [address]  [facility] [address] [phone]  Call in 1 day  To discuss this visit and schedule close follow-up.  You will need a temporal artery biopsy as soon as possible to rule out temporal arteritis.    [facility]ascular Center [facility]  [address] [address]  [phone]  Call in 1 day  To discuss this visit and schedule close follow-up. You will need a temporal artery biopsy as soon as possible to rule out temporal arteritis.      DISCHARGE MEDICATIONS:    Discharge Medication List as of 8/25/2024  3:03 PM        START taking these medications    Details   predniSONE 20 mg tablet Take 3 tablets (60 mg total) by mouth daily for 7 days, Starting Sun 8/25/2024, Until Sun 9/1/2024, Print           CONTINUE these medications which have NOT CHANGED    Details   acetaminophen (TYLENOL) 325 mg tablet Take 975 mg by mouth every 8 (eight) hours as needed, Historical Med      albuterol (PROVENTIL HFA,VENTOLIN HFA) 90 mcg/act inhaler Inhale 2 puffs every 6 (six) hours as needed for wheezing, Historical Med      amLODIPine (NORVASC) 2.5 mg tablet Take 2.5 mg by mouth daily, Historical Med      Aspirin Low Dose 81 MG chewable tablet CHEW 1 TABLET BY MOUTH DAILY, Starting Thu 5/30/2024, Normal     "  calcitonin, salmon, (MIACALCIN) 200 units/act nasal spray 1 spray into each nostril daily, Historical Med      cholecalciferol (VITAMIN D3) 1,000 units tablet Take 1 tablet (1,000 Units total) by mouth daily, Starting Fri 1/6/2023, Normal      clotrimazole-betamethasone (LOTRISONE) 1-0.05 % cream Apply topically 2 (two) times a day, Starting Wed 4/10/2024, No Print      Diclofenac Sodium (VOLTAREN) 1 % Apply 2 g topically 4 (four) times a day = To right hip and pelvis, Starting Fri 1/6/2023, Normal      fluticasone (FLONASE) 50 mcg/act nasal spray 1 spray into each nostril daily, Starting Tue 6/25/2024, Normal      gabapentin (NEURONTIN) 100 mg capsule Take 1 capsule (100 mg total) by mouth daily, Starting Thu 4/11/2024, Print      magnesium hydroxide (MILK OF MAGNESIA) 400 mg/5 mL oral suspension Take 30 mL by mouth 2 (two) times a day as needed, Historical Med      senna-docusate sodium (SENOKOT S) 8.6-50 mg per tablet Take 1 tablet by mouth 2 (two) times a day, Starting Fri 1/6/2023, Normal                      Zacarias Reese MD    Portions of the record may have been created with voice recognition software.  Occasional wrong word or \"sound alike\" substitutions may have occurred due to the inherent limitations of voice recognition software.  Please read the chart carefully and recognize, using context, where substitutions have occurred     Zacarias Reese MD  08/25/24 1953    "

## 2024-08-25 NOTE — ED NOTES
Attempted to Call kenneth hummel to give report on patient, no answer at this time     Jasmina Aguilar RN  08/25/24 0545

## 2024-08-25 NOTE — QUICK NOTE
Vascular Surgery   Doris Campbell 83 y.o. female MRN: [id number]    Unit/Bed#: ED-18 Encounter: [id number]    Vascular team contacted about Doris Campbell, patient information/history obtained from chart review and discussion with ED physician.     84 yo F h/o HTN, HLD, hyperthyroidism, and TIA presented to the [facility] ED w/ persistent L sided headache x3 days. Denies any associated symptoms including vision changes, nausea, M/S deficits, FND. She has had similar headaches in the past. Labs are significant for elevated ESR of 97 without leukocytosis. On exam pt is tender to palpation over her R temporal artery. Given these findings, ED provider concerned for GCA and started pt on steroids. Vascular consulted for TAB.   Of note, pt takes daily Aspirin 81mg. She is a nonsmoker.     Will reach out to the office to get pt scheduled for TAB. Pt will need to see PCP or rheumatologist prior to surgery for further care and management of steroids. Return to ED if symptoms worsen. Further care/dispo per ED team.      Case discussed with attending vascular surgeon.     Vitals:  Wt Readings from Last 3 Encounters:   08/25/24 47.6 kg (104 lb 15 oz)   08/07/24 45.8 kg (101 lb)   06/09/24 46.1 kg (101 lb 10.1 oz)      BP Readings from Last 3 Encounters:   08/25/24 155/83   08/07/24 165/77   06/10/24 137/77      Pulse Readings from Last 3 Encounters:   08/25/24 79   08/07/24 73   06/10/24 72      Resp Readings from Last 3 Encounters:   08/25/24 16   08/07/24 18   06/10/24 18      PF Readings from Last 3 Encounters:   No data found for PF      SpO2 Readings from Last 3 Encounters:   08/25/24 92%   08/07/24 97%   06/10/24 96%      Temp Readings from Last 3 Encounters:   08/25/24 98 °F (36.7 °C) (Oral)   08/07/24 97.7 °F (36.5 °C) (Oral)   06/09/24 97.7 °F (36.5 °C) (Oral)        Labs:  CBC - WBC/Hgb/Hct/Plts: 6.67/12.0/38.5/362 (08/25 1141)  CHEM - BUN/Cr/glu/ALT/AST/amyl/lip:20/0.91/--/--/--/--/-- (08/25 1141)     FOX -  Na/K/Cl/CO2: 136/4.8/105/23 (08/25 1141)    Imaging:  CT head without contrast    Result Date: 8/25/2024  Impression: No acute intracranial hemorrhage or acute transcortical infarction. Moderate cerebral chronic microangiopathic changes. Chronic deep cerebral gray and white matter lacunar infarctions as described above. If concerned of acute on chronic deep cerebral gray and white matter infarction, MR imaging is recommended. Left parietal lobe gliosis and encephalomalacia. Workstation performed: [id number]

## 2024-08-26 ENCOUNTER — TELEPHONE (OUTPATIENT)
Dept: VASCULAR SURGERY | Facility: CLINIC | Age: 84
End: 2024-08-26

## 2024-08-26 NOTE — TELEPHONE ENCOUNTER
----- Message from Angel Villar MD sent at 8/25/2024  3:18 PM EDT -----  Regarding: OR scheduling and follow up  Good afternoon-  This pt was referred to us by the [facility] ED for temporal artery biopsy. She lives at a SNF, so we need to make sure she sees her PCP prior to procedure so she has follow up for this condition and management of her steroids. Then we can get her scheduled within the next week for bilateral temporal artery biopsy with any surgeon please.  Thank you!

## 2024-08-27 NOTE — TELEPHONE ENCOUNTER
Spoke to Moon at [facility]. She stated that she is seeing the PCP at the [facility] tomorrow 8/28/24. She will be faxing over those notes - Gave her a tentative date of 9/4/24 SLA.

## 2024-08-30 ENCOUNTER — HOSPITAL ENCOUNTER (EMERGENCY)
Facility: HOSPITAL | Age: 84
Discharge: HOME | End: 2024-08-30
Attending: EMERGENCY MEDICINE
Payer: MEDICARE

## 2024-08-30 VITALS
OXYGEN SATURATION: 98 % | SYSTOLIC BLOOD PRESSURE: 135 MMHG | BODY MASS INDEX: 22.26 KG/M2 | DIASTOLIC BLOOD PRESSURE: 64 MMHG | TEMPERATURE: 97.9 F | RESPIRATION RATE: 18 BRPM | WEIGHT: 125.66 LBS | HEART RATE: 91 BPM

## 2024-08-30 DIAGNOSIS — R04.0 EPISTAXIS: Primary | ICD-10-CM

## 2024-08-30 PROCEDURE — 99284 EMERGENCY DEPT VISIT MOD MDM: CPT | Performed by: EMERGENCY MEDICINE

## 2024-08-30 PROCEDURE — 99283 EMERGENCY DEPT VISIT LOW MDM: CPT

## 2024-08-30 RX ORDER — OXYMETAZOLINE HYDROCHLORIDE 0.05 G/100ML
2 SPRAY NASAL ONCE
Status: COMPLETED | OUTPATIENT
Start: 2024-08-30 | End: 2024-08-30

## 2024-08-30 RX ADMIN — OXYMETAZOLINE HYDROCHLORIDE 2 SPRAY: 0.5 SPRAY NASAL at 10:20

## 2024-08-30 NOTE — DISCHARGE INSTRUCTIONS
Please return to the emergency room if your nose begins to bleed again and you are unable to stop it after applying pressure.

## 2024-08-30 NOTE — ED PROVIDER NOTES
History  Chief Complaint   Patient presents with    Nose Bleed     Pt sent from [facility] for nose bleed. Pt is scheduled for procedure related to having multiple nose bleeds. At time of arrival no active bleeding seen.     HPI  Doris Campbell is an 83-year-old female past medical history of hypertension presents via EMS from [facility] for epistaxis.  Patient stated she woke up this morning and after few minutes felt that her face was wet.  States that both her nares were bleeding and began applying pressure.  She thinks her nose was bleeding for 1 hour before EMS arrived. EMS placed clamp to compress nares.  She states nothing like this had happened before.  Denies any trauma or falls in the last couple days.  She does not feel dizzy or lightheaded at this time.  Denies any CP, SOB, abdominal pain or any pain.    No paperwork was given to EMS by facility with med list.  Patient unsure what medication she takes.  Attempted to call facility multiple times but with no answer.      Prior to Admission Medications   Prescriptions Last Dose Informant Patient Reported? Taking?   Aspirin Low Dose 81 MG chewable tablet   No No   Sig: CHEW 1 TABLET BY MOUTH DAILY   Diclofenac Sodium (VOLTAREN) 1 %  Self No No   Sig: Apply 2 g topically 4 (four) times a day = To right hip and pelvis   acetaminophen (TYLENOL) 325 mg tablet  Self Yes No   Sig: Take 975 mg by mouth every 8 (eight) hours as needed   albuterol (PROVENTIL HFA,VENTOLIN HFA) 90 mcg/act inhaler  Self Yes No   Sig: Inhale 2 puffs every 6 (six) hours as needed for wheezing   amLODIPine (NORVASC) 2.5 mg tablet   Yes No   Sig: Take 2.5 mg by mouth daily   calcitonin, salmon, (MIACALCIN) 200 units/act nasal spray   Yes No   Si spray into each nostril daily   cholecalciferol (VITAMIN D3) 1,000 units tablet  Self No No   Sig: Take 1 tablet (1,000 Units total) by mouth daily   clotrimazole-betamethasone (LOTRISONE) 1-0.05 % cream    No No   Sig: Apply topically 2 (two) times a day   fluticasone (FLONASE) 50 mcg/act nasal spray   No No   Si spray into each nostril daily   gabapentin (NEURONTIN) 100 mg capsule   No No   Sig: Take 1 capsule (100 mg total) by mouth daily   magnesium hydroxide (MILK OF MAGNESIA) 400 mg/5 mL oral suspension  Self Yes No   Sig: Take 30 mL by mouth 2 (two) times a day as needed   predniSONE 20 mg tablet   No No   Sig: Take 3 tablets (60 mg total) by mouth daily for 7 days   senna-docusate sodium (SENOKOT S) 8.6-50 mg per tablet  Self No No   Sig: Take 1 tablet by mouth 2 (two) times a day      Facility-Administered Medications: None       Past Medical History:   Diagnosis Date    Allergic     Anxiety     Arthritis     Cancer (HCC)     HLD (hyperlipidemia)     Hypertension     Hyperthyroidism     Osteoporosis     Stroke (HCC)     TIA (transient ischemic attack)        Past Surgical History:   Procedure Laterality Date    BREAST LUMPECTOMY Left     HYSTERECTOMY      KNEE SURGERY Right     ORIF TIBIA FRACTURE Right 2016    Procedure: OPEN REDUCTION W/ INTERNAL FIXATION (ORIF) TIBIA;  Surgeon: Angie Case MD;  Location: [facility];  Service:        Family History   Problem Relation Age of Onset    Coronary artery disease Family     Other Family         DJD     I have reviewed and agree with the history as documented.    E-Cigarette/Vaping    E-Cigarette Use Never User      E-Cigarette/Vaping Substances    Nicotine No     THC No     CBD No     Flavoring No     Other No     Unknown No      Social History     Tobacco Use    Smoking status: Never    Smokeless tobacco: Never    Tobacco comments:     Former smoker - As per [place]    Vaping Use    Vaping status: Never Used   Substance Use Topics    Alcohol use: Not Currently     Comment: Alcohol intake:   Occasional  - As per [place]     Drug use: Never        Review of Systems   Constitutional:  Negative for chills and fever.   HENT:  Negative for congestion,  rhinorrhea and sore throat.    Eyes:  Negative for pain and visual disturbance.   Respiratory:  Negative for cough and shortness of breath.    Cardiovascular:  Negative for chest pain and palpitations.   Gastrointestinal:  Negative for abdominal pain and vomiting.   Genitourinary:  Negative for dysuria and hematuria.   Musculoskeletal:  Negative for arthralgias and back pain.   Skin:  Negative for color change and rash.   Neurological:  Negative for seizures and syncope.   All other systems reviewed and are negative.      Physical Exam  ED Triage Vitals   Temperature Pulse Respirations Blood Pressure SpO2   08/30/24 0736 08/30/24 0736 08/30/24 0736 08/30/24 0737 08/30/24 0736   97.9 °F (36.6 °C) 91 18 135/64 98 %      Temp src Heart Rate Source Patient Position - Orthostatic VS BP Location FiO2 (%)   -- 08/30/24 0736 08/30/24 0736 08/30/24 0736 --    Monitor Sitting Left arm       Pain Score       08/30/24 0736       No Pain             Orthostatic Vital Signs  Vitals:    08/30/24 0736 08/30/24 0737   BP:  135/64   Pulse: 91    Patient Position - Orthostatic VS: Sitting        Physical Exam  Vitals and nursing note reviewed.   Constitutional:       General: She is not in acute distress.     Appearance: She is well-developed.   HENT:      Head: Normocephalic and atraumatic.      Nose: No nasal deformity.      Comments: Dried blood around inner rim of nares. No active bleeding seen.   Eyes:      Conjunctiva/sclera: Conjunctivae normal.   Cardiovascular:      Rate and Rhythm: Normal rate and regular rhythm.      Heart sounds: No murmur heard.  Pulmonary:      Effort: Pulmonary effort is normal. No respiratory distress.      Breath sounds: Normal breath sounds.   Abdominal:      Palpations: Abdomen is soft.      Tenderness: There is no abdominal tenderness.   Musculoskeletal:         General: No swelling. Normal range of motion.      Cervical back: Neck supple.   Skin:     General: Skin is warm and dry.      Capillary  Refill: Capillary refill takes less than 2 seconds.   Neurological:      Mental Status: She is alert. Mental status is at baseline.   Psychiatric:         Mood and Affect: Mood normal.         ED Medications  Medications   oxymetazoline (AFRIN) 0.05 % nasal spray 2 spray (2 sprays Each Nare Given 8/30/24 1020)       Diagnostic Studies  Results Reviewed       None                   No orders to display         Procedures  ECG 12 Lead Documentation Only    Date/Time: 8/30/2024 12:45 PM    Performed by: Caterina Alejandre MD  Authorized by: Caterina Alejandre MD    Indications / Diagnosis:  Epistaxis  ECG reviewed by me, the ED Provider: yes    Patient location:  ED  Previous ECG:     Previous ECG:  Compared to current    Similarity:  No change    Comparison to cardiac monitor: Yes    Interpretation:     Interpretation: normal    Rate:     ECG rate assessment: normal    Rhythm:     Rhythm: sinus rhythm    Ectopy:     Ectopy: none    QRS:     QRS axis:  Normal    QRS intervals:  Normal  Conduction:     Conduction: normal    ST segments:     ST segments:  Normal  T waves:     T waves: normal          ED Course                                       Medical Decision Making  Risk  OTC drugs.    Doris Campbell is an 83-year-old female past medical history of hypertension presents via EMS from [facility] for epistaxis.  On arrival, clamp applied by EMS was providing compression.  On evaluation, clamp removed and no bleeding was seen.  Had patient blow nose vigorously and there was still no bleeding.  Looked in bilateral nares and no bleeding source seen that would require cauterization.  Patient was not pale, cold, tachycardic, or hypotensive making severe blood loss highly unlikely.   Attempted to call facility multiple times to go over med list and confirm patient was not on blood thinners, but there was no answer.  Patient states she feels like she is at baseline other than what happened  today.  Patient was comfortable and pleasant during stay.  Patient hungry and given some food and water.    Patient given dose of oxymetazoline before discharge.     Dispo: discharge to home  Prescriptions: home    Discussed results and plan with patient. Patient was agreeable and expressed understanding. Discussed return precautions.              Disposition  Final diagnoses:   Epistaxis     Time reflects when diagnosis was documented in both MDM as applicable and the Disposition within this note       Time User Action Codes Description Comment    8/30/2024  9:52 AM Caterina Alejandre [R04.0] Epistaxis           ED Disposition       ED Disposition   Discharge    Condition   Stable    Date/Time   Fri Aug 30, 2024  9:52 AM    Comment   Doris Campbell discharge to home/self care.                   Follow-up Information       Follow up With Specialties Details Why Contact Info    Tuyet Cantor MD Family Medicine In 5 days  [address] [address]  [phone]              Discharge Medication List as of 8/30/2024 10:00 AM        CONTINUE these medications which have NOT CHANGED    Details   acetaminophen (TYLENOL) 325 mg tablet Take 975 mg by mouth every 8 (eight) hours as needed, Historical Med      albuterol (PROVENTIL HFA,VENTOLIN HFA) 90 mcg/act inhaler Inhale 2 puffs every 6 (six) hours as needed for wheezing, Historical Med      amLODIPine (NORVASC) 2.5 mg tablet Take 2.5 mg by mouth daily, Historical Med      Aspirin Low Dose 81 MG chewable tablet CHEW 1 TABLET BY MOUTH DAILY, Starting Thu 5/30/2024, Normal      calcitonin, salmon, (MIACALCIN) 200 units/act nasal spray 1 spray into each nostril daily, Historical Med      cholecalciferol (VITAMIN D3) 1,000 units tablet Take 1 tablet (1,000 Units total) by mouth daily, Starting Fri 1/6/2023, Normal      clotrimazole-betamethasone (LOTRISONE) 1-0.05 % cream Apply topically 2 (two) times a day, Starting Wed 4/10/2024, No Print      Diclofenac  Sodium (VOLTAREN) 1 % Apply 2 g topically 4 (four) times a day = To right hip and pelvis, Starting Fri 1/6/2023, Normal      fluticasone (FLONASE) 50 mcg/act nasal spray 1 spray into each nostril daily, Starting Tue 6/25/2024, Normal      gabapentin (NEURONTIN) 100 mg capsule Take 1 capsule (100 mg total) by mouth daily, Starting Thu 4/11/2024, Print      magnesium hydroxide (MILK OF MAGNESIA) 400 mg/5 mL oral suspension Take 30 mL by mouth 2 (two) times a day as needed, Historical Med      predniSONE 20 mg tablet Take 3 tablets (60 mg total) by mouth daily for 7 days, Starting Sun 8/25/2024, Until Sun 9/1/2024, Print      senna-docusate sodium (SENOKOT S) 8.6-50 mg per tablet Take 1 tablet by mouth 2 (two) times a day, Starting Fri 1/6/2023, Normal           No discharge procedures on file.    PDMP Review         Value Time User    PDMP Reviewed  Yes 6/9/2024 11:26 PM Gerald Hyde MD             ED Provider  Attending physically available and evaluated Doris Campbell. I managed the patient along with the ED Attending.    Electronically Signed by           Caterina Alejandre MD  08/30/24 1510

## 2024-08-30 NOTE — ED ATTENDING ATTESTATION
8/30/2024  I, Faye Luis MD, saw and evaluated the patient. I have discussed the patient with the resident/non-physician practitioner and agree with the resident's/non-physician practitioner's findings, Plan of Care, and MDM as documented in the resident's/non-physician practitioner's note, except where noted. All available labs and Radiology studies were reviewed.  I was present for key portions of any procedure(s) performed by the resident/non-physician practitioner and I was immediately available to provide assistance.       At this point I agree with the current assessment done in the Emergency Department.  I have conducted an independent evaluation of this patient a history and physical is as follows:    84 yo female with h/o HTN, HLD, CVA/TIA presents with epistaxis from nursing home.  Unable to get collateral history from nursing home despite multiple calls.  No known fall or trauma.  Pt without complaint on my evaluation.  Bleeding has resolved.  Dried  blood noted in left nares.  Pt observed at length.  No re-bleed.  Nothing to cauterize.  Did not require packing.  Pt appears at her baseline per chart without complaint.  Discharged back to nursing facility.      Past Medical History:   Diagnosis Date    Allergic     Anxiety     Arthritis     Cancer (HCC)     HLD (hyperlipidemia)     Hypertension     Hyperthyroidism     Osteoporosis     Stroke (HCC)     TIA (transient ischemic attack)              ED Course         Critical Care Time  Procedures

## 2024-08-30 NOTE — ED NOTES
Shortly after arrival small trickle of blood noted left nare. Unsure if active bleed or drainage. Blue nose clip applied     Peng Luke RN  08/30/24 [id number]

## 2024-09-04 NOTE — TELEPHONE ENCOUNTER
Spoke to Antolin - She will be having patient PCP from the NH fax over her note from last week. Antolin also stated to speak with the patients KELLY Gomez to schedule/make sure its okay to schedule.

## 2024-09-05 ENCOUNTER — APPOINTMENT (EMERGENCY)
Dept: RADIOLOGY | Facility: HOSPITAL | Age: 84
DRG: 563 | End: 2024-09-05
Payer: MEDICARE

## 2024-09-05 ENCOUNTER — HOSPITAL ENCOUNTER (INPATIENT)
Facility: HOSPITAL | Age: 84
LOS: 4 days | Discharge: NURSING, REHAB OR LONG-TERM CARE | DRG: 563 | End: 2024-09-09
Attending: EMERGENCY MEDICINE | Admitting: SURGERY
Payer: MEDICARE

## 2024-09-05 ENCOUNTER — APPOINTMENT (EMERGENCY)
Dept: CT IMAGING | Facility: HOSPITAL | Age: 84
DRG: 563 | End: 2024-09-05
Payer: MEDICARE

## 2024-09-05 DIAGNOSIS — R29.6 RECURRENT FALLS: ICD-10-CM
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
## 2024-09-05 DIAGNOSIS — S22.32XA CLOSED FRACTURE OF ONE RIB OF LEFT SIDE, INITIAL ENCOUNTER: ICD-10-CM

## 2024-09-05 DIAGNOSIS — S42.001A CLOSED RIGHT CLAVICULAR FRACTURE: ICD-10-CM

## 2024-09-05 LAB
ANION GAP SERPL CALCULATED.3IONS-SCNC: 5 MMOL/L (ref 4–13)
ATRIAL RATE: 83 BPM
BASOPHILS # BLD MANUAL: 0 THOUSAND/UL (ref 0–0.1)
BASOPHILS NFR MAR MANUAL: 0 % (ref 0–1)
BUN SERPL-MCNC: 19 MG/DL (ref 5–25)
CALCIUM SERPL-MCNC: 8.4 MG/DL (ref 8.4–10.2)
CHLORIDE SERPL-SCNC: 105 MMOL/L (ref 96–108)
CO2 SERPL-SCNC: 25 MMOL/L (ref 21–32)
CREAT SERPL-MCNC: 1.13 MG/DL (ref 0.6–1.3)
EOSINOPHIL # BLD MANUAL: 0.1 THOUSAND/UL (ref 0–0.4)
EOSINOPHIL NFR BLD MANUAL: 1 % (ref 0–6)
ERYTHROCYTE [DISTWIDTH] IN BLOOD BY AUTOMATED COUNT: 14.7 % (ref 11.6–15.1)
GFR SERPL CREATININE-BSD FRML MDRD: 44 ML/MIN/1.73SQ M
GLUCOSE SERPL-MCNC: 128 MG/DL (ref 65–140)
HCT VFR BLD AUTO: 37.1 % (ref 34.8–46.1)
HGB BLD-MCNC: 11.5 G/DL (ref 11.5–15.4)
LYMPHOCYTES # BLD AUTO: 1.29 THOUSAND/UL (ref 0.6–4.47)
LYMPHOCYTES # BLD AUTO: 13 % (ref 14–44)
MCH RBC QN AUTO: 30.8 PG (ref 26.8–34.3)
MCHC RBC AUTO-ENTMCNC: 31 G/DL (ref 31.4–37.4)
MCV RBC AUTO: 100 FL (ref 82–98)
METAMYELOCYTE ABSOLUTE CT: 0.1 THOUSAND/UL (ref 0–0.1)
METAMYELOCYTES NFR BLD MANUAL: 1 % (ref 0–1)
MONOCYTES # BLD AUTO: 0.6 THOUSAND/UL (ref 0–1.22)
MONOCYTES NFR BLD: 6 % (ref 4–12)
NEUTROPHILS # BLD MANUAL: 7.84 THOUSAND/UL (ref 1.85–7.62)
NEUTS BAND NFR BLD MANUAL: 3 % (ref 0–8)
NEUTS SEG NFR BLD AUTO: 76 % (ref 43–75)
P AXIS: -5 DEGREES
PLATELET # BLD AUTO: 364 THOUSANDS/UL (ref 149–390)
PLATELET BLD QL SMEAR: ADEQUATE
PMV BLD AUTO: 10.3 FL (ref 8.9–12.7)
POIKILOCYTOSIS BLD QL SMEAR: PRESENT
POTASSIUM SERPL-SCNC: 5.2 MMOL/L (ref 3.5–5.3)
PR INTERVAL: 110 MS
QRS AXIS: 22 DEGREES
QRSD INTERVAL: 72 MS
QT INTERVAL: 374 MS
QTC INTERVAL: 439 MS
RBC # BLD AUTO: 3.73 MILLION/UL (ref 3.81–5.12)
RBC MORPH BLD: PRESENT
SODIUM SERPL-SCNC: 135 MMOL/L (ref 135–147)
T WAVE AXIS: 64 DEGREES
VENTRICULAR RATE: 83 BPM
WBC # BLD AUTO: 9.93 THOUSAND/UL (ref 4.31–10.16)

## 2024-09-05 PROCEDURE — 99285 EMERGENCY DEPT VISIT HI MDM: CPT

## 2024-09-05 PROCEDURE — 93005 ELECTROCARDIOGRAM TRACING: CPT

## 2024-09-05 PROCEDURE — 73060 X-RAY EXAM OF HUMERUS: CPT

## 2024-09-05 PROCEDURE — 71260 CT THORAX DX C+: CPT

## 2024-09-05 PROCEDURE — 85027 COMPLETE CBC AUTOMATED: CPT

## 2024-09-05 PROCEDURE — 36415 COLL VENOUS BLD VENIPUNCTURE: CPT

## 2024-09-05 PROCEDURE — 70450 CT HEAD/BRAIN W/O DYE: CPT

## 2024-09-05 PROCEDURE — 74177 CT ABD & PELVIS W/CONTRAST: CPT

## 2024-09-05 PROCEDURE — 73502 X-RAY EXAM HIP UNI 2-3 VIEWS: CPT

## 2024-09-05 PROCEDURE — 80048 BASIC METABOLIC PNL TOTAL CA: CPT

## 2024-09-05 PROCEDURE — 73000 X-RAY EXAM OF COLLAR BONE: CPT

## 2024-09-05 PROCEDURE — 72125 CT NECK SPINE W/O DYE: CPT

## 2024-09-05 PROCEDURE — 99285 EMERGENCY DEPT VISIT HI MDM: CPT | Performed by: EMERGENCY MEDICINE

## 2024-09-05 PROCEDURE — 93010 ELECTROCARDIOGRAM REPORT: CPT | Performed by: INTERNAL MEDICINE

## 2024-09-05 PROCEDURE — 85007 BL SMEAR W/DIFF WBC COUNT: CPT

## 2024-09-05 RX ORDER — ACETAMINOPHEN 325 MG/1
650 TABLET ORAL ONCE
Status: COMPLETED | OUTPATIENT
Start: 2024-09-05 | End: 2024-09-06

## 2024-09-05 RX ORDER — LIDOCAINE 50 MG/G
1 PATCH TOPICAL ONCE
Status: COMPLETED | OUTPATIENT
Start: 2024-09-05 | End: 2024-09-06

## 2024-09-05 RX ORDER — ENOXAPARIN SODIUM 100 MG/ML
30 INJECTION SUBCUTANEOUS EVERY 12 HOURS
Status: DISCONTINUED | OUTPATIENT
Start: 2024-09-05 | End: 2024-09-07

## 2024-09-05 RX ADMIN — LIDOCAINE 1 PATCH: 50 PATCH CUTANEOUS at 17:40

## 2024-09-05 RX ADMIN — IOHEXOL 80 ML: 350 INJECTION, SOLUTION INTRAVENOUS at 14:18

## 2024-09-05 NOTE — DISCHARGE INSTRUCTIONS
You were evaluated in the emergency department for: fall. You can access your current and pending results through [employer]. A radiologist will take a second look at your X-Rays, if you had any, and you will be contacted with any new findings.     - You should follow-up with your primary care provider, as soon as possible, for re-evaluation.  - You are being referred to orthopedics for re-evaluation    Please, return to the emergency department if you experience new or worsening symptoms, fever, chest pain, shortness of breath, difficulty breathing, dizziness, abdominal pain, persistent nausea/vomiting, syncope or passing out, blood in your urine or stool, coughing up blood, leg swelling/pain, urinary retention, bowel or bladder incontinence, numbness between your legs.

## 2024-09-05 NOTE — ED PROVIDER NOTES
Emergency Department Trauma Note  Doris Campbell 83 y.o. female MRN: [id number]  Unit/Bed#: W /W -01 Encounter: [id number]      Trauma Alert: Trauma Acuity: C  Model of Arrival:   via    Trauma Team: Current Providers  Attending Provider: Britney Boo MD  Attending Provider: Xenia Fong DO  Registered Nurse: Caterina Walsh RN  Resident: Ivy Bob MD  Registered Nurse: Patricia Nguyen RN  Registered Nurse: Toya Rollins RN  Patient Care Assistant: Simi Real  Registered Nurse: Freedom Vieyra RN  Patient Care Assistant: Rafa Haque  Consulting Physician: Skyler Yates DO  Consulting Physician: Yvette Espinoza MD  Patient Care Assistant: Shane Terwilliger  Licensed Practical Nurse: Edgar Thompson LPN  Licensed Practical Nurse: Kendra Nguyen LPN  Occupational Therapist: Shelbi Calles OT  Physical Therapist: Loni Krueger, PT  Care Manager: NELY Lacy  Nurse Practitioner: VALDEMAR Rogers  Patient Care Assistant: Patsy Dias  Consultants: Trauma surgery for admission via phone    History of Present Illness     Chief Complaint:   Chief Complaint   Patient presents with    Fall     Pt arrived via EMS from [facility] with unwitnessed fall +HS +ASA. Per ems pt was vomiting at facility. Pt confused at baseline     HPI:  Doris Campbell is a 83 y.o. female who presents with fall.  Mechanism:           Patient presents via EMS for unwitnessed fall with head strike. Patient denies LOC. EMS reports patient was vomiting on arrival. Patient on arrival to the ED is GCS of 14 - confused to time, and unsure of what happened though initially says she was getting out a of a chair. She reports head pain over small hematoma on forehead, some cervical neck pain, thoracic back pain, right shoulder pain, right elbow pain, and right hip pain. Denies nausea, diarrhea, abomdinal pain. Reports other recent falls. Denies recent medication  changes, recent illnesses.     On initial presentation, patient denies fevers, chills, headaches, dizziness, chest pain, palpitations, shortness of breath, abdominal pain, constipation, diarrhea, urinary frequency, dysuria, and new extremity weakness, swelling and pain. Reports normal sleep and normal appetite. Ambulating with walker at baseline, feels unsteady.      History provided by:  EMS personnel  Fall  Associated symptoms: back pain (thoraco/lumbar pain) and neck pain (cervical spine)    Associated symptoms: no abdominal pain, no chest pain, no headaches, no hearing loss, no nausea, no seizures and no vomiting      Review of Systems   Constitutional:  Negative for chills and fever.   HENT:  Negative for congestion, hearing loss and trouble swallowing.    Eyes:  Negative for pain and visual disturbance.   Respiratory:  Negative for cough and shortness of breath.    Cardiovascular:  Negative for chest pain, palpitations and leg swelling.   Gastrointestinal:  Negative for abdominal pain, constipation, diarrhea, nausea and vomiting.   Genitourinary:  Negative for dysuria, frequency and hematuria.   Musculoskeletal:  Positive for arthralgias (Right shoulder, right elbow, right hip), back pain (thoraco/lumbar pain) and neck pain (cervical spine).   Skin:  Negative for color change and rash.   Neurological:  Negative for dizziness, seizures, syncope, weakness, light-headedness and headaches.   Psychiatric/Behavioral:  Negative for dysphoric mood.    All other systems reviewed and are negative.      Historical Information     Immunizations:   Immunization History   Administered Date(s) Administered    Pneumococcal Conjugate 13-Valent 08/16/2007    Tdap 10/19/2020       Past Medical History:   Diagnosis Date    Allergic     Anxiety     Arthritis     Cancer (HCC)     HLD (hyperlipidemia)     Hypertension     Hyperthyroidism     Osteoporosis     Stroke (HCC)     TIA (transient ischemic attack)        Family History    Problem Relation Age of Onset    Coronary artery disease Family     Other Family         DJD     Past Surgical History:   Procedure Laterality Date    BREAST LUMPECTOMY Left     HYSTERECTOMY      KNEE SURGERY Right     ORIF TIBIA FRACTURE Right 2016    Procedure: OPEN REDUCTION W/ INTERNAL FIXATION (ORIF) TIBIA;  Surgeon: Angie Case MD;  Location: [facility];  Service:      Social History     Tobacco Use    Smoking status: Never    Smokeless tobacco: Never    Tobacco comments:     Former smoker - As per [place]    Vaping Use    Vaping status: Never Used   Substance Use Topics    Alcohol use: Not Currently     Comment: Alcohol intake:   Occasional  - As per [place]     Drug use: Never     E-Cigarette/Vaping    E-Cigarette Use Never User      E-Cigarette/Vaping Substances    Nicotine No     THC No     CBD No     Flavoring No     Other No     Unknown No        Family History:   Family History   Problem Relation Age of Onset    Coronary artery disease Family     Other Family         DJD       Meds/Allergies   Prior to Admission Medications   Prescriptions Last Dose Informant Patient Reported? Taking?   Aspirin Low Dose 81 MG chewable tablet   No No   Sig: CHEW 1 TABLET BY MOUTH DAILY   Diclofenac Sodium (VOLTAREN) 1 %  Self No No   Sig: Apply 2 g topically 4 (four) times a day = To right hip and pelvis   acetaminophen (TYLENOL) 325 mg tablet  Self Yes No   Sig: Take 975 mg by mouth every 8 (eight) hours as needed   albuterol (PROVENTIL HFA,VENTOLIN HFA) 90 mcg/act inhaler  Self Yes No   Sig: Inhale 2 puffs every 6 (six) hours as needed for wheezing   amLODIPine (NORVASC) 2.5 mg tablet   Yes No   Sig: Take 2.5 mg by mouth daily   calcitonin, salmon, (MIACALCIN) 200 units/act nasal spray   Yes No   Si spray into each nostril daily   cholecalciferol (VITAMIN D3) 1,000 units tablet  Self No No   Sig: Take 1 tablet (1,000 Units total) by mouth daily   clotrimazole-betamethasone (LOTRISONE) 1-0.05 % cream    No No   Sig: Apply topically 2 (two) times a day   fluticasone (FLONASE) 50 mcg/act nasal spray   No No   Si spray into each nostril daily   gabapentin (NEURONTIN) 100 mg capsule   No No   Sig: Take 1 capsule (100 mg total) by mouth daily   magnesium hydroxide (MILK OF MAGNESIA) 400 mg/5 mL oral suspension  Self Yes No   Sig: Take 30 mL by mouth 2 (two) times a day as needed   senna-docusate sodium (SENOKOT S) 8.6-50 mg per tablet  Self No No   Sig: Take 1 tablet by mouth 2 (two) times a day      Facility-Administered Medications: None       Allergies   Allergen Reactions    Alendronate     Raloxifene     Risedronate Sodium     Sulfa Antibiotics Swelling    Penicillins Swelling     Patient received 7 day treatment course Keflex without any adverse reactions in 2022.        PHYSICAL EXAM    PE limited by: N/A    Objective   Vitals:   First set: Temperature: 97.7 °F (36.5 °C) (24 1330)  Pulse: 81 (24 1330)  Respirations: 16 (24 1330)  Blood Pressure: 141/65 (24 1330)  SpO2: 97 % (24 1330)    Primary Survey:   (A) Airway: Patent  (B) Breathing: Bilateral breath sounds  (C) Circulation: Pulses:   normal  (D) Disabliity:  GCS Total:  14 - 14/15 appears to be patient baseline per EMS  (E) Expose:  Completed    Secondary Survey: (Click on Physical Exam tab above)  Physical Exam  Vitals and nursing note reviewed.   Constitutional:       Appearance: She is well-developed.   HENT:      Head: Normocephalic.      Comments: Hematoma on right forehead     Nose:      Comments: No epistaxis     Mouth/Throat:      Mouth: Mucous membranes are moist.      Pharynx: Oropharynx is clear.   Eyes:      General: No visual field deficit.     Extraocular Movements: Extraocular movements intact.      Conjunctiva/sclera: Conjunctivae normal.   Cardiovascular:      Rate and Rhythm: Normal rate and regular rhythm.      Pulses: Normal pulses.      Heart sounds: Normal heart sounds. No murmur heard.  Pulmonary:       Effort: Pulmonary effort is normal. No respiratory distress.      Breath sounds: Normal breath sounds. No wheezing, rhonchi or rales.   Chest:      Chest wall: No deformity, swelling or tenderness.   Abdominal:      General: Abdomen is flat.      Palpations: Abdomen is soft.      Tenderness: There is no abdominal tenderness. There is no guarding or rebound.   Musculoskeletal:      Right shoulder: Tenderness and bony tenderness present. Decreased range of motion (limited by pain).      Left shoulder: Normal. Normal range of motion.      Right upper arm: Normal.      Left upper arm: Normal.      Right elbow: No swelling or deformity. Normal range of motion. Tenderness present.      Left elbow: Normal.      Right forearm: Normal.      Left forearm: Normal.      Right wrist: Normal.      Left wrist: Normal.      Right hand: Normal.      Cervical back: Normal range of motion. Tenderness and bony tenderness present. No deformity. Pain with movement present. Normal range of motion.      Thoracic back: Tenderness present. No deformity.      Lumbar back: Tenderness present. No deformity.      Right hip: Tenderness and bony tenderness present. No deformity. Normal range of motion.      Left hip: Normal.      Right upper leg: Normal.      Left upper leg: Normal.      Right knee: Normal.      Left knee: Normal.      Right lower leg: Normal. No edema.      Left lower leg: Normal. No edema.      Right ankle: Normal.      Left ankle: Normal.      Right foot: Normal.      Left foot: Normal.   Skin:     General: Skin is warm and dry.      Capillary Refill: Capillary refill takes less than 2 seconds.   Neurological:      General: No focal deficit present.      Mental Status: She is alert and oriented to person, place, and time.      GCS: GCS eye subscore is 4. GCS verbal subscore is 4. GCS motor subscore is 6.      Cranial Nerves: No cranial nerve deficit, dysarthria or facial asymmetry.      Sensory: Sensation is intact. No  sensory deficit.      Motor: Weakness (right hip limited by pain, distal strength intact) present.      Gait: Gait abnormal.   Psychiatric:         Mood and Affect: Mood normal.         Behavior: Behavior normal.         Cervical spine cleared by clinical criteria? No (imaging required)      Invasive Devices       Peripheral Intravenous Line  Duration             Peripheral IV 09/05/24 Left;Ventral (anterior) Forearm 1 day                    Lab Results:   Results Reviewed       Procedure Component Value Units Date/Time    Basic metabolic panel [[id number]]  (Abnormal) Collected: 09/06/24 0603    Lab Status: Final result Specimen: Blood from Hand, Right Updated: 09/06/24 0710     Sodium 135 mmol/L      Potassium 4.6 mmol/L      Chloride 106 mmol/L      CO2 22 mmol/L      ANION GAP 7 mmol/L      BUN 14 mg/dL      Creatinine 0.85 mg/dL      Glucose 87 mg/dL      Calcium 8.0 mg/dL      eGFR 63 ml/min/1.73sq m     Narrative:      National Kidney Disease Foundation guidelines for Chronic Kidney Disease (CKD):     Stage 1 with normal or high GFR (GFR > 90 mL/min/1.73 square meters)    Stage 2 Mild CKD (GFR = 60-89 mL/min/1.73 square meters)    Stage 3A Moderate CKD (GFR = 45-59 mL/min/1.73 square meters)    Stage 3B Moderate CKD (GFR = 30-44 mL/min/1.73 square meters)    Stage 4 Severe CKD (GFR = 15-29 mL/min/1.73 square meters)    Stage 5 End Stage CKD (GFR <15 mL/min/1.73 square meters)  Note: GFR calculation is accurate only with a steady state creatinine    CBC and differential [[id number]]  (Abnormal) Collected: 09/06/24 0603    Lab Status: Final result Specimen: Blood from Hand, Right Updated: 09/06/24 0637     WBC 8.29 Thousand/uL      RBC 3.42 Million/uL      Hemoglobin 10.5 g/dL      Hematocrit 33.5 %      MCV 98 fL      MCH 30.7 pg      MCHC 31.3 g/dL      RDW 14.7 %      MPV 10.7 fL      Platelets 324 Thousands/uL      nRBC 0 /100 WBCs      Segmented % 77 %      Immature Grans % 1 %      Lymphocytes % 14 %       Monocytes % 7 %      Eosinophils Relative 1 %      Basophils Relative 0 %      Absolute Neutrophils 6.33 Thousands/µL      Absolute Immature Grans 0.11 Thousand/uL      Absolute Lymphocytes 1.15 Thousands/µL      Absolute Monocytes 0.59 Thousand/µL      Eosinophils Absolute 0.09 Thousand/µL      Basophils Absolute 0.02 Thousands/µL     RBC Morphology Reflex Test [[id number]] Collected: 09/05/24 1351    Lab Status: Final result Specimen: Blood from Arm, Left Updated: 09/05/24 1501    CBC and differential [[id number]]  (Abnormal) Collected: 09/05/24 1351    Lab Status: Final result Specimen: Blood from Arm, Left Updated: 09/05/24 1426     WBC 9.93 Thousand/uL      RBC 3.73 Million/uL      Hemoglobin 11.5 g/dL      Hematocrit 37.1 %       fL      MCH 30.8 pg      MCHC 31.0 g/dL      RDW 14.7 %      MPV 10.3 fL      Platelets 364 Thousands/uL     Narrative:      This is an appended report.  These results have been appended to a previously verified report.    Manual Differential(PHLEBS Do Not Order) [[id number]]  (Abnormal) Collected: 09/05/24 1351    Lab Status: Final result Specimen: Blood from Arm, Left Updated: 09/05/24 1426     Segmented % 76 %      Bands % 3 %      Lymphocytes % 13 %      Monocytes % 6 %      Eosinophils % 1 %      Basophils % 0 %      Metamyelocytes % 1 %      Absolute Neutrophils 7.84 Thousand/uL      Absolute Lymphocytes 1.29 Thousand/uL      Absolute Monocytes 0.60 Thousand/uL      Absolute Eosinophils 0.10 Thousand/uL      Absolute Basophils 0.00 Thousand/uL      Absolute Metamyelocytes 0.10 Thousand/uL      Total Counted --     RBC Morphology Present     Platelet Estimate Adequate     Poikilocytes Present    Basic metabolic panel [[id number]] Collected: 09/05/24 1351    Lab Status: Final result Specimen: Blood from Arm, Left Updated: 09/05/24 1413     Sodium 135 mmol/L      Potassium 5.2 mmol/L      Chloride 105 mmol/L      CO2 25 mmol/L      ANION GAP 5 mmol/L      BUN 19 mg/dL       Creatinine 1.13 mg/dL      Glucose 128 mg/dL      Calcium 8.4 mg/dL      eGFR 44 ml/min/1.73sq m     Narrative:      National Kidney Disease Foundation guidelines for Chronic Kidney Disease (CKD):     Stage 1 with normal or high GFR (GFR > 90 mL/min/1.73 square meters)    Stage 2 Mild CKD (GFR = 60-89 mL/min/1.73 square meters)    Stage 3A Moderate CKD (GFR = 45-59 mL/min/1.73 square meters)    Stage 3B Moderate CKD (GFR = 30-44 mL/min/1.73 square meters)    Stage 4 Severe CKD (GFR = 15-29 mL/min/1.73 square meters)    Stage 5 End Stage CKD (GFR <15 mL/min/1.73 square meters)  Note: GFR calculation is accurate only with a steady state creatinine                   Imaging Studies:   Direct to CT: Yes  XR knee 1 or 2 vw right   Final Result by Taurus Carmona MD (09/06 1039)      No acute osseous abnormality.   Stable proximal tibial fixation hardware without complication.      Computerized Assisted Algorithm (CAA) may have been used to analyze all applicable images.         Workstation performed: [id number]         XR clavicle RIGHT   ED Interpretation by Ivy Bob MD (09/05 1710)   Per my interpretation: Right distal clavicular fracture      Final Result by Clarence Baeza MD (09/05 0784)      Acute comminuted fracture of the distal aspect of the right clavicle.         Computerized Assisted Algorithm (CAA) may have been used to analyze all applicable images.         Workstation performed: [id number]         XR humerus RIGHT   Final Result by Quan Tracy MD (09/05 1618)      Age-indeterminate clavicular fracture. Consider dedicated clavicle radiographs.      No acute humeral fracture or shoulder dislocation. Remote fracture of the proximal humerus.            Computerized Assisted Algorithm (CAA) may have been used to analyze all applicable images.         Workstation performed: [id number]         XR hip/pelv 2-3 vws right if performed   ED Interpretation by Ivy Bob MD (09/05  1514)   Per my interpretation: No acute osseous abnormality      Final Result by Quan Tracy MD (09/05 1619)      No acute osseous abnormality.         Computerized Assisted Algorithm (CAA) may have been used to analyze all applicable images.            Workstation performed: [id number]         TRAUMA - CT head wo contrast   Final Result by Darryn Mcguire MD (09/05 1446)      No acute intracranial abnormality.  Chronic microangiopathic changes.                  Workstation performed: [id number]         TRAUMA - CT spine cervical wo contrast   Final Result by Darryn Mcguire MD (09/05 1445)      No cervical spine fracture or traumatic malalignment.                  Workstation performed: [id number]         TRAUMA - CT chest abdomen pelvis w contrast   Final Result by Darryn Mcguire MD (09/05 1501)   New age-indeterminate  nondisplaced lateral left 4th rib fracture. No pneumothorax.      The study was marked in EPIC for immediate notification.            Workstation performed: [id number]         CT recon only thoracolumbar   Final Result by Darryn Mcguire MD (09/05 1501)   New age-indeterminate  nondisplaced lateral left 4th rib fracture. No pneumothorax.      The study was marked in EPIC for immediate notification.            Workstation performed: [id number]               Procedures  ECG 12 Lead Documentation Only    Date/Time: 9/5/2024 2:03 PM    Performed by: Ivy Bob MD  Authorized by: Ivy Bob MD    Indications / Diagnosis:  Trauma C  ECG reviewed by me, the ED Provider: yes    Patient location:  ED  Previous ECG:     Previous ECG:  Compared to current    Comparison ECG info:  8/7/2024    Similarity:  No change    Comparison to cardiac monitor: No    Interpretation:     Interpretation: non-specific    Quality:     Tracing quality:  Limited by artifact  Rate:     ECG rate:  83    ECG rate assessment: normal    Rhythm:     Rhythm: sinus rhythm    Ectopy:     Ectopy: PAC    QRS:      QRS axis:  Normal    QRS intervals:  Normal  Conduction:     Conduction: normal    ST segments:     ST segments:  Non-specific  T waves:     T waves: non-specific    Comments:      QTc 439           ED Course  ED Course as of 09/06/24 1520   Thu Sep 05, 2024   1341 Called to bedside for Trauma C  Fall - unwitnesed  Right shoulder pain  Right hip pain  T-spine pain  Hematoma on head, Neck pain    Plan:  - Will get ECG to evaluate for arrhythmia and signs of ischemia and other cause of symptoms  - Will examine CBC to evaluate for hematologic abnormalities and infection  - Will examine CMP to evaluate for metabolic and renal abnormalities  - Will get CTH, CT c-spine to evaluate for intracranial and cervical spinal injury  - Will get CT Chest/abdomen pelvis to evaluate for intraabdominal abnormalities  - Will get CT recon of thoracolumbar secondary to pain in those regions.  - Will get xray humerus and hips to examine for fractures given pain    Will continue to monitor while patient is in the ED and reconsider further evaluation or intervention as needed.   1401 CBC and differential(!)  No leukocytosis, grossly baseline hemoglobin, 11.3-12.7 over the last few months, normal platelets   1427 Basic metabolic panel   1448 TRAUMA - CT spine cervical wo contrast  IMPRESSION:     No cervical spine fracture or traumatic malalignment.     1449 TRAUMA - CT head wo contrast  IMPRESSION:     No acute intracranial abnormality.  Chronic microangiopathic changes.   1512 CT recon only thoracolumbar  THORACOLUMBAR SPINE AND BONES:     No acute fracture of the thoracic or lumbar spine. Unchanged moderate to severe T7 vertebral body compression fracture. Unchanged severe compression fractures of the L1 and L2 vertebral bodies, with unchanged retropulsion at L2 with resultant mild spinal   canal stenosis at L2; thoracolumbar alignment is otherwise unremarkable. Unchanged moderate compression fracture of the L4 vertebral body. Mild  intervertebral disc space narrowing at L5-S1. Facet arthrosis of the lower lumbar spine.     New nondisplaced lateral left 4th rib fracture (series 303, image 121).     Chronic bilateral inferior pubic rami fractures. Chronic, healed proximal right humeral fracture. Proximal left femoral orthopedic hardware.     IMPRESSION:  New age-indeterminate  nondisplaced lateral left 4th rib fracture. No pneumothorax.   1512 TRAUMA - CT chest abdomen pelvis w contrast  IMPRESSION:  New age-indeterminate  nondisplaced lateral left 4th rib fracture. No pneumothorax.   1608 Reached out to trauma.   1621 XR humerus RIGHT  IMPRESSION:     Age-indeterminate clavicular fracture. Consider dedicated clavicle radiographs.     No acute humeral fracture or shoulder dislocation. Remote fracture of the proximal humerus.   1714 Will PO challenge    1815 Cannot ambulate   1919 Spoke with trauma   2055 Trauma will accept patient           Medical Decision Making  Doris Campbell is a 83 y.o. female presenting with fall. Associated symptoms: head pain, neck pain, back pain, right shoulder pain, right elbow pain, right hip pain. Vitals remarkable for hypertension. Exam remarkable for hematoma over right forehead above eyebrow, cervical spinal tenderness without palpable deformity, thoracic spinal tenderness without palpable deformity, right shoulder pain with decreased ROM, right elbow pain without decreased ROM, intact peripheral pulses without further pain in right or left upper extremity. Right hip pain without palpable deformity and with ROM and strength intact compared to left leg. No chest wall tenderness. Lung sounds clear bilaterally. No abdominal tenderness.    DDx including but not limited to: intracranial injury, concussion, cervical injury, intrathoracic injury, intraabdominal injury, extremity injury--fracture, dislocation, strain, sprain, contusion.     Plan:  - Will get ECG to evaluate for arrhythmia and signs of ischemia and  other cause of symptoms  - Will examine CBC to evaluate for hematologic abnormalities and infection  - Will examine CMP to evaluate for metabolic and renal abnormalities  - Will get CTH, CT c-spine to evaluate for intracranial and cervical spinal injury  - Will get CT Chest/abdomen pelvis to evaluate for intraabdominal abnormalities  - Will get CT recon of thoracolumbar secondary to pain in those regions.  - Will get xray humerus and hips to examine for fractures given pain  - Will give tylenol and lidocaine patches for pain, will re-evaluate need for further pain medications.    Will continue to monitor while patient is in the ED and reconsider further evaluation or intervention as needed.    See ED course for plan, further updates and interpretation of results.    Based on these results and H&P, patient unable to ambulate in the ED. Right shoulder placed into sling, hemodynamics grossly unchanged during stay, including continued normal saturation despite rib injury. At this time, with recent fall and ambulatory dysfunction, patient would most benefit from admission. Reached out to Trauma service who accepted the patient.    Results, clinical impressions, and plan were discussed with patient and family. They expressed understanding and were in agreement with plan. Patient was given the opportunity to ask questions in ED. All questions and concerns were addressed in ED.    After evaluation and workup in the emergency department Discussed patient's case with Trauma regarding admission who accepted the patient for further evaluation and management under Dr. Fong (Trauma).    Amount and/or Complexity of Data Reviewed  Labs: ordered. Decision-making details documented in ED Course.  Radiology: ordered and independent interpretation performed. Decision-making details documented in ED Course.    Risk  OTC drugs.  Prescription drug management.                Disposition  [facility] Transfer: No  Final diagnoses:   Fall,  initial encounter   Closed right clavicular fracture   Closed fracture of one rib of left side, initial encounter   Recurrent falls     Time reflects when diagnosis was documented in both MDM as applicable and the Disposition within this note       Time User Action Codes Description Comment    9/5/2024  5:11 PM Ivy Bob Add [W19.XXXA] Fall, initial encounter     9/5/2024  5:11 PM Ivy Bob Add [S42.001A] Closed right clavicular fracture     9/5/2024  5:12 PM Ivy Bob Add [S22.32XA] Closed fracture of one rib of left side, initial encounter     9/6/2024  4:43 AM JaygayeCarmel Add [R29.6] Recurrent falls           ED Disposition       ED Disposition   Discharge    Condition   Stable    Date/Time   Thu Sep 5, 2024  5:11 PM    Comment   Doris Campbell discharge to home/self care.                   Follow-up Information       Follow up With Specialties Details Why Contact Info    Skyler Yates DO Orthopedic Surgery Follow up in 2 week(s)  [facility]  [address] [place] [address]  [phone]            Current Discharge Medication List        CONTINUE these medications which have NOT CHANGED    Details   acetaminophen (TYLENOL) 325 mg tablet Take 975 mg by mouth every 8 (eight) hours as needed      albuterol (PROVENTIL HFA,VENTOLIN HFA) 90 mcg/act inhaler Inhale 2 puffs every 6 (six) hours as needed for wheezing      amLODIPine (NORVASC) 2.5 mg tablet Take 2.5 mg by mouth daily      Aspirin Low Dose 81 MG chewable tablet CHEW 1 TABLET BY MOUTH DAILY  Qty: 90 tablet, Refills: 1    Associated Diagnoses: History of CVA (cerebrovascular accident)      calcitonin, salmon, (MIACALCIN) 200 units/act nasal spray 1 spray into each nostril daily      cholecalciferol (VITAMIN D3) 1,000 units tablet Take 1 tablet (1,000 Units total) by mouth daily  Qty: 90 tablet, Refills: 2    Associated Diagnoses: Vitamin D deficiency      clotrimazole-betamethasone (LOTRISONE) 1-0.05 % cream Apply  topically 2 (two) times a day    Associated Diagnoses: Acute pain due to trauma      Diclofenac Sodium (VOLTAREN) 1 % Apply 2 g topically 4 (four) times a day = To right hip and pelvis  Qty: 2 g, Refills: 0    Associated Diagnoses: Closed fracture of proximal end of right humerus with delayed healing, unspecified fracture morphology, subsequent encounter      fluticasone (FLONASE) 50 mcg/act nasal spray 1 spray into each nostril daily  Qty: 16 mL, Refills: 2    Associated Diagnoses: Other closed displaced fracture of proximal end of right humerus, initial encounter      gabapentin (NEURONTIN) 100 mg capsule Take 1 capsule (100 mg total) by mouth daily  Qty: 30 capsule, Refills: 0    Associated Diagnoses: Acute pain due to trauma      magnesium hydroxide (MILK OF MAGNESIA) 400 mg/5 mL oral suspension Take 30 mL by mouth 2 (two) times a day as needed      senna-docusate sodium (SENOKOT S) 8.6-50 mg per tablet Take 1 tablet by mouth 2 (two) times a day  Qty: 60 tablet, Refills: 0    Associated Diagnoses: Inferior pubic ramus fracture, right, closed, initial encounter ([facility])               PDMP Review         Value Time User    PDMP Reviewed  Yes 6/9/2024 11:26 PM Gerald Hyde MD            ED Provider  Electronically Signed by           Ivy Bob MD  09/06/24 0782

## 2024-09-05 NOTE — TELEPHONE ENCOUNTER
Hi, my name is Patricia Caro. I just got a message coming from this phone number in regards to Doris Campbell. Her YOB: 1940. I think she might have been admitted. Again, my name is Patricia Caro. I'm her power of . If you can call me back, I'd appreciate it. [phone] again DorisCarlos Campbell September 23rd, 1940. Thank you.        This voicemail was left in my e-mail.

## 2024-09-05 NOTE — ED NOTES
Provider at bedside.     Caterina Walsh RN  09/05/24 3500     Writer has contacted forms completion notifying them that Dr Mak has the forms  And paperwork.    Carla Capellan RN, OCN

## 2024-09-06 ENCOUNTER — APPOINTMENT (INPATIENT)
Dept: RADIOLOGY | Facility: HOSPITAL | Age: 84
DRG: 563 | End: 2024-09-06
Payer: MEDICARE

## 2024-09-06 PROBLEM — G47.00 INSOMNIA: Status: ACTIVE | Noted: 2024-09-06

## 2024-09-06 PROBLEM — Z91.89 AT RISK FOR DELIRIUM: Status: ACTIVE | Noted: 2024-09-06

## 2024-09-06 PROBLEM — S42.031A CLOSED FRACTURE OF ACROMIAL END OF RIGHT CLAVICLE: Status: ACTIVE | Noted: 2024-09-06

## 2024-09-06 PROBLEM — S22.32XA CLOSED FRACTURE OF ONE RIB OF LEFT SIDE: Status: ACTIVE | Noted: 2024-09-06

## 2024-09-06 PROBLEM — H91.90 HOH (HARD OF HEARING): Status: ACTIVE | Noted: 2024-09-06

## 2024-09-06 LAB
ANION GAP SERPL CALCULATED.3IONS-SCNC: 7 MMOL/L (ref 4–13)
BASOPHILS # BLD AUTO: 0.02 THOUSANDS/ÂΜL (ref 0–0.1)
BASOPHILS NFR BLD AUTO: 0 % (ref 0–1)
BUN SERPL-MCNC: 14 MG/DL (ref 5–25)
CALCIUM SERPL-MCNC: 8 MG/DL (ref 8.4–10.2)
CHLORIDE SERPL-SCNC: 106 MMOL/L (ref 96–108)
CO2 SERPL-SCNC: 22 MMOL/L (ref 21–32)
CREAT SERPL-MCNC: 0.85 MG/DL (ref 0.6–1.3)
EOSINOPHIL # BLD AUTO: 0.09 THOUSAND/ÂΜL (ref 0–0.61)
EOSINOPHIL NFR BLD AUTO: 1 % (ref 0–6)
ERYTHROCYTE [DISTWIDTH] IN BLOOD BY AUTOMATED COUNT: 14.7 % (ref 11.6–15.1)
GFR SERPL CREATININE-BSD FRML MDRD: 63 ML/MIN/1.73SQ M
GLUCOSE SERPL-MCNC: 87 MG/DL (ref 65–140)
HCT VFR BLD AUTO: 33.5 % (ref 34.8–46.1)
HGB BLD-MCNC: 10.5 G/DL (ref 11.5–15.4)
IMM GRANULOCYTES # BLD AUTO: 0.11 THOUSAND/UL (ref 0–0.2)
IMM GRANULOCYTES NFR BLD AUTO: 1 % (ref 0–2)
LYMPHOCYTES # BLD AUTO: 1.15 THOUSANDS/ÂΜL (ref 0.6–4.47)
LYMPHOCYTES NFR BLD AUTO: 14 % (ref 14–44)
MCH RBC QN AUTO: 30.7 PG (ref 26.8–34.3)
MCHC RBC AUTO-ENTMCNC: 31.3 G/DL (ref 31.4–37.4)
MCV RBC AUTO: 98 FL (ref 82–98)
MONOCYTES # BLD AUTO: 0.59 THOUSAND/ÂΜL (ref 0.17–1.22)
MONOCYTES NFR BLD AUTO: 7 % (ref 4–12)
NEUTROPHILS # BLD AUTO: 6.33 THOUSANDS/ÂΜL (ref 1.85–7.62)
NEUTS SEG NFR BLD AUTO: 77 % (ref 43–75)
NRBC BLD AUTO-RTO: 0 /100 WBCS
PLATELET # BLD AUTO: 324 THOUSANDS/UL (ref 149–390)
PMV BLD AUTO: 10.7 FL (ref 8.9–12.7)
POTASSIUM SERPL-SCNC: 4.6 MMOL/L (ref 3.5–5.3)
RBC # BLD AUTO: 3.42 MILLION/UL (ref 3.81–5.12)
SODIUM SERPL-SCNC: 135 MMOL/L (ref 135–147)
VIT B12 SERPL-MCNC: 180 PG/ML (ref 180–914)
WBC # BLD AUTO: 8.29 THOUSAND/UL (ref 4.31–10.16)

## 2024-09-06 PROCEDURE — 74018 RADEX ABDOMEN 1 VIEW: CPT

## 2024-09-06 PROCEDURE — 80048 BASIC METABOLIC PNL TOTAL CA: CPT

## 2024-09-06 PROCEDURE — 85025 COMPLETE CBC W/AUTO DIFF WBC: CPT

## 2024-09-06 PROCEDURE — 71045 X-RAY EXAM CHEST 1 VIEW: CPT

## 2024-09-06 PROCEDURE — 73560 X-RAY EXAM OF KNEE 1 OR 2: CPT

## 2024-09-06 PROCEDURE — 97167 OT EVAL HIGH COMPLEX 60 MIN: CPT

## 2024-09-06 PROCEDURE — 97530 THERAPEUTIC ACTIVITIES: CPT

## 2024-09-06 PROCEDURE — 97163 PT EVAL HIGH COMPLEX 45 MIN: CPT

## 2024-09-06 PROCEDURE — 82607 VITAMIN B-12: CPT | Performed by: FAMILY MEDICINE

## 2024-09-06 PROCEDURE — 99222 1ST HOSP IP/OBS MODERATE 55: CPT | Performed by: PHYSICIAN ASSISTANT

## 2024-09-06 PROCEDURE — 99223 1ST HOSP IP/OBS HIGH 75: CPT | Performed by: FAMILY MEDICINE

## 2024-09-06 RX ORDER — ALBUTEROL SULFATE 90 UG/1
2 AEROSOL, METERED RESPIRATORY (INHALATION) EVERY 6 HOURS PRN
Status: DISCONTINUED | OUTPATIENT
Start: 2024-09-06 | End: 2024-09-09 | Stop reason: HOSPADM

## 2024-09-06 RX ORDER — LIDOCAINE 50 MG/G
1 PATCH TOPICAL DAILY
Status: DISCONTINUED | OUTPATIENT
Start: 2024-09-06 | End: 2024-09-09 | Stop reason: HOSPADM

## 2024-09-06 RX ORDER — ACETAMINOPHEN 325 MG/1
975 TABLET ORAL 3 TIMES DAILY
Status: DISCONTINUED | OUTPATIENT
Start: 2024-09-06 | End: 2024-09-06

## 2024-09-06 RX ORDER — AMOXICILLIN 250 MG
1 CAPSULE ORAL 2 TIMES DAILY
Status: DISCONTINUED | OUTPATIENT
Start: 2024-09-06 | End: 2024-09-06

## 2024-09-06 RX ORDER — GABAPENTIN 100 MG/1
100 CAPSULE ORAL DAILY
Status: DISCONTINUED | OUTPATIENT
Start: 2024-09-06 | End: 2024-09-06

## 2024-09-06 RX ORDER — ACETAMINOPHEN 325 MG/1
975 TABLET ORAL EVERY 8 HOURS PRN
Status: DISCONTINUED | OUTPATIENT
Start: 2024-09-06 | End: 2024-09-06

## 2024-09-06 RX ORDER — AMOXICILLIN 250 MG
1 CAPSULE ORAL 2 TIMES DAILY
Status: DISCONTINUED | OUTPATIENT
Start: 2024-09-07 | End: 2024-09-09

## 2024-09-06 RX ORDER — OXYCODONE HYDROCHLORIDE 5 MG/1
5 TABLET ORAL EVERY 6 HOURS PRN
Status: DISCONTINUED | OUTPATIENT
Start: 2024-09-06 | End: 2024-09-06

## 2024-09-06 RX ORDER — BISACODYL 10 MG
10 SUPPOSITORY, RECTAL RECTAL DAILY
Status: DISCONTINUED | OUTPATIENT
Start: 2024-09-06 | End: 2024-09-06

## 2024-09-06 RX ORDER — AMLODIPINE BESYLATE 2.5 MG/1
2.5 TABLET ORAL DAILY
Status: DISCONTINUED | OUTPATIENT
Start: 2024-09-07 | End: 2024-09-08

## 2024-09-06 RX ORDER — OXYCODONE HYDROCHLORIDE 5 MG/1
5 TABLET ORAL EVERY 6 HOURS PRN
Status: DISCONTINUED | OUTPATIENT
Start: 2024-09-06 | End: 2024-09-09 | Stop reason: HOSPADM

## 2024-09-06 RX ORDER — ACETAMINOPHEN 10 MG/ML
1000 INJECTION, SOLUTION INTRAVENOUS EVERY 8 HOURS
Status: DISCONTINUED | OUTPATIENT
Start: 2024-09-06 | End: 2024-09-08

## 2024-09-06 RX ORDER — ONDANSETRON 2 MG/ML
4 INJECTION INTRAMUSCULAR; INTRAVENOUS EVERY 6 HOURS PRN
Status: DISCONTINUED | OUTPATIENT
Start: 2024-09-06 | End: 2024-09-09 | Stop reason: HOSPADM

## 2024-09-06 RX ORDER — MIRTAZAPINE 15 MG/1
7.5 TABLET, FILM COATED ORAL
Status: DISCONTINUED | OUTPATIENT
Start: 2024-09-06 | End: 2024-09-06

## 2024-09-06 RX ORDER — CALCITONIN SALMON 200 [IU]/.09ML
1 SPRAY, METERED NASAL DAILY
Status: DISCONTINUED | OUTPATIENT
Start: 2024-09-06 | End: 2024-09-09 | Stop reason: HOSPADM

## 2024-09-06 RX ORDER — MIRTAZAPINE 15 MG/1
7.5 TABLET, FILM COATED ORAL
Status: DISCONTINUED | OUTPATIENT
Start: 2024-09-06 | End: 2024-09-08

## 2024-09-06 RX ORDER — AMLODIPINE BESYLATE 2.5 MG/1
2.5 TABLET ORAL DAILY
Status: DISCONTINUED | OUTPATIENT
Start: 2024-09-07 | End: 2024-09-06

## 2024-09-06 RX ORDER — CLOTRIMAZOLE AND BETAMETHASONE DIPROPIONATE 10; .64 MG/G; MG/G
CREAM TOPICAL 2 TIMES DAILY
Status: DISCONTINUED | OUTPATIENT
Start: 2024-09-06 | End: 2024-09-09 | Stop reason: HOSPADM

## 2024-09-06 RX ORDER — GABAPENTIN 100 MG/1
100 CAPSULE ORAL DAILY
Status: DISCONTINUED | OUTPATIENT
Start: 2024-09-07 | End: 2024-09-08

## 2024-09-06 RX ORDER — AMLODIPINE BESYLATE 2.5 MG/1
2.5 TABLET ORAL DAILY
Status: DISCONTINUED | OUTPATIENT
Start: 2024-09-06 | End: 2024-09-06

## 2024-09-06 RX ORDER — MIRTAZAPINE 15 MG/1
7.5 TABLET, FILM COATED ORAL
Status: DISCONTINUED | OUTPATIENT
Start: 2024-09-07 | End: 2024-09-06

## 2024-09-06 RX ORDER — FLUTICASONE PROPIONATE 50 MCG
1 SPRAY, SUSPENSION (ML) NASAL DAILY
Status: DISCONTINUED | OUTPATIENT
Start: 2024-09-06 | End: 2024-09-09 | Stop reason: HOSPADM

## 2024-09-06 RX ADMIN — ENOXAPARIN SODIUM 30 MG: 30 INJECTION SUBCUTANEOUS at 20:52

## 2024-09-06 RX ADMIN — ONDANSETRON 4 MG: 2 INJECTION INTRAMUSCULAR; INTRAVENOUS at 22:58

## 2024-09-06 RX ADMIN — SENNOSIDES AND DOCUSATE SODIUM 1 TABLET: 8.6; 5 TABLET ORAL at 17:12

## 2024-09-06 RX ADMIN — ONDANSETRON 4 MG: 2 INJECTION INTRAMUSCULAR; INTRAVENOUS at 18:03

## 2024-09-06 RX ADMIN — ALBUTEROL SULFATE 2 PUFF: 90 AEROSOL, METERED RESPIRATORY (INHALATION) at 08:50

## 2024-09-06 RX ADMIN — CLOTRIMAZOLE AND BETAMETHASONE DIPROPIONATE: 10; .64 CREAM TOPICAL at 08:50

## 2024-09-06 RX ADMIN — LIDOCAINE 5% 1 PATCH: 700 PATCH TOPICAL at 12:08

## 2024-09-06 RX ADMIN — DICLOFENAC SODIUM TOPICAL GEL, 1% 2 G: 10 GEL TOPICAL at 22:58

## 2024-09-06 RX ADMIN — ENOXAPARIN SODIUM 30 MG: 30 INJECTION SUBCUTANEOUS at 08:43

## 2024-09-06 RX ADMIN — CLOTRIMAZOLE AND BETAMETHASONE DIPROPIONATE: 10; .64 CREAM TOPICAL at 17:13

## 2024-09-06 RX ADMIN — LIDOCAINE 5% 1 PATCH: 700 PATCH TOPICAL at 11:55

## 2024-09-06 RX ADMIN — AMLODIPINE BESYLATE 2.5 MG: 2.5 TABLET ORAL at 08:43

## 2024-09-06 RX ADMIN — ENOXAPARIN SODIUM 30 MG: 30 INJECTION SUBCUTANEOUS at 00:06

## 2024-09-06 RX ADMIN — GABAPENTIN 100 MG: 100 CAPSULE ORAL at 08:43

## 2024-09-06 RX ADMIN — SENNOSIDES AND DOCUSATE SODIUM 1 TABLET: 8.6; 5 TABLET ORAL at 08:43

## 2024-09-06 RX ADMIN — OXYCODONE HYDROCHLORIDE 5 MG: 5 TABLET ORAL at 18:32

## 2024-09-06 RX ADMIN — DICLOFENAC SODIUM TOPICAL GEL, 1% 2 G: 10 GEL TOPICAL at 17:13

## 2024-09-06 RX ADMIN — Medication 1000 UNITS: at 08:43

## 2024-09-06 RX ADMIN — MIRTAZAPINE 7.5 MG: 15 TABLET, FILM COATED ORAL at 22:58

## 2024-09-06 RX ADMIN — ACETAMINOPHEN 1000 MG: 10 INJECTION INTRAVENOUS at 20:51

## 2024-09-06 RX ADMIN — DICLOFENAC SODIUM TOPICAL GEL, 1% 2 G: 10 GEL TOPICAL at 12:35

## 2024-09-06 RX ADMIN — FLUTICASONE PROPIONATE 1 SPRAY: 50 SPRAY, METERED NASAL at 08:50

## 2024-09-06 RX ADMIN — CALCITONIN SALMON 1 SPRAY: 200 SPRAY, METERED NASAL at 08:49

## 2024-09-06 RX ADMIN — ACETAMINOPHEN 650 MG: 325 TABLET ORAL at 18:33

## 2024-09-06 NOTE — ASSESSMENT & PLAN NOTE
-Patient is high risk of delirium due to acute uncontrolled pain, hospitalization, constipation, cognitive decline, hearing loss  - delirium precautions  -maintain normal sleep/wake cycle  -minimize overnight interruptions, group overnight vitals/labs/nursing checks as possible  -dim lights, close blinds and turn off tv to minimize stimulation and encourage sleep environment in evenings  -ensure that pain is well controlled. Use Tylenol 975mg Q8H scheduled, lidocaine patches, Voltaren gel over affected joints  -monitor for fecal and urinary retention which may precipitate delirium  -encourage early mobilization and ambulation  -provide frequent reorientation and redirection  -encourage family and friends at the bedside to help calm patient if anxious  -avoid medications which may precipitate or worsen delirium such as tramadol, benzodiazepine, anticholinergics, and antihistaminics  -encourage hydration and nutrition , assist with feeding if needed  -redirect unwanted behaviors as first line, avoid physical restraints.   - continue gabapentin 100 mg QHS, may increase dose if she becomes anxious  -Continue mirtazapine 7.5 mg QHS

## 2024-09-06 NOTE — PLAN OF CARE
Problem: Potential for Falls  Goal: Patient will remain free of falls  Description: INTERVENTIONS:  - Educate patient/family on patient safety including physical limitations  - Instruct patient to call for assistance with activity   - Consult OT/PT to assist with strengthening/mobility   - Keep Call bell within reach  - Keep bed low and locked with side rails adjusted as appropriate  - Keep care items and personal belongings within reach  - Initiate and maintain comfort rounds  - Make Fall Risk Sign visible to staff  - Offer Toileting every Hours, in advance of need  - Initiate/Maintain alarm  - Obtain necessary fall risk management equipment:   - Apply yellow socks and bracelet for high fall risk patients  - Consider moving patient to room near nurses station  Outcome: Progressing     Problem: PAIN - ADULT  Goal: Verbalizes/displays adequate comfort level or baseline comfort level  Description: Interventions:  - Encourage patient to monitor pain and request assistance  - Assess pain using appropriate pain scale  - Administer analgesics based on type and severity of pain and evaluate response  - Implement non-pharmacological measures as appropriate and evaluate response  - Consider cultural and social influences on pain and pain management  - Notify physician/advanced practitioner if interventions unsuccessful or patient reports new pain  Outcome: Progressing     Problem: INFECTION - ADULT  Goal: Absence or prevention of progression during hospitalization  Description: INTERVENTIONS:  - Assess and monitor for signs and symptoms of infection  - Monitor lab/diagnostic results  - Monitor all insertion sites, i.e. indwelling lines, tubes, and drains  - Monitor endotracheal if appropriate and nasal secretions for changes in amount and color  - [place] appropriate cooling/warming therapies per order  - Administer medications as ordered  - Instruct and encourage patient and family to use good hand hygiene technique  -  Identify and instruct in appropriate isolation precautions for identified infection/condition  Outcome: Progressing  Goal: Absence of fever/infection during neutropenic period  Description: INTERVENTIONS:  - Monitor WBC    Outcome: Progressing     Problem: SAFETY ADULT  Goal: Patient will remain free of falls  Description: INTERVENTIONS:  - Educate patient/family on patient safety including physical limitations  - Instruct patient to call for assistance with activity   - Consult OT/PT to assist with strengthening/mobility   - Keep Call bell within reach  - Keep bed low and locked with side rails adjusted as appropriate  - Keep care items and personal belongings within reach  - Initiate and maintain comfort rounds  - Make Fall Risk Sign visible to staff  - Offer Toileting every  Hours, in advance of need  - Initiate/Maintain alarm  - Obtain necessary fall risk management equipment:   - Apply yellow socks and bracelet for high fall risk patients  - Consider moving patient to room near nurses station  Outcome: Progressing  Goal: Maintain or return to baseline ADL function  Description: INTERVENTIONS:  -  Assess patient's ability to carry out ADLs; assess patient's baseline for ADL function and identify physical deficits which impact ability to perform ADLs (bathing, care of mouth/teeth, toileting, grooming, dressing, etc.)  - Assess/evaluate cause of self-care deficits   - Assess range of motion  - Assess patient's mobility; develop plan if impaired  - Assess patient's need for assistive devices and provide as appropriate  - Encourage maximum independence but intervene and supervise when necessary  - Involve family in performance of ADLs  - Assess for home care needs following discharge   - Consider OT consult to assist with ADL evaluation and planning for discharge  - Provide patient education as appropriate  Outcome: Progressing  Goal: Maintains/Returns to pre admission functional level  Description:  INTERVENTIONS:  - Perform AM-PAC 6 Click Basic Mobility/ Daily Activity assessment daily.  - Set and communicate daily mobility goal to care team and patient/family/caregiver.   - Collaborate with rehabilitation services on mobility goals if consulted  - Perform Range of Motion  times a day.  - Reposition patient every  hours.  - Dangle patient  times a day  - Stand patient  times a day  - Ambulate patient  times a day  - Out of bed to chair  times a day   - Out of bed for meals  times a day  - Out of bed for toileting  - Record patient progress and toleration of activity level   Outcome: Progressing     Problem: DISCHARGE PLANNING  Goal: Discharge to home or other facility with appropriate resources  Description: INTERVENTIONS:  - Identify barriers to discharge w/patient and caregiver  - Arrange for needed discharge resources and transportation as appropriate  - Identify discharge learning needs (meds, wound care, etc.)  - Arrange for interpretive services to assist at discharge as needed  - Refer to Case Management Department for coordinating discharge planning if the patient needs post-hospital services based on physician/advanced practitioner order or complex needs related to functional status, cognitive ability, or social support system  Outcome: Progressing     Problem: Knowledge Deficit  Goal: Patient/family/caregiver demonstrates understanding of disease process, treatment plan, medications, and discharge instructions  Description: Complete learning assessment and assess knowledge base.  Interventions:  - Provide teaching at level of understanding  - Provide teaching via preferred learning methods  Outcome: Progressing     Problem: Prexisting or High Potential for Compromised Skin Integrity  Goal: Skin integrity is maintained or improved  Description: INTERVENTIONS:  - Identify patients at risk for skin breakdown  - Assess and monitor skin integrity  - Assess and monitor nutrition and hydration status  -  Monitor labs   - Assess for incontinence   - Turn and reposition patient  - Assist with mobility/ambulation  - Relieve pressure over bony prominences  - Avoid friction and shearing  - Provide appropriate hygiene as needed including keeping skin clean and dry  - Evaluate need for skin moisturizer/barrier cream  - Collaborate with interdisciplinary team   - Patient/family teaching  - Consider wound care consult   Outcome: Progressing
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
## 2024-09-06 NOTE — DISCHARGE INSTR - AVS FIRST PAGE
Discharge Instructions - Orthopedics  Doris Campbell 83 y.o. female MRN: [id number]  Unit/Bed#: W -01    Weight Bearing Status:                                           Non weight bearing to the right upper extremity in sling for comfort.   Ok for range of motion to the elbow and wrist/hand.     Pain:  Continue analgesics as directed    Dressing Instructions:   Please keep clean, dry and intact until follow up     Appt Instructions:   If you do not have your appointment, please call the clinic at [phone] to schedule with Dr. Yates.   Otherwise follow up as scheduled.    Contact the office sooner if you experience any increased numbness/tingling in the extremities.

## 2024-09-06 NOTE — ASSESSMENT & PLAN NOTE
Non weight bearing to the right upper extremity in sling for comfort.   May range the elbow and wrist.   No immediate orthopedic intervention indicated at this time for distal clavicle fracture.  Patient also with remote proximal humerus fracture seen on radiographs. No intervention indicated for this at this time.    Pain control per primary team.   Orthopedics signing off at this time.   Patient may follow up as outpatient in 2 weeks with Dr. Yates  Case reviewed and discussed with Dr. Yates.

## 2024-09-06 NOTE — ASSESSMENT & PLAN NOTE
- Patient typically ambulates with walker  - Has multiple recent falls and in emergency department was not able to ambulate even with walker due to discomfort  - PT/OT consulted  - Geriatrics consulted for medication review and further recommendations

## 2024-09-06 NOTE — H&P
Counts include 234 beds at the [facility]  H&P  Name: Doris Campbell 83 y.o. female I MRN: [id number]  Unit/Bed#: W -01 I Date of Admission: 9/5/2024   Date of Service: 9/6/2024 I Hospital Day: 1      Assessment & Plan   Closed fracture of acromial end of right clavicle  Assessment & Plan  - S/p fall at home on 9/5  - Patient placed in sling  - NWB in RUE until evaluated by orthopedics - consult placed  - Multimodal pain regimen in place   - PT/OT consulted    Closed fracture of one rib of left side  Assessment & Plan  - Single rib fracture, left 4th rib, present on admission.  - Continue rib fracture protocol.  - Continue to encourage incentive spirometer use and adequate pulmonary hygiene.  - Continue multimodal analgesic regimen.  - Supplemental oxygen via nasal cannula as needed to maintain saturations greater than or equal to 94%.   - PT and OT evaluation and treatment as indicated.    Ambulatory dysfunction  Assessment & Plan  - Patient typically ambulates with walker  - Has multiple recent falls and in emergency department was not able to ambulate even with walker due to discomfort  - PT/OT consulted  - Geriatrics consulted for medication review and further recommendations    Hypertension  Assessment & Plan  - Continue home amlodipine    Fall  Assessment & Plan  - Status post fall with the below noted injuries.  - Fall precautions.  - Geriatric Medicine consultation for evaluation, medication review and recommendations.  - PT and OT evaluation and treatment as indicated.  - Case Management consultation for disposition planning.               Chief Complaint: fall    History of Present Illness   HPI:  Doris Campbell is a 83 y.o. female who presents with chest and right shoulder pain after a fall. She was initially evaluated in the ED as a trauma level C due to the patient taking daily aspirin. The fall was at her facility - [facility] and was unwitnessed. She reportedly was vomiting at her  facility. She is unaware of what happened, reportedly has some cognitive decline at baseline. Currently she is complaining of back pain, right shoulder pain, chest pain worse on left side, and hip and foot pain on right. On review of chart, she does have a history of recurrent falls.     Review of Systems   Constitutional:  Positive for fatigue. Negative for chills and fever.   HENT:  Negative for ear pain and sore throat.    Eyes:  Negative for pain and visual disturbance.   Respiratory:  Negative for cough and shortness of breath.    Cardiovascular:  Positive for chest pain. Negative for palpitations.   Gastrointestinal:  Negative for abdominal pain and vomiting.   Genitourinary:  Negative for dysuria and hematuria.   Musculoskeletal:  Positive for arthralgias, back pain and gait problem. Negative for neck pain.   Skin:  Negative for color change and rash.   Neurological:  Positive for weakness. Negative for syncope, light-headedness and headaches.        Generalized weakness   All other systems reviewed and are negative.      Historical Information       Past Medical History:   Past Medical History:   Diagnosis Date    Allergic     Anxiety     Arthritis     Cancer (HCC)     HLD (hyperlipidemia)     Hypertension     Hyperthyroidism     Osteoporosis     Stroke (HCC)     TIA (transient ischemic attack)        Past Surgical History:   Past Surgical History:   Procedure Laterality Date    BREAST LUMPECTOMY Left     HYSTERECTOMY      KNEE SURGERY Right     ORIF TIBIA FRACTURE Right 12/8/2016    Procedure: OPEN REDUCTION W/ INTERNAL FIXATION (ORIF) TIBIA;  Surgeon: Angie Case MD;  Location: [facility];  Service:        Social History:  Alcohol Use:   Social History     Substance and Sexual Activity   Alcohol Use Not Currently    Comment: Alcohol intake:   Occasional  - As per [place]      Drug Use:   Social History     Substance and Sexual Activity   Drug Use Never     Tobacco Use:   Social History     Tobacco  Use   Smoking Status Never   Smokeless Tobacco Never   Tobacco Comments    Former smoker - As per [place]        Immunizations:   Immunization History   Administered Date(s) Administered    Pneumococcal Conjugate 13-Valent 08/16/2007    Tdap 10/19/2020       Last Tetanus: 9/5/2024  Family History: non-contributory      Meds/Allergies   all current active meds have been reviewed     Allergies   Allergen Reactions    Alendronate     Raloxifene     Risedronate Sodium     Sulfa Antibiotics Swelling    Penicillins Swelling     Patient received 7 day treatment course Keflex without any adverse reactions in 11/2022.        PHYSICAL EXAM      Objective   Vitals:   First set: Temperature: 97.7 °F (36.5 °C) (09/05/24 1330)  Pulse: 81 (09/05/24 1330)  Respirations: 16 (09/05/24 1330)  Blood Pressure: 141/65 (09/05/24 1330)    Secondary Survey: (Click on Physical Exam tab above)    Physical Exam  Constitutional:       General: She is not in acute distress.  HENT:      Head: Normocephalic.      Right Ear: External ear normal.      Left Ear: External ear normal.      Nose: Nose normal.      Mouth/Throat:      Mouth: Mucous membranes are moist.   Eyes:      Extraocular Movements: Extraocular movements intact.      Conjunctiva/sclera: Conjunctivae normal.      Pupils: Pupils are equal, round, and reactive to light.   Cardiovascular:      Rate and Rhythm: Normal rate and regular rhythm.   Pulmonary:      Effort: Pulmonary effort is normal.      Breath sounds: Normal breath sounds.   Abdominal:      General: There is no distension.      Palpations: Abdomen is soft.      Tenderness: There is no abdominal tenderness.   Musculoskeletal:         General: Tenderness present.      Cervical back: Neck supple. No tenderness.      Comments: Left sided chest wall tenderness, right shoulder tenderness. ROM limited by discomfort.   Skin:     Comments: Small contusion on forehead   Neurological:      Mental Status: She is alert.         Invasive  Devices       Peripheral Intravenous Line  Duration             Peripheral IV 09/05/24 Left;Ventral (anterior) Forearm <1 day                    Lab Results: Results: I have personally reviewed all pertinent laboratory/tests results  Imaging/EKG Studies: positive for acute findings: Right distal clavicle fracture, left 4th rib fracture  Other Studies:     Code Status: Level 3 - DNAR and DNI  Advance Directive and Living Will:      Power of : Yes  POLST:

## 2024-09-06 NOTE — ASSESSMENT & PLAN NOTE
- Single rib fracture, left 4th rib, present on admission.  - Continue rib fracture protocol.  - Continue to encourage incentive spirometer use and adequate pulmonary hygiene.  - Continue multimodal analgesic regimen.  - Supplemental oxygen via nasal cannula as needed to maintain saturations greater than or equal to 94%.   - PT and OT evaluation and treatment as indicated.

## 2024-09-06 NOTE — CONSULTS
Consultation - Geriatric Medicine   Doris Campbell 83 y.o. female MRN: [id number]  Unit/Bed#: W -01 Encounter: [id number]      Assessment & Plan     [place] (hard of hearing)  Assessment & Plan  Offer hearing amplifiers while awake  Speak loud and clear    Insomnia  Assessment & Plan  Patient has difficulty sleeping at baseline and appears to have a dysphoric mood.  Will start mirtazapine 7.5 mg daily at bedtime.    At risk for delirium  Assessment & Plan    -Patient is high risk of delirium due to acute uncontrolled pain, hospitalization, constipation, cognitive decline, hearing loss  -Initiate delirium precautions  -maintain normal sleep/wake cycle  -minimize overnight interruptions, group overnight vitals/labs/nursing checks as possible  -dim lights, close blinds and turn off tv to minimize stimulation and encourage sleep environment in evenings  -ensure that pain is well controlled. Use Tylenol 975mg Q8H scheduled, lidocaine patches, Voltaren gel over affected joints  -monitor for fecal and urinary retention which may precipitate delirium  -encourage early mobilization and ambulation  -provide frequent reorientation and redirection  -encourage family and friends at the bedside to help calm patient if anxious  -avoid medications which may precipitate or worsen delirium such as tramadol, benzodiazepine, anticholinergics, and antihistaminics  -encourage hydration and nutrition , assist with feeding if needed  -redirect unwanted behaviors as first line, avoid physical restraints.   - continue gabapentin 100 mg QHS, may increase dose if she becomes anxious  - start mirtazapine 7.5 mg QHS    Closed fracture of acromial end of right clavicle  Assessment & Plan  PT/OT as tolerated  No intervention indicated per orthopedics  Pain regimen as listed    Closed fracture of one rib of left side  Assessment & Plan  Left fourth rib fracture noted on imaging  PT/OT as tolerated  Multimodal pain regimen as listed  Apply  lidocaine patch.  OOB as tolerated, incentive spirometry    Ambulatory dysfunction  Assessment & Plan  Patient walks with a walker at baseline.    PT/OT as tolerated  Ortho VS    Constipation  Assessment & Plan  Continue Senna  Monitor and adjust regimen as needed    Chronic pain of right knee  Assessment & Plan  Continue tylenol 975 mg po TID  Add voltaren gel to R knee.  X ray reviewed - no acute pathology    Hypertension  Assessment & Plan  Stable on amlodipine, but will change administration to at night.    Mild cognitive impairment  Assessment & Plan  Pt has history of mild cognitive impairment and more recent confusion over the past 1 month per the Nursing Home staff. She is A/O x2 at baseline.  Head CT on 09/05/24 showed subcortical and periventricular white matter reflecting moderate chronic microvascular ischemic changes.  Pt had a MoCA performed on 10/2020 with a score of 9/30.  Not able to do minicog today  She needs assistance with all IADL's and some ADL's at baseline  Currently stable, no behaviors noted.  Will continue to provide supportive care, reorient as needed.  Patient is at high risk for delirium, will monitor closely and place on delirium precautions.  Maintain sleep/wake cycle.  Optimize pain regimen.  Monitor for constipation and urinary retention and manage as needed.  Check B12 level and TSH  Encourage family to visit.  Encourage to wear glasses and hearing aids while awake.  Encourage po intake, assist with feeding if needed.     Fall  Assessment & Plan  Patient uses a walker for ambulation at baseline  She reports to the hospital from a fall with head strike while walking with her walker. She has had numerous recent hospitalizations with falls.  Pt sustained a distal clavicle fracture and left 4th rib fracture for which no intervention is indicated per orthopedics.  PT/OT as tolerated.  Monitor orthostatic vital signs  Encourage p.o. hydration  Avoid hypotension and hypoglycemia       "         History of Present Illness   Physician Requesting Consult: Xenia Fong DO  Reason for Consult / Principal Problem: fall  Hx and PE limited by: cognitive dysfunction and difficulty hearing  Additional history obtained from: [facility]      HPI: Doris Campbell is a 83 y.o. year old female with past medical history significant for anxiety, arthritis, cancer, hyperlipidemia, hypertension, hypothyroidism, osteoporosis, stroke, and TIA on daily aspirin who presents with a fall at her Senior living facility.      History was limited due to patient being a poor historian, hard of hearing, and history of mild cognitive decline at baseline.  She is A/O x2 at baseline. She reportedly fell while walking with her walker at [facility], the [facility] where she lives.  She hit her head and was taken via ambulance to the hospital.  Patient was found to have a remote proximal humerus and distal clavicle fracture for which no intervention is indicated per orthopedics team.  She also complained of hip pain and knee pain, but imaging was unremarkable.     I spoke with staff at [facility] where she lives, who provided additional history about this patient.  They state that patient needs assistance with some basic ADLs including minimal assistance with toileting, dressing, and bathing.  She has 1 family member who manages her finances.  Patient walks with a walker at baseline and has had many recent hospitalizations.  Staff reports that since her hospitalization on 8/7 with a UTI, she has been more confused than her baseline state with \"fair cognition.\"  She appears to be confused sometimes, but this is hard to differentiate from her difficulty hearing. They also note that she has complained of head pain for the past 2 months and needs to be scheduled for a biopsy of the temporal artery, but has not done this because she requires transportation that the facility does not provide.     Inpatient consult to " Gerontology  Consult performed by: Yvette Espinoza MD  Consult ordered by: Carmel Gloria DO          Review of Systems   Constitutional:  Positive for appetite change. Negative for chills and fever.   HENT:  Positive for hearing loss. Negative for congestion.    Eyes:  Positive for visual disturbance.   Respiratory:  Negative for cough and shortness of breath.    Cardiovascular:  Negative for chest pain.   Gastrointestinal:  Positive for constipation. Negative for abdominal pain.   Genitourinary:  Negative for dysuria.   Musculoskeletal:  Positive for arthralgias, gait problem and myalgias.   Neurological:  Positive for headaches.   Psychiatric/Behavioral:  Positive for confusion, dysphoric mood and sleep disturbance.            Historical Information   Past Medical History:   Diagnosis Date    Allergic     Anxiety     Arthritis     Cancer (HCC)     HLD (hyperlipidemia)     Hypertension     Hyperthyroidism     Osteoporosis     Stroke (HCC)     TIA (transient ischemic attack)      Past Surgical History:   Procedure Laterality Date    BREAST LUMPECTOMY Left     HYSTERECTOMY      KNEE SURGERY Right     ORIF TIBIA FRACTURE Right 12/8/2016    Procedure: OPEN REDUCTION W/ INTERNAL FIXATION (ORIF) TIBIA;  Surgeon: Angie Case MD;  Location: [facility];  Service:      Social History   Social History     Substance and Sexual Activity   Alcohol Use Not Currently    Comment: Alcohol intake:   Occasional  - As per [facility]      Social History     Substance and Sexual Activity   Drug Use Never     Social History     Tobacco Use   Smoking Status Never   Smokeless Tobacco Never   Tobacco Comments    Former smoker - As per [facility]      Family History:   Family History   Problem Relation Age of Onset    Coronary artery disease Family     Other Family         DJD       Meds/Allergies   current meds:   Current Facility-Administered Medications   Medication Dose Route Frequency    acetaminophen (TYLENOL) tablet 650  mg  650 mg Oral Once    acetaminophen (TYLENOL) tablet 975 mg  975 mg Oral TID    albuterol (PROVENTIL HFA,VENTOLIN HFA) inhaler 2 puff  2 puff Inhalation Q6H PRN    [START ON 9/7/2024] amLODIPine (NORVASC) tablet 2.5 mg  2.5 mg Oral Daily    calcitonin (salmon) (MIACALCIN) 200 units/act nasal spray 1 spray  1 spray Alternating Nares Daily    Cholecalciferol (VITAMIN D3) tablet 1,000 Units  1,000 Units Oral Daily    clotrimazole-betamethasone (LOTRISONE) 1-0.05 % cream   Topical BID    Diclofenac Sodium (VOLTAREN) 1 % topical gel 2 g  2 g Topical 4x Daily    enoxaparin (LOVENOX) subcutaneous injection 30 mg  30 mg Subcutaneous Q12H    fluticasone (FLONASE) 50 mcg/act nasal spray 1 spray  1 spray Nasal Daily    gabapentin (NEURONTIN) capsule 100 mg  100 mg Oral Daily    lidocaine (LIDODERM) 5 % patch 1 patch  1 patch Topical Daily    lidocaine (LIDODERM) 5 % patch 1 patch  1 patch Topical Daily    mirtazapine (REMERON) tablet 7.5 mg  7.5 mg Oral HS    oxyCODONE (ROXICODONE) IR tablet 5 mg  5 mg Oral Q6H PRN    oxyCODONE (ROXICODONE) split tablet 2.5 mg  2.5 mg Oral Q6H PRN    senna-docusate sodium (SENOKOT S) 8.6-50 mg per tablet 1 tablet  1 tablet Oral BID     Home Medications as verified by Marsha Agrawal:    - Tylenol 975 q 8 h PRN  - Albuterol every 6 hours PRN  - Amlodipine 2.5 mg p.o. daily  - artificial tears  - Aspirin 81 mg p.o. daily in AM  - Calcitonin 200 units spray daily in AM  - Vitamin D3 1000 unit p.o. daily in afternoon  - Lotrisone 0.05% cream twice daily  - Voltaren 1% 4 times a day  - Flonase nasal spray daily  - Gabapentin 100 mg daily at bedtime  - ice pack at bed  - Magnesium hydroxide 30 mL twice daily  - Senna 8.6 to 50 mg/tab. twice daily      Allergies   Allergen Reactions    Alendronate     Raloxifene     Risedronate Sodium     Sulfa Antibiotics Swelling    Penicillins Swelling     Patient received 7 day treatment course Keflex without any adverse reactions in 11/2022.        Objective      Intake/Output Summary (Last 24 hours) at 9/6/2024 1256  Last data filed at 9/6/2024 1251  Gross per 24 hour   Intake 360 ml   Output --   Net 360 ml     Invasive Devices       Peripheral Intravenous Line  Duration             Peripheral IV 09/05/24 Left;Ventral (anterior) Forearm <1 day                    Physical Exam  Vitals and nursing note reviewed.   Constitutional:       Comments: Frail looking   HENT:      Head: Normocephalic.      Ears:      Comments: [place]     Nose: Nose normal.      Mouth/Throat:      Mouth: Mucous membranes are dry.   Cardiovascular:      Rate and Rhythm: Normal rate and regular rhythm.      Heart sounds: No murmur heard.  Pulmonary:      Effort: Pulmonary effort is normal.      Breath sounds: Normal breath sounds. No wheezing, rhonchi or rales.   Abdominal:      General: Abdomen is flat. There is no distension.      Palpations: Abdomen is soft.      Tenderness: There is no abdominal tenderness.   Musculoskeletal:         General: Tenderness, deformity and signs of injury present. Normal range of motion.      Cervical back: Normal range of motion and neck supple.      Comments: Sling RUE   Skin:     General: Skin is warm and dry.      Findings: Bruising and rash present.   Neurological:      General: No focal deficit present.      Mental Status: She is alert. Mental status is at baseline.      Comments: A/O x2 at baseline, limited cognitive assessment secondary to difficulty hearing.   Psychiatric:      Comments: Dysphoric mood         Lab Results:   I have personally reviewed pertinent lab results including the following:  - [facility] CMP    I have personally reviewed the following imaging study reports in PACS:  - CT head, X ray R clavicle      Therapies:   PT: PT consult held until ortho consult complete  OT: held until ortho consult    VTE Prophylaxis: Enoxaparin (Lovenox)    Code Status: Level 3 - DNAR and DNI  Advance Directive and Living Will:      Power of : Yes  POLST:       Family and Social Support: cousin  Living Arrangements: Other (Comment) (Marsha Gonzalez)  Support Systems: Family members  Type of Current Residence: Facility        Thank you for allowing me to participate in your patients' care. Please do not hesitate to call with any additional questions.  Yvette Espinoza MD

## 2024-09-06 NOTE — ASSESSMENT & PLAN NOTE
- S/p fall at home on 9/5  - Patient placed in sling  - NWB in RUE until evaluated by orthopedics - consult placed  - Multimodal pain regimen in place   - PT/OT consulted

## 2024-09-06 NOTE — ASSESSMENT & PLAN NOTE
Bruising noted to the right knee on admission.   XRAYs performed without acute osseous abnormality appreciated.   Prior operative intervention/surgical changes noted without evidence of hardware failure.   Weight bearing as tolerated to the right lower extremity.

## 2024-09-06 NOTE — PLAN OF CARE
Problem: PHYSICAL THERAPY ADULT  Goal: Performs mobility at highest level of function for planned discharge setting.  See evaluation for individualized goals.  Description: Treatment/Interventions: ADL retraining, Functional transfer training, LE strengthening/ROM, Therapeutic exercise, Endurance training, Cognitive reorientation, Patient/family training, Equipment eval/education, Bed mobility, Gait training, Compensatory technique education, Spoke to case management, OT          See flowsheet documentation for full assessment, interventions and recommendations.  Note:    Problem List: Decreased strength, Decreased range of motion, Decreased endurance, Impaired balance, Decreased mobility, Decreased coordination, Decreased cognition, Impaired judgement, Decreased safety awareness, Orthopedic restrictions, Pain, Impaired hearing  Assessment: Patient seen for Physical Therapy evaluation. Patient admitted with Closed fracture of one rib of left side.  Comorbidities affecting patient's physical performance include: mild cognitive impairment, HTN, chronic pain R knee, HLD, osteoporosis, TIA, L1 fx, major neurocog disorder, CVA, anemia.  Personal factors affecting patient at time of initial evaluation include: ambulating with assistive device, inability to ambulate household distances, inability to navigate community distances, inability to navigate level surfaces without external assistance, inability to perform dynamic tasks in community, decreased cognition, positive fall history, decreased initiation and engagement, and limited insight into impairments. Prior to admission, patient was independent with functional mobility with RW, requiring assist for ADLS, requiring assist for IADLS, ambulating household distance, and an assisted living resident.  Please find objective findings from Physical Therapy assessment regarding body systems outlined above with impairments and limitations including weakness, decreased ROM,  impaired balance, decreased endurance, impaired coordination, gait deviations, pain, decreased activity tolerance, decreased functional mobility tolerance, decreased safety awareness, fall risk, orthopedic restrictions, and decreased cognition.  The Barthel Index was used as a functional outcome tool presenting with a score of Barthel Index Score: 25 today indicating marked limitations of functional mobility and ADLS.  Patient's clinical presentation is currently unstable/unpredictable as seen in patient's presentation of vital sign response, changing level of pain, varying levels of cognitive performance, increased fall risk, new onset of impairment of functional mobility, decreased endurance, and new onset of weakness. Pt would benefit from continued Physical Therapy treatment to address deficits as defined above and maximize level of functional mobility. As demonstrated by objective findings, the assigned level of complexity for this evaluation is high.The patient's [facility] Basic Mobility Inpatient Short Form Raw Score is 10. A Raw score of less than or equal to 16 suggests the patient may benefit from discharge to post-acute rehabilitation services. Please also refer to the recommendation of the Physical Therapist for safe discharge planning.        Rehab Resource Intensity Level, PT: II (Moderate Resource Intensity)    See flowsheet documentation for full assessment.

## 2024-09-06 NOTE — CASE MANAGEMENT
Case Management Discharge Planning Note    Patient name Doris Campbell  Location W /W -01 MRN [id number]  : 1940 Date 2024       Current Admission Date: 2024  Current Admission Diagnosis:Closed fracture of one rib of left side   Patient Active Problem List    Diagnosis Date Noted Date Diagnosed    Closed fracture of one rib of left side 2024     Closed fracture of acromial end of right clavicle 2024     At risk for delirium 2024     Insomnia 2024     [place] (hard of hearing) 2024     Syncope 2024     Dysphagia 2024     Recurrent falls 2024     Ingrown nail 2024     Multiple rib fractures 2024     Acute pain due to trauma 2024     L1 vertebral fracture (HCC) 2024     Zygomatic fracture (HCC) 2024     Anemia 2023     Asthma 2023     Adjustment disorder with anxious mood 2022     History of CVA (cerebrovascular accident) 2022     Ambulatory dysfunction 2022     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     Allergic rhinitis due to allergen 2022     Chronic pain of right knee      Arthritis of knee, right      Difficult or painful urination 2020     Hereditary and idiopathic neuropathy, unspecified 2020     Rheumatoid arthritis, unspecified (HCC) 2020     Personal history of transient ischemic attack (TIA), and cerebral infarction without residual deficits 2020     Personal history of breast cancer 2020     Constipation 2020     Anxiety      Hypertension 11/10/2020     Mild cognitive impairment      Vitamin D deficiency 10/22/2020     Age-related osteoporosis with current pathological fracture 10/21/2020     Fall 10/19/2020     B12 deficiency 2018     Major neurocognitive disorder, due to vascular disease, without behavioral disturbance, mild 2017     HLD (hyperlipidemia)        LOS (days): 1  Geometric Mean LOS (GMLOS) (days): 2.8  Days to  GMLOS:2.1     OBJECTIVE:  Risk of Unplanned Readmission Score: 29.11         Current admission status: Inpatient   Preferred Pharmacy:   [facility] - [place], [place] - [address]  [address] [facility] [address]  Phone: [phone] Fax: [phone]    Primary Care Provider: Tuyet Cantor MD    Primary Insurance: MEDICARE  Secondary Insurance: BLUE CROSS    DISCHARGE DETAILS:    Discharge planning discussed with:: Patricia - KELLY lindsey  Freedom of Choice: Yes  Comments - Freedom of Choice: CM contacted pt's POAPatricia, to review the care team recommendations of the care team for rehab.  Patricia is in agreement with this as she states pt has had many falls recently.  CM contacted family/caregiver?: Yes  Were Treatment Team discharge recommendations reviewed with patient/caregiver?: Yes  Did patient/caregiver verbalize understanding of patient care needs?: Yes  Were patient/caregiver advised of the risks associated with not following Treatment Team discharge recommendations?: Yes    Contacts  Patient Contacts: Patricia  Relationship to Patient:: Family  Contact Method: Phone  Reason/Outcome: Continuity of Care, Emergency Contact, Referral, Discharge Planning    Requested Home Health Care         Is the patient interested in HHC at discharge?: No    DME Referral Provided  Referral made for DME?: No    Other Referral/Resources/Interventions Provided:  Interventions: Short Term Rehab  Referral Comments: [facility] referrals have been made for rehab as requested by POA.  List of facilities will be provided to POA to choose which facility shew ould like for pt to go to.  CM to follow up with POA in order to assist with DCP.    Would you like to participate in our [facility] service program?  : No - Declined    Treatment Team Recommendation: Short Term Rehab  Discharge Destination Plan:: Short Term Rehab

## 2024-09-06 NOTE — ASSESSMENT & PLAN NOTE
Pt has history of mild cognitive impairment and more recent confusion over the past 1 month per the Nursing Home staff. She is A/O x2 at baseline.  Head CT on 09/05/24 showed subcortical and periventricular white matter reflecting moderate chronic microvascular ischemic changes.  Pt had a MoCA performed on 10/2020 with a score of 9/30.  Not able to do minicog today  She needs assistance with all IADL's and some ADL's at baseline  Currently stable, no behaviors noted.  Will continue to provide supportive care, reorient as needed.  Patient is at high risk for delirium, will monitor closely and place on delirium precautions.  Maintain sleep/wake cycle.  Optimize pain regimen.  Monitor for constipation and urinary retention and manage as needed.  B12 level low start vitamin B12 supplements  TSH within normal limits in May 2024  Encourage family to visit.  Encourage to wear glasses and hearing aids while awake.  Encourage po intake, assist with feeding if needed.

## 2024-09-06 NOTE — CASE MANAGEMENT
Case Management Assessment    Patient name Doris Campbell  Location W /W -01 MRN [id number]  : 1940 Date 2024       Current Admission Date: 2024  Current Admission Diagnosis:Fall   Patient Active Problem List    Diagnosis Date Noted Date Diagnosed    Closed fracture of one rib of left side 2024     Closed fracture of acromial end of right clavicle 2024     Syncope 2024     Dysphagia 2024     Recurrent falls 2024     Ingrown nail 2024     Multiple rib fractures 2024     Acute pain due to trauma 2024     L1 vertebral fracture (HCC) 2024     Zygomatic fracture (HCC) 2024     Anemia 2023     Asthma 2023     Adjustment disorder with anxious mood 2022     History of CVA (cerebrovascular accident) 2022     Ambulatory dysfunction 2022     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     Allergic rhinitis due to allergen 2022     Chronic pain of right knee      Arthritis of knee, right      Difficult or painful urination 2020     Hereditary and idiopathic neuropathy, unspecified 2020     Rheumatoid arthritis, unspecified (HCC) 2020     Personal history of transient ischemic attack (TIA), and cerebral infarction without residual deficits 2020     Personal history of breast cancer 2020     Constipation 2020     Anxiety      Hypertension 11/10/2020     Mild cognitive impairment      Vitamin D deficiency 10/22/2020     Age-related osteoporosis with current pathological fracture 10/21/2020     Fall 10/19/2020     B12 deficiency 2018     Major neurocognitive disorder, due to vascular disease, without behavioral disturbance, mild 2017     HLD (hyperlipidemia)        LOS (days): 1  Geometric Mean LOS (GMLOS) (days): 2.8  Days to GMLOS:2.3     OBJECTIVE:    Risk of Unplanned Readmission Score: 28.02         Current admission status: Inpatient       Preferred Pharmacy:    [facility] [place] - [address]  [address] [facility] [address]  Phone: [phone] Fax: [phone]    Primary Care Provider: Tuyet Cantor MD    Primary Insurance: MEDICARE  Secondary Insurance: BLUE CROSS    ASSESSMENT:  Active Health Care Proxies       Patricia Caro Health Care Agent - Relative, Legal Guardian   Primary Phone: [phone] (Mobile)  Home Phone: [phone]                                Patient Information  Admitted from:: Facility ([facility])  Mental Status: Alert  During Assessment patient was accompanied by: Not accompanied during assessment  Assessment information provided by:: Patient  Primary Caregiver: Other (Comment)  Caregiver's Name:: Tania Gonzalez  Caregiver's Relationship to Patient:: Other (Specify)  Support Systems: Family members  County of Residence: [place]  What city do you live in?: [place]  Home entry access options. Select all that apply.: No steps to enter home  Type of Current Residence: Facility  Upon entering residence, is there a bedroom on the main floor (no further steps)?: Yes  Upon entering residence, is there a bathroom on the main floor (no further steps)?: Yes  Living Ar[facility]ements: [facility] ([facility]    Activities of Daily Living Prior to Admission  Functional Status: Assistance  Completes ADLs independently?: No  Level of ADL dependence: Assistance  Ambulates independently?: Yes  Does patient use assisted devices?: Yes  Assisted Devices (DME) used: Walker  Does patient currently own DME?: Yes  What DME does the patient currently own?: Walker  Does patient have a history of Outpatient Therapy (PT/OT)?: Yes  Does the patient have a history of Short-Term Rehab?: Yes  Does patient have a history of HHC?: Yes  Does patient currently have HHC?: No         Patient Information Continued  Income Source: Pension/half-way  Does patient have prescription coverage?: Yes  Does  patient receive dialysis treatments?: No  Does patient have a history of substance abuse?: No  Does patient have a history of Mental Health Diagnosis?: No         Means of Transportation  Means of Transport to Appts:: Family transport      Social Determinants of Health (SDOH)      Flowsheet Row Most Recent Value   Housing Stability    In the last 12 months, was there a time when you were not able to pay the mortgage or rent on time? N   At any time in the past 12 months, were you homeless or living in a shelter (including now)? N   Transportation Needs    In the past 12 months, has lack of transportation kept you from medical appointments or from getting medications? no   In the past 12 months, has lack of transportation kept you from meetings, work, or from getting things needed for daily living? No   Food Insecurity    Within the past 12 months, you worried that your food would run out before you got the money to buy more. Never true   Within the past 12 months, the food you bought just didn't last and you didn't have money to get more. Never true   Utilities    In the past 12 months has the electric, gas, oil, or water company threatened to shut off services in your home? No

## 2024-09-06 NOTE — ASSESSMENT & PLAN NOTE
Confusion noted on exam today.   Per review of records and discussion with primary team, patient with cognitive dysfunction at baseline.   Management per primary team.

## 2024-09-06 NOTE — ASSESSMENT & PLAN NOTE
Patient uses a walker for ambulation at baseline  She reports to the hospital from a fall with head strike while walking with her walker. She has had numerous recent hospitalizations with falls.  Pt sustained a distal clavicle fracture and left 4th rib fracture for which no intervention is indicated per orthopedics.  PT/OT as tolerated.  Monitor orthostatic vital signs  Encourage p.o. hydration  Avoid hypotension and hypoglycemia

## 2024-09-06 NOTE — ASSESSMENT & PLAN NOTE
Left fourth rib fracture noted on imaging  PT/OT as tolerated  Multimodal pain regimen as listed  Apply lidocaine patch.  OOB as tolerated, incentive spirometry

## 2024-09-06 NOTE — PLAN OF CARE

## 2024-09-06 NOTE — PHYSICAL THERAPY NOTE
09/06/24 0850   Note Type   Note type Cancelled Session   Cancel Reasons Medical status   Additional Comments PT orders received and chart reviewed. Pt is adm with a fall sustaining R clavicle fx and new age indeterminate lateral left 4th rib fx. Will hold PT eval until ortho consult completed. WBS and activity orders appreciated.   Licensure   [place] License Number  Loni Krueger PT

## 2024-09-06 NOTE — ASSESSMENT & PLAN NOTE
Increase senna to 2 tablets twice daily and continue MiraLAX daily  Monitor and adjust regimen as needed

## 2024-09-06 NOTE — ASSESSMENT & PLAN NOTE
Patient has difficulty sleeping at baseline and appears to have a dysphoric mood.  Continue mirtazapine 7.5 mg daily at bedtime.

## 2024-09-06 NOTE — PHYSICAL THERAPY NOTE
PHYSICAL THERAPY EVALUATION/TREATMENT    NAME:  Doris Campbell  AGE:   83 y.o.  Mrn:   [id number]  Length Of Stay: 1    ADMIT DX:  Closed right clavicular fracture [S42.001A]  Fall, initial encounter [W19.XXXA]  Closed fracture of one rib of left side, initial encounter [S22.32XA]  Unspecified multiple injuries, initial encounter [T07.XXXA]    Past Medical History:   Diagnosis Date    Allergic     Anxiety     Arthritis     Cancer (HCC)     HLD (hyperlipidemia)     Hypertension     Hyperthyroidism     Osteoporosis     Stroke (HCC)     TIA (transient ischemic attack)      Past Surgical History:   Procedure Laterality Date    BREAST LUMPECTOMY Left     HYSTERECTOMY      KNEE SURGERY Right     ORIF TIBIA FRACTURE Right 12/8/2016    Procedure: OPEN REDUCTION W/ INTERNAL FIXATION (ORIF) TIBIA;  Surgeon: Angie Case MD;  Location: [facility];  Service:        09/06/24 1315   PT Last Visit   PT Visit Date 09/06/24   Note Type   Note type Evaluation   Pain Assessment   Pain Assessment Tool 0-10   Pain Score 8   Pain Location/Orientation Orientation: Right;Location: Shoulder   Pain Onset/Description Frequency: Intermittent  (with mobility;0/10 at rest)   Effect of Pain on Daily Activities limits comfort and activity tolerance   Patient's Stated Pain Goal No pain   Hospital Pain Intervention(s) Repositioned;Ambulation/increased activity;Emotional support;Rest   Restrictions/Precautions   RUE Weight Bearing Per Order (S)  NWB  (in sling)   RLE Weight Bearing Per Order (S)  WBAT   Braces or Orthoses Sling  (RUE)   Other Precautions WBS;Cognitive;Fall Risk;Bed Alarm;Chair Alarm;Pain;Hard of hearing  (Fahad on room air;purewick;Rib fx protocol)   Home Living   Type of Home Assisted living  ([facility])   Home Layout One level;Able to live on main level with bedroom/bathroom;Performs ADLs on one level   Home Equipment   (RW)   Prior Function   Level of [place] Needs assistance with IADLS;Needs  "assistance with ADLs;Independent with functional mobility   Lives With Facility staff   Receives Help From Personal care attendant   IADLs Family/Friend/Other provides meals;Family/Friend/Other provides medication management;Family/Friend/Other provides transportation   Falls in the last 6 months 1 to 4  (1, reason for admission;pt also reports other falls but unable to state how many)   Comments Pt amb with a RW PTA   General   Additional Pertinent History Pt is adm with an unwitnessed fall and (+) vomiting. Pt sustained a R clavicle Fx and new age indeterminate lateral left 4th rib Fx.   Family/Caregiver Present No   Cognition   Overall Cognitive Status Impaired   Arousal/Participation Persistent stimuli required   Orientation Level Oriented to person;Disoriented to place;Disoriented to time;Oriented to situation  (Pt states \"September\" for time)   Memory Decreased recall of precautions;Decreased recall of recent events;Decreased short term memory   Following Commands Follows one step commands with increased time or repetition   Comments At least 2 pt identifiers including name and    Subjective   Subjective Pt reports pain RUE with mobility.   RLE Assessment   RLE Assessment WFL  (grossly 2+ to 3-/5)   LLE Assessment   LLE Assessment WFL  (grossly 2+ to 3-/5)   Light Touch   RLE Light Touch Grossly intact   LLE Light Touch Grossly intact   Bed Mobility   Supine to Sit 2  Maximal assistance   Additional items Assist x 1;Verbal cues;Increased time required  (trunk management)   Transfers   Sit to Stand 2  Maximal assistance   Additional items Assist x 1;Verbal cues;Increased time required   Stand to Sit 2  Maximal assistance   Additional items Assist x 1;Verbal cues;Increased time required   Additional Comments Pt trans sit to stand with max A + 1 and stood with max A + 1, no AD while AMIE Salguero performed hygiene of pt 2/2 pt with urine incontinence. Pt unable to take any steps.   Balance   Static Sitting Fair " -  (once positioned all the way at EOB and with feet on floor)   Static Standing Poor -   Endurance Deficit   Endurance Deficit Yes   Endurance Deficit Description limited activity and standing tolerance   Activity Tolerance   Activity Tolerance Patient limited by fatigue;Patient limited by pain;Treatment limited secondary to medical complications (Comment)  (weakness and deconditioning;impaired cognition)   Medical Staff Made Aware AMIE Salguero   Assessment   Problem List Decreased strength;Decreased range of motion;Decreased endurance;Impaired balance;Decreased mobility;Decreased coordination;Decreased cognition;Impaired judgement;Decreased safety awareness;Orthopedic restrictions;Pain;Impaired hearing   Assessment Patient seen for Physical Therapy evaluation. Patient admitted with Closed fracture of one rib of left side.  Comorbidities affecting patient's physical performance include: mild cognitive impairment, HTN, chronic pain R knee, HLD, osteoporosis, TIA, L1 fx, major neurocog disorder, CVA, anemia.  Personal factors affecting patient at time of initial evaluation include: ambulating with assistive device, inability to ambulate household distances, inability to navigate community distances, inability to navigate level surfaces without external assistance, inability to perform dynamic tasks in community, decreased cognition, positive fall history, decreased initiation and engagement, and limited insight into impairments. Prior to admission, patient was independent with functional mobility with RW, requiring assist for ADLS, requiring assist for IADLS, ambulating household distance, and an assisted living resident.  Please find objective findings from Physical Therapy assessment regarding body systems outlined above with impairments and limitations including weakness, decreased ROM, impaired balance, decreased endurance, impaired coordination, gait deviations, pain, decreased activity tolerance, decreased  functional mobility tolerance, decreased safety awareness, fall risk, orthopedic restrictions, and decreased cognition.  The Barthel Index was used as a functional outcome tool presenting with a score of Barthel Index Score: 25 today indicating marked limitations of functional mobility and ADLS.  Patient's clinical presentation is currently unstable/unpredictable as seen in patient's presentation of vital sign response, changing level of pain, varying levels of cognitive performance, increased fall risk, new onset of impairment of functional mobility, decreased endurance, and new onset of weakness. Pt would benefit from continued Physical Therapy treatment to address deficits as defined above and maximize level of functional mobility. As demonstrated by objective findings, the assigned level of complexity for this evaluation is high.The patient's -[facility] Basic Mobility Inpatient Short Form Raw Score is 10. A Raw score of less than or equal to 16 suggests the patient may benefit from discharge to post-acute rehabilitation services. Please also refer to the recommendation of the Physical Therapist for safe discharge planning.    The patient's -[facility] Basic Mobility Inpatient Short Form Raw Score is 10. A Raw score of less than or equal to 16 suggests the patient may benefit from discharge to post-acute rehabilitation services. Please also refer to the recommendation of the Physical Therapist for safe discharge planning.   Goals   Patient Goals Pt unable to state due to impaired cognition   STG Expiration Date 09/16/24   Short Term Goal #1 Patient will: Increase bilateral LE strength 1 grade to facilitate independent mobility, Perform all bed mobility tasks w/ minx1 to improve pt's independence w/ repositioning for decrease risk of skin breakdown, Perform all transfers w/ minx1 consistently from various height surfaces in order to improve I w/ engagement w/ real-world environments/situations, Ambulate at least 10-15 ft.  with least restrictive assistive device w/ minx1 w/o LOB to facilitate return and engagement w/ previous living environment, Increase all balance 1 grade to decrease risk for falls, Tolerate at least 10 consecutive minutes of activity to demonstrate improved activity tolerance and endurance, and Tolerate 3 hr OOB to faciliate upright tolerance   Plan   Treatment/Interventions ADL retraining;Functional transfer training;LE strengthening/ROM;Therapeutic exercise;Endurance training;Cognitive reorientation;Patient/family training;Equipment eval/education;Bed mobility;Gait training;Compensatory technique education;Spoke to case management;OT   PT Frequency 3-5x/wk   Discharge Recommendation   Rehab Resource Intensity Level, PT II (Moderate Resource Intensity)   AM-PAC Basic Mobility Inpatient   Turning in Flat Bed Without Bedrails 2   Lying on Back to Sitting on Edge of Flat Bed Without Bedrails 2   Moving Bed to Chair 2   Standing Up From Chair Using Arms 2   Walk in Room 1   Climb 3-5 Stairs With Railing 1   Basic Mobility Inpatient Raw Score 10   Turning Head Towards Sound 2   Follow Simple Instructions 2   Low Function Basic Mobility Raw Score  14   Low Function Basic Mobility Standardized Score  22.01   [facility] Level Of Mobility   -[facility] Goal 4: Move to chair/commode   -[facility] Achieved 5: Stand (1 or more minutes)   Barthel Index   Feeding 5   Bathing 0   Grooming Score 0   Dressing Score 5   Bladder Score 0   Bowels Score 5   Toilet Use Score 5   Transfers (Bed/Chair) Score 5   Mobility (Level Surface) Score 0   Stairs Score 0   Barthel Index Score 25   Additional Treatment Session   Start Time 1315   End Time 1327   Treatment Assessment Pt agreeable to additional transfers and attempting OOB to chair. Pt trans sit to stand with max A + 1 and SPT with a few steps bed to recliner chair with max A +1. Pt trans stand to sit with max A + 1 and is max A + 2 to reposition all the way back in the chair. A: Pt  with limited tolerance to PT eval and treat 2/2 to impaired cognition, pain, generalized weakness and deconditioning. While adm, pt will cont to benefit from skilled PT services to increase pt's strength, balance, endurance, functional mobility and progressive gait. When medically stable for dc, pt is appropriate for Level II Moderate Resource Intensity.   End of Consult   Patient Position at End of Consult Bed/Chair alarm activated;Bedside chair;All needs within reach   Licensure   [place] License Number  Loni Krueger, [id number]

## 2024-09-06 NOTE — PLAN OF CARE
Problem: OCCUPATIONAL THERAPY ADULT  Goal: Performs self-care activities at highest level of function for planned discharge setting.  See evaluation for individualized goals.  Description: Treatment Interventions: ADL retraining, Functional transfer training, Endurance training, Cognitive reorientation, Patient/family training, Equipment evaluation/education, Compensatory technique education, Continued evaluation, Energy conservation, Activityengagement          See flowsheet documentation for full assessment, interventions and recommendations.   Note: Limitation: Decreased ADL status, Decreased Safe judgement during ADL, Decreased cognition, Decreased endurance, Decreased high-level ADLs, Decreased self-care trans, Non-func R UE (pain, balance, fxnl mobility, act barbara, fxnl reach, standing barbara, strength, fxnl sitting balance, and fxnl sitting barbara, direction following, safety awareness, insight, orientation, and initiation, response time, flat affect)  Prognosis: Good  Assessment: Pt is a 83 y.o. female seen for OT evaluation s/p admit to [facility] on 9/5/2024 w/Closed fracture of one rib of left side. Prior to admission, pt was living at [facility], (A) with ADLs, (A) with IADLs, use of RW for mobility, (+) falls, (-) . Personal and environmental factors affecting patient at time of evaluation include advanced age, current habits and behavioral patterns, lifestyle patterns, limited social support, inaccessible home environment, inaccessible bathroom environment, and difficulty completing ADLs. Personal factors supporting patient at time of evaluation include supportive facility staff at [facility], [facility], and [facility]. Based upon this evaluation, pt is functioning significantly below baseline due to nonfunctional RUE, pain, impaired balance, significantly decreased performance of ADLs/transfers and inability to mobilize. Pt will benefit from continued skilled inpatient OT to maximize safety, level  of independence and overall performance in ADLs, functional transfers, functional mobility to return to functional baseline/highest level of function.     Rehab Resource Intensity Level, OT: II (Moderate Resource Intensity)     RAVI Mccormick, OTR/L  [place] License [id number]  [place] License [id number]

## 2024-09-06 NOTE — QUICK NOTE
Called and updated patient family today.  Informed them of the current condition of their family member.  Will continue to call them daily.  No other acute workup at this time; awaiting placement at this juncture.

## 2024-09-07 PROBLEM — Z91.89 AT RISK FOR ASPIRATION: Status: ACTIVE | Noted: 2024-09-07

## 2024-09-07 LAB
ANION GAP SERPL CALCULATED.3IONS-SCNC: 8 MMOL/L (ref 4–13)
BASOPHILS # BLD AUTO: 0.05 THOUSANDS/ÂΜL (ref 0–0.1)
BASOPHILS NFR BLD AUTO: 0 % (ref 0–1)
BUN SERPL-MCNC: 29 MG/DL (ref 5–25)
CALCIUM SERPL-MCNC: 8 MG/DL (ref 8.4–10.2)
CHLORIDE SERPL-SCNC: 102 MMOL/L (ref 96–108)
CO2 SERPL-SCNC: 23 MMOL/L (ref 21–32)
CREAT SERPL-MCNC: 1.32 MG/DL (ref 0.6–1.3)
EOSINOPHIL # BLD AUTO: 0.07 THOUSAND/ÂΜL (ref 0–0.61)
EOSINOPHIL NFR BLD AUTO: 1 % (ref 0–6)
ERYTHROCYTE [DISTWIDTH] IN BLOOD BY AUTOMATED COUNT: 14.8 % (ref 11.6–15.1)
GFR SERPL CREATININE-BSD FRML MDRD: 37 ML/MIN/1.73SQ M
GLUCOSE SERPL-MCNC: 91 MG/DL (ref 65–140)
HCT VFR BLD AUTO: 34.5 % (ref 34.8–46.1)
HGB BLD-MCNC: 10.5 G/DL (ref 11.5–15.4)
IMM GRANULOCYTES # BLD AUTO: 0.15 THOUSAND/UL (ref 0–0.2)
IMM GRANULOCYTES NFR BLD AUTO: 1 % (ref 0–2)
LYMPHOCYTES # BLD AUTO: 2.05 THOUSANDS/ÂΜL (ref 0.6–4.47)
LYMPHOCYTES NFR BLD AUTO: 17 % (ref 14–44)
MCH RBC QN AUTO: 30.7 PG (ref 26.8–34.3)
MCHC RBC AUTO-ENTMCNC: 30.4 G/DL (ref 31.4–37.4)
MCV RBC AUTO: 101 FL (ref 82–98)
MONOCYTES # BLD AUTO: 0.84 THOUSAND/ÂΜL (ref 0.17–1.22)
MONOCYTES NFR BLD AUTO: 7 % (ref 4–12)
NEUTROPHILS # BLD AUTO: 8.79 THOUSANDS/ÂΜL (ref 1.85–7.62)
NEUTS SEG NFR BLD AUTO: 74 % (ref 43–75)
NRBC BLD AUTO-RTO: 0 /100 WBCS
PLATELET # BLD AUTO: 287 THOUSANDS/UL (ref 149–390)
PMV BLD AUTO: 10.6 FL (ref 8.9–12.7)
POTASSIUM SERPL-SCNC: 4.8 MMOL/L (ref 3.5–5.3)
RBC # BLD AUTO: 3.42 MILLION/UL (ref 3.81–5.12)
SODIUM SERPL-SCNC: 133 MMOL/L (ref 135–147)
WBC # BLD AUTO: 11.95 THOUSAND/UL (ref 4.31–10.16)

## 2024-09-07 PROCEDURE — 80048 BASIC METABOLIC PNL TOTAL CA: CPT | Performed by: PHYSICIAN ASSISTANT

## 2024-09-07 PROCEDURE — 99232 SBSQ HOSP IP/OBS MODERATE 35: CPT | Performed by: SURGERY

## 2024-09-07 PROCEDURE — 97530 THERAPEUTIC ACTIVITIES: CPT

## 2024-09-07 PROCEDURE — 85025 COMPLETE CBC W/AUTO DIFF WBC: CPT | Performed by: PHYSICIAN ASSISTANT

## 2024-09-07 RX ORDER — ENOXAPARIN SODIUM 100 MG/ML
30 INJECTION SUBCUTANEOUS
Status: DISCONTINUED | OUTPATIENT
Start: 2024-09-07 | End: 2024-09-09 | Stop reason: HOSPADM

## 2024-09-07 RX ORDER — POLYETHYLENE GLYCOL 3350 17 G/17G
17 POWDER, FOR SOLUTION ORAL DAILY
Status: DISCONTINUED | OUTPATIENT
Start: 2024-09-07 | End: 2024-09-09 | Stop reason: HOSPADM

## 2024-09-07 RX ORDER — SODIUM CHLORIDE, SODIUM GLUCONATE, SODIUM ACETATE, POTASSIUM CHLORIDE, MAGNESIUM CHLORIDE, SODIUM PHOSPHATE, DIBASIC, AND POTASSIUM PHOSPHATE .53; .5; .37; .037; .03; .012; .00082 G/100ML; G/100ML; G/100ML; G/100ML; G/100ML; G/100ML; G/100ML
75 INJECTION, SOLUTION INTRAVENOUS CONTINUOUS
Status: DISCONTINUED | OUTPATIENT
Start: 2024-09-07 | End: 2024-09-08

## 2024-09-07 RX ADMIN — DICLOFENAC SODIUM TOPICAL GEL, 1% 2 G: 10 GEL TOPICAL at 13:18

## 2024-09-07 RX ADMIN — AMLODIPINE BESYLATE 2.5 MG: 2.5 TABLET ORAL at 20:41

## 2024-09-07 RX ADMIN — LIDOCAINE 5% 1 PATCH: 700 PATCH TOPICAL at 08:49

## 2024-09-07 RX ADMIN — DICLOFENAC SODIUM TOPICAL GEL, 1% 2 G: 10 GEL TOPICAL at 21:56

## 2024-09-07 RX ADMIN — ENOXAPARIN SODIUM 30 MG: 30 INJECTION SUBCUTANEOUS at 08:57

## 2024-09-07 RX ADMIN — DICLOFENAC SODIUM TOPICAL GEL, 1% 2 G: 10 GEL TOPICAL at 08:47

## 2024-09-07 RX ADMIN — ACETAMINOPHEN 1000 MG: 10 INJECTION INTRAVENOUS at 20:41

## 2024-09-07 RX ADMIN — SENNOSIDES AND DOCUSATE SODIUM 1 TABLET: 8.6; 5 TABLET ORAL at 17:36

## 2024-09-07 RX ADMIN — DICLOFENAC SODIUM TOPICAL GEL, 1% 2 G: 10 GEL TOPICAL at 17:34

## 2024-09-07 RX ADMIN — CALCITONIN SALMON 1 SPRAY: 200 SPRAY, METERED NASAL at 08:48

## 2024-09-07 RX ADMIN — ACETAMINOPHEN 1000 MG: 10 INJECTION INTRAVENOUS at 13:18

## 2024-09-07 RX ADMIN — FLUTICASONE PROPIONATE 1 SPRAY: 50 SPRAY, METERED NASAL at 08:48

## 2024-09-07 RX ADMIN — GABAPENTIN 100 MG: 100 CAPSULE ORAL at 08:50

## 2024-09-07 RX ADMIN — SENNOSIDES AND DOCUSATE SODIUM 1 TABLET: 8.6; 5 TABLET ORAL at 08:50

## 2024-09-07 RX ADMIN — PHENOL 1 SPRAY: 1.5 LIQUID ORAL at 04:59

## 2024-09-07 RX ADMIN — ACETAMINOPHEN 1000 MG: 10 INJECTION INTRAVENOUS at 04:52

## 2024-09-07 RX ADMIN — SODIUM CHLORIDE, SODIUM GLUCONATE, SODIUM ACETATE, POTASSIUM CHLORIDE, MAGNESIUM CHLORIDE, SODIUM PHOSPHATE, DIBASIC, AND POTASSIUM PHOSPHATE 75 ML/HR: .53; .5; .37; .037; .03; .012; .00082 INJECTION, SOLUTION INTRAVENOUS at 08:46

## 2024-09-07 RX ADMIN — LIDOCAINE 5% 1 PATCH: 700 PATCH TOPICAL at 08:57

## 2024-09-07 RX ADMIN — CLOTRIMAZOLE AND BETAMETHASONE DIPROPIONATE: 10; .64 CREAM TOPICAL at 17:35

## 2024-09-07 RX ADMIN — CLOTRIMAZOLE AND BETAMETHASONE DIPROPIONATE: 10; .64 CREAM TOPICAL at 08:48

## 2024-09-07 RX ADMIN — Medication 1000 UNITS: at 08:50

## 2024-09-07 RX ADMIN — SODIUM CHLORIDE, SODIUM GLUCONATE, SODIUM ACETATE, POTASSIUM CHLORIDE, MAGNESIUM CHLORIDE, SODIUM PHOSPHATE, DIBASIC, AND POTASSIUM PHOSPHATE 75 ML/HR: .53; .5; .37; .037; .03; .012; .00082 INJECTION, SOLUTION INTRAVENOUS at 23:37

## 2024-09-07 RX ADMIN — MIRTAZAPINE 7.5 MG: 15 TABLET, FILM COATED ORAL at 20:41

## 2024-09-07 NOTE — PLAN OF CARE
Problem: Potential for Falls  Goal: Patient will remain free of falls  Description: INTERVENTIONS:  - Educate patient/family on patient safety including physical limitations  - Instruct patient to call for assistance with activity   - Consult OT/PT to assist with strengthening/mobility   - Keep Call bell within reach  - Keep bed low and locked with side rails adjusted as appropriate  - Keep care items and personal belongings within reach  - Initiate and maintain comfort rounds  - Make Fall Risk Sign visible to staff  - Offer Toileting every  Hours, in advance of need  - Initiate/Maintain alarm  - Obtain necessary fall risk management equipment:   - Apply yellow socks and bracelet for high fall risk patients  - Consider moving patient to room near nurses station  Outcome: Progressing     Problem: PAIN - ADULT  Goal: Verbalizes/displays adequate comfort level or baseline comfort level  Description: Interventions:  - Encourage patient to monitor pain and request assistance  - Assess pain using appropriate pain scale  - Administer analgesics based on type and severity of pain and evaluate response  - Implement non-pharmacological measures as appropriate and evaluate response  - Consider cultural and social influences on pain and pain management  - Notify physician/advanced practitioner if interventions unsuccessful or patient reports new pain  Outcome: Progressing     Problem: INFECTION - ADULT  Goal: Absence or prevention of progression during hospitalization  Description: INTERVENTIONS:  - Assess and monitor for signs and symptoms of infection  - Monitor lab/diagnostic results  - Monitor all insertion sites, i.e. indwelling lines, tubes, and drains  - Monitor endotracheal if appropriate and nasal secretions for changes in amount and color  - [place] appropriate cooling/warming therapies per order  - Administer medications as ordered  - Instruct and encourage patient and family to use good hand hygiene technique  -  Identify and instruct in appropriate isolation precautions for identified infection/condition  Outcome: Progressing  Goal: Absence of fever/infection during neutropenic period  Description: INTERVENTIONS:  - Monitor WBC    Outcome: Progressing     Problem: SAFETY ADULT  Goal: Patient will remain free of falls  Description: INTERVENTIONS:  - Educate patient/family on patient safety including physical limitations  - Instruct patient to call for assistance with activity   - Consult OT/PT to assist with strengthening/mobility   - Keep Call bell within reach  - Keep bed low and locked with side rails adjusted as appropriate  - Keep care items and personal belongings within reach  - Initiate and maintain comfort rounds  - Make Fall Risk Sign visible to staff  - Offer Toileting every  Hours, in advance of need  - Initiate/Maintain alarm  - Obtain necessary fall risk management equipment:   - Apply yellow socks and bracelet for high fall risk patients  - Consider moving patient to room near nurses station  Outcome: Progressing  Goal: Maintain or return to baseline ADL function  Description: INTERVENTIONS:  -  Assess patient's ability to carry out ADLs; assess patient's baseline for ADL function and identify physical deficits which impact ability to perform ADLs (bathing, care of mouth/teeth, toileting, grooming, dressing, etc.)  - Assess/evaluate cause of self-care deficits   - Assess range of motion  - Assess patient's mobility; develop plan if impaired  - Assess patient's need for assistive devices and provide as appropriate  - Encourage maximum independence but intervene and supervise when necessary  - Involve family in performance of ADLs  - Assess for home care needs following discharge   - Consider OT consult to assist with ADL evaluation and planning for discharge  - Provide patient education as appropriate  Outcome: Progressing  Goal: Maintains/Returns to pre admission functional level  Description:  INTERVENTIONS:  - Perform AM-PAC 6 Click Basic Mobility/ Daily Activity assessment daily.  - Set and communicate daily mobility goal to care team and patient/family/caregiver.   - Collaborate with rehabilitation services on mobility goals if consulted  - Perform Range of Motion  times a day.  - Reposition patient every  hours.  - Dangle patient  times a day  - Stand patient  times a day  - Ambulate patient  times a day  - Out of bed to chair  times a day   - Out of bed for meals  times a day  - Out of bed for toileting  - Record patient progress and toleration of activity level   Outcome: Progressing     Problem: DISCHARGE PLANNING  Goal: Discharge to home or other facility with appropriate resources  Description: INTERVENTIONS:  - Identify barriers to discharge w/patient and caregiver  - Arrange for needed discharge resources and transportation as appropriate  - Identify discharge learning needs (meds, wound care, etc.)  - Arrange for interpretive services to assist at discharge as needed  - Refer to Case Management Department for coordinating discharge planning if the patient needs post-hospital services based on physician/advanced practitioner order or complex needs related to functional status, cognitive ability, or social support system  Outcome: Progressing     Problem: Knowledge Deficit  Goal: Patient/family/caregiver demonstrates understanding of disease process, treatment plan, medications, and discharge instructions  Description: Complete learning assessment and assess knowledge base.  Interventions:  - Provide teaching at level of understanding  - Provide teaching via preferred learning methods  Outcome: Progressing     Problem: Prexisting or High Potential for Compromised Skin Integrity  Goal: Skin integrity is maintained or improved  Description: INTERVENTIONS:  - Identify patients at risk for skin breakdown  - Assess and monitor skin integrity  - Assess and monitor nutrition and hydration status  -  Monitor labs   - Assess for incontinence   - Turn and reposition patient  - Assist with mobility/ambulation  - Relieve pressure over bony prominences  - Avoid friction and shearing  - Provide appropriate hygiene as needed including keeping skin clean and dry  - Evaluate need for skin moisturizer/barrier cream  - Collaborate with interdisciplinary team   - Patient/family teaching  - Consider wound care consult   Outcome: Progressing     Problem: Nutrition/Hydration-ADULT  Goal: Nutrient/Hydration intake appropriate for improving, restoring or maintaining nutritional needs  Description: Monitor and assess patient's nutrition/hydration status for malnutrition. Collaborate with interdisciplinary team and initiate plan and interventions as ordered.  Monitor patient's weight and dietary intake as ordered or per policy. Utilize nutrition screening tool and intervene as necessary. Determine patient's food preferences and provide high-protein, high-caloric foods as appropriate.     INTERVENTIONS:  - Monitor oral intake, urinary output, labs, and treatment plans  - Assess nutrition and hydration status and recommend course of action  - Evaluate amount of meals eaten  - Assist patient with eating if necessary   - Allow adequate time for meals  - Recommend/ encourage appropriate diets, oral nutritional supplements, and vitamin/mineral supplements  - Order, calculate, and assess calorie counts as needed  - Recommend, monitor, and adjust tube feedings and TPN/PPN based on assessed needs  - Assess need for intravenous fluids  - Provide specific nutrition/hydration education as appropriate  - Include patient/family/caregiver in decisions related to nutrition  Outcome: Progressing

## 2024-09-07 NOTE — PLAN OF CARE
Problem: PHYSICAL THERAPY ADULT  Goal: Performs mobility at highest level of function for planned discharge setting.  See evaluation for individualized goals.  Description: Treatment/Interventions: ADL retraining, Functional transfer training, LE strengthening/ROM, Therapeutic exercise, Endurance training, Cognitive reorientation, Patient/family training, Equipment eval/education, Bed mobility, Gait training, Compensatory technique education, Spoke to case management, OT          See flowsheet documentation for full assessment, interventions and recommendations.  Outcome: Not Progressing  Note:    Problem List: Decreased strength, Decreased range of motion, Impaired balance, Decreased endurance, Decreased mobility, Decreased coordination, Decreased cognition, Impaired judgement, Decreased safety awareness, Orthopedic restrictions, Pain, Impaired hearing  Assessment: pt began tx session lying supine in the bed as pt was agreeable to get OOB and participate in PT intervention. Pt to focus on be dmobility, transfer training, increasing pt activity and standing tolerance. In comparison to previous tx session pt continues to require max ax1 for all bed mobility and functional transfers with HHA in order to maintain NWB precautions of L UE. pt required LE and trunk managmenet in order to complete a supine<>sit EOB transfer. pt was limited with standing tolerance as pt stood <20 seconds per stand with mod to max ax1 for safety and balance. pt demonstrated difficulty with SPT from EOB to recliner as pt  had difficulty following directions for lateral stepping and descent into recliner. pt alkso required Ax2 to reposition towards back of recliner as pt was unable to readjust in the recliner. Post tx pt continues to demonstrate the following deficts: limited bed mobility, functional mobility, standing tolerance/balance, functional transfers. pt would benefit from continued skilled PT intervebntion in order to address  deficits listed above. Continue to recommend Dc w/ level 2 moderate reahb resource intensity when medically cleared        Rehab Resource Intensity Level, PT: II (Moderate Resource Intensity)    See flowsheet documentation for full assessment.

## 2024-09-07 NOTE — QUICK NOTE
Patient noted to have 2 episodes of vomiting, unable to remember when her last bowel movement was.  Per nursing, there was concern for aspiration after the first episode of vomiting and a chest x-ray was obtained which demonstrated an elevated gastric bubble.  KUB was then obtained which demonstrated dilation of the stomach.  Due to stomach dilation and concern for risk of aspiration, 12 Fr NG tube was placed and placement was confirmed with x-ray.  NG tube noted to have minimal output.  Tube was flushed with sterile water and repositioned without return of gastric content.  Tube was then replaced with a 16 Fr NG tube, placement confirmed by x-ray.  Minimal output still noted but decompression of the stomach was noted on x-ray.  Patient tolerated procedures well without immediate complication.    Spoke with patient's family member, Patricia, prior to placement of NG tube.  I updated her on patient's current medical status and plan at this time for NG tube placement and speech evaluation tomorrow.  All questions were answered and Patricia is in agreement with plan going forward.

## 2024-09-07 NOTE — SPEECH THERAPY NOTE
Speech Language Pathology    No Treatment Note    Order received for Swallow Evaluation, chart reviewed and attempted this day.  Patient currently has NGT to suction.  Discussed with RN, assessment is not appropriate at this time.  Will continue to follow for patient's readiness to trial POs in order to conduct exam.    Hood Barry MA CCC-SLP  Speech Language Pathologist

## 2024-09-07 NOTE — ASSESSMENT & PLAN NOTE
- Patient with 2 episodes of vomiting noted on 9/6  - CXR and KUB demonstrate dilation of the stomach  - Given patient's age and comorbidities, high risk of aspiration  - NGT tube placed for decompression of the stomach  - Continue to monitor output   - Speech evaluation pending

## 2024-09-07 NOTE — ED ATTENDING ATTESTATION
9/5/2024  I, Britney Boo MD, saw and evaluated the patient. I have discussed the patient with the resident/non-physician practitioner and agree with the resident's/non-physician practitioner's findings, Plan of Care, and MDM as documented in the resident's/non-physician practitioner's note, except where noted. All available labs and Radiology studies were reviewed.  I was present for key portions of any procedure(s) performed by the resident/non-physician practitioner and I was immediately available to provide assistance.       At this point I agree with the current assessment done in the Emergency Department.  I have conducted an independent evaluation of this patient a history and physical is as follows:    Patient is an 83-year-old female who presents to the emergency department from her residential facility of [facility].  EMS relayed that staff appreciated sound of her hitting the ground and noted her laying on her right side.  She was uncomfortable with movement following this, identifying the right shoulder as particularly uncomfortable.  She reported widespread discomfort to EMS.    On arrival she appeared mild to moderately uncomfortable upon changing positioning-transfer from EMS stretcher to ED 1.  Upon maintained positioning she appeared comfortable.  Respirations unlabored.  Heart sounds regular.  Lungs clear.  Diffuse right shoulder and upper arm tenderness appreciated.  Mild right elbow tenderness without discoloration or appreciated swelling.  Able to range elbow and shoulder-albeit with discomfort.  Right hand movements are within normal limits.  Left arm nontender and with good ranging..  Bilateral hip tenderness present.  +2 PT pulses.  Able to flex each hip with good strength.    Patient with baseline gait instability/use of walker.  Suspect this difficulty resulted in today's fall although labs pursued in consideration of medical etiology such as anemia or sepsis.   Obtaining imaging in consideration of possible resultant injuries.  ED Course     Patient identified to have isolated left rib fracture and right distal clavicle fracture.  No evidence of associated lung injury.  Vital signs stable.  She continue with diffuse soreness including over hips.  Pharmacotherapy limited, wishing to minimize sedation/altered mentation.  With combination of lidocaine patch and acetaminophen ambulation was attempted on a couple of occasions.  Patient not able to ambulate with walker.  Trauma team consulted and patient admitted for further evaluation including by therapy services.    Critical Care Time  Procedures

## 2024-09-07 NOTE — PLAN OF CARE
Problem: Potential for Falls  Goal: Patient will remain free of falls  Description: INTERVENTIONS:  - Educate patient/family on patient safety including physical limitations  - Instruct patient to call for assistance with activity   - Consult OT/PT to assist with strengthening/mobility   - Keep Call bell within reach  - Keep bed low and locked with side rails adjusted as appropriate  - Keep care items and personal belongings within reach  - Initiate and maintain comfort rounds  - Make Fall Risk Sign visible to staff  - Offer Toileting every  Hours, in advance of need  - Initiate/Maintain alarm  - Obtain necessary fall risk management equipment:   - Apply yellow socks and bracelet for high fall risk patients  - Consider moving patient to room near nurses station  Outcome: Progressing     Problem: PAIN - ADULT  Goal: Verbalizes/displays adequate comfort level or baseline comfort level  Description: Interventions:  - Encourage patient to monitor pain and request assistance  - Assess pain using appropriate pain scale  - Administer analgesics based on type and severity of pain and evaluate response  - Implement non-pharmacological measures as appropriate and evaluate response  - Consider cultural and social influences on pain and pain management  - Notify physician/advanced practitioner if interventions unsuccessful or patient reports new pain  Outcome: Progressing     Problem: INFECTION - ADULT  Goal: Absence or prevention of progression during hospitalization  Description: INTERVENTIONS:  - Assess and monitor for signs and symptoms of infection  - Monitor lab/diagnostic results  - Monitor all insertion sites, i.e. indwelling lines, tubes, and drains  - Monitor endotracheal if appropriate and nasal secretions for changes in amount and color  - [place] appropriate cooling/warming therapies per order  - Administer medications as ordered  - Instruct and encourage patient and family to use good hand hygiene technique  -  Identify and instruct in appropriate isolation precautions for identified infection/condition  Outcome: Progressing  Goal: Absence of fever/infection during neutropenic period  Description: INTERVENTIONS:  - Monitor WBC    Outcome: Progressing     Problem: SAFETY ADULT  Goal: Patient will remain free of falls  Description: INTERVENTIONS:  - Educate patient/family on patient safety including physical limitations  - Instruct patient to call for assistance with activity   - Consult OT/PT to assist with strengthening/mobility   - Keep Call bell within reach  - Keep bed low and locked with side rails adjusted as appropriate  - Keep care items and personal belongings within reach  - Initiate and maintain comfort rounds  - Make Fall Risk Sign visible to staff  - Offer Toileting every  Hours, in advance of need  - Initiate/Maintain alarm  - Obtain necessary fall risk management equipment:   - Apply yellow socks and bracelet for high fall risk patients  - Consider moving patient to room near nurses station  Outcome: Progressing  Goal: Maintain or return to baseline ADL function  Description: INTERVENTIONS:  -  Assess patient's ability to carry out ADLs; assess patient's baseline for ADL function and identify physical deficits which impact ability to perform ADLs (bathing, care of mouth/teeth, toileting, grooming, dressing, etc.)  - Assess/evaluate cause of self-care deficits   - Assess range of motion  - Assess patient's mobility; develop plan if impaired  - Assess patient's need for assistive devices and provide as appropriate  - Encourage maximum independence but intervene and supervise when necessary  - Involve family in performance of ADLs  - Assess for home care needs following discharge   - Consider OT consult to assist with ADL evaluation and planning for discharge  - Provide patient education as appropriate  Outcome: Progressing  Goal: Maintains/Returns to pre admission functional level  Description:  INTERVENTIONS:  - Perform AM-PAC 6 Click Basic Mobility/ Daily Activity assessment daily.  - Set and communicate daily mobility goal to care team and patient/family/caregiver.   - Collaborate with rehabilitation services on mobility goals if consulted  - Perform Range of Motion  times a day.  - Reposition patient every  hours.  - Dangle patient  times a day  - Stand patient  times a day  - Ambulate patient  times a day  - Out of bed to chair  times a day   - Out of bed for meals  times a day  - Out of bed for toileting  - Record patient progress and toleration of activity level   Outcome: Progressing     Problem: DISCHARGE PLANNING  Goal: Discharge to home or other facility with appropriate resources  Description: INTERVENTIONS:  - Identify barriers to discharge w/patient and caregiver  - Arrange for needed discharge resources and transportation as appropriate  - Identify discharge learning needs (meds, wound care, etc.)  - Arrange for interpretive services to assist at discharge as needed  - Refer to Case Management Department for coordinating discharge planning if the patient needs post-hospital services based on physician/advanced practitioner order or complex needs related to functional status, cognitive ability, or social support system  Outcome: Progressing     Problem: Knowledge Deficit  Goal: Patient/family/caregiver demonstrates understanding of disease process, treatment plan, medications, and discharge instructions  Description: Complete learning assessment and assess knowledge base.  Interventions:  - Provide teaching at level of understanding  - Provide teaching via preferred learning methods  Outcome: Progressing     Problem: Prexisting or High Potential for Compromised Skin Integrity  Goal: Skin integrity is maintained or improved  Description: INTERVENTIONS:  - Identify patients at risk for skin breakdown  - Assess and monitor skin integrity  - Assess and monitor nutrition and hydration status  -  Monitor labs   - Assess for incontinence   - Turn and reposition patient  - Assist with mobility/ambulation  - Relieve pressure over bony prominences  - Avoid friction and shearing  - Provide appropriate hygiene as needed including keeping skin clean and dry  - Evaluate need for skin moisturizer/barrier cream  - Collaborate with interdisciplinary team   - Patient/family teaching  - Consider wound care consult   Outcome: Progressing

## 2024-09-07 NOTE — PROGRESS NOTES
[facility]  Progress Note  Name: Doris Campbell I  MRN: [id number]  Unit/Bed#: W -01 I Date of Admission: 9/5/2024   Date of Service: 9/7/2024 I Hospital Day: 2    Assessment & Plan   At risk for aspiration  Assessment & Plan  - Patient with 2 episodes of vomiting noted on 9/6  - CXR and KUB demonstrate dilation of the stomach  - Given patient's age and comorbidities, high risk of aspiration  - NGT tube placed for decompression of the stomach  - Continue to monitor output   - Speech evaluation pending    Closed fracture of acromial end of right clavicle  Assessment & Plan  - S/p fall at home on 9/5  - Orthopedics consult, appreciate recommendations  - Non weight bearing to the right upper extremity in sling for comfort.   - May range the elbow and wrist.   - Multimodal pain regimen in place   - PT/OT consulted  - Outpatient follow up with orthopedic surgery for reevaluation    Acute pain due to trauma  Assessment & Plan  - Acute pain secondary to traumatic injuries.  - Continue multimodal analgesic regimen.  - Bowel regimen as long as using opioids.  - Continue to monitor pain and adjust regimen as indicated.      Ambulatory dysfunction  Assessment & Plan  - Patient typically ambulates with walker  - Has multiple recent falls and in emergency department was not able to ambulate even with walker due to discomfort  - Geriatrics consulted for medication review and further recommendations  - PT/OT evaluation and treatment as indicated  - CM following for disposition planning      Hypertension  Assessment & Plan  - Continue home amlodipine    Fall  Assessment & Plan  - Status post fall with the below noted injuries.  - Fall precautions.  - Geriatric Medicine consultation for evaluation, medication review and recommendations.  - PT and OT evaluation and treatment as indicated.  - Case Management consultation for disposition planning.      * Closed fracture of one rib of left  "side  Assessment & Plan  - Single rib fracture, left 4th rib, present on admission.  - Continue rib fracture protocol.  - Continue to encourage incentive spirometer use and adequate pulmonary hygiene.  - Continue multimodal analgesic regimen.  - Supplemental oxygen via nasal cannula as needed to maintain saturations greater than or equal to 94%.   - PT and OT evaluation and treatment as indicated.             Bowel Regimen: Senokot S  VTE Prophylaxis:Enoxaparin (Lovenox)     Disposition: Continue current level of care.     Subjective   Chief Complaint: \"I'm sleeping\"    Subjective: Patient did well overnight s/p NGT placement. Denies any complaints at this time including abdominal pain, nausea, and ongoing episodes of vomiting. Per nursing, no acute events overnight.      Objective   Vitals:   Temp:  [98.4 °F (36.9 °C)-99.9 °F (37.7 °C)] 98.4 °F (36.9 °C)  HR:  [80-98] 86  Resp:  [17-18] 18  BP: (119-172)/() 149/80    I/O         09/05 0701  09/06 0700 09/06 0701  09/07 0700    P.O.  600    Total Intake  600    Net  +600                   Physical Exam:   GENERAL APPEARANCE: Patient in no acute distress.  HEENT: NGT in place with minimal output; NCAT; PERRL, EOMs intact; Mucous membranes moist  NECK / BACK: ROM normal  CV: Regular rate and rhythm; no murmur/gallops/rubs appreciated.  CHEST / LUNGS: Clear to auscultation; no wheezes/rales/rhonci.  ABD: NABS; soft; non-distended; non-tender.  : voiding  EXT: RLE in sling, NVI; +2 pulses bilaterally upper & lower extremities; no edema.  NEURO: GCS 15; no focal neurologic deficits; neurovascularly intact.  SKIN: Warm, dry and well perfused; no rash; no jaundice.      Invasive Devices       Peripheral Intravenous Line  Duration             Peripheral IV 09/05/24 Left;Ventral (anterior) Forearm 1 day              Drain  Duration             NG/OG/Enteral Tube Nasogastric 16 Fr Right nare <1 day                     PIC Score  PIC Pain Score: 3 (9/6/2024  8:30 " PM)  PIC Incentive Spirometry Score: 2 (9/6/2024  4:00 AM)  PIC Cough Description: 2 (9/6/2024  4:00 AM)  PIC Total Score: 7 (9/6/2024  4:00 AM)       If the Total PIC Score </=5, did you consult APS and evaluate patient for further intervention?: no      Pain:    Incentive Spirometry  Cough  3 = Controlled  4 = Above goal volume 3 = Strong  2 = Moderate  3 = Goal to alert volume 2 = Weak  1 = Severe  2 = Below alert volume 1 = Absent     1 = Unable to perform IS         Lab Results: Results: I have personally reviewed all pertinent laboratory/tests results, BMP/CMP:   Lab Results   Component Value Date    SODIUM 135 09/06/2024    K 4.6 09/06/2024     09/06/2024    CO2 22 09/06/2024    BUN 14 09/06/2024    CREATININE 0.85 09/06/2024    CALCIUM 8.0 (L) 09/06/2024    EGFR 63 09/06/2024   , and CBC:   Lab Results   Component Value Date    WBC 8.29 09/06/2024    HGB 10.5 (L) 09/06/2024    HCT 33.5 (L) 09/06/2024    MCV 98 09/06/2024     09/06/2024    RBC 3.42 (L) 09/06/2024    MCH 30.7 09/06/2024    MCHC 31.3 (L) 09/06/2024    RDW 14.7 09/06/2024    MPV 10.7 09/06/2024    NRBC 0 09/06/2024     Imaging: I have personally reviewed pertinent reports.     Other Studies:   9/6 CXR: Bibasilar airspace opacities in conjunction with hypoventilation may simply represent atelectasis although pneumonia is still possible. No infiltrates are seen elsewhere.

## 2024-09-07 NOTE — ASSESSMENT & PLAN NOTE
- Patient typically ambulates with walker  - Has multiple recent falls and in emergency department was not able to ambulate even with walker due to discomfort  - Geriatrics consulted for medication review and further recommendations  - PT/OT evaluation and treatment as indicated  - CM following for disposition planning

## 2024-09-07 NOTE — ASSESSMENT & PLAN NOTE
- S/p fall at home on 9/5  - Orthopedics consult, appreciate recommendations  - Non weight bearing to the right upper extremity in sling for comfort.   - May range the elbow and wrist.   - Multimodal pain regimen in place   - PT/OT consulted  - Outpatient follow up with orthopedic surgery for reevaluation

## 2024-09-07 NOTE — PHYSICAL THERAPY NOTE
PHYSICAL THERAPY NOTE          Patient Name: Doris Campbell  Today's Date: 9/7/2024 09/07/24 1533   PT Last Visit   PT Visit Date 09/07/24   Note Type   Note Type Treatment   Pain Assessment   Pain Assessment Tool 0-10   Pain Score No Pain   Patient's Stated Pain Goal No pain   Hospital Pain Intervention(s) Repositioned;Ambulation/increased activity;Elevated;Rest   Multiple Pain Sites No   Pain Rating: FLACC (Rest) - Face 0   Pain Rating: FLACC (Rest) - Legs 0   Pain Rating: FLACC (Rest) - Activity 0   Pain Rating: FLACC (Rest) - Cry 0   Pain Rating: FLACC (Rest) - Consolability 0   Score: FLACC (Rest) 0   Pain Rating: FLACC (Activity) - Face 0   Pain Rating: FLACC (Activity) - Legs 0   Pain Rating: FLACC (Activity) - Activity 0   Pain Rating: FLACC (Activity) - Cry 0   Pain Rating: FLACC (Activity) - Consolability 0   Score: FLACC (Activity) 0   Restrictions/Precautions   Weight Bearing Precautions Per Order Yes   RUE Weight Bearing Per Order (S)  NWB  (in sling per ortho)   RLE Weight Bearing Per Order (S)  WBAT   Braces or Orthoses Sling  (RUE)   Other Precautions Cognitive;Chair Alarm;Bed Alarm;WBS;Fall Risk;Pain;Hard of hearing  ([place])   General   Chart Reviewed Yes   Response to Previous Treatment Patient with no complaints from previous session.   Family/Caregiver Present No   Cognition   Overall Cognitive Status Impaired   Arousal/Participation Alert;Cooperative;Responsive  ([place])   Attention Attends with cues to redirect   Orientation Level Oriented to person;Oriented to situation;Disoriented to place;Disoriented to time   Memory Decreased short term memory;Decreased recall of recent events;Decreased recall of precautions   Following Commands Follows one step commands with increased time or repetition   Comments pt continues to be pleasantly confused, [place] amd continues to demonstrate flat affect   Subjective    Subjective pt was agreeable to participate in PT inrevention   Bed Mobility   Supine to Sit 2  Maximal assistance   Additional items Assist x 1;HOB elevated;Bedrails;Increased time required;Verbal cues;LE management;Other  (trunk management)   Sit to Supine Unable to assess   Additional Comments pt saeted OOB in the recliner post tx session with call bell, chair alarm actiavted, legs elevated and all pt needs met   Transfers   Sit to Stand 2  Maximal assistance   Additional items Assist x 1;Increased time required;Verbal cues;Other  (LUE support HHA)   Stand to Sit 2  Maximal assistance   Additional items Assist x 1;Increased time required;Verbal cues  (LUE support HHA)   Stand pivot 2  Maximal assistance   Additional items Assist x 1;Increased time required;Verbal cues;Other  (pt completed SPT with max ax1 but max ax2 would be recommended due to line management, HHA required, tactile cues require and over all increase in patient safety with transfers)   Additional Comments pt completed 3 STS in todays tx sesison 1 from EOB and 2 from recliner all with max ax1 HHA   Ambulation/Elevation   Gait pattern Not appropriate   Balance   Static Sitting Fair -   Dynamic Sitting Poor +   Static Standing Poor   Ambulatory Zero   Endurance Deficit   Endurance Deficit Yes   Endurance Deficit Description limited bed mobility, functional mobility, standing tolerance, activity tolerance and ambulation   Activity Tolerance   Activity Tolerance Patient limited by fatigue;Other (Comment)  (generalized weakness and WB status)   Nurse Made Aware Spoke to RN   Exercises   Knee AROM Long Arc Quad Sitting;5 reps;AROM;Bilateral   Ankle Pumps Sitting;10 reps;AROM;Bilateral  (long sit positiobn)   Assessment   Problem List Decreased strength;Decreased range of motion;Impaired balance;Decreased endurance;Decreased mobility;Decreased coordination;Decreased cognition;Impaired judgement;Decreased safety awareness;Orthopedic  restrictions;Pain;Impaired hearing   Assessment pt began tx session lying supine in the bed as pt was agreeable to get OOB and participate in PT intervention. Pt to focus on be dmobility, transfer training, increasing pt activity and standing tolerance. In comparison to previous tx session pt continues to require max ax1 for all bed mobility and functional transfers with HHA in order to maintain NWB precautions of L UE. pt required LE and trunk managmenet in order to complete a supine<>sit EOB transfer. pt was limited with standing tolerance as pt stood <20 seconds per stand with mod to max ax1 for safety and balance. pt demonstrated difficulty with SPT from EOB to recliner as pt  had difficulty following directions for lateral stepping and descent into recliner. pt alkso required Ax2 to reposition towards back of recliner as pt was unable to readjust in the recliner. Post tx pt continues to demonstrate the following deficts: limited bed mobility, functional mobility, standing tolerance/balance, functional transfers. pt would benefit from continued skilled PT intervebntion in order to address deficits listed above. Continue to recommend Dc w/ level 2 moderate reahb resource intensity when medically cleared   Goals   Patient Goals none stated   STG Expiration Date 09/16/24   PT Treatment Day 1   Plan   Treatment/Interventions ADL retraining;Functional transfer training;LE strengthening/ROM;Therapeutic exercise;Endurance training;Cognitive reorientation;Patient/family training;Equipment eval/education;Bed mobility;Gait training;Spoke to nursing;Spoke to advanced practitioner;Compensatory technique education   Progress No functional improvements   PT Frequency 3-5x/wk   Discharge Recommendation   Rehab Resource Intensity Level, PT II (Moderate Resource Intensity)   AM-PAC Basic Mobility Inpatient   Turning in Flat Bed Without Bedrails 2   Lying on Back to Sitting on Edge of Flat Bed Without Bedrails 2   Moving Bed to  Chair 2   Standing Up From Chair Using Arms 2   Walk in Room 1   Climb 3-5 Stairs With Railing 1   Basic Mobility Inpatient Raw Score 10   [facility] Highest Level Of Mobility   -HLM Goal 4: Move to chair/commode   -HLM Achieved 4: Move to chair/commode   Education   Education Provided Other  (bed mobility, functional transfesr while maintaing NWB precautions of RUE)   Patient Reinforcement needed   End of Consult   Patient Position at End of Consult Bedside chair;Bed/Chair alarm activated;All needs within reach   The patient's AM-PAC Basic Mobility Inpatient Short Form Raw Score is 10. A Raw score of less than or equal to 16 suggests the patient may benefit from discharge to post-acute rehabilitation services. Please also refer to the recommendation of the Physical Therapist for safe discharge planning.    Piotr Huaser

## 2024-09-08 LAB
ANION GAP SERPL CALCULATED.3IONS-SCNC: 11 MMOL/L (ref 4–13)
BASOPHILS # BLD AUTO: 0.05 THOUSANDS/ÂΜL (ref 0–0.1)
BASOPHILS NFR BLD AUTO: 0 % (ref 0–1)
BUN SERPL-MCNC: 23 MG/DL (ref 5–25)
CALCIUM SERPL-MCNC: 8.3 MG/DL (ref 8.4–10.2)
CHLORIDE SERPL-SCNC: 103 MMOL/L (ref 96–108)
CO2 SERPL-SCNC: 21 MMOL/L (ref 21–32)
CREAT SERPL-MCNC: 0.91 MG/DL (ref 0.6–1.3)
EOSINOPHIL # BLD AUTO: 0.14 THOUSAND/ÂΜL (ref 0–0.61)
EOSINOPHIL NFR BLD AUTO: 1 % (ref 0–6)
ERYTHROCYTE [DISTWIDTH] IN BLOOD BY AUTOMATED COUNT: 14.6 % (ref 11.6–15.1)
GFR SERPL CREATININE-BSD FRML MDRD: 58 ML/MIN/1.73SQ M
GLUCOSE SERPL-MCNC: 171 MG/DL (ref 65–140)
GLUCOSE SERPL-MCNC: 53 MG/DL (ref 65–140)
GLUCOSE SERPL-MCNC: 60 MG/DL (ref 65–140)
GLUCOSE SERPL-MCNC: 69 MG/DL (ref 65–140)
HCT VFR BLD AUTO: 35.5 % (ref 34.8–46.1)
HGB BLD-MCNC: 10.8 G/DL (ref 11.5–15.4)
IMM GRANULOCYTES # BLD AUTO: 0.09 THOUSAND/UL (ref 0–0.2)
IMM GRANULOCYTES NFR BLD AUTO: 1 % (ref 0–2)
LYMPHOCYTES # BLD AUTO: 0.91 THOUSANDS/ÂΜL (ref 0.6–4.47)
LYMPHOCYTES NFR BLD AUTO: 8 % (ref 14–44)
MAGNESIUM SERPL-MCNC: 2.5 MG/DL (ref 1.9–2.7)
MCH RBC QN AUTO: 30.8 PG (ref 26.8–34.3)
MCHC RBC AUTO-ENTMCNC: 30.4 G/DL (ref 31.4–37.4)
MCV RBC AUTO: 101 FL (ref 82–98)
MONOCYTES # BLD AUTO: 0.55 THOUSAND/ÂΜL (ref 0.17–1.22)
MONOCYTES NFR BLD AUTO: 5 % (ref 4–12)
NEUTROPHILS # BLD AUTO: 9.83 THOUSANDS/ÂΜL (ref 1.85–7.62)
NEUTS SEG NFR BLD AUTO: 85 % (ref 43–75)
NRBC BLD AUTO-RTO: 0 /100 WBCS
PHOSPHATE SERPL-MCNC: 3.7 MG/DL (ref 2.3–4.1)
PLATELET # BLD AUTO: 305 THOUSANDS/UL (ref 149–390)
PMV BLD AUTO: 10.4 FL (ref 8.9–12.7)
POTASSIUM SERPL-SCNC: 4.9 MMOL/L (ref 3.5–5.3)
RBC # BLD AUTO: 3.51 MILLION/UL (ref 3.81–5.12)
SODIUM SERPL-SCNC: 135 MMOL/L (ref 135–147)
WBC # BLD AUTO: 11.57 THOUSAND/UL (ref 4.31–10.16)

## 2024-09-08 PROCEDURE — 82948 REAGENT STRIP/BLOOD GLUCOSE: CPT

## 2024-09-08 PROCEDURE — 85025 COMPLETE CBC W/AUTO DIFF WBC: CPT | Performed by: PHYSICIAN ASSISTANT

## 2024-09-08 PROCEDURE — 83735 ASSAY OF MAGNESIUM: CPT | Performed by: PHYSICIAN ASSISTANT

## 2024-09-08 PROCEDURE — 99231 SBSQ HOSP IP/OBS SF/LOW 25: CPT | Performed by: SURGERY

## 2024-09-08 PROCEDURE — 92610 EVALUATE SWALLOWING FUNCTION: CPT

## 2024-09-08 PROCEDURE — 84100 ASSAY OF PHOSPHORUS: CPT | Performed by: PHYSICIAN ASSISTANT

## 2024-09-08 PROCEDURE — 80048 BASIC METABOLIC PNL TOTAL CA: CPT | Performed by: PHYSICIAN ASSISTANT

## 2024-09-08 RX ORDER — DEXTROSE MONOHYDRATE 25 G/50ML
25 INJECTION, SOLUTION INTRAVENOUS ONCE
Status: COMPLETED | OUTPATIENT
Start: 2024-09-08 | End: 2024-09-08

## 2024-09-08 RX ORDER — AMLODIPINE BESYLATE 2.5 MG/1
2.5 TABLET ORAL DAILY
Status: DISCONTINUED | OUTPATIENT
Start: 2024-09-08 | End: 2024-09-09 | Stop reason: HOSPADM

## 2024-09-08 RX ORDER — ACETAMINOPHEN 325 MG/1
975 TABLET ORAL EVERY 8 HOURS
Status: DISCONTINUED | OUTPATIENT
Start: 2024-09-08 | End: 2024-09-09 | Stop reason: HOSPADM

## 2024-09-08 RX ORDER — MIRTAZAPINE 15 MG/1
7.5 TABLET, FILM COATED ORAL
Status: DISCONTINUED | OUTPATIENT
Start: 2024-09-08 | End: 2024-09-09 | Stop reason: HOSPADM

## 2024-09-08 RX ORDER — GABAPENTIN 100 MG/1
100 CAPSULE ORAL DAILY
Status: DISCONTINUED | OUTPATIENT
Start: 2024-09-09 | End: 2024-09-09 | Stop reason: HOSPADM

## 2024-09-08 RX ORDER — CHLORHEXIDINE GLUCONATE ORAL RINSE 1.2 MG/ML
15 SOLUTION DENTAL EVERY 12 HOURS SCHEDULED
Status: DISCONTINUED | OUTPATIENT
Start: 2024-09-08 | End: 2024-09-09 | Stop reason: HOSPADM

## 2024-09-08 RX ADMIN — LIDOCAINE 5% 1 PATCH: 700 PATCH TOPICAL at 09:41

## 2024-09-08 RX ADMIN — CHLORHEXIDINE GLUCONATE 15 ML: 1.2 RINSE ORAL at 09:41

## 2024-09-08 RX ADMIN — CLOTRIMAZOLE AND BETAMETHASONE DIPROPIONATE: 10; .64 CREAM TOPICAL at 18:20

## 2024-09-08 RX ADMIN — FLUTICASONE PROPIONATE 1 SPRAY: 50 SPRAY, METERED NASAL at 09:42

## 2024-09-08 RX ADMIN — DEXTROSE MONOHYDRATE 25 ML: 25 INJECTION, SOLUTION INTRAVENOUS at 08:20

## 2024-09-08 RX ADMIN — DICLOFENAC SODIUM TOPICAL GEL, 1% 2 G: 10 GEL TOPICAL at 18:18

## 2024-09-08 RX ADMIN — MIRTAZAPINE 7.5 MG: 15 TABLET, FILM COATED ORAL at 21:28

## 2024-09-08 RX ADMIN — ACETAMINOPHEN 1000 MG: 10 INJECTION INTRAVENOUS at 04:24

## 2024-09-08 RX ADMIN — CLOTRIMAZOLE AND BETAMETHASONE DIPROPIONATE: 10; .64 CREAM TOPICAL at 09:42

## 2024-09-08 RX ADMIN — DICLOFENAC SODIUM TOPICAL GEL, 1% 2 G: 10 GEL TOPICAL at 09:41

## 2024-09-08 RX ADMIN — CALCITONIN SALMON 1 SPRAY: 200 SPRAY, METERED NASAL at 09:42

## 2024-09-08 RX ADMIN — Medication 1000 UNITS: at 09:40

## 2024-09-08 RX ADMIN — DICLOFENAC SODIUM TOPICAL GEL, 1% 2 G: 10 GEL TOPICAL at 21:28

## 2024-09-08 RX ADMIN — DEXTROSE MONOHYDRATE 25 ML: 25 INJECTION, SOLUTION INTRAVENOUS at 11:20

## 2024-09-08 RX ADMIN — SENNOSIDES AND DOCUSATE SODIUM 1 TABLET: 8.6; 5 TABLET ORAL at 09:40

## 2024-09-08 RX ADMIN — SENNOSIDES AND DOCUSATE SODIUM 1 TABLET: 8.6; 5 TABLET ORAL at 18:18

## 2024-09-08 RX ADMIN — ENOXAPARIN SODIUM 30 MG: 30 INJECTION SUBCUTANEOUS at 09:42

## 2024-09-08 RX ADMIN — CHLORHEXIDINE GLUCONATE 15 ML: 1.2 RINSE ORAL at 21:28

## 2024-09-08 RX ADMIN — GABAPENTIN 100 MG: 100 CAPSULE ORAL at 09:40

## 2024-09-08 RX ADMIN — AMLODIPINE BESYLATE 2.5 MG: 2.5 TABLET ORAL at 21:28

## 2024-09-08 NOTE — PLAN OF CARE
Problem: Potential for Falls  Goal: Patient will remain free of falls  Description: INTERVENTIONS:  - Educate patient/family on patient safety including physical limitations  - Instruct patient to call for assistance with activity   - Consult OT/PT to assist with strengthening/mobility   - Keep Call bell within reach  - Keep bed low and locked with side rails adjusted as appropriate  - Keep care items and personal belongings within reach  - Initiate and maintain comfort rounds  - Make Fall Risk Sign visible to staff  - Offer Toileting every  Hours, in advance of need  - Initiate/Maintain alarm  - Obtain necessary fall risk management equipment:   - Apply yellow socks and bracelet for high fall risk patients  - Consider moving patient to room near nurses station  Outcome: Progressing     Problem: PAIN - ADULT  Goal: Verbalizes/displays adequate comfort level or baseline comfort level  Description: Interventions:  - Encourage patient to monitor pain and request assistance  - Assess pain using appropriate pain scale  - Administer analgesics based on type and severity of pain and evaluate response  - Implement non-pharmacological measures as appropriate and evaluate response  - Consider cultural and social influences on pain and pain management  - Notify physician/advanced practitioner if interventions unsuccessful or patient reports new pain  Outcome: Progressing     Problem: INFECTION - ADULT  Goal: Absence or prevention of progression during hospitalization  Description: INTERVENTIONS:  - Assess and monitor for signs and symptoms of infection  - Monitor lab/diagnostic results  - Monitor all insertion sites, i.e. indwelling lines, tubes, and drains  - Monitor endotracheal if appropriate and nasal secretions for changes in amount and color  - [place] appropriate cooling/warming therapies per order  - Administer medications as ordered  - Instruct and encourage patient and family to use good hand hygiene technique  -  Identify and instruct in appropriate isolation precautions for identified infection/condition  Outcome: Progressing  Goal: Absence of fever/infection during neutropenic period  Description: INTERVENTIONS:  - Monitor WBC    Outcome: Progressing     Problem: SAFETY ADULT  Goal: Patient will remain free of falls  Description: INTERVENTIONS:  - Educate patient/family on patient safety including physical limitations  - Instruct patient to call for assistance with activity   - Consult OT/PT to assist with strengthening/mobility   - Keep Call bell within reach  - Keep bed low and locked with side rails adjusted as appropriate  - Keep care items and personal belongings within reach  - Initiate and maintain comfort rounds  - Make Fall Risk Sign visible to staff  - Offer Toileting every  Hours, in advance of need  - Initiate/Maintain alarm  - Obtain necessary fall risk management equipment:   - Apply yellow socks and bracelet for high fall risk patients  - Consider moving patient to room near nurses station  Outcome: Progressing  Goal: Maintain or return to baseline ADL function  Description: INTERVENTIONS:  -  Assess patient's ability to carry out ADLs; assess patient's baseline for ADL function and identify physical deficits which impact ability to perform ADLs (bathing, care of mouth/teeth, toileting, grooming, dressing, etc.)  - Assess/evaluate cause of self-care deficits   - Assess range of motion  - Assess patient's mobility; develop plan if impaired  - Assess patient's need for assistive devices and provide as appropriate  - Encourage maximum independence but intervene and supervise when necessary  - Involve family in performance of ADLs  - Assess for home care needs following discharge   - Consider OT consult to assist with ADL evaluation and planning for discharge  - Provide patient education as appropriate  Outcome: Progressing  Goal: Maintains/Returns to pre admission functional level  Description:  INTERVENTIONS:  - Perform AM-PAC 6 Click Basic Mobility/ Daily Activity assessment daily.  - Set and communicate daily mobility goal to care team and patient/family/caregiver.   - Collaborate with rehabilitation services on mobility goals if consulted  - Perform Range of Motion  times a day.  - Reposition patient every  hours.  - Dangle patient  times a day  - Stand patient  times a day  - Ambulate patient  times a day  - Out of bed to chair  times a day   - Out of bed for meals  times a day  - Out of bed for toileting  - Record patient progress and toleration of activity level   Outcome: Progressing     Problem: DISCHARGE PLANNING  Goal: Discharge to home or other facility with appropriate resources  Description: INTERVENTIONS:  - Identify barriers to discharge w/patient and caregiver  - Arrange for needed discharge resources and transportation as appropriate  - Identify discharge learning needs (meds, wound care, etc.)  - Arrange for interpretive services to assist at discharge as needed  - Refer to Case Management Department for coordinating discharge planning if the patient needs post-hospital services based on physician/advanced practitioner order or complex needs related to functional status, cognitive ability, or social support system  Outcome: Progressing     Problem: Knowledge Deficit  Goal: Patient/family/caregiver demonstrates understanding of disease process, treatment plan, medications, and discharge instructions  Description: Complete learning assessment and assess knowledge base.  Interventions:  - Provide teaching at level of understanding  - Provide teaching via preferred learning methods  Outcome: Progressing     Problem: Prexisting or High Potential for Compromised Skin Integrity  Goal: Skin integrity is maintained or improved  Description: INTERVENTIONS:  - Identify patients at risk for skin breakdown  - Assess and monitor skin integrity  - Assess and monitor nutrition and hydration status  -  Monitor labs   - Assess for incontinence   - Turn and reposition patient  - Assist with mobility/ambulation  - Relieve pressure over bony prominences  - Avoid friction and shearing  - Provide appropriate hygiene as needed including keeping skin clean and dry  - Evaluate need for skin moisturizer/barrier cream  - Collaborate with interdisciplinary team   - Patient/family teaching  - Consider wound care consult   Outcome: Progressing     Problem: Nutrition/Hydration-ADULT  Goal: Nutrient/Hydration intake appropriate for improving, restoring or maintaining nutritional needs  Description: Monitor and assess patient's nutrition/hydration status for malnutrition. Collaborate with interdisciplinary team and initiate plan and interventions as ordered.  Monitor patient's weight and dietary intake as ordered or per policy. Utilize nutrition screening tool and intervene as necessary. Determine patient's food preferences and provide high-protein, high-caloric foods as appropriate.     INTERVENTIONS:  - Monitor oral intake, urinary output, labs, and treatment plans  - Assess nutrition and hydration status and recommend course of action  - Evaluate amount of meals eaten  - Assist patient with eating if necessary   - Allow adequate time for meals  - Recommend/ encourage appropriate diets, oral nutritional supplements, and vitamin/mineral supplements  - Order, calculate, and assess calorie counts as needed  - Recommend, monitor, and adjust tube feedings and TPN/PPN based on assessed needs  - Assess need for intravenous fluids  - Provide specific nutrition/hydration education as appropriate  - Include patient/family/caregiver in decisions related to nutrition  Outcome: Progressing

## 2024-09-08 NOTE — PROGRESS NOTES
[facility]  Progress Note  Name: Doris Campbell I  MRN: [id number]  Unit/Bed#: W -01 I Date of Admission: 9/5/2024   Date of Service: 9/8/2024 I Hospital Day: 3    Assessment & Plan   At risk for aspiration  Assessment & Plan  - Patient with 2 episodes of vomiting noted on 9/6  - NG tube now removed  - Speech eval pending    Closed fracture of acromial end of right clavicle  Assessment & Plan  - S/p fall at home on 9/5  - Orthopedics consult, appreciate recommendations  - Non weight bearing to the right upper extremity in sling for comfort.   - May range the elbow and wrist.   - Multimodal pain regimen in place   - PT/OT consulted  - Outpatient follow up with orthopedic surgery for reevaluation    Acute pain due to trauma  Assessment & Plan  - Acute pain secondary to traumatic injuries.  - Continue multimodal analgesic regimen.  - Bowel regimen as long as using opioids.  - Continue to monitor pain and adjust regimen as indicated.      Ambulatory dysfunction  Assessment & Plan  - Patient typically ambulates with walker  - Has multiple recent falls and in emergency department was not able to ambulate even with walker due to discomfort  - Geriatrics consulted for medication review and further recommendations  - PT/OT evaluation and treatment as indicated  - CM following for disposition planning      Hypertension  Assessment & Plan  - Continue home amlodipine    Fall  Assessment & Plan  - Status post fall with the below noted injuries.  - Fall precautions.  - Geriatric Medicine consultation for evaluation, medication review and recommendations.  - PT and OT evaluation and treatment as indicated.  - Case Management consultation for disposition planning.      * Closed fracture of one rib of left side  Assessment & Plan  - Single rib fracture, left 4th rib, present on admission.  - Continue rib fracture protocol.  - Continue to encourage incentive spirometer use and adequate pulmonary  hygiene.  - Continue multimodal analgesic regimen.  - Supplemental oxygen via nasal cannula as needed to maintain saturations greater than or equal to 94%.   - PT and OT evaluation and treatment as indicated.       DVT prophylaxis: SCDs and Lovenox  PT and OT: eval and treat    Disposition: D/c planning. Speech pending. AM labs improved. Family to be contacted today.     Subjective   Chief Complaint: No complaints today.     Subjective: Patient offering no new complaints today on presentation. Resting in bed today.      Objective   Vitals:   Temp:  [97.5 °F (36.4 °C)-98.1 °F (36.7 °C)] 98 °F (36.7 °C)  HR:  [72-79] 78  Resp:  [14-18] 18  BP: (136-165)/(71-87) 157/75    I/O         09/06 0701  09/07 0700 09/07 0701  09/08 0700 09/08 0701  09/09 0700    P.O. 640 0     I.V. 10 1573.5     Total Intake 650 1573.5     Net +650 +1573.5            Unmeasured Urine Occurrence  5 x 1 x    Unmeasured Stool Occurrence  0 x              Physical Exam:   GENERAL APPEARANCE: NAD  NEURO: GCS 14 intermittent confusion  HEENT: Normocephalic  CV: RRR  LUNGS: CTA b/l  GI: Non-tender, non-distended  : no muniz  MSK: moving all extremities  SKIN: warm, dry, intact    Invasive Devices       Peripheral Intravenous Line  Duration             Peripheral IV 09/05/24 Left;Ventral (anterior) Forearm 2 days              Drain  Duration             NG/OG/Enteral Tube Nasogastric 16 Fr Right nare 1 day                     PIC Score  PIC Pain Score: 3 (9/8/2024  8:00 AM)  PIC Incentive Spirometry Score: 1 (9/8/2024  8:00 AM)  PIC Cough Description: 2 (9/8/2024  8:00 AM)  PIC Total Score: 6 (9/8/2024  8:00 AM)       If the Total PIC Score </=5, did you consult APS and evaluate patient for further intervention?: no      Pain:    Incentive Spirometry  Cough  3 = Controlled  4 = Above goal volume 3 = Strong  2 = Moderate  3 = Goal to alert volume 2 = Weak  1 = Severe  2 = Below alert volume 1 = Absent     1 = Unable to perform IS         Lab Results:  Results: I have personally reviewed all pertinent laboratory/tests results, BMP/CMP:   Lab Results   Component Value Date    SODIUM 135 09/08/2024    K 4.9 09/08/2024     09/08/2024    CO2 21 09/08/2024    BUN 23 09/08/2024    CREATININE 0.91 09/08/2024    CALCIUM 8.3 (L) 09/08/2024    EGFR 58 09/08/2024   , and CBC:   Lab Results   Component Value Date    WBC 11.57 (H) 09/08/2024    HGB 10.8 (L) 09/08/2024    HCT 35.5 09/08/2024     (H) 09/08/2024     09/08/2024    RBC 3.51 (L) 09/08/2024    MCH 30.8 09/08/2024    MCHC 30.4 (L) 09/08/2024    RDW 14.6 09/08/2024    MPV 10.4 09/08/2024    NRBC 0 09/08/2024     Imaging: I have personally reviewed pertinent reports.     Other Studies: no other studies

## 2024-09-08 NOTE — QUICK NOTE
Spoke to the patient POA at bedside today.  Updated her on current care plan.  Anticipate a referral to palliative care upon discharge as well as possible placement tomorrow 9/9/2024.  Will follow-up with case management in AM.

## 2024-09-08 NOTE — SPEECH THERAPY NOTE
Speech-Language Pathology Bedside Swallow Evaluation        Patient Name: Doris Campbell    Today's Date: 9/8/2024     Problem List  Principal Problem:    Closed fracture of one rib of left side  Active Problems:    Fall    Mild cognitive impairment    Hypertension    Chronic pain of right knee    Constipation    Ambulatory dysfunction    Acute pain due to trauma    Closed fracture of acromial end of right clavicle    At risk for delirium    Insomnia    [place] (hard of hearing)    At risk for aspiration         Past Medical History  Past Medical History:   Diagnosis Date    Allergic     Anxiety     Arthritis     Cancer (HCC)     HLD (hyperlipidemia)     Hypertension     Hyperthyroidism     Osteoporosis     Stroke (HCC)     TIA (transient ischemic attack)        Past Surgical History  Past Surgical History:   Procedure Laterality Date    BREAST LUMPECTOMY Left     HYSTERECTOMY      KNEE SURGERY Right     ORIF TIBIA FRACTURE Right 12/8/2016    Procedure: OPEN REDUCTION W/ INTERNAL FIXATION (ORIF) TIBIA;  Surgeon: Angie Case MD;  Location: [facility];  Service:        Summary    Pt presents with     Recommendations:   Diet: soft/level 3 diet and thin liquids   Meds: whole with liquid   Frequent Oral care: 2x/day  Other Recommendations/ considerations: no follow up indicated        Current Medical Status  Pt is a 83 y.o. female who presented to [facility]  with closed fx of 1 rib, L side. Pt s/p fall, at her facility - [facility] and was unwitnessed. She reportedly was vomiting at her facility. She is unaware of what happened, reportedly has some cognitive decline at baseline. Currently she is complaining of back pain, right shoulder pain, chest pain worse on left side, and hip and foot pain on right. On review of chart, she does have a history of recurrent falls.     Past medical history:   Please see H&P for details    Special Studies:  CXR: 9/6/24 Mild pulmonary venous congestion with moderate  bibasilar atelectasis     Social/Education/Vocational Hx:  Pt lives in [facility]     Swallow Information   Prior speech/swallowing tx: jan 2024, rec dysphagia 3 diet w/ thin liquids  Current Risks for Dysphagia & Aspiration:  s/p fall, hx of dysphagia, generalized weakness  Current Symptoms/Concerns:  AMS  Current Diet: NPO except medications   Baseline Diet: soft/level 3 diet and thin liquids- pt stated her food is chopped up for her  Takes pills- whole w/ water     Baseline Assessment   Behavior/Cognition: alert  Speech/Language Status: able to participate in basic conversation and able to follow commands  Patient Positioning: upright in bed     Swallow Mechanism Exam   Facial: symmetrical  Labial: WFL  Lingual: WFL  Velum: unable to visualize  Mandible: adequate ROM  Dentition: adequate  Vocal quality:clear/adequate   Volitional Cough: weak   Respiratory: RA    Consistencies Assessed and Performance   Consistencies Administered: thin liquids, nectar thick, puree, and soft solids    Oral Stage: pt was able to bite toast and exhibited slow but effective mastication/manipulation. Pt took sips of NTL and thin liquids by cup and straw w/ good oral control.  Bolus transfer was adequate    Pharyngeal Stage: swallow initiation was timely w/ no overt s/s aspiration and no c/o food dysphagia.       Esophageal Concerns: none reported      Results Reviewed with: patient, RN, and PA   Dysphagia Goals: none at this time      Belle Hong MA CCC-SLP  Speech Pathologist  [place] license # [id number]  [place] license # [id number]  Available via Secure Chat

## 2024-09-09 VITALS
RESPIRATION RATE: 18 BRPM | TEMPERATURE: 99 F | DIASTOLIC BLOOD PRESSURE: 64 MMHG | OXYGEN SATURATION: 93 % | BODY MASS INDEX: 22.26 KG/M2 | HEIGHT: 63 IN | HEART RATE: 81 BPM | SYSTOLIC BLOOD PRESSURE: 111 MMHG

## 2024-09-09 PROBLEM — N17.9 AKI (ACUTE KIDNEY INJURY) (HCC): Status: RESOLVED | Noted: 2024-09-09 | Resolved: 2024-09-09

## 2024-09-09 PROBLEM — N17.9 AKI (ACUTE KIDNEY INJURY) (HCC): Status: ACTIVE | Noted: 2024-09-09

## 2024-09-09 LAB
GLUCOSE SERPL-MCNC: 114 MG/DL (ref 65–140)
GLUCOSE SERPL-MCNC: 130 MG/DL (ref 65–140)
GLUCOSE SERPL-MCNC: 90 MG/DL (ref 65–140)

## 2024-09-09 PROCEDURE — 94664 DEMO&/EVAL PT USE INHALER: CPT

## 2024-09-09 PROCEDURE — 82948 REAGENT STRIP/BLOOD GLUCOSE: CPT

## 2024-09-09 PROCEDURE — 99238 HOSP IP/OBS DSCHRG MGMT 30/<: CPT | Performed by: PHYSICIAN ASSISTANT

## 2024-09-09 PROCEDURE — 99232 SBSQ HOSP IP/OBS MODERATE 35: CPT | Performed by: FAMILY MEDICINE

## 2024-09-09 PROCEDURE — NC001 PR NO CHARGE: Performed by: PHYSICIAN ASSISTANT

## 2024-09-09 RX ORDER — LABETALOL HYDROCHLORIDE 5 MG/ML
10 INJECTION, SOLUTION INTRAVENOUS EVERY 4 HOURS PRN
Status: DISCONTINUED | OUTPATIENT
Start: 2024-09-09 | End: 2024-09-09 | Stop reason: HOSPADM

## 2024-09-09 RX ORDER — MIRTAZAPINE 7.5 MG/1
7.5 TABLET, FILM COATED ORAL
Start: 2024-09-09

## 2024-09-09 RX ORDER — SENNOSIDES 8.6 MG
2 TABLET ORAL 2 TIMES DAILY
Status: DISCONTINUED | OUTPATIENT
Start: 2024-09-09 | End: 2024-09-09 | Stop reason: HOSPADM

## 2024-09-09 RX ADMIN — CHLORHEXIDINE GLUCONATE 15 ML: 1.2 RINSE ORAL at 09:25

## 2024-09-09 RX ADMIN — LIDOCAINE 5% 1 PATCH: 700 PATCH TOPICAL at 09:24

## 2024-09-09 RX ADMIN — POLYETHYLENE GLYCOL 3350 17 G: 17 POWDER, FOR SOLUTION ORAL at 09:24

## 2024-09-09 RX ADMIN — FLUTICASONE PROPIONATE 1 SPRAY: 50 SPRAY, METERED NASAL at 09:34

## 2024-09-09 RX ADMIN — CLOTRIMAZOLE AND BETAMETHASONE DIPROPIONATE: 10; .64 CREAM TOPICAL at 09:34

## 2024-09-09 RX ADMIN — CLOTRIMAZOLE AND BETAMETHASONE DIPROPIONATE: 10; .64 CREAM TOPICAL at 17:21

## 2024-09-09 RX ADMIN — LIDOCAINE 5% 1 PATCH: 700 PATCH TOPICAL at 09:25

## 2024-09-09 RX ADMIN — GABAPENTIN 100 MG: 100 CAPSULE ORAL at 09:25

## 2024-09-09 RX ADMIN — DICLOFENAC SODIUM TOPICAL GEL, 1% 2 G: 10 GEL TOPICAL at 17:21

## 2024-09-09 RX ADMIN — CALCITONIN SALMON 1 SPRAY: 200 SPRAY, METERED NASAL at 09:34

## 2024-09-09 RX ADMIN — Medication 1000 UNITS: at 09:27

## 2024-09-09 RX ADMIN — SENNOSIDES 17.2 MG: 8.6 TABLET, FILM COATED ORAL at 17:27

## 2024-09-09 RX ADMIN — DICLOFENAC SODIUM TOPICAL GEL, 1% 2 G: 10 GEL TOPICAL at 12:53

## 2024-09-09 RX ADMIN — ENOXAPARIN SODIUM 30 MG: 30 INJECTION SUBCUTANEOUS at 09:25

## 2024-09-09 RX ADMIN — DICLOFENAC SODIUM TOPICAL GEL, 1% 2 G: 10 GEL TOPICAL at 09:24

## 2024-09-09 RX ADMIN — SENNOSIDES 17.2 MG: 8.6 TABLET, FILM COATED ORAL at 13:42

## 2024-09-09 RX ADMIN — SENNOSIDES AND DOCUSATE SODIUM 1 TABLET: 8.6; 5 TABLET ORAL at 09:25

## 2024-09-09 NOTE — INCIDENTAL FINDINGS
The following findings require follow up:  Radiographic finding   Finding:   STOMACH AND BOWEL: Colonic diverticulosis without findings of acute diverticulitis.    Follow up required: Yes   Follow up should be done within 2-4 week(s)    Please notify the following clinician to assist with the follow up:   Primary Care Provider    Incidental finding results were discussed with the Patient's POA by Tres Oviedo PA-C on 09/09/24.   They expressed understanding and all questions answered.

## 2024-09-09 NOTE — ASSESSMENT & PLAN NOTE
- Status post fall with the below noted injuries.  - Fall precautions.  - Geriatric Medicine consultation for evaluation, medication review and recommendations.  - PT and OT evaluation and treatment as indicated.  - Case Management consultation for disposition planning.     Perilesional Excision Additional Text (Leave Blank If You Do Not Want): The margin was drawn around the clinically apparent lesion. Incisions were then made along these lines to the appropriate tissue plane and the lesion was extirpated.

## 2024-09-09 NOTE — ASSESSMENT & PLAN NOTE
- Patient with 2 episodes of vomiting noted on 9/6  - NG tube now removed  - Speech eval completed  - Tolerating PO intake

## 2024-09-09 NOTE — RESPIRATORY THERAPY NOTE
Patient on room air at this time. Requires assistance with IS. Patient is not meeting goal at this time.        09/09/24 0820   Incentive Spirometry Tx   IS level of assistance (S)  Assisted by respiratory care provider;Needs encouragement   Frequency q1hr W/A   Respiratory Effort Normal   Treatment Tolerance Tolerated fairly well   Incentive Spirometry Goal (mL) (S)  786 mL  (per rib fracture prot)   Incentive Spirometry Achieved (mL) 250 mL   Chest Physiotherapy Tx   $ Demo/Eval of Neb, MDI, IPPB, CPT Yes

## 2024-09-09 NOTE — PROGRESS NOTES
[facility]  Progress Note  Name: Doris Campbell I  MRN: [id number]  Unit/Bed#: W -01 I Date of Admission: 9/5/2024   Date of Service: 9/9/2024 I Hospital Day: 4    Assessment & Plan   At risk for aspiration  Assessment & Plan  - Patient with 2 episodes of vomiting noted on 9/6  - NG tube now removed  - Speech eval completed  - Tolerating PO intake    Closed fracture of acromial end of right clavicle  Assessment & Plan  - S/p fall at home on 9/5  - Orthopedics consult, appreciate recommendations  - Non weight bearing to the right upper extremity in sling for comfort.   - May range the elbow and wrist.   - Multimodal pain regimen in place   - PT/OT consulted  - Outpatient follow up with orthopedic surgery for reevaluation    Acute pain due to trauma  Assessment & Plan  - Acute pain secondary to traumatic injuries.  - Continue multimodal analgesic regimen.  - Bowel regimen as long as using opioids.  - Continue to monitor pain and adjust regimen as indicated.      Ambulatory dysfunction  Assessment & Plan  - Patient typically ambulates with walker  - Has multiple recent falls and in emergency department was not able to ambulate even with walker due to discomfort  - Geriatrics consulted for medication review and further recommendations  - PT/OT evaluation and treatment as indicated  - CM following for disposition planning      Hypertension  Assessment & Plan  - Continue home amlodipine    Fall  Assessment & Plan  - Status post fall with the below noted injuries.  - Fall precautions.  - Geriatric Medicine consultation for evaluation, medication review and recommendations.  - PT and OT evaluation and treatment as indicated.  - Case Management consultation for disposition planning.      * Closed fracture of one rib of left side  Assessment & Plan  - Single rib fracture, left 4th rib, present on admission.  - Continue rib fracture protocol.  - Continue to encourage incentive spirometer use  and adequate pulmonary hygiene.  - Continue multimodal analgesic regimen.  - Supplemental oxygen via nasal cannula as needed to maintain saturations greater than or equal to 94%.   - PT and OT evaluation and treatment as indicated.    RYAN (acute kidney injury) (HCC)-resolved as of 9/9/2024  Assessment & Plan  - resolved  - No further checks         DVT Prophylaxis: SCDs and Lovenox  PT and OT: eval and treat    Disposition: DC planning.  Anticipate possible discharge home today.  Will call and update family today.  Patient medically stable for discharge.    Subjective   Chief Complaint: No complaints.    Subjective: Patient without new complaints today on presentation.      Objective   Vitals:   Temp:  [98.3 °F (36.8 °C)-99 °F (37.2 °C)] 99 °F (37.2 °C)  HR:  [77-85] 81  Resp:  [14-18] 18  BP: (111-172)/(64-90) 111/64    I/O         09/07 0701  09/08 0700 09/08 0701  09/09 0700 09/09 0701  09/10 0700    P.O. 0 600     I.V. 1573.5      Total Intake 1573.5 600     Net +1573.5 +600            Unmeasured Urine Occurrence 5 x 2 x     Unmeasured Stool Occurrence 0 x               Physical Exam:   GENERAL APPEARANCE: NAD  NEURO: GCS 15, no focal deficits  HEENT: Normocephalic  CV: RRR  LUNGS: CTA b/l  GI: Non-tender, non-distended  : no muniz  MSK: moving all extremities, sling on RUE  SKIN: warm, dry, intact    Invasive Devices       Peripheral Intravenous Line  Duration             Peripheral IV 09/05/24 Left;Ventral (anterior) Forearm 4 days              Drain  Duration             NG/OG/Enteral Tube Nasogastric 16 Fr Right nare 2 days                     PIC Score  PIC Pain Score: 3 (9/9/2024 12:00 PM)  PIC Incentive Spirometry Score: 1 (9/9/2024 12:00 PM)  PIC Cough Description: 2 (9/9/2024 12:00 PM)  PIC Total Score: 6 (9/9/2024 12:00 PM)       If the Total PIC Score </=5, did you consult APS and evaluate patient for further intervention?: no      Pain:    Incentive Spirometry  Cough  3 = Controlled  4 = Above goal  "volume 3 = Strong  2 = Moderate  3 = Goal to alert volume 2 = Weak  1 = Severe  2 = Below alert volume 1 = Absent     1 = Unable to perform IS         Lab Results: Results: I have personally reviewed all pertinent laboratory/tests results, BMP/CMP: No results found for: \"SODIUM\", \"K\", \"CL\", \"CO2\", \"ANIONGAP\", \"BUN\", \"CREATININE\", \"GLUCOSE\", \"CALCIUM\", \"AST\", \"ALT\", \"ALKPHOS\", \"PROT\", \"BILITOT\", \"EGFR\", and CBC: No results found for: \"WBC\", \"HGB\", \"HCT\", \"MCV\", \"PLT\", \"ADJUSTEDWBC\", \"RBC\", \"MCH\", \"MCHC\", \"RDW\", \"MPV\", \"NRBC\"  Imaging: I have personally reviewed pertinent reports.     Other Studies: no other studies       "

## 2024-09-09 NOTE — PROGRESS NOTES
Progress Note - Geriatric Medicine   Doris Campbell 83 y.o. female MRN: [id number]  Unit/Bed#: W -01 Encounter: [id number]      Assessment/Plan:  [place] (hard of hearing)  Assessment & Plan  Offer hearing amplifiers while awake  Speak loud and clear    Insomnia  Assessment & Plan  Patient has difficulty sleeping at baseline and appears to have a dysphoric mood.  Continue mirtazapine 7.5 mg daily at bedtime.    At risk for delirium  Assessment & Plan    -Patient is high risk of delirium due to acute uncontrolled pain, hospitalization, constipation, cognitive decline, hearing loss  - delirium precautions  -maintain normal sleep/wake cycle  -minimize overnight interruptions, group overnight vitals/labs/nursing checks as possible  -dim lights, close blinds and turn off tv to minimize stimulation and encourage sleep environment in evenings  -ensure that pain is well controlled. Use Tylenol 975mg Q8H scheduled, lidocaine patches, Voltaren gel over affected joints  -monitor for fecal and urinary retention which may precipitate delirium  -encourage early mobilization and ambulation  -provide frequent reorientation and redirection  -encourage family and friends at the bedside to help calm patient if anxious  -avoid medications which may precipitate or worsen delirium such as tramadol, benzodiazepine, anticholinergics, and antihistaminics  -encourage hydration and nutrition , assist with feeding if needed  -redirect unwanted behaviors as first line, avoid physical restraints.   - continue gabapentin 100 mg QHS, may increase dose if she becomes anxious  -Continue mirtazapine 7.5 mg QHS    Closed fracture of acromial end of right clavicle  Assessment & Plan  PT/OT as tolerated  No intervention indicated per orthopedics  Pain regimen as listed    Ambulatory dysfunction  Assessment & Plan  Patient walks with a walker at baseline.    PT/OT as tolerated  Ortho VS    Constipation  Assessment & Plan  Increase senna to 2 tablets  twice daily and continue MiraLAX daily  Monitor and adjust regimen as needed    Chronic pain of right knee  Assessment & Plan  Continue tylenol 975 mg po TID  Add voltaren gel to R knee.  X ray reviewed - no acute pathology    Hypertension  Assessment & Plan  Stable on amlodipine, but will change administration to at night.    Mild cognitive impairment  Assessment & Plan  Pt has history of mild cognitive impairment and more recent confusion over the past 1 month per the Nursing Home staff. She is A/O x2 at baseline.  Head CT on 09/05/24 showed subcortical and periventricular white matter reflecting moderate chronic microvascular ischemic changes.  Pt had a MoCA performed on 10/2020 with a score of 9/30.  Not able to do minicog today  She needs assistance with all IADL's and some ADL's at baseline  Currently stable, no behaviors noted.  Will continue to provide supportive care, reorient as needed.  Patient is at high risk for delirium, will monitor closely and place on delirium precautions.  Maintain sleep/wake cycle.  Optimize pain regimen.  Monitor for constipation and urinary retention and manage as needed.  B12 level low start vitamin B12 supplements  TSH within normal limits in May 2024  Encourage family to visit.  Encourage to wear glasses and hearing aids while awake.  Encourage po intake, assist with feeding if needed.     Fall  Assessment & Plan  Patient uses a walker for ambulation at baseline  She reports to the hospital from a fall with head strike while walking with her walker. She has had numerous recent hospitalizations with falls.  Pt sustained a distal clavicle fracture and left 4th rib fracture for which no intervention is indicated per orthopedics.  PT/OT as tolerated.  Monitor orthostatic vital signs  Encourage p.o. hydration  Avoid hypotension and hypoglycemia     * Closed fracture of one rib of left side  Assessment & Plan  Left fourth rib fracture noted on imaging  PT/OT as  "tolerated  Multimodal pain regimen as listed  Apply lidocaine patch.  OOB as tolerated, incentive spirometry       Subjective:   Patient seen and examined at bedside for geriatric follow-up.  At the time of encounter she does not have any acute complaints pain is well-controlled.  Per nursing staff no acute events to report.  Appetite is improving.  No bowel movement since admission    Review of Systems   Constitutional:  Positive for appetite change. Negative for chills and fever.   HENT:  Positive for hearing loss. Negative for congestion and rhinorrhea.    Respiratory:  Negative for cough, shortness of breath and wheezing.    Cardiovascular:  Negative for chest pain, palpitations and leg swelling.   Gastrointestinal:  Negative for abdominal pain and constipation.   Endocrine: Negative for cold intolerance.   Genitourinary:  Negative for difficulty urinating, dysuria and hematuria.   Musculoskeletal:  Positive for arthralgias and gait problem.   Skin:  Negative for wound.   Allergic/Immunologic: Negative for environmental allergies.   Neurological:  Negative for dizziness and seizures.   Hematological:  Does not bruise/bleed easily.   Psychiatric/Behavioral:  Negative for behavioral problems and sleep disturbance.          Objective:     Vitals: Blood pressure 111/64, pulse 81, temperature 99 °F (37.2 °C), resp. rate 18, height 5' 3\" (1.6 m), SpO2 93%.,Body mass index is 22.26 kg/m².      Intake/Output Summary (Last 24 hours) at 9/9/2024 1307  Last data filed at 9/8/2024 2100  Gross per 24 hour   Intake 240 ml   Output --   Net 240 ml       Current Medications: Reviewed    Physical Exam:   Physical Exam  Vitals and nursing note reviewed.   Constitutional:       General: She is not in acute distress.     Appearance: She is well-developed.      Comments: Frail looking   HENT:      Head: Normocephalic and atraumatic.      Ears:      Comments: [place]     Mouth/Throat:      Mouth: Mucous membranes are dry.   Eyes:      " Conjunctiva/sclera: Conjunctivae normal.   Cardiovascular:      Rate and Rhythm: Normal rate and regular rhythm.      Heart sounds: No murmur heard.  Pulmonary:      Effort: Pulmonary effort is normal. No respiratory distress.      Breath sounds: Normal breath sounds.   Abdominal:      Palpations: Abdomen is soft.      Tenderness: There is no abdominal tenderness.   Musculoskeletal:         General: No swelling.      Cervical back: Neck supple.      Right lower leg: No edema.      Left lower leg: No edema.      Comments: Sling RUE   Skin:     General: Skin is warm and dry.      Capillary Refill: Capillary refill takes less than 2 seconds.      Findings: Bruising present.   Neurological:      Mental Status: She is alert.      Comments: AAOx2   Psychiatric:         Mood and Affect: Mood normal.          Invasive Devices       Peripheral Intravenous Line  Duration             Peripheral IV 09/05/24 Left;Ventral (anterior) Forearm 3 days              Drain  Duration             NG/OG/Enteral Tube Nasogastric 16 Fr Right nare 2 days                    Lab, Imaging and other studies: I have personally reviewed pertinent reports.

## 2024-09-09 NOTE — DISCHARGE SUMMARY
"  Discharge Summary - Doris Campbell 83 y.o. female MRN: [id number]    Unit/Bed#: W -01 Encounter: [id number]    Admission Date:   Admission Orders (From admission, onward)       Ordered        09/05/24 2121  Inpatient Admission  Once                            Admitting Diagnosis: Closed right clavicular fracture [S42.001A]  Fall, initial encounter [W19.XXXA]  Closed fracture of one rib of left side, initial encounter [S22.32XA]  Unspecified multiple injuries, initial encounter [T07.XXXA]    HPI: Per Carmel Gloria, \"Doris Campbell is a 83 y.o. female who presents with chest and right shoulder pain after a fall. She was initially evaluated in the ED as a trauma level C due to the patient taking daily aspirin. The fall was at her facility - [facility] and was unwitnessed. She reportedly was vomiting at her facility. She is unaware of what happened, reportedly has some cognitive decline at baseline. Currently she is complaining of back pain, right shoulder pain, chest pain worse on left side, and hip and foot pain on right. On review of chart, she does have a history of recurrent falls.\"    Procedures Performed:   Orders Placed This Encounter   Procedures    ED ECG Documentation Only       Summary of Hospital Course: Patient is an 83-year-old female comes in status post fall.  Noted to have clavicular fracture and isolated rib fracture.  She was monitored on the floor.  Orthopedics recommended nonoperative management.  They recommended a sling.  Patient clinically did well.  She did require an NG tube for a total of 24 hours but improved with her p.o. intake and NG tube was discontinued.  She would ultimately require rehab.  She would be discharged on 9/9/2024 with outpatient follow-up with orthopedics and her primary care provider.  Incidental findings were discussed with patient daughter over the phone.    Significant Findings, Care, Treatment and Services Provided:   XR abdomen 1 view kub    Result " Date: 9/7/2024  Impression: No acute findings. Workstation performed: [id number]     XR chest portable    Result Date: 9/7/2024  Impression: NG tube in stomach. Mild pulmonary venous congestion with moderate bibasilar atelectasis Workstation performed: [id number]     XR chest 1 view    Result Date: 9/7/2024  Impression: NG tube in stomach. Mild pulmonary venous congestion. Moderate bibasilar atelectasis with elevation of the left diaphragm. Workstation performed: [id number]     XR chest portable    Result Date: 9/6/2024  Impression: New gastric distention. Bibasilar airspace opacities in conjunction with hypoventilation may simply represent atelectasis although pneumonia is still possible. No infiltrates are seen elsewhere.. Workstation performed: [id number]     XR knee 1 or 2 vw right    Result Date: 9/6/2024  Impression: No acute osseous abnormality. Stable proximal tibial fixation hardware without complication. Computerized Assisted Algorithm (CAA) may have been used to analyze all applicable images. Workstation performed: [id number]     XR clavicle RIGHT    Result Date: 9/5/2024  Impression: Acute comminuted fracture of the distal aspect of the right clavicle. Computerized Assisted Algorithm (CAA) may have been used to analyze all applicable images. Workstation performed: [id number]     XR hip/pelv 2-3 vws right if performed    Result Date: 9/5/2024  Impression: No acute osseous abnormality. Computerized Assisted Algorithm (CAA) may have been used to analyze all applicable images. Workstation performed: [id number]     XR humerus RIGHT    Result Date: 9/5/2024  Impression: Age-indeterminate clavicular fracture. Consider dedicated clavicle radiographs. No acute humeral fracture or shoulder dislocation. Remote fracture of the proximal humerus. Computerized Assisted Algorithm (CAA) may have been used to analyze all applicable images. Workstation performed: [id number]     TRAUMA - CT chest abdomen pelvis w  contrast    Result Date: 9/5/2024  Impression: New age-indeterminate  nondisplaced lateral left 4th rib fracture. No pneumothorax. The study was marked in EPIC for immediate notification. Workstation performed: [id number]     CT recon only thoracolumbar    Result Date: 9/5/2024  Impression: New age-indeterminate  nondisplaced lateral left 4th rib fracture. No pneumothorax. The study was marked in EPIC for immediate notification. Workstation performed: [id number]     TRAUMA - CT head wo contrast    Result Date: 9/5/2024  Impression: No acute intracranial abnormality.  Chronic microangiopathic changes. Workstation performed: [id number]     TRAUMA - CT spine cervical wo contrast    Result Date: 9/5/2024  Impression: No cervical spine fracture or traumatic malalignment. Workstation performed: [id number]        Complications: no complications    Discharge Diagnosis:   Patient Active Problem List   Diagnosis    HLD (hyperlipidemia)    Fall    Age-related osteoporosis with current pathological fracture    Vitamin D deficiency    Mild cognitive impairment    Hypertension    Anxiety    Chronic pain of right knee    Arthritis of knee, right    Rheumatoid arthritis, unspecified (HCC)    Personal history of transient ischemic attack (TIA), and cerebral infarction without residual deficits    Personal history of breast cancer    Major neurocognitive disorder, due to vascular disease, without behavioral disturbance, mild    Hereditary and idiopathic neuropathy, unspecified    B12 deficiency    Constipation    Difficult or painful urination    Allergic rhinitis due to allergen    Vesicovaginal fistula    History of CVA (cerebrovascular accident)    Ambulatory dysfunction    Adjustment disorder with anxious mood    Asthma    Anemia    Multiple rib fractures    Acute pain due to trauma    L1 vertebral fracture (HCC)    Zygomatic fracture (HCC)    Ingrown nail    Dysphagia    Recurrent falls    Syncope    Closed fracture of one rib  of left side    Closed fracture of acromial end of right clavicle    At risk for delirium    Insomnia    [place] (hard of hearing)    At risk for aspiration         Medical Problems       Resolved Problems  Date Reviewed: 9/9/2024            Resolved    RYAN (acute kidney injury) ([facility]) 9/9/2024     Resolved by  Tres Oviedo PA-C          Condition at Discharge: good         Discharge instructions/Information to patient and family:   See after visit summary for information provided to patient and family.      Provisions for Follow-Up Care:  See after visit summary for information related to follow-up care and any pertinent home health orders.      PCP: Tuyet Cantor MD    Disposition:  rehab    Planned Readmission: No      Discharge Statement   I spent 22 minutes discharging the patient. This time was spent on the day of discharge. I had direct contact with the patient on the day of discharge. Additional documentation is required if more than 30 minutes were spent on discharge.     Discharge Medications:  See after visit summary for reconciled discharge medications provided to patient and family.

## 2024-09-09 NOTE — CASE MANAGEMENT
Case Management Discharge Planning Note    Patient name Doris Campbell  Location W /W -01 MRN [id number]  : 1940 Date 2024       Current Admission Date: 2024  Current Admission Diagnosis:Closed fracture of one rib of left side   Patient Active Problem List    Diagnosis Date Noted Date Diagnosed    At risk for aspiration 2024     Closed fracture of one rib of left side 2024     Closed fracture of acromial end of right clavicle 2024     At risk for delirium 2024     Insomnia 2024     [place] (hard of hearing) 2024     Syncope 2024     Dysphagia 2024     Recurrent falls 2024     Ingrown nail 2024     Multiple rib fractures 2024     Acute pain due to trauma 2024     L1 vertebral fracture (HCC) 2024     Zygomatic fracture (HCC) 2024     Anemia 2023     Asthma 2023     Adjustment disorder with anxious mood 2022     History of CVA (cerebrovascular accident) 2022     Ambulatory dysfunction 2022     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     Allergic rhinitis due to allergen 2022     Chronic pain of right knee      Arthritis of knee, right      Difficult or painful urination 2020     Hereditary and idiopathic neuropathy, unspecified 2020     Rheumatoid arthritis, unspecified (HCC) 2020     Personal history of transient ischemic attack (TIA), and cerebral infarction without residual deficits 2020     Personal history of breast cancer 2020     Constipation 2020     Anxiety      Hypertension 11/10/2020     Mild cognitive impairment      Vitamin D deficiency 10/22/2020     Age-related osteoporosis with current pathological fracture 10/21/2020     Fall 10/19/2020     B12 deficiency 2018     Major neurocognitive disorder, due to vascular disease, without behavioral disturbance, mild 2017     HLD (hyperlipidemia)        LOS (days): 4  Geometric  Mean LOS (GMLOS) (days): 2.8  Days to GMLOS:-1     OBJECTIVE:  Risk of Unplanned Readmission Score: 33.09         Current admission status: Inpatient   Preferred Pharmacy:   [facility] [place] [place] - [address]  [address] [address]  Phone: [phone] Fax: [phone]    Primary Care Provider: Tuyet Cantor MD    Primary Insurance: MEDICARE  Secondary Insurance: BLUE CROSS    DISCHARGE DETAILS:    Discharge planning discussed with:: Patricia MENDOZA  Freedom of Choice: Yes  Comments - Freedom of Choice: Pt is stable for DC and POA has chosen for [place] to go [place].  Pt will be able to DC today.  Transportation is needed for DC.      Contacts  Patient Contacts: Patricia  Relationship to Patient:: Family  Contact Method: Phone  Reason/Outcome: Continuity of Care, Emergency Contact, Referral, Discharge Planning       Other Referral/Resources/Interventions Provided:  Interventions: Transportation  Referral Comments: Pt is stable for DC and is in need of transportation for today.  [facility] has been requested for 1700 to [facility] [place]  [facility]  All parties involved have been notified of DC arrangements.    Would you like to participate in our [facility] service program?  : No - Declined    Treatment Team Recommendation: Short Term Rehab  Discharge Destination Plan:: Short Term Rehab  Transport at Discharge : [facility]  Dispatcher Contacted: Yes  Number/Name of Dispatcher: lexie  Transported by (Company and Unit #): [employer] [facility]  ETA of Transport (Date): 09/09/24  ETA of Transport (Time): 1900     Transfer Mode: Stretcher  Accompanied by: EMS personnel  Transfer Equipment: BLS devices  IMM Given (Date):: 09/09/24  IMM Given to:: Family  Family notified:: POA     IMM reviewed with  POA ,  POA  agrees with discharge determination.

## 2024-09-19 ENCOUNTER — APPOINTMENT (OUTPATIENT)
Dept: RADIOLOGY | Facility: AMBULARY SURGERY CENTER | Age: 84
End: 2024-09-19
Attending: STUDENT IN AN ORGANIZED HEALTH CARE EDUCATION/TRAINING PROGRAM
Payer: MEDICARE

## 2024-09-19 ENCOUNTER — OFFICE VISIT (OUTPATIENT)
Dept: OBGYN CLINIC | Facility: CLINIC | Age: 84
End: 2024-09-19
Payer: MEDICARE

## 2024-09-19 VITALS — BODY MASS INDEX: 22.27 KG/M2 | HEIGHT: 63 IN | WEIGHT: 125.66 LBS

## 2024-09-19 DIAGNOSIS — S22.32XA CLOSED FRACTURE OF ONE RIB OF LEFT SIDE, INITIAL ENCOUNTER: ICD-10-CM

## 2024-09-19 DIAGNOSIS — S42.034A CLOSED NONDISPLACED FRACTURE OF ACROMIAL END OF RIGHT CLAVICLE, INITIAL ENCOUNTER: ICD-10-CM

## 2024-09-19 DIAGNOSIS — S42.034A CLOSED NONDISPLACED FRACTURE OF ACROMIAL END OF RIGHT CLAVICLE, INITIAL ENCOUNTER: Primary | ICD-10-CM

## 2024-09-19 PROCEDURE — 73030 X-RAY EXAM OF SHOULDER: CPT

## 2024-09-19 PROCEDURE — 99213 OFFICE O/P EST LOW 20 MIN: CPT | Performed by: STUDENT IN AN ORGANIZED HEALTH CARE EDUCATION/TRAINING PROGRAM

## 2024-09-19 NOTE — PATIENT INSTRUCTIONS
Assessment:   Right nondisplaced clavicle fracture sustained on 9/5/24    Plan:   X-rays reviewed and discussed with patient revealing a nondisplaced distal clavicle fracture  ROM as tolerated  Patient is very hard of hearing. Her treatment plan was written on her nursing home paperwork   She is to discontinue use of sling  Weightbearing as tolerated range of motion as tolerated right upper extremity  Pt to continue at home analgesic regimen with Tylenol   Pt to follow up in 8 weeks for repeat x-ray and re-evaluation   _________________

## 2024-09-19 NOTE — PROGRESS NOTES
Orthopaedics Office Visit - New Patient Visit    ASSESSMENT/PLAN:    Assessment:   Right nondisplaced clavicle fracture sustained on 9/5/24    Plan:   X-rays reviewed and discussed with patient revealing a nondisplaced distal clavicle fracture  ROM as tolerated  Patient is very hard of hearing. Her treatment plan was written on her nursing home paperwork   She is to discontinue use of sling  Weightbearing as tolerated range of motion as tolerated right upper extremity  Pt to continue at home analgesic regimen with Tylenol   Pt to follow up in 8 weeks for repeat x-ray and re-evaluation   _____________________________________________________  CHIEF COMPLAINT:  Chief Complaint   Patient presents with    Right Shoulder - Pain, Fracture         SUBJECTIVE:  Doris Campbell is a 83 y.o. female who presents today for an initial visit for her right clavicle fracture. Patient reports that she fell on 9/5/24 landing on her right side. She was seen in the ED and provided with a sling and then referred here to orthopedics. Minimal history obtained due to the patient being alone in the room today and she is very hard of hearing. She presented today in a sling.     PAST MEDICAL HISTORY:  Past Medical History:   Diagnosis Date    Allergic     Anxiety     Arthritis     Cancer (HCC)     HLD (hyperlipidemia)     Hypertension     Hyperthyroidism     Osteoporosis     Stroke (HCC)     TIA (transient ischemic attack)        PAST SURGICAL HISTORY:  Past Surgical History:   Procedure Laterality Date    BREAST LUMPECTOMY Left     HYSTERECTOMY      KNEE SURGERY Right     ORIF TIBIA FRACTURE Right 12/8/2016    Procedure: OPEN REDUCTION W/ INTERNAL FIXATION (ORIF) TIBIA;  Surgeon: Angie Case MD;  Location: [facility];  Service:        FAMILY HISTORY:  Family History   Problem Relation Age of Onset    Coronary artery disease Family     Other Family         DJD       SOCIAL HISTORY:  Social History     Tobacco Use    Smoking status:  Trauma SBIRT attempted. Staff was in with pt. Never    Smokeless tobacco: Never    Tobacco comments:     Former smoker - As per [facility]    Vaping Use    Vaping status: Never Used   Substance Use Topics    Alcohol use: Not Currently     Comment: Alcohol intake:   Occasional  - As per [facility]     Drug use: Never       MEDICATIONS:    Current Outpatient Medications:     acetaminophen (TYLENOL) 325 mg tablet, Take 975 mg by mouth every 8 (eight) hours as needed, Disp: , Rfl:     albuterol (PROVENTIL HFA,VENTOLIN HFA) 90 mcg/act inhaler, Inhale 2 puffs every 6 (six) hours as needed for wheezing, Disp: , Rfl:     amLODIPine (NORVASC) 2.5 mg tablet, Take 2.5 mg by mouth daily, Disp: , Rfl:     Aspirin Low Dose 81 MG chewable tablet, CHEW 1 TABLET BY MOUTH DAILY, Disp: 90 tablet, Rfl: 1    calcitonin, salmon, (MIACALCIN) 200 units/act nasal spray, 1 spray into each nostril daily, Disp: , Rfl:     cholecalciferol (VITAMIN D3) 1,000 units tablet, Take 1 tablet (1,000 Units total) by mouth daily, Disp: 90 tablet, Rfl: 2    clotrimazole-betamethasone (LOTRISONE) 1-0.05 % cream, Apply topically 2 (two) times a day, Disp: , Rfl:     Diclofenac Sodium (VOLTAREN) 1 %, Apply 2 g topically 4 (four) times a day = To right hip and pelvis, Disp: 2 g, Rfl: 0    fluticasone (FLONASE) 50 mcg/act nasal spray, 1 spray into each nostril daily, Disp: 16 mL, Rfl: 2    gabapentin (NEURONTIN) 100 mg capsule, Take 1 capsule (100 mg total) by mouth daily, Disp: 30 capsule, Rfl: 0    magnesium hydroxide (MILK OF MAGNESIA) 400 mg/5 mL oral suspension, Take 30 mL by mouth 2 (two) times a day as needed, Disp: , Rfl:     mirtazapine (REMERON) 7.5 MG tablet, Take 1 tablet (7.5 mg total) by mouth daily at bedtime, Disp: , Rfl:     senna-docusate sodium (SENOKOT S) 8.6-50 mg per tablet, Take 1 tablet by mouth 2 (two) times a day, Disp: 60 tablet, Rfl: 0    ALLERGIES:  Allergies   Allergen Reactions    Alendronate     Raloxifene     Risedronate Sodium     Sulfa Antibiotics Swelling    Penicillins  "Swelling     Patient received 7 day treatment course Keflex without any adverse reactions in 11/2022.        REVIEW OF SYSTEMS:  MSK: Clavicle fracture  Neuro: Normal  Pertinent items are otherwise noted in HPI.  A comprehensive review of systems was otherwise negative.    LABS:  HgA1c:   Lab Results   Component Value Date    HGBA1C 5.7 (H) 01/25/2018     BMP:   Lab Results   Component Value Date    GLUCOSE 136 10/19/2020    CALCIUM 8.3 (L) 09/08/2024    K 4.9 09/08/2024    CO2 21 09/08/2024     09/08/2024    BUN 23 09/08/2024    CREATININE 0.91 09/08/2024     CBC: No components found for: \"CBC\"    _____________________________________________________  PHYSICAL EXAMINATION:  Vital signs: Ht 5' 3\" (1.6 m)   Wt 57 kg (125 lb 10.6 oz)   BMI 22.26 kg/m²   General: No acute distress, awake and alert  Psychiatric: Mood and affect appear appropriate  HEENT: Trachea Midline, No torticollis, no apparent facial trauma  Cardiovascular: No audible murmurs; Extremities appear perfused  Pulmonary: No audible wheezing or stridor  Skin: No open lesions; see further details (if any) below    MUSCULOSKELETAL EXAMINATION:  Extremities:  The right upper extremity was exposed and inspected. Visible skin intact without erythema, ecchymosis, effusion or obvious osseous deformity. TTP over distal one third of the clavicle.  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  Unable to appropriately assess neurologic exam as the patient is unable to follow commands.  Moving the hand spontaneously.  Limb is well perfused. Compartments soft and compressible.         _____________________________________________________  STUDIES REVIEWED:  I personally reviewed the images and interpretation is as follows:  X Ray Right Clavicle demonstrates nondisplaced fracture of the distal clavicle.     PROCEDURES " PERFORMED:  Procedures    Scribe Attestation      I,:  Lino Tao am acting as a scribe while in the presence of the attending physician.:       I,:  Skyler Yates, DO personally performed the services described in this documentation    as scribed in my presence.:

## 2024-12-29 NOTE — ASSESSMENT & PLAN NOTE
Severe osteoporosis according to DEXA scan in 2019. T score lumbar spine -4.8, femoral neck -4.7. History of recent fall in April with multiple rib fracture, L1, L2, L4 fracture, zygomatic bone fractur.  According to allergy panel patient allergic to alendronate and raloxifene.  Currently on intranasal calcitonin daily, alternating nostrils     Cognitively Impaired

## 2025-05-06 NOTE — ASSESSMENT & PLAN NOTE
Forwarding to [facility] for review and approval.     Thank you,   [facility] Staff.      Reports cough overnight   Was tested for COVID-19, negative   Lungs clear, afebrile  Will order robitussin prn